# Patient Record
Sex: FEMALE | Race: WHITE | Employment: UNEMPLOYED | URBAN - METROPOLITAN AREA
[De-identification: names, ages, dates, MRNs, and addresses within clinical notes are randomized per-mention and may not be internally consistent; named-entity substitution may affect disease eponyms.]

---

## 2017-01-01 ENCOUNTER — APPOINTMENT (OUTPATIENT)
Dept: GENERAL RADIOLOGY | Age: 62
DRG: 216 | End: 2017-01-01
Attending: INTERNAL MEDICINE
Payer: MEDICARE

## 2017-01-01 ENCOUNTER — APPOINTMENT (OUTPATIENT)
Dept: ULTRASOUND IMAGING | Age: 62
DRG: 216 | End: 2017-01-01
Attending: INTERNAL MEDICINE
Payer: MEDICARE

## 2017-01-01 ENCOUNTER — APPOINTMENT (OUTPATIENT)
Dept: INTERVENTIONAL RADIOLOGY/VASCULAR | Age: 62
DRG: 216 | End: 2017-01-01
Attending: INTERNAL MEDICINE
Payer: MEDICARE

## 2017-01-01 ENCOUNTER — APPOINTMENT (OUTPATIENT)
Dept: GENERAL RADIOLOGY | Age: 62
DRG: 216 | End: 2017-01-01
Attending: SURGERY
Payer: MEDICARE

## 2017-01-01 ENCOUNTER — APPOINTMENT (OUTPATIENT)
Dept: CT IMAGING | Age: 62
End: 2017-01-01
Attending: NURSE PRACTITIONER
Payer: MEDICARE

## 2017-01-01 ENCOUNTER — ANESTHESIA (OUTPATIENT)
Dept: SURGERY | Age: 62
DRG: 216 | End: 2017-01-01
Payer: MEDICARE

## 2017-01-01 ENCOUNTER — HOSPITAL ENCOUNTER (INPATIENT)
Age: 62
LOS: 39 days | Discharge: HOSPICE | DRG: 216 | End: 2017-04-03
Attending: EMERGENCY MEDICINE | Admitting: INTERNAL MEDICINE
Payer: MEDICARE

## 2017-01-01 ENCOUNTER — ANESTHESIA EVENT (OUTPATIENT)
Dept: SURGERY | Age: 62
DRG: 216 | End: 2017-01-01
Payer: MEDICARE

## 2017-01-01 ENCOUNTER — HOSPICE ADMISSION (OUTPATIENT)
Dept: HOSPICE | Facility: HOSPICE | Age: 62
End: 2017-01-01

## 2017-01-01 ENCOUNTER — HOSPITAL ENCOUNTER (OUTPATIENT)
Dept: LAB | Age: 62
Discharge: HOME | End: 2017-02-21
Payer: MEDICARE

## 2017-01-01 ENCOUNTER — APPOINTMENT (OUTPATIENT)
Dept: GENERAL RADIOLOGY | Age: 62
DRG: 216 | End: 2017-01-01
Attending: UROLOGY
Payer: MEDICARE

## 2017-01-01 ENCOUNTER — OFFICE VISIT (OUTPATIENT)
Dept: FAMILY MEDICINE CLINIC | Age: 62
End: 2017-01-01

## 2017-01-01 ENCOUNTER — APPOINTMENT (OUTPATIENT)
Dept: CT IMAGING | Age: 62
DRG: 216 | End: 2017-01-01
Attending: SURGERY
Payer: MEDICARE

## 2017-01-01 ENCOUNTER — ANESTHESIA EVENT (OUTPATIENT)
Dept: NON INVASIVE DIAGNOSTICS | Age: 62
DRG: 216 | End: 2017-01-01
Payer: MEDICARE

## 2017-01-01 ENCOUNTER — APPOINTMENT (OUTPATIENT)
Dept: GENERAL RADIOLOGY | Age: 62
End: 2017-01-01
Attending: NURSE PRACTITIONER
Payer: MEDICARE

## 2017-01-01 ENCOUNTER — PATIENT OUTREACH (OUTPATIENT)
Dept: FAMILY MEDICINE CLINIC | Age: 62
End: 2017-01-01

## 2017-01-01 ENCOUNTER — ANESTHESIA (OUTPATIENT)
Dept: NON INVASIVE DIAGNOSTICS | Age: 62
DRG: 216 | End: 2017-01-01
Payer: MEDICARE

## 2017-01-01 ENCOUNTER — APPOINTMENT (OUTPATIENT)
Dept: ULTRASOUND IMAGING | Age: 62
DRG: 216 | End: 2017-01-01
Attending: UROLOGY
Payer: MEDICARE

## 2017-01-01 ENCOUNTER — APPOINTMENT (OUTPATIENT)
Dept: GENERAL RADIOLOGY | Age: 62
DRG: 216 | End: 2017-01-01
Attending: RADIOLOGY
Payer: MEDICARE

## 2017-01-01 ENCOUNTER — HOSPITAL ENCOUNTER (EMERGENCY)
Age: 62
Discharge: OTHER INSTITUTION | End: 2017-02-23
Attending: EMERGENCY MEDICINE
Payer: MEDICARE

## 2017-01-01 VITALS
OXYGEN SATURATION: 95 % | DIASTOLIC BLOOD PRESSURE: 52 MMHG | WEIGHT: 207.8 LBS | BODY MASS INDEX: 32.62 KG/M2 | HEART RATE: 80 BPM | SYSTOLIC BLOOD PRESSURE: 142 MMHG | HEIGHT: 67 IN | TEMPERATURE: 98 F | RESPIRATION RATE: 18 BRPM

## 2017-01-01 VITALS
HEIGHT: 66 IN | HEART RATE: 65 BPM | RESPIRATION RATE: 16 BRPM | OXYGEN SATURATION: 97 % | WEIGHT: 170.25 LBS | TEMPERATURE: 97.7 F | DIASTOLIC BLOOD PRESSURE: 98 MMHG | SYSTOLIC BLOOD PRESSURE: 152 MMHG | BODY MASS INDEX: 27.36 KG/M2

## 2017-01-01 VITALS
TEMPERATURE: 98.5 F | HEIGHT: 66 IN | RESPIRATION RATE: 21 BRPM | OXYGEN SATURATION: 93 % | BODY MASS INDEX: 27.64 KG/M2 | DIASTOLIC BLOOD PRESSURE: 50 MMHG | WEIGHT: 171.96 LBS | HEART RATE: 84 BPM | SYSTOLIC BLOOD PRESSURE: 122 MMHG

## 2017-01-01 VITALS
WEIGHT: 171.25 LBS | OXYGEN SATURATION: 96 % | HEIGHT: 66 IN | BODY MASS INDEX: 27.52 KG/M2 | SYSTOLIC BLOOD PRESSURE: 140 MMHG | TEMPERATURE: 97.3 F | RESPIRATION RATE: 16 BRPM | DIASTOLIC BLOOD PRESSURE: 86 MMHG | HEART RATE: 56 BPM

## 2017-01-01 DIAGNOSIS — I70.1 RENAL ARTERY ARTERIOSCLEROSIS (HCC): ICD-10-CM

## 2017-01-01 DIAGNOSIS — I71.00 DISSECTION OF AORTA, UNSPECIFIED PORTION OF AORTA (HCC): Primary | ICD-10-CM

## 2017-01-01 DIAGNOSIS — I10 ESSENTIAL HYPERTENSION: ICD-10-CM

## 2017-01-01 DIAGNOSIS — J32.0 MAXILLARY SINUSITIS, UNSPECIFIED CHRONICITY: Primary | ICD-10-CM

## 2017-01-01 DIAGNOSIS — E78.00 HYPERCHOLESTEROLEMIA: ICD-10-CM

## 2017-01-01 DIAGNOSIS — F41.9 ANXIETY: ICD-10-CM

## 2017-01-01 DIAGNOSIS — E03.1 CONGENITAL HYPOTHYROIDISM WITHOUT GOITER: ICD-10-CM

## 2017-01-01 DIAGNOSIS — I71.012 DISSECTION OF DESCENDING THORACIC AORTA: Primary | ICD-10-CM

## 2017-01-01 DIAGNOSIS — K29.70 VIRAL GASTRITIS: Primary | ICD-10-CM

## 2017-01-01 LAB
ABO + RH BLD: NORMAL
ACT BLD: 147 SECS (ref 79–138)
ALBUMIN SERPL BCP-MCNC: 1.5 G/DL (ref 3.5–5)
ALBUMIN SERPL BCP-MCNC: 1.5 G/DL (ref 3.5–5)
ALBUMIN SERPL BCP-MCNC: 1.6 G/DL (ref 3.5–5)
ALBUMIN SERPL BCP-MCNC: 1.7 G/DL (ref 3.5–5)
ALBUMIN SERPL BCP-MCNC: 2 G/DL (ref 3.5–5)
ALBUMIN SERPL BCP-MCNC: 2 G/DL (ref 3.5–5)
ALBUMIN SERPL BCP-MCNC: 2.1 G/DL (ref 3.5–5)
ALBUMIN SERPL BCP-MCNC: 2.2 G/DL (ref 3.5–5)
ALBUMIN SERPL BCP-MCNC: 2.3 G/DL (ref 3.5–5)
ALBUMIN SERPL BCP-MCNC: 2.4 G/DL (ref 3.5–5)
ALBUMIN SERPL BCP-MCNC: 2.5 G/DL (ref 3.5–5)
ALBUMIN SERPL BCP-MCNC: 2.5 G/DL (ref 3.5–5)
ALBUMIN SERPL BCP-MCNC: 3.1 G/DL (ref 3.5–5)
ALBUMIN SERPL-MCNC: 4.6 G/DL (ref 3.6–4.8)
ALBUMIN/GLOB SERPL: 0.3 {RATIO} (ref 1.1–2.2)
ALBUMIN/GLOB SERPL: 0.4 {RATIO} (ref 1.1–2.2)
ALBUMIN/GLOB SERPL: 0.5 {RATIO} (ref 1.1–2.2)
ALBUMIN/GLOB SERPL: 0.6 {RATIO} (ref 1.1–2.2)
ALBUMIN/GLOB SERPL: 0.7 {RATIO} (ref 1.1–2.2)
ALBUMIN/GLOB SERPL: 0.8 {RATIO} (ref 1.1–2.2)
ALBUMIN/GLOB SERPL: 0.9 {RATIO} (ref 1.1–2.2)
ALBUMIN/GLOB SERPL: 1.5 {RATIO} (ref 1.1–2.5)
ALP SERPL-CCNC: 101 U/L (ref 45–117)
ALP SERPL-CCNC: 103 U/L (ref 45–117)
ALP SERPL-CCNC: 104 U/L (ref 45–117)
ALP SERPL-CCNC: 107 U/L (ref 45–117)
ALP SERPL-CCNC: 109 U/L (ref 45–117)
ALP SERPL-CCNC: 117 U/L (ref 45–117)
ALP SERPL-CCNC: 118 U/L (ref 45–117)
ALP SERPL-CCNC: 120 U/L (ref 45–117)
ALP SERPL-CCNC: 121 U/L (ref 45–117)
ALP SERPL-CCNC: 130 U/L (ref 45–117)
ALP SERPL-CCNC: 135 U/L (ref 45–117)
ALP SERPL-CCNC: 136 U/L (ref 45–117)
ALP SERPL-CCNC: 142 U/L (ref 45–117)
ALP SERPL-CCNC: 147 U/L (ref 45–117)
ALP SERPL-CCNC: 148 U/L (ref 45–117)
ALP SERPL-CCNC: 148 U/L (ref 45–117)
ALP SERPL-CCNC: 156 U/L (ref 45–117)
ALP SERPL-CCNC: 157 U/L (ref 45–117)
ALP SERPL-CCNC: 159 U/L (ref 45–117)
ALP SERPL-CCNC: 162 U/L (ref 45–117)
ALP SERPL-CCNC: 162 U/L (ref 45–117)
ALP SERPL-CCNC: 166 U/L (ref 45–117)
ALP SERPL-CCNC: 169 U/L (ref 45–117)
ALP SERPL-CCNC: 177 U/L (ref 45–117)
ALP SERPL-CCNC: 182 U/L (ref 45–117)
ALP SERPL-CCNC: 182 U/L (ref 45–117)
ALP SERPL-CCNC: 199 U/L (ref 45–117)
ALP SERPL-CCNC: 203 U/L (ref 45–117)
ALP SERPL-CCNC: 79 U/L (ref 45–117)
ALP SERPL-CCNC: 85 IU/L (ref 39–117)
ALP SERPL-CCNC: 95 U/L (ref 45–117)
ALT SERPL-CCNC: 111 U/L (ref 12–78)
ALT SERPL-CCNC: 112 U/L (ref 12–78)
ALT SERPL-CCNC: 114 U/L (ref 12–78)
ALT SERPL-CCNC: 146 U/L (ref 12–78)
ALT SERPL-CCNC: 17 IU/L (ref 0–32)
ALT SERPL-CCNC: 17 U/L (ref 12–78)
ALT SERPL-CCNC: 18 U/L (ref 12–78)
ALT SERPL-CCNC: 198 U/L (ref 12–78)
ALT SERPL-CCNC: 20 U/L (ref 12–78)
ALT SERPL-CCNC: 22 U/L (ref 12–78)
ALT SERPL-CCNC: 22 U/L (ref 12–78)
ALT SERPL-CCNC: 23 U/L (ref 12–78)
ALT SERPL-CCNC: 25 U/L (ref 12–78)
ALT SERPL-CCNC: 25 U/L (ref 12–78)
ALT SERPL-CCNC: 29 U/L (ref 12–78)
ALT SERPL-CCNC: 34 U/L (ref 12–78)
ALT SERPL-CCNC: 35 U/L (ref 12–78)
ALT SERPL-CCNC: 37 U/L (ref 12–78)
ALT SERPL-CCNC: 41 U/L (ref 12–78)
ALT SERPL-CCNC: 42 U/L (ref 12–78)
ALT SERPL-CCNC: 42 U/L (ref 12–78)
ALT SERPL-CCNC: 43 U/L (ref 12–78)
ALT SERPL-CCNC: 50 U/L (ref 12–78)
ALT SERPL-CCNC: 53 U/L (ref 12–78)
ALT SERPL-CCNC: 57 U/L (ref 12–78)
ALT SERPL-CCNC: 58 U/L (ref 12–78)
ALT SERPL-CCNC: 63 U/L (ref 12–78)
ALT SERPL-CCNC: 66 U/L (ref 12–78)
ALT SERPL-CCNC: 72 U/L (ref 12–78)
ALT SERPL-CCNC: 80 U/L (ref 12–78)
ALT SERPL-CCNC: 81 U/L (ref 12–78)
ANION GAP BLD CALC-SCNC: 10 MMOL/L (ref 5–15)
ANION GAP BLD CALC-SCNC: 11 MMOL/L (ref 5–15)
ANION GAP BLD CALC-SCNC: 12 MMOL/L (ref 5–15)
ANION GAP BLD CALC-SCNC: 13 MMOL/L (ref 5–15)
ANION GAP BLD CALC-SCNC: 14 MMOL/L (ref 5–15)
ANION GAP BLD CALC-SCNC: 15 MMOL/L (ref 5–15)
ANION GAP BLD CALC-SCNC: 6 MMOL/L (ref 5–15)
ANION GAP BLD CALC-SCNC: 7 MMOL/L (ref 5–15)
ANION GAP BLD CALC-SCNC: 9 MMOL/L (ref 5–15)
ANTI-COMPLEMENT (C3B,C3D): NORMAL
APPEARANCE UR: ABNORMAL
ARTERIAL PATENCY WRIST A: ABNORMAL
ARTERIAL PATENCY WRIST A: YES
AST SERPL W P-5'-P-CCNC: 107 U/L (ref 15–37)
AST SERPL W P-5'-P-CCNC: 121 U/L (ref 15–37)
AST SERPL W P-5'-P-CCNC: 138 U/L (ref 15–37)
AST SERPL W P-5'-P-CCNC: 142 U/L (ref 15–37)
AST SERPL W P-5'-P-CCNC: 146 U/L (ref 15–37)
AST SERPL W P-5'-P-CCNC: 155 U/L (ref 15–37)
AST SERPL W P-5'-P-CCNC: 16 U/L (ref 15–37)
AST SERPL W P-5'-P-CCNC: 17 U/L (ref 15–37)
AST SERPL W P-5'-P-CCNC: 20 U/L (ref 15–37)
AST SERPL W P-5'-P-CCNC: 20 U/L (ref 15–37)
AST SERPL W P-5'-P-CCNC: 23 U/L (ref 15–37)
AST SERPL W P-5'-P-CCNC: 249 U/L (ref 15–37)
AST SERPL W P-5'-P-CCNC: 25 U/L (ref 15–37)
AST SERPL W P-5'-P-CCNC: 25 U/L (ref 15–37)
AST SERPL W P-5'-P-CCNC: 26 U/L (ref 15–37)
AST SERPL W P-5'-P-CCNC: 27 U/L (ref 15–37)
AST SERPL W P-5'-P-CCNC: 27 U/L (ref 15–37)
AST SERPL W P-5'-P-CCNC: 32 U/L (ref 15–37)
AST SERPL W P-5'-P-CCNC: 32 U/L (ref 15–37)
AST SERPL W P-5'-P-CCNC: 35 U/L (ref 15–37)
AST SERPL W P-5'-P-CCNC: 55 U/L (ref 15–37)
AST SERPL W P-5'-P-CCNC: 58 U/L (ref 15–37)
AST SERPL W P-5'-P-CCNC: 61 U/L (ref 15–37)
AST SERPL W P-5'-P-CCNC: 64 U/L (ref 15–37)
AST SERPL W P-5'-P-CCNC: 65 U/L (ref 15–37)
AST SERPL W P-5'-P-CCNC: 68 U/L (ref 15–37)
AST SERPL W P-5'-P-CCNC: 76 U/L (ref 15–37)
AST SERPL W P-5'-P-CCNC: 79 U/L (ref 15–37)
AST SERPL W P-5'-P-CCNC: 88 U/L (ref 15–37)
AST SERPL W P-5'-P-CCNC: 89 U/L (ref 15–37)
AST SERPL-CCNC: 23 IU/L (ref 0–40)
ATRIAL RATE: 70 BPM
ATRIAL RATE: 77 BPM
BACTERIA SPEC CULT: ABNORMAL
BACTERIA SPEC CULT: NORMAL
BACTERIA SPEC CULT: NORMAL
BACTERIA URNS QL MICRO: NEGATIVE /HPF
BASE DEFICIT BLDA-SCNC: 0.6 MMOL/L
BASE DEFICIT BLDA-SCNC: 1.1 MMOL/L
BASE DEFICIT BLDA-SCNC: 1.3 MMOL/L
BASE DEFICIT BLDA-SCNC: 1.4 MMOL/L
BASE DEFICIT BLDA-SCNC: 1.5 MMOL/L
BASE DEFICIT BLDA-SCNC: 2.6 MMOL/L
BASE DEFICIT BLDA-SCNC: 2.6 MMOL/L
BASE DEFICIT BLDA-SCNC: 4.5 MMOL/L
BASE DEFICIT BLDA-SCNC: 4.5 MMOL/L
BASE DEFICIT BLDA-SCNC: 4.7 MMOL/L
BASE DEFICIT BLDA-SCNC: 5.2 MMOL/L
BASE DEFICIT BLDA-SCNC: 7.9 MMOL/L
BASE DEFICIT BLDA-SCNC: 8.3 MMOL/L
BASE DEFICIT BLDA-SCNC: 9.1 MMOL/L
BASE EXCESS BLDA CALC-SCNC: 0.2 MMOL/L
BASOPHILS # BLD AUTO: 0 K/UL
BASOPHILS # BLD AUTO: 0 K/UL (ref 0–0.1)
BASOPHILS # BLD AUTO: 0 X10E3/UL (ref 0–0.2)
BASOPHILS # BLD AUTO: 0.1 K/UL (ref 0–0.1)
BASOPHILS # BLD AUTO: 0.1 K/UL (ref 0–0.1)
BASOPHILS # BLD: 0 %
BASOPHILS # BLD: 0 % (ref 0–1)
BASOPHILS # BLD: 1 % (ref 0–1)
BASOPHILS # BLD: 1 % (ref 0–1)
BASOPHILS NFR BLD AUTO: 0 %
BDY SITE: ABNORMAL
BILIRUB SERPL-MCNC: 0.2 MG/DL (ref 0.2–1)
BILIRUB SERPL-MCNC: 0.3 MG/DL (ref 0.2–1)
BILIRUB SERPL-MCNC: 0.3 MG/DL (ref 0–1.2)
BILIRUB SERPL-MCNC: 0.4 MG/DL (ref 0.2–1)
BILIRUB SERPL-MCNC: 0.5 MG/DL (ref 0.2–1)
BILIRUB SERPL-MCNC: 0.6 MG/DL (ref 0.2–1)
BILIRUB SERPL-MCNC: 0.7 MG/DL (ref 0.2–1)
BILIRUB SERPL-MCNC: 0.8 MG/DL (ref 0.2–1)
BILIRUB SERPL-MCNC: 1 MG/DL (ref 0.2–1)
BILIRUB UR QL: NEGATIVE
BLD GP AB SCN SERPL QL ELUTION: NORMAL
BLD PROD TYP BPU: NORMAL
BLOOD BAG HEMOLYSIS,BLBAG: NORMAL
BLOOD BANK CMNT PATIENT-IMP: NORMAL
BLOOD GROUP ANTIBODIES SERPL: NORMAL
BNP SERPL-MCNC: 1319 PG/ML (ref 0–100)
BPU ID: NORMAL
BREATHS.SPONTANEOUS ON VENT: 18
BREATHS.SPONTANEOUS ON VENT: 28
BUN SERPL-MCNC: 10 MG/DL (ref 6–20)
BUN SERPL-MCNC: 11 MG/DL (ref 6–20)
BUN SERPL-MCNC: 12 MG/DL (ref 6–20)
BUN SERPL-MCNC: 12 MG/DL (ref 6–20)
BUN SERPL-MCNC: 14 MG/DL (ref 6–20)
BUN SERPL-MCNC: 16 MG/DL (ref 6–20)
BUN SERPL-MCNC: 18 MG/DL (ref 6–20)
BUN SERPL-MCNC: 20 MG/DL (ref 6–20)
BUN SERPL-MCNC: 23 MG/DL (ref 6–20)
BUN SERPL-MCNC: 23 MG/DL (ref 6–20)
BUN SERPL-MCNC: 25 MG/DL (ref 6–20)
BUN SERPL-MCNC: 26 MG/DL (ref 6–20)
BUN SERPL-MCNC: 27 MG/DL (ref 8–27)
BUN SERPL-MCNC: 30 MG/DL (ref 6–20)
BUN SERPL-MCNC: 33 MG/DL (ref 6–20)
BUN SERPL-MCNC: 33 MG/DL (ref 6–20)
BUN SERPL-MCNC: 37 MG/DL (ref 6–20)
BUN SERPL-MCNC: 39 MG/DL (ref 6–20)
BUN SERPL-MCNC: 39 MG/DL (ref 6–20)
BUN SERPL-MCNC: 42 MG/DL (ref 6–20)
BUN SERPL-MCNC: 51 MG/DL (ref 6–20)
BUN SERPL-MCNC: 52 MG/DL (ref 6–20)
BUN SERPL-MCNC: 54 MG/DL (ref 6–20)
BUN SERPL-MCNC: 55 MG/DL (ref 6–20)
BUN SERPL-MCNC: 56 MG/DL (ref 6–20)
BUN SERPL-MCNC: 62 MG/DL (ref 6–20)
BUN SERPL-MCNC: 64 MG/DL (ref 6–20)
BUN SERPL-MCNC: 66 MG/DL (ref 6–20)
BUN SERPL-MCNC: 67 MG/DL (ref 6–20)
BUN SERPL-MCNC: 68 MG/DL (ref 6–20)
BUN SERPL-MCNC: 72 MG/DL (ref 6–20)
BUN SERPL-MCNC: 73 MG/DL (ref 6–20)
BUN SERPL-MCNC: 74 MG/DL (ref 6–20)
BUN SERPL-MCNC: 78 MG/DL (ref 6–20)
BUN SERPL-MCNC: 80 MG/DL (ref 6–20)
BUN SERPL-MCNC: 80 MG/DL (ref 6–20)
BUN SERPL-MCNC: 81 MG/DL (ref 6–20)
BUN SERPL-MCNC: 9 MG/DL (ref 6–20)
BUN SERPL-MCNC: 94 MG/DL (ref 6–20)
BUN/CREAT SERPL: 10 (ref 12–20)
BUN/CREAT SERPL: 11 (ref 12–20)
BUN/CREAT SERPL: 13 (ref 12–20)
BUN/CREAT SERPL: 14 (ref 12–20)
BUN/CREAT SERPL: 16 (ref 11–26)
BUN/CREAT SERPL: 16 (ref 12–20)
BUN/CREAT SERPL: 17 (ref 12–20)
BUN/CREAT SERPL: 20 (ref 12–20)
BUN/CREAT SERPL: 20 (ref 12–20)
BUN/CREAT SERPL: 21 (ref 12–20)
BUN/CREAT SERPL: 21 (ref 12–20)
BUN/CREAT SERPL: 22 (ref 12–20)
BUN/CREAT SERPL: 23 (ref 12–20)
BUN/CREAT SERPL: 23 (ref 12–20)
BUN/CREAT SERPL: 24 (ref 12–20)
BUN/CREAT SERPL: 25 (ref 12–20)
BUN/CREAT SERPL: 25 (ref 12–20)
BUN/CREAT SERPL: 26 (ref 12–20)
BUN/CREAT SERPL: 26 (ref 12–20)
BUN/CREAT SERPL: 27 (ref 12–20)
BUN/CREAT SERPL: 29 (ref 12–20)
BUN/CREAT SERPL: 29 (ref 12–20)
BUN/CREAT SERPL: 30 (ref 12–20)
BUN/CREAT SERPL: 31 (ref 12–20)
BUN/CREAT SERPL: 32 (ref 12–20)
BUN/CREAT SERPL: 7 (ref 12–20)
BUN/CREAT SERPL: 7 (ref 12–20)
BUN/CREAT SERPL: 8 (ref 12–20)
BUN/CREAT SERPL: 9 (ref 12–20)
CALCIUM SERPL-MCNC: 10.7 MG/DL (ref 8.7–10.3)
CALCIUM SERPL-MCNC: 7.2 MG/DL (ref 8.5–10.1)
CALCIUM SERPL-MCNC: 7.5 MG/DL (ref 8.5–10.1)
CALCIUM SERPL-MCNC: 7.5 MG/DL (ref 8.5–10.1)
CALCIUM SERPL-MCNC: 7.7 MG/DL (ref 8.5–10.1)
CALCIUM SERPL-MCNC: 7.8 MG/DL (ref 8.5–10.1)
CALCIUM SERPL-MCNC: 7.9 MG/DL (ref 8.5–10.1)
CALCIUM SERPL-MCNC: 8 MG/DL (ref 8.5–10.1)
CALCIUM SERPL-MCNC: 8.1 MG/DL (ref 8.5–10.1)
CALCIUM SERPL-MCNC: 8.2 MG/DL (ref 8.5–10.1)
CALCIUM SERPL-MCNC: 8.3 MG/DL (ref 8.5–10.1)
CALCIUM SERPL-MCNC: 8.4 MG/DL (ref 8.5–10.1)
CALCIUM SERPL-MCNC: 8.5 MG/DL (ref 8.5–10.1)
CALCIUM SERPL-MCNC: 8.6 MG/DL (ref 8.5–10.1)
CALCIUM SERPL-MCNC: 8.7 MG/DL (ref 8.5–10.1)
CALCIUM SERPL-MCNC: 8.8 MG/DL (ref 8.5–10.1)
CALCIUM SERPL-MCNC: 8.9 MG/DL (ref 8.5–10.1)
CALCIUM SERPL-MCNC: 8.9 MG/DL (ref 8.5–10.1)
CALCIUM SERPL-MCNC: 9 MG/DL (ref 8.5–10.1)
CALCIUM SERPL-MCNC: 9 MG/DL (ref 8.5–10.1)
CALCIUM SERPL-MCNC: 9.1 MG/DL (ref 8.5–10.1)
CALCIUM SERPL-MCNC: 9.2 MG/DL (ref 8.5–10.1)
CALCULATED P AXIS, ECG09: 70 DEGREES
CALCULATED P AXIS, ECG09: 70 DEGREES
CALCULATED R AXIS, ECG10: -6 DEGREES
CALCULATED R AXIS, ECG10: 8 DEGREES
CALCULATED T AXIS, ECG11: 102 DEGREES
CALCULATED T AXIS, ECG11: 146 DEGREES
CC UR VC: ABNORMAL
CHLORIDE SERPL-SCNC: 100 MMOL/L (ref 97–108)
CHLORIDE SERPL-SCNC: 102 MMOL/L (ref 97–108)
CHLORIDE SERPL-SCNC: 103 MMOL/L (ref 97–108)
CHLORIDE SERPL-SCNC: 104 MMOL/L (ref 97–108)
CHLORIDE SERPL-SCNC: 105 MMOL/L (ref 97–108)
CHLORIDE SERPL-SCNC: 106 MMOL/L (ref 97–108)
CHLORIDE SERPL-SCNC: 107 MMOL/L (ref 97–108)
CHLORIDE SERPL-SCNC: 108 MMOL/L (ref 97–108)
CHLORIDE SERPL-SCNC: 109 MMOL/L (ref 97–108)
CHLORIDE SERPL-SCNC: 111 MMOL/L (ref 97–108)
CHLORIDE SERPL-SCNC: 111 MMOL/L (ref 97–108)
CHLORIDE SERPL-SCNC: 85 MMOL/L (ref 96–106)
CHLORIDE SERPL-SCNC: 93 MMOL/L (ref 97–108)
CHLORIDE SERPL-SCNC: 94 MMOL/L (ref 97–108)
CHLORIDE SERPL-SCNC: 96 MMOL/L (ref 97–108)
CHLORIDE SERPL-SCNC: 96 MMOL/L (ref 97–108)
CHLORIDE SERPL-SCNC: 97 MMOL/L (ref 97–108)
CHLORIDE SERPL-SCNC: 99 MMOL/L (ref 97–108)
CHLORIDE UR-SCNC: 19 MMOL/L
CHOLEST SERPL-MCNC: 263 MG/DL (ref 100–199)
CLERICAL ERRORS,CLERR: NORMAL
CO2 SERPL-SCNC: 18 MMOL/L (ref 21–32)
CO2 SERPL-SCNC: 19 MMOL/L (ref 21–32)
CO2 SERPL-SCNC: 20 MMOL/L (ref 21–32)
CO2 SERPL-SCNC: 21 MMOL/L (ref 21–32)
CO2 SERPL-SCNC: 22 MMOL/L (ref 21–32)
CO2 SERPL-SCNC: 23 MMOL/L (ref 21–32)
CO2 SERPL-SCNC: 24 MMOL/L (ref 21–32)
CO2 SERPL-SCNC: 25 MMOL/L (ref 21–32)
CO2 SERPL-SCNC: 26 MMOL/L (ref 18–29)
CO2 SERPL-SCNC: 26 MMOL/L (ref 21–32)
CO2 SERPL-SCNC: 26 MMOL/L (ref 21–32)
CO2 SERPL-SCNC: 27 MMOL/L (ref 21–32)
CO2 SERPL-SCNC: 30 MMOL/L (ref 21–32)
COLOR UR: ABNORMAL
CREAT SERPL-MCNC: 1.12 MG/DL (ref 0.55–1.02)
CREAT SERPL-MCNC: 1.16 MG/DL (ref 0.55–1.02)
CREAT SERPL-MCNC: 1.18 MG/DL (ref 0.55–1.02)
CREAT SERPL-MCNC: 1.21 MG/DL (ref 0.55–1.02)
CREAT SERPL-MCNC: 1.22 MG/DL (ref 0.55–1.02)
CREAT SERPL-MCNC: 1.22 MG/DL (ref 0.55–1.02)
CREAT SERPL-MCNC: 1.24 MG/DL (ref 0.55–1.02)
CREAT SERPL-MCNC: 1.28 MG/DL (ref 0.55–1.02)
CREAT SERPL-MCNC: 1.35 MG/DL (ref 0.55–1.02)
CREAT SERPL-MCNC: 1.43 MG/DL (ref 0.55–1.02)
CREAT SERPL-MCNC: 1.44 MG/DL (ref 0.55–1.02)
CREAT SERPL-MCNC: 1.47 MG/DL (ref 0.55–1.02)
CREAT SERPL-MCNC: 1.55 MG/DL (ref 0.55–1.02)
CREAT SERPL-MCNC: 1.56 MG/DL (ref 0.55–1.02)
CREAT SERPL-MCNC: 1.61 MG/DL (ref 0.55–1.02)
CREAT SERPL-MCNC: 1.61 MG/DL (ref 0.55–1.02)
CREAT SERPL-MCNC: 1.73 MG/DL (ref 0.57–1)
CREAT SERPL-MCNC: 1.76 MG/DL (ref 0.55–1.02)
CREAT SERPL-MCNC: 1.76 MG/DL (ref 0.55–1.02)
CREAT SERPL-MCNC: 1.81 MG/DL (ref 0.55–1.02)
CREAT SERPL-MCNC: 1.92 MG/DL (ref 0.55–1.02)
CREAT SERPL-MCNC: 1.96 MG/DL (ref 0.55–1.02)
CREAT SERPL-MCNC: 2.13 MG/DL (ref 0.55–1.02)
CREAT SERPL-MCNC: 2.21 MG/DL (ref 0.55–1.02)
CREAT SERPL-MCNC: 2.27 MG/DL (ref 0.55–1.02)
CREAT SERPL-MCNC: 2.31 MG/DL (ref 0.55–1.02)
CREAT SERPL-MCNC: 2.34 MG/DL (ref 0.55–1.02)
CREAT SERPL-MCNC: 2.46 MG/DL (ref 0.55–1.02)
CREAT SERPL-MCNC: 2.53 MG/DL (ref 0.55–1.02)
CREAT SERPL-MCNC: 2.61 MG/DL (ref 0.55–1.02)
CREAT SERPL-MCNC: 2.63 MG/DL (ref 0.55–1.02)
CREAT SERPL-MCNC: 2.73 MG/DL (ref 0.55–1.02)
CREAT SERPL-MCNC: 2.78 MG/DL (ref 0.55–1.02)
CREAT SERPL-MCNC: 2.81 MG/DL (ref 0.55–1.02)
CREAT SERPL-MCNC: 2.9 MG/DL (ref 0.55–1.02)
CREAT SERPL-MCNC: 2.95 MG/DL (ref 0.55–1.02)
CREAT SERPL-MCNC: 3 MG/DL (ref 0.55–1.02)
CREAT SERPL-MCNC: 3.04 MG/DL (ref 0.55–1.02)
CREAT SERPL-MCNC: 3.05 MG/DL (ref 0.55–1.02)
CREAT SERPL-MCNC: 3.07 MG/DL (ref 0.55–1.02)
CREAT SERPL-MCNC: 3.12 MG/DL (ref 0.55–1.02)
CREAT SERPL-MCNC: 3.18 MG/DL (ref 0.55–1.02)
CREAT SERPL-MCNC: 3.21 MG/DL (ref 0.55–1.02)
CREAT UR-MCNC: 58 MG/DL
CROSSMATCH RESULT,%XM: NORMAL
DAT IGG-SP REAG RBC QL: NORMAL
DAT P TRANSF RBC QL: NORMAL
DAT POLY-SP REAG RBC QL: NORMAL
DAT RBC QL: NORMAL
DATE LAST DOSE: ABNORMAL
DATE LAST DOSE: ABNORMAL
DIAGNOSIS, 93000: NORMAL
DIAGNOSIS, 93000: NORMAL
DIFFERENTIAL METHOD BLD: ABNORMAL
EOSINOPHIL # BLD AUTO: 0.4 X10E3/UL (ref 0–0.4)
EOSINOPHIL # BLD: 0 K/UL
EOSINOPHIL # BLD: 0.1 K/UL
EOSINOPHIL # BLD: 0.1 K/UL (ref 0–0.4)
EOSINOPHIL # BLD: 0.2 K/UL
EOSINOPHIL # BLD: 0.2 K/UL (ref 0–0.4)
EOSINOPHIL # BLD: 0.3 K/UL (ref 0–0.4)
EOSINOPHIL # BLD: 0.4 K/UL (ref 0–0.4)
EOSINOPHIL # BLD: 0.5 K/UL (ref 0–0.4)
EOSINOPHIL # BLD: 0.6 K/UL (ref 0–0.4)
EOSINOPHIL # BLD: 0.7 K/UL
EOSINOPHIL # BLD: 0.7 K/UL (ref 0–0.4)
EOSINOPHIL # BLD: 0.8 K/UL (ref 0–0.4)
EOSINOPHIL NFR BLD AUTO: 3 %
EOSINOPHIL NFR BLD: 0 %
EOSINOPHIL NFR BLD: 1 %
EOSINOPHIL NFR BLD: 1 % (ref 0–7)
EOSINOPHIL NFR BLD: 2 %
EOSINOPHIL NFR BLD: 2 % (ref 0–7)
EOSINOPHIL NFR BLD: 3 % (ref 0–7)
EOSINOPHIL NFR BLD: 4 % (ref 0–7)
EOSINOPHIL NFR BLD: 5 %
EOSINOPHIL NFR BLD: 5 % (ref 0–7)
EOSINOPHIL NFR BLD: 7 % (ref 0–7)
EOSINOPHIL NFR BLD: 7 % (ref 0–7)
EPAP/CPAP/PEEP, PAPEEP: 5
EPAP/CPAP/PEEP, PAPEEP: 6
EPAP/CPAP/PEEP, PAPEEP: 7
EPAP/CPAP/PEEP, PAPEEP: 8
EPITH CASTS URNS QL MICRO: ABNORMAL /LPF
ERYTHROCYTE [DISTWIDTH] IN BLOOD BY AUTOMATED COUNT: 14.1 % (ref 11.5–14.5)
ERYTHROCYTE [DISTWIDTH] IN BLOOD BY AUTOMATED COUNT: 14.3 % (ref 11.5–14.5)
ERYTHROCYTE [DISTWIDTH] IN BLOOD BY AUTOMATED COUNT: 14.4 % (ref 11.5–14.5)
ERYTHROCYTE [DISTWIDTH] IN BLOOD BY AUTOMATED COUNT: 14.5 % (ref 11.5–14.5)
ERYTHROCYTE [DISTWIDTH] IN BLOOD BY AUTOMATED COUNT: 14.6 % (ref 11.5–14.5)
ERYTHROCYTE [DISTWIDTH] IN BLOOD BY AUTOMATED COUNT: 14.8 % (ref 11.5–14.5)
ERYTHROCYTE [DISTWIDTH] IN BLOOD BY AUTOMATED COUNT: 14.8 % (ref 11.5–14.5)
ERYTHROCYTE [DISTWIDTH] IN BLOOD BY AUTOMATED COUNT: 14.9 % (ref 11.5–14.5)
ERYTHROCYTE [DISTWIDTH] IN BLOOD BY AUTOMATED COUNT: 14.9 % (ref 11.5–14.5)
ERYTHROCYTE [DISTWIDTH] IN BLOOD BY AUTOMATED COUNT: 15 % (ref 11.5–14.5)
ERYTHROCYTE [DISTWIDTH] IN BLOOD BY AUTOMATED COUNT: 15 % (ref 11.5–14.5)
ERYTHROCYTE [DISTWIDTH] IN BLOOD BY AUTOMATED COUNT: 15 % (ref 12.3–15.4)
ERYTHROCYTE [DISTWIDTH] IN BLOOD BY AUTOMATED COUNT: 15.2 % (ref 11.5–14.5)
ERYTHROCYTE [DISTWIDTH] IN BLOOD BY AUTOMATED COUNT: 15.3 % (ref 11.5–14.5)
ERYTHROCYTE [DISTWIDTH] IN BLOOD BY AUTOMATED COUNT: 15.4 % (ref 11.5–14.5)
ERYTHROCYTE [DISTWIDTH] IN BLOOD BY AUTOMATED COUNT: 15.7 % (ref 11.5–14.5)
ERYTHROCYTE [DISTWIDTH] IN BLOOD BY AUTOMATED COUNT: 15.7 % (ref 11.5–14.5)
ERYTHROCYTE [DISTWIDTH] IN BLOOD BY AUTOMATED COUNT: 16.4 % (ref 11.5–14.5)
ERYTHROCYTE [DISTWIDTH] IN BLOOD BY AUTOMATED COUNT: 16.4 % (ref 11.5–14.5)
ERYTHROCYTE [DISTWIDTH] IN BLOOD BY AUTOMATED COUNT: 16.6 % (ref 11.5–14.5)
ERYTHROCYTE [DISTWIDTH] IN BLOOD BY AUTOMATED COUNT: 17.1 % (ref 11.5–14.5)
ERYTHROCYTE [DISTWIDTH] IN BLOOD BY AUTOMATED COUNT: 17.2 % (ref 11.5–14.5)
ERYTHROCYTE [DISTWIDTH] IN BLOOD BY AUTOMATED COUNT: 17.2 % (ref 11.5–14.5)
ERYTHROCYTE [DISTWIDTH] IN BLOOD BY AUTOMATED COUNT: 17.3 % (ref 11.5–14.5)
ERYTHROCYTE [DISTWIDTH] IN BLOOD BY AUTOMATED COUNT: 17.3 % (ref 11.5–14.5)
ERYTHROCYTE [DISTWIDTH] IN BLOOD BY AUTOMATED COUNT: 17.4 % (ref 11.5–14.5)
ERYTHROCYTE [DISTWIDTH] IN BLOOD BY AUTOMATED COUNT: 17.6 % (ref 11.5–14.5)
ERYTHROCYTE [DISTWIDTH] IN BLOOD BY AUTOMATED COUNT: 17.7 % (ref 11.5–14.5)
ERYTHROCYTE [DISTWIDTH] IN BLOOD BY AUTOMATED COUNT: 17.8 % (ref 11.5–14.5)
FIO2 ON VENT: 100 %
FIO2 ON VENT: 100 %
FIO2 ON VENT: 40 %
FIO2 ON VENT: 40 %
FIO2 ON VENT: 50 %
FIO2 ON VENT: 60 %
FIO2 ON VENT: 80 %
GAS FLOW.O2 O2 DELIVERY SYS: 5 L/MIN
GAS FLOW.O2 SETTING OXYMISER: 10 L/MIN
GAS FLOW.O2 SETTING OXYMISER: 12 L/MIN
GAS FLOW.O2 SETTING OXYMISER: 125 L/MIN
GAS FLOW.O2 SETTING OXYMISER: 16 L/MIN
GAS FLOW.O2 SETTING OXYMISER: 18 L/MIN
GLOBULIN SER CALC-MCNC: 2.7 G/DL (ref 2–4)
GLOBULIN SER CALC-MCNC: 3 G/DL (ref 1.5–4.5)
GLOBULIN SER CALC-MCNC: 3.1 G/DL (ref 2–4)
GLOBULIN SER CALC-MCNC: 3.4 G/DL (ref 2–4)
GLOBULIN SER CALC-MCNC: 3.4 G/DL (ref 2–4)
GLOBULIN SER CALC-MCNC: 3.5 G/DL (ref 2–4)
GLOBULIN SER CALC-MCNC: 3.6 G/DL (ref 2–4)
GLOBULIN SER CALC-MCNC: 3.6 G/DL (ref 2–4)
GLOBULIN SER CALC-MCNC: 3.7 G/DL (ref 2–4)
GLOBULIN SER CALC-MCNC: 3.8 G/DL (ref 2–4)
GLOBULIN SER CALC-MCNC: 3.9 G/DL (ref 2–4)
GLOBULIN SER CALC-MCNC: 4 G/DL (ref 2–4)
GLOBULIN SER CALC-MCNC: 4.1 G/DL (ref 2–4)
GLOBULIN SER CALC-MCNC: 4.2 G/DL (ref 2–4)
GLOBULIN SER CALC-MCNC: 4.3 G/DL (ref 2–4)
GLOBULIN SER CALC-MCNC: 4.3 G/DL (ref 2–4)
GLOBULIN SER CALC-MCNC: 4.4 G/DL (ref 2–4)
GLOBULIN SER CALC-MCNC: 4.4 G/DL (ref 2–4)
GLOBULIN SER CALC-MCNC: 4.5 G/DL (ref 2–4)
GLOBULIN SER CALC-MCNC: 4.6 G/DL (ref 2–4)
GLUCOSE BLD STRIP.AUTO-MCNC: 111 MG/DL (ref 65–100)
GLUCOSE BLD STRIP.AUTO-MCNC: 113 MG/DL (ref 65–100)
GLUCOSE BLD STRIP.AUTO-MCNC: 120 MG/DL (ref 65–100)
GLUCOSE BLD STRIP.AUTO-MCNC: 120 MG/DL (ref 65–100)
GLUCOSE BLD STRIP.AUTO-MCNC: 121 MG/DL (ref 65–100)
GLUCOSE BLD STRIP.AUTO-MCNC: 121 MG/DL (ref 65–100)
GLUCOSE BLD STRIP.AUTO-MCNC: 122 MG/DL (ref 65–100)
GLUCOSE BLD STRIP.AUTO-MCNC: 125 MG/DL (ref 65–100)
GLUCOSE BLD STRIP.AUTO-MCNC: 126 MG/DL (ref 65–100)
GLUCOSE BLD STRIP.AUTO-MCNC: 128 MG/DL (ref 65–100)
GLUCOSE BLD STRIP.AUTO-MCNC: 129 MG/DL (ref 65–100)
GLUCOSE BLD STRIP.AUTO-MCNC: 130 MG/DL (ref 65–100)
GLUCOSE BLD STRIP.AUTO-MCNC: 132 MG/DL (ref 65–100)
GLUCOSE BLD STRIP.AUTO-MCNC: 134 MG/DL (ref 65–100)
GLUCOSE BLD STRIP.AUTO-MCNC: 135 MG/DL (ref 65–100)
GLUCOSE BLD STRIP.AUTO-MCNC: 138 MG/DL (ref 65–100)
GLUCOSE BLD STRIP.AUTO-MCNC: 140 MG/DL (ref 65–100)
GLUCOSE BLD STRIP.AUTO-MCNC: 141 MG/DL (ref 65–100)
GLUCOSE BLD STRIP.AUTO-MCNC: 142 MG/DL (ref 65–100)
GLUCOSE BLD STRIP.AUTO-MCNC: 142 MG/DL (ref 65–100)
GLUCOSE BLD STRIP.AUTO-MCNC: 143 MG/DL (ref 65–100)
GLUCOSE BLD STRIP.AUTO-MCNC: 144 MG/DL (ref 65–100)
GLUCOSE BLD STRIP.AUTO-MCNC: 144 MG/DL (ref 65–100)
GLUCOSE BLD STRIP.AUTO-MCNC: 148 MG/DL (ref 65–100)
GLUCOSE BLD STRIP.AUTO-MCNC: 149 MG/DL (ref 65–100)
GLUCOSE BLD STRIP.AUTO-MCNC: 152 MG/DL (ref 65–100)
GLUCOSE BLD STRIP.AUTO-MCNC: 152 MG/DL (ref 65–100)
GLUCOSE BLD STRIP.AUTO-MCNC: 153 MG/DL (ref 65–100)
GLUCOSE BLD STRIP.AUTO-MCNC: 155 MG/DL (ref 65–100)
GLUCOSE BLD STRIP.AUTO-MCNC: 157 MG/DL (ref 65–100)
GLUCOSE BLD STRIP.AUTO-MCNC: 158 MG/DL (ref 65–100)
GLUCOSE BLD STRIP.AUTO-MCNC: 159 MG/DL (ref 65–100)
GLUCOSE BLD STRIP.AUTO-MCNC: 160 MG/DL (ref 65–100)
GLUCOSE BLD STRIP.AUTO-MCNC: 162 MG/DL (ref 65–100)
GLUCOSE BLD STRIP.AUTO-MCNC: 163 MG/DL (ref 65–100)
GLUCOSE BLD STRIP.AUTO-MCNC: 164 MG/DL (ref 65–100)
GLUCOSE BLD STRIP.AUTO-MCNC: 164 MG/DL (ref 65–100)
GLUCOSE BLD STRIP.AUTO-MCNC: 166 MG/DL (ref 65–100)
GLUCOSE BLD STRIP.AUTO-MCNC: 167 MG/DL (ref 65–100)
GLUCOSE BLD STRIP.AUTO-MCNC: 168 MG/DL (ref 65–100)
GLUCOSE BLD STRIP.AUTO-MCNC: 170 MG/DL (ref 65–100)
GLUCOSE BLD STRIP.AUTO-MCNC: 172 MG/DL (ref 65–100)
GLUCOSE BLD STRIP.AUTO-MCNC: 173 MG/DL (ref 65–100)
GLUCOSE BLD STRIP.AUTO-MCNC: 175 MG/DL (ref 65–100)
GLUCOSE BLD STRIP.AUTO-MCNC: 175 MG/DL (ref 65–100)
GLUCOSE BLD STRIP.AUTO-MCNC: 176 MG/DL (ref 65–100)
GLUCOSE BLD STRIP.AUTO-MCNC: 180 MG/DL (ref 65–100)
GLUCOSE BLD STRIP.AUTO-MCNC: 180 MG/DL (ref 65–100)
GLUCOSE BLD STRIP.AUTO-MCNC: 182 MG/DL (ref 65–100)
GLUCOSE BLD STRIP.AUTO-MCNC: 183 MG/DL (ref 65–100)
GLUCOSE BLD STRIP.AUTO-MCNC: 184 MG/DL (ref 65–100)
GLUCOSE BLD STRIP.AUTO-MCNC: 184 MG/DL (ref 65–100)
GLUCOSE BLD STRIP.AUTO-MCNC: 187 MG/DL (ref 65–100)
GLUCOSE BLD STRIP.AUTO-MCNC: 188 MG/DL (ref 65–100)
GLUCOSE BLD STRIP.AUTO-MCNC: 190 MG/DL (ref 65–100)
GLUCOSE BLD STRIP.AUTO-MCNC: 190 MG/DL (ref 65–100)
GLUCOSE BLD STRIP.AUTO-MCNC: 192 MG/DL (ref 65–100)
GLUCOSE BLD STRIP.AUTO-MCNC: 193 MG/DL (ref 65–100)
GLUCOSE BLD STRIP.AUTO-MCNC: 194 MG/DL (ref 65–100)
GLUCOSE BLD STRIP.AUTO-MCNC: 200 MG/DL (ref 65–100)
GLUCOSE BLD STRIP.AUTO-MCNC: 200 MG/DL (ref 65–100)
GLUCOSE BLD STRIP.AUTO-MCNC: 201 MG/DL (ref 65–100)
GLUCOSE BLD STRIP.AUTO-MCNC: 202 MG/DL (ref 65–100)
GLUCOSE BLD STRIP.AUTO-MCNC: 208 MG/DL (ref 65–100)
GLUCOSE BLD STRIP.AUTO-MCNC: 210 MG/DL (ref 65–100)
GLUCOSE BLD STRIP.AUTO-MCNC: 216 MG/DL (ref 65–100)
GLUCOSE BLD STRIP.AUTO-MCNC: 218 MG/DL (ref 65–100)
GLUCOSE BLD STRIP.AUTO-MCNC: 220 MG/DL (ref 65–100)
GLUCOSE BLD STRIP.AUTO-MCNC: 220 MG/DL (ref 65–100)
GLUCOSE BLD STRIP.AUTO-MCNC: 223 MG/DL (ref 65–100)
GLUCOSE BLD STRIP.AUTO-MCNC: 239 MG/DL (ref 65–100)
GLUCOSE BLD STRIP.AUTO-MCNC: 78 MG/DL (ref 65–100)
GLUCOSE BLD STRIP.AUTO-MCNC: 82 MG/DL (ref 65–100)
GLUCOSE BLD STRIP.AUTO-MCNC: 95 MG/DL (ref 65–100)
GLUCOSE BLD STRIP.AUTO-MCNC: 96 MG/DL (ref 65–100)
GLUCOSE SERPL-MCNC: 100 MG/DL (ref 65–100)
GLUCOSE SERPL-MCNC: 102 MG/DL (ref 65–100)
GLUCOSE SERPL-MCNC: 103 MG/DL (ref 65–100)
GLUCOSE SERPL-MCNC: 106 MG/DL (ref 65–100)
GLUCOSE SERPL-MCNC: 109 MG/DL (ref 65–100)
GLUCOSE SERPL-MCNC: 113 MG/DL (ref 65–100)
GLUCOSE SERPL-MCNC: 116 MG/DL (ref 65–99)
GLUCOSE SERPL-MCNC: 117 MG/DL (ref 65–100)
GLUCOSE SERPL-MCNC: 118 MG/DL (ref 65–100)
GLUCOSE SERPL-MCNC: 119 MG/DL (ref 65–100)
GLUCOSE SERPL-MCNC: 120 MG/DL (ref 65–100)
GLUCOSE SERPL-MCNC: 122 MG/DL (ref 65–100)
GLUCOSE SERPL-MCNC: 123 MG/DL (ref 65–100)
GLUCOSE SERPL-MCNC: 130 MG/DL (ref 65–100)
GLUCOSE SERPL-MCNC: 131 MG/DL (ref 65–100)
GLUCOSE SERPL-MCNC: 131 MG/DL (ref 65–100)
GLUCOSE SERPL-MCNC: 132 MG/DL (ref 65–100)
GLUCOSE SERPL-MCNC: 133 MG/DL (ref 65–100)
GLUCOSE SERPL-MCNC: 135 MG/DL (ref 65–100)
GLUCOSE SERPL-MCNC: 137 MG/DL (ref 65–100)
GLUCOSE SERPL-MCNC: 138 MG/DL (ref 65–100)
GLUCOSE SERPL-MCNC: 140 MG/DL (ref 65–100)
GLUCOSE SERPL-MCNC: 142 MG/DL (ref 65–100)
GLUCOSE SERPL-MCNC: 144 MG/DL (ref 65–100)
GLUCOSE SERPL-MCNC: 144 MG/DL (ref 65–100)
GLUCOSE SERPL-MCNC: 146 MG/DL (ref 65–100)
GLUCOSE SERPL-MCNC: 149 MG/DL (ref 65–100)
GLUCOSE SERPL-MCNC: 151 MG/DL (ref 65–100)
GLUCOSE SERPL-MCNC: 155 MG/DL (ref 65–100)
GLUCOSE SERPL-MCNC: 159 MG/DL (ref 65–100)
GLUCOSE SERPL-MCNC: 161 MG/DL (ref 65–100)
GLUCOSE SERPL-MCNC: 163 MG/DL (ref 65–100)
GLUCOSE SERPL-MCNC: 165 MG/DL (ref 65–100)
GLUCOSE SERPL-MCNC: 171 MG/DL (ref 65–100)
GLUCOSE SERPL-MCNC: 181 MG/DL (ref 65–100)
GLUCOSE SERPL-MCNC: 198 MG/DL (ref 65–100)
GLUCOSE SERPL-MCNC: 74 MG/DL (ref 65–100)
GLUCOSE SERPL-MCNC: 84 MG/DL (ref 65–100)
GLUCOSE SERPL-MCNC: 93 MG/DL (ref 65–100)
GLUCOSE SERPL-MCNC: 94 MG/DL (ref 65–100)
GLUCOSE UR STRIP.AUTO-MCNC: 100 MG/DL
GRAM STN SPEC: ABNORMAL
GRAM STN SPEC: NORMAL
HBV SURFACE AG SER QL: <0.1 INDEX
HBV SURFACE AG SER QL: NEGATIVE
HCO3 BLDA-SCNC: 17 MMOL/L (ref 22–26)
HCO3 BLDA-SCNC: 17 MMOL/L (ref 22–26)
HCO3 BLDA-SCNC: 18 MMOL/L (ref 22–26)
HCO3 BLDA-SCNC: 19 MMOL/L (ref 22–26)
HCO3 BLDA-SCNC: 20 MMOL/L (ref 22–26)
HCO3 BLDA-SCNC: 21 MMOL/L (ref 22–26)
HCO3 BLDA-SCNC: 21 MMOL/L (ref 22–26)
HCO3 BLDA-SCNC: 22 MMOL/L (ref 22–26)
HCO3 BLDA-SCNC: 22 MMOL/L (ref 22–26)
HCO3 BLDA-SCNC: 23 MMOL/L (ref 22–26)
HCO3 BLDA-SCNC: 23 MMOL/L (ref 22–26)
HCO3 BLDA-SCNC: 24 MMOL/L (ref 22–26)
HCO3 BLDA-SCNC: 25 MMOL/L (ref 22–26)
HCT VFR BLD AUTO: 19.8 % (ref 35–47)
HCT VFR BLD AUTO: 20.3 % (ref 35–47)
HCT VFR BLD AUTO: 20.7 % (ref 35–47)
HCT VFR BLD AUTO: 20.7 % (ref 35–47)
HCT VFR BLD AUTO: 21.7 % (ref 35–47)
HCT VFR BLD AUTO: 21.7 % (ref 35–47)
HCT VFR BLD AUTO: 22.3 % (ref 35–47)
HCT VFR BLD AUTO: 23 % (ref 35–47)
HCT VFR BLD AUTO: 23.2 % (ref 35–47)
HCT VFR BLD AUTO: 23.5 % (ref 35–47)
HCT VFR BLD AUTO: 23.6 % (ref 35–47)
HCT VFR BLD AUTO: 23.6 % (ref 35–47)
HCT VFR BLD AUTO: 24.2 % (ref 35–47)
HCT VFR BLD AUTO: 24.2 % (ref 35–47)
HCT VFR BLD AUTO: 25 % (ref 35–47)
HCT VFR BLD AUTO: 25 % (ref 35–47)
HCT VFR BLD AUTO: 25.1 % (ref 35–47)
HCT VFR BLD AUTO: 25.4 % (ref 35–47)
HCT VFR BLD AUTO: 25.9 % (ref 35–47)
HCT VFR BLD AUTO: 26.3 % (ref 35–47)
HCT VFR BLD AUTO: 26.6 % (ref 35–47)
HCT VFR BLD AUTO: 26.6 % (ref 35–47)
HCT VFR BLD AUTO: 27.2 % (ref 35–47)
HCT VFR BLD AUTO: 27.3 % (ref 35–47)
HCT VFR BLD AUTO: 27.4 % (ref 35–47)
HCT VFR BLD AUTO: 27.9 % (ref 35–47)
HCT VFR BLD AUTO: 28.1 % (ref 35–47)
HCT VFR BLD AUTO: 28.9 % (ref 35–47)
HCT VFR BLD AUTO: 29.7 % (ref 35–47)
HCT VFR BLD AUTO: 29.9 % (ref 35–47)
HCT VFR BLD AUTO: 30.2 % (ref 35–47)
HCT VFR BLD AUTO: 30.6 % (ref 35–47)
HCT VFR BLD AUTO: 31.1 % (ref 35–47)
HCT VFR BLD AUTO: 32.2 % (ref 35–47)
HCT VFR BLD AUTO: 32.2 % (ref 35–47)
HCT VFR BLD AUTO: 32.7 % (ref 35–47)
HCT VFR BLD AUTO: 32.8 % (ref 35–47)
HCT VFR BLD AUTO: 33.2 % (ref 34–46.6)
HDLC SERPL-MCNC: 49 MG/DL
HEMOLYSIS, POST TXN,PSTHE: NORMAL
HEMOLYSIS,PRE TXN,PREHE: SLIGHT
HGB BLD-MCNC: 10 G/DL (ref 11.5–16)
HGB BLD-MCNC: 10.8 G/DL (ref 11.5–16)
HGB BLD-MCNC: 11 G/DL (ref 11.5–16)
HGB BLD-MCNC: 11.1 G/DL (ref 11.5–16)
HGB BLD-MCNC: 11.2 G/DL (ref 11.1–15.9)
HGB BLD-MCNC: 6.4 G/DL (ref 11.5–16)
HGB BLD-MCNC: 6.5 G/DL (ref 11.5–16)
HGB BLD-MCNC: 6.9 G/DL (ref 11.5–16)
HGB BLD-MCNC: 7.1 G/DL (ref 11.5–16)
HGB BLD-MCNC: 7.1 G/DL (ref 11.5–16)
HGB BLD-MCNC: 7.2 G/DL (ref 11.5–16)
HGB BLD-MCNC: 7.4 G/DL (ref 11.5–16)
HGB BLD-MCNC: 7.6 G/DL (ref 11.5–16)
HGB BLD-MCNC: 7.7 G/DL (ref 11.5–16)
HGB BLD-MCNC: 7.7 G/DL (ref 11.5–16)
HGB BLD-MCNC: 7.8 G/DL (ref 11.5–16)
HGB BLD-MCNC: 7.8 G/DL (ref 11.5–16)
HGB BLD-MCNC: 7.9 G/DL (ref 11.5–16)
HGB BLD-MCNC: 8 G/DL (ref 11.5–16)
HGB BLD-MCNC: 8 G/DL (ref 11.5–16)
HGB BLD-MCNC: 8.2 G/DL (ref 11.5–16)
HGB BLD-MCNC: 8.4 G/DL (ref 11.5–16)
HGB BLD-MCNC: 8.4 G/DL (ref 11.5–16)
HGB BLD-MCNC: 8.5 G/DL (ref 11.5–16)
HGB BLD-MCNC: 8.7 G/DL (ref 11.5–16)
HGB BLD-MCNC: 8.7 G/DL (ref 11.5–16)
HGB BLD-MCNC: 8.8 G/DL (ref 11.5–16)
HGB BLD-MCNC: 8.9 G/DL (ref 11.5–16)
HGB BLD-MCNC: 9 G/DL (ref 11.5–16)
HGB BLD-MCNC: 9.1 G/DL (ref 11.5–16)
HGB BLD-MCNC: 9.3 G/DL (ref 11.5–16)
HGB BLD-MCNC: 9.4 G/DL (ref 11.5–16)
HGB BLD-MCNC: 9.5 G/DL (ref 11.5–16)
HGB BLD-MCNC: 9.7 G/DL (ref 11.5–16)
HGB BLD-MCNC: 9.7 G/DL (ref 11.5–16)
HGB UR QL STRIP: NEGATIVE
IMM GRANULOCYTES # BLD: 0.1 X10E3/UL (ref 0–0.1)
IMM GRANULOCYTES NFR BLD: 1 %
INR PPP: 1.1 (ref 0.9–1.1)
INR PPP: 1.1 (ref 0.9–1.1)
INTERPRETATION, 910389: NORMAL
INTERPRETATION: NORMAL
IPAP/PIP, IPAPIP: 12
IPAP/PIP, IPAPIP: 24
KETONES UR QL STRIP.AUTO: NEGATIVE MG/DL
LACTATE SERPL-SCNC: 0.8 MMOL/L (ref 0.4–2)
LACTATE SERPL-SCNC: 0.8 MMOL/L (ref 0.4–2)
LACTATE SERPL-SCNC: 1 MMOL/L (ref 0.4–2)
LACTATE SERPL-SCNC: 1.2 MMOL/L (ref 0.4–2)
LACTATE SERPL-SCNC: 1.2 MMOL/L (ref 0.4–2)
LDLC SERPL CALC-MCNC: 168 MG/DL (ref 0–99)
LEUKOCYTE ESTERASE UR QL STRIP.AUTO: ABNORMAL
LIPASE SERPL-CCNC: 147 U/L (ref 73–393)
LYMPHOCYTES # BLD AUTO: 1 % (ref 12–49)
LYMPHOCYTES # BLD AUTO: 1.2 X10E3/UL (ref 0.7–3.1)
LYMPHOCYTES # BLD AUTO: 10 % (ref 12–49)
LYMPHOCYTES # BLD AUTO: 3 %
LYMPHOCYTES # BLD AUTO: 4 %
LYMPHOCYTES # BLD AUTO: 4 %
LYMPHOCYTES # BLD AUTO: 4 % (ref 12–49)
LYMPHOCYTES # BLD AUTO: 5 % (ref 12–49)
LYMPHOCYTES # BLD AUTO: 6 % (ref 12–49)
LYMPHOCYTES # BLD AUTO: 7 %
LYMPHOCYTES # BLD AUTO: 8 % (ref 12–49)
LYMPHOCYTES # BLD AUTO: 8 % (ref 12–49)
LYMPHOCYTES # BLD AUTO: 9 % (ref 12–49)
LYMPHOCYTES # BLD: 0.1 K/UL (ref 0.8–3.5)
LYMPHOCYTES # BLD: 0.3 K/UL
LYMPHOCYTES # BLD: 0.4 K/UL (ref 0.8–3.5)
LYMPHOCYTES # BLD: 0.5 K/UL
LYMPHOCYTES # BLD: 0.5 K/UL
LYMPHOCYTES # BLD: 0.5 K/UL (ref 0.8–3.5)
LYMPHOCYTES # BLD: 0.5 K/UL (ref 0.8–3.5)
LYMPHOCYTES # BLD: 0.6 K/UL (ref 0.8–3.5)
LYMPHOCYTES # BLD: 0.7 K/UL (ref 0.8–3.5)
LYMPHOCYTES # BLD: 0.7 K/UL (ref 0.8–3.5)
LYMPHOCYTES # BLD: 0.8 K/UL (ref 0.8–3.5)
LYMPHOCYTES # BLD: 0.9 K/UL
LYMPHOCYTES # BLD: 0.9 K/UL (ref 0.8–3.5)
LYMPHOCYTES # BLD: 1.1 K/UL (ref 0.8–3.5)
LYMPHOCYTES NFR BLD AUTO: 8 %
MAGNESIUM SERPL-MCNC: 1.4 MG/DL (ref 1.6–2.4)
MAGNESIUM SERPL-MCNC: 1.4 MG/DL (ref 1.6–2.4)
MAGNESIUM SERPL-MCNC: 1.5 MG/DL (ref 1.6–2.4)
MAGNESIUM SERPL-MCNC: 1.7 MG/DL (ref 1.6–2.4)
MAGNESIUM SERPL-MCNC: 1.8 MG/DL (ref 1.6–2.4)
MAGNESIUM SERPL-MCNC: 1.8 MG/DL (ref 1.6–2.4)
MAGNESIUM SERPL-MCNC: 1.9 MG/DL (ref 1.6–2.4)
MAGNESIUM SERPL-MCNC: 2 MG/DL (ref 1.6–2.4)
MAGNESIUM SERPL-MCNC: 2.1 MG/DL (ref 1.6–2.4)
MAGNESIUM SERPL-MCNC: 2.1 MG/DL (ref 1.6–2.4)
MAGNESIUM SERPL-MCNC: 2.4 MG/DL (ref 1.6–2.4)
MAGNESIUM SERPL-MCNC: 2.5 MG/DL (ref 1.6–2.4)
MAGNESIUM SERPL-MCNC: 2.7 MG/DL (ref 1.6–2.4)
MAGNESIUM SERPL-MCNC: 2.8 MG/DL (ref 1.6–2.4)
MCH RBC QN AUTO: 27.1 PG (ref 26–34)
MCH RBC QN AUTO: 27.4 PG (ref 26–34)
MCH RBC QN AUTO: 27.5 PG (ref 26–34)
MCH RBC QN AUTO: 27.6 PG (ref 26–34)
MCH RBC QN AUTO: 27.7 PG (ref 26.6–33)
MCH RBC QN AUTO: 27.7 PG (ref 26–34)
MCH RBC QN AUTO: 27.8 PG (ref 26–34)
MCH RBC QN AUTO: 27.8 PG (ref 26–34)
MCH RBC QN AUTO: 27.9 PG (ref 26–34)
MCH RBC QN AUTO: 28 PG (ref 26–34)
MCH RBC QN AUTO: 28.1 PG (ref 26–34)
MCH RBC QN AUTO: 28.2 PG (ref 26–34)
MCH RBC QN AUTO: 28.3 PG (ref 26–34)
MCH RBC QN AUTO: 28.5 PG (ref 26–34)
MCH RBC QN AUTO: 28.5 PG (ref 26–34)
MCH RBC QN AUTO: 28.6 PG (ref 26–34)
MCH RBC QN AUTO: 28.8 PG (ref 26–34)
MCH RBC QN AUTO: 28.9 PG (ref 26–34)
MCH RBC QN AUTO: 29 PG (ref 26–34)
MCH RBC QN AUTO: 29 PG (ref 26–34)
MCHC RBC AUTO-ENTMCNC: 31 G/DL (ref 30–36.5)
MCHC RBC AUTO-ENTMCNC: 31.1 G/DL (ref 30–36.5)
MCHC RBC AUTO-ENTMCNC: 31.1 G/DL (ref 30–36.5)
MCHC RBC AUTO-ENTMCNC: 31.5 G/DL (ref 30–36.5)
MCHC RBC AUTO-ENTMCNC: 31.8 G/DL (ref 30–36.5)
MCHC RBC AUTO-ENTMCNC: 32 G/DL (ref 30–36.5)
MCHC RBC AUTO-ENTMCNC: 32.2 G/DL (ref 30–36.5)
MCHC RBC AUTO-ENTMCNC: 32.2 G/DL (ref 30–36.5)
MCHC RBC AUTO-ENTMCNC: 32.3 G/DL (ref 30–36.5)
MCHC RBC AUTO-ENTMCNC: 32.5 G/DL (ref 30–36.5)
MCHC RBC AUTO-ENTMCNC: 32.7 G/DL (ref 30–36.5)
MCHC RBC AUTO-ENTMCNC: 32.8 G/DL (ref 30–36.5)
MCHC RBC AUTO-ENTMCNC: 33 G/DL (ref 30–36.5)
MCHC RBC AUTO-ENTMCNC: 33 G/DL (ref 30–36.5)
MCHC RBC AUTO-ENTMCNC: 33.1 G/DL (ref 30–36.5)
MCHC RBC AUTO-ENTMCNC: 33.2 G/DL (ref 30–36.5)
MCHC RBC AUTO-ENTMCNC: 33.2 G/DL (ref 30–36.5)
MCHC RBC AUTO-ENTMCNC: 33.3 G/DL (ref 30–36.5)
MCHC RBC AUTO-ENTMCNC: 33.3 G/DL (ref 30–36.5)
MCHC RBC AUTO-ENTMCNC: 33.5 G/DL (ref 30–36.5)
MCHC RBC AUTO-ENTMCNC: 33.6 G/DL (ref 30–36.5)
MCHC RBC AUTO-ENTMCNC: 33.7 G/DL (ref 30–36.5)
MCHC RBC AUTO-ENTMCNC: 33.7 G/DL (ref 31.5–35.7)
MCHC RBC AUTO-ENTMCNC: 33.8 G/DL (ref 30–36.5)
MCHC RBC AUTO-ENTMCNC: 34.1 G/DL (ref 30–36.5)
MCHC RBC AUTO-ENTMCNC: 34.2 G/DL (ref 30–36.5)
MCHC RBC AUTO-ENTMCNC: 34.5 G/DL (ref 30–36.5)
MCHC RBC AUTO-ENTMCNC: 34.6 G/DL (ref 30–36.5)
MCV RBC AUTO: 82 FL (ref 79–97)
MCV RBC AUTO: 82.4 FL (ref 80–99)
MCV RBC AUTO: 82.6 FL (ref 80–99)
MCV RBC AUTO: 83.4 FL (ref 80–99)
MCV RBC AUTO: 83.8 FL (ref 80–99)
MCV RBC AUTO: 84.5 FL (ref 80–99)
MCV RBC AUTO: 84.5 FL (ref 80–99)
MCV RBC AUTO: 84.6 FL (ref 80–99)
MCV RBC AUTO: 84.6 FL (ref 80–99)
MCV RBC AUTO: 84.7 FL (ref 80–99)
MCV RBC AUTO: 84.9 FL (ref 80–99)
MCV RBC AUTO: 84.9 FL (ref 80–99)
MCV RBC AUTO: 85 FL (ref 80–99)
MCV RBC AUTO: 85.1 FL (ref 80–99)
MCV RBC AUTO: 85.1 FL (ref 80–99)
MCV RBC AUTO: 85.3 FL (ref 80–99)
MCV RBC AUTO: 85.3 FL (ref 80–99)
MCV RBC AUTO: 85.4 FL (ref 80–99)
MCV RBC AUTO: 85.5 FL (ref 80–99)
MCV RBC AUTO: 85.6 FL (ref 80–99)
MCV RBC AUTO: 85.6 FL (ref 80–99)
MCV RBC AUTO: 85.7 FL (ref 80–99)
MCV RBC AUTO: 85.8 FL (ref 80–99)
MCV RBC AUTO: 85.8 FL (ref 80–99)
MCV RBC AUTO: 85.9 FL (ref 80–99)
MCV RBC AUTO: 86.1 FL (ref 80–99)
MCV RBC AUTO: 86.1 FL (ref 80–99)
MCV RBC AUTO: 86.2 FL (ref 80–99)
MCV RBC AUTO: 86.3 FL (ref 80–99)
MCV RBC AUTO: 86.4 FL (ref 80–99)
MCV RBC AUTO: 86.6 FL (ref 80–99)
MCV RBC AUTO: 86.6 FL (ref 80–99)
MCV RBC AUTO: 87.2 FL (ref 80–99)
MCV RBC AUTO: 88.7 FL (ref 80–99)
MCV RBC AUTO: 89.5 FL (ref 80–99)
MCV RBC AUTO: 89.7 FL (ref 80–99)
MCV RBC AUTO: 89.9 FL (ref 80–99)
MD INTERPRETATION: NORMAL
METAMYELOCYTES NFR BLD MANUAL: 1 %
METAMYELOCYTES NFR BLD MANUAL: 1 %
METAMYELOCYTES NFR BLD MANUAL: 6 %
MONOCYTES # BLD AUTO: 0.9 X10E3/UL (ref 0.1–0.9)
MONOCYTES # BLD: 0.3 K/UL
MONOCYTES # BLD: 0.4 K/UL
MONOCYTES # BLD: 0.5 K/UL
MONOCYTES # BLD: 0.5 K/UL (ref 0–1)
MONOCYTES # BLD: 0.5 K/UL (ref 0–1)
MONOCYTES # BLD: 0.6 K/UL (ref 0–1)
MONOCYTES # BLD: 0.7 K/UL
MONOCYTES # BLD: 0.7 K/UL (ref 0–1)
MONOCYTES # BLD: 0.8 K/UL (ref 0–1)
MONOCYTES # BLD: 0.8 K/UL (ref 0–1)
MONOCYTES # BLD: 0.9 K/UL (ref 0–1)
MONOCYTES # BLD: 1 K/UL (ref 0–1)
MONOCYTES # BLD: 1.1 K/UL (ref 0–1)
MONOCYTES # BLD: 1.2 K/UL (ref 0–1)
MONOCYTES # BLD: 1.5 K/UL (ref 0–1)
MONOCYTES NFR BLD AUTO: 10 % (ref 5–13)
MONOCYTES NFR BLD AUTO: 10 % (ref 5–13)
MONOCYTES NFR BLD AUTO: 11 % (ref 5–13)
MONOCYTES NFR BLD AUTO: 3 %
MONOCYTES NFR BLD AUTO: 3 %
MONOCYTES NFR BLD AUTO: 4 %
MONOCYTES NFR BLD AUTO: 4 % (ref 5–13)
MONOCYTES NFR BLD AUTO: 5 %
MONOCYTES NFR BLD AUTO: 5 % (ref 5–13)
MONOCYTES NFR BLD AUTO: 5 % (ref 5–13)
MONOCYTES NFR BLD AUTO: 6 %
MONOCYTES NFR BLD AUTO: 6 % (ref 5–13)
MONOCYTES NFR BLD AUTO: 6 % (ref 5–13)
MONOCYTES NFR BLD AUTO: 7 % (ref 5–13)
MONOCYTES NFR BLD AUTO: 7 % (ref 5–13)
MONOCYTES NFR BLD AUTO: 9 % (ref 5–13)
MYELOCYTES NFR BLD MANUAL: 1 %
MYELOCYTES NFR BLD MANUAL: 1 %
MYELOCYTES NFR BLD MANUAL: 2 %
NEUTROPHILS # BLD AUTO: 12.4 X10E3/UL (ref 1.4–7)
NEUTROPHILS NFR BLD AUTO: 82 %
NEUTS BAND NFR BLD MANUAL: 1 %
NEUTS BAND NFR BLD MANUAL: 1 %
NEUTS BAND NFR BLD MANUAL: 1 % (ref 0–6)
NEUTS BAND NFR BLD MANUAL: 4 %
NEUTS BAND NFR BLD MANUAL: 5 %
NEUTS SEG # BLD: 10.4 K/UL (ref 1.8–8)
NEUTS SEG # BLD: 10.7 K/UL
NEUTS SEG # BLD: 11.8 K/UL
NEUTS SEG # BLD: 11.9 K/UL
NEUTS SEG # BLD: 11.9 K/UL (ref 1.8–8)
NEUTS SEG # BLD: 12.5 K/UL (ref 1.8–8)
NEUTS SEG # BLD: 13.9 K/UL (ref 1.8–8)
NEUTS SEG # BLD: 7.6 K/UL (ref 1.8–8)
NEUTS SEG # BLD: 8.3 K/UL (ref 1.8–8)
NEUTS SEG # BLD: 8.6 K/UL (ref 1.8–8)
NEUTS SEG # BLD: 8.7 K/UL (ref 1.8–8)
NEUTS SEG # BLD: 9 K/UL
NEUTS SEG # BLD: 9.1 K/UL (ref 1.8–8)
NEUTS SEG # BLD: 9.2 K/UL (ref 1.8–8)
NEUTS SEG # BLD: 9.2 K/UL (ref 1.8–8)
NEUTS SEG # BLD: 9.7 K/UL (ref 1.8–8)
NEUTS SEG NFR BLD AUTO: 73 % (ref 32–75)
NEUTS SEG NFR BLD AUTO: 76 % (ref 32–75)
NEUTS SEG NFR BLD AUTO: 81 %
NEUTS SEG NFR BLD AUTO: 81 % (ref 32–75)
NEUTS SEG NFR BLD AUTO: 82 %
NEUTS SEG NFR BLD AUTO: 82 % (ref 32–75)
NEUTS SEG NFR BLD AUTO: 83 % (ref 32–75)
NEUTS SEG NFR BLD AUTO: 84 % (ref 32–75)
NEUTS SEG NFR BLD AUTO: 84 % (ref 32–75)
NEUTS SEG NFR BLD AUTO: 85 % (ref 32–75)
NEUTS SEG NFR BLD AUTO: 86 %
NEUTS SEG NFR BLD AUTO: 86 % (ref 32–75)
NEUTS SEG NFR BLD AUTO: 88 %
NEUTS SEG NFR BLD AUTO: 88 % (ref 32–75)
NITRITE UR QL STRIP.AUTO: NEGATIVE
OSMOLALITY UR: 230 MOSM/KG H2O
P-R INTERVAL, ECG05: 140 MS
P-R INTERVAL, ECG05: 148 MS
PATH REV BLD -IMP: NORMAL
PCO2 BLDA: 25 MMHG (ref 35–45)
PCO2 BLDA: 31 MMHG (ref 35–45)
PCO2 BLDA: 33 MMHG (ref 35–45)
PCO2 BLDA: 34 MMHG (ref 35–45)
PCO2 BLDA: 34 MMHG (ref 35–45)
PCO2 BLDA: 35 MMHG (ref 35–45)
PCO2 BLDA: 36 MMHG (ref 35–45)
PCO2 BLDA: 40 MMHG (ref 35–45)
PCO2 BLDA: 40 MMHG (ref 35–45)
PCO2 BLDA: 41 MMHG (ref 35–45)
PCO2 BLDA: 57 MMHG (ref 35–45)
PDF IMAGE, 910387: NORMAL
PH BLDA: 7.24 [PH] (ref 7.35–7.45)
PH BLDA: 7.25 [PH] (ref 7.35–7.45)
PH BLDA: 7.32 [PH] (ref 7.35–7.45)
PH BLDA: 7.34 [PH] (ref 7.35–7.45)
PH BLDA: 7.36 [PH] (ref 7.35–7.45)
PH BLDA: 7.37 [PH] (ref 7.35–7.45)
PH BLDA: 7.38 [PH] (ref 7.35–7.45)
PH BLDA: 7.38 [PH] (ref 7.35–7.45)
PH BLDA: 7.41 [PH] (ref 7.35–7.45)
PH BLDA: 7.41 [PH] (ref 7.35–7.45)
PH BLDA: 7.42 [PH] (ref 7.35–7.45)
PH BLDA: 7.42 [PH] (ref 7.35–7.45)
PH BLDA: 7.43 [PH] (ref 7.35–7.45)
PH BLDA: 7.45 [PH] (ref 7.35–7.45)
PH BLDA: 7.52 [PH] (ref 7.35–7.45)
PH UR STRIP: 5 [PH] (ref 5–8)
PHOSPHATE SERPL-MCNC: 2.1 MG/DL (ref 2.6–4.7)
PHOSPHATE SERPL-MCNC: 2.4 MG/DL (ref 2.6–4.7)
PHOSPHATE SERPL-MCNC: 2.5 MG/DL (ref 2.6–4.7)
PHOSPHATE SERPL-MCNC: 2.5 MG/DL (ref 2.6–4.7)
PHOSPHATE SERPL-MCNC: 2.6 MG/DL (ref 2.6–4.7)
PHOSPHATE SERPL-MCNC: 3 MG/DL (ref 2.6–4.7)
PHOSPHATE SERPL-MCNC: 3.7 MG/DL (ref 2.6–4.7)
PHOSPHATE SERPL-MCNC: 3.7 MG/DL (ref 2.6–4.7)
PHOSPHATE SERPL-MCNC: 4 MG/DL (ref 2.6–4.7)
PHOSPHATE SERPL-MCNC: 4 MG/DL (ref 2.6–4.7)
PHOSPHATE SERPL-MCNC: 4.4 MG/DL (ref 2.6–4.7)
PHOSPHATE SERPL-MCNC: 4.5 MG/DL (ref 2.6–4.7)
PHOSPHATE SERPL-MCNC: 4.8 MG/DL (ref 2.6–4.7)
PHOSPHATE SERPL-MCNC: 4.9 MG/DL (ref 2.6–4.7)
PHOSPHATE SERPL-MCNC: 5.3 MG/DL (ref 2.6–4.7)
PHOSPHATE SERPL-MCNC: 5.4 MG/DL (ref 2.6–4.7)
PHOSPHATE SERPL-MCNC: 5.5 MG/DL (ref 2.6–4.7)
PHOSPHATE SERPL-MCNC: 6.1 MG/DL (ref 2.6–4.7)
PLATELET # BLD AUTO: 115 K/UL (ref 150–400)
PLATELET # BLD AUTO: 128 K/UL (ref 150–400)
PLATELET # BLD AUTO: 138 K/UL (ref 150–400)
PLATELET # BLD AUTO: 141 K/UL (ref 150–400)
PLATELET # BLD AUTO: 143 K/UL (ref 150–400)
PLATELET # BLD AUTO: 162 K/UL (ref 150–400)
PLATELET # BLD AUTO: 171 K/UL (ref 150–400)
PLATELET # BLD AUTO: 182 K/UL (ref 150–400)
PLATELET # BLD AUTO: 185 K/UL (ref 150–400)
PLATELET # BLD AUTO: 188 K/UL (ref 150–400)
PLATELET # BLD AUTO: 189 K/UL (ref 150–400)
PLATELET # BLD AUTO: 190 K/UL (ref 150–400)
PLATELET # BLD AUTO: 196 K/UL (ref 150–400)
PLATELET # BLD AUTO: 200 K/UL (ref 150–400)
PLATELET # BLD AUTO: 203 K/UL (ref 150–400)
PLATELET # BLD AUTO: 208 K/UL (ref 150–400)
PLATELET # BLD AUTO: 217 K/UL (ref 150–400)
PLATELET # BLD AUTO: 226 K/UL (ref 150–400)
PLATELET # BLD AUTO: 231 K/UL (ref 150–400)
PLATELET # BLD AUTO: 231 K/UL (ref 150–400)
PLATELET # BLD AUTO: 233 K/UL (ref 150–400)
PLATELET # BLD AUTO: 235 K/UL (ref 150–400)
PLATELET # BLD AUTO: 264 K/UL (ref 150–400)
PLATELET # BLD AUTO: 266 K/UL (ref 150–400)
PLATELET # BLD AUTO: 268 K/UL (ref 150–400)
PLATELET # BLD AUTO: 275 K/UL (ref 150–400)
PLATELET # BLD AUTO: 292 K/UL (ref 150–400)
PLATELET # BLD AUTO: 310 K/UL (ref 150–400)
PLATELET # BLD AUTO: 325 K/UL (ref 150–400)
PLATELET # BLD AUTO: 334 K/UL (ref 150–400)
PLATELET # BLD AUTO: 338 K/UL (ref 150–400)
PLATELET # BLD AUTO: 342 K/UL (ref 150–400)
PLATELET # BLD AUTO: 344 K/UL (ref 150–400)
PLATELET # BLD AUTO: 377 K/UL (ref 150–400)
PLATELET # BLD AUTO: 382 K/UL (ref 150–400)
PLATELET # BLD AUTO: 387 K/UL (ref 150–400)
PLATELET # BLD AUTO: 473 X10E3/UL (ref 150–379)
PLATELET COMMENTS,PCOM: ABNORMAL
PO2 BLDA: 102 MMHG (ref 80–100)
PO2 BLDA: 55 MMHG (ref 80–100)
PO2 BLDA: 55 MMHG (ref 80–100)
PO2 BLDA: 61 MMHG (ref 80–100)
PO2 BLDA: 63 MMHG (ref 80–100)
PO2 BLDA: 66 MMHG (ref 80–100)
PO2 BLDA: 68 MMHG (ref 80–100)
PO2 BLDA: 71 MMHG (ref 80–100)
PO2 BLDA: 71 MMHG (ref 80–100)
PO2 BLDA: 72 MMHG (ref 80–100)
PO2 BLDA: 76 MMHG (ref 80–100)
PO2 BLDA: 79 MMHG (ref 80–100)
PO2 BLDA: 84 MMHG (ref 80–100)
PO2 BLDA: 84 MMHG (ref 80–100)
PO2 BLDA: 90 MMHG (ref 80–100)
POTASSIUM SERPL-SCNC: 3.2 MMOL/L (ref 3.5–5.1)
POTASSIUM SERPL-SCNC: 3.3 MMOL/L (ref 3.5–5.1)
POTASSIUM SERPL-SCNC: 3.4 MMOL/L (ref 3.5–5.1)
POTASSIUM SERPL-SCNC: 3.5 MMOL/L (ref 3.5–5.1)
POTASSIUM SERPL-SCNC: 3.6 MMOL/L (ref 3.5–5.1)
POTASSIUM SERPL-SCNC: 3.7 MMOL/L (ref 3.5–5.1)
POTASSIUM SERPL-SCNC: 3.8 MMOL/L (ref 3.5–5.1)
POTASSIUM SERPL-SCNC: 3.9 MMOL/L (ref 3.5–5.1)
POTASSIUM SERPL-SCNC: 3.9 MMOL/L (ref 3.5–5.1)
POTASSIUM SERPL-SCNC: 4 MMOL/L (ref 3.5–5.1)
POTASSIUM SERPL-SCNC: 4.1 MMOL/L (ref 3.5–5.1)
POTASSIUM SERPL-SCNC: 4.2 MMOL/L (ref 3.5–5.1)
POTASSIUM SERPL-SCNC: 4.2 MMOL/L (ref 3.5–5.1)
POTASSIUM SERPL-SCNC: 4.3 MMOL/L (ref 3.5–5.1)
POTASSIUM SERPL-SCNC: 4.3 MMOL/L (ref 3.5–5.1)
POTASSIUM SERPL-SCNC: 4.4 MMOL/L (ref 3.5–5.1)
POTASSIUM SERPL-SCNC: 4.5 MMOL/L (ref 3.5–5.1)
POTASSIUM SERPL-SCNC: 4.6 MMOL/L (ref 3.5–5.1)
POTASSIUM SERPL-SCNC: 4.7 MMOL/L (ref 3.5–5.1)
POTASSIUM SERPL-SCNC: 4.9 MMOL/L (ref 3.5–5.1)
POTASSIUM SERPL-SCNC: 5.3 MMOL/L (ref 3.5–5.2)
POTASSIUM SERPL-SCNC: 5.6 MMOL/L (ref 3.5–5.1)
PRESSURE SUPPORT SETTING VENT: 5 CM[H2O]
PRESSURE SUPPORT SETTING VENT: 5 CM[H2O]
PROT SERPL-MCNC: 5 G/DL (ref 6.4–8.2)
PROT SERPL-MCNC: 5.2 G/DL (ref 6.4–8.2)
PROT SERPL-MCNC: 5.4 G/DL (ref 6.4–8.2)
PROT SERPL-MCNC: 5.5 G/DL (ref 6.4–8.2)
PROT SERPL-MCNC: 5.7 G/DL (ref 6.4–8.2)
PROT SERPL-MCNC: 5.8 G/DL (ref 6.4–8.2)
PROT SERPL-MCNC: 5.9 G/DL (ref 6.4–8.2)
PROT SERPL-MCNC: 6 G/DL (ref 6.4–8.2)
PROT SERPL-MCNC: 6.1 G/DL (ref 6.4–8.2)
PROT SERPL-MCNC: 6.1 G/DL (ref 6.4–8.2)
PROT SERPL-MCNC: 6.2 G/DL (ref 6.4–8.2)
PROT SERPL-MCNC: 6.3 G/DL (ref 6.4–8.2)
PROT SERPL-MCNC: 6.5 G/DL (ref 6.4–8.2)
PROT SERPL-MCNC: 6.5 G/DL (ref 6.4–8.2)
PROT SERPL-MCNC: 6.6 G/DL (ref 6.4–8.2)
PROT SERPL-MCNC: 6.8 G/DL (ref 6.4–8.2)
PROT SERPL-MCNC: 7.6 G/DL (ref 6–8.5)
PROT SERPL-MCNC: 7.7 G/DL (ref 6.4–8.2)
PROT UR STRIP-MCNC: 100 MG/DL
PROTHROMBIN TIME: 10.8 SEC (ref 9–11.1)
PROTHROMBIN TIME: 11.6 SEC (ref 9–11.1)
Q-T INTERVAL, ECG07: 372 MS
Q-T INTERVAL, ECG07: 392 MS
QRS DURATION, ECG06: 88 MS
QRS DURATION, ECG06: 88 MS
QTC CALCULATION (BEZET), ECG08: 420 MS
QTC CALCULATION (BEZET), ECG08: 423 MS
RBC # BLD AUTO: 2.32 M/UL (ref 3.8–5.2)
RBC # BLD AUTO: 2.37 M/UL (ref 3.8–5.2)
RBC # BLD AUTO: 2.4 M/UL (ref 3.8–5.2)
RBC # BLD AUTO: 2.53 M/UL (ref 3.8–5.2)
RBC # BLD AUTO: 2.55 M/UL (ref 3.8–5.2)
RBC # BLD AUTO: 2.58 M/UL (ref 3.8–5.2)
RBC # BLD AUTO: 2.69 M/UL (ref 3.8–5.2)
RBC # BLD AUTO: 2.72 M/UL (ref 3.8–5.2)
RBC # BLD AUTO: 2.78 M/UL (ref 3.8–5.2)
RBC # BLD AUTO: 2.78 M/UL (ref 3.8–5.2)
RBC # BLD AUTO: 2.79 M/UL (ref 3.8–5.2)
RBC # BLD AUTO: 2.81 M/UL (ref 3.8–5.2)
RBC # BLD AUTO: 2.85 M/UL (ref 3.8–5.2)
RBC # BLD AUTO: 2.9 M/UL (ref 3.8–5.2)
RBC # BLD AUTO: 2.92 M/UL (ref 3.8–5.2)
RBC # BLD AUTO: 2.95 M/UL (ref 3.8–5.2)
RBC # BLD AUTO: 2.95 M/UL (ref 3.8–5.2)
RBC # BLD AUTO: 3.02 M/UL (ref 3.8–5.2)
RBC # BLD AUTO: 3.06 M/UL (ref 3.8–5.2)
RBC # BLD AUTO: 3.07 M/UL (ref 3.8–5.2)
RBC # BLD AUTO: 3.07 M/UL (ref 3.8–5.2)
RBC # BLD AUTO: 3.21 M/UL (ref 3.8–5.2)
RBC # BLD AUTO: 3.21 M/UL (ref 3.8–5.2)
RBC # BLD AUTO: 3.24 M/UL (ref 3.8–5.2)
RBC # BLD AUTO: 3.33 M/UL (ref 3.8–5.2)
RBC # BLD AUTO: 3.34 M/UL (ref 3.8–5.2)
RBC # BLD AUTO: 3.35 M/UL (ref 3.8–5.2)
RBC # BLD AUTO: 3.37 M/UL (ref 3.8–5.2)
RBC # BLD AUTO: 3.41 M/UL (ref 3.8–5.2)
RBC # BLD AUTO: 3.5 M/UL (ref 3.8–5.2)
RBC # BLD AUTO: 3.53 M/UL (ref 3.8–5.2)
RBC # BLD AUTO: 3.58 M/UL (ref 3.8–5.2)
RBC # BLD AUTO: 3.64 M/UL (ref 3.8–5.2)
RBC # BLD AUTO: 3.75 M/UL (ref 3.8–5.2)
RBC # BLD AUTO: 3.81 M/UL (ref 3.8–5.2)
RBC # BLD AUTO: 3.98 M/UL (ref 3.8–5.2)
RBC # BLD AUTO: 4.04 X10E6/UL (ref 3.77–5.28)
RBC #/AREA URNS HPF: ABNORMAL /HPF (ref 0–5)
RBC MORPH BLD: ABNORMAL
REPORTED DOSE,DOSE: ABNORMAL UNITS
REPORTED DOSE,DOSE: ABNORMAL UNITS
REPORTED DOSE/TIME,TMG: ABNORMAL
REPORTED DOSE/TIME,TMG: ABNORMAL
SAO2 % BLD: 87 % (ref 92–97)
SAO2 % BLD: 88 % (ref 92–97)
SAO2 % BLD: 91 % (ref 92–97)
SAO2 % BLD: 93 % (ref 92–97)
SAO2 % BLD: 94 % (ref 92–97)
SAO2 % BLD: 95 % (ref 92–97)
SAO2 % BLD: 96 % (ref 92–97)
SAO2 % BLD: 97 % (ref 92–97)
SAO2 % BLD: 98 % (ref 92–97)
SAO2% DEVICE SAO2% SENSOR NAME: ABNORMAL
SERVICE CMNT-IMP: ABNORMAL
SERVICE CMNT-IMP: NORMAL
SODIUM SERPL-SCNC: 129 MMOL/L (ref 134–144)
SODIUM SERPL-SCNC: 129 MMOL/L (ref 136–145)
SODIUM SERPL-SCNC: 131 MMOL/L (ref 136–145)
SODIUM SERPL-SCNC: 132 MMOL/L (ref 136–145)
SODIUM SERPL-SCNC: 133 MMOL/L (ref 136–145)
SODIUM SERPL-SCNC: 134 MMOL/L (ref 136–145)
SODIUM SERPL-SCNC: 135 MMOL/L (ref 136–145)
SODIUM SERPL-SCNC: 136 MMOL/L (ref 136–145)
SODIUM SERPL-SCNC: 137 MMOL/L (ref 136–145)
SODIUM SERPL-SCNC: 138 MMOL/L (ref 136–145)
SODIUM SERPL-SCNC: 139 MMOL/L (ref 136–145)
SODIUM SERPL-SCNC: 140 MMOL/L (ref 136–145)
SODIUM SERPL-SCNC: 140 MMOL/L (ref 136–145)
SODIUM SERPL-SCNC: 141 MMOL/L (ref 136–145)
SODIUM SERPL-SCNC: 142 MMOL/L (ref 136–145)
SODIUM SERPL-SCNC: 142 MMOL/L (ref 136–145)
SODIUM SERPL-SCNC: 143 MMOL/L (ref 136–145)
SODIUM SERPL-SCNC: 143 MMOL/L (ref 136–145)
SODIUM SERPL-SCNC: 145 MMOL/L (ref 136–145)
SODIUM UR-SCNC: 19 MMOL/L
SP GR UR REFRACTOMETRY: 1.01 (ref 1–1.03)
SPECIMEN EXP DATE BLD: NORMAL
SPECIMEN SITE: ABNORMAL
STATUS OF UNIT,%ST: NORMAL
TRIGL SERPL-MCNC: 232 MG/DL (ref 0–149)
TROPONIN I SERPL-MCNC: <0.04 NG/ML
TRXN WORKUP COMMENT,TRCOM: NORMAL
TSH SERPL DL<=0.005 MIU/L-ACNC: 2.41 UIU/ML (ref 0.45–4.5)
UNIT DIVISION, %UDIV: 0
UNIT NUMBER,UN: NORMAL
UROBILINOGEN UR QL STRIP.AUTO: 0.2 EU/DL (ref 0.2–1)
VANCOMYCIN TROUGH SERPL-MCNC: 19.7 UG/ML (ref 5–10)
VANCOMYCIN TROUGH SERPL-MCNC: 29.2 UG/ML (ref 5–10)
VENTILATION MODE VENT: ABNORMAL
VENTRICULAR RATE, ECG03: 70 BPM
VENTRICULAR RATE, ECG03: 77 BPM
VLDLC SERPL CALC-MCNC: 46 MG/DL (ref 5–40)
VT SETTING VENT: 500 ML
WBC # BLD AUTO: 10.1 K/UL (ref 3.6–11)
WBC # BLD AUTO: 10.4 K/UL (ref 3.6–11)
WBC # BLD AUTO: 10.5 K/UL (ref 3.6–11)
WBC # BLD AUTO: 10.6 K/UL (ref 3.6–11)
WBC # BLD AUTO: 10.6 K/UL (ref 3.6–11)
WBC # BLD AUTO: 10.8 K/UL (ref 3.6–11)
WBC # BLD AUTO: 10.8 K/UL (ref 3.6–11)
WBC # BLD AUTO: 10.9 K/UL (ref 3.6–11)
WBC # BLD AUTO: 11 K/UL (ref 3.6–11)
WBC # BLD AUTO: 11.2 K/UL (ref 3.6–11)
WBC # BLD AUTO: 11.3 K/UL (ref 3.6–11)
WBC # BLD AUTO: 11.5 K/UL (ref 3.6–11)
WBC # BLD AUTO: 12 K/UL (ref 3.6–11)
WBC # BLD AUTO: 12 K/UL (ref 3.6–11)
WBC # BLD AUTO: 12.1 K/UL (ref 3.6–11)
WBC # BLD AUTO: 12.3 K/UL (ref 3.6–11)
WBC # BLD AUTO: 12.7 K/UL (ref 3.6–11)
WBC # BLD AUTO: 12.8 K/UL (ref 3.6–11)
WBC # BLD AUTO: 13 K/UL (ref 3.6–11)
WBC # BLD AUTO: 13.2 K/UL (ref 3.6–11)
WBC # BLD AUTO: 13.6 K/UL (ref 3.6–11)
WBC # BLD AUTO: 14.1 K/UL (ref 3.6–11)
WBC # BLD AUTO: 14.3 K/UL (ref 3.6–11)
WBC # BLD AUTO: 14.6 K/UL (ref 3.6–11)
WBC # BLD AUTO: 14.9 K/UL (ref 3.6–11)
WBC # BLD AUTO: 15 X10E3/UL (ref 3.4–10.8)
WBC # BLD AUTO: 15.8 K/UL (ref 3.6–11)
WBC # BLD AUTO: 8.5 K/UL (ref 3.6–11)
WBC # BLD AUTO: 9.2 K/UL (ref 3.6–11)
WBC # BLD AUTO: 9.2 K/UL (ref 3.6–11)
WBC # BLD AUTO: 9.5 K/UL (ref 3.6–11)
WBC # BLD AUTO: 9.7 K/UL (ref 3.6–11)
WBC # BLD AUTO: 9.7 K/UL (ref 3.6–11)
WBC # BLD AUTO: 9.8 K/UL (ref 3.6–11)
WBC # BLD AUTO: 9.9 K/UL (ref 3.6–11)
WBC MORPH BLD: ABNORMAL
WBC URNS QL MICRO: ABNORMAL /HPF (ref 0–4)
YEAST URNS QL MICRO: PRESENT

## 2017-01-01 PROCEDURE — 74011000250 HC RX REV CODE- 250: Performed by: INTERNAL MEDICINE

## 2017-01-01 PROCEDURE — 80048 BASIC METABOLIC PNL TOTAL CA: CPT | Performed by: INTERNAL MEDICINE

## 2017-01-01 PROCEDURE — 77030018836 HC SOL IRR NACL ICUM -A: Performed by: UROLOGY

## 2017-01-01 PROCEDURE — 74011000258 HC RX REV CODE- 258: Performed by: INTERNAL MEDICINE

## 2017-01-01 PROCEDURE — 74011000250 HC RX REV CODE- 250: Performed by: SURGERY

## 2017-01-01 PROCEDURE — 74011250636 HC RX REV CODE- 250/636: Performed by: INTERNAL MEDICINE

## 2017-01-01 PROCEDURE — 77010033678 HC OXYGEN DAILY

## 2017-01-01 PROCEDURE — P9016 RBC LEUKOCYTES REDUCED: HCPCS | Performed by: SURGERY

## 2017-01-01 PROCEDURE — 85025 COMPLETE CBC W/AUTO DIFF WBC: CPT | Performed by: INTERNAL MEDICINE

## 2017-01-01 PROCEDURE — 0W9B30Z DRAINAGE OF LEFT PLEURAL CAVITY WITH DRAINAGE DEVICE, PERCUTANEOUS APPROACH: ICD-10-PCS | Performed by: RADIOLOGY

## 2017-01-01 PROCEDURE — 85025 COMPLETE CBC W/AUTO DIFF WBC: CPT | Performed by: SURGERY

## 2017-01-01 PROCEDURE — 94640 AIRWAY INHALATION TREATMENT: CPT

## 2017-01-01 PROCEDURE — 80053 COMPREHEN METABOLIC PANEL: CPT | Performed by: INTERNAL MEDICINE

## 2017-01-01 PROCEDURE — 36415 COLL VENOUS BLD VENIPUNCTURE: CPT | Performed by: INTERNAL MEDICINE

## 2017-01-01 PROCEDURE — 85025 COMPLETE CBC W/AUTO DIFF WBC: CPT

## 2017-01-01 PROCEDURE — L3670 SO ACRO/CLAV CAN WEB PRE OTS: HCPCS

## 2017-01-01 PROCEDURE — C9113 INJ PANTOPRAZOLE SODIUM, VIA: HCPCS | Performed by: INTERNAL MEDICINE

## 2017-01-01 PROCEDURE — 74011250637 HC RX REV CODE- 250/637: Performed by: SURGERY

## 2017-01-01 PROCEDURE — 047D3EZ DILATION OF LEFT COMMON ILIAC ARTERY WITH TWO INTRALUMINAL DEVICES, PERCUTANEOUS APPROACH: ICD-10-PCS | Performed by: SURGERY

## 2017-01-01 PROCEDURE — 71010 XR CHEST PORT: CPT

## 2017-01-01 PROCEDURE — C1732 CATH, EP, DIAG/ABL, 3D/VECT: HCPCS

## 2017-01-01 PROCEDURE — 65660000000 HC RM CCU STEPDOWN

## 2017-01-01 PROCEDURE — 84100 ASSAY OF PHOSPHORUS: CPT | Performed by: INTERNAL MEDICINE

## 2017-01-01 PROCEDURE — 77030002996 HC SUT SLK J&J -A: Performed by: SURGERY

## 2017-01-01 PROCEDURE — 77030004515 HC CATH ANGI DX AVU ANGI -C: Performed by: SURGERY

## 2017-01-01 PROCEDURE — 77030018846 HC SOL IRR STRL H20 ICUM -A

## 2017-01-01 PROCEDURE — 74011250637 HC RX REV CODE- 250/637: Performed by: INTERNAL MEDICINE

## 2017-01-01 PROCEDURE — 82803 BLOOD GASES ANY COMBINATION: CPT | Performed by: INTERNAL MEDICINE

## 2017-01-01 PROCEDURE — 74011250636 HC RX REV CODE- 250/636: Performed by: SURGERY

## 2017-01-01 PROCEDURE — 82962 GLUCOSE BLOOD TEST: CPT

## 2017-01-01 PROCEDURE — 74011250637 HC RX REV CODE- 250/637: Performed by: NURSE PRACTITIONER

## 2017-01-01 PROCEDURE — 97530 THERAPEUTIC ACTIVITIES: CPT

## 2017-01-01 PROCEDURE — 97165 OT EVAL LOW COMPLEX 30 MIN: CPT | Performed by: OCCUPATIONAL THERAPIST

## 2017-01-01 PROCEDURE — 97112 NEUROMUSCULAR REEDUCATION: CPT

## 2017-01-01 PROCEDURE — C1894 INTRO/SHEATH, NON-LASER: HCPCS

## 2017-01-01 PROCEDURE — 76001 XR FLUOROSCOPY OVER 60 MINUTES: CPT

## 2017-01-01 PROCEDURE — 85027 COMPLETE CBC AUTOMATED: CPT | Performed by: INTERNAL MEDICINE

## 2017-01-01 PROCEDURE — 97110 THERAPEUTIC EXERCISES: CPT

## 2017-01-01 PROCEDURE — 74011000250 HC RX REV CODE- 250: Performed by: EMERGENCY MEDICINE

## 2017-01-01 PROCEDURE — 74011000258 HC RX REV CODE- 258: Performed by: SURGERY

## 2017-01-01 PROCEDURE — 77030031139 HC SUT VCRL2 J&J -A

## 2017-01-01 PROCEDURE — C1785 PMKR, DUAL, RATE-RESP: HCPCS

## 2017-01-01 PROCEDURE — 65620000000 HC RM CCU GENERAL

## 2017-01-01 PROCEDURE — 80048 BASIC METABOLIC PNL TOTAL CA: CPT | Performed by: SURGERY

## 2017-01-01 PROCEDURE — C1892 INTRO/SHEATH,FIXED,PEEL-AWAY: HCPCS | Performed by: SURGERY

## 2017-01-01 PROCEDURE — 041J0JJ BYPASS LEFT EXTERNAL ILIAC ARTERY TO LEFT FEMORAL ARTERY WITH SYNTHETIC SUBSTITUTE, OPEN APPROACH: ICD-10-PCS | Performed by: SURGERY

## 2017-01-01 PROCEDURE — 96376 TX/PRO/DX INJ SAME DRUG ADON: CPT

## 2017-01-01 PROCEDURE — 77030018832 HC SOL IRR H20 ICUM -A: Performed by: UROLOGY

## 2017-01-01 PROCEDURE — 77030014008 HC SPNG HEMSTAT J&J -C: Performed by: SURGERY

## 2017-01-01 PROCEDURE — C1768 GRAFT, VASCULAR: HCPCS | Performed by: SURGERY

## 2017-01-01 PROCEDURE — 36592 COLLECT BLOOD FROM PICC: CPT

## 2017-01-01 PROCEDURE — 65610000006 HC RM INTENSIVE CARE

## 2017-01-01 PROCEDURE — 87070 CULTURE OTHR SPECIMN AEROBIC: CPT | Performed by: INTERNAL MEDICINE

## 2017-01-01 PROCEDURE — C1758 CATHETER, URETERAL: HCPCS | Performed by: UROLOGY

## 2017-01-01 PROCEDURE — C1876 STENT, NON-COA/NON-COV W/DEL: HCPCS

## 2017-01-01 PROCEDURE — 94003 VENT MGMT INPAT SUBQ DAY: CPT

## 2017-01-01 PROCEDURE — C1752 CATH,HEMODIALYSIS,SHORT-TERM: HCPCS

## 2017-01-01 PROCEDURE — 77030032490 HC SLV COMPR SCD KNE COVD -B: Performed by: UROLOGY

## 2017-01-01 PROCEDURE — 96361 HYDRATE IV INFUSION ADD-ON: CPT

## 2017-01-01 PROCEDURE — 77030018798 HC PMP KT ENTRL FED COVD -A

## 2017-01-01 PROCEDURE — 77030008771 HC TU NG SALEM SUMP -A

## 2017-01-01 PROCEDURE — 86920 COMPATIBILITY TEST SPIN: CPT | Performed by: SURGERY

## 2017-01-01 PROCEDURE — 77030003627 HC NDL PERC VASC BSC -A: Performed by: SURGERY

## 2017-01-01 PROCEDURE — 74011636637 HC RX REV CODE- 636/637: Performed by: INTERNAL MEDICINE

## 2017-01-01 PROCEDURE — 74000 XR ABD PORT  1 V: CPT

## 2017-01-01 PROCEDURE — 76770 US EXAM ABDO BACK WALL COMP: CPT

## 2017-01-01 PROCEDURE — 77030012890

## 2017-01-01 PROCEDURE — 77030006689 HC BLD OPHTH BVR BD -A: Performed by: SURGERY

## 2017-01-01 PROCEDURE — 74011250636 HC RX REV CODE- 250/636: Performed by: NURSE PRACTITIONER

## 2017-01-01 PROCEDURE — 36593 DECLOT VASCULAR DEVICE: CPT

## 2017-01-01 PROCEDURE — 77030009834 HC MSK O2 TRACH VYRM -A

## 2017-01-01 PROCEDURE — G8988 SELF CARE GOAL STATUS: HCPCS | Performed by: OCCUPATIONAL THERAPIST

## 2017-01-01 PROCEDURE — 36430 TRANSFUSION BLD/BLD COMPNT: CPT

## 2017-01-01 PROCEDURE — 74011636320 HC RX REV CODE- 636/320: Performed by: SURGERY

## 2017-01-01 PROCEDURE — 97535 SELF CARE MNGMENT TRAINING: CPT | Performed by: OCCUPATIONAL THERAPIST

## 2017-01-01 PROCEDURE — 74011000250 HC RX REV CODE- 250

## 2017-01-01 PROCEDURE — C1769 GUIDE WIRE: HCPCS

## 2017-01-01 PROCEDURE — 83735 ASSAY OF MAGNESIUM: CPT | Performed by: INTERNAL MEDICINE

## 2017-01-01 PROCEDURE — 77030010852 HC CATH NB ADVNTG COOK -B

## 2017-01-01 PROCEDURE — 83690 ASSAY OF LIPASE: CPT | Performed by: NURSE PRACTITIONER

## 2017-01-01 PROCEDURE — L4396 STATIC OR DYNAMI AFO PRE CST: HCPCS

## 2017-01-01 PROCEDURE — 77030004561 HC CATH ANGI DX COBRA ANGI -B: Performed by: SURGERY

## 2017-01-01 PROCEDURE — 77030029065 HC DRSG HEMO QCLOT ZMED -B

## 2017-01-01 PROCEDURE — 94002 VENT MGMT INPAT INIT DAY: CPT

## 2017-01-01 PROCEDURE — B41G1ZZ FLUOROSCOPY OF LEFT LOWER EXTREMITY ARTERIES USING LOW OSMOLAR CONTRAST: ICD-10-PCS | Performed by: SURGERY

## 2017-01-01 PROCEDURE — 02VW3DZ RESTRICTION OF THORACIC AORTA, DESCENDING WITH INTRALUMINAL DEVICE, PERCUTANEOUS APPROACH: ICD-10-PCS | Performed by: SURGERY

## 2017-01-01 PROCEDURE — 02PYX3Z REMOVAL OF INFUSION DEVICE FROM GREAT VESSEL, EXTERNAL APPROACH: ICD-10-PCS | Performed by: INTERNAL MEDICINE

## 2017-01-01 PROCEDURE — 86900 BLOOD TYPING SEROLOGIC ABO: CPT | Performed by: SURGERY

## 2017-01-01 PROCEDURE — 85610 PROTHROMBIN TIME: CPT | Performed by: INTERNAL MEDICINE

## 2017-01-01 PROCEDURE — 97162 PT EVAL MOD COMPLEX 30 MIN: CPT

## 2017-01-01 PROCEDURE — C1892 INTRO/SHEATH,FIXED,PEEL-AWAY: HCPCS

## 2017-01-01 PROCEDURE — 83605 ASSAY OF LACTIC ACID: CPT | Performed by: INTERNAL MEDICINE

## 2017-01-01 PROCEDURE — B3141ZZ FLUOROSCOPY OF LEFT COMMON CAROTID ARTERY USING LOW OSMOLAR CONTRAST: ICD-10-PCS | Performed by: SURGERY

## 2017-01-01 PROCEDURE — 04CL0ZZ EXTIRPATION OF MATTER FROM LEFT FEMORAL ARTERY, OPEN APPROACH: ICD-10-PCS | Performed by: SURGERY

## 2017-01-01 PROCEDURE — 80202 ASSAY OF VANCOMYCIN: CPT | Performed by: SURGERY

## 2017-01-01 PROCEDURE — 86880 COOMBS TEST DIRECT: CPT | Performed by: INTERNAL MEDICINE

## 2017-01-01 PROCEDURE — C1769 GUIDE WIRE: HCPCS | Performed by: UROLOGY

## 2017-01-01 PROCEDURE — 0T9B8ZX DRAINAGE OF BLADDER, VIA NATURAL OR ARTIFICIAL OPENING ENDOSCOPIC, DIAGNOSTIC: ICD-10-PCS | Performed by: UROLOGY

## 2017-01-01 PROCEDURE — 77030012961 HC IRR KT CYSTO/TUR ICUM -A: Performed by: UROLOGY

## 2017-01-01 PROCEDURE — 51798 US URINE CAPACITY MEASURE: CPT

## 2017-01-01 PROCEDURE — 92526 ORAL FUNCTION THERAPY: CPT

## 2017-01-01 PROCEDURE — 83735 ASSAY OF MAGNESIUM: CPT | Performed by: SURGERY

## 2017-01-01 PROCEDURE — 77030020847 HC STATLOK BARD -A

## 2017-01-01 PROCEDURE — 84300 ASSAY OF URINE SODIUM: CPT | Performed by: INTERNAL MEDICINE

## 2017-01-01 PROCEDURE — 02K83ZZ MAP CONDUCTION MECHANISM, PERCUTANEOUS APPROACH: ICD-10-PCS | Performed by: INTERNAL MEDICINE

## 2017-01-01 PROCEDURE — 77030018846 HC SOL IRR STRL H20 ICUM -A: Performed by: UROLOGY

## 2017-01-01 PROCEDURE — 36600 WITHDRAWAL OF ARTERIAL BLOOD: CPT | Performed by: INTERNAL MEDICINE

## 2017-01-01 PROCEDURE — B340ZZ3 ULTRASONOGRAPHY OF THORACIC AORTA, INTRAVASCULAR: ICD-10-PCS | Performed by: SURGERY

## 2017-01-01 PROCEDURE — 04753EZ DILATION OF SUPERIOR MESENTERIC ARTERY WITH TWO INTRALUMINAL DEVICES, PERCUTANEOUS APPROACH: ICD-10-PCS | Performed by: SURGERY

## 2017-01-01 PROCEDURE — C1757 CATH, THROMBECTOMY/EMBOLECT: HCPCS | Performed by: SURGERY

## 2017-01-01 PROCEDURE — B3121ZZ FLUOROSCOPY OF LEFT SUBCLAVIAN ARTERY USING LOW OSMOLAR CONTRAST: ICD-10-PCS | Performed by: SURGERY

## 2017-01-01 PROCEDURE — 82803 BLOOD GASES ANY COMBINATION: CPT | Performed by: SURGERY

## 2017-01-01 PROCEDURE — 77030002987 HC SUT PROL J&J -B: Performed by: SURGERY

## 2017-01-01 PROCEDURE — 83605 ASSAY OF LACTIC ACID: CPT | Performed by: SURGERY

## 2017-01-01 PROCEDURE — 76942 ECHO GUIDE FOR BIOPSY: CPT

## 2017-01-01 PROCEDURE — 87077 CULTURE AEROBIC IDENTIFY: CPT | Performed by: INTERNAL MEDICINE

## 2017-01-01 PROCEDURE — 77030002986 HC SUT PROL J&J -A: Performed by: SURGERY

## 2017-01-01 PROCEDURE — 77030027138 HC INCENT SPIROMETER -A

## 2017-01-01 PROCEDURE — 86880 COOMBS TEST DIRECT: CPT | Performed by: SURGERY

## 2017-01-01 PROCEDURE — 86922 COMPATIBILITY TEST ANTIGLOB: CPT | Performed by: INTERNAL MEDICINE

## 2017-01-01 PROCEDURE — 77030012879 HC MSK CPAP FLL FAC PHIL -B

## 2017-01-01 PROCEDURE — C1751 CATH, INF, PER/CENT/MIDLINE: HCPCS | Performed by: ANESTHESIOLOGY

## 2017-01-01 PROCEDURE — 75625 CONTRAST EXAM ABDOMINL AORTA: CPT

## 2017-01-01 PROCEDURE — 74011250636 HC RX REV CODE- 250/636

## 2017-01-01 PROCEDURE — P9016 RBC LEUKOCYTES REDUCED: HCPCS | Performed by: INTERNAL MEDICINE

## 2017-01-01 PROCEDURE — 96375 TX/PRO/DX INJ NEW DRUG ADDON: CPT

## 2017-01-01 PROCEDURE — 36415 COLL VENOUS BLD VENIPUNCTURE: CPT | Performed by: EMERGENCY MEDICINE

## 2017-01-01 PROCEDURE — 86078 PHYS BLOOD BANK SERV REACTJ: CPT | Performed by: SURGERY

## 2017-01-01 PROCEDURE — 77030015396 HC CATH DRN ABSC ANGI -C

## 2017-01-01 PROCEDURE — 77030029684 HC NEB SM VOL KT MONA -A

## 2017-01-01 PROCEDURE — 3E0436Z INTRODUCTION OF NUTRITIONAL SUBSTANCE INTO CENTRAL VEIN, PERCUTANEOUS APPROACH: ICD-10-PCS | Performed by: INTERNAL MEDICINE

## 2017-01-01 PROCEDURE — B44GZZ3 ULTRASONOGRAPHY OF LEFT LOWER EXTREMITY ARTERIES, INTRAVASCULAR: ICD-10-PCS | Performed by: SURGERY

## 2017-01-01 PROCEDURE — 85347 COAGULATION TIME ACTIVATED: CPT

## 2017-01-01 PROCEDURE — 92526 ORAL FUNCTION THERAPY: CPT | Performed by: SPEECH-LANGUAGE PATHOLOGIST

## 2017-01-01 PROCEDURE — B4171ZZ FLUOROSCOPY OF LEFT RENAL ARTERY USING LOW OSMOLAR CONTRAST: ICD-10-PCS | Performed by: SURGERY

## 2017-01-01 PROCEDURE — G8979 MOBILITY GOAL STATUS: HCPCS

## 2017-01-01 PROCEDURE — 65270000029 HC RM PRIVATE

## 2017-01-01 PROCEDURE — 76010000138 HC OR TIME 0.5 TO 1 HR: Performed by: UROLOGY

## 2017-01-01 PROCEDURE — 97530 THERAPEUTIC ACTIVITIES: CPT | Performed by: OCCUPATIONAL THERAPIST

## 2017-01-01 PROCEDURE — 85025 COMPLETE CBC W/AUTO DIFF WBC: CPT | Performed by: NURSE PRACTITIONER

## 2017-01-01 PROCEDURE — 77030012390 HC DRN CHST BTL GTNG -B

## 2017-01-01 PROCEDURE — 77030010880 HC CBL EP SUPRME STJU -C

## 2017-01-01 PROCEDURE — 77030021678 HC GLIDESCP STAT DISP VERT -B

## 2017-01-01 PROCEDURE — 77030011229 HC DIL VESL COON COOK -A

## 2017-01-01 PROCEDURE — 83880 ASSAY OF NATRIURETIC PEPTIDE: CPT | Performed by: INTERNAL MEDICINE

## 2017-01-01 PROCEDURE — 77030011943

## 2017-01-01 PROCEDURE — 03L43DZ OCCLUSION OF LEFT SUBCLAVIAN ARTERY WITH INTRALUMINAL DEVICE, PERCUTANEOUS APPROACH: ICD-10-PCS | Performed by: SURGERY

## 2017-01-01 PROCEDURE — 74011000250 HC RX REV CODE- 250: Performed by: RADIOLOGY

## 2017-01-01 PROCEDURE — 77030032490 HC SLV COMPR SCD KNE COVD -B

## 2017-01-01 PROCEDURE — 86921 COMPATIBILITY TEST INCUBATE: CPT | Performed by: SURGERY

## 2017-01-01 PROCEDURE — 71020 XR CHEST PA LAT: CPT

## 2017-01-01 PROCEDURE — 36415 COLL VENOUS BLD VENIPUNCTURE: CPT | Performed by: NURSE PRACTITIONER

## 2017-01-01 PROCEDURE — 80061 LIPID PANEL: CPT

## 2017-01-01 PROCEDURE — 77030011229 HC DIL VESL COON COOK -A: Performed by: SURGERY

## 2017-01-01 PROCEDURE — 77030013518: Performed by: SURGERY

## 2017-01-01 PROCEDURE — 77030002924 HC SUT GORTX WLGO -B: Performed by: SURGERY

## 2017-01-01 PROCEDURE — 82570 ASSAY OF URINE CREATININE: CPT | Performed by: INTERNAL MEDICINE

## 2017-01-01 PROCEDURE — 02H63JZ INSERTION OF PACEMAKER LEAD INTO RIGHT ATRIUM, PERCUTANEOUS APPROACH: ICD-10-PCS | Performed by: INTERNAL MEDICINE

## 2017-01-01 PROCEDURE — P9047 ALBUMIN (HUMAN), 25%, 50ML: HCPCS | Performed by: NURSE PRACTITIONER

## 2017-01-01 PROCEDURE — B440ZZ3 ULTRASONOGRAPHY OF ABDOMINAL AORTA, INTRAVASCULAR: ICD-10-PCS | Performed by: SURGERY

## 2017-01-01 PROCEDURE — 74011000258 HC RX REV CODE- 258: Performed by: NURSE ANESTHETIST, CERTIFIED REGISTERED

## 2017-01-01 PROCEDURE — 031J0JK BYPASS LEFT COMMON CAROTID ARTERY TO LEFT EXTRACRANIAL ARTERY WITH SYNTHETIC SUBSTITUTE, OPEN APPROACH: ICD-10-PCS | Performed by: SURGERY

## 2017-01-01 PROCEDURE — 74011250637 HC RX REV CODE- 250/637: Performed by: EMERGENCY MEDICINE

## 2017-01-01 PROCEDURE — 92523 SPEECH SOUND LANG COMPREHEN: CPT

## 2017-01-01 PROCEDURE — 77030020782 HC GWN BAIR PAWS FLX 3M -B

## 2017-01-01 PROCEDURE — 77030011640 HC PAD GRND REM COVD -A: Performed by: SURGERY

## 2017-01-01 PROCEDURE — 77030015398 HC CBL EP EXT STJU -C

## 2017-01-01 PROCEDURE — 02HV33Z INSERTION OF INFUSION DEVICE INTO SUPERIOR VENA CAVA, PERCUTANEOUS APPROACH: ICD-10-PCS | Performed by: RADIOLOGY

## 2017-01-01 PROCEDURE — C1769 GUIDE WIRE: HCPCS | Performed by: SURGERY

## 2017-01-01 PROCEDURE — 77030010516 HC APPL HEMA CLP TELE -B: Performed by: SURGERY

## 2017-01-01 PROCEDURE — 36415 COLL VENOUS BLD VENIPUNCTURE: CPT | Performed by: SURGERY

## 2017-01-01 PROCEDURE — 77030033263 HC DRSG MEPILEX 16-48IN BORD MOLN -B

## 2017-01-01 PROCEDURE — 96374 THER/PROPH/DIAG INJ IV PUSH: CPT

## 2017-01-01 PROCEDURE — 5A1D60Z PERFORMANCE OF URINARY FILTRATION, MULTIPLE: ICD-10-PCS | Performed by: INTERNAL MEDICINE

## 2017-01-01 PROCEDURE — P9045 ALBUMIN (HUMAN), 5%, 250 ML: HCPCS | Performed by: INTERNAL MEDICINE

## 2017-01-01 PROCEDURE — C1874 STENT, COATED/COV W/DEL SYS: HCPCS | Performed by: SURGERY

## 2017-01-01 PROCEDURE — 86870 RBC ANTIBODY IDENTIFICATION: CPT | Performed by: INTERNAL MEDICINE

## 2017-01-01 PROCEDURE — 90935 HEMODIALYSIS ONE EVALUATION: CPT

## 2017-01-01 PROCEDURE — 80053 COMPREHEN METABOLIC PANEL: CPT

## 2017-01-01 PROCEDURE — 80069 RENAL FUNCTION PANEL: CPT | Performed by: INTERNAL MEDICINE

## 2017-01-01 PROCEDURE — 86920 COMPATIBILITY TEST SPIN: CPT | Performed by: INTERNAL MEDICINE

## 2017-01-01 PROCEDURE — 77030018836 HC SOL IRR NACL ICUM -A: Performed by: SURGERY

## 2017-01-01 PROCEDURE — 77030019563 HC DEV ATTCH FEED HOLL -A

## 2017-01-01 PROCEDURE — 77030013140 HC MSK NEB VYRM -A

## 2017-01-01 PROCEDURE — 77030002996 HC SUT SLK J&J -A

## 2017-01-01 PROCEDURE — 96366 THER/PROPH/DIAG IV INF ADDON: CPT

## 2017-01-01 PROCEDURE — 77030018719 HC DRSG PTCH ANTIMIC J&J -A

## 2017-01-01 PROCEDURE — 77030018729 HC ELECTRD DEFIB PAD CARD -B

## 2017-01-01 PROCEDURE — C1725 CATH, TRANSLUMIN NON-LASER: HCPCS | Performed by: SURGERY

## 2017-01-01 PROCEDURE — 77030026438 HC STYL ET INTUB CARD -A: Performed by: ANESTHESIOLOGY

## 2017-01-01 PROCEDURE — 74000 XR ABD (KUB): CPT

## 2017-01-01 PROCEDURE — C1753 CATH, INTRAVAS ULTRASOUND: HCPCS | Performed by: SURGERY

## 2017-01-01 PROCEDURE — BT1F1ZZ FLUOROSCOPY OF LEFT KIDNEY, URETER AND BLADDER USING LOW OSMOLAR CONTRAST: ICD-10-PCS | Performed by: UROLOGY

## 2017-01-01 PROCEDURE — 74011636320 HC RX REV CODE- 636/320: Performed by: RADIOLOGY

## 2017-01-01 PROCEDURE — 77030008027

## 2017-01-01 PROCEDURE — 77030013058 HC DEV INFL ANGIO BSC -B: Performed by: SURGERY

## 2017-01-01 PROCEDURE — 86870 RBC ANTIBODY IDENTIFICATION: CPT | Performed by: SURGERY

## 2017-01-01 PROCEDURE — P9047 ALBUMIN (HUMAN), 25%, 50ML: HCPCS | Performed by: INTERNAL MEDICINE

## 2017-01-01 PROCEDURE — 74176 CT ABD & PELVIS W/O CONTRAST: CPT

## 2017-01-01 PROCEDURE — 74011636320 HC RX REV CODE- 636/320: Performed by: UROLOGY

## 2017-01-01 PROCEDURE — 93306 TTE W/DOPPLER COMPLETE: CPT

## 2017-01-01 PROCEDURE — 85025 COMPLETE CBC W/AUTO DIFF WBC: CPT | Performed by: EMERGENCY MEDICINE

## 2017-01-01 PROCEDURE — 30233N1 TRANSFUSION OF NONAUTOLOGOUS RED BLOOD CELLS INTO PERIPHERAL VEIN, PERCUTANEOUS APPROACH: ICD-10-PCS | Performed by: SURGERY

## 2017-01-01 PROCEDURE — 77030018836 HC SOL IRR NACL ICUM -A

## 2017-01-01 PROCEDURE — G8978 MOBILITY CURRENT STATUS: HCPCS

## 2017-01-01 PROCEDURE — 74011250636 HC RX REV CODE- 250/636: Performed by: ANESTHESIOLOGY

## 2017-01-01 PROCEDURE — 86921 COMPATIBILITY TEST INCUBATE: CPT | Performed by: INTERNAL MEDICINE

## 2017-01-01 PROCEDURE — 77030019908 HC STETH ESOPH SIMS -A: Performed by: ANESTHESIOLOGY

## 2017-01-01 PROCEDURE — 97112 NEUROMUSCULAR REEDUCATION: CPT | Performed by: OCCUPATIONAL THERAPIST

## 2017-01-01 PROCEDURE — 80053 COMPREHEN METABOLIC PANEL: CPT | Performed by: SURGERY

## 2017-01-01 PROCEDURE — 76060000048 HC ANESTHESIA 8.5 TO 9 HR: Performed by: SURGERY

## 2017-01-01 PROCEDURE — 84100 ASSAY OF PHOSPHORUS: CPT | Performed by: SURGERY

## 2017-01-01 PROCEDURE — 04BL0ZZ EXCISION OF LEFT FEMORAL ARTERY, OPEN APPROACH: ICD-10-PCS | Performed by: SURGERY

## 2017-01-01 PROCEDURE — 85027 COMPLETE CBC AUTOMATED: CPT | Performed by: NURSE PRACTITIONER

## 2017-01-01 PROCEDURE — 77030013797 HC KT TRNSDUC PRSSR EDWD -A: Performed by: ANESTHESIOLOGY

## 2017-01-01 PROCEDURE — G8987 SELF CARE CURRENT STATUS: HCPCS | Performed by: OCCUPATIONAL THERAPIST

## 2017-01-01 PROCEDURE — 80053 COMPREHEN METABOLIC PANEL: CPT | Performed by: NURSE PRACTITIONER

## 2017-01-01 PROCEDURE — 77030008684 HC TU ET CUF COVD -B: Performed by: ANESTHESIOLOGY

## 2017-01-01 PROCEDURE — 0BH17EZ INSERTION OF ENDOTRACHEAL AIRWAY INTO TRACHEA, VIA NATURAL OR ARTIFICIAL OPENING: ICD-10-PCS | Performed by: ANESTHESIOLOGY

## 2017-01-01 PROCEDURE — 97535 SELF CARE MNGMENT TRAINING: CPT

## 2017-01-01 PROCEDURE — 77030034848: Performed by: SURGERY

## 2017-01-01 PROCEDURE — 82436 ASSAY OF URINE CHLORIDE: CPT | Performed by: INTERNAL MEDICINE

## 2017-01-01 PROCEDURE — 86922 COMPATIBILITY TEST ANTIGLOB: CPT | Performed by: SURGERY

## 2017-01-01 PROCEDURE — 92507 TX SP LANG VOICE COMM INDIV: CPT | Performed by: SPEECH-LANGUAGE PATHOLOGIST

## 2017-01-01 PROCEDURE — 77030014558 HC PLUG EMB VASC -G: Performed by: SURGERY

## 2017-01-01 PROCEDURE — 77030031139 HC SUT VCRL2 J&J -A: Performed by: SURGERY

## 2017-01-01 PROCEDURE — C1898 LEAD, PMKR, OTHER THAN TRANS: HCPCS

## 2017-01-01 PROCEDURE — 0BPQX0Z REMOVAL OF DRAINAGE DEVICE FROM PLEURA, EXTERNAL APPROACH: ICD-10-PCS | Performed by: INTERNAL MEDICINE

## 2017-01-01 PROCEDURE — 74011636320 HC RX REV CODE- 636/320

## 2017-01-01 PROCEDURE — 76210000006 HC OR PH I REC 0.5 TO 1 HR: Performed by: UROLOGY

## 2017-01-01 PROCEDURE — 83935 ASSAY OF URINE OSMOLALITY: CPT | Performed by: INTERNAL MEDICINE

## 2017-01-01 PROCEDURE — 4A023FZ MEASUREMENT OF CARDIAC RHYTHM, PERCUTANEOUS APPROACH: ICD-10-PCS | Performed by: INTERNAL MEDICINE

## 2017-01-01 PROCEDURE — 74420 UROGRAPHY RTRGR +-KUB: CPT

## 2017-01-01 PROCEDURE — 77030018786 HC NDL GD F/USND BARD -B

## 2017-01-01 PROCEDURE — 76937 US GUIDE VASCULAR ACCESS: CPT

## 2017-01-01 PROCEDURE — 0W9930Z DRAINAGE OF RIGHT PLEURAL CAVITY WITH DRAINAGE DEVICE, PERCUTANEOUS APPROACH: ICD-10-PCS | Performed by: RADIOLOGY

## 2017-01-01 PROCEDURE — 93970 EXTREMITY STUDY: CPT

## 2017-01-01 PROCEDURE — C1887 CATHETER, GUIDING: HCPCS

## 2017-01-01 PROCEDURE — 76060000032 HC ANESTHESIA 0.5 TO 1 HR: Performed by: UROLOGY

## 2017-01-01 PROCEDURE — 77030013079 HC BLNKT BAIR HGGR 3M -A: Performed by: ANESTHESIOLOGY

## 2017-01-01 PROCEDURE — C1725 CATH, TRANSLUMIN NON-LASER: HCPCS

## 2017-01-01 PROCEDURE — 77030010221 HC SPLNT WR POS TELE -B

## 2017-01-01 PROCEDURE — 0JH606Z INSERTION OF PACEMAKER, DUAL CHAMBER INTO CHEST SUBCUTANEOUS TISSUE AND FASCIA, OPEN APPROACH: ICD-10-PCS | Performed by: INTERNAL MEDICINE

## 2017-01-01 PROCEDURE — C1731 CATH, EP, 20 OR MORE ELEC: HCPCS

## 2017-01-01 PROCEDURE — 74011000250 HC RX REV CODE- 250: Performed by: NURSE ANESTHETIST, CERTIFIED REGISTERED

## 2017-01-01 PROCEDURE — 85018 HEMOGLOBIN: CPT | Performed by: SURGERY

## 2017-01-01 PROCEDURE — 77030020153 HC PRB DOPLR DISP MIZU -C: Performed by: SURGERY

## 2017-01-01 PROCEDURE — 87086 URINE CULTURE/COLONY COUNT: CPT | Performed by: SURGERY

## 2017-01-01 PROCEDURE — 0T778DZ DILATION OF LEFT URETER WITH INTRALUMINAL DEVICE, VIA NATURAL OR ARTIFICIAL OPENING ENDOSCOPIC: ICD-10-PCS | Performed by: UROLOGY

## 2017-01-01 PROCEDURE — 77030028837 HC SYR ANGI PWR INJ COEU -A: Performed by: SURGERY

## 2017-01-01 PROCEDURE — 74011250636 HC RX REV CODE- 250/636: Performed by: EMERGENCY MEDICINE

## 2017-01-01 PROCEDURE — 86860 RBC ANTIBODY ELUTION: CPT | Performed by: INTERNAL MEDICINE

## 2017-01-01 PROCEDURE — 84484 ASSAY OF TROPONIN QUANT: CPT | Performed by: NURSE PRACTITIONER

## 2017-01-01 PROCEDURE — C1751 CATH, INF, PER/CENT/MIDLINE: HCPCS

## 2017-01-01 PROCEDURE — 77030011187

## 2017-01-01 PROCEDURE — 87340 HEPATITIS B SURFACE AG IA: CPT | Performed by: INTERNAL MEDICINE

## 2017-01-01 PROCEDURE — 77030013058 HC DEV INFL ANGIO BSC -B

## 2017-01-01 PROCEDURE — 5A1955Z RESPIRATORY VENTILATION, GREATER THAN 96 CONSECUTIVE HOURS: ICD-10-PCS | Performed by: INTERNAL MEDICINE

## 2017-01-01 PROCEDURE — 77030018846 HC SOL IRR STRL H20 ICUM -A: Performed by: SURGERY

## 2017-01-01 PROCEDURE — 04UL0JZ SUPPLEMENT LEFT FEMORAL ARTERY WITH SYNTHETIC SUBSTITUTE, OPEN APPROACH: ICD-10-PCS | Performed by: SURGERY

## 2017-01-01 PROCEDURE — C1894 INTRO/SHEATH, NON-LASER: HCPCS | Performed by: SURGERY

## 2017-01-01 PROCEDURE — 77030020263 HC SOL INJ SOD CL0.9% LFCR 1000ML: Performed by: SURGERY

## 2017-01-01 PROCEDURE — 02583ZZ DESTRUCTION OF CONDUCTION MECHANISM, PERCUTANEOUS APPROACH: ICD-10-PCS | Performed by: INTERNAL MEDICINE

## 2017-01-01 PROCEDURE — 77030004549 HC CATH ANGI DX PRF MRTM -A

## 2017-01-01 PROCEDURE — 33208 INSRT HEART PM ATRIAL & VENT: CPT

## 2017-01-01 PROCEDURE — 3E0G76Z INTRODUCTION OF NUTRITIONAL SUBSTANCE INTO UPPER GI, VIA NATURAL OR ARTIFICIAL OPENING: ICD-10-PCS | Performed by: INTERNAL MEDICINE

## 2017-01-01 PROCEDURE — 74011250636 HC RX REV CODE- 250/636: Performed by: RADIOLOGY

## 2017-01-01 PROCEDURE — 81001 URINALYSIS AUTO W/SCOPE: CPT | Performed by: NURSE PRACTITIONER

## 2017-01-01 PROCEDURE — 037J0DZ DILATION OF LEFT COMMON CAROTID ARTERY WITH INTRALUMINAL DEVICE, OPEN APPROACH: ICD-10-PCS | Performed by: SURGERY

## 2017-01-01 PROCEDURE — 87186 SC STD MICRODIL/AGAR DIL: CPT | Performed by: INTERNAL MEDICINE

## 2017-01-01 PROCEDURE — 77001 FLUOROGUIDE FOR VEIN DEVICE: CPT

## 2017-01-01 PROCEDURE — 93005 ELECTROCARDIOGRAM TRACING: CPT

## 2017-01-01 PROCEDURE — 4A0234Z MEASUREMENT OF CARDIAC ELECTRICAL ACTIVITY, PERCUTANEOUS APPROACH: ICD-10-PCS | Performed by: INTERNAL MEDICINE

## 2017-01-01 PROCEDURE — 77030004827 HC CATH CSF LUMBR MEDT -C: Performed by: SURGERY

## 2017-01-01 PROCEDURE — 009U30Z DRAINAGE OF SPINAL CANAL WITH DRAINAGE DEVICE, PERCUTANEOUS APPROACH: ICD-10-PCS | Performed by: ANESTHESIOLOGY

## 2017-01-01 PROCEDURE — 77030029131 HC ADMN ST IV BLD N DEHP ICUM -B

## 2017-01-01 PROCEDURE — 77030002933 HC SUT MCRYL J&J -A: Performed by: SURGERY

## 2017-01-01 PROCEDURE — 83605 ASSAY OF LACTIC ACID: CPT | Performed by: NURSE PRACTITIONER

## 2017-01-01 PROCEDURE — 77030027107 HC PTCH EXT REF CARTO3 J&J -F

## 2017-01-01 PROCEDURE — 77030034850

## 2017-01-01 PROCEDURE — P9045 ALBUMIN (HUMAN), 5%, 250 ML: HCPCS

## 2017-01-01 PROCEDURE — C2617 STENT, NON-COR, TEM W/O DEL: HCPCS | Performed by: UROLOGY

## 2017-01-01 PROCEDURE — 36569 INSJ PICC 5 YR+ W/O IMAGING: CPT | Performed by: SURGERY

## 2017-01-01 PROCEDURE — 77030034115 HC SHR ENDO COAG HARM FOCS J&J -F: Performed by: SURGERY

## 2017-01-01 PROCEDURE — 99285 EMERGENCY DEPT VISIT HI MDM: CPT

## 2017-01-01 PROCEDURE — B31P1ZZ FLUOROSCOPY OF THORACO-ABDOMINAL AORTA USING LOW OSMOLAR CONTRAST: ICD-10-PCS | Performed by: SURGERY

## 2017-01-01 PROCEDURE — B4141ZZ FLUOROSCOPY OF SUPERIOR MESENTERIC ARTERY USING LOW OSMOLAR CONTRAST: ICD-10-PCS | Performed by: SURGERY

## 2017-01-01 PROCEDURE — 77030020268 HC MISC GENERAL SUPPLY: Performed by: SURGERY

## 2017-01-01 PROCEDURE — 74020 XR ABD PORT MIN 2 V: CPT

## 2017-01-01 PROCEDURE — 71275 CT ANGIOGRAPHY CHEST: CPT

## 2017-01-01 PROCEDURE — 77030012793 HC CIRC VNTLTR FISP -B

## 2017-01-01 PROCEDURE — 00PUX0Z REMOVAL OF DRAINAGE DEVICE FROM SPINAL CANAL, EXTERNAL APPROACH: ICD-10-PCS | Performed by: ANESTHESIOLOGY

## 2017-01-01 PROCEDURE — 77030028837 HC SYR ANGI PWR INJ COEU -A

## 2017-01-01 PROCEDURE — 80202 ASSAY OF VANCOMYCIN: CPT | Performed by: INTERNAL MEDICINE

## 2017-01-01 PROCEDURE — 77030020788: Performed by: SURGERY

## 2017-01-01 PROCEDURE — 77030020269 HC MISC IMPL: Performed by: SURGERY

## 2017-01-01 PROCEDURE — 77030012393 HC DRN CSF INLC -C: Performed by: SURGERY

## 2017-01-01 PROCEDURE — 74011000250 HC RX REV CODE- 250: Performed by: NURSE PRACTITIONER

## 2017-01-01 PROCEDURE — 84443 ASSAY THYROID STIM HORMONE: CPT

## 2017-01-01 PROCEDURE — 36600 WITHDRAWAL OF ARTERIAL BLOOD: CPT | Performed by: SURGERY

## 2017-01-01 PROCEDURE — 74011000272 HC RX REV CODE- 272: Performed by: SURGERY

## 2017-01-01 PROCEDURE — 76010000184 HC OR TIME 8.5 TO 9 HR INTENSV-TIER 1: Performed by: SURGERY

## 2017-01-01 PROCEDURE — 04713DZ DILATION OF CELIAC ARTERY WITH INTRALUMINAL DEVICE, PERCUTANEOUS APPROACH: ICD-10-PCS | Performed by: SURGERY

## 2017-01-01 PROCEDURE — 92610 EVALUATE SWALLOWING FUNCTION: CPT

## 2017-01-01 PROCEDURE — 80048 BASIC METABOLIC PNL TOTAL CA: CPT | Performed by: NURSE PRACTITIONER

## 2017-01-01 PROCEDURE — 96365 THER/PROPH/DIAG IV INF INIT: CPT

## 2017-01-01 PROCEDURE — 77030011640 HC PAD GRND REM COVD -A

## 2017-01-01 PROCEDURE — 86900 BLOOD TYPING SEROLOGIC ABO: CPT | Performed by: INTERNAL MEDICINE

## 2017-01-01 PROCEDURE — 02HK3JZ INSERTION OF PACEMAKER LEAD INTO RIGHT VENTRICLE, PERCUTANEOUS APPROACH: ICD-10-PCS | Performed by: INTERNAL MEDICINE

## 2017-01-01 PROCEDURE — 77030020061 HC IV BLD WRMR ADMIN SET 3M -B: Performed by: ANESTHESIOLOGY

## 2017-01-01 DEVICE — STENT PERIPH L100MM DIA8MM CATH L120CM DIA7FR 0.035IN HEP: Type: IMPLANTABLE DEVICE | Site: AORTA | Status: FUNCTIONAL

## 2017-01-01 DEVICE — STENT TRACHBRONCH L38MM DIA8MM CATH 7FR L80CM S STL PTFE: Type: IMPLANTABLE DEVICE | Site: CAROTID | Status: FUNCTIONAL

## 2017-01-01 DEVICE — URETERAL STENT
Type: IMPLANTABLE DEVICE | Site: URETER | Status: FUNCTIONAL
Brand: CONTOUR™

## 2017-01-01 DEVICE — GRAFT VASC L40CM ID8MM EPTFE HEP THN WALLED STR PROPATEN: Type: IMPLANTABLE DEVICE | Site: CAROTID | Status: FUNCTIONAL

## 2017-01-01 DEVICE — IMPLANTABLE DEVICE: Type: IMPLANTABLE DEVICE | Site: AORTA | Status: FUNCTIONAL

## 2017-01-01 DEVICE — IMPLANTABLE DEVICE: Type: IMPLANTABLE DEVICE | Site: SUBCLAVIAN | Status: FUNCTIONAL

## 2017-01-01 DEVICE — GRAFT PTCH TW GEL SEAL 8X75MM -- VASCUTEK FLUOROPASSIV: Type: IMPLANTABLE DEVICE | Site: GROIN | Status: FUNCTIONAL

## 2017-01-01 RX ORDER — SODIUM CHLORIDE 0.9 % (FLUSH) 0.9 %
5-10 SYRINGE (ML) INJECTION AS NEEDED
Status: DISCONTINUED | OUTPATIENT
Start: 2017-01-01 | End: 2017-01-01 | Stop reason: SDUPTHER

## 2017-01-01 RX ORDER — HEPARIN SODIUM 200 [USP'U]/100ML
500 INJECTION, SOLUTION INTRAVENOUS ONCE
Status: COMPLETED | OUTPATIENT
Start: 2017-01-01 | End: 2017-01-01

## 2017-01-01 RX ORDER — HEPARIN 100 UNIT/ML
300 SYRINGE INTRAVENOUS ONCE
Status: COMPLETED | OUTPATIENT
Start: 2017-01-01 | End: 2017-01-01

## 2017-01-01 RX ORDER — MIDAZOLAM HYDROCHLORIDE 1 MG/ML
.5-2 INJECTION, SOLUTION INTRAMUSCULAR; INTRAVENOUS
Status: DISCONTINUED | OUTPATIENT
Start: 2017-01-01 | End: 2017-01-01

## 2017-01-01 RX ORDER — ESMOLOL HYDROCHLORIDE 10 MG/ML
INJECTION INTRAVENOUS
Status: DISCONTINUED | OUTPATIENT
Start: 2017-01-01 | End: 2017-01-01 | Stop reason: HOSPADM

## 2017-01-01 RX ORDER — ESMOLOL HYDROCHLORIDE 10 MG/ML
50 INJECTION, SOLUTION INTRAVENOUS
Status: DISCONTINUED | OUTPATIENT
Start: 2017-01-01 | End: 2017-01-01 | Stop reason: HOSPADM

## 2017-01-01 RX ORDER — MUPIROCIN 20 MG/G
OINTMENT TOPICAL EVERY 12 HOURS
Status: DISPENSED | OUTPATIENT
Start: 2017-01-01 | End: 2017-01-01

## 2017-01-01 RX ORDER — PROPOFOL 10 MG/ML
INJECTION, EMULSION INTRAVENOUS
Status: DISCONTINUED | OUTPATIENT
Start: 2017-01-01 | End: 2017-01-01 | Stop reason: HOSPADM

## 2017-01-01 RX ORDER — SODIUM CHLORIDE 9 MG/ML
INJECTION, SOLUTION INTRAVENOUS
Status: DISCONTINUED | OUTPATIENT
Start: 2017-01-01 | End: 2017-01-01 | Stop reason: HOSPADM

## 2017-01-01 RX ORDER — HYDROMORPHONE HYDROCHLORIDE 1 MG/ML
1 INJECTION, SOLUTION INTRAMUSCULAR; INTRAVENOUS; SUBCUTANEOUS
Status: DISCONTINUED | OUTPATIENT
Start: 2017-01-01 | End: 2017-01-01

## 2017-01-01 RX ORDER — MIDAZOLAM HYDROCHLORIDE 1 MG/ML
INJECTION, SOLUTION INTRAMUSCULAR; INTRAVENOUS
Status: COMPLETED
Start: 2017-01-01 | End: 2017-01-01

## 2017-01-01 RX ORDER — IPRATROPIUM BROMIDE AND ALBUTEROL SULFATE 2.5; .5 MG/3ML; MG/3ML
3 SOLUTION RESPIRATORY (INHALATION)
Status: DISCONTINUED | OUTPATIENT
Start: 2017-01-01 | End: 2017-01-01 | Stop reason: HOSPADM

## 2017-01-01 RX ORDER — FUROSEMIDE 10 MG/ML
40 INJECTION INTRAMUSCULAR; INTRAVENOUS ONCE
Status: COMPLETED | OUTPATIENT
Start: 2017-01-01 | End: 2017-01-01

## 2017-01-01 RX ORDER — CHLORHEXIDINE GLUCONATE 1.2 MG/ML
15 RINSE ORAL EVERY 12 HOURS
Status: DISCONTINUED | OUTPATIENT
Start: 2017-01-01 | End: 2017-01-01

## 2017-01-01 RX ORDER — ALBUMIN HUMAN 250 G/1000ML
50 SOLUTION INTRAVENOUS ONCE
Status: COMPLETED | OUTPATIENT
Start: 2017-01-01 | End: 2017-01-01

## 2017-01-01 RX ORDER — FUROSEMIDE 10 MG/ML
80 INJECTION INTRAMUSCULAR; INTRAVENOUS ONCE
Status: COMPLETED | OUTPATIENT
Start: 2017-01-01 | End: 2017-01-01

## 2017-01-01 RX ORDER — SODIUM CHLORIDE 0.9 % (FLUSH) 0.9 %
10 SYRINGE (ML) INJECTION EVERY 24 HOURS
Status: DISCONTINUED | OUTPATIENT
Start: 2017-01-01 | End: 2017-01-01

## 2017-01-01 RX ORDER — CEFAZOLIN SODIUM 1 G/3ML
INJECTION, POWDER, FOR SOLUTION INTRAMUSCULAR; INTRAVENOUS AS NEEDED
Status: DISCONTINUED | OUTPATIENT
Start: 2017-01-01 | End: 2017-01-01 | Stop reason: HOSPADM

## 2017-01-01 RX ORDER — CALCIUM CHLORIDE INJECTION 100 MG/ML
INJECTION, SOLUTION INTRAVENOUS AS NEEDED
Status: DISCONTINUED | OUTPATIENT
Start: 2017-01-01 | End: 2017-01-01 | Stop reason: HOSPADM

## 2017-01-01 RX ORDER — VANCOMYCIN HYDROCHLORIDE
1250
Status: DISCONTINUED | OUTPATIENT
Start: 2017-01-01 | End: 2017-01-01

## 2017-01-01 RX ORDER — LIDOCAINE HYDROCHLORIDE 20 MG/ML
20 INJECTION, SOLUTION INFILTRATION; PERINEURAL ONCE
Status: COMPLETED | OUTPATIENT
Start: 2017-01-01 | End: 2017-01-01

## 2017-01-01 RX ORDER — ALPRAZOLAM 0.5 MG/1
0.5 TABLET ORAL
Status: DISCONTINUED | OUTPATIENT
Start: 2017-01-01 | End: 2017-01-01

## 2017-01-01 RX ORDER — BACITRACIN 50000 [IU]/1
50000 INJECTION, POWDER, FOR SOLUTION INTRAMUSCULAR ONCE
Status: DISCONTINUED | OUTPATIENT
Start: 2017-01-01 | End: 2017-01-01 | Stop reason: HOSPADM

## 2017-01-01 RX ORDER — CEFDINIR 300 MG/1
300 CAPSULE ORAL 2 TIMES DAILY
Qty: 20 CAP | Refills: 0 | Status: SHIPPED | OUTPATIENT
Start: 2017-01-01 | End: 2017-01-01

## 2017-01-01 RX ORDER — ONDANSETRON 2 MG/ML
4 INJECTION INTRAMUSCULAR; INTRAVENOUS
Status: COMPLETED | OUTPATIENT
Start: 2017-01-01 | End: 2017-01-01

## 2017-01-01 RX ORDER — SODIUM CHLORIDE 0.9 % (FLUSH) 0.9 %
20 SYRINGE (ML) INJECTION AS NEEDED
Status: DISCONTINUED | OUTPATIENT
Start: 2017-01-01 | End: 2017-01-01 | Stop reason: HOSPADM

## 2017-01-01 RX ORDER — MIDAZOLAM HYDROCHLORIDE 1 MG/ML
2 INJECTION, SOLUTION INTRAMUSCULAR; INTRAVENOUS ONCE
Status: ACTIVE | OUTPATIENT
Start: 2017-01-01 | End: 2017-01-01

## 2017-01-01 RX ORDER — METOPROLOL TARTRATE 25 MG/1
25 TABLET, FILM COATED ORAL 2 TIMES DAILY
Status: DISCONTINUED | OUTPATIENT
Start: 2017-01-01 | End: 2017-01-01

## 2017-01-01 RX ORDER — PROTAMINE SULFATE 10 MG/ML
40 INJECTION, SOLUTION INTRAVENOUS
Status: COMPLETED | OUTPATIENT
Start: 2017-01-01 | End: 2017-01-01

## 2017-01-01 RX ORDER — HYDROMORPHONE HYDROCHLORIDE 1 MG/ML
0.2 INJECTION, SOLUTION INTRAMUSCULAR; INTRAVENOUS; SUBCUTANEOUS
Status: DISCONTINUED | OUTPATIENT
Start: 2017-01-01 | End: 2017-01-01 | Stop reason: HOSPADM

## 2017-01-01 RX ORDER — POTASSIUM CHLORIDE 29.8 MG/ML
20 INJECTION INTRAVENOUS
Status: COMPLETED | OUTPATIENT
Start: 2017-01-01 | End: 2017-01-01

## 2017-01-01 RX ORDER — PROPOFOL 10 MG/ML
5-50 VIAL (ML) INTRAVENOUS
Status: DISCONTINUED | OUTPATIENT
Start: 2017-01-01 | End: 2017-01-01

## 2017-01-01 RX ORDER — DEXTROSE 20 G/100ML
INJECTION, SOLUTION INTRAVENOUS CONTINUOUS
Status: DISCONTINUED | OUTPATIENT
Start: 2017-01-01 | End: 2017-01-01

## 2017-01-01 RX ORDER — FENTANYL CITRATE 50 UG/ML
INJECTION, SOLUTION INTRAMUSCULAR; INTRAVENOUS
Status: COMPLETED
Start: 2017-01-01 | End: 2017-01-01

## 2017-01-01 RX ORDER — SORBITOL SOLUTION 70 %
30 SOLUTION, ORAL MISCELLANEOUS ONCE
Status: COMPLETED | OUTPATIENT
Start: 2017-01-01 | End: 2017-01-01

## 2017-01-01 RX ORDER — FUROSEMIDE 40 MG/1
40 TABLET ORAL ONCE
Status: COMPLETED | OUTPATIENT
Start: 2017-01-01 | End: 2017-01-01

## 2017-01-01 RX ORDER — ALBUTEROL SULFATE 0.83 MG/ML
2.5 SOLUTION RESPIRATORY (INHALATION)
Status: DISCONTINUED | OUTPATIENT
Start: 2017-01-01 | End: 2017-01-01

## 2017-01-01 RX ORDER — SODIUM CHLORIDE 0.9 % (FLUSH) 0.9 %
10-40 SYRINGE (ML) INJECTION EVERY 8 HOURS
Status: DISCONTINUED | OUTPATIENT
Start: 2017-01-01 | End: 2017-01-01

## 2017-01-01 RX ORDER — ALBUMIN HUMAN 50 G/1000ML
SOLUTION INTRAVENOUS AS NEEDED
Status: DISCONTINUED | OUTPATIENT
Start: 2017-01-01 | End: 2017-01-01 | Stop reason: HOSPADM

## 2017-01-01 RX ORDER — ESCITALOPRAM OXALATE 10 MG/1
10 TABLET ORAL DAILY
Status: DISCONTINUED | OUTPATIENT
Start: 2017-01-01 | End: 2017-01-01 | Stop reason: ALTCHOICE

## 2017-01-01 RX ORDER — DEXTROSE 20 G/100ML
INJECTION, SOLUTION INTRAVENOUS CONTINUOUS
Status: DISPENSED | OUTPATIENT
Start: 2017-01-01 | End: 2017-01-01

## 2017-01-01 RX ORDER — IPRATROPIUM BROMIDE AND ALBUTEROL SULFATE 2.5; .5 MG/3ML; MG/3ML
3 SOLUTION RESPIRATORY (INHALATION)
Status: DISCONTINUED | OUTPATIENT
Start: 2017-01-01 | End: 2017-01-01

## 2017-01-01 RX ORDER — SODIUM CHLORIDE 9 MG/ML
250 INJECTION, SOLUTION INTRAVENOUS AS NEEDED
Status: DISCONTINUED | OUTPATIENT
Start: 2017-01-01 | End: 2017-01-01 | Stop reason: HOSPADM

## 2017-01-01 RX ORDER — SODIUM CHLORIDE 9 MG/ML
50 INJECTION, SOLUTION INTRAVENOUS CONTINUOUS
Status: DISCONTINUED | OUTPATIENT
Start: 2017-01-01 | End: 2017-01-01

## 2017-01-01 RX ORDER — HYDRALAZINE HYDROCHLORIDE 50 MG/1
100 TABLET, FILM COATED ORAL 3 TIMES DAILY
Status: DISCONTINUED | OUTPATIENT
Start: 2017-01-01 | End: 2017-01-01 | Stop reason: HOSPADM

## 2017-01-01 RX ORDER — SODIUM CHLORIDE 9 MG/ML
150 INJECTION, SOLUTION INTRAVENOUS ONCE
Status: ACTIVE | OUTPATIENT
Start: 2017-01-01 | End: 2017-01-01

## 2017-01-01 RX ORDER — HEPARIN SODIUM 5000 [USP'U]/ML
5000 INJECTION, SOLUTION INTRAVENOUS; SUBCUTANEOUS EVERY 8 HOURS
Status: DISCONTINUED | OUTPATIENT
Start: 2017-01-01 | End: 2017-01-01

## 2017-01-01 RX ORDER — SODIUM CHLORIDE, SODIUM LACTATE, POTASSIUM CHLORIDE, CALCIUM CHLORIDE 600; 310; 30; 20 MG/100ML; MG/100ML; MG/100ML; MG/100ML
125 INJECTION, SOLUTION INTRAVENOUS CONTINUOUS
Status: DISCONTINUED | OUTPATIENT
Start: 2017-01-01 | End: 2017-01-01

## 2017-01-01 RX ORDER — SODIUM CHLORIDE 0.9 % (FLUSH) 0.9 %
5-10 SYRINGE (ML) INJECTION AS NEEDED
Status: DISCONTINUED | OUTPATIENT
Start: 2017-01-01 | End: 2017-01-01 | Stop reason: HOSPADM

## 2017-01-01 RX ORDER — CLONAZEPAM 0.5 MG/1
TABLET ORAL
Qty: 60 TAB | Refills: 2 | Status: SHIPPED | OUTPATIENT
Start: 2017-01-01

## 2017-01-01 RX ORDER — DOBUTAMINE HYDROCHLORIDE 200 MG/100ML
INJECTION INTRAVENOUS
Status: COMPLETED
Start: 2017-01-01 | End: 2017-01-01

## 2017-01-01 RX ORDER — POLYETHYLENE GLYCOL 3350 17 G/17G
17 POWDER, FOR SOLUTION ORAL DAILY
Status: DISCONTINUED | OUTPATIENT
Start: 2017-01-01 | End: 2017-01-01 | Stop reason: HOSPADM

## 2017-01-01 RX ORDER — HEPARIN SODIUM 5000 [USP'U]/ML
5000 INJECTION, SOLUTION INTRAVENOUS; SUBCUTANEOUS EVERY 8 HOURS
Status: DISCONTINUED | OUTPATIENT
Start: 2017-01-01 | End: 2017-01-01 | Stop reason: HOSPADM

## 2017-01-01 RX ORDER — PANTOPRAZOLE SODIUM 40 MG/1
40 TABLET, DELAYED RELEASE ORAL 2 TIMES DAILY
Status: DISCONTINUED | OUTPATIENT
Start: 2017-01-01 | End: 2017-01-01

## 2017-01-01 RX ORDER — SODIUM CHLORIDE, SODIUM LACTATE, POTASSIUM CHLORIDE, CALCIUM CHLORIDE 600; 310; 30; 20 MG/100ML; MG/100ML; MG/100ML; MG/100ML
125 INJECTION, SOLUTION INTRAVENOUS CONTINUOUS
Status: CANCELLED | OUTPATIENT
Start: 2017-01-01

## 2017-01-01 RX ORDER — SODIUM BICARBONATE 1 MEQ/ML
SYRINGE (ML) INTRAVENOUS AS NEEDED
Status: DISCONTINUED | OUTPATIENT
Start: 2017-01-01 | End: 2017-01-01

## 2017-01-01 RX ORDER — LIDOCAINE HYDROCHLORIDE 10 MG/ML
1-30 INJECTION, SOLUTION EPIDURAL; INFILTRATION; INTRACAUDAL; PERINEURAL
Status: DISCONTINUED | OUTPATIENT
Start: 2017-01-01 | End: 2017-01-01

## 2017-01-01 RX ORDER — SPIRONOLACTONE 25 MG/1
25 TABLET ORAL DAILY
Qty: 90 TAB | Refills: 3 | Status: SHIPPED | OUTPATIENT
Start: 2017-01-01 | End: 2017-01-01

## 2017-01-01 RX ORDER — SODIUM CHLORIDE 9 MG/ML
250 INJECTION, SOLUTION INTRAVENOUS AS NEEDED
Status: DISCONTINUED | OUTPATIENT
Start: 2017-01-01 | End: 2017-01-01

## 2017-01-01 RX ORDER — PETROLATUM 42 G/100G
OINTMENT TOPICAL AS NEEDED
Status: DISCONTINUED | OUTPATIENT
Start: 2017-01-01 | End: 2017-01-01

## 2017-01-01 RX ORDER — MINOXIDIL 2.5 MG/1
5 TABLET ORAL DAILY
Status: DISCONTINUED | OUTPATIENT
Start: 2017-01-01 | End: 2017-01-01

## 2017-01-01 RX ORDER — PANTOPRAZOLE SODIUM 40 MG/1
40 TABLET, DELAYED RELEASE ORAL 2 TIMES DAILY
Qty: 180 TAB | Refills: 3 | Status: SHIPPED | OUTPATIENT
Start: 2017-01-01

## 2017-01-01 RX ORDER — BUSPIRONE HYDROCHLORIDE 5 MG/1
7.5 TABLET ORAL 2 TIMES DAILY
Status: DISCONTINUED | OUTPATIENT
Start: 2017-01-01 | End: 2017-01-01

## 2017-01-01 RX ORDER — SODIUM CHLORIDE 0.9 % (FLUSH) 0.9 %
10-40 SYRINGE (ML) INJECTION EVERY 8 HOURS
Status: DISCONTINUED | OUTPATIENT
Start: 2017-01-01 | End: 2017-01-01 | Stop reason: HOSPADM

## 2017-01-01 RX ORDER — LIDOCAINE HYDROCHLORIDE 20 MG/ML
INJECTION, SOLUTION EPIDURAL; INFILTRATION; INTRACAUDAL; PERINEURAL AS NEEDED
Status: DISCONTINUED | OUTPATIENT
Start: 2017-01-01 | End: 2017-01-01 | Stop reason: HOSPADM

## 2017-01-01 RX ORDER — METOPROLOL TARTRATE 5 MG/5ML
2.5 INJECTION INTRAVENOUS EVERY 4 HOURS
Status: DISCONTINUED | OUTPATIENT
Start: 2017-01-01 | End: 2017-01-01

## 2017-01-01 RX ORDER — SODIUM CHLORIDE 0.9 % (FLUSH) 0.9 %
10 SYRINGE (ML) INJECTION AS NEEDED
Status: DISCONTINUED | OUTPATIENT
Start: 2017-01-01 | End: 2017-01-01

## 2017-01-01 RX ORDER — FENOFIBRATE 54 MG/1
54 TABLET ORAL DAILY
Qty: 90 TAB | Refills: 3 | Status: SHIPPED | OUTPATIENT
Start: 2017-01-01 | End: 2017-01-01

## 2017-01-01 RX ORDER — HYDROMORPHONE HYDROCHLORIDE 2 MG/ML
1 INJECTION, SOLUTION INTRAMUSCULAR; INTRAVENOUS; SUBCUTANEOUS
Status: DISCONTINUED | OUTPATIENT
Start: 2017-01-01 | End: 2017-01-01

## 2017-01-01 RX ORDER — ALBUMIN HUMAN 250 G/1000ML
12.5 SOLUTION INTRAVENOUS ONCE
Status: COMPLETED | OUTPATIENT
Start: 2017-01-01 | End: 2017-01-01

## 2017-01-01 RX ORDER — IPRATROPIUM BROMIDE 0.5 MG/2.5ML
0.5 SOLUTION RESPIRATORY (INHALATION)
Status: DISCONTINUED | OUTPATIENT
Start: 2017-01-01 | End: 2017-01-01

## 2017-01-01 RX ORDER — POTASSIUM CHLORIDE 29.8 MG/ML
20 INJECTION INTRAVENOUS
Status: DISCONTINUED | OUTPATIENT
Start: 2017-01-01 | End: 2017-01-01 | Stop reason: SDUPTHER

## 2017-01-01 RX ORDER — CEFAZOLIN SODIUM IN 0.9 % NACL 2 G/100 ML
2 PLASTIC BAG, INJECTION (ML) INTRAVENOUS EVERY 8 HOURS
Status: DISCONTINUED | OUTPATIENT
Start: 2017-01-01 | End: 2017-01-01

## 2017-01-01 RX ORDER — DOBUTAMINE HYDROCHLORIDE 200 MG/100ML
2.5-1 INJECTION INTRAVENOUS
Status: DISCONTINUED | OUTPATIENT
Start: 2017-01-01 | End: 2017-01-01

## 2017-01-01 RX ORDER — PROPOFOL 10 MG/ML
INJECTION, EMULSION INTRAVENOUS AS NEEDED
Status: DISCONTINUED | OUTPATIENT
Start: 2017-01-01 | End: 2017-01-01 | Stop reason: HOSPADM

## 2017-01-01 RX ORDER — LIDOCAINE HYDROCHLORIDE 10 MG/ML
INJECTION INFILTRATION; PERINEURAL
Status: COMPLETED
Start: 2017-01-01 | End: 2017-01-01

## 2017-01-01 RX ORDER — LABETALOL HYDROCHLORIDE 5 MG/ML
20 INJECTION, SOLUTION INTRAVENOUS
Status: COMPLETED | OUTPATIENT
Start: 2017-01-01 | End: 2017-01-01

## 2017-01-01 RX ORDER — ENOXAPARIN SODIUM 100 MG/ML
40 INJECTION SUBCUTANEOUS EVERY 24 HOURS
Status: DISCONTINUED | OUTPATIENT
Start: 2017-01-01 | End: 2017-01-01

## 2017-01-01 RX ORDER — MAGNESIUM SULFATE HEPTAHYDRATE 40 MG/ML
2 INJECTION, SOLUTION INTRAVENOUS ONCE
Status: COMPLETED | OUTPATIENT
Start: 2017-01-01 | End: 2017-01-01

## 2017-01-01 RX ORDER — SODIUM CHLORIDE 450 MG/100ML
25 INJECTION, SOLUTION INTRAVENOUS CONTINUOUS
Status: DISCONTINUED | OUTPATIENT
Start: 2017-01-01 | End: 2017-01-01

## 2017-01-01 RX ORDER — SODIUM CHLORIDE 0.9 % (FLUSH) 0.9 %
5-10 SYRINGE (ML) INJECTION EVERY 8 HOURS
Status: DISCONTINUED | OUTPATIENT
Start: 2017-01-01 | End: 2017-01-01

## 2017-01-01 RX ORDER — ONDANSETRON 2 MG/ML
INJECTION INTRAMUSCULAR; INTRAVENOUS
Status: COMPLETED
Start: 2017-01-01 | End: 2017-01-01

## 2017-01-01 RX ORDER — FLUCONAZOLE 100 MG/1
200 TABLET ORAL DAILY
Status: DISCONTINUED | OUTPATIENT
Start: 2017-01-01 | End: 2017-01-01

## 2017-01-01 RX ORDER — FLUCONAZOLE 100 MG/1
400 TABLET ORAL DAILY
Status: DISCONTINUED | OUTPATIENT
Start: 2017-01-01 | End: 2017-01-01 | Stop reason: HOSPADM

## 2017-01-01 RX ORDER — LIDOCAINE HYDROCHLORIDE 10 MG/ML
INJECTION, SOLUTION EPIDURAL; INFILTRATION; INTRACAUDAL; PERINEURAL
Status: COMPLETED
Start: 2017-01-01 | End: 2017-01-01

## 2017-01-01 RX ORDER — HYDROMORPHONE HYDROCHLORIDE 1 MG/ML
0.5 INJECTION, SOLUTION INTRAMUSCULAR; INTRAVENOUS; SUBCUTANEOUS
Status: DISCONTINUED | OUTPATIENT
Start: 2017-01-01 | End: 2017-01-01 | Stop reason: HOSPADM

## 2017-01-01 RX ORDER — SODIUM BICARBONATE 1 MEQ/ML
SYRINGE (ML) INTRAVENOUS
Status: COMPLETED
Start: 2017-01-01 | End: 2017-01-01

## 2017-01-01 RX ORDER — LABETALOL HYDROCHLORIDE 5 MG/ML
INJECTION, SOLUTION INTRAVENOUS
Status: COMPLETED
Start: 2017-01-01 | End: 2017-01-01

## 2017-01-01 RX ORDER — LIDOCAINE HYDROCHLORIDE 20 MG/ML
18 INJECTION, SOLUTION INFILTRATION; PERINEURAL ONCE
Status: COMPLETED | OUTPATIENT
Start: 2017-01-01 | End: 2017-01-01

## 2017-01-01 RX ORDER — SODIUM CHLORIDE 0.9 % (FLUSH) 0.9 %
5-10 SYRINGE (ML) INJECTION EVERY 8 HOURS
Status: DISCONTINUED | OUTPATIENT
Start: 2017-01-01 | End: 2017-01-01 | Stop reason: SDUPTHER

## 2017-01-01 RX ORDER — FENTANYL CITRATE 50 UG/ML
25 INJECTION, SOLUTION INTRAMUSCULAR; INTRAVENOUS
Status: DISCONTINUED | OUTPATIENT
Start: 2017-01-01 | End: 2017-01-01 | Stop reason: HOSPADM

## 2017-01-01 RX ORDER — VANCOMYCIN 2 GRAM/500 ML IN 0.9 % SODIUM CHLORIDE INTRAVENOUS
2000 ONCE
Status: COMPLETED | OUTPATIENT
Start: 2017-01-01 | End: 2017-01-01

## 2017-01-01 RX ORDER — OXYCODONE AND ACETAMINOPHEN 5; 325 MG/1; MG/1
1 TABLET ORAL AS NEEDED
Status: CANCELLED | OUTPATIENT
Start: 2017-01-01

## 2017-01-01 RX ORDER — SODIUM CHLORIDE 900 MG/100ML
INJECTION INTRAVENOUS
Status: DISCONTINUED
Start: 2017-01-01 | End: 2017-01-01 | Stop reason: HOSPADM

## 2017-01-01 RX ORDER — ACETAMINOPHEN 325 MG/1
650 TABLET ORAL
Status: DISCONTINUED | OUTPATIENT
Start: 2017-01-01 | End: 2017-01-01 | Stop reason: HOSPADM

## 2017-01-01 RX ORDER — AMIODARONE HYDROCHLORIDE 200 MG/1
400 TABLET ORAL EVERY 12 HOURS
Status: DISPENSED | OUTPATIENT
Start: 2017-01-01 | End: 2017-01-01

## 2017-01-01 RX ORDER — CLOPIDOGREL BISULFATE 75 MG/1
75 TABLET ORAL DAILY
Qty: 90 TAB | Refills: 3 | Status: SHIPPED | OUTPATIENT
Start: 2017-01-01 | End: 2017-01-01

## 2017-01-01 RX ORDER — SODIUM BICARBONATE 1 MEQ/ML
50 SYRINGE (ML) INTRAVENOUS ONCE
Status: CANCELLED | OUTPATIENT
Start: 2017-01-01 | End: 2017-01-01

## 2017-01-01 RX ORDER — HYDRALAZINE HYDROCHLORIDE 25 MG/1
25 TABLET, FILM COATED ORAL 3 TIMES DAILY
Status: DISCONTINUED | OUTPATIENT
Start: 2017-01-01 | End: 2017-01-01

## 2017-01-01 RX ORDER — MIDAZOLAM HYDROCHLORIDE 1 MG/ML
0.5 INJECTION, SOLUTION INTRAMUSCULAR; INTRAVENOUS
Status: CANCELLED | OUTPATIENT
Start: 2017-01-01

## 2017-01-01 RX ORDER — IODIXANOL 320 MG/ML
0-100 INJECTION, SOLUTION INTRAVASCULAR
Status: DISCONTINUED | OUTPATIENT
Start: 2017-01-01 | End: 2017-01-01

## 2017-01-01 RX ORDER — FENTANYL CITRATE 50 UG/ML
25 INJECTION, SOLUTION INTRAMUSCULAR; INTRAVENOUS
Status: CANCELLED | OUTPATIENT
Start: 2017-01-01

## 2017-01-01 RX ORDER — HEPARIN SODIUM 1000 [USP'U]/ML
INJECTION, SOLUTION INTRAVENOUS; SUBCUTANEOUS AS NEEDED
Status: DISCONTINUED | OUTPATIENT
Start: 2017-01-01 | End: 2017-01-01 | Stop reason: HOSPADM

## 2017-01-01 RX ORDER — HEPARIN SODIUM 5000 [USP'U]/ML
5000 INJECTION, SOLUTION INTRAVENOUS; SUBCUTANEOUS EVERY 8 HOURS
Status: CANCELLED | OUTPATIENT
Start: 2017-01-01

## 2017-01-01 RX ORDER — PROTAMINE SULFATE 10 MG/ML
INJECTION, SOLUTION INTRAVENOUS
Status: COMPLETED
Start: 2017-01-01 | End: 2017-01-01

## 2017-01-01 RX ORDER — FENTANYL CITRATE 50 UG/ML
INJECTION, SOLUTION INTRAMUSCULAR; INTRAVENOUS AS NEEDED
Status: DISCONTINUED | OUTPATIENT
Start: 2017-01-01 | End: 2017-01-01 | Stop reason: HOSPADM

## 2017-01-01 RX ORDER — BUPROPION HYDROCHLORIDE 150 MG/1
150 TABLET ORAL DAILY
Status: DISCONTINUED | OUTPATIENT
Start: 2017-01-01 | End: 2017-01-01

## 2017-01-01 RX ORDER — CLONIDINE HYDROCHLORIDE 0.1 MG/1
0.1 TABLET ORAL 2 TIMES DAILY
Status: DISCONTINUED | OUTPATIENT
Start: 2017-01-01 | End: 2017-01-01

## 2017-01-01 RX ORDER — SODIUM CHLORIDE 9 MG/ML
500 INJECTION, SOLUTION INTRAVENOUS ONCE
Status: DISCONTINUED | OUTPATIENT
Start: 2017-01-01 | End: 2017-01-01

## 2017-01-01 RX ORDER — ONDANSETRON 2 MG/ML
4 INJECTION INTRAMUSCULAR; INTRAVENOUS AS NEEDED
Status: CANCELLED | OUTPATIENT
Start: 2017-01-01

## 2017-01-01 RX ORDER — DIPHENHYDRAMINE HYDROCHLORIDE 50 MG/ML
INJECTION, SOLUTION INTRAMUSCULAR; INTRAVENOUS
Status: COMPLETED
Start: 2017-01-01 | End: 2017-01-01

## 2017-01-01 RX ORDER — ESMOLOL HYDROCHLORIDE 10 MG/ML
50-200 INJECTION, SOLUTION INTRAVENOUS
Status: DISCONTINUED | OUTPATIENT
Start: 2017-01-01 | End: 2017-01-01

## 2017-01-01 RX ORDER — LEVOTHYROXINE SODIUM 100 UG/1
100 TABLET ORAL
Status: DISCONTINUED | OUTPATIENT
Start: 2017-01-01 | End: 2017-01-01 | Stop reason: HOSPADM

## 2017-01-01 RX ORDER — HYDRALAZINE HYDROCHLORIDE 20 MG/ML
10 INJECTION INTRAMUSCULAR; INTRAVENOUS EVERY 4 HOURS
Status: DISCONTINUED | OUTPATIENT
Start: 2017-01-01 | End: 2017-01-01

## 2017-01-01 RX ORDER — VENLAFAXINE HYDROCHLORIDE 150 MG/1
150 CAPSULE, EXTENDED RELEASE ORAL 2 TIMES DAILY
Status: DISCONTINUED | OUTPATIENT
Start: 2017-01-01 | End: 2017-01-01

## 2017-01-01 RX ORDER — FACIAL-BODY WIPES
10 EACH TOPICAL DAILY PRN
Status: DISCONTINUED | OUTPATIENT
Start: 2017-01-01 | End: 2017-01-01 | Stop reason: HOSPADM

## 2017-01-01 RX ORDER — HYDRALAZINE HYDROCHLORIDE 20 MG/ML
20 INJECTION INTRAMUSCULAR; INTRAVENOUS
Status: DISCONTINUED | OUTPATIENT
Start: 2017-01-01 | End: 2017-01-01

## 2017-01-01 RX ORDER — SODIUM CHLORIDE, SODIUM LACTATE, POTASSIUM CHLORIDE, CALCIUM CHLORIDE 600; 310; 30; 20 MG/100ML; MG/100ML; MG/100ML; MG/100ML
25 INJECTION, SOLUTION INTRAVENOUS CONTINUOUS
Status: DISCONTINUED | OUTPATIENT
Start: 2017-01-01 | End: 2017-01-01 | Stop reason: HOSPADM

## 2017-01-01 RX ORDER — INSULIN LISPRO 100 [IU]/ML
INJECTION, SOLUTION INTRAVENOUS; SUBCUTANEOUS EVERY 6 HOURS
Status: DISCONTINUED | OUTPATIENT
Start: 2017-01-01 | End: 2017-01-01 | Stop reason: HOSPADM

## 2017-01-01 RX ORDER — FAMOTIDINE 10 MG/ML
20 INJECTION INTRAVENOUS
Status: COMPLETED | OUTPATIENT
Start: 2017-01-01 | End: 2017-01-01

## 2017-01-01 RX ORDER — HYDRALAZINE HYDROCHLORIDE 20 MG/ML
10 INJECTION INTRAMUSCULAR; INTRAVENOUS EVERY 6 HOURS
Status: DISCONTINUED | OUTPATIENT
Start: 2017-01-01 | End: 2017-01-01

## 2017-01-01 RX ORDER — MIDAZOLAM HYDROCHLORIDE 1 MG/ML
1-5 INJECTION, SOLUTION INTRAMUSCULAR; INTRAVENOUS
Status: DISCONTINUED | OUTPATIENT
Start: 2017-01-01 | End: 2017-01-01

## 2017-01-01 RX ORDER — SODIUM CHLORIDE 9 MG/ML
25 INJECTION, SOLUTION INTRAVENOUS CONTINUOUS
Status: DISCONTINUED | OUTPATIENT
Start: 2017-01-01 | End: 2017-01-01

## 2017-01-01 RX ORDER — PETROLATUM 42 G/100G
OINTMENT TOPICAL AS NEEDED
Status: DISCONTINUED | OUTPATIENT
Start: 2017-01-01 | End: 2017-01-01 | Stop reason: HOSPADM

## 2017-01-01 RX ORDER — CEFAZOLIN SODIUM IN 0.9 % NACL 2 G/100 ML
2 PLASTIC BAG, INJECTION (ML) INTRAVENOUS
Status: DISCONTINUED | OUTPATIENT
Start: 2017-01-01 | End: 2017-01-01

## 2017-01-01 RX ORDER — LEVALBUTEROL INHALATION SOLUTION 1.25 MG/3ML
1.25 SOLUTION RESPIRATORY (INHALATION)
Status: DISCONTINUED | OUTPATIENT
Start: 2017-01-01 | End: 2017-01-01

## 2017-01-01 RX ORDER — SODIUM CHLORIDE 0.9 % (FLUSH) 0.9 %
10 SYRINGE (ML) INJECTION AS NEEDED
Status: DISCONTINUED | OUTPATIENT
Start: 2017-01-01 | End: 2017-01-01 | Stop reason: SDUPTHER

## 2017-01-01 RX ORDER — MIDAZOLAM HYDROCHLORIDE 1 MG/ML
INJECTION, SOLUTION INTRAMUSCULAR; INTRAVENOUS AS NEEDED
Status: DISCONTINUED | OUTPATIENT
Start: 2017-01-01 | End: 2017-01-01 | Stop reason: HOSPADM

## 2017-01-01 RX ORDER — HYDRALAZINE HYDROCHLORIDE 20 MG/ML
20 INJECTION INTRAMUSCULAR; INTRAVENOUS EVERY 4 HOURS
Status: DISCONTINUED | OUTPATIENT
Start: 2017-01-01 | End: 2017-01-01

## 2017-01-01 RX ORDER — HYDROMORPHONE HYDROCHLORIDE 1 MG/ML
1-2 INJECTION, SOLUTION INTRAMUSCULAR; INTRAVENOUS; SUBCUTANEOUS
Status: DISCONTINUED | OUTPATIENT
Start: 2017-01-01 | End: 2017-01-01

## 2017-01-01 RX ORDER — MORPHINE SULFATE 10 MG/ML
2 INJECTION, SOLUTION INTRAMUSCULAR; INTRAVENOUS
Status: CANCELLED | OUTPATIENT
Start: 2017-01-01

## 2017-01-01 RX ORDER — MORPHINE SULFATE 4 MG/ML
4 INJECTION, SOLUTION INTRAMUSCULAR; INTRAVENOUS
Status: COMPLETED | OUTPATIENT
Start: 2017-01-01 | End: 2017-01-01

## 2017-01-01 RX ORDER — CLONIDINE 0.2 MG/24H
2 PATCH, EXTENDED RELEASE TRANSDERMAL
Status: DISCONTINUED | OUTPATIENT
Start: 2017-01-01 | End: 2017-01-01 | Stop reason: HOSPADM

## 2017-01-01 RX ORDER — LEVOTHYROXINE SODIUM 100 UG/1
100 TABLET ORAL
Qty: 90 TAB | Refills: 3 | Status: SHIPPED | OUTPATIENT
Start: 2017-01-01 | End: 2017-01-01

## 2017-01-01 RX ORDER — DEXTROSE 50 % IN WATER (D50W) INTRAVENOUS SYRINGE
12.5-25 AS NEEDED
Status: DISCONTINUED | OUTPATIENT
Start: 2017-01-01 | End: 2017-01-01 | Stop reason: HOSPADM

## 2017-01-01 RX ORDER — LORAZEPAM 2 MG/ML
1 INJECTION INTRAMUSCULAR ONCE
Status: COMPLETED | OUTPATIENT
Start: 2017-01-01 | End: 2017-01-01

## 2017-01-01 RX ORDER — PROTAMINE SULFATE 10 MG/ML
INJECTION, SOLUTION INTRAVENOUS AS NEEDED
Status: DISCONTINUED | OUTPATIENT
Start: 2017-01-01 | End: 2017-01-01 | Stop reason: HOSPADM

## 2017-01-01 RX ORDER — SODIUM CHLORIDE 0.9 % (FLUSH) 0.9 %
5-10 SYRINGE (ML) INJECTION AS NEEDED
Status: DISCONTINUED | OUTPATIENT
Start: 2017-01-01 | End: 2017-01-01

## 2017-01-01 RX ORDER — VENLAFAXINE HYDROCHLORIDE 150 MG/1
150 CAPSULE, EXTENDED RELEASE ORAL 2 TIMES DAILY
Qty: 180 CAP | Refills: 3 | Status: SHIPPED | OUTPATIENT
Start: 2017-01-01

## 2017-01-01 RX ORDER — SODIUM CHLORIDE 0.9 % (FLUSH) 0.9 %
10-30 SYRINGE (ML) INJECTION AS NEEDED
Status: DISCONTINUED | OUTPATIENT
Start: 2017-01-01 | End: 2017-01-01

## 2017-01-01 RX ORDER — ALPRAZOLAM 0.25 MG/1
0.25 TABLET ORAL
Status: DISCONTINUED | OUTPATIENT
Start: 2017-01-01 | End: 2017-01-01 | Stop reason: HOSPADM

## 2017-01-01 RX ORDER — SODIUM CHLORIDE 0.9 % (FLUSH) 0.9 %
5-10 SYRINGE (ML) INJECTION EVERY 8 HOURS
Status: DISCONTINUED | OUTPATIENT
Start: 2017-01-01 | End: 2017-01-01 | Stop reason: HOSPADM

## 2017-01-01 RX ORDER — HEPARIN SODIUM 200 [USP'U]/100ML
INJECTION, SOLUTION INTRAVENOUS
Status: COMPLETED
Start: 2017-01-01 | End: 2017-01-01

## 2017-01-01 RX ORDER — FUROSEMIDE 10 MG/ML
INJECTION INTRAMUSCULAR; INTRAVENOUS
Status: COMPLETED
Start: 2017-01-01 | End: 2017-01-01

## 2017-01-01 RX ORDER — NALOXONE HYDROCHLORIDE 0.4 MG/ML
0.4 INJECTION, SOLUTION INTRAMUSCULAR; INTRAVENOUS; SUBCUTANEOUS AS NEEDED
Status: DISCONTINUED | OUTPATIENT
Start: 2017-01-01 | End: 2017-01-01

## 2017-01-01 RX ORDER — AMLODIPINE BESYLATE 5 MG/1
5 TABLET ORAL ONCE
Status: COMPLETED | OUTPATIENT
Start: 2017-01-01 | End: 2017-01-01

## 2017-01-01 RX ORDER — HEPARIN SODIUM 1000 [USP'U]/ML
INJECTION, SOLUTION INTRAVENOUS; SUBCUTANEOUS
Status: COMPLETED
Start: 2017-01-01 | End: 2017-01-01

## 2017-01-01 RX ORDER — HYDROMORPHONE HYDROCHLORIDE 1 MG/ML
0.2 INJECTION, SOLUTION INTRAMUSCULAR; INTRAVENOUS; SUBCUTANEOUS
Status: CANCELLED | OUTPATIENT
Start: 2017-01-01

## 2017-01-01 RX ORDER — DEXAMETHASONE SODIUM PHOSPHATE 4 MG/ML
6 INJECTION, SOLUTION INTRA-ARTICULAR; INTRALESIONAL; INTRAMUSCULAR; INTRAVENOUS; SOFT TISSUE EVERY 6 HOURS
Status: COMPLETED | OUTPATIENT
Start: 2017-01-01 | End: 2017-01-01

## 2017-01-01 RX ORDER — ONDANSETRON 2 MG/ML
INJECTION INTRAMUSCULAR; INTRAVENOUS AS NEEDED
Status: DISCONTINUED | OUTPATIENT
Start: 2017-01-01 | End: 2017-01-01 | Stop reason: HOSPADM

## 2017-01-01 RX ORDER — CISATRACURIUM BESYLATE 2 MG/ML
INJECTION, SOLUTION INTRAVENOUS AS NEEDED
Status: DISCONTINUED | OUTPATIENT
Start: 2017-01-01 | End: 2017-01-01 | Stop reason: HOSPADM

## 2017-01-01 RX ORDER — ONDANSETRON 2 MG/ML
4 INJECTION INTRAMUSCULAR; INTRAVENOUS
Status: DISCONTINUED | OUTPATIENT
Start: 2017-01-01 | End: 2017-01-01

## 2017-01-01 RX ORDER — HYDROMORPHONE HYDROCHLORIDE 1 MG/ML
INJECTION, SOLUTION INTRAMUSCULAR; INTRAVENOUS; SUBCUTANEOUS AS NEEDED
Status: DISCONTINUED | OUTPATIENT
Start: 2017-01-01 | End: 2017-01-01 | Stop reason: HOSPADM

## 2017-01-01 RX ORDER — CLONAZEPAM 0.5 MG/1
0.5 TABLET ORAL 2 TIMES DAILY
Status: DISCONTINUED | OUTPATIENT
Start: 2017-01-01 | End: 2017-01-01

## 2017-01-01 RX ORDER — HYDROMORPHONE HYDROCHLORIDE 1 MG/ML
1 INJECTION, SOLUTION INTRAMUSCULAR; INTRAVENOUS; SUBCUTANEOUS ONCE
Status: COMPLETED | OUTPATIENT
Start: 2017-01-01 | End: 2017-01-01

## 2017-01-01 RX ORDER — MORPHINE SULFATE 2 MG/ML
2-5 INJECTION, SOLUTION INTRAMUSCULAR; INTRAVENOUS
Status: DISCONTINUED | OUTPATIENT
Start: 2017-01-01 | End: 2017-01-01

## 2017-01-01 RX ORDER — LIDOCAINE HYDROCHLORIDE 10 MG/ML
0.1 INJECTION, SOLUTION EPIDURAL; INFILTRATION; INTRACAUDAL; PERINEURAL AS NEEDED
Status: DISCONTINUED | OUTPATIENT
Start: 2017-01-01 | End: 2017-01-01 | Stop reason: HOSPADM

## 2017-01-01 RX ORDER — DIPHENHYDRAMINE HYDROCHLORIDE 50 MG/ML
25 INJECTION, SOLUTION INTRAMUSCULAR; INTRAVENOUS
Status: COMPLETED | OUTPATIENT
Start: 2017-01-01 | End: 2017-01-01

## 2017-01-01 RX ORDER — BUSPIRONE HYDROCHLORIDE 7.5 MG/1
TABLET ORAL
Qty: 60 TAB | Refills: 2 | Status: SHIPPED | OUTPATIENT
Start: 2017-01-01 | End: 2017-01-01

## 2017-01-01 RX ORDER — MIDAZOLAM HYDROCHLORIDE 1 MG/ML
1 INJECTION, SOLUTION INTRAMUSCULAR; INTRAVENOUS AS NEEDED
Status: DISCONTINUED | OUTPATIENT
Start: 2017-01-01 | End: 2017-01-01

## 2017-01-01 RX ORDER — LORAZEPAM 2 MG/ML
INJECTION INTRAMUSCULAR
Status: COMPLETED
Start: 2017-01-01 | End: 2017-01-01

## 2017-01-01 RX ORDER — FACIAL-BODY WIPES
10 EACH TOPICAL ONCE
Status: COMPLETED | OUTPATIENT
Start: 2017-01-01 | End: 2017-01-01

## 2017-01-01 RX ORDER — ATORVASTATIN CALCIUM 40 MG/1
80 TABLET, FILM COATED ORAL DAILY
Status: DISCONTINUED | OUTPATIENT
Start: 2017-01-01 | End: 2017-01-01

## 2017-01-01 RX ORDER — FENTANYL CITRATE 50 UG/ML
50 INJECTION, SOLUTION INTRAMUSCULAR; INTRAVENOUS AS NEEDED
Status: DISCONTINUED | OUTPATIENT
Start: 2017-01-01 | End: 2017-01-01

## 2017-01-01 RX ORDER — HYDROMORPHONE HYDROCHLORIDE 1 MG/ML
INJECTION, SOLUTION INTRAMUSCULAR; INTRAVENOUS; SUBCUTANEOUS
Status: COMPLETED
Start: 2017-01-01 | End: 2017-01-01

## 2017-01-01 RX ORDER — SUCCINYLCHOLINE CHLORIDE 20 MG/ML
INJECTION INTRAMUSCULAR; INTRAVENOUS
Status: DISPENSED
Start: 2017-01-01 | End: 2017-01-01

## 2017-01-01 RX ORDER — LIDOCAINE HYDROCHLORIDE 10 MG/ML
1-40 INJECTION INFILTRATION; PERINEURAL
Status: DISCONTINUED | OUTPATIENT
Start: 2017-01-01 | End: 2017-01-01 | Stop reason: HOSPADM

## 2017-01-01 RX ORDER — MICONAZOLE NITRATE 20 MG/G
CREAM TOPICAL 2 TIMES DAILY
Status: DISCONTINUED | OUTPATIENT
Start: 2017-01-01 | End: 2017-01-01 | Stop reason: HOSPADM

## 2017-01-01 RX ORDER — ATORVASTATIN CALCIUM 40 MG/1
80 TABLET, FILM COATED ORAL DAILY
Qty: 180 TAB | Refills: 1 | Status: SHIPPED | OUTPATIENT
Start: 2017-01-01 | End: 2017-01-01

## 2017-01-01 RX ORDER — HEPARIN SODIUM 1000 [USP'U]/ML
1000-10000 INJECTION, SOLUTION INTRAVENOUS; SUBCUTANEOUS
Status: DISCONTINUED | OUTPATIENT
Start: 2017-01-01 | End: 2017-01-01

## 2017-01-01 RX ORDER — DEXTROSE 50 % IN WATER (D50W) INTRAVENOUS SYRINGE
Status: COMPLETED
Start: 2017-01-01 | End: 2017-01-01

## 2017-01-01 RX ORDER — PANTOPRAZOLE SODIUM 40 MG/1
40 TABLET, DELAYED RELEASE ORAL
Status: DISCONTINUED | OUTPATIENT
Start: 2017-01-01 | End: 2017-01-01 | Stop reason: HOSPADM

## 2017-01-01 RX ORDER — HYDROMORPHONE HYDROCHLORIDE 1 MG/ML
0.5 INJECTION, SOLUTION INTRAMUSCULAR; INTRAVENOUS; SUBCUTANEOUS
Status: DISCONTINUED | OUTPATIENT
Start: 2017-01-01 | End: 2017-01-01

## 2017-01-01 RX ORDER — NALOXONE HYDROCHLORIDE 0.4 MG/ML
0.4 INJECTION, SOLUTION INTRAMUSCULAR; INTRAVENOUS; SUBCUTANEOUS AS NEEDED
Status: DISCONTINUED | OUTPATIENT
Start: 2017-01-01 | End: 2017-01-01 | Stop reason: HOSPADM

## 2017-01-01 RX ORDER — CLONIDINE 0.2 MG/24H
1 PATCH, EXTENDED RELEASE TRANSDERMAL
Status: DISCONTINUED | OUTPATIENT
Start: 2017-01-01 | End: 2017-01-01

## 2017-01-01 RX ORDER — DIPHENHYDRAMINE HYDROCHLORIDE 50 MG/ML
12.5 INJECTION, SOLUTION INTRAMUSCULAR; INTRAVENOUS AS NEEDED
Status: CANCELLED | OUTPATIENT
Start: 2017-01-01 | End: 2017-01-01

## 2017-01-01 RX ORDER — FUROSEMIDE 10 MG/ML
40 INJECTION INTRAMUSCULAR; INTRAVENOUS DAILY
Status: COMPLETED | OUTPATIENT
Start: 2017-01-01 | End: 2017-01-01

## 2017-01-01 RX ORDER — LABETALOL HYDROCHLORIDE 5 MG/ML
20 INJECTION, SOLUTION INTRAVENOUS
Status: DISCONTINUED | OUTPATIENT
Start: 2017-01-01 | End: 2017-01-01 | Stop reason: HOSPADM

## 2017-01-01 RX ORDER — SODIUM BICARBONATE 1 MEQ/ML
100 SYRINGE (ML) INTRAVENOUS ONCE
Status: COMPLETED | OUTPATIENT
Start: 2017-01-01 | End: 2017-01-01

## 2017-01-01 RX ORDER — AMLODIPINE BESYLATE 5 MG/1
10 TABLET ORAL DAILY
Status: DISCONTINUED | OUTPATIENT
Start: 2017-01-01 | End: 2017-01-01 | Stop reason: HOSPADM

## 2017-01-01 RX ORDER — HEPARIN SODIUM 200 [USP'U]/100ML
500 INJECTION, SOLUTION INTRAVENOUS ONCE
Status: ACTIVE | OUTPATIENT
Start: 2017-01-01 | End: 2017-01-01

## 2017-01-01 RX ORDER — SODIUM CHLORIDE 9 MG/ML
250 INJECTION, SOLUTION INTRAVENOUS AS NEEDED
Status: DISCONTINUED | OUTPATIENT
Start: 2017-01-01 | End: 2017-01-01 | Stop reason: SDUPTHER

## 2017-01-01 RX ORDER — DOCUSATE SODIUM 100 MG/1
100 CAPSULE, LIQUID FILLED ORAL 2 TIMES DAILY
Status: DISCONTINUED | OUTPATIENT
Start: 2017-01-01 | End: 2017-01-01

## 2017-01-01 RX ORDER — HYDRALAZINE HYDROCHLORIDE 20 MG/ML
10 INJECTION INTRAMUSCULAR; INTRAVENOUS
Status: COMPLETED | OUTPATIENT
Start: 2017-01-01 | End: 2017-01-01

## 2017-01-01 RX ORDER — DIPHENHYDRAMINE HYDROCHLORIDE 50 MG/ML
12.5 INJECTION, SOLUTION INTRAMUSCULAR; INTRAVENOUS AS NEEDED
Status: DISCONTINUED | OUTPATIENT
Start: 2017-01-01 | End: 2017-01-01 | Stop reason: HOSPADM

## 2017-01-01 RX ORDER — HYDROMORPHONE HYDROCHLORIDE 2 MG/1
2-4 TABLET ORAL
Qty: 100 TAB | Refills: 0 | Status: SHIPPED | OUTPATIENT
Start: 2017-01-01

## 2017-01-01 RX ORDER — FENTANYL CITRATE 50 UG/ML
25-50 INJECTION, SOLUTION INTRAMUSCULAR; INTRAVENOUS
Status: DISCONTINUED | OUTPATIENT
Start: 2017-01-01 | End: 2017-01-01

## 2017-01-01 RX ORDER — CLONIDINE HYDROCHLORIDE 0.1 MG/1
0.2 TABLET ORAL
Status: COMPLETED | OUTPATIENT
Start: 2017-01-01 | End: 2017-01-01

## 2017-01-01 RX ORDER — FENTANYL CITRATE 50 UG/ML
12.5-5 INJECTION, SOLUTION INTRAMUSCULAR; INTRAVENOUS
Status: DISCONTINUED | OUTPATIENT
Start: 2017-01-01 | End: 2017-01-01

## 2017-01-01 RX ORDER — LIDOCAINE HYDROCHLORIDE 10 MG/ML
0.1 INJECTION, SOLUTION EPIDURAL; INFILTRATION; INTRACAUDAL; PERINEURAL AS NEEDED
Status: DISCONTINUED | OUTPATIENT
Start: 2017-01-01 | End: 2017-01-01

## 2017-01-01 RX ORDER — HEPARIN 100 UNIT/ML
300 SYRINGE INTRAVENOUS AS NEEDED
Status: DISCONTINUED | OUTPATIENT
Start: 2017-01-01 | End: 2017-01-01

## 2017-01-01 RX ORDER — SODIUM CHLORIDE 0.9 % (FLUSH) 0.9 %
10-30 SYRINGE (ML) INJECTION AS NEEDED
Status: DISCONTINUED | OUTPATIENT
Start: 2017-01-01 | End: 2017-01-01 | Stop reason: HOSPADM

## 2017-01-01 RX ORDER — ONDANSETRON 2 MG/ML
4 INJECTION INTRAMUSCULAR; INTRAVENOUS
Status: DISCONTINUED | OUTPATIENT
Start: 2017-01-01 | End: 2017-01-01 | Stop reason: HOSPADM

## 2017-01-01 RX ORDER — LEVOTHYROXINE SODIUM 100 UG/1
100 TABLET ORAL
Status: DISCONTINUED | OUTPATIENT
Start: 2017-01-01 | End: 2017-01-01

## 2017-01-01 RX ORDER — ENALAPRILAT 1.25 MG/ML
1.25 INJECTION INTRAVENOUS EVERY 6 HOURS
Status: DISCONTINUED | OUTPATIENT
Start: 2017-01-01 | End: 2017-01-01

## 2017-01-01 RX ORDER — CLONAZEPAM 0.5 MG/1
0.5 TABLET ORAL
Status: DISCONTINUED | OUTPATIENT
Start: 2017-01-01 | End: 2017-01-01

## 2017-01-01 RX ORDER — AMIODARONE HYDROCHLORIDE 200 MG/1
400 TABLET ORAL DAILY
Status: DISCONTINUED | OUTPATIENT
Start: 2017-01-01 | End: 2017-01-01

## 2017-01-01 RX ORDER — AMLODIPINE BESYLATE 5 MG/1
10 TABLET ORAL DAILY
Status: DISCONTINUED | OUTPATIENT
Start: 2017-01-01 | End: 2017-01-01

## 2017-01-01 RX ORDER — ALBUMIN HUMAN 50 G/1000ML
25 SOLUTION INTRAVENOUS ONCE
Status: COMPLETED | OUTPATIENT
Start: 2017-01-01 | End: 2017-01-01

## 2017-01-01 RX ORDER — FLUMAZENIL 0.1 MG/ML
0.2 INJECTION INTRAVENOUS ONCE
Status: ACTIVE | OUTPATIENT
Start: 2017-01-01 | End: 2017-01-01

## 2017-01-01 RX ORDER — SODIUM CHLORIDE, SODIUM LACTATE, POTASSIUM CHLORIDE, CALCIUM CHLORIDE 600; 310; 30; 20 MG/100ML; MG/100ML; MG/100ML; MG/100ML
INJECTION, SOLUTION INTRAVENOUS
Status: DISCONTINUED | OUTPATIENT
Start: 2017-01-01 | End: 2017-01-01 | Stop reason: HOSPADM

## 2017-01-01 RX ORDER — POTASSIUM CHLORIDE 29.8 MG/ML
20 INJECTION INTRAVENOUS
Status: DISCONTINUED | OUTPATIENT
Start: 2017-01-01 | End: 2017-01-01

## 2017-01-01 RX ORDER — BACITRACIN 50000 [IU]/1
INJECTION, POWDER, FOR SOLUTION INTRAMUSCULAR
Status: COMPLETED
Start: 2017-01-01 | End: 2017-01-01

## 2017-01-01 RX ORDER — IODIXANOL 320 MG/ML
INJECTION, SOLUTION INTRAVASCULAR
Status: COMPLETED
Start: 2017-01-01 | End: 2017-01-01

## 2017-01-01 RX ORDER — HEPARIN SODIUM 200 [USP'U]/100ML
400 INJECTION, SOLUTION INTRAVENOUS ONCE
Status: DISPENSED | OUTPATIENT
Start: 2017-01-01 | End: 2017-01-01

## 2017-01-01 RX ORDER — DEXTROSE 50 % IN WATER (D50W) INTRAVENOUS SYRINGE
25 ONCE
Status: COMPLETED | OUTPATIENT
Start: 2017-01-01 | End: 2017-01-01

## 2017-01-01 RX ORDER — LABETALOL HYDROCHLORIDE 5 MG/ML
20 INJECTION, SOLUTION INTRAVENOUS ONCE
Status: COMPLETED | OUTPATIENT
Start: 2017-01-01 | End: 2017-01-01

## 2017-01-01 RX ORDER — SODIUM CHLORIDE, SODIUM LACTATE, POTASSIUM CHLORIDE, CALCIUM CHLORIDE 600; 310; 30; 20 MG/100ML; MG/100ML; MG/100ML; MG/100ML
25 INJECTION, SOLUTION INTRAVENOUS CONTINUOUS
Status: DISCONTINUED | OUTPATIENT
Start: 2017-01-01 | End: 2017-01-01

## 2017-01-01 RX ORDER — SUCCINYLCHOLINE CHLORIDE 20 MG/ML
INJECTION INTRAMUSCULAR; INTRAVENOUS AS NEEDED
Status: DISCONTINUED | OUTPATIENT
Start: 2017-01-01 | End: 2017-01-01 | Stop reason: HOSPADM

## 2017-01-01 RX ORDER — NALOXONE HYDROCHLORIDE 0.4 MG/ML
0.4 INJECTION, SOLUTION INTRAMUSCULAR; INTRAVENOUS; SUBCUTANEOUS ONCE
Status: ACTIVE | OUTPATIENT
Start: 2017-01-01 | End: 2017-01-01

## 2017-01-01 RX ORDER — PROPOFOL 10 MG/ML
INJECTION, EMULSION INTRAVENOUS
Status: COMPLETED
Start: 2017-01-01 | End: 2017-01-01

## 2017-01-01 RX ORDER — FENOFIBRATE 48 MG/1
48 TABLET, COATED ORAL DAILY
Status: DISCONTINUED | OUTPATIENT
Start: 2017-01-01 | End: 2017-01-01

## 2017-01-01 RX ORDER — VANCOMYCIN HYDROCHLORIDE 1 G/20ML
INJECTION, POWDER, LYOPHILIZED, FOR SOLUTION INTRAVENOUS
Status: DISCONTINUED
Start: 2017-01-01 | End: 2017-01-01 | Stop reason: HOSPADM

## 2017-01-01 RX ORDER — ROCURONIUM BROMIDE 10 MG/ML
INJECTION, SOLUTION INTRAVENOUS AS NEEDED
Status: DISCONTINUED | OUTPATIENT
Start: 2017-01-01 | End: 2017-01-01 | Stop reason: HOSPADM

## 2017-01-01 RX ORDER — MINOXIDIL 2.5 MG/1
2.5 TABLET ORAL DAILY
Status: DISCONTINUED | OUTPATIENT
Start: 2017-01-01 | End: 2017-01-01 | Stop reason: HOSPADM

## 2017-01-01 RX ORDER — FUROSEMIDE 10 MG/ML
20 INJECTION INTRAMUSCULAR; INTRAVENOUS ONCE
Status: DISCONTINUED | OUTPATIENT
Start: 2017-01-01 | End: 2017-01-01

## 2017-01-01 RX ORDER — SPIRONOLACTONE 25 MG/1
25 TABLET ORAL DAILY
Status: DISCONTINUED | OUTPATIENT
Start: 2017-01-01 | End: 2017-01-01

## 2017-01-01 RX ORDER — FENTANYL CITRATE 50 UG/ML
25 INJECTION, SOLUTION INTRAMUSCULAR; INTRAVENOUS ONCE
Status: DISPENSED | OUTPATIENT
Start: 2017-01-01 | End: 2017-01-01

## 2017-01-01 RX ORDER — BUSPIRONE HYDROCHLORIDE 5 MG/1
5 TABLET ORAL 2 TIMES DAILY
Status: DISCONTINUED | OUTPATIENT
Start: 2017-01-01 | End: 2017-01-01

## 2017-01-01 RX ORDER — SODIUM CHLORIDE 0.9 % (FLUSH) 0.9 %
5-10 SYRINGE (ML) INJECTION AS NEEDED
Status: CANCELLED | OUTPATIENT
Start: 2017-01-01

## 2017-01-01 RX ORDER — MAGNESIUM SULFATE 100 %
4 CRYSTALS MISCELLANEOUS AS NEEDED
Status: DISCONTINUED | OUTPATIENT
Start: 2017-01-01 | End: 2017-01-01 | Stop reason: HOSPADM

## 2017-01-01 RX ORDER — HYDROMORPHONE HYDROCHLORIDE 2 MG/ML
0.5 INJECTION, SOLUTION INTRAMUSCULAR; INTRAVENOUS; SUBCUTANEOUS
Status: DISCONTINUED | OUTPATIENT
Start: 2017-01-01 | End: 2017-01-01 | Stop reason: SDUPTHER

## 2017-01-01 RX ADMIN — SODIUM CHLORIDE 5 MG: 9 INJECTION INTRAMUSCULAR; INTRAVENOUS; SUBCUTANEOUS at 12:18

## 2017-01-01 RX ADMIN — Medication 10 ML: at 03:21

## 2017-01-01 RX ADMIN — INSULIN LISPRO 2 UNITS: 100 INJECTION, SOLUTION INTRAVENOUS; SUBCUTANEOUS at 06:06

## 2017-01-01 RX ADMIN — Medication 10 ML: at 17:00

## 2017-01-01 RX ADMIN — HYDRALAZINE HYDROCHLORIDE 20 MG: 20 INJECTION INTRAMUSCULAR; INTRAVENOUS at 16:08

## 2017-01-01 RX ADMIN — HEPARIN SODIUM 1000 UNITS: 200 INJECTION, SOLUTION INTRAVENOUS at 12:11

## 2017-01-01 RX ADMIN — VANCOMYCIN HYDROCHLORIDE 1250 MG: 10 INJECTION, POWDER, LYOPHILIZED, FOR SOLUTION INTRAVENOUS at 23:26

## 2017-01-01 RX ADMIN — Medication 10 ML: at 05:42

## 2017-01-01 RX ADMIN — AMIODARONE HYDROCHLORIDE 0.5 MG/MIN: 50 INJECTION, SOLUTION INTRAVENOUS at 09:20

## 2017-01-01 RX ADMIN — HEPARIN SODIUM 5000 UNITS: 5000 INJECTION, SOLUTION INTRAVENOUS; SUBCUTANEOUS at 06:25

## 2017-01-01 RX ADMIN — ALBUTEROL SULFATE 2.5 MG: 2.5 SOLUTION RESPIRATORY (INHALATION) at 15:15

## 2017-01-01 RX ADMIN — LEVALBUTEROL HYDROCHLORIDE 1.25 MG: 1.25 SOLUTION RESPIRATORY (INHALATION) at 15:12

## 2017-01-01 RX ADMIN — LIDOCAINE HYDROCHLORIDE 2 ML: 10 INJECTION, SOLUTION EPIDURAL; INFILTRATION; INTRACAUDAL; PERINEURAL at 16:12

## 2017-01-01 RX ADMIN — SODIUM CHLORIDE 40 MG: 9 INJECTION INTRAMUSCULAR; INTRAVENOUS; SUBCUTANEOUS at 08:54

## 2017-01-01 RX ADMIN — BUPROPION HYDROCHLORIDE 150 MG: 150 TABLET, FILM COATED, EXTENDED RELEASE ORAL at 10:49

## 2017-01-01 RX ADMIN — RACEPINEPHRINE HYDROCHLORIDE 0.5 ML: 11.25 SOLUTION RESPIRATORY (INHALATION) at 18:41

## 2017-01-01 RX ADMIN — Medication 10 ML: at 23:06

## 2017-01-01 RX ADMIN — SODIUM CHLORIDE: 9 INJECTION, SOLUTION INTRAVENOUS at 08:00

## 2017-01-01 RX ADMIN — HEPARIN SODIUM 5000 UNITS: 5000 INJECTION, SOLUTION INTRAVENOUS; SUBCUTANEOUS at 21:28

## 2017-01-01 RX ADMIN — PROPOFOL 20 MG: 10 INJECTION, EMULSION INTRAVENOUS at 11:26

## 2017-01-01 RX ADMIN — HYDRALAZINE HYDROCHLORIDE 100 MG: 50 TABLET, FILM COATED ORAL at 08:01

## 2017-01-01 RX ADMIN — PROPOFOL 20 MCG/KG/MIN: 10 INJECTION, EMULSION INTRAVENOUS at 08:11

## 2017-01-01 RX ADMIN — FLUCONAZOLE 400 MG: 100 TABLET ORAL at 08:12

## 2017-01-01 RX ADMIN — ACETAMINOPHEN 650 MG: 325 TABLET, FILM COATED ORAL at 19:54

## 2017-01-01 RX ADMIN — PANTOPRAZOLE SODIUM 40 MG: 40 TABLET, DELAYED RELEASE ORAL at 09:44

## 2017-01-01 RX ADMIN — RACEPINEPHRINE HYDROCHLORIDE 0.5 ML: 11.25 SOLUTION RESPIRATORY (INHALATION) at 15:54

## 2017-01-01 RX ADMIN — Medication 10 MG/HR: at 05:30

## 2017-01-01 RX ADMIN — SODIUM CHLORIDE 100 ML/HR: 450 INJECTION, SOLUTION INTRAVENOUS at 17:50

## 2017-01-01 RX ADMIN — PANTOPRAZOLE SODIUM 40 MG: 40 TABLET, DELAYED RELEASE ORAL at 10:48

## 2017-01-01 RX ADMIN — HYDRALAZINE HYDROCHLORIDE 10 MG: 20 INJECTION INTRAMUSCULAR; INTRAVENOUS at 16:57

## 2017-01-01 RX ADMIN — Medication 10 ML: at 22:13

## 2017-01-01 RX ADMIN — CLONIDINE HYDROCHLORIDE 0.1 MG: 0.1 TABLET ORAL at 08:54

## 2017-01-01 RX ADMIN — HEPARIN SODIUM 5000 UNITS: 5000 INJECTION, SOLUTION INTRAVENOUS; SUBCUTANEOUS at 05:42

## 2017-01-01 RX ADMIN — Medication 10 ML: at 06:36

## 2017-01-01 RX ADMIN — ASCORBIC ACID: 500 INJECTION, SOLUTION INTRAMUSCULAR; INTRAVENOUS; SUBCUTANEOUS at 17:58

## 2017-01-01 RX ADMIN — Medication 10 ML: at 18:00

## 2017-01-01 RX ADMIN — HEPARIN SODIUM 5000 UNITS: 5000 INJECTION, SOLUTION INTRAVENOUS; SUBCUTANEOUS at 14:09

## 2017-01-01 RX ADMIN — IPRATROPIUM BROMIDE 0.5 MG: 0.5 SOLUTION RESPIRATORY (INHALATION) at 07:47

## 2017-01-01 RX ADMIN — CLONAZEPAM 0.5 MG: 0.5 TABLET ORAL at 08:45

## 2017-01-01 RX ADMIN — Medication 10 ML: at 16:09

## 2017-01-01 RX ADMIN — CHLORHEXIDINE GLUCONATE 15 ML: 1.2 RINSE ORAL at 09:44

## 2017-01-01 RX ADMIN — Medication 10 MG/HR: at 14:02

## 2017-01-01 RX ADMIN — PROPOFOL 50 MCG/KG/MIN: 10 INJECTION, EMULSION INTRAVENOUS at 16:47

## 2017-01-01 RX ADMIN — CHLORHEXIDINE GLUCONATE 15 ML: 1.2 RINSE ORAL at 21:10

## 2017-01-01 RX ADMIN — HYDROMORPHONE HYDROCHLORIDE 0.5 MG: 1 INJECTION, SOLUTION INTRAMUSCULAR; INTRAVENOUS; SUBCUTANEOUS at 03:17

## 2017-01-01 RX ADMIN — Medication 10 MG/HR: at 20:54

## 2017-01-01 RX ADMIN — METOPROLOL TARTRATE 2.5 MG: 5 INJECTION INTRAVENOUS at 15:48

## 2017-01-01 RX ADMIN — Medication 10 ML: at 06:20

## 2017-01-01 RX ADMIN — HYDROMORPHONE HYDROCHLORIDE 0.5 MG: 1 INJECTION, SOLUTION INTRAMUSCULAR; INTRAVENOUS; SUBCUTANEOUS at 22:50

## 2017-01-01 RX ADMIN — HEPARIN SODIUM 5000 UNITS: 5000 INJECTION, SOLUTION INTRAVENOUS; SUBCUTANEOUS at 06:01

## 2017-01-01 RX ADMIN — ENALAPRILAT 1.25 MG: 2.5 INJECTION INTRAVENOUS at 11:56

## 2017-01-01 RX ADMIN — HYDROMORPHONE HYDROCHLORIDE 0.5 MG: 1 INJECTION, SOLUTION INTRAMUSCULAR; INTRAVENOUS; SUBCUTANEOUS at 05:37

## 2017-01-01 RX ADMIN — INSULIN LISPRO 2 UNITS: 100 INJECTION, SOLUTION INTRAVENOUS; SUBCUTANEOUS at 06:45

## 2017-01-01 RX ADMIN — HEPARIN SODIUM 1000 UNITS: 200 INJECTION, SOLUTION INTRAVENOUS at 16:07

## 2017-01-01 RX ADMIN — Medication 4 MG/HR: at 15:38

## 2017-01-01 RX ADMIN — PROPOFOL 50 MCG/KG/MIN: 10 INJECTION, EMULSION INTRAVENOUS at 06:35

## 2017-01-01 RX ADMIN — IPRATROPIUM BROMIDE AND ALBUTEROL SULFATE 3 ML: .5; 3 SOLUTION RESPIRATORY (INHALATION) at 11:42

## 2017-01-01 RX ADMIN — INSULIN LISPRO 2 UNITS: 100 INJECTION, SOLUTION INTRAVENOUS; SUBCUTANEOUS at 23:46

## 2017-01-01 RX ADMIN — Medication 10 ML: at 17:58

## 2017-01-01 RX ADMIN — AMLODIPINE BESYLATE 10 MG: 5 TABLET ORAL at 08:16

## 2017-01-01 RX ADMIN — PROPOFOL 35 MCG/KG/MIN: 10 INJECTION, EMULSION INTRAVENOUS at 16:46

## 2017-01-01 RX ADMIN — POLYETHYLENE GLYCOL 3350 17 G: 17 POWDER, FOR SOLUTION ORAL at 09:02

## 2017-01-01 RX ADMIN — LEVOTHYROXINE SODIUM 100 MCG: 100 TABLET ORAL at 08:07

## 2017-01-01 RX ADMIN — Medication 10 ML: at 06:44

## 2017-01-01 RX ADMIN — Medication 10 ML: at 21:22

## 2017-01-01 RX ADMIN — ALBUTEROL SULFATE 2.5 MG: 2.5 SOLUTION RESPIRATORY (INHALATION) at 02:32

## 2017-01-01 RX ADMIN — Medication 10 ML: at 21:27

## 2017-01-01 RX ADMIN — Medication 10 ML: at 21:33

## 2017-01-01 RX ADMIN — Medication 10 MG/HR: at 10:42

## 2017-01-01 RX ADMIN — AMIODARONE HYDROCHLORIDE 0.5 MG/MIN: 50 INJECTION, SOLUTION INTRAVENOUS at 05:10

## 2017-01-01 RX ADMIN — BACITRACIN 50000 UNITS: 5000 INJECTION, POWDER, FOR SOLUTION INTRAMUSCULAR at 12:14

## 2017-01-01 RX ADMIN — Medication 10 MG/HR: at 13:23

## 2017-01-01 RX ADMIN — PROPOFOL 150 MG: 10 INJECTION, EMULSION INTRAVENOUS at 07:52

## 2017-01-01 RX ADMIN — HYDRALAZINE HYDROCHLORIDE 100 MG: 50 TABLET, FILM COATED ORAL at 08:12

## 2017-01-01 RX ADMIN — Medication 10 ML: at 15:32

## 2017-01-01 RX ADMIN — ESMOLOL HYDROCHLORIDE 50 MCG/KG/MIN: 10 INJECTION INTRAVENOUS at 08:01

## 2017-01-01 RX ADMIN — MUPIROCIN: 20 OINTMENT TOPICAL at 02:59

## 2017-01-01 RX ADMIN — Medication 10 ML: at 14:46

## 2017-01-01 RX ADMIN — SODIUM CHLORIDE 40 MG: 9 INJECTION INTRAMUSCULAR; INTRAVENOUS; SUBCUTANEOUS at 09:14

## 2017-01-01 RX ADMIN — Medication 10 ML: at 22:43

## 2017-01-01 RX ADMIN — Medication 30 ML: at 14:00

## 2017-01-01 RX ADMIN — METOPROLOL TARTRATE 2.5 MG: 5 INJECTION INTRAVENOUS at 23:15

## 2017-01-01 RX ADMIN — ALBUTEROL SULFATE 2.5 MG: 2.5 SOLUTION RESPIRATORY (INHALATION) at 04:00

## 2017-01-01 RX ADMIN — LEVALBUTEROL HYDROCHLORIDE 1.25 MG: 1.25 SOLUTION RESPIRATORY (INHALATION) at 12:34

## 2017-01-01 RX ADMIN — ALBUTEROL SULFATE 2.5 MG: 2.5 SOLUTION RESPIRATORY (INHALATION) at 01:31

## 2017-01-01 RX ADMIN — SODIUM CHLORIDE 5 MG/HR: 900 INJECTION, SOLUTION INTRAVENOUS at 01:01

## 2017-01-01 RX ADMIN — HEPARIN SODIUM 5000 UNITS: 5000 INJECTION, SOLUTION INTRAVENOUS; SUBCUTANEOUS at 05:56

## 2017-01-01 RX ADMIN — Medication 10 ML: at 00:58

## 2017-01-01 RX ADMIN — HYDROMORPHONE HYDROCHLORIDE 1 MG: 1 INJECTION, SOLUTION INTRAMUSCULAR; INTRAVENOUS; SUBCUTANEOUS at 18:30

## 2017-01-01 RX ADMIN — AMLODIPINE BESYLATE 10 MG: 5 TABLET ORAL at 09:25

## 2017-01-01 RX ADMIN — HYDROMORPHONE HYDROCHLORIDE 1 MG: 1 INJECTION, SOLUTION INTRAMUSCULAR; INTRAVENOUS; SUBCUTANEOUS at 03:13

## 2017-01-01 RX ADMIN — FENTANYL CITRATE 50 MCG: 50 INJECTION, SOLUTION INTRAMUSCULAR; INTRAVENOUS at 16:56

## 2017-01-01 RX ADMIN — HEPARIN SODIUM 5000 UNITS: 5000 INJECTION, SOLUTION INTRAVENOUS; SUBCUTANEOUS at 22:12

## 2017-01-01 RX ADMIN — SODIUM CHLORIDE 5 MG: 9 INJECTION INTRAMUSCULAR; INTRAVENOUS; SUBCUTANEOUS at 18:42

## 2017-01-01 RX ADMIN — INSULIN LISPRO 3 UNITS: 100 INJECTION, SOLUTION INTRAVENOUS; SUBCUTANEOUS at 12:09

## 2017-01-01 RX ADMIN — HEPARIN SODIUM 5000 UNITS: 5000 INJECTION, SOLUTION INTRAVENOUS; SUBCUTANEOUS at 13:38

## 2017-01-01 RX ADMIN — Medication 10 ML: at 22:09

## 2017-01-01 RX ADMIN — ALBUTEROL SULFATE 2.5 MG: 2.5 SOLUTION RESPIRATORY (INHALATION) at 14:28

## 2017-01-01 RX ADMIN — IPRATROPIUM BROMIDE AND ALBUTEROL SULFATE 3 ML: .5; 3 SOLUTION RESPIRATORY (INHALATION) at 20:57

## 2017-01-01 RX ADMIN — Medication 10 ML: at 21:57

## 2017-01-01 RX ADMIN — IPRATROPIUM BROMIDE 0.5 MG: 0.5 SOLUTION RESPIRATORY (INHALATION) at 07:48

## 2017-01-01 RX ADMIN — Medication 10 ML: at 06:46

## 2017-01-01 RX ADMIN — Medication 10 ML: at 21:34

## 2017-01-01 RX ADMIN — Medication 10 ML: at 18:25

## 2017-01-01 RX ADMIN — Medication 10 ML: at 11:11

## 2017-01-01 RX ADMIN — Medication 10 MG/HR: at 19:00

## 2017-01-01 RX ADMIN — FUROSEMIDE 40 MG: 10 INJECTION, SOLUTION INTRAMUSCULAR; INTRAVENOUS at 08:57

## 2017-01-01 RX ADMIN — HEPARIN SODIUM 5000 UNITS: 5000 INJECTION, SOLUTION INTRAVENOUS; SUBCUTANEOUS at 16:39

## 2017-01-01 RX ADMIN — BUPROPION HYDROCHLORIDE 150 MG: 150 TABLET, FILM COATED, EXTENDED RELEASE ORAL at 08:45

## 2017-01-01 RX ADMIN — HYDRALAZINE HYDROCHLORIDE 100 MG: 50 TABLET, FILM COATED ORAL at 14:23

## 2017-01-01 RX ADMIN — PROPOFOL 50 MCG/KG/MIN: 10 INJECTION, EMULSION INTRAVENOUS at 23:32

## 2017-01-01 RX ADMIN — HYDRALAZINE HYDROCHLORIDE 100 MG: 50 TABLET, FILM COATED ORAL at 09:25

## 2017-01-01 RX ADMIN — SODIUM CHLORIDE, SODIUM LACTATE, POTASSIUM CHLORIDE, CALCIUM CHLORIDE: 600; 310; 30; 20 INJECTION, SOLUTION INTRAVENOUS at 13:31

## 2017-01-01 RX ADMIN — Medication 10 ML: at 14:21

## 2017-01-01 RX ADMIN — Medication 10 MG/HR: at 02:32

## 2017-01-01 RX ADMIN — ERYTHROPOIETIN 10000 UNITS: 20000 INJECTION, SOLUTION INTRAVENOUS; SUBCUTANEOUS at 18:03

## 2017-01-01 RX ADMIN — AMIODARONE HYDROCHLORIDE 150 MG: 50 INJECTION, SOLUTION INTRAVENOUS at 16:50

## 2017-01-01 RX ADMIN — Medication 2 MG: at 22:45

## 2017-01-01 RX ADMIN — Medication 10 ML: at 18:10

## 2017-01-01 RX ADMIN — LEVALBUTEROL HYDROCHLORIDE 1.25 MG: 1.25 SOLUTION RESPIRATORY (INHALATION) at 15:16

## 2017-01-01 RX ADMIN — Medication 7.5 MG/HR: at 00:03

## 2017-01-01 RX ADMIN — Medication 10 MG/HR: at 08:17

## 2017-01-01 RX ADMIN — LEVALBUTEROL HYDROCHLORIDE 1.25 MG: 1.25 SOLUTION RESPIRATORY (INHALATION) at 08:22

## 2017-01-01 RX ADMIN — LEVOTHYROXINE SODIUM 100 MCG: 100 TABLET ORAL at 08:03

## 2017-01-01 RX ADMIN — Medication 7.5 MG/HR: at 21:14

## 2017-01-01 RX ADMIN — LEVOTHYROXINE SODIUM 100 MCG: 100 TABLET ORAL at 09:25

## 2017-01-01 RX ADMIN — HYDROMORPHONE HYDROCHLORIDE 0.5 MG: 1 INJECTION, SOLUTION INTRAMUSCULAR; INTRAVENOUS; SUBCUTANEOUS at 06:17

## 2017-01-01 RX ADMIN — IPRATROPIUM BROMIDE 0.5 MG: 0.5 SOLUTION RESPIRATORY (INHALATION) at 15:16

## 2017-01-01 RX ADMIN — FUROSEMIDE 40 MG: 40 TABLET ORAL at 14:02

## 2017-01-01 RX ADMIN — HYDROMORPHONE HYDROCHLORIDE 0.4 MG: 1 INJECTION, SOLUTION INTRAMUSCULAR; INTRAVENOUS; SUBCUTANEOUS at 12:19

## 2017-01-01 RX ADMIN — Medication 10 ML: at 23:19

## 2017-01-01 RX ADMIN — Medication 7.5 MG/HR: at 08:57

## 2017-01-01 RX ADMIN — Medication 10 ML: at 22:11

## 2017-01-01 RX ADMIN — BUSPIRONE HYDROCHLORIDE 5 MG: 5 TABLET ORAL at 19:52

## 2017-01-01 RX ADMIN — POTASSIUM CHLORIDE 20 MEQ: 400 INJECTION, SOLUTION INTRAVENOUS at 07:45

## 2017-01-01 RX ADMIN — Medication 10 ML: at 21:14

## 2017-01-01 RX ADMIN — HEPARIN SODIUM 5000 UNITS: 5000 INJECTION, SOLUTION INTRAVENOUS; SUBCUTANEOUS at 22:05

## 2017-01-01 RX ADMIN — PROPOFOL 50 MCG/KG/MIN: 10 INJECTION, EMULSION INTRAVENOUS at 09:42

## 2017-01-01 RX ADMIN — Medication 5 MG/HR: at 21:28

## 2017-01-01 RX ADMIN — POLYETHYLENE GLYCOL 3350 17 G: 17 POWDER, FOR SOLUTION ORAL at 08:12

## 2017-01-01 RX ADMIN — FENOFIBRATE 48 MG: 48 TABLET ORAL at 10:22

## 2017-01-01 RX ADMIN — SODIUM CHLORIDE 40 MG: 9 INJECTION INTRAMUSCULAR; INTRAVENOUS; SUBCUTANEOUS at 08:15

## 2017-01-01 RX ADMIN — POLYETHYLENE GLYCOL 3350 17 G: 17 POWDER, FOR SOLUTION ORAL at 08:45

## 2017-01-01 RX ADMIN — SODIUM CHLORIDE 5 MG: 9 INJECTION INTRAMUSCULAR; INTRAVENOUS; SUBCUTANEOUS at 05:37

## 2017-01-01 RX ADMIN — Medication 10 ML: at 21:01

## 2017-01-01 RX ADMIN — Medication 10 ML: at 05:30

## 2017-01-01 RX ADMIN — BUSPIRONE HYDROCHLORIDE 5 MG: 5 TABLET ORAL at 08:02

## 2017-01-01 RX ADMIN — INSULIN LISPRO 2 UNITS: 100 INJECTION, SOLUTION INTRAVENOUS; SUBCUTANEOUS at 18:19

## 2017-01-01 RX ADMIN — IPRATROPIUM BROMIDE 0.5 MG: 0.5 SOLUTION RESPIRATORY (INHALATION) at 19:22

## 2017-01-01 RX ADMIN — ALBUTEROL SULFATE 2.5 MG: 2.5 SOLUTION RESPIRATORY (INHALATION) at 07:22

## 2017-01-01 RX ADMIN — ENALAPRILAT 1.25 MG: 2.5 INJECTION INTRAVENOUS at 00:27

## 2017-01-01 RX ADMIN — Medication 30 ML: at 10:36

## 2017-01-01 RX ADMIN — ASCORBIC ACID: 500 INJECTION, SOLUTION INTRAMUSCULAR; INTRAVENOUS; SUBCUTANEOUS at 20:34

## 2017-01-01 RX ADMIN — HEPARIN SODIUM 5000 UNITS: 5000 INJECTION, SOLUTION INTRAVENOUS; SUBCUTANEOUS at 21:18

## 2017-01-01 RX ADMIN — ONDANSETRON 4 MG: 2 SOLUTION INTRAMUSCULAR; INTRAVENOUS at 00:29

## 2017-01-01 RX ADMIN — PROPOFOL 30 MCG/KG/MIN: 10 INJECTION, EMULSION INTRAVENOUS at 18:29

## 2017-01-01 RX ADMIN — PROPOFOL 50 MCG/KG/MIN: 10 INJECTION, EMULSION INTRAVENOUS at 09:52

## 2017-01-01 RX ADMIN — Medication 10 ML: at 03:23

## 2017-01-01 RX ADMIN — Medication 10 ML: at 22:10

## 2017-01-01 RX ADMIN — DEXTROSE MONOHYDRATE 25 G: 25 INJECTION, SOLUTION INTRAVENOUS at 10:50

## 2017-01-01 RX ADMIN — Medication 10 ML: at 06:26

## 2017-01-01 RX ADMIN — FUROSEMIDE 40 MG: 10 INJECTION, SOLUTION INTRAMUSCULAR; INTRAVENOUS at 09:53

## 2017-01-01 RX ADMIN — HYDROMORPHONE HYDROCHLORIDE 0.2 MG: 1 INJECTION, SOLUTION INTRAMUSCULAR; INTRAVENOUS; SUBCUTANEOUS at 14:52

## 2017-01-01 RX ADMIN — METOPROLOL TARTRATE 2.5 MG: 5 INJECTION INTRAVENOUS at 11:55

## 2017-01-01 RX ADMIN — CHLORHEXIDINE GLUCONATE 15 ML: 1.2 RINSE ORAL at 08:39

## 2017-01-01 RX ADMIN — HYDRALAZINE HYDROCHLORIDE 100 MG: 50 TABLET, FILM COATED ORAL at 08:37

## 2017-01-01 RX ADMIN — Medication 10 ML: at 09:17

## 2017-01-01 RX ADMIN — Medication 10 ML: at 14:04

## 2017-01-01 RX ADMIN — Medication 10 ML: at 14:45

## 2017-01-01 RX ADMIN — ASCORBIC ACID: 500 INJECTION, SOLUTION INTRAMUSCULAR; INTRAVENOUS; SUBCUTANEOUS at 20:12

## 2017-01-01 RX ADMIN — POTASSIUM CHLORIDE 20 MEQ: 400 INJECTION, SOLUTION INTRAVENOUS at 10:57

## 2017-01-01 RX ADMIN — FUROSEMIDE 80 MG: 10 INJECTION, SOLUTION INTRAMUSCULAR; INTRAVENOUS at 11:31

## 2017-01-01 RX ADMIN — METOPROLOL TARTRATE 2.5 MG: 5 INJECTION INTRAVENOUS at 03:02

## 2017-01-01 RX ADMIN — UMECLIDINIUM 1 PUFF: 62.5 AEROSOL, POWDER ORAL at 09:10

## 2017-01-01 RX ADMIN — ESMOLOL HYDROCHLORIDE 50 MCG/KG/MIN: 10 INJECTION INTRAVENOUS at 15:00

## 2017-01-01 RX ADMIN — Medication 15 MG/HR: at 20:51

## 2017-01-01 RX ADMIN — PANTOPRAZOLE SODIUM 40 MG: 40 TABLET, DELAYED RELEASE ORAL at 08:54

## 2017-01-01 RX ADMIN — IPRATROPIUM BROMIDE AND ALBUTEROL SULFATE 3 ML: .5; 3 SOLUTION RESPIRATORY (INHALATION) at 20:50

## 2017-01-01 RX ADMIN — HEPARIN SODIUM 5000 UNITS: 5000 INJECTION, SOLUTION INTRAVENOUS; SUBCUTANEOUS at 14:02

## 2017-01-01 RX ADMIN — ALBUTEROL SULFATE 2.5 MG: 2.5 SOLUTION RESPIRATORY (INHALATION) at 19:38

## 2017-01-01 RX ADMIN — HYDROMORPHONE HYDROCHLORIDE 1 MG: 1 INJECTION, SOLUTION INTRAMUSCULAR; INTRAVENOUS; SUBCUTANEOUS at 05:29

## 2017-01-01 RX ADMIN — HYDROMORPHONE HYDROCHLORIDE 1 MG: 1 INJECTION, SOLUTION INTRAMUSCULAR; INTRAVENOUS; SUBCUTANEOUS at 20:09

## 2017-01-01 RX ADMIN — AMIODARONE HYDROCHLORIDE 400 MG: 200 TABLET ORAL at 10:49

## 2017-01-01 RX ADMIN — Medication 10 ML: at 13:16

## 2017-01-01 RX ADMIN — SODIUM CHLORIDE 40 MG: 9 INJECTION INTRAMUSCULAR; INTRAVENOUS; SUBCUTANEOUS at 08:12

## 2017-01-01 RX ADMIN — MUPIROCIN: 20 OINTMENT TOPICAL at 09:32

## 2017-01-01 RX ADMIN — HEPARIN SODIUM 5000 UNITS: 5000 INJECTION, SOLUTION INTRAVENOUS; SUBCUTANEOUS at 05:41

## 2017-01-01 RX ADMIN — CALCIUM CHLORIDE INJECTION 1 G: 100 INJECTION, SOLUTION INTRAVENOUS at 14:13

## 2017-01-01 RX ADMIN — LEVALBUTEROL HYDROCHLORIDE 1.25 MG: 1.25 SOLUTION RESPIRATORY (INHALATION) at 09:35

## 2017-01-01 RX ADMIN — POTASSIUM CHLORIDE 20 MEQ: 400 INJECTION, SOLUTION INTRAVENOUS at 13:07

## 2017-01-01 RX ADMIN — Medication 10 ML: at 21:03

## 2017-01-01 RX ADMIN — Medication 13 MG/HR: at 08:14

## 2017-01-01 RX ADMIN — Medication 10 ML: at 06:58

## 2017-01-01 RX ADMIN — HYDROMORPHONE HYDROCHLORIDE 1 MG: 1 INJECTION, SOLUTION INTRAMUSCULAR; INTRAVENOUS; SUBCUTANEOUS at 14:14

## 2017-01-01 RX ADMIN — Medication 10 ML: at 06:40

## 2017-01-01 RX ADMIN — SODIUM CHLORIDE 40 MG: 9 INJECTION INTRAMUSCULAR; INTRAVENOUS; SUBCUTANEOUS at 10:00

## 2017-01-01 RX ADMIN — SODIUM CHLORIDE 50 ML/HR: 900 INJECTION, SOLUTION INTRAVENOUS at 03:42

## 2017-01-01 RX ADMIN — Medication 6 MG/HR: at 05:51

## 2017-01-01 RX ADMIN — HYDROMORPHONE HYDROCHLORIDE 1 MG: 1 INJECTION, SOLUTION INTRAMUSCULAR; INTRAVENOUS; SUBCUTANEOUS at 02:15

## 2017-01-01 RX ADMIN — ENALAPRILAT 1.25 MG: 2.5 INJECTION INTRAVENOUS at 07:45

## 2017-01-01 RX ADMIN — Medication 10 MG/HR: at 23:00

## 2017-01-01 RX ADMIN — BUSPIRONE HYDROCHLORIDE 5 MG: 5 TABLET ORAL at 08:15

## 2017-01-01 RX ADMIN — Medication 5 MG/HR: at 09:30

## 2017-01-01 RX ADMIN — SODIUM CHLORIDE 5 MG: 9 INJECTION INTRAMUSCULAR; INTRAVENOUS; SUBCUTANEOUS at 16:16

## 2017-01-01 RX ADMIN — Medication 10 ML: at 14:30

## 2017-01-01 RX ADMIN — HYDROMORPHONE HYDROCHLORIDE 0.5 MG: 1 INJECTION, SOLUTION INTRAMUSCULAR; INTRAVENOUS; SUBCUTANEOUS at 02:41

## 2017-01-01 RX ADMIN — FUROSEMIDE 40 MG: 10 INJECTION, SOLUTION INTRAMUSCULAR; INTRAVENOUS at 08:27

## 2017-01-01 RX ADMIN — Medication 10 ML: at 13:15

## 2017-01-01 RX ADMIN — Medication 13 MG/HR: at 10:41

## 2017-01-01 RX ADMIN — Medication 10 ML: at 20:15

## 2017-01-01 RX ADMIN — PROPOFOL 50 MCG/KG/MIN: 10 INJECTION, EMULSION INTRAVENOUS at 06:46

## 2017-01-01 RX ADMIN — HYDRALAZINE HYDROCHLORIDE 10 MG: 20 INJECTION INTRAMUSCULAR; INTRAVENOUS at 08:28

## 2017-01-01 RX ADMIN — AMIODARONE HYDROCHLORIDE 400 MG: 200 TABLET ORAL at 09:25

## 2017-01-01 RX ADMIN — BUPROPION HYDROCHLORIDE 150 MG: 150 TABLET, FILM COATED, EXTENDED RELEASE ORAL at 12:30

## 2017-01-01 RX ADMIN — Medication 10 ML: at 18:18

## 2017-01-01 RX ADMIN — Medication 10 ML: at 22:28

## 2017-01-01 RX ADMIN — PROPOFOL 50 MCG/KG/MIN: 10 INJECTION, EMULSION INTRAVENOUS at 16:41

## 2017-01-01 RX ADMIN — PROPOFOL 35 MCG/KG/MIN: 10 INJECTION, EMULSION INTRAVENOUS at 04:09

## 2017-01-01 RX ADMIN — IPRATROPIUM BROMIDE 0.5 MG: 0.5 SOLUTION RESPIRATORY (INHALATION) at 11:27

## 2017-01-01 RX ADMIN — SODIUM BICARBONATE: 84 INJECTION, SOLUTION INTRAVENOUS at 11:00

## 2017-01-01 RX ADMIN — DEXAMETHASONE SODIUM PHOSPHATE 6 MG: 4 INJECTION, SOLUTION INTRAMUSCULAR; INTRAVENOUS at 15:40

## 2017-01-01 RX ADMIN — SODIUM CHLORIDE 40 MG: 9 INJECTION INTRAMUSCULAR; INTRAVENOUS; SUBCUTANEOUS at 08:03

## 2017-01-01 RX ADMIN — HYDROMORPHONE HYDROCHLORIDE 1 MG: 1 INJECTION, SOLUTION INTRAMUSCULAR; INTRAVENOUS; SUBCUTANEOUS at 03:56

## 2017-01-01 RX ADMIN — METOPROLOL TARTRATE 2.5 MG: 5 INJECTION INTRAVENOUS at 07:41

## 2017-01-01 RX ADMIN — AMLODIPINE BESYLATE 10 MG: 5 TABLET ORAL at 10:13

## 2017-01-01 RX ADMIN — IODIXANOL 15 ML: 320 INJECTION, SOLUTION INTRAVASCULAR at 16:29

## 2017-01-01 RX ADMIN — PANTOPRAZOLE SODIUM 40 MG: 40 TABLET, DELAYED RELEASE ORAL at 08:12

## 2017-01-01 RX ADMIN — Medication 10 ML: at 13:50

## 2017-01-01 RX ADMIN — HEPARIN SODIUM 5000 UNITS: 1000 INJECTION, SOLUTION INTRAVENOUS; SUBCUTANEOUS at 11:50

## 2017-01-01 RX ADMIN — SODIUM CHLORIDE: 234 INJECTION, SOLUTION, CONCENTRATE INTRAVENOUS; SUBCUTANEOUS at 02:41

## 2017-01-01 RX ADMIN — HYDROMORPHONE HYDROCHLORIDE 0.5 MG: 1 INJECTION, SOLUTION INTRAMUSCULAR; INTRAVENOUS; SUBCUTANEOUS at 20:36

## 2017-01-01 RX ADMIN — FENTANYL CITRATE 50 MCG: 50 INJECTION, SOLUTION INTRAMUSCULAR; INTRAVENOUS at 11:25

## 2017-01-01 RX ADMIN — INSULIN LISPRO 2 UNITS: 100 INJECTION, SOLUTION INTRAVENOUS; SUBCUTANEOUS at 18:30

## 2017-01-01 RX ADMIN — PROPOFOL 50 MG: 10 INJECTION, EMULSION INTRAVENOUS at 12:20

## 2017-01-01 RX ADMIN — DOBUTAMINE HYDROCHLORIDE 5 MCG/KG/MIN: 200 INJECTION INTRAVENOUS at 11:45

## 2017-01-01 RX ADMIN — Medication 10 ML: at 05:20

## 2017-01-01 RX ADMIN — ONDANSETRON HYDROCHLORIDE 4 MG: 2 INJECTION, SOLUTION INTRAMUSCULAR; INTRAVENOUS at 22:36

## 2017-01-01 RX ADMIN — HYDROMORPHONE HYDROCHLORIDE 0.2 MG: 1 INJECTION, SOLUTION INTRAMUSCULAR; INTRAVENOUS; SUBCUTANEOUS at 14:21

## 2017-01-01 RX ADMIN — HEPARIN SODIUM 5000 UNITS: 5000 INJECTION, SOLUTION INTRAVENOUS; SUBCUTANEOUS at 05:30

## 2017-01-01 RX ADMIN — Medication 10 ML: at 21:09

## 2017-01-01 RX ADMIN — CISATRACURIUM BESYLATE 4 MG: 2 INJECTION, SOLUTION INTRAVENOUS at 15:17

## 2017-01-01 RX ADMIN — DOCUSATE SODIUM 100 MG: 100 CAPSULE, LIQUID FILLED ORAL at 17:06

## 2017-01-01 RX ADMIN — PROPOFOL 20 MCG/KG/MIN: 10 INJECTION, EMULSION INTRAVENOUS at 14:56

## 2017-01-01 RX ADMIN — DIPHENHYDRAMINE HYDROCHLORIDE 25 MG: 50 INJECTION, SOLUTION INTRAMUSCULAR; INTRAVENOUS at 23:13

## 2017-01-01 RX ADMIN — Medication 10 ML: at 23:09

## 2017-01-01 RX ADMIN — VENLAFAXINE HYDROCHLORIDE 150 MG: 150 CAPSULE, EXTENDED RELEASE ORAL at 18:38

## 2017-01-01 RX ADMIN — Medication 10 ML: at 21:53

## 2017-01-01 RX ADMIN — CLONAZEPAM 0.5 MG: 0.5 TABLET ORAL at 20:44

## 2017-01-01 RX ADMIN — SODIUM CHLORIDE 5 MG: 9 INJECTION INTRAMUSCULAR; INTRAVENOUS; SUBCUTANEOUS at 08:42

## 2017-01-01 RX ADMIN — ESMOLOL HYDROCHLORIDE 75 MCG/KG/MIN: 10 INJECTION INTRAVENOUS at 18:24

## 2017-01-01 RX ADMIN — HYDROMORPHONE HYDROCHLORIDE 0.4 MG: 1 INJECTION, SOLUTION INTRAMUSCULAR; INTRAVENOUS; SUBCUTANEOUS at 09:04

## 2017-01-01 RX ADMIN — Medication 10 ML: at 13:09

## 2017-01-01 RX ADMIN — ERYTHROPOIETIN 10000 UNITS: 20000 INJECTION, SOLUTION INTRAVENOUS; SUBCUTANEOUS at 17:00

## 2017-01-01 RX ADMIN — INSULIN LISPRO 3 UNITS: 100 INJECTION, SOLUTION INTRAVENOUS; SUBCUTANEOUS at 23:52

## 2017-01-01 RX ADMIN — HYDROMORPHONE HYDROCHLORIDE 2 MG: 1 INJECTION, SOLUTION INTRAMUSCULAR; INTRAVENOUS; SUBCUTANEOUS at 11:03

## 2017-01-01 RX ADMIN — Medication 7.5 MG/HR: at 11:38

## 2017-01-01 RX ADMIN — CHLORHEXIDINE GLUCONATE 15 ML: 1.2 RINSE ORAL at 08:17

## 2017-01-01 RX ADMIN — Medication 10 ML: at 05:43

## 2017-01-01 RX ADMIN — SODIUM BICARBONATE 84 ML/HR: 84 INJECTION, SOLUTION INTRAVENOUS at 08:17

## 2017-01-01 RX ADMIN — CLONAZEPAM 0.5 MG: 0.5 TABLET ORAL at 18:17

## 2017-01-01 RX ADMIN — Medication 10 ML: at 06:47

## 2017-01-01 RX ADMIN — HEPARIN SODIUM 5000 UNITS: 5000 INJECTION, SOLUTION INTRAVENOUS; SUBCUTANEOUS at 22:17

## 2017-01-01 RX ADMIN — INSULIN LISPRO 2 UNITS: 100 INJECTION, SOLUTION INTRAVENOUS; SUBCUTANEOUS at 19:52

## 2017-01-01 RX ADMIN — ALBUTEROL SULFATE 2.5 MG: 2.5 SOLUTION RESPIRATORY (INHALATION) at 07:42

## 2017-01-01 RX ADMIN — Medication 10 ML: at 17:47

## 2017-01-01 RX ADMIN — WATER 3 MG: 1 INJECTION INTRAMUSCULAR; INTRAVENOUS; SUBCUTANEOUS at 17:25

## 2017-01-01 RX ADMIN — Medication 10 MG/HR: at 02:10

## 2017-01-01 RX ADMIN — PROPOFOL 30 MCG/KG/MIN: 10 INJECTION, EMULSION INTRAVENOUS at 04:27

## 2017-01-01 RX ADMIN — LEVOTHYROXINE SODIUM 100 MCG: 100 TABLET ORAL at 08:10

## 2017-01-01 RX ADMIN — PROPOFOL 20 MG: 10 INJECTION, EMULSION INTRAVENOUS at 11:30

## 2017-01-01 RX ADMIN — HYDRALAZINE HYDROCHLORIDE 100 MG: 50 TABLET, FILM COATED ORAL at 22:11

## 2017-01-01 RX ADMIN — HYDRALAZINE HYDROCHLORIDE 10 MG: 20 INJECTION INTRAMUSCULAR; INTRAVENOUS at 23:41

## 2017-01-01 RX ADMIN — ALPRAZOLAM 0.25 MG: 0.25 TABLET ORAL at 20:07

## 2017-01-01 RX ADMIN — HYDRALAZINE HYDROCHLORIDE 100 MG: 50 TABLET, FILM COATED ORAL at 15:50

## 2017-01-01 RX ADMIN — CEFEPIME 2 G: 2 INJECTION, POWDER, FOR SOLUTION INTRAVENOUS at 12:13

## 2017-01-01 RX ADMIN — AMIODARONE HYDROCHLORIDE 0.5 MG/MIN: 50 INJECTION, SOLUTION INTRAVENOUS at 23:04

## 2017-01-01 RX ADMIN — Medication 10 MG/HR: at 04:59

## 2017-01-01 RX ADMIN — SODIUM CHLORIDE: 234 INJECTION, SOLUTION, CONCENTRATE INTRAVENOUS; SUBCUTANEOUS at 18:27

## 2017-01-01 RX ADMIN — HEPARIN SODIUM 5000 UNITS: 5000 INJECTION, SOLUTION INTRAVENOUS; SUBCUTANEOUS at 05:57

## 2017-01-01 RX ADMIN — METOPROLOL TARTRATE 25 MG: 25 TABLET ORAL at 08:53

## 2017-01-01 RX ADMIN — AMIODARONE HYDROCHLORIDE 0.5 MG/MIN: 50 INJECTION, SOLUTION INTRAVENOUS at 08:30

## 2017-01-01 RX ADMIN — Medication 10 MG/HR: at 07:17

## 2017-01-01 RX ADMIN — LEVOTHYROXINE SODIUM 100 MCG: 100 TABLET ORAL at 08:29

## 2017-01-01 RX ADMIN — PROPOFOL 25 MCG/KG/MIN: 10 INJECTION, EMULSION INTRAVENOUS at 13:00

## 2017-01-01 RX ADMIN — LACTULOSE 30 G: 10 SOLUTION ORAL at 10:12

## 2017-01-01 RX ADMIN — MUPIROCIN: 20 OINTMENT TOPICAL at 21:12

## 2017-01-01 RX ADMIN — SODIUM CHLORIDE: 234 INJECTION, SOLUTION, CONCENTRATE INTRAVENOUS; SUBCUTANEOUS at 21:12

## 2017-01-01 RX ADMIN — HYDROMORPHONE HYDROCHLORIDE 1 MG: 1 INJECTION, SOLUTION INTRAMUSCULAR; INTRAVENOUS; SUBCUTANEOUS at 17:30

## 2017-01-01 RX ADMIN — BUSPIRONE HYDROCHLORIDE 5 MG: 5 TABLET ORAL at 17:58

## 2017-01-01 RX ADMIN — VANCOMYCIN HYDROCHLORIDE 1000 MG: 1 INJECTION, POWDER, LYOPHILIZED, FOR SOLUTION INTRAVENOUS at 15:54

## 2017-01-01 RX ADMIN — CEFEPIME 2 G: 2 INJECTION, POWDER, FOR SOLUTION INTRAVENOUS at 13:10

## 2017-01-01 RX ADMIN — Medication 10 ML: at 22:00

## 2017-01-01 RX ADMIN — HYDRALAZINE HYDROCHLORIDE 10 MG: 20 INJECTION INTRAMUSCULAR; INTRAVENOUS at 21:19

## 2017-01-01 RX ADMIN — LEVALBUTEROL HYDROCHLORIDE 1.25 MG: 1.25 SOLUTION RESPIRATORY (INHALATION) at 11:43

## 2017-01-01 RX ADMIN — ONDANSETRON 4 MG: 2 SOLUTION INTRAMUSCULAR; INTRAVENOUS at 22:18

## 2017-01-01 RX ADMIN — Medication 20 ML: at 06:00

## 2017-01-01 RX ADMIN — Medication 10 MG/HR: at 12:34

## 2017-01-01 RX ADMIN — PANTOPRAZOLE SODIUM 40 MG: 40 TABLET, DELAYED RELEASE ORAL at 19:21

## 2017-01-01 RX ADMIN — CEFAZOLIN 2 G: 10 INJECTION, POWDER, FOR SOLUTION INTRAVENOUS; PARENTERAL at 19:22

## 2017-01-01 RX ADMIN — HYDROMORPHONE HYDROCHLORIDE 1 MG: 1 INJECTION, SOLUTION INTRAMUSCULAR; INTRAVENOUS; SUBCUTANEOUS at 02:24

## 2017-01-01 RX ADMIN — SODIUM BICARBONATE: 84 INJECTION, SOLUTION INTRAVENOUS at 18:00

## 2017-01-01 RX ADMIN — Medication 40 ML: at 05:37

## 2017-01-01 RX ADMIN — Medication 2.5 MG/HR: at 07:06

## 2017-01-01 RX ADMIN — Medication 10 MG/HR: at 19:07

## 2017-01-01 RX ADMIN — Medication 10 ML: at 23:10

## 2017-01-01 RX ADMIN — Medication 20 ML: at 21:18

## 2017-01-01 RX ADMIN — ONDANSETRON 4 MG: 2 INJECTION INTRAMUSCULAR; INTRAVENOUS at 15:29

## 2017-01-01 RX ADMIN — PROPOFOL 50 MCG/KG/MIN: 10 INJECTION, EMULSION INTRAVENOUS at 23:38

## 2017-01-01 RX ADMIN — Medication 10 ML: at 17:55

## 2017-01-01 RX ADMIN — HYDROMORPHONE HYDROCHLORIDE 1 MG: 1 INJECTION, SOLUTION INTRAMUSCULAR; INTRAVENOUS; SUBCUTANEOUS at 10:23

## 2017-01-01 RX ADMIN — VANCOMYCIN HYDROCHLORIDE 1250 MG: 10 INJECTION, POWDER, LYOPHILIZED, FOR SOLUTION INTRAVENOUS at 11:38

## 2017-01-01 RX ADMIN — SODIUM CHLORIDE 0.3 MCG/KG/HR: 900 INJECTION, SOLUTION INTRAVENOUS at 22:08

## 2017-01-01 RX ADMIN — AMIODARONE HYDROCHLORIDE 1 MG/MIN: 50 INJECTION, SOLUTION INTRAVENOUS at 05:08

## 2017-01-01 RX ADMIN — Medication 10 ML: at 05:19

## 2017-01-01 RX ADMIN — SPIRONOLACTONE 25 MG: 25 TABLET, FILM COATED ORAL at 09:27

## 2017-01-01 RX ADMIN — HYDROMORPHONE HYDROCHLORIDE 0.5 MG: 1 INJECTION, SOLUTION INTRAMUSCULAR; INTRAVENOUS; SUBCUTANEOUS at 06:41

## 2017-01-01 RX ADMIN — SODIUM CHLORIDE 100 ML/HR: 900 INJECTION, SOLUTION INTRAVENOUS at 12:28

## 2017-01-01 RX ADMIN — HEPARIN SODIUM 5000 UNITS: 5000 INJECTION, SOLUTION INTRAVENOUS; SUBCUTANEOUS at 13:53

## 2017-01-01 RX ADMIN — PANTOPRAZOLE SODIUM 40 MG: 40 TABLET, DELAYED RELEASE ORAL at 17:06

## 2017-01-01 RX ADMIN — PROPOFOL 50 MCG/KG/MIN: 10 INJECTION, EMULSION INTRAVENOUS at 12:12

## 2017-01-01 RX ADMIN — CEFEPIME 2 G: 2 INJECTION, POWDER, FOR SOLUTION INTRAVENOUS at 12:27

## 2017-01-01 RX ADMIN — CHLORHEXIDINE GLUCONATE 15 ML: 1.2 RINSE ORAL at 09:02

## 2017-01-01 RX ADMIN — ONDANSETRON 4 MG: 2 SOLUTION INTRAMUSCULAR; INTRAVENOUS at 02:52

## 2017-01-01 RX ADMIN — ACETAMINOPHEN 650 MG: 325 TABLET, FILM COATED ORAL at 06:51

## 2017-01-01 RX ADMIN — IODIXANOL 48 ML: 320 INJECTION, SOLUTION INTRAVASCULAR at 17:15

## 2017-01-01 RX ADMIN — HYDROMORPHONE HYDROCHLORIDE 2 MG: 1 INJECTION, SOLUTION INTRAMUSCULAR; INTRAVENOUS; SUBCUTANEOUS at 06:36

## 2017-01-01 RX ADMIN — BUPROPION HYDROCHLORIDE 150 MG: 150 TABLET, FILM COATED, EXTENDED RELEASE ORAL at 08:14

## 2017-01-01 RX ADMIN — HYDRALAZINE HYDROCHLORIDE 100 MG: 50 TABLET, FILM COATED ORAL at 17:54

## 2017-01-01 RX ADMIN — Medication 10 ML: at 15:58

## 2017-01-01 RX ADMIN — IPRATROPIUM BROMIDE 0.5 MG: 0.5 SOLUTION RESPIRATORY (INHALATION) at 11:00

## 2017-01-01 RX ADMIN — HYDRALAZINE HYDROCHLORIDE 100 MG: 50 TABLET, FILM COATED ORAL at 16:45

## 2017-01-01 RX ADMIN — HEPARIN SODIUM 5000 UNITS: 5000 INJECTION, SOLUTION INTRAVENOUS; SUBCUTANEOUS at 14:07

## 2017-01-01 RX ADMIN — MAGNESIUM SULFATE HEPTAHYDRATE 2 G: 40 INJECTION, SOLUTION INTRAVENOUS at 13:00

## 2017-01-01 RX ADMIN — Medication 10 ML: at 18:11

## 2017-01-01 RX ADMIN — POTASSIUM CHLORIDE 20 MEQ: 400 INJECTION, SOLUTION INTRAVENOUS at 11:38

## 2017-01-01 RX ADMIN — HEPARIN SODIUM 5000 UNITS: 5000 INJECTION, SOLUTION INTRAVENOUS; SUBCUTANEOUS at 14:15

## 2017-01-01 RX ADMIN — METOPROLOL TARTRATE 2.5 MG: 5 INJECTION INTRAVENOUS at 03:56

## 2017-01-01 RX ADMIN — HEPARIN SODIUM 5000 UNITS: 5000 INJECTION, SOLUTION INTRAVENOUS; SUBCUTANEOUS at 22:27

## 2017-01-01 RX ADMIN — ONDANSETRON 4 MG: 2 SOLUTION INTRAMUSCULAR; INTRAVENOUS at 12:34

## 2017-01-01 RX ADMIN — Medication 5 MG/HR: at 10:31

## 2017-01-01 RX ADMIN — HEPARIN SODIUM 5000 UNITS: 5000 INJECTION, SOLUTION INTRAVENOUS; SUBCUTANEOUS at 13:09

## 2017-01-01 RX ADMIN — METOPROLOL TARTRATE 2.5 MG: 5 INJECTION INTRAVENOUS at 03:20

## 2017-01-01 RX ADMIN — METOPROLOL TARTRATE 25 MG: 25 TABLET ORAL at 17:06

## 2017-01-01 RX ADMIN — Medication 40 ML: at 14:04

## 2017-01-01 RX ADMIN — Medication 40 ML: at 14:03

## 2017-01-01 RX ADMIN — ALBUTEROL SULFATE 2.5 MG: 2.5 SOLUTION RESPIRATORY (INHALATION) at 19:47

## 2017-01-01 RX ADMIN — HEPARIN SODIUM 5000 UNITS: 5000 INJECTION, SOLUTION INTRAVENOUS; SUBCUTANEOUS at 21:33

## 2017-01-01 RX ADMIN — INSULIN LISPRO 3 UNITS: 100 INJECTION, SOLUTION INTRAVENOUS; SUBCUTANEOUS at 12:08

## 2017-01-01 RX ADMIN — HYDROMORPHONE HYDROCHLORIDE 1 MG: 1 INJECTION, SOLUTION INTRAMUSCULAR; INTRAVENOUS; SUBCUTANEOUS at 15:38

## 2017-01-01 RX ADMIN — Medication 10 ML: at 13:40

## 2017-01-01 RX ADMIN — HYDRALAZINE HYDROCHLORIDE 100 MG: 50 TABLET, FILM COATED ORAL at 21:56

## 2017-01-01 RX ADMIN — PROPOFOL 20 MCG/KG/MIN: 10 INJECTION, EMULSION INTRAVENOUS at 22:24

## 2017-01-01 RX ADMIN — HYDROMORPHONE HYDROCHLORIDE 0.5 MG: 1 INJECTION, SOLUTION INTRAMUSCULAR; INTRAVENOUS; SUBCUTANEOUS at 09:29

## 2017-01-01 RX ADMIN — BUSPIRONE HYDROCHLORIDE 7.5 MG: 5 TABLET ORAL at 10:21

## 2017-01-01 RX ADMIN — FENOFIBRATE 48 MG: 48 TABLET ORAL at 08:53

## 2017-01-01 RX ADMIN — Medication 9 MG/HR: at 09:00

## 2017-01-01 RX ADMIN — IPRATROPIUM BROMIDE 0.5 MG: 0.5 SOLUTION RESPIRATORY (INHALATION) at 15:40

## 2017-01-01 RX ADMIN — PROPOFOL 50 MCG/KG/MIN: 10 INJECTION, EMULSION INTRAVENOUS at 12:04

## 2017-01-01 RX ADMIN — Medication 10 ML: at 21:17

## 2017-01-01 RX ADMIN — SODIUM CHLORIDE: 234 INJECTION, SOLUTION, CONCENTRATE INTRAVENOUS; SUBCUTANEOUS at 05:35

## 2017-01-01 RX ADMIN — Medication 15 MG/HR: at 00:37

## 2017-01-01 RX ADMIN — PANTOPRAZOLE SODIUM 40 MG: 40 TABLET, DELAYED RELEASE ORAL at 07:40

## 2017-01-01 RX ADMIN — AMIODARONE HYDROCHLORIDE 400 MG: 200 TABLET ORAL at 08:02

## 2017-01-01 RX ADMIN — ALBUTEROL SULFATE 2.5 MG: 2.5 SOLUTION RESPIRATORY (INHALATION) at 07:28

## 2017-01-01 RX ADMIN — RETINOL, ERGOCALCIFEROL, .ALPHA.-TOCOPHEROL ACETATE, DL-, PHYTONADIONE, ASCORBIC ACID, NIACINAMIDE, RIBOFLAVIN 5-PHOSPHATE SODIUM, THIAMINE HYDROCHLORIDE, PYRIDOXINE HYDROCHLORIDE, DEXPANTHENOL, BIOTIN, FOLIC ACID, AND CYANOCOBALAMIN: KIT at 18:48

## 2017-01-01 RX ADMIN — AMLODIPINE BESYLATE 10 MG: 5 TABLET ORAL at 07:55

## 2017-01-01 RX ADMIN — MICONAZOLE NITRATE: 20 CREAM TOPICAL at 22:28

## 2017-01-01 RX ADMIN — LABETALOL HYDROCHLORIDE 20 MG: 5 INJECTION, SOLUTION INTRAVENOUS at 02:13

## 2017-01-01 RX ADMIN — SODIUM CHLORIDE 100 ML/HR: 900 INJECTION, SOLUTION INTRAVENOUS at 16:31

## 2017-01-01 RX ADMIN — HEPARIN SODIUM 5000 UNITS: 5000 INJECTION, SOLUTION INTRAVENOUS; SUBCUTANEOUS at 21:50

## 2017-01-01 RX ADMIN — POTASSIUM CHLORIDE 20 MEQ: 400 INJECTION, SOLUTION INTRAVENOUS at 08:49

## 2017-01-01 RX ADMIN — HYDRALAZINE HYDROCHLORIDE 100 MG: 50 TABLET, FILM COATED ORAL at 08:29

## 2017-01-01 RX ADMIN — RETINOL, ERGOCALCIFEROL, .ALPHA.-TOCOPHEROL ACETATE, DL-, PHYTONADIONE, ASCORBIC ACID, NIACINAMIDE, RIBOFLAVIN 5-PHOSPHATE SODIUM, THIAMINE HYDROCHLORIDE, PYRIDOXINE HYDROCHLORIDE, DEXPANTHENOL, BIOTIN, FOLIC ACID, AND CYANOCOBALAMIN: KIT at 18:09

## 2017-01-01 RX ADMIN — LEVOTHYROXINE SODIUM 100 MCG: 100 TABLET ORAL at 08:14

## 2017-01-01 RX ADMIN — HYDRALAZINE HYDROCHLORIDE 25 MG: 50 TABLET, FILM COATED ORAL at 16:52

## 2017-01-01 RX ADMIN — INSULIN LISPRO 2 UNITS: 100 INJECTION, SOLUTION INTRAVENOUS; SUBCUTANEOUS at 11:58

## 2017-01-01 RX ADMIN — Medication 10 ML: at 13:24

## 2017-01-01 RX ADMIN — HYDRALAZINE HYDROCHLORIDE 10 MG: 20 INJECTION INTRAMUSCULAR; INTRAVENOUS at 04:05

## 2017-01-01 RX ADMIN — IPRATROPIUM BROMIDE 0.5 MG: 0.5 SOLUTION RESPIRATORY (INHALATION) at 21:09

## 2017-01-01 RX ADMIN — AMIODARONE HYDROCHLORIDE 400 MG: 200 TABLET ORAL at 20:18

## 2017-01-01 RX ADMIN — Medication 10 ML: at 15:54

## 2017-01-01 RX ADMIN — RETINOL, ERGOCALCIFEROL, .ALPHA.-TOCOPHEROL ACETATE, DL-, PHYTONADIONE, ASCORBIC ACID, NIACINAMIDE, RIBOFLAVIN 5-PHOSPHATE SODIUM, THIAMINE HYDROCHLORIDE, PYRIDOXINE HYDROCHLORIDE, DEXPANTHENOL, BIOTIN, FOLIC ACID, AND CYANOCOBALAMIN: KIT at 17:58

## 2017-01-01 RX ADMIN — PROPOFOL 50 MCG/KG/MIN: 10 INJECTION, EMULSION INTRAVENOUS at 20:03

## 2017-01-01 RX ADMIN — HEPARIN SODIUM 5000 UNITS: 5000 INJECTION, SOLUTION INTRAVENOUS; SUBCUTANEOUS at 06:18

## 2017-01-01 RX ADMIN — DIPHENHYDRAMINE HYDROCHLORIDE 25 MG: 50 INJECTION, SOLUTION INTRAMUSCULAR; INTRAVENOUS at 19:32

## 2017-01-01 RX ADMIN — MINOXIDIL 2.5 MG: 2.5 TABLET ORAL at 09:26

## 2017-01-01 RX ADMIN — AMIODARONE HYDROCHLORIDE 1 MG/MIN: 50 INJECTION, SOLUTION INTRAVENOUS at 16:58

## 2017-01-01 RX ADMIN — SODIUM CHLORIDE 10 MG/HR: 900 INJECTION, SOLUTION INTRAVENOUS at 10:12

## 2017-01-01 RX ADMIN — MINOXIDIL 5 MG: 2.5 TABLET ORAL at 08:54

## 2017-01-01 RX ADMIN — POTASSIUM CHLORIDE 20 MEQ: 400 INJECTION, SOLUTION INTRAVENOUS at 09:50

## 2017-01-01 RX ADMIN — Medication 10 ML: at 22:14

## 2017-01-01 RX ADMIN — PROPOFOL 40 MCG/KG/MIN: 10 INJECTION, EMULSION INTRAVENOUS at 10:29

## 2017-01-01 RX ADMIN — MIDAZOLAM HYDROCHLORIDE 2 MG: 1 INJECTION, SOLUTION INTRAMUSCULAR; INTRAVENOUS at 17:07

## 2017-01-01 RX ADMIN — MIDAZOLAM HYDROCHLORIDE 1 MG: 1 INJECTION, SOLUTION INTRAMUSCULAR; INTRAVENOUS at 16:30

## 2017-01-01 RX ADMIN — POLYETHYLENE GLYCOL 3350 17 G: 17 POWDER, FOR SOLUTION ORAL at 08:53

## 2017-01-01 RX ADMIN — ALPRAZOLAM 0.25 MG: 0.25 TABLET ORAL at 08:53

## 2017-01-01 RX ADMIN — LEVALBUTEROL HYDROCHLORIDE 1.25 MG: 1.25 SOLUTION RESPIRATORY (INHALATION) at 15:40

## 2017-01-01 RX ADMIN — CLONAZEPAM 0.5 MG: 0.5 TABLET ORAL at 11:22

## 2017-01-01 RX ADMIN — ESMOLOL HYDROCHLORIDE 145 MCG/KG/MIN: 10 INJECTION INTRAVENOUS at 00:31

## 2017-01-01 RX ADMIN — HEPARIN SODIUM 5000 UNITS: 5000 INJECTION, SOLUTION INTRAVENOUS; SUBCUTANEOUS at 05:27

## 2017-01-01 RX ADMIN — INSULIN LISPRO 3 UNITS: 100 INJECTION, SOLUTION INTRAVENOUS; SUBCUTANEOUS at 06:08

## 2017-01-01 RX ADMIN — MIDAZOLAM HYDROCHLORIDE 1 MG: 1 INJECTION, SOLUTION INTRAMUSCULAR; INTRAVENOUS at 16:03

## 2017-01-01 RX ADMIN — BISACODYL 10 MG: 10 SUPPOSITORY RECTAL at 00:28

## 2017-01-01 RX ADMIN — METOPROLOL TARTRATE 2.5 MG: 5 INJECTION INTRAVENOUS at 00:04

## 2017-01-01 RX ADMIN — SODIUM CHLORIDE 0.5 MCG/KG/HR: 900 INJECTION, SOLUTION INTRAVENOUS at 11:18

## 2017-01-01 RX ADMIN — LABETALOL HYDROCHLORIDE 20 MG: 5 INJECTION, SOLUTION INTRAVENOUS at 06:17

## 2017-01-01 RX ADMIN — Medication 10 ML: at 18:55

## 2017-01-01 RX ADMIN — IPRATROPIUM BROMIDE 0.5 MG: 0.5 SOLUTION RESPIRATORY (INHALATION) at 11:31

## 2017-01-01 RX ADMIN — Medication 10 ML: at 19:23

## 2017-01-01 RX ADMIN — LEVALBUTEROL HYDROCHLORIDE 1.25 MG: 1.25 SOLUTION RESPIRATORY (INHALATION) at 08:15

## 2017-01-01 RX ADMIN — ESMOLOL HYDROCHLORIDE 115 MCG/KG/MIN: 10 INJECTION INTRAVENOUS at 07:44

## 2017-01-01 RX ADMIN — SODIUM CHLORIDE 5 MG: 9 INJECTION INTRAMUSCULAR; INTRAVENOUS; SUBCUTANEOUS at 06:41

## 2017-01-01 RX ADMIN — HEPARIN SODIUM 5000 UNITS: 5000 INJECTION, SOLUTION INTRAVENOUS; SUBCUTANEOUS at 21:31

## 2017-01-01 RX ADMIN — HYDROMORPHONE HYDROCHLORIDE 1 MG: 1 INJECTION, SOLUTION INTRAMUSCULAR; INTRAVENOUS; SUBCUTANEOUS at 22:09

## 2017-01-01 RX ADMIN — IPRATROPIUM BROMIDE AND ALBUTEROL SULFATE 3 ML: .5; 3 SOLUTION RESPIRATORY (INHALATION) at 08:19

## 2017-01-01 RX ADMIN — HYDROMORPHONE HYDROCHLORIDE 0.5 MG: 1 INJECTION, SOLUTION INTRAMUSCULAR; INTRAVENOUS; SUBCUTANEOUS at 19:38

## 2017-01-01 RX ADMIN — CHLORHEXIDINE GLUCONATE 15 ML: 1.2 RINSE ORAL at 20:19

## 2017-01-01 RX ADMIN — SODIUM CHLORIDE: 234 INJECTION, SOLUTION, CONCENTRATE INTRAVENOUS; SUBCUTANEOUS at 02:14

## 2017-01-01 RX ADMIN — ALBUTEROL SULFATE 2.5 MG: 2.5 SOLUTION RESPIRATORY (INHALATION) at 07:56

## 2017-01-01 RX ADMIN — BACITRACIN 50000 UNITS: 50000 INJECTION, POWDER, FOR SOLUTION INTRAMUSCULAR at 12:14

## 2017-01-01 RX ADMIN — HEPARIN SODIUM 3000 UNITS: 1000 INJECTION, SOLUTION INTRAVENOUS; SUBCUTANEOUS at 13:42

## 2017-01-01 RX ADMIN — LEVALBUTEROL HYDROCHLORIDE 1.25 MG: 1.25 SOLUTION RESPIRATORY (INHALATION) at 15:20

## 2017-01-01 RX ADMIN — Medication 7 MG/HR: at 02:59

## 2017-01-01 RX ADMIN — LABETALOL HYDROCHLORIDE 20 MG: 5 INJECTION, SOLUTION INTRAVENOUS at 13:01

## 2017-01-01 RX ADMIN — AMIODARONE HYDROCHLORIDE 1 MG/MIN: 50 INJECTION, SOLUTION INTRAVENOUS at 20:37

## 2017-01-01 RX ADMIN — HYDRALAZINE HYDROCHLORIDE 20 MG: 20 INJECTION INTRAMUSCULAR; INTRAVENOUS at 07:11

## 2017-01-01 RX ADMIN — LEVALBUTEROL HYDROCHLORIDE 1.25 MG: 1.25 SOLUTION RESPIRATORY (INHALATION) at 19:50

## 2017-01-01 RX ADMIN — LEVALBUTEROL HYDROCHLORIDE 1.25 MG: 1.25 SOLUTION RESPIRATORY (INHALATION) at 21:32

## 2017-01-01 RX ADMIN — Medication 10 ML: at 17:08

## 2017-01-01 RX ADMIN — SODIUM CHLORIDE 0.4 MCG/KG/HR: 900 INJECTION, SOLUTION INTRAVENOUS at 16:25

## 2017-01-01 RX ADMIN — IOPAMIDOL 70 ML: 755 INJECTION, SOLUTION INTRAVENOUS at 17:11

## 2017-01-01 RX ADMIN — Medication 10 ML: at 04:55

## 2017-01-01 RX ADMIN — Medication 10 ML: at 21:41

## 2017-01-01 RX ADMIN — ONDANSETRON HYDROCHLORIDE 4 MG: 2 INJECTION, SOLUTION INTRAMUSCULAR; INTRAVENOUS at 16:45

## 2017-01-01 RX ADMIN — INSULIN LISPRO 2 UNITS: 100 INJECTION, SOLUTION INTRAVENOUS; SUBCUTANEOUS at 20:11

## 2017-01-01 RX ADMIN — AMLODIPINE BESYLATE 10 MG: 5 TABLET ORAL at 08:14

## 2017-01-01 RX ADMIN — Medication 10 ML: at 00:26

## 2017-01-01 RX ADMIN — FLUCONAZOLE 400 MG: 100 TABLET ORAL at 08:00

## 2017-01-01 RX ADMIN — HYDROMORPHONE HYDROCHLORIDE 2 MG: 1 INJECTION, SOLUTION INTRAMUSCULAR; INTRAVENOUS; SUBCUTANEOUS at 06:14

## 2017-01-01 RX ADMIN — Medication 10 ML: at 06:27

## 2017-01-01 RX ADMIN — SODIUM CHLORIDE 100 ML/HR: 900 INJECTION, SOLUTION INTRAVENOUS at 17:57

## 2017-01-01 RX ADMIN — Medication 15 MG/HR: at 03:01

## 2017-01-01 RX ADMIN — Medication 40 ML: at 05:57

## 2017-01-01 RX ADMIN — SPIRONOLACTONE 25 MG: 25 TABLET, FILM COATED ORAL at 10:36

## 2017-01-01 RX ADMIN — SODIUM CHLORIDE 100 ML/HR: 450 INJECTION, SOLUTION INTRAVENOUS at 07:33

## 2017-01-01 RX ADMIN — PHENOL 1 SPRAY: 1.4 LIQUID ORAL at 21:33

## 2017-01-01 RX ADMIN — CHLORHEXIDINE GLUCONATE 15 ML: 1.2 RINSE ORAL at 09:04

## 2017-01-01 RX ADMIN — HYDROMORPHONE HYDROCHLORIDE 1 MG: 1 INJECTION, SOLUTION INTRAMUSCULAR; INTRAVENOUS; SUBCUTANEOUS at 04:21

## 2017-01-01 RX ADMIN — Medication 10 ML: at 16:39

## 2017-01-01 RX ADMIN — SODIUM CHLORIDE: 234 INJECTION, SOLUTION, CONCENTRATE INTRAVENOUS; SUBCUTANEOUS at 09:11

## 2017-01-01 RX ADMIN — Medication: at 21:53

## 2017-01-01 RX ADMIN — LIDOCAINE HYDROCHLORIDE 360 MG: 20 INJECTION, SOLUTION INFILTRATION; PERINEURAL at 15:18

## 2017-01-01 RX ADMIN — METOPROLOL TARTRATE 2.5 MG: 5 INJECTION INTRAVENOUS at 00:29

## 2017-01-01 RX ADMIN — HYDROMORPHONE HYDROCHLORIDE 1 MG: 1 INJECTION, SOLUTION INTRAMUSCULAR; INTRAVENOUS; SUBCUTANEOUS at 14:04

## 2017-01-01 RX ADMIN — MUPIROCIN 1 G: 20 OINTMENT TOPICAL at 20:35

## 2017-01-01 RX ADMIN — ESMOLOL HYDROCHLORIDE 110 MCG/KG/MIN: 10 INJECTION INTRAVENOUS at 17:50

## 2017-01-01 RX ADMIN — ESMOLOL HYDROCHLORIDE 180 MCG/KG/MIN: 10 INJECTION INTRAVENOUS at 11:39

## 2017-01-01 RX ADMIN — INSULIN LISPRO 2 UNITS: 100 INJECTION, SOLUTION INTRAVENOUS; SUBCUTANEOUS at 00:51

## 2017-01-01 RX ADMIN — Medication 10 ML: at 16:44

## 2017-01-01 RX ADMIN — LEVALBUTEROL HYDROCHLORIDE 1.25 MG: 1.25 SOLUTION RESPIRATORY (INHALATION) at 11:00

## 2017-01-01 RX ADMIN — Medication 7.5 MG/HR: at 13:14

## 2017-01-01 RX ADMIN — Medication 10 ML: at 04:04

## 2017-01-01 RX ADMIN — ALBUTEROL SULFATE 2.5 MG: 2.5 SOLUTION RESPIRATORY (INHALATION) at 16:14

## 2017-01-01 RX ADMIN — Medication 10 ML: at 13:11

## 2017-01-01 RX ADMIN — SODIUM CHLORIDE 15 MG/HR: 900 INJECTION, SOLUTION INTRAVENOUS at 10:02

## 2017-01-01 RX ADMIN — HYDROMORPHONE HYDROCHLORIDE 1 MG: 1 INJECTION, SOLUTION INTRAMUSCULAR; INTRAVENOUS; SUBCUTANEOUS at 16:48

## 2017-01-01 RX ADMIN — Medication 10 ML: at 21:49

## 2017-01-01 RX ADMIN — LABETALOL HYDROCHLORIDE 20 MG: 5 INJECTION, SOLUTION INTRAVENOUS at 18:04

## 2017-01-01 RX ADMIN — Medication 10 ML: at 06:18

## 2017-01-01 RX ADMIN — Medication 11 MG/HR: at 05:00

## 2017-01-01 RX ADMIN — ONDANSETRON HYDROCHLORIDE 4 MG: 2 INJECTION, SOLUTION INTRAMUSCULAR; INTRAVENOUS at 08:46

## 2017-01-01 RX ADMIN — VANCOMYCIN HYDROCHLORIDE 1250 MG: 10 INJECTION, POWDER, LYOPHILIZED, FOR SOLUTION INTRAVENOUS at 16:37

## 2017-01-01 RX ADMIN — HYDRALAZINE HYDROCHLORIDE 100 MG: 50 TABLET, FILM COATED ORAL at 13:11

## 2017-01-01 RX ADMIN — Medication 10 ML: at 06:41

## 2017-01-01 RX ADMIN — AMLODIPINE BESYLATE 10 MG: 5 TABLET ORAL at 08:29

## 2017-01-01 RX ADMIN — VENLAFAXINE HYDROCHLORIDE 150 MG: 150 CAPSULE, EXTENDED RELEASE ORAL at 08:55

## 2017-01-01 RX ADMIN — MICONAZOLE NITRATE: 20 CREAM TOPICAL at 09:00

## 2017-01-01 RX ADMIN — IPRATROPIUM BROMIDE 0.5 MG: 0.5 SOLUTION RESPIRATORY (INHALATION) at 08:43

## 2017-01-01 RX ADMIN — HYDRALAZINE HYDROCHLORIDE 10 MG: 20 INJECTION INTRAMUSCULAR; INTRAVENOUS at 05:32

## 2017-01-01 RX ADMIN — ASCORBIC ACID: 500 INJECTION, SOLUTION INTRAMUSCULAR; INTRAVENOUS; SUBCUTANEOUS at 21:57

## 2017-01-01 RX ADMIN — ACETAMINOPHEN 650 MG: 325 TABLET, FILM COATED ORAL at 04:21

## 2017-01-01 RX ADMIN — LEVALBUTEROL HYDROCHLORIDE 1.25 MG: 1.25 SOLUTION RESPIRATORY (INHALATION) at 16:24

## 2017-01-01 RX ADMIN — Medication 10 ML: at 21:32

## 2017-01-01 RX ADMIN — SORBITOL SOLUTION (BULK) 30 ML: 70 SOLUTION at 10:53

## 2017-01-01 RX ADMIN — Medication 10 ML: at 17:05

## 2017-01-01 RX ADMIN — PROPOFOL 20 MG: 10 INJECTION, EMULSION INTRAVENOUS at 11:27

## 2017-01-01 RX ADMIN — LIDOCAINE HYDROCHLORIDE 200 MG: 20 INJECTION, SOLUTION INFILTRATION; PERINEURAL at 11:04

## 2017-01-01 RX ADMIN — METOPROLOL TARTRATE 2.5 MG: 5 INJECTION INTRAVENOUS at 00:00

## 2017-01-01 RX ADMIN — ESMOLOL HYDROCHLORIDE 140 MCG/KG/MIN: 10 INJECTION INTRAVENOUS at 12:00

## 2017-01-01 RX ADMIN — Medication 10 ML: at 03:52

## 2017-01-01 RX ADMIN — INSULIN LISPRO 2 UNITS: 100 INJECTION, SOLUTION INTRAVENOUS; SUBCUTANEOUS at 11:55

## 2017-01-01 RX ADMIN — PANTOPRAZOLE SODIUM 40 MG: 40 TABLET, DELAYED RELEASE ORAL at 18:08

## 2017-01-01 RX ADMIN — Medication 7.5 MG/HR: at 13:22

## 2017-01-01 RX ADMIN — Medication 10 ML: at 17:52

## 2017-01-01 RX ADMIN — HYDRALAZINE HYDROCHLORIDE 25 MG: 50 TABLET, FILM COATED ORAL at 08:51

## 2017-01-01 RX ADMIN — PROPOFOL 50 MCG/KG/MIN: 10 INJECTION, EMULSION INTRAVENOUS at 01:12

## 2017-01-01 RX ADMIN — HYDROMORPHONE HYDROCHLORIDE 1 MG: 1 INJECTION, SOLUTION INTRAMUSCULAR; INTRAVENOUS; SUBCUTANEOUS at 00:13

## 2017-01-01 RX ADMIN — LEVALBUTEROL HYDROCHLORIDE 1.25 MG: 1.25 SOLUTION RESPIRATORY (INHALATION) at 19:16

## 2017-01-01 RX ADMIN — Medication 2 MG/HR: at 23:11

## 2017-01-01 RX ADMIN — Medication 10 ML: at 05:51

## 2017-01-01 RX ADMIN — HEPARIN SODIUM 5000 UNITS: 5000 INJECTION, SOLUTION INTRAVENOUS; SUBCUTANEOUS at 22:48

## 2017-01-01 RX ADMIN — HYDROMORPHONE HYDROCHLORIDE 0.5 MG: 1 INJECTION, SOLUTION INTRAMUSCULAR; INTRAVENOUS; SUBCUTANEOUS at 01:22

## 2017-01-01 RX ADMIN — INSULIN LISPRO 2 UNITS: 100 INJECTION, SOLUTION INTRAVENOUS; SUBCUTANEOUS at 23:49

## 2017-01-01 RX ADMIN — HYDROMORPHONE HYDROCHLORIDE 1 MG: 1 INJECTION, SOLUTION INTRAMUSCULAR; INTRAVENOUS; SUBCUTANEOUS at 22:45

## 2017-01-01 RX ADMIN — Medication 10 ML: at 21:08

## 2017-01-01 RX ADMIN — Medication 10 ML: at 18:19

## 2017-01-01 RX ADMIN — HYDRALAZINE HYDROCHLORIDE 100 MG: 50 TABLET, FILM COATED ORAL at 10:48

## 2017-01-01 RX ADMIN — Medication 10 ML: at 15:04

## 2017-01-01 RX ADMIN — FUROSEMIDE 80 MG: 10 INJECTION, SOLUTION INTRAMUSCULAR; INTRAVENOUS at 12:03

## 2017-01-01 RX ADMIN — CHLORHEXIDINE GLUCONATE 15 ML: 1.2 RINSE ORAL at 21:00

## 2017-01-01 RX ADMIN — HYDRALAZINE HYDROCHLORIDE 100 MG: 50 TABLET, FILM COATED ORAL at 20:16

## 2017-01-01 RX ADMIN — Medication 10 MG/HR: at 18:12

## 2017-01-01 RX ADMIN — PANTOPRAZOLE SODIUM 40 MG: 40 TABLET, DELAYED RELEASE ORAL at 08:14

## 2017-01-01 RX ADMIN — HYDROMORPHONE HYDROCHLORIDE 1 MG: 1 INJECTION, SOLUTION INTRAMUSCULAR; INTRAVENOUS; SUBCUTANEOUS at 11:48

## 2017-01-01 RX ADMIN — LEVOTHYROXINE SODIUM 100 MCG: 100 TABLET ORAL at 08:54

## 2017-01-01 RX ADMIN — Medication 40 ML: at 14:10

## 2017-01-01 RX ADMIN — FUROSEMIDE 40 MG: 10 INJECTION, SOLUTION INTRAMUSCULAR; INTRAVENOUS at 08:48

## 2017-01-01 RX ADMIN — ESMOLOL HYDROCHLORIDE 170 MCG/KG/MIN: 10 INJECTION INTRAVENOUS at 08:25

## 2017-01-01 RX ADMIN — HYDROMORPHONE HYDROCHLORIDE 0.5 MG: 1 INJECTION, SOLUTION INTRAMUSCULAR; INTRAVENOUS; SUBCUTANEOUS at 05:06

## 2017-01-01 RX ADMIN — ONDANSETRON 4 MG: 2 INJECTION INTRAMUSCULAR; INTRAVENOUS at 21:49

## 2017-01-01 RX ADMIN — SODIUM CHLORIDE 5 MG: 9 INJECTION INTRAMUSCULAR; INTRAVENOUS; SUBCUTANEOUS at 11:06

## 2017-01-01 RX ADMIN — LEVOTHYROXINE SODIUM 100 MCG: 100 TABLET ORAL at 10:49

## 2017-01-01 RX ADMIN — Medication 10 ML: at 22:50

## 2017-01-01 RX ADMIN — MINOXIDIL 2.5 MG: 2.5 TABLET ORAL at 08:12

## 2017-01-01 RX ADMIN — HYDROMORPHONE HYDROCHLORIDE 1 MG: 1 INJECTION, SOLUTION INTRAMUSCULAR; INTRAVENOUS; SUBCUTANEOUS at 06:37

## 2017-01-01 RX ADMIN — AMIODARONE HYDROCHLORIDE 150 MG: 50 INJECTION, SOLUTION INTRAVENOUS at 13:23

## 2017-01-01 RX ADMIN — Medication 10 MG/HR: at 15:19

## 2017-01-01 RX ADMIN — INSULIN LISPRO 2 UNITS: 100 INJECTION, SOLUTION INTRAVENOUS; SUBCUTANEOUS at 19:05

## 2017-01-01 RX ADMIN — SODIUM CHLORIDE 40 MG: 9 INJECTION INTRAMUSCULAR; INTRAVENOUS; SUBCUTANEOUS at 09:02

## 2017-01-01 RX ADMIN — ONDANSETRON HYDROCHLORIDE 4 MG: 2 INJECTION, SOLUTION INTRAMUSCULAR; INTRAVENOUS at 00:38

## 2017-01-01 RX ADMIN — HEPARIN SODIUM 5000 UNITS: 5000 INJECTION, SOLUTION INTRAVENOUS; SUBCUTANEOUS at 05:06

## 2017-01-01 RX ADMIN — METOPROLOL TARTRATE 2.5 MG: 5 INJECTION INTRAVENOUS at 15:34

## 2017-01-01 RX ADMIN — HYDRALAZINE HYDROCHLORIDE 10 MG: 20 INJECTION INTRAMUSCULAR; INTRAVENOUS at 18:01

## 2017-01-01 RX ADMIN — HYDRALAZINE HYDROCHLORIDE 100 MG: 50 TABLET, FILM COATED ORAL at 09:44

## 2017-01-01 RX ADMIN — HYDROMORPHONE HYDROCHLORIDE 1 MG: 1 INJECTION, SOLUTION INTRAMUSCULAR; INTRAVENOUS; SUBCUTANEOUS at 23:07

## 2017-01-01 RX ADMIN — LEVOTHYROXINE SODIUM 100 MCG: 100 TABLET ORAL at 08:01

## 2017-01-01 RX ADMIN — METOPROLOL TARTRATE 2.5 MG: 5 INJECTION INTRAVENOUS at 04:54

## 2017-01-01 RX ADMIN — HYDROMORPHONE HYDROCHLORIDE 1 MG: 1 INJECTION, SOLUTION INTRAMUSCULAR; INTRAVENOUS; SUBCUTANEOUS at 03:44

## 2017-01-01 RX ADMIN — ONDANSETRON 4 MG: 2 SOLUTION INTRAMUSCULAR; INTRAVENOUS at 01:23

## 2017-01-01 RX ADMIN — HYDROMORPHONE HYDROCHLORIDE 0.5 MG: 1 INJECTION, SOLUTION INTRAMUSCULAR; INTRAVENOUS; SUBCUTANEOUS at 13:28

## 2017-01-01 RX ADMIN — Medication 10 ML: at 14:07

## 2017-01-01 RX ADMIN — MICONAZOLE NITRATE: 20 CREAM TOPICAL at 18:55

## 2017-01-01 RX ADMIN — ALBUTEROL SULFATE 2.5 MG: 2.5 SOLUTION RESPIRATORY (INHALATION) at 20:07

## 2017-01-01 RX ADMIN — ACETAMINOPHEN 650 MG: 325 TABLET, FILM COATED ORAL at 09:39

## 2017-01-01 RX ADMIN — Medication 10 ML: at 23:07

## 2017-01-01 RX ADMIN — SODIUM CHLORIDE: 234 INJECTION, SOLUTION, CONCENTRATE INTRAVENOUS; SUBCUTANEOUS at 12:53

## 2017-01-01 RX ADMIN — Medication 2 MG: at 06:24

## 2017-01-01 RX ADMIN — PROPOFOL 50 MG: 10 INJECTION, EMULSION INTRAVENOUS at 12:21

## 2017-01-01 RX ADMIN — PROPOFOL 10 MG: 10 INJECTION, EMULSION INTRAVENOUS at 11:33

## 2017-01-01 RX ADMIN — DEXTROSE: 20 INJECTION, SOLUTION INTRAVENOUS at 20:12

## 2017-01-01 RX ADMIN — PROPOFOL 50 MCG/KG/MIN: 10 INJECTION, EMULSION INTRAVENOUS at 22:24

## 2017-01-01 RX ADMIN — FLUCONAZOLE 400 MG: 100 TABLET ORAL at 10:16

## 2017-01-01 RX ADMIN — AMIODARONE HYDROCHLORIDE 400 MG: 200 TABLET ORAL at 08:14

## 2017-01-01 RX ADMIN — SODIUM CHLORIDE 5 MG: 9 INJECTION INTRAMUSCULAR; INTRAVENOUS; SUBCUTANEOUS at 01:44

## 2017-01-01 RX ADMIN — Medication 10 ML: at 22:29

## 2017-01-01 RX ADMIN — HYDROMORPHONE HYDROCHLORIDE 0.5 MG: 1 INJECTION, SOLUTION INTRAMUSCULAR; INTRAVENOUS; SUBCUTANEOUS at 20:32

## 2017-01-01 RX ADMIN — Medication 10 ML: at 06:04

## 2017-01-01 RX ADMIN — Medication 4 MG: at 16:22

## 2017-01-01 RX ADMIN — Medication 7.5 MG/HR: at 17:03

## 2017-01-01 RX ADMIN — HYDROMORPHONE HYDROCHLORIDE 0.5 MG: 1 INJECTION, SOLUTION INTRAMUSCULAR; INTRAVENOUS; SUBCUTANEOUS at 09:41

## 2017-01-01 RX ADMIN — MICONAZOLE NITRATE: 20 CREAM TOPICAL at 18:30

## 2017-01-01 RX ADMIN — Medication 7.5 MG/HR: at 09:56

## 2017-01-01 RX ADMIN — CEFEPIME 2 G: 2 INJECTION, POWDER, FOR SOLUTION INTRAVENOUS at 12:47

## 2017-01-01 RX ADMIN — Medication 2 MG: at 14:34

## 2017-01-01 RX ADMIN — POLYETHYLENE GLYCOL 3350 17 G: 17 POWDER, FOR SOLUTION ORAL at 09:26

## 2017-01-01 RX ADMIN — CALCIUM GLUCONATE: 94 INJECTION, SOLUTION INTRAVENOUS at 18:31

## 2017-01-01 RX ADMIN — Medication 10 ML: at 15:38

## 2017-01-01 RX ADMIN — METOPROLOL TARTRATE 2.5 MG: 5 INJECTION INTRAVENOUS at 04:03

## 2017-01-01 RX ADMIN — ONDANSETRON 4 MG: 2 SOLUTION INTRAMUSCULAR; INTRAVENOUS at 16:29

## 2017-01-01 RX ADMIN — DOCUSATE SODIUM 100 MG: 100 CAPSULE, LIQUID FILLED ORAL at 18:39

## 2017-01-01 RX ADMIN — ALBUTEROL SULFATE 2.5 MG: 2.5 SOLUTION RESPIRATORY (INHALATION) at 14:32

## 2017-01-01 RX ADMIN — Medication 7 MG/HR: at 02:35

## 2017-01-01 RX ADMIN — ACETAMINOPHEN 650 MG: 325 TABLET, FILM COATED ORAL at 02:53

## 2017-01-01 RX ADMIN — PROPOFOL 50 MCG/KG/MIN: 10 INJECTION, EMULSION INTRAVENOUS at 19:46

## 2017-01-01 RX ADMIN — HEPARIN SODIUM 5000 UNITS: 1000 INJECTION, SOLUTION INTRAVENOUS; SUBCUTANEOUS at 16:23

## 2017-01-01 RX ADMIN — Medication 10 MG/HR: at 00:10

## 2017-01-01 RX ADMIN — IPRATROPIUM BROMIDE 0.5 MG: 0.5 SOLUTION RESPIRATORY (INHALATION) at 11:51

## 2017-01-01 RX ADMIN — ESMOLOL HYDROCHLORIDE 80 MCG/KG/MIN: 10 INJECTION INTRAVENOUS at 07:43

## 2017-01-01 RX ADMIN — HYDRALAZINE HYDROCHLORIDE 10 MG: 20 INJECTION INTRAMUSCULAR; INTRAVENOUS at 12:13

## 2017-01-01 RX ADMIN — HYDRALAZINE HYDROCHLORIDE 25 MG: 50 TABLET, FILM COATED ORAL at 21:14

## 2017-01-01 RX ADMIN — Medication 10 ML: at 16:42

## 2017-01-01 RX ADMIN — HYDROMORPHONE HYDROCHLORIDE 0.5 MG: 1 INJECTION, SOLUTION INTRAMUSCULAR; INTRAVENOUS; SUBCUTANEOUS at 16:40

## 2017-01-01 RX ADMIN — Medication 10 ML: at 17:44

## 2017-01-01 RX ADMIN — CEFAZOLIN SODIUM 2 G: 1 INJECTION, POWDER, FOR SOLUTION INTRAMUSCULAR; INTRAVENOUS at 15:54

## 2017-01-01 RX ADMIN — HYDROMORPHONE HYDROCHLORIDE 1 MG: 1 INJECTION, SOLUTION INTRAMUSCULAR; INTRAVENOUS; SUBCUTANEOUS at 13:37

## 2017-01-01 RX ADMIN — HYDROMORPHONE HYDROCHLORIDE 0.5 MG: 1 INJECTION, SOLUTION INTRAMUSCULAR; INTRAVENOUS; SUBCUTANEOUS at 19:14

## 2017-01-01 RX ADMIN — HEPARIN SODIUM 5000 UNITS: 5000 INJECTION, SOLUTION INTRAVENOUS; SUBCUTANEOUS at 05:19

## 2017-01-01 RX ADMIN — ALBUTEROL SULFATE 2.5 MG: 2.5 SOLUTION RESPIRATORY (INHALATION) at 00:19

## 2017-01-01 RX ADMIN — Medication 10 ML: at 14:10

## 2017-01-01 RX ADMIN — ONDANSETRON 4 MG: 2 SOLUTION INTRAMUSCULAR; INTRAVENOUS at 21:13

## 2017-01-01 RX ADMIN — HYDROMORPHONE HYDROCHLORIDE 2 MG: 1 INJECTION, SOLUTION INTRAMUSCULAR; INTRAVENOUS; SUBCUTANEOUS at 20:41

## 2017-01-01 RX ADMIN — SODIUM CHLORIDE: 9 INJECTION, SOLUTION INTRAVENOUS at 11:23

## 2017-01-01 RX ADMIN — HYDROMORPHONE HYDROCHLORIDE 1 MG: 1 INJECTION, SOLUTION INTRAMUSCULAR; INTRAVENOUS; SUBCUTANEOUS at 04:38

## 2017-01-01 RX ADMIN — HYDROMORPHONE HYDROCHLORIDE 0.5 MG: 1 INJECTION, SOLUTION INTRAMUSCULAR; INTRAVENOUS; SUBCUTANEOUS at 14:14

## 2017-01-01 RX ADMIN — BUPROPION HYDROCHLORIDE 150 MG: 150 TABLET, FILM COATED, EXTENDED RELEASE ORAL at 08:54

## 2017-01-01 RX ADMIN — IPRATROPIUM BROMIDE 0.5 MG: 0.5 SOLUTION RESPIRATORY (INHALATION) at 09:35

## 2017-01-01 RX ADMIN — Medication 10 ML: at 18:20

## 2017-01-01 RX ADMIN — RETINOL, ERGOCALCIFEROL, .ALPHA.-TOCOPHEROL ACETATE, DL-, PHYTONADIONE, ASCORBIC ACID, NIACINAMIDE, RIBOFLAVIN 5-PHOSPHATE SODIUM, THIAMINE HYDROCHLORIDE, PYRIDOXINE HYDROCHLORIDE, DEXPANTHENOL, BIOTIN, FOLIC ACID, AND CYANOCOBALAMIN: KIT at 18:36

## 2017-01-01 RX ADMIN — HEPARIN SODIUM 2000 UNITS: 1000 INJECTION, SOLUTION INTRAVENOUS; SUBCUTANEOUS at 15:05

## 2017-01-01 RX ADMIN — Medication 10 ML: at 02:42

## 2017-01-01 RX ADMIN — INSULIN LISPRO 3 UNITS: 100 INJECTION, SOLUTION INTRAVENOUS; SUBCUTANEOUS at 05:20

## 2017-01-01 RX ADMIN — LEVALBUTEROL HYDROCHLORIDE 1.25 MG: 1.25 SOLUTION RESPIRATORY (INHALATION) at 20:21

## 2017-01-01 RX ADMIN — INSULIN LISPRO 2 UNITS: 100 INJECTION, SOLUTION INTRAVENOUS; SUBCUTANEOUS at 06:47

## 2017-01-01 RX ADMIN — Medication 10 ML: at 09:32

## 2017-01-01 RX ADMIN — MIDAZOLAM HYDROCHLORIDE 1 MG: 1 INJECTION INTRAMUSCULAR; INTRAVENOUS at 16:03

## 2017-01-01 RX ADMIN — FENTANYL CITRATE 50 MCG: 50 INJECTION, SOLUTION INTRAMUSCULAR; INTRAVENOUS at 08:35

## 2017-01-01 RX ADMIN — Medication 10 ML: at 16:17

## 2017-01-01 RX ADMIN — PANTOPRAZOLE SODIUM 40 MG: 40 TABLET, DELAYED RELEASE ORAL at 08:37

## 2017-01-01 RX ADMIN — AMLODIPINE BESYLATE 10 MG: 5 TABLET ORAL at 09:24

## 2017-01-01 RX ADMIN — INSULIN LISPRO 2 UNITS: 100 INJECTION, SOLUTION INTRAVENOUS; SUBCUTANEOUS at 12:46

## 2017-01-01 RX ADMIN — Medication 2.5 MG/HR: at 18:10

## 2017-01-01 RX ADMIN — Medication 10 ML: at 12:05

## 2017-01-01 RX ADMIN — ACETAMINOPHEN 650 MG: 325 TABLET, FILM COATED ORAL at 21:01

## 2017-01-01 RX ADMIN — HYDROMORPHONE HYDROCHLORIDE 0.5 MG: 1 INJECTION, SOLUTION INTRAMUSCULAR; INTRAVENOUS; SUBCUTANEOUS at 06:08

## 2017-01-01 RX ADMIN — LABETALOL HYDROCHLORIDE: 5 INJECTION, SOLUTION INTRAVENOUS at 17:53

## 2017-01-01 RX ADMIN — VANCOMYCIN HYDROCHLORIDE 1250 MG: 10 INJECTION, POWDER, LYOPHILIZED, FOR SOLUTION INTRAVENOUS at 06:18

## 2017-01-01 RX ADMIN — ATORVASTATIN CALCIUM 80 MG: 40 TABLET, FILM COATED ORAL at 09:19

## 2017-01-01 RX ADMIN — SODIUM CHLORIDE 100 ML/HR: 450 INJECTION, SOLUTION INTRAVENOUS at 04:08

## 2017-01-01 RX ADMIN — SODIUM BICARBONATE 2 ML: 0.2 INJECTION, SOLUTION INTRAVENOUS at 11:04

## 2017-01-01 RX ADMIN — INSULIN LISPRO 2 UNITS: 100 INJECTION, SOLUTION INTRAVENOUS; SUBCUTANEOUS at 06:03

## 2017-01-01 RX ADMIN — HYDRALAZINE HYDROCHLORIDE 100 MG: 50 TABLET, FILM COATED ORAL at 08:54

## 2017-01-01 RX ADMIN — IPRATROPIUM BROMIDE 0.5 MG: 0.5 SOLUTION RESPIRATORY (INHALATION) at 11:42

## 2017-01-01 RX ADMIN — PANTOPRAZOLE SODIUM 40 MG: 40 TABLET, DELAYED RELEASE ORAL at 09:20

## 2017-01-01 RX ADMIN — IPRATROPIUM BROMIDE 0.5 MG: 0.5 SOLUTION RESPIRATORY (INHALATION) at 07:16

## 2017-01-01 RX ADMIN — ONDANSETRON 4 MG: 2 SOLUTION INTRAMUSCULAR; INTRAVENOUS at 07:57

## 2017-01-01 RX ADMIN — Medication 8 MG/HR: at 09:54

## 2017-01-01 RX ADMIN — Medication 10 ML: at 09:25

## 2017-01-01 RX ADMIN — Medication 10 MG/HR: at 06:17

## 2017-01-01 RX ADMIN — IPRATROPIUM BROMIDE 0.5 MG: 0.5 SOLUTION RESPIRATORY (INHALATION) at 16:30

## 2017-01-01 RX ADMIN — CEFEPIME 2 G: 2 INJECTION, POWDER, FOR SOLUTION INTRAVENOUS at 12:16

## 2017-01-01 RX ADMIN — AMLODIPINE BESYLATE 10 MG: 5 TABLET ORAL at 08:45

## 2017-01-01 RX ADMIN — POLYETHYLENE GLYCOL 3350 17 G: 17 POWDER, FOR SOLUTION ORAL at 08:05

## 2017-01-01 RX ADMIN — Medication 10 ML: at 14:59

## 2017-01-01 RX ADMIN — AMIODARONE HYDROCHLORIDE 400 MG: 200 TABLET ORAL at 09:00

## 2017-01-01 RX ADMIN — POLYETHYLENE GLYCOL 3350 17 G: 17 POWDER, FOR SOLUTION ORAL at 09:03

## 2017-01-01 RX ADMIN — PROPOFOL 40 MCG/KG/MIN: 10 INJECTION, EMULSION INTRAVENOUS at 19:34

## 2017-01-01 RX ADMIN — PANTOPRAZOLE SODIUM 40 MG: 40 TABLET, DELAYED RELEASE ORAL at 16:30

## 2017-01-01 RX ADMIN — HEPARIN SODIUM 5000 UNITS: 5000 INJECTION, SOLUTION INTRAVENOUS; SUBCUTANEOUS at 14:10

## 2017-01-01 RX ADMIN — HYDROMORPHONE HYDROCHLORIDE 1 MG: 1 INJECTION, SOLUTION INTRAMUSCULAR; INTRAVENOUS; SUBCUTANEOUS at 02:34

## 2017-01-01 RX ADMIN — SODIUM CHLORIDE 5 MG: 9 INJECTION INTRAMUSCULAR; INTRAVENOUS; SUBCUTANEOUS at 21:06

## 2017-01-01 RX ADMIN — HYDROMORPHONE HYDROCHLORIDE 0.5 MG: 1 INJECTION, SOLUTION INTRAMUSCULAR; INTRAVENOUS; SUBCUTANEOUS at 00:36

## 2017-01-01 RX ADMIN — IPRATROPIUM BROMIDE 0.5 MG: 0.5 SOLUTION RESPIRATORY (INHALATION) at 15:24

## 2017-01-01 RX ADMIN — HEPARIN SODIUM 5000 UNITS: 5000 INJECTION, SOLUTION INTRAVENOUS; SUBCUTANEOUS at 14:45

## 2017-01-01 RX ADMIN — AMLODIPINE BESYLATE 10 MG: 5 TABLET ORAL at 09:44

## 2017-01-01 RX ADMIN — PROPOFOL 50 MCG/KG/MIN: 10 INJECTION, EMULSION INTRAVENOUS at 14:54

## 2017-01-01 RX ADMIN — Medication 2.5 MG/HR: at 10:14

## 2017-01-01 RX ADMIN — Medication 13 MG/HR: at 09:44

## 2017-01-01 RX ADMIN — POTASSIUM CHLORIDE 20 MEQ: 400 INJECTION, SOLUTION INTRAVENOUS at 10:47

## 2017-01-01 RX ADMIN — IPRATROPIUM BROMIDE 0.5 MG: 0.5 SOLUTION RESPIRATORY (INHALATION) at 21:32

## 2017-01-01 RX ADMIN — CHLORHEXIDINE GLUCONATE 15 ML: 1.2 RINSE ORAL at 10:00

## 2017-01-01 RX ADMIN — MICONAZOLE NITRATE: 20 CREAM TOPICAL at 08:13

## 2017-01-01 RX ADMIN — METOPROLOL TARTRATE 2.5 MG: 5 INJECTION INTRAVENOUS at 20:25

## 2017-01-01 RX ADMIN — CHLORHEXIDINE GLUCONATE 15 ML: 1.2 RINSE ORAL at 08:58

## 2017-01-01 RX ADMIN — INSULIN LISPRO 2 UNITS: 100 INJECTION, SOLUTION INTRAVENOUS; SUBCUTANEOUS at 05:57

## 2017-01-01 RX ADMIN — SODIUM CHLORIDE 100 ML/HR: 900 INJECTION, SOLUTION INTRAVENOUS at 02:49

## 2017-01-01 RX ADMIN — AMLODIPINE BESYLATE 10 MG: 5 TABLET ORAL at 08:37

## 2017-01-01 RX ADMIN — Medication 10 ML: at 21:50

## 2017-01-01 RX ADMIN — FUROSEMIDE 40 MG: 10 INJECTION, SOLUTION INTRAMUSCULAR; INTRAVENOUS at 08:59

## 2017-01-01 RX ADMIN — LEVOTHYROXINE SODIUM 100 MCG: 100 TABLET ORAL at 08:37

## 2017-01-01 RX ADMIN — ASCORBIC ACID: 500 INJECTION, SOLUTION INTRAMUSCULAR; INTRAVENOUS; SUBCUTANEOUS at 18:18

## 2017-01-01 RX ADMIN — ALPRAZOLAM 0.5 MG: 0.5 TABLET ORAL at 11:01

## 2017-01-01 RX ADMIN — ALBUMIN (HUMAN) 12.5 G: 0.25 INJECTION, SOLUTION INTRAVENOUS at 08:56

## 2017-01-01 RX ADMIN — BUSPIRONE HYDROCHLORIDE 5 MG: 5 TABLET ORAL at 18:54

## 2017-01-01 RX ADMIN — AMIODARONE HYDROCHLORIDE 0.5 MG/MIN: 50 INJECTION, SOLUTION INTRAVENOUS at 17:42

## 2017-01-01 RX ADMIN — LIDOCAINE HYDROCHLORIDE 10 ML: 10 INJECTION INFILTRATION; PERINEURAL at 11:00

## 2017-01-01 RX ADMIN — BUSPIRONE HYDROCHLORIDE 5 MG: 5 TABLET ORAL at 18:19

## 2017-01-01 RX ADMIN — Medication 10 ML: at 16:31

## 2017-01-01 RX ADMIN — ESMOLOL HYDROCHLORIDE 110 MCG/KG/MIN: 10 INJECTION INTRAVENOUS at 22:12

## 2017-01-01 RX ADMIN — Medication 7.5 MG/HR: at 18:17

## 2017-01-01 RX ADMIN — Medication 10 ML: at 13:55

## 2017-01-01 RX ADMIN — Medication 10 ML: at 11:10

## 2017-01-01 RX ADMIN — PANTOPRAZOLE SODIUM 40 MG: 40 TABLET, DELAYED RELEASE ORAL at 17:54

## 2017-01-01 RX ADMIN — PROPOFOL 50 MG: 10 INJECTION, EMULSION INTRAVENOUS at 12:22

## 2017-01-01 RX ADMIN — Medication 10 ML: at 06:00

## 2017-01-01 RX ADMIN — IPRATROPIUM BROMIDE 0.5 MG: 0.5 SOLUTION RESPIRATORY (INHALATION) at 19:31

## 2017-01-01 RX ADMIN — Medication 5 MG/HR: at 10:52

## 2017-01-01 RX ADMIN — HYDRALAZINE HYDROCHLORIDE 10 MG: 20 INJECTION INTRAMUSCULAR; INTRAVENOUS at 13:10

## 2017-01-01 RX ADMIN — SODIUM CHLORIDE 5 MG: 9 INJECTION INTRAMUSCULAR; INTRAVENOUS; SUBCUTANEOUS at 02:57

## 2017-01-01 RX ADMIN — Medication 5 MG/HR: at 14:55

## 2017-01-01 RX ADMIN — HYDROMORPHONE HYDROCHLORIDE 1 MG: 1 INJECTION, SOLUTION INTRAMUSCULAR; INTRAVENOUS; SUBCUTANEOUS at 16:12

## 2017-01-01 RX ADMIN — PANTOPRAZOLE SODIUM 40 MG: 40 TABLET, DELAYED RELEASE ORAL at 09:26

## 2017-01-01 RX ADMIN — HYDRALAZINE HYDROCHLORIDE 100 MG: 50 TABLET, FILM COATED ORAL at 22:20

## 2017-01-01 RX ADMIN — ENALAPRILAT 1.25 MG: 2.5 INJECTION INTRAVENOUS at 05:55

## 2017-01-01 RX ADMIN — SODIUM CHLORIDE 100 ML/HR: 900 INJECTION, SOLUTION INTRAVENOUS at 20:53

## 2017-01-01 RX ADMIN — CLONIDINE HYDROCHLORIDE 0.1 MG: 0.1 TABLET ORAL at 18:39

## 2017-01-01 RX ADMIN — Medication 20 ML: at 18:36

## 2017-01-01 RX ADMIN — VANCOMYCIN HYDROCHLORIDE 2000 MG: 10 INJECTION, POWDER, LYOPHILIZED, FOR SOLUTION INTRAVENOUS at 11:02

## 2017-01-01 RX ADMIN — AMIODARONE HYDROCHLORIDE 0.5 MG/MIN: 50 INJECTION, SOLUTION INTRAVENOUS at 02:06

## 2017-01-01 RX ADMIN — LABETALOL HYDROCHLORIDE 20 MG: 5 INJECTION, SOLUTION INTRAVENOUS at 22:12

## 2017-01-01 RX ADMIN — IPRATROPIUM BROMIDE AND ALBUTEROL SULFATE 3 ML: .5; 3 SOLUTION RESPIRATORY (INHALATION) at 16:33

## 2017-01-01 RX ADMIN — Medication 10 ML: at 16:01

## 2017-01-01 RX ADMIN — PROPOFOL 50 MCG/KG/MIN: 10 INJECTION, EMULSION INTRAVENOUS at 05:38

## 2017-01-01 RX ADMIN — ONDANSETRON 4 MG: 2 SOLUTION INTRAMUSCULAR; INTRAVENOUS at 15:57

## 2017-01-01 RX ADMIN — Medication 10 ML: at 15:30

## 2017-01-01 RX ADMIN — SODIUM CHLORIDE 40 MG: 9 INJECTION INTRAMUSCULAR; INTRAVENOUS; SUBCUTANEOUS at 08:37

## 2017-01-01 RX ADMIN — Medication 10 ML: at 13:39

## 2017-01-01 RX ADMIN — INSULIN LISPRO 2 UNITS: 100 INJECTION, SOLUTION INTRAVENOUS; SUBCUTANEOUS at 17:58

## 2017-01-01 RX ADMIN — HYDROMORPHONE HYDROCHLORIDE 1 MG: 1 INJECTION, SOLUTION INTRAMUSCULAR; INTRAVENOUS; SUBCUTANEOUS at 13:39

## 2017-01-01 RX ADMIN — POLYETHYLENE GLYCOL 3350 17 G: 17 POWDER, FOR SOLUTION ORAL at 09:33

## 2017-01-01 RX ADMIN — LEVOTHYROXINE SODIUM 100 MCG: 100 TABLET ORAL at 17:54

## 2017-01-01 RX ADMIN — HYDROMORPHONE HYDROCHLORIDE 1 MG: 1 INJECTION, SOLUTION INTRAMUSCULAR; INTRAVENOUS; SUBCUTANEOUS at 17:52

## 2017-01-01 RX ADMIN — ROCURONIUM BROMIDE 10 MG: 10 INJECTION, SOLUTION INTRAVENOUS at 07:52

## 2017-01-01 RX ADMIN — AMIODARONE HYDROCHLORIDE 400 MG: 200 TABLET ORAL at 21:28

## 2017-01-01 RX ADMIN — PROPOFOL 50 MCG/KG/MIN: 10 INJECTION, EMULSION INTRAVENOUS at 04:04

## 2017-01-01 RX ADMIN — BUSPIRONE HYDROCHLORIDE 5 MG: 5 TABLET ORAL at 11:22

## 2017-01-01 RX ADMIN — CLONIDINE HYDROCHLORIDE 0.1 MG: 0.1 TABLET ORAL at 09:23

## 2017-01-01 RX ADMIN — Medication 40 ML: at 05:07

## 2017-01-01 RX ADMIN — Medication 10 MG/HR: at 23:47

## 2017-01-01 RX ADMIN — Medication 10 ML: at 06:17

## 2017-01-01 RX ADMIN — BUSPIRONE HYDROCHLORIDE 7.5 MG: 5 TABLET ORAL at 17:05

## 2017-01-01 RX ADMIN — HYDRALAZINE HYDROCHLORIDE 100 MG: 50 TABLET, FILM COATED ORAL at 20:27

## 2017-01-01 RX ADMIN — IPRATROPIUM BROMIDE 0.5 MG: 0.5 SOLUTION RESPIRATORY (INHALATION) at 12:20

## 2017-01-01 RX ADMIN — BUSPIRONE HYDROCHLORIDE 5 MG: 5 TABLET ORAL at 08:45

## 2017-01-01 RX ADMIN — Medication 10 ML: at 21:00

## 2017-01-01 RX ADMIN — ALBUMIN (HUMAN) 50 G: 0.25 INJECTION, SOLUTION INTRAVENOUS at 07:32

## 2017-01-01 RX ADMIN — HYDRALAZINE HYDROCHLORIDE 10 MG: 20 INJECTION INTRAMUSCULAR; INTRAVENOUS at 15:50

## 2017-01-01 RX ADMIN — ALBUTEROL SULFATE 2.5 MG: 2.5 SOLUTION RESPIRATORY (INHALATION) at 00:38

## 2017-01-01 RX ADMIN — Medication 4.5 MG/HR: at 20:56

## 2017-01-01 RX ADMIN — Medication 10 ML: at 18:01

## 2017-01-01 RX ADMIN — PROPOFOL 50 MCG/KG/MIN: 10 INJECTION, EMULSION INTRAVENOUS at 07:17

## 2017-01-01 RX ADMIN — PANTOPRAZOLE SODIUM 40 MG: 40 TABLET, DELAYED RELEASE ORAL at 17:25

## 2017-01-01 RX ADMIN — Medication 20 ML: at 13:10

## 2017-01-01 RX ADMIN — LEVALBUTEROL HYDROCHLORIDE 1.25 MG: 1.25 SOLUTION RESPIRATORY (INHALATION) at 19:31

## 2017-01-01 RX ADMIN — Medication 30 ML: at 19:55

## 2017-01-01 RX ADMIN — Medication 15 MG/HR: at 22:36

## 2017-01-01 RX ADMIN — HYDROMORPHONE HYDROCHLORIDE 1 MG: 1 INJECTION, SOLUTION INTRAMUSCULAR; INTRAVENOUS; SUBCUTANEOUS at 13:31

## 2017-01-01 RX ADMIN — Medication 20 ML: at 17:25

## 2017-01-01 RX ADMIN — DEXTROSE MONOHYDRATE 25 ML: 25 INJECTION, SOLUTION INTRAVENOUS at 01:47

## 2017-01-01 RX ADMIN — INSULIN LISPRO 2 UNITS: 100 INJECTION, SOLUTION INTRAVENOUS; SUBCUTANEOUS at 00:00

## 2017-01-01 RX ADMIN — INSULIN LISPRO 2 UNITS: 100 INJECTION, SOLUTION INTRAVENOUS; SUBCUTANEOUS at 13:13

## 2017-01-01 RX ADMIN — SODIUM CHLORIDE, SODIUM LACTATE, POTASSIUM CHLORIDE, CALCIUM CHLORIDE: 600; 310; 30; 20 INJECTION, SOLUTION INTRAVENOUS at 07:48

## 2017-01-01 RX ADMIN — RETINOL, ERGOCALCIFEROL, .ALPHA.-TOCOPHEROL ACETATE, DL-, PHYTONADIONE, ASCORBIC ACID, NIACINAMIDE, RIBOFLAVIN 5-PHOSPHATE SODIUM, THIAMINE HYDROCHLORIDE, PYRIDOXINE HYDROCHLORIDE, DEXPANTHENOL, BIOTIN, FOLIC ACID, AND CYANOCOBALAMIN: KIT at 19:03

## 2017-01-01 RX ADMIN — PROPOFOL 30 MCG/KG/MIN: 10 INJECTION, EMULSION INTRAVENOUS at 14:03

## 2017-01-01 RX ADMIN — HYDRALAZINE HYDROCHLORIDE 100 MG: 50 TABLET, FILM COATED ORAL at 08:07

## 2017-01-01 RX ADMIN — LEVALBUTEROL HYDROCHLORIDE 1.25 MG: 1.25 SOLUTION RESPIRATORY (INHALATION) at 07:47

## 2017-01-01 RX ADMIN — HYDROMORPHONE HYDROCHLORIDE 0.5 MG: 1 INJECTION, SOLUTION INTRAMUSCULAR; INTRAVENOUS; SUBCUTANEOUS at 12:44

## 2017-01-01 RX ADMIN — ATORVASTATIN CALCIUM 80 MG: 40 TABLET, FILM COATED ORAL at 10:17

## 2017-01-01 RX ADMIN — CEFAZOLIN 2 G: 10 INJECTION, POWDER, FOR SOLUTION INTRAVENOUS; PARENTERAL at 02:05

## 2017-01-01 RX ADMIN — MINOXIDIL 2.5 MG: 2.5 TABLET ORAL at 08:01

## 2017-01-01 RX ADMIN — HEPARIN SODIUM 5000 UNITS: 5000 INJECTION, SOLUTION INTRAVENOUS; SUBCUTANEOUS at 06:36

## 2017-01-01 RX ADMIN — ESMOLOL HYDROCHLORIDE 50 MCG/KG/MIN: 10 INJECTION INTRAVENOUS at 17:58

## 2017-01-01 RX ADMIN — Medication 10 ML: at 14:25

## 2017-01-01 RX ADMIN — INSULIN LISPRO 2 UNITS: 100 INJECTION, SOLUTION INTRAVENOUS; SUBCUTANEOUS at 18:00

## 2017-01-01 RX ADMIN — AMIODARONE HYDROCHLORIDE 400 MG: 200 TABLET ORAL at 08:54

## 2017-01-01 RX ADMIN — POTASSIUM CHLORIDE 20 MEQ: 400 INJECTION, SOLUTION INTRAVENOUS at 11:51

## 2017-01-01 RX ADMIN — Medication 10 ML: at 05:29

## 2017-01-01 RX ADMIN — ESMOLOL HYDROCHLORIDE 50 MCG/KG/MIN: 10 INJECTION INTRAVENOUS at 19:49

## 2017-01-01 RX ADMIN — FENTANYL CITRATE 50 MCG: 50 INJECTION, SOLUTION INTRAMUSCULAR; INTRAVENOUS at 16:03

## 2017-01-01 RX ADMIN — HYDROMORPHONE HYDROCHLORIDE 1 MG: 1 INJECTION, SOLUTION INTRAMUSCULAR; INTRAVENOUS; SUBCUTANEOUS at 11:31

## 2017-01-01 RX ADMIN — HEPARIN SODIUM 5000 UNITS: 5000 INJECTION, SOLUTION INTRAVENOUS; SUBCUTANEOUS at 15:31

## 2017-01-01 RX ADMIN — INSULIN LISPRO 2 UNITS: 100 INJECTION, SOLUTION INTRAVENOUS; SUBCUTANEOUS at 18:18

## 2017-01-01 RX ADMIN — LIDOCAINE HYDROCHLORIDE 30 ML: 10 INJECTION INFILTRATION; PERINEURAL at 12:00

## 2017-01-01 RX ADMIN — IPRATROPIUM BROMIDE 0.5 MG: 0.5 SOLUTION RESPIRATORY (INHALATION) at 19:25

## 2017-01-01 RX ADMIN — HYDROMORPHONE HYDROCHLORIDE 1 MG: 1 INJECTION, SOLUTION INTRAMUSCULAR; INTRAVENOUS; SUBCUTANEOUS at 01:01

## 2017-01-01 RX ADMIN — IPRATROPIUM BROMIDE 0.5 MG: 0.5 SOLUTION RESPIRATORY (INHALATION) at 15:29

## 2017-01-01 RX ADMIN — HYDROMORPHONE HYDROCHLORIDE 1 MG: 1 INJECTION, SOLUTION INTRAMUSCULAR; INTRAVENOUS; SUBCUTANEOUS at 07:57

## 2017-01-01 RX ADMIN — HYDROMORPHONE HYDROCHLORIDE 2 MG: 1 INJECTION, SOLUTION INTRAMUSCULAR; INTRAVENOUS; SUBCUTANEOUS at 08:05

## 2017-01-01 RX ADMIN — POTASSIUM CHLORIDE 20 MEQ: 400 INJECTION, SOLUTION INTRAVENOUS at 08:56

## 2017-01-01 RX ADMIN — HEPARIN SODIUM 5000 UNITS: 5000 INJECTION, SOLUTION INTRAVENOUS; SUBCUTANEOUS at 06:00

## 2017-01-01 RX ADMIN — HYDROMORPHONE HYDROCHLORIDE 1 MG: 1 INJECTION, SOLUTION INTRAMUSCULAR; INTRAVENOUS; SUBCUTANEOUS at 17:48

## 2017-01-01 RX ADMIN — LABETALOL HYDROCHLORIDE 20 MG: 5 INJECTION, SOLUTION INTRAVENOUS at 01:14

## 2017-01-01 RX ADMIN — ALBUMIN HUMAN 250 ML: 50 SOLUTION INTRAVENOUS at 09:13

## 2017-01-01 RX ADMIN — POTASSIUM CHLORIDE 20 MEQ: 400 INJECTION, SOLUTION INTRAVENOUS at 11:00

## 2017-01-01 RX ADMIN — Medication 10 MG/HR: at 18:04

## 2017-01-01 RX ADMIN — RETINOL, ERGOCALCIFEROL, .ALPHA.-TOCOPHEROL ACETATE, DL-, PHYTONADIONE, ASCORBIC ACID, NIACINAMIDE, RIBOFLAVIN 5-PHOSPHATE SODIUM, THIAMINE HYDROCHLORIDE, PYRIDOXINE HYDROCHLORIDE, DEXPANTHENOL, BIOTIN, FOLIC ACID, AND CYANOCOBALAMIN: KIT at 18:45

## 2017-01-01 RX ADMIN — Medication 10 ML: at 05:21

## 2017-01-01 RX ADMIN — IPRATROPIUM BROMIDE 0.5 MG: 0.5 SOLUTION RESPIRATORY (INHALATION) at 15:20

## 2017-01-01 RX ADMIN — HYDRALAZINE HYDROCHLORIDE 100 MG: 50 TABLET, FILM COATED ORAL at 19:52

## 2017-01-01 RX ADMIN — LEVALBUTEROL HYDROCHLORIDE 1.25 MG: 1.25 SOLUTION RESPIRATORY (INHALATION) at 19:25

## 2017-01-01 RX ADMIN — IPRATROPIUM BROMIDE 0.5 MG: 0.5 SOLUTION RESPIRATORY (INHALATION) at 16:24

## 2017-01-01 RX ADMIN — INSULIN LISPRO 2 UNITS: 100 INJECTION, SOLUTION INTRAVENOUS; SUBCUTANEOUS at 00:37

## 2017-01-01 RX ADMIN — ALBUTEROL SULFATE 2.5 MG: 2.5 SOLUTION RESPIRATORY (INHALATION) at 01:34

## 2017-01-01 RX ADMIN — MINOXIDIL 2.5 MG: 2.5 TABLET ORAL at 08:07

## 2017-01-01 RX ADMIN — METOPROLOL TARTRATE 2.5 MG: 5 INJECTION INTRAVENOUS at 23:49

## 2017-01-01 RX ADMIN — POTASSIUM CHLORIDE 20 MEQ: 400 INJECTION, SOLUTION INTRAVENOUS at 12:05

## 2017-01-01 RX ADMIN — ENOXAPARIN SODIUM 40 MG: 40 INJECTION SUBCUTANEOUS at 07:34

## 2017-01-01 RX ADMIN — IPRATROPIUM BROMIDE 0.5 MG: 0.5 SOLUTION RESPIRATORY (INHALATION) at 11:59

## 2017-01-01 RX ADMIN — INSULIN LISPRO 3 UNITS: 100 INJECTION, SOLUTION INTRAVENOUS; SUBCUTANEOUS at 12:31

## 2017-01-01 RX ADMIN — SODIUM BICARBONATE: 84 INJECTION, SOLUTION INTRAVENOUS at 13:27

## 2017-01-01 RX ADMIN — CHLORHEXIDINE GLUCONATE 15 ML: 1.2 RINSE ORAL at 09:03

## 2017-01-01 RX ADMIN — INSULIN LISPRO 2 UNITS: 100 INJECTION, SOLUTION INTRAVENOUS; SUBCUTANEOUS at 18:03

## 2017-01-01 RX ADMIN — Medication 10 ML: at 17:10

## 2017-01-01 RX ADMIN — LEVALBUTEROL HYDROCHLORIDE 1.25 MG: 1.25 SOLUTION RESPIRATORY (INHALATION) at 12:37

## 2017-01-01 RX ADMIN — IPRATROPIUM BROMIDE 0.5 MG: 0.5 SOLUTION RESPIRATORY (INHALATION) at 07:29

## 2017-01-01 RX ADMIN — LABETALOL HYDROCHLORIDE 20 MG: 5 INJECTION, SOLUTION INTRAVENOUS at 13:02

## 2017-01-01 RX ADMIN — METOPROLOL TARTRATE 2.5 MG: 5 INJECTION INTRAVENOUS at 04:21

## 2017-01-01 RX ADMIN — Medication 10 ML: at 22:24

## 2017-01-01 RX ADMIN — IPRATROPIUM BROMIDE 0.5 MG: 0.5 SOLUTION RESPIRATORY (INHALATION) at 15:12

## 2017-01-01 RX ADMIN — Medication 10 ML: at 20:45

## 2017-01-01 RX ADMIN — Medication 10 ML: at 18:04

## 2017-01-01 RX ADMIN — LIDOCAINE HYDROCHLORIDE 20 MG: 20 INJECTION, SOLUTION EPIDURAL; INFILTRATION; INTRACAUDAL; PERINEURAL at 07:52

## 2017-01-01 RX ADMIN — HYDRALAZINE HYDROCHLORIDE 25 MG: 50 TABLET, FILM COATED ORAL at 21:22

## 2017-01-01 RX ADMIN — Medication 10 ML: at 06:43

## 2017-01-01 RX ADMIN — INSULIN LISPRO 2 UNITS: 100 INJECTION, SOLUTION INTRAVENOUS; SUBCUTANEOUS at 05:19

## 2017-01-01 RX ADMIN — HYDROMORPHONE HYDROCHLORIDE 0.5 MG: 1 INJECTION, SOLUTION INTRAMUSCULAR; INTRAVENOUS; SUBCUTANEOUS at 09:24

## 2017-01-01 RX ADMIN — METOPROLOL TARTRATE 25 MG: 25 TABLET ORAL at 10:22

## 2017-01-01 RX ADMIN — POTASSIUM CHLORIDE 20 MEQ: 400 INJECTION, SOLUTION INTRAVENOUS at 10:06

## 2017-01-01 RX ADMIN — Medication 15 MG/HR: at 15:00

## 2017-01-01 RX ADMIN — Medication 14 MG/HR: at 23:12

## 2017-01-01 RX ADMIN — Medication 10 ML: at 17:57

## 2017-01-01 RX ADMIN — Medication 8 MG/HR: at 08:38

## 2017-01-01 RX ADMIN — INSULIN LISPRO 2 UNITS: 100 INJECTION, SOLUTION INTRAVENOUS; SUBCUTANEOUS at 00:19

## 2017-01-01 RX ADMIN — HYDRALAZINE HYDROCHLORIDE 10 MG: 20 INJECTION INTRAMUSCULAR; INTRAVENOUS at 23:10

## 2017-01-01 RX ADMIN — IPRATROPIUM BROMIDE 0.5 MG: 0.5 SOLUTION RESPIRATORY (INHALATION) at 07:19

## 2017-01-01 RX ADMIN — PROPOFOL 50 MCG/KG/MIN: 10 INJECTION, EMULSION INTRAVENOUS at 23:54

## 2017-01-01 RX ADMIN — IPRATROPIUM BROMIDE 0.5 MG: 0.5 SOLUTION RESPIRATORY (INHALATION) at 08:05

## 2017-01-01 RX ADMIN — LEVOTHYROXINE SODIUM 100 MCG: 100 TABLET ORAL at 07:40

## 2017-01-01 RX ADMIN — Medication 10 MG/HR: at 20:40

## 2017-01-01 RX ADMIN — Medication 12 MG/HR: at 12:16

## 2017-01-01 RX ADMIN — Medication 20 ML: at 18:55

## 2017-01-01 RX ADMIN — HYDROMORPHONE HYDROCHLORIDE 1 MG: 1 INJECTION, SOLUTION INTRAMUSCULAR; INTRAVENOUS; SUBCUTANEOUS at 23:09

## 2017-01-01 RX ADMIN — SODIUM CHLORIDE 5 MG: 9 INJECTION INTRAMUSCULAR; INTRAVENOUS; SUBCUTANEOUS at 03:00

## 2017-01-01 RX ADMIN — ONDANSETRON HYDROCHLORIDE 4 MG: 2 INJECTION, SOLUTION INTRAMUSCULAR; INTRAVENOUS at 18:46

## 2017-01-01 RX ADMIN — Medication 15 MG/HR: at 02:25

## 2017-01-01 RX ADMIN — HYDROMORPHONE HYDROCHLORIDE 0.5 MG: 1 INJECTION, SOLUTION INTRAMUSCULAR; INTRAVENOUS; SUBCUTANEOUS at 21:13

## 2017-01-01 RX ADMIN — Medication 10 MG/HR: at 23:20

## 2017-01-01 RX ADMIN — RETINOL, ERGOCALCIFEROL, .ALPHA.-TOCOPHEROL ACETATE, DL-, PHYTONADIONE, ASCORBIC ACID, NIACINAMIDE, RIBOFLAVIN 5-PHOSPHATE SODIUM, THIAMINE HYDROCHLORIDE, PYRIDOXINE HYDROCHLORIDE, DEXPANTHENOL, BIOTIN, FOLIC ACID, AND CYANOCOBALAMIN: KIT at 19:34

## 2017-01-01 RX ADMIN — Medication 8 MG/HR: at 06:59

## 2017-01-01 RX ADMIN — SODIUM CHLORIDE 0.5 MCG/KG/HR: 900 INJECTION, SOLUTION INTRAVENOUS at 11:42

## 2017-01-01 RX ADMIN — Medication 10 ML: at 15:07

## 2017-01-01 RX ADMIN — SODIUM CHLORIDE 5 MG: 9 INJECTION INTRAMUSCULAR; INTRAVENOUS; SUBCUTANEOUS at 18:36

## 2017-01-01 RX ADMIN — LABETALOL HYDROCHLORIDE 20 MG: 5 INJECTION, SOLUTION INTRAVENOUS at 03:23

## 2017-01-01 RX ADMIN — CLONAZEPAM 0.5 MG: 0.5 TABLET ORAL at 18:19

## 2017-01-01 RX ADMIN — POTASSIUM CHLORIDE 20 MEQ: 400 INJECTION, SOLUTION INTRAVENOUS at 09:45

## 2017-01-01 RX ADMIN — PROPOFOL 50 MCG/KG/MIN: 10 INJECTION, EMULSION INTRAVENOUS at 18:29

## 2017-01-01 RX ADMIN — HEPARIN SODIUM 5000 UNITS: 5000 INJECTION, SOLUTION INTRAVENOUS; SUBCUTANEOUS at 14:23

## 2017-01-01 RX ADMIN — PROPOFOL 30 MCG/KG/MIN: 10 INJECTION, EMULSION INTRAVENOUS at 13:32

## 2017-01-01 RX ADMIN — HYDRALAZINE HYDROCHLORIDE 10 MG: 20 INJECTION INTRAMUSCULAR; INTRAVENOUS at 20:32

## 2017-01-01 RX ADMIN — Medication 10 ML: at 17:19

## 2017-01-01 RX ADMIN — BUSPIRONE HYDROCHLORIDE 5 MG: 5 TABLET ORAL at 08:01

## 2017-01-01 RX ADMIN — LEVALBUTEROL HYDROCHLORIDE 1.25 MG: 1.25 SOLUTION RESPIRATORY (INHALATION) at 15:33

## 2017-01-01 RX ADMIN — LEVALBUTEROL HYDROCHLORIDE 1.25 MG: 1.25 SOLUTION RESPIRATORY (INHALATION) at 15:25

## 2017-01-01 RX ADMIN — FUROSEMIDE 80 MG: 10 INJECTION, SOLUTION INTRAMUSCULAR; INTRAVENOUS at 10:57

## 2017-01-01 RX ADMIN — PANTOPRAZOLE SODIUM 40 MG: 40 TABLET, DELAYED RELEASE ORAL at 15:51

## 2017-01-01 RX ADMIN — METOPROLOL TARTRATE 2.5 MG: 5 INJECTION INTRAVENOUS at 20:36

## 2017-01-01 RX ADMIN — HEPARIN SODIUM 5000 UNITS: 5000 INJECTION, SOLUTION INTRAVENOUS; SUBCUTANEOUS at 21:07

## 2017-01-01 RX ADMIN — ESMOLOL HYDROCHLORIDE 200 MCG/KG/MIN: 10 INJECTION INTRAVENOUS at 01:07

## 2017-01-01 RX ADMIN — CLONIDINE HYDROCHLORIDE 0.1 MG: 0.1 TABLET ORAL at 17:05

## 2017-01-01 RX ADMIN — Medication 10 ML: at 23:42

## 2017-01-01 RX ADMIN — INSULIN LISPRO 2 UNITS: 100 INJECTION, SOLUTION INTRAVENOUS; SUBCUTANEOUS at 06:25

## 2017-01-01 RX ADMIN — MUPIROCIN: 20 OINTMENT TOPICAL at 20:29

## 2017-01-01 RX ADMIN — HYDROMORPHONE HYDROCHLORIDE 1 MG: 1 INJECTION, SOLUTION INTRAMUSCULAR; INTRAVENOUS; SUBCUTANEOUS at 00:00

## 2017-01-01 RX ADMIN — Medication 10 MG/HR: at 16:04

## 2017-01-01 RX ADMIN — INSULIN LISPRO 2 UNITS: 100 INJECTION, SOLUTION INTRAVENOUS; SUBCUTANEOUS at 00:07

## 2017-01-01 RX ADMIN — ESMOLOL HYDROCHLORIDE 75 MCG/KG/MIN: 10 INJECTION INTRAVENOUS at 00:39

## 2017-01-01 RX ADMIN — LEVALBUTEROL HYDROCHLORIDE 1.25 MG: 1.25 SOLUTION RESPIRATORY (INHALATION) at 15:21

## 2017-01-01 RX ADMIN — HYDRALAZINE HYDROCHLORIDE 25 MG: 50 TABLET, FILM COATED ORAL at 10:15

## 2017-01-01 RX ADMIN — Medication 10 ML: at 14:06

## 2017-01-01 RX ADMIN — DEXTROSE MONOHYDRATE 5 MG/HR: 50 INJECTION, SOLUTION INTRAVENOUS at 01:51

## 2017-01-01 RX ADMIN — PANTOPRAZOLE SODIUM 40 MG: 40 TABLET, DELAYED RELEASE ORAL at 19:52

## 2017-01-01 RX ADMIN — SODIUM CHLORIDE 5 MG: 9 INJECTION INTRAMUSCULAR; INTRAVENOUS; SUBCUTANEOUS at 17:58

## 2017-01-01 RX ADMIN — Medication 10 MG/HR: at 18:47

## 2017-01-01 RX ADMIN — HYDROMORPHONE HYDROCHLORIDE 0.5 MG: 1 INJECTION, SOLUTION INTRAMUSCULAR; INTRAVENOUS; SUBCUTANEOUS at 02:57

## 2017-01-01 RX ADMIN — HYDROMORPHONE HYDROCHLORIDE 0.5 MG: 1 INJECTION, SOLUTION INTRAMUSCULAR; INTRAVENOUS; SUBCUTANEOUS at 11:13

## 2017-01-01 RX ADMIN — ESMOLOL HYDROCHLORIDE 170 MCG/KG/MIN: 10 INJECTION INTRAVENOUS at 09:52

## 2017-01-01 RX ADMIN — HYDRALAZINE HYDROCHLORIDE 100 MG: 50 TABLET, FILM COATED ORAL at 14:29

## 2017-01-01 RX ADMIN — POTASSIUM CHLORIDE 20 MEQ: 400 INJECTION, SOLUTION INTRAVENOUS at 12:03

## 2017-01-01 RX ADMIN — PROPOFOL 30 MCG/KG/MIN: 10 INJECTION, EMULSION INTRAVENOUS at 02:14

## 2017-01-01 RX ADMIN — PROPOFOL 30 MCG/KG/MIN: 10 INJECTION, EMULSION INTRAVENOUS at 20:48

## 2017-01-01 RX ADMIN — SODIUM CHLORIDE, PRESERVATIVE FREE 300 UNITS: 5 INJECTION INTRAVENOUS at 15:18

## 2017-01-01 RX ADMIN — HYDRALAZINE HYDROCHLORIDE 20 MG: 20 INJECTION INTRAMUSCULAR; INTRAVENOUS at 20:18

## 2017-01-01 RX ADMIN — INSULIN LISPRO 3 UNITS: 100 INJECTION, SOLUTION INTRAVENOUS; SUBCUTANEOUS at 18:46

## 2017-01-01 RX ADMIN — HYDROMORPHONE HYDROCHLORIDE 1 MG: 1 INJECTION, SOLUTION INTRAMUSCULAR; INTRAVENOUS; SUBCUTANEOUS at 19:19

## 2017-01-01 RX ADMIN — IPRATROPIUM BROMIDE 0.5 MG: 0.5 SOLUTION RESPIRATORY (INHALATION) at 19:44

## 2017-01-01 RX ADMIN — Medication 10 ML: at 14:29

## 2017-01-01 RX ADMIN — Medication 10 ML: at 04:06

## 2017-01-01 RX ADMIN — AMIODARONE HYDROCHLORIDE 400 MG: 200 TABLET ORAL at 08:00

## 2017-01-01 RX ADMIN — PROPOFOL 50 MCG/KG/MIN: 10 INJECTION, EMULSION INTRAVENOUS at 22:56

## 2017-01-01 RX ADMIN — Medication 20 ML: at 21:12

## 2017-01-01 RX ADMIN — LABETALOL HYDROCHLORIDE 20 MG: 5 INJECTION, SOLUTION INTRAVENOUS at 10:30

## 2017-01-01 RX ADMIN — METOPROLOL TARTRATE 2.5 MG: 5 INJECTION INTRAVENOUS at 08:19

## 2017-01-01 RX ADMIN — SODIUM CHLORIDE 40 MG: 9 INJECTION INTRAMUSCULAR; INTRAVENOUS; SUBCUTANEOUS at 08:59

## 2017-01-01 RX ADMIN — MINOXIDIL 2.5 MG: 2.5 TABLET ORAL at 08:37

## 2017-01-01 RX ADMIN — HYDROMORPHONE HYDROCHLORIDE 1 MG: 1 INJECTION, SOLUTION INTRAMUSCULAR; INTRAVENOUS; SUBCUTANEOUS at 13:18

## 2017-01-01 RX ADMIN — ONDANSETRON 4 MG: 2 SOLUTION INTRAMUSCULAR; INTRAVENOUS at 09:48

## 2017-01-01 RX ADMIN — Medication 10 ML: at 22:03

## 2017-01-01 RX ADMIN — IPRATROPIUM BROMIDE 0.5 MG: 0.5 SOLUTION RESPIRATORY (INHALATION) at 19:08

## 2017-01-01 RX ADMIN — PROPOFOL 50 MCG/KG/MIN: 10 INJECTION, EMULSION INTRAVENOUS at 03:00

## 2017-01-01 RX ADMIN — FLUCONAZOLE 400 MG: 100 TABLET ORAL at 08:36

## 2017-01-01 RX ADMIN — SODIUM CHLORIDE 40 MG: 9 INJECTION INTRAMUSCULAR; INTRAVENOUS; SUBCUTANEOUS at 09:33

## 2017-01-01 RX ADMIN — ONDANSETRON 4 MG: 2 SOLUTION INTRAMUSCULAR; INTRAVENOUS at 17:46

## 2017-01-01 RX ADMIN — HYDRALAZINE HYDROCHLORIDE 100 MG: 50 TABLET, FILM COATED ORAL at 18:08

## 2017-01-01 RX ADMIN — AMIODARONE HYDROCHLORIDE 400 MG: 200 TABLET ORAL at 13:01

## 2017-01-01 RX ADMIN — CEFEPIME 2 G: 2 INJECTION, POWDER, FOR SOLUTION INTRAVENOUS at 13:09

## 2017-01-01 RX ADMIN — SODIUM CHLORIDE: 9 INJECTION, SOLUTION INTRAVENOUS at 15:40

## 2017-01-01 RX ADMIN — Medication 30 ML: at 19:56

## 2017-01-01 RX ADMIN — Medication 9 MG/HR: at 19:11

## 2017-01-01 RX ADMIN — FENTANYL CITRATE 50 MCG: 50 INJECTION, SOLUTION INTRAMUSCULAR; INTRAVENOUS at 12:16

## 2017-01-01 RX ADMIN — CHLORHEXIDINE GLUCONATE 15 ML: 1.2 RINSE ORAL at 20:34

## 2017-01-01 RX ADMIN — HYDROMORPHONE HYDROCHLORIDE 2 MG: 1 INJECTION, SOLUTION INTRAMUSCULAR; INTRAVENOUS; SUBCUTANEOUS at 16:50

## 2017-01-01 RX ADMIN — ONDANSETRON HYDROCHLORIDE 4 MG: 2 INJECTION, SOLUTION INTRAMUSCULAR; INTRAVENOUS at 10:41

## 2017-01-01 RX ADMIN — PANTOPRAZOLE SODIUM 40 MG: 40 TABLET, DELAYED RELEASE ORAL at 18:42

## 2017-01-01 RX ADMIN — ENOXAPARIN SODIUM 40 MG: 40 INJECTION SUBCUTANEOUS at 09:04

## 2017-01-01 RX ADMIN — PROTAMINE SULFATE 40 MG: 10 INJECTION, SOLUTION INTRAVENOUS at 17:09

## 2017-01-01 RX ADMIN — Medication 10 ML: at 15:39

## 2017-01-01 RX ADMIN — HEPARIN SODIUM 5000 UNITS: 5000 INJECTION, SOLUTION INTRAVENOUS; SUBCUTANEOUS at 05:28

## 2017-01-01 RX ADMIN — LIDOCAINE HYDROCHLORIDE 30 ML: 10 INJECTION, SOLUTION INFILTRATION; PERINEURAL at 12:00

## 2017-01-01 RX ADMIN — SODIUM CHLORIDE 40 MG: 9 INJECTION INTRAMUSCULAR; INTRAVENOUS; SUBCUTANEOUS at 08:28

## 2017-01-01 RX ADMIN — LEVOTHYROXINE SODIUM 100 MCG: 100 TABLET ORAL at 07:53

## 2017-01-01 RX ADMIN — MICONAZOLE NITRATE: 20 CREAM TOPICAL at 09:49

## 2017-01-01 RX ADMIN — FENTANYL CITRATE 25 MCG: 50 INJECTION, SOLUTION INTRAMUSCULAR; INTRAVENOUS at 15:46

## 2017-01-01 RX ADMIN — HEPARIN SODIUM 5000 UNITS: 5000 INJECTION, SOLUTION INTRAVENOUS; SUBCUTANEOUS at 21:43

## 2017-01-01 RX ADMIN — MICONAZOLE NITRATE: 20 CREAM TOPICAL at 08:37

## 2017-01-01 RX ADMIN — Medication 10 ML: at 13:19

## 2017-01-01 RX ADMIN — Medication 7.5 MG/HR: at 18:09

## 2017-01-01 RX ADMIN — ACETAMINOPHEN 650 MG: 325 TABLET, FILM COATED ORAL at 01:59

## 2017-01-01 RX ADMIN — Medication 10 ML: at 14:58

## 2017-01-01 RX ADMIN — Medication 10 ML: at 05:41

## 2017-01-01 RX ADMIN — HYDROMORPHONE HYDROCHLORIDE 0.5 MG: 1 INJECTION, SOLUTION INTRAMUSCULAR; INTRAVENOUS; SUBCUTANEOUS at 14:23

## 2017-01-01 RX ADMIN — Medication 10 MG/HR: at 23:29

## 2017-01-01 RX ADMIN — Medication 10 ML: at 05:37

## 2017-01-01 RX ADMIN — HYDROMORPHONE HYDROCHLORIDE 1 MG: 1 INJECTION, SOLUTION INTRAMUSCULAR; INTRAVENOUS; SUBCUTANEOUS at 21:07

## 2017-01-01 RX ADMIN — METOPROLOL TARTRATE 2.5 MG: 5 INJECTION INTRAVENOUS at 21:22

## 2017-01-01 RX ADMIN — AMIODARONE HYDROCHLORIDE 150 MG: 50 INJECTION, SOLUTION INTRAVENOUS at 08:52

## 2017-01-01 RX ADMIN — Medication 10 ML: at 17:36

## 2017-01-01 RX ADMIN — POTASSIUM CHLORIDE 20 MEQ: 400 INJECTION, SOLUTION INTRAVENOUS at 15:32

## 2017-01-01 RX ADMIN — HYDROMORPHONE HYDROCHLORIDE 0.5 MG: 1 INJECTION, SOLUTION INTRAMUSCULAR; INTRAVENOUS; SUBCUTANEOUS at 03:53

## 2017-01-01 RX ADMIN — BUSPIRONE HYDROCHLORIDE 7.5 MG: 5 TABLET ORAL at 19:22

## 2017-01-01 RX ADMIN — CHLORHEXIDINE GLUCONATE 15 ML: 1.2 RINSE ORAL at 08:21

## 2017-01-01 RX ADMIN — SODIUM CHLORIDE 5 MG: 9 INJECTION INTRAMUSCULAR; INTRAVENOUS; SUBCUTANEOUS at 20:32

## 2017-01-01 RX ADMIN — Medication 10 ML: at 19:03

## 2017-01-01 RX ADMIN — METOPROLOL TARTRATE 2.5 MG: 5 INJECTION INTRAVENOUS at 03:23

## 2017-01-01 RX ADMIN — SODIUM CHLORIDE 5 MG: 9 INJECTION INTRAMUSCULAR; INTRAVENOUS; SUBCUTANEOUS at 22:23

## 2017-01-01 RX ADMIN — HYDROMORPHONE HYDROCHLORIDE 1 MG: 1 INJECTION, SOLUTION INTRAMUSCULAR; INTRAVENOUS; SUBCUTANEOUS at 10:34

## 2017-01-01 RX ADMIN — SODIUM CHLORIDE 5 MG: 9 INJECTION INTRAMUSCULAR; INTRAVENOUS; SUBCUTANEOUS at 07:54

## 2017-01-01 RX ADMIN — Medication 10 ML: at 13:13

## 2017-01-01 RX ADMIN — CLONIDINE HYDROCHLORIDE 0.1 MG: 0.1 TABLET ORAL at 19:22

## 2017-01-01 RX ADMIN — HYDRALAZINE HYDROCHLORIDE 100 MG: 50 TABLET, FILM COATED ORAL at 20:24

## 2017-01-01 RX ADMIN — ASCORBIC ACID: 500 INJECTION, SOLUTION INTRAMUSCULAR; INTRAVENOUS; SUBCUTANEOUS at 19:16

## 2017-01-01 RX ADMIN — Medication 10 ML: at 18:33

## 2017-01-01 RX ADMIN — HYDROMORPHONE HYDROCHLORIDE 2 MG: 1 INJECTION, SOLUTION INTRAMUSCULAR; INTRAVENOUS; SUBCUTANEOUS at 09:00

## 2017-01-01 RX ADMIN — HYDROMORPHONE HYDROCHLORIDE 0.5 MG: 1 INJECTION, SOLUTION INTRAMUSCULAR; INTRAVENOUS; SUBCUTANEOUS at 15:30

## 2017-01-01 RX ADMIN — Medication 10 ML: at 05:06

## 2017-01-01 RX ADMIN — MINOXIDIL 2.5 MG: 2.5 TABLET ORAL at 09:40

## 2017-01-01 RX ADMIN — Medication 10 ML: at 06:57

## 2017-01-01 RX ADMIN — ALBUTEROL SULFATE 2.5 MG: 2.5 SOLUTION RESPIRATORY (INHALATION) at 07:40

## 2017-01-01 RX ADMIN — IPRATROPIUM BROMIDE AND ALBUTEROL SULFATE 3 ML: .5; 3 SOLUTION RESPIRATORY (INHALATION) at 14:07

## 2017-01-01 RX ADMIN — MIDAZOLAM HYDROCHLORIDE 1 MG: 1 INJECTION, SOLUTION INTRAMUSCULAR; INTRAVENOUS at 16:22

## 2017-01-01 RX ADMIN — INSULIN LISPRO 2 UNITS: 100 INJECTION, SOLUTION INTRAVENOUS; SUBCUTANEOUS at 06:00

## 2017-01-01 RX ADMIN — SODIUM CHLORIDE 100 ML/HR: 900 INJECTION, SOLUTION INTRAVENOUS at 18:15

## 2017-01-01 RX ADMIN — HYDRALAZINE HYDROCHLORIDE 10 MG: 20 INJECTION INTRAMUSCULAR; INTRAVENOUS at 08:11

## 2017-01-01 RX ADMIN — FENTANYL CITRATE 50 MCG: 50 INJECTION, SOLUTION INTRAMUSCULAR; INTRAVENOUS at 16:13

## 2017-01-01 RX ADMIN — LABETALOL HYDROCHLORIDE 20 MG: 5 INJECTION, SOLUTION INTRAVENOUS at 10:08

## 2017-01-01 RX ADMIN — LEVALBUTEROL HYDROCHLORIDE 1.25 MG: 1.25 SOLUTION RESPIRATORY (INHALATION) at 07:15

## 2017-01-01 RX ADMIN — HYDROMORPHONE HYDROCHLORIDE 1 MG: 1 INJECTION, SOLUTION INTRAMUSCULAR; INTRAVENOUS; SUBCUTANEOUS at 22:47

## 2017-01-01 RX ADMIN — HEPARIN SODIUM 5000 UNITS: 5000 INJECTION, SOLUTION INTRAVENOUS; SUBCUTANEOUS at 13:55

## 2017-01-01 RX ADMIN — HYDROMORPHONE HYDROCHLORIDE 0.5 MG: 1 INJECTION, SOLUTION INTRAMUSCULAR; INTRAVENOUS; SUBCUTANEOUS at 00:38

## 2017-01-01 RX ADMIN — HYDROMORPHONE HYDROCHLORIDE 1 MG: 1 INJECTION, SOLUTION INTRAMUSCULAR; INTRAVENOUS; SUBCUTANEOUS at 02:47

## 2017-01-01 RX ADMIN — HYDROMORPHONE HYDROCHLORIDE 0.5 MG: 1 INJECTION, SOLUTION INTRAMUSCULAR; INTRAVENOUS; SUBCUTANEOUS at 00:52

## 2017-01-01 RX ADMIN — METOPROLOL TARTRATE 2.5 MG: 5 INJECTION INTRAVENOUS at 00:24

## 2017-01-01 RX ADMIN — METOPROLOL TARTRATE 2.5 MG: 5 INJECTION INTRAVENOUS at 16:55

## 2017-01-01 RX ADMIN — PANTOPRAZOLE SODIUM 40 MG: 40 TABLET, DELAYED RELEASE ORAL at 18:19

## 2017-01-01 RX ADMIN — Medication 10 ML: at 17:22

## 2017-01-01 RX ADMIN — HYDRALAZINE HYDROCHLORIDE 10 MG: 20 INJECTION INTRAMUSCULAR; INTRAVENOUS at 05:19

## 2017-01-01 RX ADMIN — PROPOFOL 50 MCG/KG/MIN: 10 INJECTION, EMULSION INTRAVENOUS at 10:13

## 2017-01-01 RX ADMIN — Medication 2 MG: at 09:03

## 2017-01-01 RX ADMIN — LEVOTHYROXINE SODIUM ANHYDROUS 100 MCG: 100 INJECTION, POWDER, LYOPHILIZED, FOR SOLUTION INTRAVENOUS at 11:10

## 2017-01-01 RX ADMIN — VANCOMYCIN HYDROCHLORIDE 1250 MG: 10 INJECTION, POWDER, LYOPHILIZED, FOR SOLUTION INTRAVENOUS at 17:17

## 2017-01-01 RX ADMIN — Medication 15 MG/HR: at 04:23

## 2017-01-01 RX ADMIN — HYDROMORPHONE HYDROCHLORIDE 1 MG: 1 INJECTION, SOLUTION INTRAMUSCULAR; INTRAVENOUS; SUBCUTANEOUS at 01:58

## 2017-01-01 RX ADMIN — HYDROMORPHONE HYDROCHLORIDE 1 MG: 1 INJECTION, SOLUTION INTRAMUSCULAR; INTRAVENOUS; SUBCUTANEOUS at 20:12

## 2017-01-01 RX ADMIN — HYDROMORPHONE HYDROCHLORIDE 0.5 MG: 1 INJECTION, SOLUTION INTRAMUSCULAR; INTRAVENOUS; SUBCUTANEOUS at 14:49

## 2017-01-01 RX ADMIN — SODIUM CHLORIDE 40 MG: 9 INJECTION INTRAMUSCULAR; INTRAVENOUS; SUBCUTANEOUS at 08:14

## 2017-01-01 RX ADMIN — BUSPIRONE HYDROCHLORIDE 7.5 MG: 5 TABLET ORAL at 18:43

## 2017-01-01 RX ADMIN — SODIUM CHLORIDE 5 MG: 9 INJECTION INTRAMUSCULAR; INTRAVENOUS; SUBCUTANEOUS at 03:45

## 2017-01-01 RX ADMIN — ONDANSETRON 4 MG: 2 INJECTION INTRAMUSCULAR; INTRAVENOUS at 16:12

## 2017-01-01 RX ADMIN — ONDANSETRON HYDROCHLORIDE 4 MG: 2 INJECTION, SOLUTION INTRAMUSCULAR; INTRAVENOUS at 05:06

## 2017-01-01 RX ADMIN — Medication 40 ML: at 15:31

## 2017-01-01 RX ADMIN — CHLORHEXIDINE GLUCONATE 15 ML: 1.2 RINSE ORAL at 21:49

## 2017-01-01 RX ADMIN — FLUCONAZOLE 400 MG: 100 TABLET ORAL at 09:39

## 2017-01-01 RX ADMIN — SODIUM CHLORIDE 15 MG/HR: 900 INJECTION, SOLUTION INTRAVENOUS at 14:34

## 2017-01-01 RX ADMIN — DEXAMETHASONE SODIUM PHOSPHATE 6 MG: 4 INJECTION, SOLUTION INTRAMUSCULAR; INTRAVENOUS at 20:12

## 2017-01-01 RX ADMIN — Medication 10 MG/HR: at 21:18

## 2017-01-01 RX ADMIN — ONDANSETRON HYDROCHLORIDE 4 MG: 2 INJECTION, SOLUTION INTRAMUSCULAR; INTRAVENOUS at 08:09

## 2017-01-01 RX ADMIN — HEPARIN SODIUM 5000 UNITS: 5000 INJECTION, SOLUTION INTRAVENOUS; SUBCUTANEOUS at 06:46

## 2017-01-01 RX ADMIN — AMLODIPINE BESYLATE 10 MG: 5 TABLET ORAL at 08:12

## 2017-01-01 RX ADMIN — INSULIN LISPRO 2 UNITS: 100 INJECTION, SOLUTION INTRAVENOUS; SUBCUTANEOUS at 13:28

## 2017-01-01 RX ADMIN — CHLORHEXIDINE GLUCONATE 15 ML: 1.2 RINSE ORAL at 20:55

## 2017-01-01 RX ADMIN — Medication 40 ML: at 04:30

## 2017-01-01 RX ADMIN — Medication 2 MG: at 21:42

## 2017-01-01 RX ADMIN — VENLAFAXINE HYDROCHLORIDE 150 MG: 150 CAPSULE, EXTENDED RELEASE ORAL at 17:07

## 2017-01-01 RX ADMIN — PHENOL 1 SPRAY: 1.4 LIQUID ORAL at 17:44

## 2017-01-01 RX ADMIN — Medication 10 ML: at 21:18

## 2017-01-01 RX ADMIN — IPRATROPIUM BROMIDE 0.5 MG: 0.5 SOLUTION RESPIRATORY (INHALATION) at 19:16

## 2017-01-01 RX ADMIN — HYDRALAZINE HYDROCHLORIDE 20 MG: 20 INJECTION INTRAMUSCULAR; INTRAVENOUS at 04:21

## 2017-01-01 RX ADMIN — Medication 10 ML: at 17:54

## 2017-01-01 RX ADMIN — FUROSEMIDE 40 MG: 10 INJECTION, SOLUTION INTRAMUSCULAR; INTRAVENOUS at 00:50

## 2017-01-01 RX ADMIN — METOPROLOL TARTRATE 2.5 MG: 5 INJECTION INTRAVENOUS at 16:32

## 2017-01-01 RX ADMIN — ALBUTEROL SULFATE 2.5 MG: 2.5 SOLUTION RESPIRATORY (INHALATION) at 13:31

## 2017-01-01 RX ADMIN — IPRATROPIUM BROMIDE 0.5 MG: 0.5 SOLUTION RESPIRATORY (INHALATION) at 15:43

## 2017-01-01 RX ADMIN — LEVOTHYROXINE SODIUM ANHYDROUS 260 MCG: 100 INJECTION, POWDER, LYOPHILIZED, FOR SOLUTION INTRAVENOUS at 17:53

## 2017-01-01 RX ADMIN — ONDANSETRON 4 MG: 2 SOLUTION INTRAMUSCULAR; INTRAVENOUS at 12:29

## 2017-01-01 RX ADMIN — POLYETHYLENE GLYCOL 3350 17 G: 17 POWDER, FOR SOLUTION ORAL at 09:59

## 2017-01-01 RX ADMIN — INSULIN HUMAN 10 UNITS: 100 INJECTION, SOLUTION PARENTERAL at 10:49

## 2017-01-01 RX ADMIN — Medication 13 MG/HR: at 06:23

## 2017-01-01 RX ADMIN — VANCOMYCIN HYDROCHLORIDE 1250 MG: 10 INJECTION, POWDER, LYOPHILIZED, FOR SOLUTION INTRAVENOUS at 23:37

## 2017-01-01 RX ADMIN — LEVALBUTEROL HYDROCHLORIDE 1.25 MG: 1.25 SOLUTION RESPIRATORY (INHALATION) at 19:51

## 2017-01-01 RX ADMIN — AMIODARONE HYDROCHLORIDE 1 MG/MIN: 50 INJECTION, SOLUTION INTRAVENOUS at 13:23

## 2017-01-01 RX ADMIN — Medication 10 ML: at 13:23

## 2017-01-01 RX ADMIN — INSULIN LISPRO 2 UNITS: 100 INJECTION, SOLUTION INTRAVENOUS; SUBCUTANEOUS at 18:34

## 2017-01-01 RX ADMIN — ATORVASTATIN CALCIUM 80 MG: 40 TABLET, FILM COATED ORAL at 08:11

## 2017-01-01 RX ADMIN — PANTOPRAZOLE SODIUM 40 MG: 40 TABLET, DELAYED RELEASE ORAL at 16:03

## 2017-01-01 RX ADMIN — INSULIN LISPRO 3 UNITS: 100 INJECTION, SOLUTION INTRAVENOUS; SUBCUTANEOUS at 12:30

## 2017-01-01 RX ADMIN — HYDROMORPHONE HYDROCHLORIDE 1 MG: 1 INJECTION, SOLUTION INTRAMUSCULAR; INTRAVENOUS; SUBCUTANEOUS at 20:40

## 2017-01-01 RX ADMIN — Medication: at 17:58

## 2017-01-01 RX ADMIN — Medication 10 ML: at 05:32

## 2017-01-01 RX ADMIN — CHLORHEXIDINE GLUCONATE 15 ML: 1.2 RINSE ORAL at 21:34

## 2017-01-01 RX ADMIN — HYDROMORPHONE HYDROCHLORIDE 2 MG: 1 INJECTION, SOLUTION INTRAMUSCULAR; INTRAVENOUS; SUBCUTANEOUS at 02:50

## 2017-01-01 RX ADMIN — HYDROMORPHONE HYDROCHLORIDE 0.5 MG: 1 INJECTION, SOLUTION INTRAMUSCULAR; INTRAVENOUS; SUBCUTANEOUS at 11:07

## 2017-01-01 RX ADMIN — ONDANSETRON 4 MG: 2 SOLUTION INTRAMUSCULAR; INTRAVENOUS at 18:26

## 2017-01-01 RX ADMIN — SODIUM CHLORIDE 40 MG: 9 INJECTION INTRAMUSCULAR; INTRAVENOUS; SUBCUTANEOUS at 12:46

## 2017-01-01 RX ADMIN — HYDRALAZINE HYDROCHLORIDE 100 MG: 50 TABLET, FILM COATED ORAL at 08:14

## 2017-01-01 RX ADMIN — Medication 11 MG/HR: at 23:10

## 2017-01-01 RX ADMIN — PROPOFOL 30 MCG/KG/MIN: 10 INJECTION, EMULSION INTRAVENOUS at 17:37

## 2017-01-01 RX ADMIN — HEPARIN SODIUM 5000 UNITS: 5000 INJECTION, SOLUTION INTRAVENOUS; SUBCUTANEOUS at 06:41

## 2017-01-01 RX ADMIN — HYDROMORPHONE HYDROCHLORIDE 1 MG: 1 INJECTION, SOLUTION INTRAMUSCULAR; INTRAVENOUS; SUBCUTANEOUS at 06:54

## 2017-01-01 RX ADMIN — HEPARIN SODIUM 5000 UNITS: 5000 INJECTION, SOLUTION INTRAVENOUS; SUBCUTANEOUS at 22:23

## 2017-01-01 RX ADMIN — Medication 12.5 MG/HR: at 15:23

## 2017-01-01 RX ADMIN — POTASSIUM CHLORIDE 20 MEQ: 400 INJECTION, SOLUTION INTRAVENOUS at 09:53

## 2017-01-01 RX ADMIN — LEVALBUTEROL HYDROCHLORIDE 1.25 MG: 1.25 SOLUTION RESPIRATORY (INHALATION) at 19:44

## 2017-01-01 RX ADMIN — HYDRALAZINE HYDROCHLORIDE 10 MG: 20 INJECTION INTRAMUSCULAR; INTRAVENOUS at 03:32

## 2017-01-01 RX ADMIN — BUPROPION HYDROCHLORIDE 150 MG: 150 TABLET, FILM COATED, EXTENDED RELEASE ORAL at 17:54

## 2017-01-01 RX ADMIN — POLYETHYLENE GLYCOL 3350 17 G: 17 POWDER, FOR SOLUTION ORAL at 08:29

## 2017-01-01 RX ADMIN — HYDROMORPHONE HYDROCHLORIDE 1 MG: 1 INJECTION, SOLUTION INTRAMUSCULAR; INTRAVENOUS; SUBCUTANEOUS at 17:11

## 2017-01-01 RX ADMIN — HYDRALAZINE HYDROCHLORIDE 100 MG: 50 TABLET, FILM COATED ORAL at 20:07

## 2017-01-01 RX ADMIN — IPRATROPIUM BROMIDE AND ALBUTEROL SULFATE 3 ML: .5; 3 SOLUTION RESPIRATORY (INHALATION) at 11:55

## 2017-01-01 RX ADMIN — Medication 7.5 MG/HR: at 03:16

## 2017-01-01 RX ADMIN — HEPARIN SODIUM 5000 UNITS: 5000 INJECTION, SOLUTION INTRAVENOUS; SUBCUTANEOUS at 06:06

## 2017-01-01 RX ADMIN — FUROSEMIDE 40 MG: 10 INJECTION, SOLUTION INTRAMUSCULAR; INTRAVENOUS at 09:32

## 2017-01-01 RX ADMIN — CISATRACURIUM BESYLATE 2 MG: 2 INJECTION, SOLUTION INTRAVENOUS at 13:26

## 2017-01-01 RX ADMIN — IPRATROPIUM BROMIDE 0.5 MG: 0.5 SOLUTION RESPIRATORY (INHALATION) at 08:22

## 2017-01-01 RX ADMIN — LEVALBUTEROL HYDROCHLORIDE 1.25 MG: 1.25 SOLUTION RESPIRATORY (INHALATION) at 11:31

## 2017-01-01 RX ADMIN — HEPARIN SODIUM 5000 UNITS: 5000 INJECTION, SOLUTION INTRAVENOUS; SUBCUTANEOUS at 20:27

## 2017-01-01 RX ADMIN — POLYETHYLENE GLYCOL 3350 17 G: 17 POWDER, FOR SOLUTION ORAL at 10:48

## 2017-01-01 RX ADMIN — Medication 7.5 MG/HR: at 04:18

## 2017-01-01 RX ADMIN — PANTOPRAZOLE SODIUM 40 MG: 40 TABLET, DELAYED RELEASE ORAL at 16:40

## 2017-01-01 RX ADMIN — ONDANSETRON 4 MG: 2 SOLUTION INTRAMUSCULAR; INTRAVENOUS at 20:25

## 2017-01-01 RX ADMIN — CLONAZEPAM 0.5 MG: 0.5 TABLET ORAL at 17:57

## 2017-01-01 RX ADMIN — ESMOLOL HYDROCHLORIDE 50 MCG/KG/MIN: 10 INJECTION INTRAVENOUS at 07:50

## 2017-01-01 RX ADMIN — ALBUTEROL SULFATE 2.5 MG: 2.5 SOLUTION RESPIRATORY (INHALATION) at 02:13

## 2017-01-01 RX ADMIN — POTASSIUM CHLORIDE 20 MEQ: 400 INJECTION, SOLUTION INTRAVENOUS at 10:08

## 2017-01-01 RX ADMIN — Medication 4 MG: at 18:10

## 2017-01-01 RX ADMIN — AMLODIPINE BESYLATE 10 MG: 5 TABLET ORAL at 08:01

## 2017-01-01 RX ADMIN — BUSPIRONE HYDROCHLORIDE 7.5 MG: 5 TABLET ORAL at 08:30

## 2017-01-01 RX ADMIN — RACEPINEPHRINE HYDROCHLORIDE 0.5 ML: 11.25 SOLUTION RESPIRATORY (INHALATION) at 03:19

## 2017-01-01 RX ADMIN — SODIUM CHLORIDE 40 MG: 9 INJECTION INTRAMUSCULAR; INTRAVENOUS; SUBCUTANEOUS at 09:31

## 2017-01-01 RX ADMIN — Medication 10 ML: at 05:27

## 2017-01-01 RX ADMIN — Medication 30 ML: at 16:34

## 2017-01-01 RX ADMIN — HEPARIN SODIUM 5000 UNITS: 5000 INJECTION, SOLUTION INTRAVENOUS; SUBCUTANEOUS at 22:09

## 2017-01-01 RX ADMIN — Medication 10 ML: at 21:23

## 2017-01-01 RX ADMIN — METOPROLOL TARTRATE 2.5 MG: 5 INJECTION INTRAVENOUS at 11:30

## 2017-01-01 RX ADMIN — MUPIROCIN: 20 OINTMENT TOPICAL at 08:59

## 2017-01-01 RX ADMIN — Medication 10 ML: at 08:32

## 2017-01-01 RX ADMIN — Medication 10 ML: at 21:56

## 2017-01-01 RX ADMIN — ALBUMIN (HUMAN) 25 G: 12.5 INJECTION, SOLUTION INTRAVENOUS at 09:11

## 2017-01-01 RX ADMIN — CHLORHEXIDINE GLUCONATE 15 ML: 1.2 RINSE ORAL at 20:36

## 2017-01-01 RX ADMIN — HYDRALAZINE HYDROCHLORIDE 100 MG: 50 TABLET, FILM COATED ORAL at 13:55

## 2017-01-01 RX ADMIN — Medication 30 ML: at 13:39

## 2017-01-01 RX ADMIN — PROPOFOL 50 MCG/KG/MIN: 10 INJECTION, EMULSION INTRAVENOUS at 13:11

## 2017-01-01 RX ADMIN — POTASSIUM CHLORIDE 20 MEQ: 400 INJECTION, SOLUTION INTRAVENOUS at 12:04

## 2017-01-01 RX ADMIN — LORAZEPAM 1 MG: 2 INJECTION INTRAMUSCULAR; INTRAVENOUS at 18:05

## 2017-01-01 RX ADMIN — ESMOLOL HYDROCHLORIDE 100 MCG/KG/MIN: 10 INJECTION INTRAVENOUS at 22:34

## 2017-01-01 RX ADMIN — SODIUM CHLORIDE 40 MG: 9 INJECTION INTRAMUSCULAR; INTRAVENOUS; SUBCUTANEOUS at 08:32

## 2017-01-01 RX ADMIN — PROPOFOL 50 MCG/KG/MIN: 10 INJECTION, EMULSION INTRAVENOUS at 02:50

## 2017-01-01 RX ADMIN — POTASSIUM CHLORIDE 20 MEQ: 400 INJECTION, SOLUTION INTRAVENOUS at 09:39

## 2017-01-01 RX ADMIN — INSULIN LISPRO 2 UNITS: 100 INJECTION, SOLUTION INTRAVENOUS; SUBCUTANEOUS at 14:29

## 2017-01-01 RX ADMIN — AMIODARONE HYDROCHLORIDE 0.5 MG/MIN: 50 INJECTION, SOLUTION INTRAVENOUS at 23:38

## 2017-01-01 RX ADMIN — PANTOPRAZOLE SODIUM 40 MG: 40 TABLET, DELAYED RELEASE ORAL at 18:55

## 2017-01-01 RX ADMIN — SODIUM CHLORIDE 100 ML/HR: 450 INJECTION, SOLUTION INTRAVENOUS at 16:18

## 2017-01-01 RX ADMIN — LEVALBUTEROL HYDROCHLORIDE 1.25 MG: 1.25 SOLUTION RESPIRATORY (INHALATION) at 07:48

## 2017-01-01 RX ADMIN — IPRATROPIUM BROMIDE AND ALBUTEROL SULFATE 3 ML: .5; 3 SOLUTION RESPIRATORY (INHALATION) at 19:59

## 2017-01-01 RX ADMIN — HYDROMORPHONE HYDROCHLORIDE 0.5 MG: 1 INJECTION, SOLUTION INTRAMUSCULAR; INTRAVENOUS; SUBCUTANEOUS at 20:15

## 2017-01-01 RX ADMIN — HYDROMORPHONE HYDROCHLORIDE 0.5 MG: 1 INJECTION, SOLUTION INTRAMUSCULAR; INTRAVENOUS; SUBCUTANEOUS at 19:08

## 2017-01-01 RX ADMIN — Medication 10 ML: at 20:07

## 2017-01-01 RX ADMIN — ALPRAZOLAM 0.25 MG: 0.25 TABLET ORAL at 22:51

## 2017-01-01 RX ADMIN — ONDANSETRON 4 MG: 2 SOLUTION INTRAMUSCULAR; INTRAVENOUS at 21:22

## 2017-01-01 RX ADMIN — CLONAZEPAM 0.5 MG: 0.5 TABLET ORAL at 09:01

## 2017-01-01 RX ADMIN — SODIUM CHLORIDE 1000 ML: 900 INJECTION, SOLUTION INTRAVENOUS at 15:29

## 2017-01-01 RX ADMIN — Medication 10 ML: at 20:57

## 2017-01-01 RX ADMIN — HYDROMORPHONE HYDROCHLORIDE 0.5 MG: 1 INJECTION, SOLUTION INTRAMUSCULAR; INTRAVENOUS; SUBCUTANEOUS at 14:07

## 2017-01-01 RX ADMIN — IPRATROPIUM BROMIDE 0.5 MG: 0.5 SOLUTION RESPIRATORY (INHALATION) at 19:27

## 2017-01-01 RX ADMIN — Medication 10 ML: at 05:31

## 2017-01-01 RX ADMIN — HYDROMORPHONE HYDROCHLORIDE 0.5 MG: 1 INJECTION, SOLUTION INTRAMUSCULAR; INTRAVENOUS; SUBCUTANEOUS at 16:45

## 2017-01-01 RX ADMIN — Medication 6 MG/HR: at 10:24

## 2017-01-01 RX ADMIN — SODIUM CHLORIDE 5 MG: 9 INJECTION INTRAMUSCULAR; INTRAVENOUS; SUBCUTANEOUS at 20:16

## 2017-01-01 RX ADMIN — DOCUSATE SODIUM 100 MG: 100 CAPSULE, LIQUID FILLED ORAL at 10:17

## 2017-01-01 RX ADMIN — Medication 10 ML: at 00:27

## 2017-01-01 RX ADMIN — INSULIN LISPRO 2 UNITS: 100 INJECTION, SOLUTION INTRAVENOUS; SUBCUTANEOUS at 19:22

## 2017-01-01 RX ADMIN — CHLORHEXIDINE GLUCONATE 15 ML: 1.2 RINSE ORAL at 23:20

## 2017-01-01 RX ADMIN — Medication 10 MG/HR: at 10:18

## 2017-01-01 RX ADMIN — PANTOPRAZOLE SODIUM 40 MG: 40 TABLET, DELAYED RELEASE ORAL at 16:45

## 2017-01-01 RX ADMIN — PROPOFOL 30 MCG/KG/MIN: 10 INJECTION, EMULSION INTRAVENOUS at 07:33

## 2017-01-01 RX ADMIN — HEPARIN SODIUM 7000 UNITS: 1000 INJECTION, SOLUTION INTRAVENOUS; SUBCUTANEOUS at 10:11

## 2017-01-01 RX ADMIN — LEVALBUTEROL HYDROCHLORIDE 1.25 MG: 1.25 SOLUTION RESPIRATORY (INHALATION) at 12:14

## 2017-01-01 RX ADMIN — FENTANYL CITRATE 25 MCG: 50 INJECTION, SOLUTION INTRAMUSCULAR; INTRAVENOUS at 15:50

## 2017-01-01 RX ADMIN — Medication 10 MG/HR: at 05:54

## 2017-01-01 RX ADMIN — LEVALBUTEROL HYDROCHLORIDE 1.25 MG: 1.25 SOLUTION RESPIRATORY (INHALATION) at 15:24

## 2017-01-01 RX ADMIN — Medication 30 ML: at 13:09

## 2017-01-01 RX ADMIN — Medication 9 MG/HR: at 10:24

## 2017-01-01 RX ADMIN — Medication 10 ML: at 13:18

## 2017-01-01 RX ADMIN — LEVOTHYROXINE SODIUM ANHYDROUS 260 MCG: 100 INJECTION, POWDER, LYOPHILIZED, FOR SOLUTION INTRAVENOUS at 17:42

## 2017-01-01 RX ADMIN — HEPARIN SODIUM 5000 UNITS: 5000 INJECTION, SOLUTION INTRAVENOUS; SUBCUTANEOUS at 13:10

## 2017-01-01 RX ADMIN — METOPROLOL TARTRATE 2.5 MG: 5 INJECTION INTRAVENOUS at 08:36

## 2017-01-01 RX ADMIN — HYDRALAZINE HYDROCHLORIDE 20 MG: 20 INJECTION INTRAMUSCULAR; INTRAVENOUS at 12:17

## 2017-01-01 RX ADMIN — HYDRALAZINE HYDROCHLORIDE 10 MG: 20 INJECTION INTRAMUSCULAR; INTRAVENOUS at 12:16

## 2017-01-01 RX ADMIN — Medication 10 ML: at 14:00

## 2017-01-01 RX ADMIN — SODIUM CHLORIDE 5 MG: 9 INJECTION INTRAMUSCULAR; INTRAVENOUS; SUBCUTANEOUS at 09:49

## 2017-01-01 RX ADMIN — HEPARIN SODIUM 5000 UNITS: 5000 INJECTION, SOLUTION INTRAVENOUS; SUBCUTANEOUS at 05:26

## 2017-01-01 RX ADMIN — ALBUTEROL SULFATE 2.5 MG: 2.5 SOLUTION RESPIRATORY (INHALATION) at 14:15

## 2017-01-01 RX ADMIN — HYDROMORPHONE HYDROCHLORIDE 1 MG: 1 INJECTION, SOLUTION INTRAMUSCULAR; INTRAVENOUS; SUBCUTANEOUS at 16:00

## 2017-01-01 RX ADMIN — POLYETHYLENE GLYCOL 3350 17 G: 17 POWDER, FOR SOLUTION ORAL at 08:15

## 2017-01-01 RX ADMIN — ERYTHROPOIETIN 10000 UNITS: 20000 INJECTION, SOLUTION INTRAVENOUS; SUBCUTANEOUS at 19:06

## 2017-01-01 RX ADMIN — HEPARIN SODIUM 5000 UNITS: 5000 INJECTION, SOLUTION INTRAVENOUS; SUBCUTANEOUS at 13:12

## 2017-01-01 RX ADMIN — HYDRALAZINE HYDROCHLORIDE 25 MG: 50 TABLET, FILM COATED ORAL at 17:01

## 2017-01-01 RX ADMIN — HYDRALAZINE HYDROCHLORIDE 25 MG: 50 TABLET, FILM COATED ORAL at 22:01

## 2017-01-01 RX ADMIN — ERYTHROPOIETIN 10000 UNITS: 20000 INJECTION, SOLUTION INTRAVENOUS; SUBCUTANEOUS at 22:32

## 2017-01-01 RX ADMIN — Medication 40 ML: at 13:28

## 2017-01-01 RX ADMIN — MUPIROCIN: 20 OINTMENT TOPICAL at 10:23

## 2017-01-01 RX ADMIN — INSULIN LISPRO 2 UNITS: 100 INJECTION, SOLUTION INTRAVENOUS; SUBCUTANEOUS at 13:21

## 2017-01-01 RX ADMIN — HYDROMORPHONE HYDROCHLORIDE 1 MG: 1 INJECTION, SOLUTION INTRAMUSCULAR; INTRAVENOUS; SUBCUTANEOUS at 17:18

## 2017-01-01 RX ADMIN — HYDRALAZINE HYDROCHLORIDE 100 MG: 50 TABLET, FILM COATED ORAL at 17:56

## 2017-01-01 RX ADMIN — DOCUSATE SODIUM 100 MG: 100 CAPSULE, LIQUID FILLED ORAL at 19:22

## 2017-01-01 RX ADMIN — DEXTROSE: 20 INJECTION, SOLUTION INTRAVENOUS at 19:16

## 2017-01-01 RX ADMIN — CHLORHEXIDINE GLUCONATE 15 ML: 1.2 RINSE ORAL at 21:16

## 2017-01-01 RX ADMIN — HYDROMORPHONE HYDROCHLORIDE 1 MG: 1 INJECTION, SOLUTION INTRAMUSCULAR; INTRAVENOUS; SUBCUTANEOUS at 12:00

## 2017-01-01 RX ADMIN — ALBUTEROL SULFATE 2.5 MG: 2.5 SOLUTION RESPIRATORY (INHALATION) at 20:16

## 2017-01-01 RX ADMIN — SODIUM CHLORIDE 5 MG: 9 INJECTION INTRAMUSCULAR; INTRAVENOUS; SUBCUTANEOUS at 13:12

## 2017-01-01 RX ADMIN — Medication 10 ML: at 23:43

## 2017-01-01 RX ADMIN — VANCOMYCIN HYDROCHLORIDE 1250 MG: 10 INJECTION, POWDER, LYOPHILIZED, FOR SOLUTION INTRAVENOUS at 04:19

## 2017-01-01 RX ADMIN — SODIUM CHLORIDE 100 ML/HR: 450 INJECTION, SOLUTION INTRAVENOUS at 08:23

## 2017-01-01 RX ADMIN — LEVALBUTEROL HYDROCHLORIDE 1.25 MG: 1.25 SOLUTION RESPIRATORY (INHALATION) at 07:29

## 2017-01-01 RX ADMIN — ROCURONIUM BROMIDE 40 MG: 10 INJECTION, SOLUTION INTRAVENOUS at 08:06

## 2017-01-01 RX ADMIN — SPIRONOLACTONE 25 MG: 25 TABLET, FILM COATED ORAL at 11:20

## 2017-01-01 RX ADMIN — SODIUM CHLORIDE 10 MG/HR: 900 INJECTION, SOLUTION INTRAVENOUS at 15:56

## 2017-01-01 RX ADMIN — HYDRALAZINE HYDROCHLORIDE 20 MG: 20 INJECTION INTRAMUSCULAR; INTRAVENOUS at 08:40

## 2017-01-01 RX ADMIN — METOPROLOL TARTRATE 2.5 MG: 5 INJECTION INTRAVENOUS at 20:01

## 2017-01-01 RX ADMIN — Medication 10 ML: at 09:15

## 2017-01-01 RX ADMIN — SODIUM BICARBONATE 2 ML: 0.2 INJECTION, SOLUTION INTRAVENOUS at 15:18

## 2017-01-01 RX ADMIN — LEVOTHYROXINE SODIUM 100 MCG: 100 TABLET ORAL at 09:44

## 2017-01-01 RX ADMIN — SODIUM CHLORIDE 100 ML/HR: 900 INJECTION, SOLUTION INTRAVENOUS at 05:33

## 2017-01-01 RX ADMIN — SODIUM CHLORIDE 40 MG: 9 INJECTION INTRAMUSCULAR; INTRAVENOUS; SUBCUTANEOUS at 09:03

## 2017-01-01 RX ADMIN — Medication 10 MG/HR: at 02:30

## 2017-01-01 RX ADMIN — IPRATROPIUM BROMIDE 0.5 MG: 0.5 SOLUTION RESPIRATORY (INHALATION) at 12:34

## 2017-01-01 RX ADMIN — METOPROLOL TARTRATE 2.5 MG: 5 INJECTION INTRAVENOUS at 07:49

## 2017-01-01 RX ADMIN — HYDRALAZINE HYDROCHLORIDE 10 MG: 20 INJECTION INTRAMUSCULAR; INTRAVENOUS at 17:43

## 2017-01-01 RX ADMIN — PROPOFOL 50 MCG/KG/MIN: 10 INJECTION, EMULSION INTRAVENOUS at 15:15

## 2017-01-01 RX ADMIN — FLUCONAZOLE 400 MG: 100 TABLET ORAL at 08:07

## 2017-01-01 RX ADMIN — Medication 10 ML: at 15:59

## 2017-01-01 RX ADMIN — IPRATROPIUM BROMIDE 0.5 MG: 0.5 SOLUTION RESPIRATORY (INHALATION) at 15:33

## 2017-01-01 RX ADMIN — HYDROMORPHONE HYDROCHLORIDE 1 MG: 1 INJECTION, SOLUTION INTRAMUSCULAR; INTRAVENOUS; SUBCUTANEOUS at 16:43

## 2017-01-01 RX ADMIN — Medication 10 MG/HR: at 16:20

## 2017-01-01 RX ADMIN — ALBUTEROL SULFATE 2.5 MG: 2.5 SOLUTION RESPIRATORY (INHALATION) at 08:31

## 2017-01-01 RX ADMIN — HYDROMORPHONE HYDROCHLORIDE 1 MG: 1 INJECTION, SOLUTION INTRAMUSCULAR; INTRAVENOUS; SUBCUTANEOUS at 21:44

## 2017-01-01 RX ADMIN — LEVALBUTEROL HYDROCHLORIDE 1.25 MG: 1.25 SOLUTION RESPIRATORY (INHALATION) at 19:22

## 2017-01-01 RX ADMIN — IPRATROPIUM BROMIDE 0.5 MG: 0.5 SOLUTION RESPIRATORY (INHALATION) at 08:57

## 2017-01-01 RX ADMIN — SODIUM CHLORIDE 5 MG: 9 INJECTION INTRAMUSCULAR; INTRAVENOUS; SUBCUTANEOUS at 00:52

## 2017-01-01 RX ADMIN — PROPOFOL 60 MCG/KG/MIN: 10 INJECTION, EMULSION INTRAVENOUS at 15:50

## 2017-01-01 RX ADMIN — HYDRALAZINE HYDROCHLORIDE 10 MG: 20 INJECTION INTRAMUSCULAR; INTRAVENOUS at 03:46

## 2017-01-01 RX ADMIN — HYDRALAZINE HYDROCHLORIDE 20 MG: 20 INJECTION INTRAMUSCULAR; INTRAVENOUS at 11:55

## 2017-01-01 RX ADMIN — HYDROMORPHONE HYDROCHLORIDE 1 MG: 1 INJECTION, SOLUTION INTRAMUSCULAR; INTRAVENOUS; SUBCUTANEOUS at 20:28

## 2017-01-01 RX ADMIN — SODIUM CHLORIDE: 234 INJECTION, SOLUTION, CONCENTRATE INTRAVENOUS; SUBCUTANEOUS at 06:45

## 2017-01-01 RX ADMIN — AMIODARONE HYDROCHLORIDE 0.5 MG/MIN: 50 INJECTION, SOLUTION INTRAVENOUS at 15:07

## 2017-01-01 RX ADMIN — CLONAZEPAM 0.5 MG: 0.5 TABLET ORAL at 07:55

## 2017-01-01 RX ADMIN — HYDRALAZINE HYDROCHLORIDE 100 MG: 50 TABLET, FILM COATED ORAL at 21:54

## 2017-01-01 RX ADMIN — PANTOPRAZOLE SODIUM 40 MG: 40 TABLET, DELAYED RELEASE ORAL at 08:07

## 2017-01-01 RX ADMIN — POLYETHYLENE GLYCOL 3350 17 G: 17 POWDER, FOR SOLUTION ORAL at 08:40

## 2017-01-01 RX ADMIN — PROPOFOL 50 MCG/KG/MIN: 10 INJECTION, EMULSION INTRAVENOUS at 06:23

## 2017-01-01 RX ADMIN — FENTANYL CITRATE 50 MCG: 50 INJECTION, SOLUTION INTRAMUSCULAR; INTRAVENOUS at 16:42

## 2017-01-01 RX ADMIN — PROPOFOL 25 MCG/KG/MIN: 10 INJECTION, EMULSION INTRAVENOUS at 13:46

## 2017-01-01 RX ADMIN — Medication 10 ML: at 15:06

## 2017-01-01 RX ADMIN — HYDROMORPHONE HYDROCHLORIDE 0.5 MG: 1 INJECTION, SOLUTION INTRAMUSCULAR; INTRAVENOUS; SUBCUTANEOUS at 09:50

## 2017-01-01 RX ADMIN — AMLODIPINE BESYLATE 10 MG: 5 TABLET ORAL at 08:54

## 2017-01-01 RX ADMIN — HYDROMORPHONE HYDROCHLORIDE 0.5 MG: 1 INJECTION, SOLUTION INTRAMUSCULAR; INTRAVENOUS; SUBCUTANEOUS at 16:30

## 2017-01-01 RX ADMIN — UMECLIDINIUM 1 PUFF: 62.5 AEROSOL, POWDER ORAL at 09:30

## 2017-01-01 RX ADMIN — SODIUM CHLORIDE 5 MG: 9 INJECTION INTRAMUSCULAR; INTRAVENOUS; SUBCUTANEOUS at 11:13

## 2017-01-01 RX ADMIN — SODIUM CHLORIDE 100 ML/HR: 900 INJECTION, SOLUTION INTRAVENOUS at 21:20

## 2017-01-01 RX ADMIN — IPRATROPIUM BROMIDE AND ALBUTEROL SULFATE 3 ML: .5; 3 SOLUTION RESPIRATORY (INHALATION) at 11:29

## 2017-01-01 RX ADMIN — HYDRALAZINE HYDROCHLORIDE 25 MG: 50 TABLET, FILM COATED ORAL at 16:35

## 2017-01-01 RX ADMIN — Medication 10 ML: at 20:23

## 2017-01-01 RX ADMIN — AMIODARONE HYDROCHLORIDE 400 MG: 200 TABLET ORAL at 21:54

## 2017-01-01 RX ADMIN — POTASSIUM CHLORIDE 20 MEQ: 400 INJECTION, SOLUTION INTRAVENOUS at 10:00

## 2017-01-01 RX ADMIN — Medication 40 ML: at 20:28

## 2017-01-01 RX ADMIN — Medication 7 MG/HR: at 02:24

## 2017-01-01 RX ADMIN — LEVALBUTEROL HYDROCHLORIDE 1.25 MG: 1.25 SOLUTION RESPIRATORY (INHALATION) at 12:20

## 2017-01-01 RX ADMIN — Medication 10 ML: at 05:17

## 2017-01-01 RX ADMIN — HYDROMORPHONE HYDROCHLORIDE 2 MG: 1 INJECTION, SOLUTION INTRAMUSCULAR; INTRAVENOUS; SUBCUTANEOUS at 23:00

## 2017-01-01 RX ADMIN — METOPROLOL TARTRATE 2.5 MG: 5 INJECTION INTRAVENOUS at 13:10

## 2017-01-01 RX ADMIN — ACETAMINOPHEN 650 MG: 325 TABLET, FILM COATED ORAL at 09:34

## 2017-01-01 RX ADMIN — MINOXIDIL 2.5 MG: 2.5 TABLET ORAL at 08:29

## 2017-01-01 RX ADMIN — IPRATROPIUM BROMIDE 0.5 MG: 0.5 SOLUTION RESPIRATORY (INHALATION) at 15:21

## 2017-01-01 RX ADMIN — BISACODYL 10 MG: 10 SUPPOSITORY RECTAL at 12:04

## 2017-01-01 RX ADMIN — Medication 2 MG: at 16:43

## 2017-01-01 RX ADMIN — HEPARIN SODIUM 5000 UNITS: 5000 INJECTION, SOLUTION INTRAVENOUS; SUBCUTANEOUS at 14:29

## 2017-01-01 RX ADMIN — MINOXIDIL 5 MG: 2.5 TABLET ORAL at 12:03

## 2017-01-01 RX ADMIN — SODIUM CHLORIDE 100 ML/HR: 900 INJECTION, SOLUTION INTRAVENOUS at 12:10

## 2017-01-01 RX ADMIN — ESCITALOPRAM OXALATE 10 MG: 10 TABLET, FILM COATED ORAL at 08:28

## 2017-01-01 RX ADMIN — AMIODARONE HYDROCHLORIDE 400 MG: 200 TABLET ORAL at 21:19

## 2017-01-01 RX ADMIN — MUPIROCIN: 20 OINTMENT TOPICAL at 21:23

## 2017-01-01 RX ADMIN — Medication 10 ML: at 11:12

## 2017-01-01 RX ADMIN — HYDRALAZINE HYDROCHLORIDE 10 MG: 20 INJECTION INTRAMUSCULAR; INTRAVENOUS at 09:29

## 2017-01-01 RX ADMIN — LABETALOL HYDROCHLORIDE 20 MG: 5 INJECTION, SOLUTION INTRAVENOUS at 10:14

## 2017-01-01 RX ADMIN — HYDRALAZINE HYDROCHLORIDE 10 MG: 20 INJECTION INTRAMUSCULAR; INTRAVENOUS at 15:25

## 2017-01-01 RX ADMIN — Medication 10 MG/HR: at 20:58

## 2017-01-01 RX ADMIN — IPRATROPIUM BROMIDE 0.5 MG: 0.5 SOLUTION RESPIRATORY (INHALATION) at 12:37

## 2017-01-01 RX ADMIN — CEFAZOLIN SODIUM 2 G: 1 INJECTION, POWDER, FOR SOLUTION INTRAMUSCULAR; INTRAVENOUS at 08:44

## 2017-01-01 RX ADMIN — BUSPIRONE HYDROCHLORIDE 5 MG: 5 TABLET ORAL at 17:57

## 2017-01-01 RX ADMIN — SODIUM CHLORIDE 5 MG: 9 INJECTION INTRAMUSCULAR; INTRAVENOUS; SUBCUTANEOUS at 17:15

## 2017-01-01 RX ADMIN — ESMOLOL HYDROCHLORIDE 200 MCG/KG/MIN: 10 INJECTION INTRAVENOUS at 21:44

## 2017-01-01 RX ADMIN — IPRATROPIUM BROMIDE 0.5 MG: 0.5 SOLUTION RESPIRATORY (INHALATION) at 15:25

## 2017-01-01 RX ADMIN — PANTOPRAZOLE SODIUM 40 MG: 40 TABLET, DELAYED RELEASE ORAL at 17:57

## 2017-01-01 RX ADMIN — SODIUM CHLORIDE 40 MG: 9 INJECTION INTRAMUSCULAR; INTRAVENOUS; SUBCUTANEOUS at 08:26

## 2017-01-01 RX ADMIN — CHLORHEXIDINE GLUCONATE 15 ML: 1.2 RINSE ORAL at 08:13

## 2017-01-01 RX ADMIN — HYDRALAZINE HYDROCHLORIDE 20 MG: 20 INJECTION INTRAMUSCULAR; INTRAVENOUS at 18:17

## 2017-01-01 RX ADMIN — FENTANYL CITRATE 50 MCG: 50 INJECTION, SOLUTION INTRAMUSCULAR; INTRAVENOUS at 09:20

## 2017-01-01 RX ADMIN — IPRATROPIUM BROMIDE 0.5 MG: 0.5 SOLUTION RESPIRATORY (INHALATION) at 20:48

## 2017-01-01 RX ADMIN — Medication 10 ML: at 14:31

## 2017-01-01 RX ADMIN — PROPOFOL 50 MCG/KG/MIN: 10 INJECTION, EMULSION INTRAVENOUS at 02:29

## 2017-01-01 RX ADMIN — SODIUM CHLORIDE, SODIUM LACTATE, POTASSIUM CHLORIDE, AND CALCIUM CHLORIDE 25 ML/HR: 600; 310; 30; 20 INJECTION, SOLUTION INTRAVENOUS at 07:02

## 2017-01-01 RX ADMIN — POLYETHYLENE GLYCOL 3350 17 G: 17 POWDER, FOR SOLUTION ORAL at 10:32

## 2017-01-01 RX ADMIN — PROPOFOL 50 MCG/KG/MIN: 10 INJECTION, EMULSION INTRAVENOUS at 02:01

## 2017-01-01 RX ADMIN — SODIUM CHLORIDE 5 MG: 9 INJECTION INTRAMUSCULAR; INTRAVENOUS; SUBCUTANEOUS at 15:56

## 2017-01-01 RX ADMIN — IPRATROPIUM BROMIDE 0.5 MG: 0.5 SOLUTION RESPIRATORY (INHALATION) at 20:21

## 2017-01-01 RX ADMIN — AMLODIPINE BESYLATE 10 MG: 5 TABLET ORAL at 10:09

## 2017-01-01 RX ADMIN — INSULIN LISPRO 2 UNITS: 100 INJECTION, SOLUTION INTRAVENOUS; SUBCUTANEOUS at 13:01

## 2017-01-01 RX ADMIN — PROPOFOL 30 MCG/KG/MIN: 10 INJECTION, EMULSION INTRAVENOUS at 11:27

## 2017-01-01 RX ADMIN — Medication 6 MG/HR: at 05:43

## 2017-01-01 RX ADMIN — INSULIN LISPRO 2 UNITS: 100 INJECTION, SOLUTION INTRAVENOUS; SUBCUTANEOUS at 06:34

## 2017-01-01 RX ADMIN — ACETAMINOPHEN 650 MG: 325 TABLET, FILM COATED ORAL at 00:21

## 2017-01-01 RX ADMIN — SODIUM CHLORIDE 5 MG: 9 INJECTION INTRAMUSCULAR; INTRAVENOUS; SUBCUTANEOUS at 02:41

## 2017-01-01 RX ADMIN — ONDANSETRON HYDROCHLORIDE 4 MG: 2 INJECTION, SOLUTION INTRAMUSCULAR; INTRAVENOUS at 18:53

## 2017-01-01 RX ADMIN — Medication 10 ML: at 09:07

## 2017-01-01 RX ADMIN — CEFEPIME 2 G: 2 INJECTION, POWDER, FOR SOLUTION INTRAVENOUS at 13:31

## 2017-01-01 RX ADMIN — FUROSEMIDE 80 MG: 10 INJECTION INTRAMUSCULAR; INTRAVENOUS at 11:31

## 2017-01-01 RX ADMIN — PROPOFOL 50 MCG/KG/MIN: 10 INJECTION, EMULSION INTRAVENOUS at 18:47

## 2017-01-01 RX ADMIN — SODIUM CHLORIDE 1000 ML: 900 INJECTION, SOLUTION INTRAVENOUS at 17:54

## 2017-01-01 RX ADMIN — LORAZEPAM 1 MG: 2 INJECTION INTRAMUSCULAR at 18:05

## 2017-01-01 RX ADMIN — HYDROMORPHONE HYDROCHLORIDE 2 MG: 1 INJECTION, SOLUTION INTRAMUSCULAR; INTRAVENOUS; SUBCUTANEOUS at 14:12

## 2017-01-01 RX ADMIN — ESMOLOL HYDROCHLORIDE 105 MCG/KG/MIN: 10 INJECTION INTRAVENOUS at 02:57

## 2017-01-01 RX ADMIN — PANTOPRAZOLE SODIUM 40 MG: 40 TABLET, DELAYED RELEASE ORAL at 19:13

## 2017-01-01 RX ADMIN — PANTOPRAZOLE SODIUM 40 MG: 40 TABLET, DELAYED RELEASE ORAL at 08:29

## 2017-01-01 RX ADMIN — ESMOLOL HYDROCHLORIDE 100 MCG/KG/MIN: 10 INJECTION INTRAVENOUS at 04:10

## 2017-01-01 RX ADMIN — HYDROMORPHONE HYDROCHLORIDE 0.5 MG: 1 INJECTION, SOLUTION INTRAMUSCULAR; INTRAVENOUS; SUBCUTANEOUS at 20:07

## 2017-01-01 RX ADMIN — ESMOLOL HYDROCHLORIDE 140 MCG/KG/MIN: 10 INJECTION INTRAVENOUS at 13:00

## 2017-01-01 RX ADMIN — Medication 30 ML: at 18:27

## 2017-01-01 RX ADMIN — ALBUTEROL SULFATE 2.5 MG: 2.5 SOLUTION RESPIRATORY (INHALATION) at 03:23

## 2017-01-01 RX ADMIN — PROPOFOL 35 MCG/KG/MIN: 10 INJECTION, EMULSION INTRAVENOUS at 22:52

## 2017-01-01 RX ADMIN — PROPOFOL 50 MCG/KG/MIN: 10 INJECTION, EMULSION INTRAVENOUS at 16:20

## 2017-01-01 RX ADMIN — VENLAFAXINE HYDROCHLORIDE 150 MG: 150 CAPSULE, EXTENDED RELEASE ORAL at 19:22

## 2017-01-01 RX ADMIN — HYDROMORPHONE HYDROCHLORIDE 1 MG: 1 INJECTION, SOLUTION INTRAMUSCULAR; INTRAVENOUS; SUBCUTANEOUS at 08:46

## 2017-01-01 RX ADMIN — CLONIDINE HYDROCHLORIDE 0.1 MG: 0.1 TABLET ORAL at 10:15

## 2017-01-01 RX ADMIN — Medication 10 MG/HR: at 02:19

## 2017-01-01 RX ADMIN — UMECLIDINIUM 1 PUFF: 62.5 AEROSOL, POWDER ORAL at 09:50

## 2017-01-01 RX ADMIN — AMIODARONE HYDROCHLORIDE 400 MG: 200 TABLET ORAL at 22:11

## 2017-01-01 RX ADMIN — ENALAPRILAT 1.25 MG: 2.5 INJECTION INTRAVENOUS at 17:19

## 2017-01-01 RX ADMIN — VENLAFAXINE HYDROCHLORIDE 150 MG: 150 CAPSULE, EXTENDED RELEASE ORAL at 10:16

## 2017-01-01 RX ADMIN — Medication 10 ML: at 22:15

## 2017-01-01 RX ADMIN — Medication: at 17:41

## 2017-01-01 RX ADMIN — MIDAZOLAM HYDROCHLORIDE 1 MG: 1 INJECTION, SOLUTION INTRAMUSCULAR; INTRAVENOUS at 16:12

## 2017-01-01 RX ADMIN — SODIUM CHLORIDE: 9 INJECTION, SOLUTION INTRAVENOUS at 13:31

## 2017-01-01 RX ADMIN — INSULIN LISPRO 3 UNITS: 100 INJECTION, SOLUTION INTRAVENOUS; SUBCUTANEOUS at 06:42

## 2017-01-01 RX ADMIN — METOPROLOL TARTRATE 2.5 MG: 5 INJECTION INTRAVENOUS at 08:11

## 2017-01-01 RX ADMIN — Medication 6 MG/HR: at 05:00

## 2017-01-01 RX ADMIN — HYDRALAZINE HYDROCHLORIDE 10 MG: 20 INJECTION INTRAMUSCULAR; INTRAVENOUS at 00:56

## 2017-01-01 RX ADMIN — HEPARIN SODIUM 5000 UNITS: 5000 INJECTION, SOLUTION INTRAVENOUS; SUBCUTANEOUS at 17:32

## 2017-01-01 RX ADMIN — METOPROLOL TARTRATE 2.5 MG: 5 INJECTION INTRAVENOUS at 11:15

## 2017-01-01 RX ADMIN — SODIUM CHLORIDE 5 MG: 9 INJECTION INTRAMUSCULAR; INTRAVENOUS; SUBCUTANEOUS at 23:47

## 2017-01-01 RX ADMIN — HYDRALAZINE HYDROCHLORIDE 100 MG: 50 TABLET, FILM COATED ORAL at 13:28

## 2017-01-01 RX ADMIN — SODIUM CHLORIDE: 234 INJECTION, SOLUTION, CONCENTRATE INTRAVENOUS; SUBCUTANEOUS at 03:27

## 2017-01-01 RX ADMIN — AMLODIPINE BESYLATE 10 MG: 5 TABLET ORAL at 10:48

## 2017-01-01 RX ADMIN — AMIODARONE HYDROCHLORIDE 400 MG: 200 TABLET ORAL at 20:34

## 2017-01-01 RX ADMIN — ALBUTEROL SULFATE 2.5 MG: 2.5 SOLUTION RESPIRATORY (INHALATION) at 20:18

## 2017-01-01 RX ADMIN — CHLORHEXIDINE GLUCONATE 15 ML: 1.2 RINSE ORAL at 20:04

## 2017-01-01 RX ADMIN — FENOFIBRATE 48 MG: 48 TABLET ORAL at 09:22

## 2017-01-01 RX ADMIN — PANTOPRAZOLE SODIUM 40 MG: 40 TABLET, DELAYED RELEASE ORAL at 07:55

## 2017-01-01 RX ADMIN — HYDRALAZINE HYDROCHLORIDE 100 MG: 50 TABLET, FILM COATED ORAL at 18:55

## 2017-01-01 RX ADMIN — PROPOFOL 30 MCG/KG/MIN: 10 INJECTION, EMULSION INTRAVENOUS at 22:48

## 2017-01-01 RX ADMIN — HEPARIN SODIUM 5000 UNITS: 5000 INJECTION, SOLUTION INTRAVENOUS; SUBCUTANEOUS at 05:37

## 2017-01-01 RX ADMIN — IPRATROPIUM BROMIDE 0.5 MG: 0.5 SOLUTION RESPIRATORY (INHALATION) at 07:23

## 2017-01-01 RX ADMIN — VENLAFAXINE HYDROCHLORIDE 150 MG: 150 CAPSULE, EXTENDED RELEASE ORAL at 09:22

## 2017-01-01 RX ADMIN — PROPOFOL 50 MCG/KG/MIN: 10 INJECTION, EMULSION INTRAVENOUS at 20:55

## 2017-01-01 RX ADMIN — Medication 5 MG/HR: at 18:02

## 2017-01-01 RX ADMIN — METOPROLOL TARTRATE 2.5 MG: 5 INJECTION INTRAVENOUS at 21:13

## 2017-01-01 RX ADMIN — PROPOFOL 50 MCG/KG/MIN: 10 INJECTION, EMULSION INTRAVENOUS at 08:10

## 2017-01-01 RX ADMIN — SUCCINYLCHOLINE CHLORIDE 160 MG: 20 INJECTION INTRAMUSCULAR; INTRAVENOUS at 07:53

## 2017-01-01 RX ADMIN — INSULIN LISPRO 2 UNITS: 100 INJECTION, SOLUTION INTRAVENOUS; SUBCUTANEOUS at 05:06

## 2017-01-01 RX ADMIN — ESCITALOPRAM OXALATE 10 MG: 10 TABLET, FILM COATED ORAL at 09:14

## 2017-01-01 RX ADMIN — METOPROLOL TARTRATE 2.5 MG: 5 INJECTION INTRAVENOUS at 15:15

## 2017-01-01 RX ADMIN — PROPOFOL 20 MCG/KG/MIN: 10 INJECTION, EMULSION INTRAVENOUS at 22:43

## 2017-01-01 RX ADMIN — CHLORHEXIDINE GLUCONATE 15 ML: 1.2 RINSE ORAL at 20:02

## 2017-01-01 RX ADMIN — HYDRALAZINE HYDROCHLORIDE 10 MG: 20 INJECTION INTRAMUSCULAR; INTRAVENOUS at 18:19

## 2017-01-01 RX ADMIN — SODIUM CHLORIDE 5 MG: 9 INJECTION INTRAMUSCULAR; INTRAVENOUS; SUBCUTANEOUS at 09:32

## 2017-01-01 RX ADMIN — INSULIN LISPRO 3 UNITS: 100 INJECTION, SOLUTION INTRAVENOUS; SUBCUTANEOUS at 12:41

## 2017-01-01 RX ADMIN — PROPOFOL 50 MCG/KG/MIN: 10 INJECTION, EMULSION INTRAVENOUS at 22:43

## 2017-01-01 RX ADMIN — HYDROMORPHONE HYDROCHLORIDE 1 MG: 1 INJECTION, SOLUTION INTRAMUSCULAR; INTRAVENOUS; SUBCUTANEOUS at 22:43

## 2017-01-01 RX ADMIN — MIDAZOLAM HYDROCHLORIDE 2 MG: 1 INJECTION, SOLUTION INTRAMUSCULAR; INTRAVENOUS at 11:24

## 2017-01-01 RX ADMIN — AMLODIPINE BESYLATE 5 MG: 5 TABLET ORAL at 05:27

## 2017-01-01 RX ADMIN — Medication 5 MG/HR: at 19:06

## 2017-01-01 RX ADMIN — Medication 10 ML: at 06:22

## 2017-01-01 RX ADMIN — LEVALBUTEROL HYDROCHLORIDE 1.25 MG: 1.25 SOLUTION RESPIRATORY (INHALATION) at 07:19

## 2017-01-01 RX ADMIN — HYDROMORPHONE HYDROCHLORIDE 1 MG: 1 INJECTION, SOLUTION INTRAMUSCULAR; INTRAVENOUS; SUBCUTANEOUS at 23:45

## 2017-01-01 RX ADMIN — CALCIUM GLUCONATE: 94 INJECTION, SOLUTION INTRAVENOUS at 19:21

## 2017-01-01 RX ADMIN — ESMOLOL HYDROCHLORIDE 175 MCG/KG/MIN: 10 INJECTION INTRAVENOUS at 17:58

## 2017-01-01 RX ADMIN — CHLORHEXIDINE GLUCONATE 15 ML: 1.2 RINSE ORAL at 21:08

## 2017-01-01 RX ADMIN — PROPOFOL 50 MCG/KG/MIN: 10 INJECTION, EMULSION INTRAVENOUS at 22:10

## 2017-01-01 RX ADMIN — Medication 13 MG/HR: at 06:28

## 2017-01-01 RX ADMIN — MICONAZOLE NITRATE: 20 CREAM TOPICAL at 18:19

## 2017-01-01 RX ADMIN — INSULIN LISPRO 2 UNITS: 100 INJECTION, SOLUTION INTRAVENOUS; SUBCUTANEOUS at 12:44

## 2017-01-01 RX ADMIN — DOCUSATE SODIUM 100 MG: 100 CAPSULE, LIQUID FILLED ORAL at 09:22

## 2017-01-01 RX ADMIN — ESCITALOPRAM OXALATE 10 MG: 10 TABLET, FILM COATED ORAL at 08:03

## 2017-01-01 RX ADMIN — PROPOFOL 50 MCG/KG/MIN: 10 INJECTION, EMULSION INTRAVENOUS at 18:44

## 2017-01-01 RX ADMIN — ESCITALOPRAM OXALATE 10 MG: 10 TABLET, FILM COATED ORAL at 10:21

## 2017-01-01 RX ADMIN — AMIODARONE HYDROCHLORIDE 400 MG: 200 TABLET ORAL at 08:03

## 2017-01-01 RX ADMIN — ESMOLOL HYDROCHLORIDE 80 MCG/KG/MIN: 10 INJECTION INTRAVENOUS at 17:06

## 2017-01-01 RX ADMIN — INSULIN LISPRO 2 UNITS: 100 INJECTION, SOLUTION INTRAVENOUS; SUBCUTANEOUS at 01:23

## 2017-01-01 RX ADMIN — ALBUTEROL SULFATE 2.5 MG: 2.5 SOLUTION RESPIRATORY (INHALATION) at 14:14

## 2017-01-01 RX ADMIN — SODIUM CHLORIDE 5 MG: 9 INJECTION INTRAMUSCULAR; INTRAVENOUS; SUBCUTANEOUS at 23:51

## 2017-01-01 RX ADMIN — Medication 10 ML: at 14:03

## 2017-01-01 RX ADMIN — PROPOFOL 50 MCG/KG/MIN: 10 INJECTION, EMULSION INTRAVENOUS at 13:12

## 2017-01-01 RX ADMIN — ALBUTEROL SULFATE 2.5 MG: 2.5 SOLUTION RESPIRATORY (INHALATION) at 20:36

## 2017-01-01 RX ADMIN — PROPOFOL 30 MCG/KG/MIN: 10 INJECTION, EMULSION INTRAVENOUS at 08:42

## 2017-01-01 RX ADMIN — HYDRALAZINE HYDROCHLORIDE 25 MG: 50 TABLET, FILM COATED ORAL at 09:26

## 2017-01-01 RX ADMIN — POTASSIUM CHLORIDE 20 MEQ: 400 INJECTION, SOLUTION INTRAVENOUS at 08:27

## 2017-01-01 RX ADMIN — Medication 30 ML: at 19:53

## 2017-01-01 RX ADMIN — Medication 10 ML: at 21:07

## 2017-01-01 RX ADMIN — HEPARIN SODIUM 5000 UNITS: 5000 INJECTION, SOLUTION INTRAVENOUS; SUBCUTANEOUS at 14:35

## 2017-01-01 RX ADMIN — FLUCONAZOLE 400 MG: 100 TABLET ORAL at 09:23

## 2017-01-01 RX ADMIN — VANCOMYCIN HYDROCHLORIDE 1000 MG: 1 INJECTION, POWDER, LYOPHILIZED, FOR SOLUTION INTRAVENOUS at 11:40

## 2017-01-01 RX ADMIN — Medication 10 ML: at 22:04

## 2017-01-01 RX ADMIN — LEVALBUTEROL HYDROCHLORIDE 1.25 MG: 1.25 SOLUTION RESPIRATORY (INHALATION) at 11:58

## 2017-01-01 RX ADMIN — PANTOPRAZOLE SODIUM 40 MG: 40 TABLET, DELAYED RELEASE ORAL at 09:49

## 2017-01-01 RX ADMIN — HEPARIN SODIUM 5000 UNITS: 5000 INJECTION, SOLUTION INTRAVENOUS; SUBCUTANEOUS at 21:54

## 2017-01-01 RX ADMIN — SODIUM CHLORIDE 5 MG: 9 INJECTION INTRAMUSCULAR; INTRAVENOUS; SUBCUTANEOUS at 05:42

## 2017-01-01 RX ADMIN — PROPOFOL 50 MCG/KG/MIN: 10 INJECTION, EMULSION INTRAVENOUS at 08:33

## 2017-01-01 RX ADMIN — Medication 10 ML: at 21:40

## 2017-01-01 RX ADMIN — PANTOPRAZOLE SODIUM 40 MG: 40 TABLET, DELAYED RELEASE ORAL at 18:46

## 2017-01-01 RX ADMIN — Medication 2 MG: at 10:19

## 2017-01-01 RX ADMIN — FLUCONAZOLE 400 MG: 100 TABLET ORAL at 08:28

## 2017-01-01 RX ADMIN — PROPOFOL 50 MCG/KG/MIN: 10 INJECTION, EMULSION INTRAVENOUS at 20:34

## 2017-01-01 RX ADMIN — POTASSIUM CHLORIDE 20 MEQ: 400 INJECTION, SOLUTION INTRAVENOUS at 08:58

## 2017-01-01 RX ADMIN — AMIODARONE HYDROCHLORIDE 400 MG: 200 TABLET ORAL at 08:45

## 2017-01-01 RX ADMIN — ESMOLOL HYDROCHLORIDE 165 MCG/KG/MIN: 10 INJECTION INTRAVENOUS at 20:59

## 2017-01-01 RX ADMIN — AMIODARONE HYDROCHLORIDE 400 MG: 200 TABLET ORAL at 08:27

## 2017-01-01 RX ADMIN — BUSPIRONE HYDROCHLORIDE 5 MG: 5 TABLET ORAL at 18:17

## 2017-01-01 RX ADMIN — Medication 10 MG/HR: at 12:36

## 2017-01-01 RX ADMIN — HYDROMORPHONE HYDROCHLORIDE 0.5 MG: 1 INJECTION, SOLUTION INTRAMUSCULAR; INTRAVENOUS; SUBCUTANEOUS at 22:35

## 2017-01-01 RX ADMIN — Medication 15 MG/HR: at 13:17

## 2017-01-01 RX ADMIN — PROPOFOL 50 MCG/KG/MIN: 10 INJECTION, EMULSION INTRAVENOUS at 01:51

## 2017-01-01 RX ADMIN — MICONAZOLE NITRATE: 20 CREAM TOPICAL at 08:08

## 2017-01-01 RX ADMIN — PROPOFOL 50 MCG/KG/MIN: 10 INJECTION, EMULSION INTRAVENOUS at 06:08

## 2017-01-01 RX ADMIN — HYDRALAZINE HYDROCHLORIDE 20 MG: 20 INJECTION INTRAMUSCULAR; INTRAVENOUS at 23:46

## 2017-01-01 RX ADMIN — HYDROMORPHONE HYDROCHLORIDE 1 MG: 1 INJECTION, SOLUTION INTRAMUSCULAR; INTRAVENOUS; SUBCUTANEOUS at 11:40

## 2017-01-01 RX ADMIN — MIDAZOLAM HYDROCHLORIDE 1 MG: 1 INJECTION, SOLUTION INTRAMUSCULAR; INTRAVENOUS at 16:36

## 2017-01-01 RX ADMIN — FENTANYL CITRATE 50 MCG: 50 INJECTION, SOLUTION INTRAMUSCULAR; INTRAVENOUS at 12:25

## 2017-01-01 RX ADMIN — AMIODARONE HYDROCHLORIDE 1 MG/MIN: 50 INJECTION, SOLUTION INTRAVENOUS at 08:57

## 2017-01-01 RX ADMIN — IPRATROPIUM BROMIDE 0.5 MG: 0.5 SOLUTION RESPIRATORY (INHALATION) at 11:52

## 2017-01-01 RX ADMIN — AMIODARONE HYDROCHLORIDE 400 MG: 200 TABLET ORAL at 09:14

## 2017-01-01 RX ADMIN — BUSPIRONE HYDROCHLORIDE 5 MG: 5 TABLET ORAL at 08:54

## 2017-01-01 RX ADMIN — PROPOFOL 30 MCG/KG/MIN: 10 INJECTION, EMULSION INTRAVENOUS at 15:03

## 2017-01-01 RX ADMIN — PROPOFOL 150 MG: 10 INJECTION, EMULSION INTRAVENOUS at 13:07

## 2017-01-01 RX ADMIN — HYDROMORPHONE HYDROCHLORIDE 1 MG: 1 INJECTION, SOLUTION INTRAMUSCULAR; INTRAVENOUS; SUBCUTANEOUS at 00:27

## 2017-01-01 RX ADMIN — HEPARIN SODIUM 5000 UNITS: 5000 INJECTION, SOLUTION INTRAVENOUS; SUBCUTANEOUS at 06:44

## 2017-01-01 RX ADMIN — SODIUM CHLORIDE 100 ML/HR: 450 INJECTION, SOLUTION INTRAVENOUS at 00:05

## 2017-01-01 RX ADMIN — SODIUM BICARBONATE 100 MEQ: 84 INJECTION, SOLUTION INTRAVENOUS at 17:37

## 2017-01-01 RX ADMIN — DOCUSATE SODIUM 100 MG: 100 CAPSULE, LIQUID FILLED ORAL at 08:51

## 2017-01-01 RX ADMIN — HYDRALAZINE HYDROCHLORIDE 20 MG: 20 INJECTION INTRAMUSCULAR; INTRAVENOUS at 12:30

## 2017-01-01 RX ADMIN — HYDROMORPHONE HYDROCHLORIDE 0.5 MG: 1 INJECTION, SOLUTION INTRAMUSCULAR; INTRAVENOUS; SUBCUTANEOUS at 13:55

## 2017-01-01 RX ADMIN — AMLODIPINE BESYLATE 10 MG: 5 TABLET ORAL at 08:07

## 2017-01-01 RX ADMIN — IPRATROPIUM BROMIDE 0.5 MG: 0.5 SOLUTION RESPIRATORY (INHALATION) at 19:50

## 2017-01-01 RX ADMIN — LEVALBUTEROL HYDROCHLORIDE 1.25 MG: 1.25 SOLUTION RESPIRATORY (INHALATION) at 08:57

## 2017-01-01 RX ADMIN — LEVOTHYROXINE SODIUM 100 MCG: 100 TABLET ORAL at 08:18

## 2017-01-01 RX ADMIN — POLYETHYLENE GLYCOL 3350 17 G: 17 POWDER, FOR SOLUTION ORAL at 11:35

## 2017-01-01 RX ADMIN — IPRATROPIUM BROMIDE 0.5 MG: 0.5 SOLUTION RESPIRATORY (INHALATION) at 07:15

## 2017-01-01 RX ADMIN — PANTOPRAZOLE SODIUM 40 MG: 40 TABLET, DELAYED RELEASE ORAL at 08:55

## 2017-01-01 RX ADMIN — PROTAMINE SULFATE 25 MG: 10 INJECTION, SOLUTION INTRAVENOUS at 16:00

## 2017-01-01 RX ADMIN — Medication 10 ML: at 21:28

## 2017-01-01 RX ADMIN — HYDRALAZINE HYDROCHLORIDE 100 MG: 50 TABLET, FILM COATED ORAL at 21:07

## 2017-01-01 RX ADMIN — PROPOFOL 50 MCG/KG/MIN: 10 INJECTION, EMULSION INTRAVENOUS at 12:59

## 2017-01-01 RX ADMIN — ENOXAPARIN SODIUM 40 MG: 40 INJECTION SUBCUTANEOUS at 08:14

## 2017-01-01 RX ADMIN — HEPARIN SODIUM 5000 UNITS: 5000 INJECTION, SOLUTION INTRAVENOUS; SUBCUTANEOUS at 22:04

## 2017-01-01 RX ADMIN — ALBUTEROL SULFATE 2.5 MG: 2.5 SOLUTION RESPIRATORY (INHALATION) at 08:23

## 2017-01-01 RX ADMIN — ESMOLOL HYDROCHLORIDE 100 MCG/KG/MIN: 10 INJECTION INTRAVENOUS at 13:23

## 2017-01-01 RX ADMIN — PROPOFOL 50 MCG/KG/MIN: 10 INJECTION, EMULSION INTRAVENOUS at 15:38

## 2017-01-01 RX ADMIN — HYDROMORPHONE HYDROCHLORIDE 0.5 MG: 1 INJECTION, SOLUTION INTRAMUSCULAR; INTRAVENOUS; SUBCUTANEOUS at 08:00

## 2017-01-01 RX ADMIN — MUPIROCIN: 20 OINTMENT TOPICAL at 08:21

## 2017-01-01 RX ADMIN — ALBUTEROL SULFATE 2.5 MG: 2.5 SOLUTION RESPIRATORY (INHALATION) at 01:39

## 2017-01-01 RX ADMIN — HYDROMORPHONE HYDROCHLORIDE 1 MG: 1 INJECTION, SOLUTION INTRAMUSCULAR; INTRAVENOUS; SUBCUTANEOUS at 10:40

## 2017-01-01 RX ADMIN — ESMOLOL HYDROCHLORIDE 190 MCG/KG/MIN: 10 INJECTION INTRAVENOUS at 04:34

## 2017-01-01 RX ADMIN — HEPARIN SODIUM 5000 UNITS: 5000 INJECTION, SOLUTION INTRAVENOUS; SUBCUTANEOUS at 15:53

## 2017-01-01 RX ADMIN — LEVALBUTEROL HYDROCHLORIDE 1.25 MG: 1.25 SOLUTION RESPIRATORY (INHALATION) at 19:27

## 2017-01-01 RX ADMIN — ONDANSETRON HYDROCHLORIDE 4 MG: 2 INJECTION, SOLUTION INTRAMUSCULAR; INTRAVENOUS at 16:40

## 2017-01-01 RX ADMIN — SODIUM CHLORIDE 5 MG: 9 INJECTION INTRAMUSCULAR; INTRAVENOUS; SUBCUTANEOUS at 01:21

## 2017-01-01 RX ADMIN — IPRATROPIUM BROMIDE 0.5 MG: 0.5 SOLUTION RESPIRATORY (INHALATION) at 15:22

## 2017-01-01 RX ADMIN — ESMOLOL HYDROCHLORIDE 165 MCG/KG/MIN: 10 INJECTION INTRAVENOUS at 03:55

## 2017-01-01 RX ADMIN — PROPOFOL 50 MCG/KG/MIN: 10 INJECTION, EMULSION INTRAVENOUS at 05:24

## 2017-01-01 RX ADMIN — SODIUM CHLORIDE: 234 INJECTION, SOLUTION, CONCENTRATE INTRAVENOUS; SUBCUTANEOUS at 05:00

## 2017-01-01 RX ADMIN — RETINOL, ERGOCALCIFEROL, .ALPHA.-TOCOPHEROL ACETATE, DL-, PHYTONADIONE, ASCORBIC ACID, NIACINAMIDE, RIBOFLAVIN 5-PHOSPHATE SODIUM, THIAMINE HYDROCHLORIDE, PYRIDOXINE HYDROCHLORIDE, DEXPANTHENOL, BIOTIN, FOLIC ACID, AND CYANOCOBALAMIN: KIT at 19:37

## 2017-01-01 RX ADMIN — Medication 7.5 MG/HR: at 00:51

## 2017-01-01 RX ADMIN — LEVALBUTEROL HYDROCHLORIDE 1.25 MG: 1.25 SOLUTION RESPIRATORY (INHALATION) at 08:05

## 2017-01-01 RX ADMIN — BUSPIRONE HYDROCHLORIDE 5 MG: 5 TABLET ORAL at 18:03

## 2017-01-01 RX ADMIN — PROPOFOL 20 MCG/KG/MIN: 10 INJECTION, EMULSION INTRAVENOUS at 16:18

## 2017-01-01 RX ADMIN — MIDAZOLAM HYDROCHLORIDE 1 MG: 1 INJECTION, SOLUTION INTRAMUSCULAR; INTRAVENOUS at 15:43

## 2017-01-01 RX ADMIN — CHLORHEXIDINE GLUCONATE 15 ML: 1.2 RINSE ORAL at 08:40

## 2017-01-01 RX ADMIN — FAMOTIDINE 20 MG: 10 INJECTION, SOLUTION INTRAVENOUS at 15:29

## 2017-01-01 RX ADMIN — Medication 10 ML: at 22:05

## 2017-01-01 RX ADMIN — ALBUTEROL SULFATE 2.5 MG: 2.5 SOLUTION RESPIRATORY (INHALATION) at 17:39

## 2017-01-01 RX ADMIN — BUPROPION HYDROCHLORIDE 150 MG: 150 TABLET, FILM COATED, EXTENDED RELEASE ORAL at 08:02

## 2017-01-01 RX ADMIN — MICONAZOLE NITRATE: 20 CREAM TOPICAL at 18:51

## 2017-01-01 RX ADMIN — HYDROMORPHONE HYDROCHLORIDE 0.5 MG: 1 INJECTION, SOLUTION INTRAMUSCULAR; INTRAVENOUS; SUBCUTANEOUS at 21:18

## 2017-01-01 RX ADMIN — LEVALBUTEROL HYDROCHLORIDE 1.25 MG: 1.25 SOLUTION RESPIRATORY (INHALATION) at 08:43

## 2017-01-01 RX ADMIN — Medication 12 MG/HR: at 14:19

## 2017-01-01 RX ADMIN — IPRATROPIUM BROMIDE 0.5 MG: 0.5 SOLUTION RESPIRATORY (INHALATION) at 12:14

## 2017-01-01 RX ADMIN — PANTOPRAZOLE SODIUM 40 MG: 40 TABLET, DELAYED RELEASE ORAL at 08:01

## 2017-01-01 RX ADMIN — INSULIN LISPRO 2 UNITS: 100 INJECTION, SOLUTION INTRAVENOUS; SUBCUTANEOUS at 06:24

## 2017-01-01 RX ADMIN — CHLORHEXIDINE GLUCONATE 15 ML: 1.2 RINSE ORAL at 08:45

## 2017-01-01 RX ADMIN — HYDRALAZINE HYDROCHLORIDE 100 MG: 50 TABLET, FILM COATED ORAL at 14:15

## 2017-01-01 RX ADMIN — Medication 10 ML: at 13:14

## 2017-01-01 RX ADMIN — HYDROMORPHONE HYDROCHLORIDE 1 MG: 1 INJECTION, SOLUTION INTRAMUSCULAR; INTRAVENOUS; SUBCUTANEOUS at 04:55

## 2017-01-01 RX ADMIN — SODIUM CHLORIDE 40 MG: 9 INJECTION INTRAMUSCULAR; INTRAVENOUS; SUBCUTANEOUS at 09:00

## 2017-01-01 RX ADMIN — Medication 10 ML: at 04:54

## 2017-01-01 RX ADMIN — LEVALBUTEROL HYDROCHLORIDE 1.25 MG: 1.25 SOLUTION RESPIRATORY (INHALATION) at 11:51

## 2017-01-01 RX ADMIN — METOPROLOL TARTRATE 2.5 MG: 5 INJECTION INTRAVENOUS at 20:11

## 2017-01-01 RX ADMIN — Medication 10 ML: at 14:15

## 2017-01-01 RX ADMIN — HYDRALAZINE HYDROCHLORIDE 100 MG: 50 TABLET, FILM COATED ORAL at 22:28

## 2017-01-01 RX ADMIN — HEPARIN SODIUM 5000 UNITS: 5000 INJECTION, SOLUTION INTRAVENOUS; SUBCUTANEOUS at 21:46

## 2017-01-01 RX ADMIN — METOPROLOL TARTRATE 2.5 MG: 5 INJECTION INTRAVENOUS at 21:07

## 2017-01-01 RX ADMIN — BUPROPION HYDROCHLORIDE 150 MG: 150 TABLET, FILM COATED, EXTENDED RELEASE ORAL at 08:01

## 2017-01-01 RX ADMIN — IPRATROPIUM BROMIDE 0.5 MG: 0.5 SOLUTION RESPIRATORY (INHALATION) at 11:36

## 2017-01-01 RX ADMIN — HYDROMORPHONE HYDROCHLORIDE 1 MG: 1 INJECTION, SOLUTION INTRAMUSCULAR; INTRAVENOUS; SUBCUTANEOUS at 04:27

## 2017-01-01 RX ADMIN — FUROSEMIDE 40 MG: 10 INJECTION, SOLUTION INTRAMUSCULAR; INTRAVENOUS at 09:46

## 2017-01-01 RX ADMIN — LEVALBUTEROL HYDROCHLORIDE 1.25 MG: 1.25 SOLUTION RESPIRATORY (INHALATION) at 16:30

## 2017-01-01 RX ADMIN — POLYETHYLENE GLYCOL 3350 17 G: 17 POWDER, FOR SOLUTION ORAL at 08:10

## 2017-01-01 RX ADMIN — POTASSIUM CHLORIDE 20 MEQ: 400 INJECTION, SOLUTION INTRAVENOUS at 08:01

## 2017-01-01 RX ADMIN — ALBUTEROL SULFATE 2.5 MG: 2.5 SOLUTION RESPIRATORY (INHALATION) at 07:24

## 2017-01-01 RX ADMIN — CLONIDINE HYDROCHLORIDE 0.2 MG: 0.1 TABLET ORAL at 10:09

## 2017-01-01 RX ADMIN — POTASSIUM CHLORIDE 20 MEQ: 400 INJECTION, SOLUTION INTRAVENOUS at 08:32

## 2017-01-01 RX ADMIN — CHLORHEXIDINE GLUCONATE 15 ML: 1.2 RINSE ORAL at 20:44

## 2017-01-01 RX ADMIN — HYDROMORPHONE HYDROCHLORIDE 0.5 MG: 1 INJECTION, SOLUTION INTRAMUSCULAR; INTRAVENOUS; SUBCUTANEOUS at 08:15

## 2017-01-01 RX ADMIN — LIDOCAINE HYDROCHLORIDE 20 MG: 20 INJECTION, SOLUTION EPIDURAL; INFILTRATION; INTRACAUDAL; PERINEURAL at 15:50

## 2017-01-01 RX ADMIN — ALBUMIN HUMAN 250 ML: 50 SOLUTION INTRAVENOUS at 11:51

## 2017-01-01 RX ADMIN — LEVOTHYROXINE SODIUM 100 MCG: 100 TABLET ORAL at 08:12

## 2017-01-01 RX ADMIN — BUSPIRONE HYDROCHLORIDE 5 MG: 5 TABLET ORAL at 10:48

## 2017-01-01 RX ADMIN — ONDANSETRON HYDROCHLORIDE 4 MG: 2 INJECTION, SOLUTION INTRAMUSCULAR; INTRAVENOUS at 13:27

## 2017-01-01 RX ADMIN — PROPOFOL 50 MG: 10 INJECTION, EMULSION INTRAVENOUS at 12:23

## 2017-01-01 RX ADMIN — METOPROLOL TARTRATE 2.5 MG: 5 INJECTION INTRAVENOUS at 08:12

## 2017-01-01 RX ADMIN — POLYETHYLENE GLYCOL 3350 17 G: 17 POWDER, FOR SOLUTION ORAL at 08:01

## 2017-01-01 RX ADMIN — Medication 20 ML: at 18:53

## 2017-01-01 RX ADMIN — HYDRALAZINE HYDROCHLORIDE 100 MG: 50 TABLET, FILM COATED ORAL at 07:55

## 2017-01-01 RX ADMIN — IPRATROPIUM BROMIDE 0.5 MG: 0.5 SOLUTION RESPIRATORY (INHALATION) at 19:51

## 2017-01-01 RX ADMIN — HYDROMORPHONE HYDROCHLORIDE 0.5 MG: 1 INJECTION, SOLUTION INTRAMUSCULAR; INTRAVENOUS; SUBCUTANEOUS at 20:12

## 2017-01-01 RX ADMIN — ALPRAZOLAM 0.25 MG: 0.25 TABLET ORAL at 20:27

## 2017-01-01 RX ADMIN — PROPOFOL 30 MG: 10 INJECTION, EMULSION INTRAVENOUS at 11:59

## 2017-01-01 RX ADMIN — BUSPIRONE HYDROCHLORIDE 7.5 MG: 5 TABLET ORAL at 09:20

## 2017-01-01 RX ADMIN — Medication 2.5 MG/HR: at 04:43

## 2017-01-01 RX ADMIN — ONDANSETRON 4 MG: 2 SOLUTION INTRAMUSCULAR; INTRAVENOUS at 09:03

## 2017-01-01 RX ADMIN — HEPARIN SODIUM 5000 UNITS: 5000 INJECTION, SOLUTION INTRAVENOUS; SUBCUTANEOUS at 18:09

## 2017-01-01 RX ADMIN — IPRATROPIUM BROMIDE 0.5 MG: 0.5 SOLUTION RESPIRATORY (INHALATION) at 08:15

## 2017-01-01 RX ADMIN — PROPOFOL 50 MCG/KG/MIN: 10 INJECTION, EMULSION INTRAVENOUS at 09:54

## 2017-01-01 RX ADMIN — HEPARIN SODIUM 5000 UNITS: 5000 INJECTION, SOLUTION INTRAVENOUS; SUBCUTANEOUS at 06:09

## 2017-01-01 RX ADMIN — HEPARIN SODIUM 5000 UNITS: 5000 INJECTION, SOLUTION INTRAVENOUS; SUBCUTANEOUS at 13:27

## 2017-01-01 RX ADMIN — MINOXIDIL 2.5 MG: 2.5 TABLET ORAL at 10:15

## 2017-01-01 RX ADMIN — ESMOLOL HYDROCHLORIDE 180 MCG/KG/MIN: 10 INJECTION INTRAVENOUS at 15:12

## 2017-01-17 NOTE — TELEPHONE ENCOUNTER
From: Kita Elliott  To: Gloria Bowser NP  Sent: 1/15/2017 2:06 PM EST  Subject: Medication Renewal Request    Original authorizing provider: GIL Benz would like a refill of the following medications:  spironolactone (ALDACTONE) 25 mg tablet Gloria Cargo, NP]  venlafaxine-SR [facility] 150 mg capsule Gloria Bowser, NP]  levothyroxine (SYNTHROID) 100 mcg tablet Gloria Cargo, NP]  clopidogrel (PLAVIX) 75 mg tablet Gloria Cargo, NP]  fenofibrate (LOFIBRA) 54 mg tablet Gloria Cargo, NP]  pantoprazole (PROTONIX) 40 mg tablet Gloria Cargo, NP]  atorvastatin (LIPITOR) 40 mg tablet Glorai Cargo, NP]    Preferred pharmacy: [place]    Comment:  Steven Webb you and your family had a great Holiday, I'm doing alright, just a head cold right now. Please renew my prescriptions for 2017.  Thank You Very Much I didn't see these two on your list: Amiodipine Besylate Tabs 10mg 1 Daily Clonidine Tabs 0.1mg

## 2017-01-24 NOTE — PROGRESS NOTES
1. Have you been to the ER, urgent care clinic since your last visit? Hospitalized since your last visit? No    2. Have you seen or consulted any other health care providers outside of the [address] [place] [address] since your last visit? Include any pap smears or colon screening.  No    Chief Complaint   Patient presents with    Follow-up     med ck    Labs     fasting

## 2017-01-24 NOTE — MR AVS SNAPSHOT
Visit Information Date & Time Provider Department Dept. Phone Encounter #  
 1/24/2017 11:15 AM Onel Muller, [address] [phone] [id number] Follow-up Instructions Return for feb for fasting labs. Upcoming Health Maintenance Date Due Hepatitis C Screening 1955 Pneumococcal 19-64 Medium Risk (1 of 1 - PPSV23) 9/6/1972 DTaP/Tdap/Td series (1 - Tdap) 9/6/1974 PAP AKA CERVICAL CYTOLOGY 10/15/2015 FOOT EXAM Q1 10/24/2015 EYE EXAM RETINAL OR DILATED Q1 10/24/2015 MICROALBUMIN Q1 4/21/2016 INFLUENZA AGE 9 TO ADULT 8/1/2016 BREAST CANCER SCRN MAMMOGRAM 10/24/2016 HEMOGLOBIN A1C Q6M 2/24/2017 LIPID PANEL Q1 6/25/2017 COLONOSCOPY 5/22/2023 Allergies as of 1/24/2017  Review Complete On: 1/24/2017 By: Chandana Rogers LPN Severity Noted Reaction Type Reactions Tussionex High 10/01/2010   Side Effect Itching Codeine  08/10/2011    Itching Nickel  11/07/2013    Other (comments) Local reaction (redness, irritation, blistering) if wears \"cheap earrings\" Oxycodone  12/14/2012    Itching Current Immunizations  Reviewed on 11/30/2014 No immunizations on file. Not reviewed this visit You Were Diagnosed With   
  
 Codes Comments Maxillary sinusitis, unspecified chronicity    -  Primary ICD-10-CM: J32.0 ICD-9-CM: 473.0 Anxiety     ICD-10-CM: F41.9 ICD-9-CM: 300.00 Vitals BP Pulse Temp Resp Height(growth percentile) Weight(growth percentile) 140/86 (!) 56 97.3 °F (36.3 °C) (Oral) 16 5' 6\" (1.676 m) 171 lb 4 oz (77.7 kg) SpO2 BMI OB Status Smoking Status 96% 27.64 kg/m2 Postmenopausal Former Smoker Vitals History BMI and BSA Data Body Mass Index Body Surface Area  
 27.64 kg/m 2 1.9 m 2 Preferred Pharmacy Pharmacy Name Phone [id number], [address] [phone] Your Updated Medication List  
  
   
 This list is accurate as of: 1/24/17 11:41 AM.  Always use your most recent med list. amLODIPine 10 mg tablet Commonly known as:  Ankita Upton Take  by mouth ACB/HS. aspirin 81 mg chewable tablet Take 81 mg by mouth daily. atorvastatin 40 mg tablet Commonly known as:  LIPITOR Take 2 Tabs by mouth daily. BENADRYL ALLERGY 25 mg tablet Generic drug:  diphenhydrAMINE Take 25 mg by mouth three (3) times daily as needed for Itching. busPIRone 7.5 mg tablet Commonly known as:  BUSPAR  
TAKE 1 TABLET BY MOUTH TWO TIMES A DAY. cefdinir 300 mg capsule Commonly known as:  OMNICEF Take 1 Cap by mouth two (2) times a day for 10 days. clonazePAM 0.5 mg tablet Commonly known as:  KlonoPIN  
TAKE 1 TABLET BY MOUTH TWICE A DAY . Mike Reasoner MUST LAST 30 DAYS  
  
 cloNIDine HCl 0.1 mg tablet Commonly known as:  CATAPRES Take  by mouth two (2) times a day. clopidogrel 75 mg Tab Commonly known as:  PLAVIX Take 1 Tab by mouth daily. fenofibrate 54 mg tablet Commonly known as:  LOFIBRA Take 1 Tab by mouth daily. ferrous sulfate 325 mg (65 mg iron) tablet Take 325 mg by mouth three (3) times daily (with meals). hydrALAZINE 25 mg tablet Commonly known as:  APRESOLINE  
  
 KLOR-CON M20 20 mEq tablet Generic drug:  potassium chloride Take 20 mEq by mouth daily as needed (for leg cramps). levothyroxine 100 mcg tablet Commonly known as:  SYNTHROID Take 1 Tab by mouth Daily (before breakfast). metoprolol tartrate 25 mg tablet Commonly known as:  LOPRESSOR  
TAKE 1 TAB BY MOUTH TWO (2) TIMES A DAY. oxyCODONE-acetaminophen 5-325 mg per tablet Commonly known as:  PERCOCET TK 1 TO 2 TS PO Q 4 H PRN P  
  
 pantoprazole 40 mg tablet Commonly known as:  PROTONIX Take 1 Tab by mouth two (2) times a day. spironolactone 25 mg tablet Commonly known as:  ALDACTONE Take 1 Tab by mouth daily. STOOL SOFTENER 100 mg capsule Generic drug:  docusate sodium TK ONE C PO  BID FOR CONSTIPATION  
  
 tiotropium bromide 1.25 mcg/actuation inhaler Commonly known as:  De Ruyter Hasting Take 2 Puffs by inhalation daily. venlafaxine- mg capsule Commonly known as:  EFFEXOR-XR Take 1 Cap by mouth two (2) times a day. Prescriptions Printed Refills  
 clonazePAM (KLONOPIN) 0.5 mg tablet 2 Sig: TAKE 1 TABLET BY MOUTH TWICE A DAY . Jodi Wright MUST LAST 30 DAYS Class: Print Prescriptions Sent to Pharmacy Refills  
 cefdinir (OMNICEF) 300 mg capsule 0 Sig: Take 1 Cap by mouth two (2) times a day for 10 days. Class: Normal  
 Pharm[id number] [address] [facility] #: [phone] Route: Oral  
  
Follow-up Instructions Return for feb for fasting labs. Introducing [facility]! Dear Beny Aw: Thank you for requesting a Lockstream account. Our records indicate that you already have an active Lockstream account. You can access your account anytime at [email or web address] Did you know that you can access your hospital and ER discharge instructions at any time in Lockstream? You can also review all of your test results from your hospital stay or ER visit. Additional Information If you have questions, please visit the Frequently Asked Questions section of the Lockstream website at [email or web address] Remember, Lockstream is NOT to be used for urgent needs. For medical emergencies, dial 911. Now available from your iPhone and Android! Please provide this summary of care documentation to your next provider. Your primary care clinician is listed as Diony Damonelor. If you have any questions after today's visit, please call [phone].

## 2017-01-25 NOTE — PROGRESS NOTES
HPI/ROS  Patient complains of bilateral ear pressure/pain. Symptoms include congestion, headache described as frontal, lightheadedness, low grade fever, post nasal drip, productive cough with  yellow colored sputum, sinus pressure, tooth pain and vertigo. Onset of symptoms was 5 days ago, gradually worsening since that time. Patient is drinking plenty of fluids. .  Past history is significant for no history of pneumonia or bronchitis. Patient is non-smoker. She is using mucinex otc without relief of congestion and sinus pressure. She is due for refill of her klonopin, takes scheduled bid without adr/se of med      Visit Vitals    /86    Pulse (!) 56    Temp 97.3 °F (36.3 °C) (Oral)    Resp 16    Ht 5' 6\" (1.676 m)    Wt 171 lb 4 oz (77.7 kg)    SpO2 96%    BMI 27.64 kg/m2     Physical Examination:   GENERAL ASSESSMENT: well developed and well nourished  SKIN: normal color, no lesions  HEAD: normocephalic  EYES: normal eyes  EARS: external auditory canal: clear and tympanic membrane: yellow, bulging  NOSE: normal external appearance and nares patent  MOUTH: yellow exudates of OP  NECK: normal  CHEST: normal air exchange, no rales, no rhonchi, no wheezes, respiratory effort normal with no retractions  HEART: regular rate and rhythm, normal S1/S2, no murmurs  ABDOMEN:  not examined  EXTREMITY: not examined  NEURO: not examined    Tish was seen today for follow-up and labs. Diagnoses and all orders for this visit:    Maxillary sinusitis, unspecified chronicity    Anxiety    Other orders  -     clonazePAM (KLONOPIN) 0.5 mg tablet; TAKE 1 TABLET BY MOUTH TWICE A DAY . Dexter Shillings MUST LAST 30 DAYS  -     cefdinir (OMNICEF) 300 mg capsule; Take 1 Cap by mouth two (2) times a day for 10 days. Reveiwed adr/se of medication  Push fluids, rest, suggested mucinex for congestion and drainage  Recheck 5-7 days if sx not improved.     Refills of meds given for klonopin  Follow-up Disposition:  Return for feb for fasting labs. I have discussed the diagnosis with the patient and the intended plan as seen in the above orders. The patient has received an after-visit summary and questions were answered concerning future plans. Patient conveyed understanding of the plan at the time of the visit.     Mela Marrufo MSN, ANP  1/25/2017

## 2017-02-21 NOTE — PROGRESS NOTES
1. Have you been to the ER, urgent care clinic since your last visit? Hospitalized since your last visit? No    2. Have you seen or consulted any other health care providers outside of the [facility] since your last visit? Include any pap smears or colon screening.  No    Chief Complaint   Patient presents with    Follow-up     1 mo f/u, med ck    Labs     fasting

## 2017-02-21 NOTE — MR AVS SNAPSHOT
Visit Information Date & Time Provider Department Dept. Phone Encounter #  
 2/21/2017 11:15 AM [facility] [phone] [id number] Upcoming Health Maintenance Date Due Hepatitis C Screening 1955 Pneumococcal 19-64 Medium Risk (1 of 1 - PPSV23) 9/6/1972 DTaP/Tdap/Td series (1 - Tdap) 9/6/1974 PAP AKA CERVICAL CYTOLOGY 10/15/2015 FOOT EXAM Q1 10/24/2015 EYE EXAM RETINAL OR DILATED Q1 10/24/2015 MICROALBUMIN Q1 4/21/2016 INFLUENZA AGE 9 TO ADULT 8/1/2016 BREAST CANCER SCRN MAMMOGRAM 10/24/2016 HEMOGLOBIN A1C Q6M 2/24/2017 LIPID PANEL Q1 6/25/2017 COLONOSCOPY 5/22/2023 Allergies as of 2/21/2017  Review Complete On: 2/21/2017 By: Jessa Bowels, LPN Severity Noted Reaction Type Reactions Tussionex High 10/01/2010   Side Effect Itching Codeine  08/10/2011    Itching Nickel  11/07/2013    Other (comments) Local reaction (redness, irritation, blistering) if wears \"cheap earrings\" Oxycodone  12/14/2012    Itching Current Immunizations  Reviewed on 11/30/2014 No immunizations on file. Not reviewed this visit You Were Diagnosed With   
  
 Codes Comments Viral gastritis    -  Primary ICD-10-CM: K29.70 ICD-9-CM: 535.50 Renal artery arteriosclerosis (HCC)     ICD-10-CM: I70.1 ICD-9-CM: 440.1 Essential hypertension     ICD-10-CM: I10 
ICD-9-CM: 401.9 Congenital hypothyroidism without goiter     ICD-10-CM: E03.1 ICD-9-CM: 187 Hypercholesterolemia     ICD-10-CM: E78.00 ICD-9-CM: 272.0 Vitals BP Pulse Temp Resp Height(growth percentile) Weight(growth percentile) (!) 152/98 65 97.7 °F (36.5 °C) (Oral) 16 5' 6\" (1.676 m) 170 lb 4 oz (77.2 kg) SpO2 BMI OB Status Smoking Status 97% 27.48 kg/m2 Postmenopausal Former Smoker Vitals History BMI and BSA Data  Body Mass Index Body Surface Area  
 27.48 kg/m 2 1.9 m 2  
  
  
 Preferred Pharmacy Pharmacy Name Phone [id number] [facility] [phone] Your Updated Medication List  
  
   
This list is accurate as of: 2/21/17 12:00 PM.  Always use your most recent med list. amLODIPine 10 mg tablet Commonly known as:  Moe Joaquín Take  by mouth ACB/HS. aspirin 81 mg chewable tablet Take 81 mg by mouth daily. atorvastatin 40 mg tablet Commonly known as:  LIPITOR Take 2 Tabs by mouth daily. BENADRYL ALLERGY 25 mg tablet Generic drug:  diphenhydrAMINE Take 25 mg by mouth three (3) times daily as needed for Itching. busPIRone 7.5 mg tablet Commonly known as:  BUSPAR  
TAKE 1 TABLET BY MOUTH TWO TIMES A DAY. clonazePAM 0.5 mg tablet Commonly known as:  KlonoPIN  
TAKE 1 TABLET BY MOUTH TWICE A DAY . Peggyann Gang MUST LAST 30 DAYS  
  
 cloNIDine HCl 0.1 mg tablet Commonly known as:  CATAPRES Take  by mouth two (2) times a day. clopidogrel 75 mg Tab Commonly known as:  PLAVIX Take 1 Tab by mouth daily. fenofibrate 54 mg tablet Commonly known as:  LOFIBRA Take 1 Tab by mouth daily. ferrous sulfate 325 mg (65 mg iron) tablet Take 325 mg by mouth three (3) times daily (with meals). hydrALAZINE 25 mg tablet Commonly known as:  APRESOLINE  
  
 KLOR-CON M20 20 mEq tablet Generic drug:  potassium chloride Take 20 mEq by mouth daily as needed (for leg cramps). levothyroxine 100 mcg tablet Commonly known as:  SYNTHROID Take 1 Tab by mouth Daily (before breakfast). metoprolol tartrate 25 mg tablet Commonly known as:  LOPRESSOR  
TAKE 1 TAB BY MOUTH TWO (2) TIMES A DAY. oxyCODONE-acetaminophen 5-325 mg per tablet Commonly known as:  PERCOCET TK 1 TO 2 TS PO Q 4 H PRN P  
  
 pantoprazole 40 mg tablet Commonly known as:  PROTONIX Take 1 Tab by mouth two (2) times a day. spironolactone 25 mg tablet Commonly known as:  ALDACTONE  
 Take 1 Tab by mouth daily. STOOL SOFTENER 100 mg capsule Generic drug:  docusate sodium TK ONE C PO  BID FOR CONSTIPATION  
  
 tiotropium bromide 1.25 mcg/actuation inhaler Commonly known as:  Otelia Madai Take 2 Puffs by inhalation daily. venlafaxine- mg capsule Commonly known as:  EFFEXOR-XR Take 1 Cap by mouth two (2) times a day. We Performed the Following CBC WITH AUTOMATED DIFF [87457 CPT(R)] LIPID PANEL [09041 CPT(R)] METABOLIC PANEL, COMPREHENSIVE [12933 CPT(R)] TSH 3RD GENERATION [67086 CPT(R)] Introducing [facility] SERVICES! Dear Tami Birmingham: Thank you for requesting a BlueSwarm account. Our records indicate that you already have an active BlueSwarm account. You can access your account anytime at [email or web address] Did you know that you can access your hospital and ER discharge instructions at any time in BlueSwarm? You can also review all of your test results from your hospital stay or ER visit. Additional Information If you have questions, please visit the Frequently Asked Questions section of the BlueSwarm website at [email or web address] Remember, BlueSwarm is NOT to be used for urgent needs. For medical emergencies, dial 911. Now available from your iPhone and Android! Please provide this summary of care documentation to your next provider. Your primary care clinician is listed as Diamante Pompa. If you have any questions after today's visit, please call [phone].

## 2017-02-22 NOTE — PROGRESS NOTES
Pt notified (confirmed x 3). Patient verbalized understanding. Pt states she is taking her cholesterol med everyday. (confirmed pharm on file) Please advise?

## 2017-02-22 NOTE — PROGRESS NOTES
Please let her know that her infection labs (WBC) is high. I want to send in an abx for her to take - omnicef. May be respiratory in nature. Otherwise her labs look like she is dehydrated as expected, make sure she is getting enough fluids in the last 24 hours. Her chol is very high, has she been taking the chol med?  Kevin Gross

## 2017-02-22 NOTE — PROGRESS NOTES
HISTORY OF PRESENT ILLNESS  Angelica Harry is a 59 y.o. female. HPI  Cardiovascular Review:  The patient has hypertension and hyperlipidemia. Diet and Lifestyle: generally follows a low fat low cholesterol diet, generally follows a low sodium diet, exercises sporadically, nonsmoker  Home BP Monitoring: is not measured at home. Pertinent ROS: taking medications as instructed, no medication side effects noted, no TIA's, no chest pain on exertion, no dyspnea on exertion, no swelling of ankles. Thyroid Review:  Patient is seen for followup of hypothyroidism. Thyroid ROS: denies fatigue, weight changes, heat/cold intolerance, bowel/skin changes or CVS symptoms. Viral Gastritis:  Had episode of vomiting and diarrhea that lasted all night long with dry heaves. Has held down Powerade today, not eating. Patient Active Problem List    Diagnosis Date Noted    Symptomatic anemia 06/25/2016    Dyslipidemia 12/21/2015    Carotid disease, bilateral ([address] 12/21/2015    ABIMBOLA (obstructive sleep apnea) 12/21/2015    COPD (chronic obstructive pulmonary disease) ([facility] 06/29/2015    Anemia 05/02/2015    Iron deficiency anemia due to chronic blood loss 04/21/2015    GI bleed 11/28/2014    Snoring 06/27/2013    Joint pain 06/27/2013    Disturbance of skin sensation 06/27/2013    Cerebral thrombosis without mention of cerebral infarction 06/27/2013    Acute lower GI bleeding 05/21/2013    TIA (transient ischemic attack) 12/23/2012    Hypokalemia 12/23/2012    Renal artery arteriosclerosis ([address] 12/23/2012    CAD (coronary artery disease) 08/19/2011    S/P CABG (coronary artery bypass graft) 08/19/2011    Hypercholesterolemia 03/11/2011    HTN (hypertension)     Depression with anxiety     Hypothyroid      Current Outpatient Prescriptions   Medication Sig Dispense Refill    clonazePAM (KLONOPIN) 0.5 mg tablet TAKE 1 TABLET BY MOUTH TWICE A DAY . Jodi Kyle MUST LAST 30 DAYS 60 Tab 2    spironolactone (ALDACTONE) 25 mg tablet Take 1 Tab by mouth daily. 90 Tab 3    venlafaxine-SR (EFFEXOR-XR) 150 mg capsule Take 1 Cap by mouth two (2) times a day. 180 Cap 3    levothyroxine (SYNTHROID) 100 mcg tablet Take 1 Tab by mouth Daily (before breakfast). 90 Tab 3    clopidogrel (PLAVIX) 75 mg tab Take 1 Tab by mouth daily. 90 Tab 3    fenofibrate (LOFIBRA) 54 mg tablet Take 1 Tab by mouth daily. 90 Tab 3    pantoprazole (PROTONIX) 40 mg tablet Take 1 Tab by mouth two (2) times a day. 180 Tab 3    atorvastatin (LIPITOR) 40 mg tablet Take 2 Tabs by mouth daily. 180 Tab 1    busPIRone (BUSPAR) 7.5 mg tablet TAKE 1 TABLET BY MOUTH TWO TIMES A DAY. 60 Tab 2    metoprolol tartrate (LOPRESSOR) 25 mg tablet TAKE 1 TAB BY MOUTH TWO (2) TIMES A DAY. 60 Tab 4    hydrALAZINE (APRESOLINE) 25 mg tablet   0    oxyCODONE-acetaminophen (PERCOCET) 5-325 mg per tablet TK 1 TO 2 TS PO Q 4 H PRN P  0    STOOL SOFTENER 100 mg capsule TK ONE C PO  BID FOR CONSTIPATION  0    amLODIPine (NORVASC) 10 mg tablet Take  by mouth ACB/HS.  cloNIDine HCl (CATAPRES) 0.1 mg tablet Take  by mouth two (2) times a day.  tiotropium bromide (SPIRIVA RESPIMAT) 1.25 mcg/actuation inhaler Take 2 Puffs by inhalation daily.  ferrous sulfate 325 mg (65 mg iron) tablet Take 325 mg by mouth three (3) times daily (with meals).  potassium chloride (KLOR-CON M20) 20 mEq tablet Take 20 mEq by mouth daily as needed (for leg cramps).  diphenhydrAMINE (BENADRYL ALLERGY) 25 mg tablet Take 25 mg by mouth three (3) times daily as needed for Itching.  aspirin 81 mg chewable tablet Take 81 mg by mouth daily.        Family History   Problem Relation Age of Onset    Post-op Nausea/Vomiting Other     Other Other      LOW BP AND DIFFICULTY BREATHING    Hypertension Mother     Stroke Mother       age 48,    Tammy Leaks Hypertension Brother     Stroke Brother       age 55, smoked     Social History   Substance Use Topics    Smoking status: Former Smoker Packs/day: 1.00     Years: 40.00     Types: Cigarettes     Quit date: 8/7/2011    Smokeless tobacco: Former User      Comment: quit a few times but relapsed    Alcohol use No        ROS  A comprehensive review of system was obtained and negative except findings in the HPI    Visit Vitals    BP (!) 152/98    Pulse 65    Temp 97.7 °F (36.5 °C) (Oral)    Resp 16    Ht 5' 6\" (1.676 m)    Wt 170 lb 4 oz (77.2 kg)    SpO2 97%    BMI 27.48 kg/m2     Physical Exam   Constitutional: She is oriented to person, place, and time. She appears well-developed and well-nourished. Neck: No JVD present. Cardiovascular: Normal rate, regular rhythm and intact distal pulses. Exam reveals no gallop and no friction rub. No murmur heard. Pulmonary/Chest: Effort normal and breath sounds normal. No respiratory distress. She has no wheezes. Musculoskeletal: She exhibits no edema. Neurological: She is alert and oriented to person, place, and time. Skin: Skin is warm. Nursing note and vitals reviewed. ASSESSMENT and PLAN  Encounter Diagnoses   Name Primary?  Viral gastritis Yes    Renal artery arteriosclerosis (HCC)     Essential hypertension     Congenital hypothyroidism without goiter     Hypercholesterolemia      Orders Placed This Encounter    CBC WITH AUTOMATED DIFF    METABOLIC PANEL, COMPREHENSIVE    LIPID PANEL    TSH 3RD GENERATION     Labs updated today  Dev plan with labs  Reviewed GI virus management; bland diet, powerade and water, advance slowly  Recheck 24 hours if not improved    I have discussed the diagnosis with the patient and the intended plan as seen in the above orders. The patient has received an after-visit summary and questions were answered concerning future plans. Patient conveyed understanding of the plan at the time of the visit.     Brock Tan, MSN, ANP  2/21/2017

## 2017-02-22 NOTE — PROGRESS NOTES
Due to the dehydration, I want to recheck her labs in 2 weeks anyway, come fasting and I will do the chol as well to recheck. No med changes at this time.  Jaun Garay

## 2017-02-23 PROBLEM — I72.9 ANEURYSM (HCC): Status: ACTIVE | Noted: 2017-01-01

## 2017-02-23 NOTE — ED NOTES
Awaiting information to transfer pt to [facility] for specialized care. Pt updated.

## 2017-02-23 NOTE — ED PROVIDER NOTES
HPI Comments: The pt is a 59year old female without significant vasculopathic history who presents with complaints of generalized abdominal, chest, and upper back pain that began two evenings ago after eating dinner. Pain has been constant. Yesterday, she feeling jittery and lightheaded with associated diaphoresis. Seen by PCP on 2/21 and diagnosed with viral gastritis. No sick contacts. Pt denies fevers, chills, night sweats, SOB, ELIZABETH, PND, orthopnea,melena, hematuria, dysuria,  HA, and syncope. Past Medical History:  No date: Anemia  No date: Anxiety disorder  No date: Arthritis      Comment: BACK, RT. KNEE and HANDS  No date: CAD (coronary artery disease)      Comment:  s/p 5 vessel CABG 2011  No date: Carotid arterial disease (HCC)      Comment: diffuse bilateral CCA plaques  No date: Chronic obstructive pulmonary disease (HCC)  No date: Depression with anxiety  No date: Essential hypertension  No date: GERD (gastroesophageal reflux disease)      Comment: rarely  No date: GI bleed  No date: Hypothyroid  No date: Mesenteric artery stenosis (HCC)  No date: Microcytic anemia  No date: Renal artery atherosclerosis, bilateral (HCC)      Comment:  RA occlusion, left s/p stent  No date: Renal atrophy, right  No date: Thyroid disease  No date: UC (ulcerative colitis) ([facility] [address]    Past Surgical History:  6/8/2015: ESOPHAGEAL CAPSULE ENDOSCOPY      Comment:    No date: HX COLONOSCOPY  8/11: HX CORONARY ARTERY BYPASS GRAFT      Comment: 5 way bypass. 08/19/11  No date: HX FEMORAL BYPASS      Comment: triple  1997: HX LUMBAR DISKECTOMY  No date: HX UROLOGICAL      Comment: vascular stent x 2 to left kidney  3/13/2015: SMALL BOWEL ENDOSCOPY,ABLATE LESN      Comment:    4/24/12: STRESS TEST LEXISCAN/CARDIOLITE  6/2/2015: UPPER GI ENDOSCOPY,CTRL BLEED      Comment:      PCP:  Annemarie Goldman NP        Patient is a 59 y.o. female presenting with abdominal pain. The history is provided by the patient.    Abdominal Pain This is a new problem. The current episode started 2 days ago. The problem occurs constantly. The problem has not changed since onset. The pain is associated with an unknown factor. The pain is located in the generalized abdominal region and epigastric region. The quality of the pain is aching, sharp and shooting. The pain is at a severity of 3/10. The pain is moderate. Associated symptoms include diarrhea, nausea, vomiting, constipation, chest pain and back pain. Pertinent negatives include no anorexia, no fever, no belching, no flatus, no hematochezia, no melena, no dysuria, no frequency, no hematuria, no headaches, no arthralgias, no myalgias and no trauma. Nothing worsens the pain. The pain is relieved by nothing. Her past medical history is significant for GERD and ulcerative colitis. Past Medical History:   Diagnosis Date    Anemia     Anxiety disorder     Arthritis     BACK, RT. KNEE and HANDS    CAD (coronary artery disease)      s/p 5 vessel CABG 2011    Carotid arterial disease (HCC)     diffuse bilateral CCA plaques    Chronic obstructive pulmonary disease (HCC)     Depression with anxiety     Essential hypertension     GERD (gastroesophageal reflux disease)     rarely    GI bleed     Hypothyroid     Mesenteric artery stenosis (HCC)     Microcytic anemia     Renal artery atherosclerosis, bilateral (HCC)      RA occlusion, left s/p stent    Renal atrophy, right     Thyroid disease     UC (ulcerative colitis) ([facility] [address]        Past Surgical History:   Procedure Laterality Date    ESOPHAGEAL CAPSULE ENDOSCOPY  6/8/2015         HX COLONOSCOPY      HX CORONARY ARTERY BYPASS GRAFT  8/11    5 way bypass.  08/19/11    HX FEMORAL BYPASS      triple    HX LUMBAR DISKECTOMY  1997    HX UROLOGICAL      vascular stent x 2 to left kidney    SMALL BOWEL ENDOSCOPY,ABLATE LESN  3/13/2015         STRESS TEST LEXISCAN/CARDIOLITE  4/24/12    UPPER GI ENDOSCOPY,CTRL BLEED  6/2/2015 Family History:   Problem Relation Age of Onset    Post-op Nausea/Vomiting Other     Other Other      LOW BP AND DIFFICULTY BREATHING    Hypertension Mother    Wali Moore Stroke Mother       age 48,    Wali Moore Hypertension Brother     Stroke Brother       age 55, smoked       Social History     Social History    Marital status:      Spouse name: N/A    Number of children: N/A    Years of education: N/A     Occupational History    Not on file. Social History Main Topics    Smoking status: Former Smoker     Packs/day: 1.00     Years: 40.00     Types: Cigarettes     Quit date: 2011    Smokeless tobacco: Former User      Comment: quit a few times but relapsed    Alcohol use No    Drug use: No    Sexual activity: No     Other Topics Concern    Not on file     Social History Narrative         ALLERGIES: Tussionex; Codeine; Nickel; and Oxycodone    Review of Systems   Constitutional: Negative for activity change, appetite change, chills, diaphoresis, fatigue, fever and unexpected weight change. HENT: Negative for congestion, ear pain, rhinorrhea, sinus pressure, sore throat and tinnitus. Eyes: Negative for photophobia, pain, discharge and visual disturbance. Respiratory: Negative for apnea, cough, choking, chest tightness, shortness of breath, wheezing and stridor. Cardiovascular: Positive for chest pain. Negative for palpitations and leg swelling. Gastrointestinal: Positive for abdominal pain, constipation, diarrhea, nausea and vomiting. Negative for anorexia, flatus, hematochezia and melena. Endocrine: Negative for polydipsia, polyphagia and polyuria. Genitourinary: Negative for decreased urine volume, dyspareunia, dysuria, enuresis, flank pain, frequency, hematuria and urgency. Musculoskeletal: Positive for back pain. Negative for arthralgias, gait problem, myalgias and neck pain. Skin: Negative for color change, pallor, rash and wound.    Allergic/Immunologic: Negative for immunocompromised state. Neurological: Negative for dizziness, seizures, syncope, weakness, light-headedness and headaches. Hematological: Does not bruise/bleed easily. Psychiatric/Behavioral: Negative for agitation and confusion. The patient is not nervous/anxious. Vitals:    02/23/17 2110 02/23/17 2115 02/23/17 2120 02/23/17 2125   BP: 147/42 139/43 (!) 138/39 96/46   Pulse: 75 80 83 79   Resp: 17 24 24 16   Temp:       SpO2: 94% 92% 94% 92%   Weight:       Height:                Physical Exam   Constitutional: She is oriented to person, place, and time. She appears well-developed and well-nourished. No distress. HENT:   Head: Normocephalic. Right Ear: External ear normal.   Left Ear: External ear normal.   Mouth/Throat: Oropharynx is clear and moist. No oropharyngeal exudate. Eyes: Conjunctivae and EOM are normal. Pupils are equal, round, and reactive to light. Right eye exhibits no discharge. Left eye exhibits no discharge. No scleral icterus. Neck: Normal range of motion. Neck supple. No JVD present. No tracheal deviation present. No thyromegaly present. Cardiovascular: Normal rate, regular rhythm, normal heart sounds, intact distal pulses and normal pulses. PMI is not displaced. Exam reveals no gallop and no friction rub. No murmur heard. Pulmonary/Chest: Effort normal and breath sounds normal. No accessory muscle usage or stridor. No respiratory distress. She has no decreased breath sounds. She has no wheezes. She has no rhonchi. She has no rales. She exhibits no tenderness. Abdominal: Soft. Bowel sounds are normal. She exhibits no distension and no mass. There is no hepatosplenomegaly. There is no rigidity, no rebound, no guarding, no CVA tenderness, no tenderness at McBurney's point and negative Reed's sign. Musculoskeletal: Normal range of motion. She exhibits no edema or tenderness. Lymphadenopathy:     She has no cervical adenopathy.    Neurological: She is alert and oriented to person, place, and time. She has normal strength. She displays normal reflexes. No cranial nerve deficit or sensory deficit. Coordination normal. GCS eye subscore is 4. GCS verbal subscore is 5. GCS motor subscore is 6. Skin: Skin is warm and dry. No rash noted. She is not diaphoretic. No erythema. No pallor. Psychiatric: She has a normal mood and affect. Her behavior is normal. Judgment and thought content normal.   Nursing note and vitals reviewed. MDM  Number of Diagnoses or Management Options  Dissection of aorta, unspecified portion of aorta [facility]):   Diagnosis management comments:    * routine laboratory data and UA   * IVF   * EKG and CXR   * CTA chest/abd/pelvis   * Consult to vascular   * transfer          Amount and/or Complexity of Data Reviewed  Clinical lab tests: ordered and reviewed  Tests in the radiology section of CPT®: ordered and reviewed  Review and summarize past medical records: yes  Discuss the patient with other providers: yes    Risk of Complications, Morbidity, and/or Mortality  General comments:    - stable, ambulatory pt in NAD    Patient Progress  Patient progress: stable    ED Course       Procedures        ED EKG interpretation:  Rhythm: normal sinus rhythm; and regular . Rate (approx.): 70; Axis: normal; P wave: normal; QRS interval: normal ; ST/T wave: non-specific changes; in  Lead: Unchanged from 6/24/2016 This EKG was interpreted by Lex Wu NP,ED Provider. 9:43 PM  Patient is being admitted to the hospital.  The results of their tests and reasons for their admission have been discussed with them and/or available family. They convey agreement and understanding for the need to be admitted and for their admission diagnosis. Consultation has been made with the inpatient physician specialist for hospitalization.     LABORATORY TESTS:  Recent Results (from the past 12 hour(s))   EKG, 12 LEAD, INITIAL    Collection Time: 02/23/17  3:06 PM Result Value Ref Range    Ventricular Rate 70 BPM    Atrial Rate 70 BPM    P-R Interval 148 ms    QRS Duration 88 ms    Q-T Interval 392 ms    QTC Calculation (Bezet) 423 ms    Calculated P Axis 70 degrees    Calculated R Axis -6 degrees    Calculated T Axis 102 degrees    Diagnosis       Normal sinus rhythm  Possible Left atrial enlargement  Nonspecific ST and T wave abnormality  Abnormal ECG  When compared with ECG of 24-JUN-2016 21:09,  Nonspecific T wave abnormality no longer evident in Inferior leads  T wave inversion less evident in Anterolateral leads     METABOLIC PANEL, COMPREHENSIVE    Collection Time: 02/23/17  3:20 PM   Result Value Ref Range    Sodium 132 (L) 136 - 145 mmol/L    Potassium 4.0 3.5 - 5.1 mmol/L    Chloride 96 (L) 97 - 108 mmol/L    CO2 30 21 - 32 mmol/L    Anion gap 6 5 - 15 mmol/L    Glucose 74 65 - 100 mg/dL    BUN 30 (H) 6 - 20 MG/DL    Creatinine 1.92 (H) 0.55 - 1.02 MG/DL    BUN/Creatinine ratio 16 12 - 20      GFR est AA 32 (L) >60 ml/min/1.73m2    GFR est non-AA 26 (L) >60 ml/min/1.73m2    Calcium 9.0 8.5 - 10.1 MG/DL    Bilirubin, total 0.2 0.2 - 1.0 MG/DL    ALT (SGPT) 58 12 - 78 U/L    AST (SGOT) 68 (H) 15 - 37 U/L    Alk. phosphatase 79 45 - 117 U/L    Protein, total 7.7 6.4 - 8.2 g/dL    Albumin 3.1 (L) 3.5 - 5.0 g/dL    Globulin 4.6 (H) 2.0 - 4.0 g/dL    A-G Ratio 0.7 (L) 1.1 - 2.2     CBC WITH AUTOMATED DIFF    Collection Time: 02/23/17  3:20 PM   Result Value Ref Range    WBC 10.4 3.6 - 11.0 K/uL    RBC 3.53 (L) 3.80 - 5.20 M/uL    HGB 10.0 (L) 11.5 - 16.0 g/dL    HCT 30.2 (L) 35.0 - 47.0 %    MCV 85.6 80.0 - 99.0 FL    MCH 28.3 26.0 - 34.0 PG    MCHC 33.1 30.0 - 36.5 g/dL    RDW 14.4 11.5 - 14.5 %    PLATELET 674 045 - 046 K/uL    NEUTROPHILS 73 32 - 75 %    LYMPHOCYTES 9 (L) 12 - 49 %    MONOCYTES 11 5 - 13 %    EOSINOPHILS 7 0 - 7 %    BASOPHILS 0 0 - 1 %    ABS. NEUTROPHILS 7.6 1.8 - 8.0 K/UL    ABS. LYMPHOCYTES 0.9 0.8 - 3.5 K/UL    ABS.  MONOCYTES 1.2 (H) 0.0 - 1.0 K/UL    ABS. EOSINOPHILS 0.7 (H) 0.0 - 0.4 K/UL    ABS. BASOPHILS 0.0 0.0 - 0.1 K/UL   LIPASE    Collection Time: 02/23/17  3:20 PM   Result Value Ref Range    Lipase 147 73 - 393 U/L   LACTIC ACID, PLASMA    Collection Time: 02/23/17  3:20 PM   Result Value Ref Range    Lactic acid 0.8 0.4 - 2.0 MMOL/L   TROPONIN I    Collection Time: 02/23/17  3:20 PM   Result Value Ref Range    Troponin-I, Qt. <0.04 <0.05 ng/mL   URINALYSIS W/MICROSCOPIC    Collection Time: 02/23/17  3:21 PM   Result Value Ref Range    Color YELLOW/STRAW      Appearance CLOUDY (A) CLEAR      Specific gravity 1.013 1.003 - 1.030      pH (UA) 5.0 5.0 - 8.0      Protein 100 (A) NEG mg/dL    Glucose 100 (A) NEG mg/dL    Ketone NEGATIVE  NEG mg/dL    Bilirubin NEGATIVE  NEG      Blood NEGATIVE  NEG      Urobilinogen 0.2 0.2 - 1.0 EU/dL    Nitrites NEGATIVE  NEG      Leukocyte Esterase TRACE (A) NEG      WBC 0-4 0 - 4 /hpf    RBC 0-5 0 - 5 /hpf    Epithelial cells FEW FEW /lpf    Bacteria NEGATIVE  NEG /hpf    Yeast PRESENT (A) NEG         IMAGING RESULTS:  CTA CHEST ABD PELV W CONT   Final Result      XR CHEST PA LAT   Final Result        Xr Chest Pa Lat    Result Date: 2/23/2017  INDICATION: Abdominal pain for 2 days. Chest and upper back pain. COMPARISON: August 26, 2015 FINDINGS: PA and lateral views of the chest demonstrate a stable cardiomediastinal silhouette and clear lungs bilaterally. The patient is status post median sternotomy and CABG. The visualized osseous structures are unremarkable. IMPRESSION: No acute process. No change from the prior study. Cta Chest Abd Pelv W Cont    Result Date: 2/23/2017  CLINICAL HISTORY: Vasculopathy, severe back pain, possible dissection, or mesenteric ischemia INDICATION: Vasculopathy, severe back pain, possible dissection, or mesenteric ischemia COMPARISON: 2013 TECHNIQUE: CT of the chest, abdomen, pelvis with  IV contrast , 7 mL of Isovue-370 is performed.  Axial images from the thoracic inlet to the level of the upper abdomen are obtained. Manual post-processing of the images and coronal reformatting is also performed. CT dose reduction was achieved through use of a standardized protocol tailored for this examination and automatic exposure control for dose modulation. Multiplanar reformatted imaging was performed. Sagittal and coronal reformatting. 3-D Postprocessing of imaging was performed. 3-D MIP reconstructed images were obtained in the coronal plane. The risks/benefits of IV contrast menstruation were discussed with the patient. Pre-CT hydration. FINDINGS: There is an area of abnormal contrast enhancement in the mural plaque of the descending thoracic aorta. There are additional small foci of ulcerative plaque more inferiorly. In the superior descending aorta just beyond the origin of the left subclavian artery there is a ring of contrast enhancement extending into the mural plaque. Postprocedural changes and coronary artery disease. Sternotomy. The a sending aorta is normal in caliber. There is severe stenosis in the common carotid artery on the left. The patient has a history of carotid bypass. 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 There is no proximal pulmonary embolism. There is a small left-sided pleural effusion which is fluid density. There is no mediastinal, axillary or hilar lymphadenopathy. There is no pleural or pericardial effusion. Abdominal aorta demonstrates extensive mural irregularity or plaque. There is a left renal stent. Severe stenosis in the proximal right renal artery with renal cortical atrophy. SMA origin demonstrates atherosclerotic change without occlusion. Celiac origin demonstrates atherosclerotic change without hemodynamically significant stenosis or occlusion common iliac vessels demonstrate atherosclerotic change without hemodynamically significant stenosis. External iliac arteries also with mild atherosclerotic change.  Mild stenosis in the proximal left superficial femoral artery. There is atrophy of the right kidney. Contrast opacification of the right and left renal cortices is identified. Right renal cysts. Duodenal diverticulum. Cholelithiasis. Hepatic steatosis. SPLEEN/PANCREAS: No mass. There is no pancreatic duct dilatation. There is no evidence of splenomegaly. No focal ADRENALS/KIDNEYS: Renal cortical atrophy on the right. Bilateral renal cysts. There is no hydroureter or hydronephrosis. There is no renal mass. There is no perinephric mass. COLON AND SMALL BOWEL: No dilatation or wall thickening. There is no obstruction or free intraperitoneal air. There is no evidence of incarceration or obstruction. APPENDIX: Unremarkable. URINARY BLADDER: No mass or calculus. BONES: No destructive bone lesion. IMPRESSION: Progressing/developing type B descending aortic dissection. There is contrast extension into mural plaque in the descending aorta. There is extensive mural atherosclerotic change in the descending aorta with additional focal areas of ulcerated plaque as well. There is no compromise of flow into the left kidney. Severe stenosis in the left common carotid artery at its origin. The patient has an internal carotid bypass history. Occluded right vertebral artery. Atrophic right kidney with moderate to severe stenosis in the right renal artery. Small left-sided effusion is low-density. There is a diminutive right renal artery with renal cortical atrophy on the right. These results were discussed with Dr. Cynthia Angulo at 5:38 PM on 2/23/2017 by Dr. Diana Vallejo [phone].       MEDICATIONS GIVEN:  Medications   esmolol 10mg/mL (BREVIBLOC) infusion (200 mcg/kg/min × 78 kg IntraVENous Rate Change 2/23/17 1957)   sodium chloride 0.9 % bolus infusion 1,000 mL (0 mL IntraVENous IV Completed 2/23/17 1629)   ondansetron (ZOFRAN) injection 4 mg (4 mg IntraVENous Given 2/23/17 1529)   famotidine (PF) (PEPCID) injection 20 mg (20 mg IntraVENous Given 2/23/17 1529)   morphine injection 4 mg (4 mg IntraVENous Given 2/23/17 1622)   hydrALAZINE (APRESOLINE) 20 mg/mL injection 10 mg (10 mg IntraVENous Given 2/23/17 1657)   iopamidol (ISOVUE-370) 76 % injection 100 mL (70 mL IntraVENous Given 2/23/17 1711)   labetalol (NORMODYNE;TRANDATE) injection 20 mg ( IntraVENous Given 2/23/17 1753)   sodium chloride 0.9 % bolus infusion 1,000 mL (0 mL IntraVENous IV Completed 2/23/17 1959)   morphine injection 4 mg (4 mg IntraVENous Given 2/23/17 1810)   LORazepam (ATIVAN) injection 1 mg (1 mg IntraVENous Given 2/23/17 1805)   HYDROmorphone (PF) (DILAUDID) injection 1 mg (1 mg IntraVENous Given 2/23/17 1919)   diphenhydrAMINE (BENADRYL) injection 25 mg (25 mg IntraVENous Given 2/23/17 1932)       IMPRESSION:  1. Dissection of aorta, unspecified portion of aorta (HCC)        PLAN:  1.  Transfer to [facility] under the care of MD Anita Mendez NP  9:44 PM

## 2017-02-23 NOTE — ED TRIAGE NOTES
Pt presents with gas like pains in her abdomen x 2 days. Pt seen at PCP yesterday and told WBC was low and cholesterol was high.

## 2017-02-23 NOTE — IP AVS SNAPSHOT
Current Discharge Medication List  
  
START taking these medications Dose & Instructions Dispensing Information Comments Morning Noon Evening Bedtime HYDROmorphone 2 mg tablet Commonly known as:  DILAUDID Your last dose was: Your next dose is:    
   
   
 Dose:  2-4 mg Take 1-2 Tabs by mouth every three (3) hours as needed for Pain. Max Daily Amount: 32 mg. Quantity:  100 Tab Refills:  0 CONTINUE these medications which have NOT CHANGED Dose & Instructions Dispensing Information Comments Morning Noon Evening Bedtime  
 clonazePAM 0.5 mg tablet Commonly known as:  Reynoldsbenito Juan Your last dose was: Your next dose is: TAKE 1 TABLET BY MOUTH TWICE A DAY . Sia Franco MUST LAST 30 DAYS Quantity:  60 Tab Refills:  2 Not to exceed 3 additional fills before 08/20/2016  
    
   
   
   
  
 pantoprazole 40 mg tablet Commonly known as:  PROTONIX Your last dose was: Your next dose is:    
   
   
 Dose:  40 mg Take 1 Tab by mouth two (2) times a day. Quantity:  180 Tab Refills:  3 STOOL SOFTENER 100 mg capsule Generic drug:  docusate sodium Your last dose was: Your next dose is:    
   
   
 TK ONE C PO  BID FOR CONSTIPATION Refills:  0  
     
   
   
   
  
 tiotropium bromide 1.25 mcg/actuation inhaler Commonly known as:  Myke Daniels Your last dose was: Your next dose is:    
   
   
 Dose:  2 Puff Take 2 Puffs by inhalation daily. Refills:  0  
     
   
   
   
  
 venlafaxine- mg capsule Commonly known as:  EFFEXOR-XR Your last dose was: Your next dose is:    
   
   
 Dose:  150 mg Take 1 Cap by mouth two (2) times a day. Quantity:  180 Cap Refills:  3 STOP taking these medications   
 amLODIPine 10 mg tablet Commonly known as:  Jacqueline De La Garza aspirin 81 mg chewable tablet  
   
  
 atorvastatin 40 mg tablet Commonly known as:  LIPITOR  
   
  
 BENADRYL ALLERGY 25 mg tablet Generic drug:  diphenhydrAMINE  
   
  
 busPIRone 7.5 mg tablet Commonly known as:  BUSPAR  
   
  
 cefdinir 300 mg capsule Commonly known as:  OMNICEF  
   
  
 cloNIDine HCl 0.1 mg tablet Commonly known as:  CATAPRES  
   
  
 clopidogrel 75 mg Tab Commonly known as:  PLAVIX  
   
  
 fenofibrate 54 mg tablet Commonly known as:  LOFIBRA ferrous sulfate 325 mg (65 mg iron) tablet  
   
  
 hydrALAZINE 25 mg tablet Commonly known as:  APRESOLINE  
   
  
 KLOR-CON M20 20 mEq tablet Generic drug:  potassium chloride  
   
  
 levothyroxine 100 mcg tablet Commonly known as:  SYNTHROID  
   
  
 metoprolol tartrate 25 mg tablet Commonly known as:  LOPRESSOR  
   
  
 oxyCODONE-acetaminophen 5-325 mg per tablet Commonly known as:  PERCOCET  
   
  
 spironolactone 25 mg tablet Commonly known as:  ALDACTONE Where to Get Your Medications Information on where to get these meds will be given to you by the nurse or doctor. ! Ask your nurse or doctor about these medications HYDROmorphone 2 mg tablet

## 2017-02-23 NOTE — IP AVS SNAPSHOT
[address] [facility] 
[phone] Patient: Ghulam Odom MRN: [id number] DVR:3/3/7428 You are allergic to the following Allergen Reactions Tussionex Itching Codeine Itching Nickel Other (comments) Local reaction (redness, irritation, blistering) if wears \"cheap earrings\" Oxycodone Itching Recent Documentation Height Weight Breastfeeding? BMI OB Status Smoking Status 1.689 m 94.3 kg No 33.04 kg/m2 Postmenopausal Former Smoker Unresulted Labs Order Current Status BLOOD GAS, ARTERIAL In process TRXN WORKUP-POST TXN Preliminary result Yvetta Radha TXN Preliminary result Emergency Contacts Name Discharge Info Relation Home Work Mobile Vel Meyer) DISCHARGE CAREGIVER [3] Spouse [3] [phone] Antony Crease  Spouse [3] [phone] About your hospitalization You were admitted on:  February 23, 2017 You last received care in the:  [facility] PROGRESSIVE CARE You were discharged on:  April 3, 2017 Unit phone number:  [phone] Why you were hospitalized Your primary diagnosis was:  Not on File Your diagnoses also included:  Aneurysm (Hcc), Svt (Supraventricular Tachycardia) (Hcc), Htn (Hypertension), Sss (Sick Sinus Syndrome) (Hcc), Paroxysmal Atrial Fibrillation (Hcc), Atrial Flutter (Hcc) Providers Seen During Your Hospitalizations Provider Role Specialty Primary office phone Jason Crawley MD Attending Provider Emergency Medicine [phone] Magali Dunbar MD Attending Provider Vascular Surgery [phone] Your Primary Care Physician (PCP) Primary Care Physician Office Phone Office Fax Rosy Van N [phone] ** None ** Follow-up Information Follow up With Details Comments Contact Info Harley Stuart MD Schedule an appointment as soon as possible for a visit in 2 weeks post discharge [address] [facility] 
[phone] [place]   [address] [facility] [phone] [facility] [address] 
[phone] Current Discharge Medication List  
  
START taking these medications Dose & Instructions Dispensing Information Comments Morning Noon Evening Bedtime HYDROmorphone 2 mg tablet Commonly known as:  DILAUDID Your last dose was: Your next dose is:    
   
   
 Dose:  2-4 mg Take 1-2 Tabs by mouth every three (3) hours as needed for Pain. Max Daily Amount: 32 mg. Quantity:  100 Tab Refills:  0 CONTINUE these medications which have NOT CHANGED Dose & Instructions Dispensing Information Comments Morning Noon Evening Bedtime  
 clonazePAM 0.5 mg tablet Commonly known as:  [place]ee Schaefer Your last dose was: Your next dose is: TAKE 1 TABLET BY MOUTH TWICE A DAY . Vernida Chard MUST LAST 30 DAYS Quantity:  60 Tab Refills:  2 Not to exceed 3 additional fills before 08/20/2016  
    
   
   
   
  
 pantoprazole 40 mg tablet Commonly known as:  PROTONIX Your last dose was: Your next dose is:    
   
   
 Dose:  40 mg Take 1 Tab by mouth two (2) times a day. Quantity:  180 Tab Refills:  3 STOOL SOFTENER 100 mg capsule Generic drug:  docusate sodium Your last dose was: Your next dose is:    
   
   
 TK ONE C PO  BID FOR CONSTIPATION Refills:  0  
     
   
   
   
  
 tiotropium bromide 1.25 mcg/actuation inhaler Commonly known as:  Bandar Bales Your last dose was: Your next dose is:    
   
   
 Dose:  2 Puff Take 2 Puffs by inhalation daily. Refills:  0  
     
   
   
   
  
 venlafaxine- mg capsule Commonly known as:  EFFEXOR-XR  
   
 Your last dose was: Your next dose is:    
   
   
 Dose:  150 mg Take 1 Cap by mouth two (2) times a day. Quantity:  180 Cap Refills:  3 STOP taking these medications   
 amLODIPine 10 mg tablet Commonly known as:  NORVASC  
   
  
 aspirin 81 mg chewable tablet  
   
  
 atorvastatin 40 mg tablet Commonly known as:  LIPITOR  
   
  
 BENADRYL ALLERGY 25 mg tablet Generic drug:  diphenhydrAMINE  
   
  
 busPIRone 7.5 mg tablet Commonly known as:  BUSPAR  
   
  
 cefdinir 300 mg capsule Commonly known as:  OMNICEF  
   
  
 cloNIDine HCl 0.1 mg tablet Commonly known as:  CATAPRES  
   
  
 clopidogrel 75 mg Tab Commonly known as:  PLAVIX  
   
  
 fenofibrate 54 mg tablet Commonly known as:  LOFIBRA ferrous sulfate 325 mg (65 mg iron) tablet  
   
  
 hydrALAZINE 25 mg tablet Commonly known as:  APRESOLINE  
   
  
 KLOR-CON M20 20 mEq tablet Generic drug:  potassium chloride  
   
  
 levothyroxine 100 mcg tablet Commonly known as:  SYNTHROID  
   
  
 metoprolol tartrate 25 mg tablet Commonly known as:  LOPRESSOR  
   
  
 oxyCODONE-acetaminophen 5-325 mg per tablet Commonly known as:  PERCOCET  
   
  
 spironolactone 25 mg tablet Commonly known as:  ALDACTONE Where to Get Your Medications Information on where to get these meds will be given to you by the nurse or doctor. ! Ask your nurse or doctor about these medications HYDROmorphone 2 mg tablet Discharge Instructions Patient Discharge Instructions Griselda Nanas / [id number] : 1955 Admitted 2017 Discharged: 4/3/2017 Take Home Medications · It is important that you take the medication exactly as they are prescribed. · Keep your medication in the bottles provided by the pharmacist and keep a list of the medication names, dosages, and times to be taken in your wallet. · Do not take other medications without consulting your doctor. What to do at [facility] Wound Care: None Recommended diet: Regular Recommended activity: As Tolerated. Call Dr Des Noel if you have questions or problems that the hospice team cannot resolve [phone] Information obtained by : 
I understand that if any problems occur once I am at home I am to contact my physician. I understand and acknowledge receipt of the instructions indicated above. Physician's or R.N.'s Signature                                                                  Date/Time Patient or Representative Signature                                                          Date/Time Discharge Orders None ACO Transitions of Care Introducing [employer] [address] Saadia Galeano offers a voluntary care coordination program to provide high quality service and care to [facility] fee-for-service beneficiaries. Naida Capone was designed to help you enhance your health and well-being through the following services: ? Transitions of Care  support for individuals who are transitioning from one care setting to another (example: Hospital to home). ? Chronic and Complex Care Coordination  support for individuals and caregivers of those with serious or chronic illnesses or with more than one chronic (ongoing) condition and those who take a number of different medications. If you meet specific medical criteria, a [facility] [address] may call you directly to coordinate your care with your primary care physician and your other care providers. For questions about the [facility] programs, please, contact your physicians office. For general questions or additional information about Accountable Care Organizations: 
Please visit www.medicare.gov/acos. html or call 1-800-MEDICARE (6-482.296.9339) TTY users should call 6-808.510.3150. Introducing [facility] SERVICES! Dear Annemarie Hammond: Thank you for requesting a [employer] account. Our records indicate that you already have an active Tap[employer] account. You can access your account anytime at [email or web address] Did you know that you can access your hospital and ER discharge instructions at any time in [employer]Press? You can also review all of your test results from your hospital stay or ER visit. Additional Information If you have questions, please visit the Frequently Asked Questions section of the [employer] website at [email or web address] Remember, [employer]Press is NOT to be used for urgent needs. For medical emergencies, dial 911. Now available from your iPhone and Android! General Information Please provide this summary of care documentation to your next provider. Patient Signature:  ____________________________________________________________ Date:  ____________________________________________________________  
  
[employer] Provider Signature:  ____________________________________________________________ Date:  ____________________________________________________________

## 2017-02-24 NOTE — H&P
Hospitalist Admission Note    NAME: Mayito Delaney   :  1955   MRN:  [id number]     Date/Time:  2017 11:31 PM    Patient PCP: Ronel Goodson NP  ________________________________________________________________________    My assessment of this patient's clinical condition and my plan of care is as follows. Assessment / Plan:  Type B descending aortic dissection and severe stenosis of L common carotid artery in setting of severe vasculopathy with CAD/CABG, R vertebral artery occlusion, bilateral renal artery atherosclerosis and mesenteric artery stenosis, present on admission:  - CTA chest/abd/pelvis tonight with progressing/developing type B descending aortic dissection. There is contrast extension into mural plaque in the descending aorta. There is extensive mural atherosclerotic change in the descending aorta with additional focal areas of ulcerated plaque as well. There is no compromise of flow into the left kidney. Severe stenosis in the left common carotid artery at its origin. The patient has  an internal carotid bypass history. Occluded right vertebral artery. Atrophic right kidney with moderate to severe stenosis in the right. - con't esmolol for blood pressure control  - vascular surgery evaluation in AM  - holding home ASA and plavix in anticipation of surgery in the near future  - dilaudid prn for pain control  Malignant hypertension and chronic diastolic CHF in setting of CAD/CABG 2011:  - last echo 8/15 with EF 65 %. There were no regional wall motion abnormalities. Wall thickness was at the upper limits of normal. Doppler parameters were consistent with abnormal left ventricular relaxation (grade 1 diastolic dysfunction). - holding ASA and plavix as above  - con't lopressor, spironolactone, lipitor, fenofibrate  - con't hydralazine, norvasc, clonidine. Esmolol gtt as above.   Depression with anxiety:  - con't klonipin, buspar, effexor  Hypothyroidism: con't synthroid  Overweight: says she has ABIMBOLA but denies using O2 or CPAP at night. Hospitalist will sign off at this time and turn her care over to Dr. Sandeep Maravilla and Intensivist service. Please reconsult when Pt leaves ICU if additional Hospitalist assistance is needed. Code Status: Full  Surrogate Decision Maker:  Elliott Palafox [phone]  DVT Prophylaxis: SCDs        Subjective:   CHIEF COMPLAINT:  Transfer for aortic aneurysm    HISTORY OF PRESENT ILLNESS:     [place] is a 59 y.o.  female who presents with above. Pt is heavily medicated (for her pain prior to transfer) and is only very briefly arousable to obtain any history. Per notes, she went to PCP a couple of days ago for nausea/vomiting. She was dx with gastroenteritis but also had mild leukocytosis and was given Rx for omnicef. This evening, she presented to [facility] with chest/abd/back pain. She says that her chest pain was so severe that she thought she was having a heart attack. She also had back and mid abdominal pain at the same time. Pain has now resolved with meds at [facility]. She denies fever or URI sx. No nausea/vomiting today. No stool changes. No focal weakness. We were asked to admit for work up and evaluation of the above problems.      Past Medical History:   Diagnosis Date    Anxiety disorder     Arthritis     BACK, RT. KNEE and HANDS    CAD (coronary artery disease)      s/p 5 vessel CABG 2011    Carotid arterial disease (HCC)     diffuse bilateral CCA plaques    Chronic obstructive pulmonary disease (HCC)     Depression with anxiety     Essential hypertension     GERD (gastroesophageal reflux disease)     rarely    GI bleed     Hypothyroid     Mesenteric artery stenosis (HCC)     Microcytic anemia     Renal artery atherosclerosis, bilateral (HCC)      RA occlusion, left s/p stent    Renal atrophy, right     UC (ulcerative colitis) ([facility] [address]         Past Surgical History:   Procedure Laterality Date    ESOPHAGEAL CAPSULE ENDOSCOPY  2015         HX COLONOSCOPY      HX CORONARY ARTERY BYPASS GRAFT      5 way bypass. 11    HX FEMORAL BYPASS      triple    HX LUMBAR DISKECTOMY      HX UROLOGICAL      vascular stent x 2 to left kidney    SMALL BOWEL ENDOSCOPY,ABLATE LESN  3/13/2015         STRESS TEST LEXISCAN/CARDIOLITE  12    UPPER GI ENDOSCOPY,CTRL BLEED  2015            Social History   Substance Use Topics    Smoking status: Former Smoker     Packs/day: 1.00     Years: 40.00     Types: Cigarettes     Quit date: 2011    Smokeless tobacco: Former User      Comment: quit a few times but relapsed    Alcohol use No        Family History   Problem Relation Age of Onset    Post-op Nausea/Vomiting Other     Other Other      LOW BP AND DIFFICULTY BREATHING    Hypertension Mother     Stroke Mother       age 48,    Mavis Arrow Hypertension Brother     Stroke Brother       age 55, smoked     Allergies   Allergen Reactions    Tussionex Itching    Codeine Itching    Nickel Other (comments)     Local reaction (redness, irritation, blistering) if wears \"cheap earrings\"    Oxycodone Itching        Prior to Admission medications    Medication Sig Start Date End Date Taking? Authorizing Provider   cefdinir (OMNICEF) 300 mg capsule Take 1 Cap by mouth two (2) times a day for 10 days. 2/22/17 3/4/17  Jose J Coker NP   clonazePAM (KLONOPIN) 0.5 mg tablet TAKE 1 TABLET BY MOUTH TWICE A DAY . Barbie Dellen MUST LAST 30 DAYS 17   Jose J Coker NP   spironolactone (ALDACTONE) 25 mg tablet Take 1 Tab by mouth daily. 17   Jose J Coker NP   venlafaxine-SR [facility] 150 mg capsule Take 1 Cap by mouth two (2) times a day. 17   Jose J Coker NP   levothyroxine (SYNTHROID) 100 mcg tablet Take 1 Tab by mouth Daily (before breakfast). 17   Jose J Coker NP   clopidogrel (PLAVIX) 75 mg tab Take 1 Tab by mouth daily.  17   Jose J Coker NP   fenofibrate (LOFIBRA) 54 mg tablet Take 1 Tab by mouth daily. 1/17/17   Brina Winters NP   pantoprazole (PROTONIX) 40 mg tablet Take 1 Tab by mouth two (2) times a day. 1/17/17   Brina Winters NP   atorvastatin (LIPITOR) 40 mg tablet Take 2 Tabs by mouth daily. 1/17/17   Brina Winters NP   busPIRone (BUSPAR) 7.5 mg tablet TAKE 1 TABLET BY MOUTH TWO TIMES A DAY. 1/10/17   Brina Winters NP   metoprolol tartrate (LOPRESSOR) 25 mg tablet TAKE 1 TAB BY MOUTH TWO (2) TIMES A DAY. 12/20/16   Mickie Ceballos MD   hydrALAZINE (APRESOLINE) 25 mg tablet  8/2/16   Historical Provider   oxyCODONE-acetaminophen (PERCOCET) 5-325 mg per tablet TK 1 TO 2 TS PO Q 4 H PRN P 8/2/16   Historical Provider   STOOL SOFTENER 100 mg capsule TK ONE C PO  BID FOR CONSTIPATION 8/2/16   Historical Provider   amLODIPine (NORVASC) 10 mg tablet Take  by mouth ACB/HS. Historical Provider   cloNIDine HCl (CATAPRES) 0.1 mg tablet Take  by mouth two (2) times a day. Historical Provider   tiotropium bromide (SPIRIVA RESPIMAT) 1.25 mcg/actuation inhaler Take 2 Puffs by inhalation daily. Historical Provider   ferrous sulfate 325 mg (65 mg iron) tablet Take 325 mg by mouth three (3) times daily (with meals). Historical Provider   potassium chloride (KLOR-CON M20) 20 mEq tablet Take 20 mEq by mouth daily as needed (for leg cramps). Historical Provider   diphenhydrAMINE (BENADRYL ALLERGY) 25 mg tablet Take 25 mg by mouth three (3) times daily as needed for Itching. Historical Provider   aspirin 81 mg chewable tablet Take 81 mg by mouth daily. Historical Provider       REVIEW OF SYSTEMS:     I am not able to complete the review of systems because:    The patient is intubated and sedated   x The patient has altered mental status due to medications and is only giving limited history    The patient has baseline aphasia from prior stroke(s)    The patient has baseline dementia and is not reliable historian    The patient is in acute medical distress and unable to provide information           Total of 12 systems reviewed as follows:       POSITIVE= underlined text  Negative = text not underlined  General:  fever, chills, sweats, generalized weakness, weight loss/gain,      loss of appetite   Eyes:    blurred vision, eye pain, loss of vision, double vision  ENT:    rhinorrhea, pharyngitis   Respiratory:   cough, sputum production, SOB, ELIZABETH, wheezing, pleuritic pain   Cardiology:   chest pain, palpitations, orthopnea, PND, edema, syncope   Gastrointestinal:  abdominal pain , N/V, diarrhea, dysphagia, constipation, bleeding   Genitourinary:  frequency, urgency, dysuria, hematuria, incontinence   Muskuloskeletal :  arthralgia, myalgia, back pain  Hematology:  easy bruising, nose or gum bleeding, lymphadenopathy   Dermatological: rash, ulceration, pruritis, color change / jaundice  Endocrine:   hot flashes or polydipsia   Neurological:  headache, dizziness, confusion, focal weakness, paresthesia,     Speech difficulties, memory loss, gait difficulty  Psychological: Feelings of anxiety, depression, agitation    Objective:   VITALS:    Visit Vitals    /50    Pulse 71       PHYSICAL EXAM:    General:    Arousable for 2-3 seconds at a time, not cooperative due to lethargy, no distress, appears stated age. HEENT: Atraumatic, anicteric sclerae, pink conjunctivae     No oral ulcers, mucosa moist, throat clear, dentition fair  Neck:  Supple, symmetrical,  thyroid: non tender  Lungs:   Clear to auscultation bilaterally. No Wheezing or Rhonchi. No rales. Chest wall:  No tenderness  No Accessory muscle use. Heart:   Regular rhythm,  No  murmur   No edema  Abdomen:   Soft, non-tender. moderately distended. Bowel sounds normal  Extremities: No cyanosis. No clubbing    Skin:     Pale. Not Jaundiced  No rashes   Psych:  Limited insight. Not depressed. Not anxious or agitated. Neurologic: Not participating in neuro exam due to lethargy. Oriented x 1.    _______________________________________________________________________  Care Plan discussed with:    Comments   Patient x    Family      RN     Care Manager                    Consultant:  x vascular   _______________________________________________________________________  Expected  Disposition:   Home with Family x   HH/PT/OT/RN x   SNF/LTC    RADHA    ________________________________________________________________________  TOTAL TIME:   Minutes    Critical Care Provided    39 Minutes non procedure based ([id number] and additional time in discussion with vascular surgery, transfer center and ER at [facility])      Comments    x Reviewed previous records   >50% of visit spent in counseling and coordination of care x Discussion with patient and/or family and questions answered       ________________________________________________________________________  Signed: Cholo Norton MD    Procedures: see electronic medical records for all procedures/Xrays and details which were not copied into this note but were reviewed prior to creation of Plan.     LAB DATA REVIEWED:    Recent Results (from the past 24 hour(s))   EKG, 12 LEAD, INITIAL    Collection Time: 02/23/17  3:06 PM   Result Value Ref Range    Ventricular Rate 70 BPM    Atrial Rate 70 BPM    P-R Interval 148 ms    QRS Duration 88 ms    Q-T Interval 392 ms    QTC Calculation (Bezet) 423 ms    Calculated P Axis 70 degrees    Calculated R Axis -6 degrees    Calculated T Axis 102 degrees    Diagnosis       Normal sinus rhythm  Possible Left atrial enlargement  Nonspecific ST and T wave abnormality  Abnormal ECG  When compared with ECG of 24-JUN-2016 21:09,  Nonspecific T wave abnormality no longer evident in Inferior leads  T wave inversion less evident in Anterolateral leads     METABOLIC PANEL, COMPREHENSIVE    Collection Time: 02/23/17  3:20 PM   Result Value Ref Range    Sodium 132 (L) 136 - 145 mmol/L    Potassium 4.0 3.5 - 5.1 mmol/L    Chloride 96 (L) 97 - 108 mmol/L    CO2 30 21 - 32 mmol/L    Anion gap 6 5 - 15 mmol/L    Glucose 74 65 - 100 mg/dL    BUN 30 (H) 6 - 20 MG/DL    Creatinine 1.92 (H) 0.55 - 1.02 MG/DL    BUN/Creatinine ratio 16 12 - 20      GFR est AA 32 (L) >60 ml/min/1.73m2    GFR est non-AA 26 (L) >60 ml/min/1.73m2    Calcium 9.0 8.5 - 10.1 MG/DL    Bilirubin, total 0.2 0.2 - 1.0 MG/DL    ALT (SGPT) 58 12 - 78 U/L    AST (SGOT) 68 (H) 15 - 37 U/L    Alk. phosphatase 79 45 - 117 U/L    Protein, total 7.7 6.4 - 8.2 g/dL    Albumin 3.1 (L) 3.5 - 5.0 g/dL    Globulin 4.6 (H) 2.0 - 4.0 g/dL    A-G Ratio 0.7 (L) 1.1 - 2.2     CBC WITH AUTOMATED DIFF    Collection Time: 02/23/17  3:20 PM   Result Value Ref Range    WBC 10.4 3.6 - 11.0 K/uL    RBC 3.53 (L) 3.80 - 5.20 M/uL    HGB 10.0 (L) 11.5 - 16.0 g/dL    HCT 30.2 (L) 35.0 - 47.0 %    MCV 85.6 80.0 - 99.0 FL    MCH 28.3 26.0 - 34.0 PG    MCHC 33.1 30.0 - 36.5 g/dL    RDW 14.4 11.5 - 14.5 %    PLATELET 580 958 - 157 K/uL    NEUTROPHILS 73 32 - 75 %    LYMPHOCYTES 9 (L) 12 - 49 %    MONOCYTES 11 5 - 13 %    EOSINOPHILS 7 0 - 7 %    BASOPHILS 0 0 - 1 %    ABS. NEUTROPHILS 7.6 1.8 - 8.0 K/UL    ABS. LYMPHOCYTES 0.9 0.8 - 3.5 K/UL    ABS. MONOCYTES 1.2 (H) 0.0 - 1.0 K/UL    ABS. EOSINOPHILS 0.7 (H) 0.0 - 0.4 K/UL    ABS.  BASOPHILS 0.0 0.0 - 0.1 K/UL   LIPASE    Collection Time: 02/23/17  3:20 PM   Result Value Ref Range    Lipase 147 73 - 393 U/L   LACTIC ACID, PLASMA    Collection Time: 02/23/17  3:20 PM   Result Value Ref Range    Lactic acid 0.8 0.4 - 2.0 MMOL/L   TROPONIN I    Collection Time: 02/23/17  3:20 PM   Result Value Ref Range    Troponin-I, Qt. <0.04 <0.05 ng/mL   URINALYSIS W/MICROSCOPIC    Collection Time: 02/23/17  3:21 PM   Result Value Ref Range    Color YELLOW/STRAW      Appearance CLOUDY (A) CLEAR      Specific gravity 1.013 1.003 - 1.030      pH (UA) 5.0 5.0 - 8.0      Protein 100 (A) NEG mg/dL    Glucose 100 (A) NEG mg/dL    Ketone NEGATIVE  NEG mg/dL    Bilirubin NEGATIVE NEG      Blood NEGATIVE  NEG      Urobilinogen 0.2 0.2 - 1.0 EU/dL    Nitrites NEGATIVE  NEG      Leukocyte Esterase TRACE (A) NEG      WBC 0-4 0 - 4 /hpf    RBC 0-5 0 - 5 /hpf    Epithelial cells FEW FEW /lpf    Bacteria NEGATIVE  NEG /hpf    Yeast PRESENT (A) NEG

## 2017-02-24 NOTE — ED NOTES
Pt escorted to floor by Anne Elliott and tech with Esmolol gtt continued and cardiac monitor. V/S remain stable.

## 2017-02-24 NOTE — ROUTINE PROCESS
Report to [facility] on CCU regarding pt's current hx of admission and plan of care. Pt has remained on total bedrest with Esmolol gtt infusing and v/s within parameters since arrival. No c/o pain made. Pt to be escorted by CCU staff to room via stretcher, IV, O2, and cardiac monitor.

## 2017-02-24 NOTE — PROGRESS NOTES
0730:  Bedside handoff report received from Candi Mckeon RN (offgoing nurse). Pt on esmolol at 170 mcg/kg/min. Pt reports \"a little\" pain \"all over\", along with nausea. Extremities warm; cap refill normal in BLE BUE; Pulses +1 in BLE, BUE.    0900:  2 mg morphine given for 6/10 abdominal and upper back pain. 0945:  Pt denies pain; resting comfortably. 1055:  Pt denies urge to void; has not voided through shift. Bladder scan reveals 781 mL. Writer s/w Dr. Mary Stahl: orders received to place grubbs catheter. Pt agreeable    1110: Grubbs catheter placed using sterile technique. Pt tolerated well. Pale yellow urine draining. 1555:  IV with normal saline infusing noted to be leaking; IV removed; writer unable to place new line. PICC team notified. Esmolol continues to infuse. 1655:  Writer s/w Dr. Sailaja Mckeon regarding prospect of PICC line as pt will now be on esmolol and nicardepine. Dr. Des Noel reminded of pt's elevated creatinine and CKD; agreeable to PICC placement. 1900:  Bedside handoff report given to Candi Mckeon RN (oncoming nurse).     Elena Holly RN

## 2017-02-24 NOTE — ED PROVIDER NOTES
HPI Comments: Ras Castillo is a 59 y.o. female with PMhx significant for HTN, carotid arterial disease, CAD, COPD, GERD, and anemia who presents via EMS as sent by [address] [place] [address] ED for admission and overnight observation prior to undergoing a AAA Type B repair tomorrow with cardiac surgery. Pt was seen at [address] [place] [address] ED today complaining of constant, generalized abdominal, chest, and upper back pain that had been ongoing x 2 days after eating dinner. She had followed up with her PCP on 2/21 regarding symptoms and was diagnosed with gastritis with no known sick contact exposure. On exam, pt reports some discomfort across her upper back. She is otherwise without complaint and denies any leg pain, numbness or weakness. See note from [address] [place] [address] ED. PCP: Rosa Glass NP    There are no other complaints, changes or physical findings at this time. The history is provided by the patient and medical records. No  was used. Past Medical History:   Diagnosis Date    Anemia     Anxiety disorder     Arthritis     BACK, RT. KNEE and HANDS    CAD (coronary artery disease)      s/p 5 vessel CABG 2011    Carotid arterial disease (HCC)     diffuse bilateral CCA plaques    Chronic obstructive pulmonary disease (HCC)     Depression with anxiety     Essential hypertension     GERD (gastroesophageal reflux disease)     rarely    GI bleed     Hypothyroid     Mesenteric artery stenosis (HCC)     Microcytic anemia     Renal artery atherosclerosis, bilateral (HCC)      RA occlusion, left s/p stent    Renal atrophy, right     Thyroid disease     UC (ulcerative colitis) ([facility] [address]        Past Surgical History:   Procedure Laterality Date    ESOPHAGEAL CAPSULE ENDOSCOPY  6/8/2015         HX COLONOSCOPY      HX CORONARY ARTERY BYPASS GRAFT  8/11    5 way bypass.  08/19/11    HX FEMORAL BYPASS      triple    HX LUMBAR DISKECTOMY  1997    HX UROLOGICAL      vascular stent x 2 to left kidney  SMALL BOWEL ENDOSCOPY,ABLATE LESN  3/13/2015         STRESS TEST LEXISCAN/CARDIOLITE  12    UPPER GI ENDOSCOPY,CTRL BLEED  2015              Family History:   Problem Relation Age of Onset    Post-op Nausea/Vomiting Other     Other Other      LOW BP AND DIFFICULTY BREATHING    Hypertension Mother     Stroke Mother       age 48,    Laurie Stack Hypertension Brother     Stroke Brother       age 55, smoked       Social History     Social History    Marital status:      Spouse name: N/A    Number of children: N/A    Years of education: N/A     Occupational History    Not on file. Social History Main Topics    Smoking status: Former Smoker     Packs/day: 1.00     Years: 40.00     Types: Cigarettes     Quit date: 2011    Smokeless tobacco: Former User      Comment: quit a few times but relapsed    Alcohol use No    Drug use: No    Sexual activity: No     Other Topics Concern    Not on file     Social History Narrative         ALLERGIES: Tussionex; Codeine; Nickel; and Oxycodone    Review of Systems   Constitutional: Negative for activity change, appetite change, chills, fever and unexpected weight change. HENT: Negative for congestion. Eyes: Negative for pain and visual disturbance. Respiratory: Negative for cough and shortness of breath. Cardiovascular: Negative for chest pain. Gastrointestinal: Negative for abdominal pain, diarrhea, nausea and vomiting. Genitourinary: Negative for dysuria. Musculoskeletal: Positive for back pain. Negative for myalgias. Skin: Negative for pallor and rash. Neurological: Negative for weakness, numbness and headaches. Vitals:    17 2230 17 2248 17 2345   BP: 133/50     Pulse: 71 71    SpO2:   94%            Physical Exam   Constitutional: She is oriented to person, place, and time. She appears well-developed and well-nourished. Sleepy, but easily arousable. HENT:   Head: Normocephalic and atraumatic. Mouth/Throat: Oropharynx is clear and moist.   Eyes: Conjunctivae and EOM are normal. Pupils are equal, round, and reactive to light. Right eye exhibits no discharge. Left eye exhibits no discharge. Neck: Normal range of motion. Neck supple. No JVD present. No tracheal deviation present. Cardiovascular: Normal rate, normal heart sounds and intact distal pulses. No murmur heard. Pulses:       Radial pulses are 2+ on the right side, and 2+ on the left side. Femoral pulses are 2+ on the right side, and 2+ on the left side. Dorsalis pedis pulses are 2+ on the right side, and 2+ on the left side. HR of 70. Pulmonary/Chest: Effort normal and breath sounds normal. No stridor. No respiratory distress. She has no wheezes. She has no rales. Normal respiratory rate. Abdominal: Soft. Bowel sounds are normal. She exhibits no distension and no mass. There is no tenderness. Musculoskeletal: Normal range of motion. She exhibits no edema, tenderness or deformity. Neurological: She is alert and oriented to person, place, and time. No cranial nerve deficit. She exhibits normal muscle tone. Skin: Skin is warm and dry. No rash noted. She is not diaphoretic. Nursing note and vitals reviewed. MDM  Number of Diagnoses or Management Options  Dissection of descending thoracic aorta [facility]):   Diagnosis management comments: Known descending dissection. Hear rate and blood pressure well controlled with beta blocker. Pain well controlled with opiates. Distal pulses intact. Pt appears stable at this time.         Amount and/or Complexity of Data Reviewed  Clinical lab tests: ordered and reviewed  Review and summarize past medical records: yes  Discuss the patient with other providers: yes (Vascular Surgery, Hospitalist)    Patient Progress  Patient progress: stable        Procedures    PROGRESS NOTE:  10:55 PM  Pt reports itching at time of exam; symptoms believed to be a side effect of the narcotic pain medication. Will provide Benadryl. CONSULT NOTE:  11:21 PM  Tiffany Ortez MD spoke with Dr. Clarence Cook,  Specialty: vascular surgery  Discussed pt's hx, disposition, and available diagnostic and imaging results. Reviewed care plans. Consultant made aware of pt's status in the ED. He recommends hospitalist admission. CONSULT NOTE:   11:30 PM  Tiffany Ortez MD spoke with Dr. Ying Ramsey,   Specialty: Hospitalist  Discussed pt's hx, disposition, and available diagnostic and imaging results. Reviewed care plans. Consultant will evaluate pt for admission. LABORATORY TESTS:  Recent Results (from the past 12 hour(s))   EKG, 12 LEAD, INITIAL    Collection Time: 02/23/17  3:06 PM   Result Value Ref Range    Ventricular Rate 70 BPM    Atrial Rate 70 BPM    P-R Interval 148 ms    QRS Duration 88 ms    Q-T Interval 392 ms    QTC Calculation (Bezet) 423 ms    Calculated P Axis 70 degrees    Calculated R Axis -6 degrees    Calculated T Axis 102 degrees    Diagnosis       Normal sinus rhythm  Possible Left atrial enlargement  Nonspecific ST and T wave abnormality  Abnormal ECG  When compared with ECG of 24-JUN-2016 21:09,  Nonspecific T wave abnormality no longer evident in Inferior leads  T wave inversion less evident in Anterolateral leads     METABOLIC PANEL, COMPREHENSIVE    Collection Time: 02/23/17  3:20 PM   Result Value Ref Range    Sodium 132 (L) 136 - 145 mmol/L    Potassium 4.0 3.5 - 5.1 mmol/L    Chloride 96 (L) 97 - 108 mmol/L    CO2 30 21 - 32 mmol/L    Anion gap 6 5 - 15 mmol/L    Glucose 74 65 - 100 mg/dL    BUN 30 (H) 6 - 20 MG/DL    Creatinine 1.92 (H) 0.55 - 1.02 MG/DL    BUN/Creatinine ratio 16 12 - 20      GFR est AA 32 (L) >60 ml/min/1.73m2    GFR est non-AA 26 (L) >60 ml/min/1.73m2    Calcium 9.0 8.5 - 10.1 MG/DL    Bilirubin, total 0.2 0.2 - 1.0 MG/DL    ALT (SGPT) 58 12 - 78 U/L    AST (SGOT) 68 (H) 15 - 37 U/L    Alk.  phosphatase 79 45 - 117 U/L    Protein, total 7.7 6.4 - 8.2 g/dL    Albumin 3.1 (L) 3.5 - 5.0 g/dL    Globulin 4.6 (H) 2.0 - 4.0 g/dL    A-G Ratio 0.7 (L) 1.1 - 2.2     CBC WITH AUTOMATED DIFF    Collection Time: 02/23/17  3:20 PM   Result Value Ref Range    WBC 10.4 3.6 - 11.0 K/uL    RBC 3.53 (L) 3.80 - 5.20 M/uL    HGB 10.0 (L) 11.5 - 16.0 g/dL    HCT 30.2 (L) 35.0 - 47.0 %    MCV 85.6 80.0 - 99.0 FL    MCH 28.3 26.0 - 34.0 PG    MCHC 33.1 30.0 - 36.5 g/dL    RDW 14.4 11.5 - 14.5 %    PLATELET 516 309 - 690 K/uL    NEUTROPHILS 73 32 - 75 %    LYMPHOCYTES 9 (L) 12 - 49 %    MONOCYTES 11 5 - 13 %    EOSINOPHILS 7 0 - 7 %    BASOPHILS 0 0 - 1 %    ABS. NEUTROPHILS 7.6 1.8 - 8.0 K/UL    ABS. LYMPHOCYTES 0.9 0.8 - 3.5 K/UL    ABS. MONOCYTES 1.2 (H) 0.0 - 1.0 K/UL    ABS. EOSINOPHILS 0.7 (H) 0.0 - 0.4 K/UL    ABS. BASOPHILS 0.0 0.0 - 0.1 K/UL   LIPASE    Collection Time: 02/23/17  3:20 PM   Result Value Ref Range    Lipase 147 73 - 393 U/L   LACTIC ACID, PLASMA    Collection Time: 02/23/17  3:20 PM   Result Value Ref Range    Lactic acid 0.8 0.4 - 2.0 MMOL/L   TROPONIN I    Collection Time: 02/23/17  3:20 PM   Result Value Ref Range    Troponin-I, Qt. <0.04 <0.05 ng/mL   URINALYSIS W/MICROSCOPIC    Collection Time: 02/23/17  3:21 PM   Result Value Ref Range    Color YELLOW/STRAW      Appearance CLOUDY (A) CLEAR      Specific gravity 1.013 1.003 - 1.030      pH (UA) 5.0 5.0 - 8.0      Protein 100 (A) NEG mg/dL    Glucose 100 (A) NEG mg/dL    Ketone NEGATIVE  NEG mg/dL    Bilirubin NEGATIVE  NEG      Blood NEGATIVE  NEG      Urobilinogen 0.2 0.2 - 1.0 EU/dL    Nitrites NEGATIVE  NEG      Leukocyte Esterase TRACE (A) NEG      WBC 0-4 0 - 4 /hpf    RBC 0-5 0 - 5 /hpf    Epithelial cells FEW FEW /lpf    Bacteria NEGATIVE  NEG /hpf    Yeast PRESENT (A) NEG         IMAGING RESULTS:  CT Results  (Last 48 hours)               02/23/17 1715  CTA CHEST ABD PELV W CONT Final result    Impression:  IMPRESSION:   Progressing/developing type B descending aortic dissection.  There is contrast   extension into mural plaque in the descending aorta. There is extensive mural   atherosclerotic change in the descending aorta with additional focal areas of   ulcerated plaque as well. There is no compromise of flow into the left kidney. Severe stenosis in the left common carotid artery at its origin. The patient has   an internal carotid bypass history. Occluded right vertebral artery. Atrophic right kidney with moderate to severe stenosis in the right renal   artery. Small left-sided effusion is low-density. There is a diminutive right renal artery with renal cortical atrophy on the   right. These results were discussed with Dr. Carl Moreland at 5:38 PM on 2/23/2017 by Dr. Shayla Sierra. Narrative:  CLINICAL HISTORY: Vasculopathy, severe back pain, possible dissection, or   mesenteric ischemia       INDICATION: Vasculopathy, severe back pain, possible dissection, or mesenteric   ischemia           COMPARISON: 2013       TECHNIQUE: CT of the chest, abdomen, pelvis with  IV contrast , 7 mL of   Isovue-370 is performed. Axial images from the thoracic inlet to the level of   the upper abdomen are obtained. Manual post-processing of the images and coronal   reformatting is also performed. CT dose reduction was achieved through use of a standardized protocol tailored   for this examination and automatic exposure control for dose modulation. Multiplanar reformatted imaging was performed. Sagittal and coronal reformatting. 3-D Postprocessing of imaging was performed. 3-D MIP reconstructed images were obtained in the coronal plane. The risks/benefits of IV contrast menstruation were discussed with the patient. Pre-CT hydration. FINDINGS:    There is an area of abnormal contrast enhancement in the mural plaque of the   descending thoracic aorta. There are additional small foci of ulcerative plaque   more inferiorly.  In the superior descending aorta just beyond the origin of the   left subclavian artery there is a ring of contrast enhancement extending into   the mural plaque. Postprocedural changes and coronary artery disease. Sternotomy. The a sending   aorta is normal in caliber. There is severe stenosis in the common carotid   artery on the left. The patient has a history of carotid bypass. The right   vertebral artery is occluded. There is no proximal pulmonary embolism. There is a small left-sided pleural   effusion which is fluid density. There is no mediastinal, axillary or hilar lymphadenopathy. There is no pleural   or pericardial effusion. Abdominal aorta demonstrates extensive mural irregularity or plaque. There is a   left renal stent. Severe stenosis in the proximal right renal artery with renal   cortical atrophy. SMA origin demonstrates atherosclerotic change without occlusion. Celiac origin demonstrates atherosclerotic change without hemodynamically   significant stenosis or occlusion common iliac vessels demonstrate   atherosclerotic change without hemodynamically significant stenosis. External iliac arteries also with mild atherosclerotic change. Mild stenosis in   the proximal left superficial femoral artery. There is atrophy of the right kidney. Contrast opacification of the right and left renal cortices is identified. Right   renal cysts. Duodenal diverticulum. Cholelithiasis. Hepatic steatosis. SPLEEN/PANCREAS: No mass. There is no pancreatic duct dilatation. There is no   evidence of splenomegaly. No focal       ADRENALS/KIDNEYS: Renal cortical atrophy on the right. Bilateral renal cysts. There is no hydroureter or hydronephrosis. There is no renal mass. There is no   perinephric mass. COLON AND SMALL BOWEL: No dilatation or wall thickening. There is no obstruction   or free intraperitoneal air. There is no evidence of incarceration or   obstruction. APPENDIX: Unremarkable. URINARY BLADDER: No mass or calculus. BONES: No destructive bone lesion. CXR Results  (Last 48 hours)               02/23/17 1514  XR CHEST PA LAT Final result    Impression:  IMPRESSION: No acute process. No change from the prior study. Narrative:  INDICATION: Abdominal pain for 2 days. Chest and upper back pain. COMPARISON: August 26, 2015       FINDINGS: PA and lateral views of the chest demonstrate a stable   cardiomediastinal silhouette and clear lungs bilaterally. The patient is status   post median sternotomy and CABG. The visualized osseous structures are   unremarkable. MEDICATIONS GIVEN:  Medications   esmolol 10mg/mL (BREVIBLOC) infusion (0 mcg/kg/min × 78 kg IntraVENous Continued On External Transport 2/23/17 2230)   diphenhydrAMINE (BENADRYL) injection 25 mg (25 mg IntraVENous Given 2/23/17 2313)   0.9% sodium chloride infusion (150 mL/hr IntraVENous New Bag 2/23/17 2314)       IMPRESSION:  1. Dissection of descending thoracic aorta (HCC)        PLAN:  1. Admit to Hospitalist    Admit Note:  11:33 PM  Patient is being admitted to the hospital by Dr. Lionel Castle. The results of their tests and reasons for their admission have been discussed with them and/or available family. They convey agreement and understanding for the need to be admitted and for their admission diagnosis. Consultation has been made with the inpatient physician specialist for hospitalization. Attestation: This note is prepared by Rebekah Miranda, acting as Scribe for MD Wu Akhtar MD: The scribe's documentation has been prepared under my direction and personally reviewed by me in its entirety. I confirm that the note above accurately reflects all work, treatment, procedures, and medical decision making performed by me.

## 2017-02-24 NOTE — CONSULTS
Vascular Surgery Consult    Subjective:      Denny Rosario is a 59 y.o. WHITE OR  female who was referred for Type B aortic dissection evaluation by Dr. Wilfredo Barnhart. Pt with PMH significant for CAD, s/p CABG, HTN, anxiety, carotid artery disease, renal artery stenosis, high cholesterol, anemia and GERD. Pt presented to [facility] ED yesterday with c/o worsening chest, upper back and abdominal pain, described as burning and pressure, over the last 2 days. She was diagnosed with gastritis by PCP but symptoms have persisted. Symptoms seemed to be aggravated by eating, no relieving factors. She denies any SOB, N/V, diarrhea, fever or chills. CTA of chest, abdomen and pelvis revealed the following:    Progressing/developing type B descending aortic dissection. There is contrast  extension into mural plaque in the descending aorta. There is extensive mural  atherosclerotic change in the descending aorta with additional focal areas of  ulcerated plaque as well. There is no compromise of flow into the left kidney.      Severe stenosis in the left common carotid artery at its origin. The patient has  an internal carotid bypass history.     Occluded right vertebral artery.     Atrophic right kidney with moderate to severe stenosis in the right renal  artery.     Past Medical History:   Diagnosis Date    Anxiety disorder     Arthritis     BACK, RT. KNEE and HANDS    CAD (coronary artery disease)      s/p 5 vessel CABG 2011    Carotid arterial disease (HCC)     diffuse bilateral CCA plaques    Chronic obstructive pulmonary disease (HCC)     Depression with anxiety     Essential hypertension     GERD (gastroesophageal reflux disease)     rarely    GI bleed     Hypothyroid     Mesenteric artery stenosis (HCC)     Microcytic anemia     Renal artery atherosclerosis, bilateral (HCC)      RA occlusion, left s/p stent    Renal atrophy, right     UC (ulcerative colitis) ([facility] [address]      Past Surgical History:   Procedure Laterality Date    ESOPHAGEAL CAPSULE ENDOSCOPY  2015         HX COLONOSCOPY      HX CORONARY ARTERY BYPASS GRAFT      5 way bypass. 11    HX FEMORAL BYPASS      triple    HX LUMBAR DISKECTOMY      HX UROLOGICAL      vascular stent x 2 to left kidney    SMALL BOWEL ENDOSCOPY,ABLATE LESN  3/13/2015         STRESS TEST LEXISCAN/CARDIOLITE  12    UPPER GI ENDOSCOPY,CTRL BLEED  2015           Social History   Substance Use Topics    Smoking status: Former Smoker     Packs/day: 1.00     Years: 40.00     Types: Cigarettes     Quit date: 2011    Smokeless tobacco: Former User      Comment: quit a few times but relapsed    Alcohol use No      Family History   Problem Relation Age of Onset    Post-op Nausea/Vomiting Other     Other Other      LOW BP AND DIFFICULTY BREATHING    Hypertension Mother     Stroke Mother       age 48,    [facility] Hypertension Brother    [facility] Stroke Brother       age 55, smoked     Prior to Admission medications    Medication Sig Start Date End Date Taking? Authorizing Provider   cefdinir (OMNICEF) 300 mg capsule Take 1 Cap by mouth two (2) times a day for 10 days. 2/22/17 3/4/17  Miles Reza NP   clonazePAM (KLONOPIN) 0.5 mg tablet TAKE 1 TABLET BY MOUTH TWICE A DAY . Marisabel Sanderson MUST LAST 30 DAYS 17   Miles Reza NP   spironolactone (ALDACTONE) 25 mg tablet Take 1 Tab by mouth daily. 17   Miles Reza NP   venlafaxine-SR [facility] 150 mg capsule Take 1 Cap by mouth two (2) times a day. 17   Miles Reza NP   levothyroxine (SYNTHROID) 100 mcg tablet Take 1 Tab by mouth Daily (before breakfast). 17   Miles Reza NP   clopidogrel (PLAVIX) 75 mg tab Take 1 Tab by mouth daily. 17   Miles Reza NP   fenofibrate (LOFIBRA) 54 mg tablet Take 1 Tab by mouth daily. 17   Miles Reza NP   pantoprazole (PROTONIX) 40 mg tablet Take 1 Tab by mouth two (2) times a day.  17   Miles Reza NP atorvastatin (LIPITOR) 40 mg tablet Take 2 Tabs by mouth daily. 1/17/17   Gold Brennan NP   busPIRone (BUSPAR) 7.5 mg tablet TAKE 1 TABLET BY MOUTH TWO TIMES A DAY. 1/10/17   Gold Brennan NP   metoprolol tartrate (LOPRESSOR) 25 mg tablet TAKE 1 TAB BY MOUTH TWO (2) TIMES A DAY. 12/20/16   Brynn Salmeron MD   hydrALAZINE (APRESOLINE) 25 mg tablet  8/2/16   Historical Provider   oxyCODONE-acetaminophen (PERCOCET) 5-325 mg per tablet TK 1 TO 2 TS PO Q 4 H PRN P 8/2/16   Historical Provider   STOOL SOFTENER 100 mg capsule TK ONE C PO  BID FOR CONSTIPATION 8/2/16   Historical Provider   amLODIPine (NORVASC) 10 mg tablet Take  by mouth ACB/HS. Historical Provider   cloNIDine HCl (CATAPRES) 0.1 mg tablet Take  by mouth two (2) times a day. Historical Provider   tiotropium bromide (SPIRIVA RESPIMAT) 1.25 mcg/actuation inhaler Take 2 Puffs by inhalation daily. Historical Provider   ferrous sulfate 325 mg (65 mg iron) tablet Take 325 mg by mouth three (3) times daily (with meals). Historical Provider   potassium chloride (KLOR-CON M20) 20 mEq tablet Take 20 mEq by mouth daily as needed (for leg cramps). Historical Provider   diphenhydrAMINE (BENADRYL ALLERGY) 25 mg tablet Take 25 mg by mouth three (3) times daily as needed for Itching. Historical Provider   aspirin 81 mg chewable tablet Take 81 mg by mouth daily.     Historical Provider       Allergies   Allergen Reactions    Tussionex Itching    Codeine Itching    Nickel Other (comments)     Local reaction (redness, irritation, blistering) if wears \"cheap earrings\"    Oxycodone Itching       Review of Systems:    [] Unable to obtain  ROS due to  []mental status change  []sedated   []intubated   [x]Total of 13 systems reviewed as follows:  Constitutional: negative fever, negative chills  Eyes:   negative for amauroses fugax  ENT:   negative sore throat,oral absecess  Endocrine Negative goiter; DM  Respiratory:  negative chronic cough,sputum production  Cards:  negative for  palpitations, lower extremity edema, varicosities, Claudication  GI:   negative for dysphagia, bleeding, nausea, vomiting, diarrhea  Genitourinary: negative for frequency, dysuria, BPH in men. Integument:  negative for rash and pruritus  Hematologic:  negative for easy bruising; bleeding dyscrasia  Musculoskel: negative for muscle weakness inhibiting ambulation  Neurological:  negative for stroke, syncope, dizziness, +TIA history  Behavl/Psych: negative for feelings of anxiety, depression     Objective:     Patient Vitals for the past 8 hrs:   BP Temp Pulse Resp SpO2 Weight   02/24/17 0802 144/76 - 80 16 96 % -   02/24/17 0706 136/54 98.5 °F (36.9 °C) 72 26 94 % -   02/24/17 0600 128/61 - 76 22 97 % -   02/24/17 0544 121/63 - 72 20 95 % -   02/24/17 0500 140/50 - 74 14 97 % -   02/24/17 0434 137/59 - 81 17 90 % -   02/24/17 0319 118/64 - 73 - - 81.9 kg (180 lb 8.9 oz)   02/24/17 0315 118/64 - 72 20 100 % -   02/24/17 0300 121/77 - 73 13 100 % -   02/24/17 0238 141/67 98.5 °F (36.9 °C) 74 21 100 % -   02/24/17 0154 118/44 - 78 - - -   02/24/17 0145 118/44 - 80 24 96 % -   02/24/17 0130 137/52 - 73 21 96 % -   02/24/17 0115 135/51 - 75 19 98 % -       EXAM:  General:  Alert, cooperative, appears stated age. Anxious   Eyes:  Sclera anicteric, PERRL   Neck: Supple, symmetrical, trachea midline, no adenopathy, no carotid bruit and no JVD. Lungs:   Clear to auscultation bilaterally. Heart:  Regular rate and rhythm, S1, S2 normal, no murmur, click, rub or gallop. Abdomen:   Soft, non-tender. Bowel sounds normal. No masses,  No organomegaly. Extremities: No edema, +2 DP pulses bilat, unable to palpate PT pulses, lower extremities warm   Neurologic:  Normal strength and sensation throughout.      LABS: Recent Labs      02/24/17   0357   WBC  9.7   HGB  8.7*   HCT  26.6*   PLT  266   NA  138   K  4.4   BUN  25*   CREA  1.76*   GLU  84   INR  1.1       Assessment:     Patient Active Problem List   Diagnosis Code    HTN (hypertension) I10    Depression with anxiety F41.8    Hypothyroid E03.9    Hypercholesterolemia E78.00    CAD (coronary artery disease) I25.10    S/P CABG (coronary artery bypass graft) Z95.1    TIA (transient ischemic attack) G45.9    Hypokalemia E87.6    Renal artery arteriosclerosis (HCC) I70.1    Acute lower GI bleeding K92.2    Snoring R06.83    Joint pain M25.50    Disturbance of skin sensation R20.9    Cerebral thrombosis without mention of cerebral infarction I66.9    GI bleed K92.2    Iron deficiency anemia due to chronic blood loss D50.0    Anemia D64.9    COPD (chronic obstructive pulmonary disease) ([facility]) J44.9    Dyslipidemia E78.5    Carotid disease, bilateral ([facility]) I77.9    ABIMBOLA (obstructive sleep apnea) G47.33    Symptomatic anemia D64.9    Aneurysm ([facility]) I72.9       Plan:   1. Type B aortic dissection: cont to control pain and BP, keep NPO. Over sedated with dilaudid apparently, trying morphine for pain. If significant pain continues will most likely need surgical intervention today.  Dr. Reynolds  will reassess between OR cases today and determine plan     Signed By: Haseeb Moody NP     February 24, 2017       madhavions

## 2017-02-24 NOTE — ED NOTES
Bedside and Verbal shift change report given to Jacinto Craft (oncoming nurse) by Graciela Cee RN (offgoing nurse).  Report included the following information SBAR, ED Summary, MAR, Recent Results and Cardiac Rhythm SR.

## 2017-02-24 NOTE — PROGRESS NOTES
2/24/2017  8:33 AM    INTENSIVIST PROGRESS NOTE:     Patient seen and evaluated   58 yo female with HX of HTN presented with CP  Found to have uncontrolled BP, type B descending aortic dissection  Admitted by vascular surgery  Now anxious on esmolol drip  Resting comfortable  No acute distress    Visit Vitals    /76    Pulse 80    Temp 98.5 °F (36.9 °C)    Resp 16    Wt 81.9 kg (180 lb 8.9 oz)    SpO2 96%    Breastfeeding No    BMI 29.14 kg/m2       General: no acute distress  CV: RRR  Lungs: clear    CXR: no acute findings    Labs reviewed    A/P:  BP control  Pain control  IVF  May need to go to OR today  Will assist on disposition planning when stable for transfer  Yaakov Pearson MD

## 2017-02-24 NOTE — PROGRESS NOTES
Vascular    Pain free at present  She has only had 2mg Morphine  BP/HR good on Esmolol  No need for emergent TEVAR at this time  Will give her a diet  Watch closely for now  We are prepared for emergent repair but would be good to give her a chance to recover fro JEANA before more contrast exposure if possible

## 2017-02-24 NOTE — PROGRESS NOTES
PICC (Peripherally Inserted Central Catheter) line insertion  procedure note :     Procedure explained to patient along with risks and benefits  and patient agreed to proceed. Informed consent obtained from  patient. Patient teaching completed. Timeout completed. Pre-procedure assessment done. Maximum sterile barrier precautions observed throughout procedure. Lidocaine 1%  3.0    ml sq given prior to cannulation. Cannulated basilic  vein using ultrasound guidance and modified seldinger technique. Inserted 6  [place] triple  lumen PICC to right arm using Hitpost Tip Location System and  [address]. Pt has    sinus   rhythm. PICC tip location was confirmed by 3 CG tip positioning system, indicating tall P wave and no negative deflection before P wave which would indicate that the PICC tip is properly placed in the distal SVC or at the [place]. PICC tip location was  confirmed by 2 PICC nurses and 3CG printout placed on patient's chart. Blood return verified and flushed with 20 ml normal saline in each port. Sterile dressing applied with biopatch, statLock and occlusive dressing as per protocol. Curos caps applied to each port. Patient tolerated procedure well with minimal blood loss ( less than 5 ml.)   Patient education material provided. PICC procedure performed by  :  Shania Avilez RN. PICC nurse  Assisted by : Glenora Castleman RN  PICC nurse  Reason for access : reliable access / MD order /   Hemodynamically unstable / Poor vascular access / Vesicant IV medication infusion  Complications related to insertion  : none  X-Ray : not applicable  Notified primary nurse  Eddie Bronson RN  that  PICC line can be used.    Total Trimmed Length :  39   cm   External Length : 0  cm   PICC line site arm circumference:  29    cm   PICC catheter occupies  19   % of vein  Type of PICC: Bard Solo Power PICC   Ref # :     [id number]     Lot # :  [id number]   Expiration Date :    2018-03-31     Shania Avilez RN. MEGGAN. OLVIN,CMSRN. Clinician IV .  PICC Nurse, Vascular Access Team.

## 2017-02-24 NOTE — ED NOTES
Pt transferred to [facility] via [facility], belongings taken with patient. Pt treated for Nausea prior transfer.

## 2017-02-24 NOTE — PROGRESS NOTES
0300  TRANSFER - IN REPORT:    Verbal report received from Melchor GARCIA(name) on Denny Rosario  being received from ER(unit) for routine progression of care      Report consisted of patients Situation, Background, Assessment and   Recommendations(SBAR). Information from the following report(s) SBAR, Recent Results and Cardiac Rhythm NSR was reviewed with the receiving nurse. Opportunity for questions and clarification was provided. Assessment completed upon patients arrival to unit and care assumed. Patient brought from ER to CCU with RN and tech on monitor. VSS. NSR. Afebrile. Has not voided. A/O to person, time, and situation, had to reorient to place. Clothes and purse in closet. Pulses palpable. Primary Nurse Jake Tunrer and Jose RN, RN performed a dual skin assessment on this patient No impairment noted  Robin score is 17    0320  7/10 pain in epigastric area, pt describes as aching, pt drifting in and out of sleep, trying to use bedpan to see if that's what is causing pain, pt said pain feels like she is just hungry    0450  Patient bladder scanned over 400 in bladder. Straight cath per . 650 out. Titrating esmolol, pt denies pain.     0700  Bedside and Verbal shift change report given to ABRAHAN's (oncoming nurse) by Mk Pérez RN (offgoing nurse). Report included the following information SBAR, Kardex, Intake/Output, MAR, Recent Results, Med Rec Status and Cardiac Rhythm NSR.

## 2017-02-24 NOTE — ED NOTES
Pt currently max dose for esmolol, pt to maintain on current dose. Dr. Perla Broadveronica aware of current BP and heart rate. No further orders at this time.

## 2017-02-25 NOTE — PROGRESS NOTES
Patient A/Ox4, drowsy. Follows commands. VSS. NSR 60s. Titrating meds according to parameters in esmolol order. Pulses palpable, denies pain. Put O2 at 3 L on, pt says she sometimes wears O2 at home. 2245  Pt has some back pain, describes it as 2/10 achy, burning, and constant. She says she thinks it is from being in bed all day. 2 mg morphine given, pt then denied any pain. 2330  No change in assessment. VSS. NSR. Afebrile. 12  Pt woke up confused, asked RN where her  was and then where was she. Reoriented to place, time, and situation, pt asked if she was dying, having some back pain and possible nausea, but has not been able to describe what level and how it feels, it is the middle of her back. VS remain about the same, BP varying based on pt alertness and arm position. A little anxious. Labs drawn. 0250  Zofran given. 0300  SBP 140s-150s titrating up, 0400 metoprolol given.   hmg 8.7 and today 7.4    Discussed situation with [facility] RN.      1271  CT abd pelvis for decrease in H/H to rule out bleed. Pt told xray tech that her abdomen was hurting some, she described it as gas to RN, like she needs to have a BM.     0420  Called CT to see why image has not been read, it has been verified and is waiting to be read by radiologist.   Patient is less confused, just drowsy. 7984  Image has not been read, CT called again. 5405  No evidence of bleed from CT abd/pelvis, small bilateral pleural effusions. 2873  6/10 aching in middle of back, morphine helped. Bedside and Verbal shift change report given to Do Bajwa (oncoming nurse) by Narda Perkins RN (offgoing nurse). Report included the following information SBAR, Kardex, Intake/Output, MAR, Recent Results, Med Rec Status and Cardiac Rhythm NSR.

## 2017-02-25 NOTE — PROGRESS NOTES
Vascular  Stilll uncomfortable but improving  Good urine output  Renal function improving with Cr of 1.47  hgb down to 7.4-will set up 2 units of prbcs for the OR  Continue ICU care

## 2017-02-25 NOTE — PROGRESS NOTES
Patient complained of nausea. Zofran 4mg IV given. 1005 Nausea relieved some at this time. 1200 Patient with O2 sats 88%. Patient with nasal cannula at 3LPM and patient states that she wears nasal cannula at home of 4 LPM. O2 increased to 5 LPM.   1203 O2 sat up to 90%  1352 Dr. Won Clark in to see patient. 1643 Patient complained of pain in back 9/10. Morphine 2mg IV given. 1700 Patient resting quietly with eyes closed. 1900 Patient without complaints at this time. Report to Morelia Mac RN.

## 2017-02-25 NOTE — PROGRESS NOTES
1746 -- Patient vomited green chunky emesis approx. 150cc. Zofran 4mg IV given. 1800 -- Nausea relieved at this time. Patient resting .

## 2017-02-25 NOTE — PROGRESS NOTES
2/25/2017  8:33 AM    INTENSIVIST PROGRESS NOTE:       58 yo female with HX of HTN presented with CP  Found to have uncontrolled BP, type B descending aortic dissection  Admitted by vascular surgery  Now  on esmolol drip  Resting comfortable, poor appetite, denies chest pain  No acute distress    Visit Vitals    /52 (BP 1 Location: Left arm, BP Patient Position: At rest)    Pulse 64    Temp 97.9 °F (36.6 °C)    Resp 17    Wt 81.9 kg (180 lb 8.9 oz)    SpO2 94%    Breastfeeding No    BMI 29.14 kg/m2       General: no acute distress  CV: RRR  Lungs: clear    CXR: no acute findings    Labs reviewed    Impression  =========  Type B aortic dissection  htn    Plan:   ====  Esmolol  Vascular surgery plans noted  Pain control  IVF  F/u labs      Martha Carter MD

## 2017-02-26 NOTE — ROUTINE PROCESS
8:35 PM  Pt SATS in 80's. Went in pt room. Pt states she feels SOB. NC turned back on.    8:38 PM  Pt SATS remains in 80's despite NC. Venti-mask 24%, 2 L NC placed on pt.    8:45 PM  Pt place Venti mask 50%, SATS up to low 90's. Klonopin given for anxiety.

## 2017-02-26 NOTE — PROGRESS NOTES
2/26/2017  8:33 AM    INTENSIVIST PROGRESS NOTE:       58 yo female with HX of HTN presented with CP  Found to have htn urgency and type B descending aortic dissection  Now  on esmolol drip  Resting comfortable, poor appetite, denies chest pain  No acute distress    Visit Vitals    /57    Pulse 62    Temp 97.8 °F (36.6 °C)    Resp 24    Wt 81.9 kg (180 lb 8.9 oz)    SpO2 93%    Breastfeeding No    BMI 29.14 kg/m2       General: no acute distress  CV: RRR  Lungs: clear    CXR: no acute findings    Labs reviewed    Impression  =========  Type B aortic dissection  htn    Plan:   ====  Esmolol  Vascular surgery plans noted  Pain control  IVF  F/u labs      Chavo Bashir MD

## 2017-02-26 NOTE — PROGRESS NOTES
Patient anxious and nervous. Clonazepam .5mg po given at this time. 8399 Patient eating a few bites of breakfast and complaining of nausea. Zofran 4mg IV given at this time. 0912 Nausea relieved at this time   0930 Dr. Kristine Castillo in to see patient. 1019 Patient complained of pain in her back 9/10. Morphine 2mg IV given at this time. 1045 Pain relieved and patient reports 0/10.  1102 Dr. Yovana Farley in to see patient  [phone] Patient complained of nausea. Compazine   5mg IV given. 1240 Nausea relieved and patient eating some of her lunch at this time. 1430 Patient with venti mask partially off of face at this time. O2 sats down to 70%. 50% venti-mask placed back on patient at this time and nasal cannula increased to 5LPM.   1431 O2 sats up to 81%. O2 sat coming up at this time   1434 O2 sat up to 90%. Dr. Yovana Farley paged. 12 Dr. Yovana Farley called back and notified of patients increase oxygen demand. Orders received for PCXR and ABG. 1520 Dr. Morris Louis paged to notify of ABG result. 1 Dr. Emeli Aguilar called back and order received for hi-flow O2 or 100% non-rebreather to keep O2 sat >92%. Patient placed on 100%non-rebreather mask and nasal cannula at 6LPM  1546 O2 sats %. Patient tolerating well at this time. 1629 Patient complained of nausea. Zofran 4mg IV given. 1645 Nausea easing a bit, but still persists. 1836 Patient continues to complain of nausea. Compazine 5mg Iv given. 1845 Patient resting quietly at this time. No complaints of pain or nausea. 1900 report to Sonu Wright RN.

## 2017-02-27 NOTE — PROGRESS NOTES
[facility]  Pulmonary, Critical Care, and Sleep Medicine    Name: Griselda Nanas MRN: [id number]   : 1955 Hospital: [facility]   Date: 2017        Critical Care Patient Consult      IMPRESSION:   · Acute respiratory failure with hypoxia: on NRB prior to OR. On arrival to ICU pt was not maintaing Vt. Placed on ACV, Vt of 500 but only was getting Vt of 100s. Placed pt on PCV with pressure of PCV of 24, That generated tidal volumes in mid 300s. Awaiting CXR to eval ET tube placement. · COPD noted to be moderate severity, Possible bronchitis-has moderate rhonchi. · AAA Type B dissection; distal thoracic aorta  · S/p Left CCA- SCA bypass 17  · S/p TEVAR 17  · Post op iliac artery repair  · IMH from Penetrating Aortic ulceration  · spinal drain placed for measurement of ICP, drainage for Ao dissection Opening pressure (cm H2O):  26  · PICC line  · Renal disease: CRI; JEANA - improved  · Abn LFTS  · Anemia  · Sleep apnea: No treatment  · Hypertension: well controlled  · CAD and CABG  · Hypothyroidism: well controlled  · Arthritis  · GERD: well controlled  · Pt is critically ill at at high risk of deterioration; 35 min CC, EOP. · CC time with pt eop procedures  · Discussed with nurse, and CRNA and RT. PLAN:   · CCU monitoring  · Hemodynamic monitoring  · Spinal drain monitoring  · Will try to obtain sputum Cx. · Added Nebs  · Will adjust ventilator. Will check ABG. Added nebs. Placed on PCV mode. · Frequent neuro checks  · Transfuse prn Hgb less than 7  · Watch renal function and LFTs  · CXR in AM  · Antihypertensives and Beta Blocker        Subjective/History: This patient has been seen and evaluated at the request of Dr. Sachin Escobedo for above. Patient is a 59 y.o. female who presented from OR directly to ICU. Pt underwent EVAR for aneurysm. Failed medical therapy for above. Brought to ICU intubated and sedated.      The patient is critically ill and can not provide additional history due to Ventilated. Past Medical History:   Diagnosis Date    Anxiety disorder     Arthritis     BACK, RT. KNEE and HANDS    CAD (coronary artery disease)      s/p 5 vessel CABG 2011    Carotid arterial disease (HCC)     diffuse bilateral CCA plaques    Chronic obstructive pulmonary disease (HCC)     Depression with anxiety     Essential hypertension     GERD (gastroesophageal reflux disease)     rarely    GI bleed     Hypothyroid     Mesenteric artery stenosis (HCC)     Microcytic anemia     Renal artery atherosclerosis, bilateral (HCC)      RA occlusion, left s/p stent    Renal atrophy, right     UC (ulcerative colitis) ([facility] [address]       Past Surgical History:   Procedure Laterality Date    ESOPHAGEAL CAPSULE ENDOSCOPY  6/8/2015         HX COLONOSCOPY      HX CORONARY ARTERY BYPASS GRAFT  8/11    5 way bypass. 08/19/11    HX FEMORAL BYPASS      triple    HX LUMBAR DISKECTOMY  1997    HX UROLOGICAL      vascular stent x 2 to left kidney    SMALL BOWEL ENDOSCOPY,ABLATE LESN  3/13/2015         STRESS TEST LEXISCAN/CARDIOLITE  4/24/12    UPPER GI ENDOSCOPY,CTRL BLEED  6/2/2015           Prior to Admission medications    Medication Sig Start Date End Date Taking? Authorizing Provider   spironolactone (ALDACTONE) 25 mg tablet Take 1 Tab by mouth daily. 1/17/17  Yes Christine Awad NP   venlafaxine-SR [facility] 150 mg capsule Take 1 Cap by mouth two (2) times a day. 1/17/17  Yes Christine Awad NP   clopidogrel (PLAVIX) 75 mg tab Take 1 Tab by mouth daily. 1/17/17  Yes Christine Awad NP   fenofibrate (LOFIBRA) 54 mg tablet Take 1 Tab by mouth daily. 1/17/17  Yes Christine Awad NP   pantoprazole (PROTONIX) 40 mg tablet Take 1 Tab by mouth two (2) times a day. 1/17/17  Yes Christine Awad NP   atorvastatin (LIPITOR) 40 mg tablet Take 2 Tabs by mouth daily.  1/17/17  Yes Christine Awad NP   busPIRone (BUSPAR) 7.5 mg tablet TAKE 1 TABLET BY MOUTH TWO TIMES A DAY. 1/10/17  Yes Forrest Hoffman NP   metoprolol tartrate (LOPRESSOR) 25 mg tablet TAKE 1 TAB BY MOUTH TWO (2) TIMES A DAY. 12/20/16  Yes Jania Suero MD   STOOL SOFTENER 100 mg capsule TK ONE C PO  BID FOR CONSTIPATION 8/2/16  Yes Historical Provider   cloNIDine HCl (CATAPRES) 0.1 mg tablet Take  by mouth two (2) times a day. Yes Historical Provider   cefdinir (OMNICEF) 300 mg capsule Take 1 Cap by mouth two (2) times a day for 10 days. 2/22/17 3/4/17  Forrest Hoffman NP   clonazePAM (KLONOPIN) 0.5 mg tablet TAKE 1 TABLET BY MOUTH TWICE A DAY . Kasia Cashing MUST LAST 30 DAYS 1/24/17   Forrest Hoffman NP   levothyroxine (SYNTHROID) 100 mcg tablet Take 1 Tab by mouth Daily (before breakfast). 1/17/17   Forrest Hoffman NP   hydrALAZINE (APRESOLINE) 25 mg tablet  8/2/16   Historical Provider   oxyCODONE-acetaminophen (PERCOCET) 5-325 mg per tablet TK 1 TO 2 TS PO Q 4 H PRN P 8/2/16   Historical Provider   amLODIPine (NORVASC) 10 mg tablet Take  by mouth ACB/HS. Historical Provider   tiotropium bromide (SPIRIVA RESPIMAT) 1.25 mcg/actuation inhaler Take 2 Puffs by inhalation daily. Historical Provider   ferrous sulfate 325 mg (65 mg iron) tablet Take 325 mg by mouth three (3) times daily (with meals). Historical Provider   potassium chloride (KLOR-CON M20) 20 mEq tablet Take 20 mEq by mouth daily as needed (for leg cramps). Historical Provider   diphenhydrAMINE (BENADRYL ALLERGY) 25 mg tablet Take 25 mg by mouth three (3) times daily as needed for Itching. Historical Provider   aspirin 81 mg chewable tablet Take 81 mg by mouth daily.     Historical Provider     Current Facility-Administered Medications   Medication Dose Route Frequency    lactated ringers infusion  125 mL/hr IntraVENous CONTINUOUS    0.9% sodium chloride infusion  25 mL/hr IntraVENous CONTINUOUS    sodium chloride (NS) flush 5-10 mL  5-10 mL IntraVENous Q8H    lactated ringers infusion  25 mL/hr IntraVENous CONTINUOUS    sodium chloride (NS) flush 5-10 mL  5-10 mL IntraVENous Q8H    fentaNYL citrate (PF) injection 25 mcg  25 mcg IntraVENous ONCE    midazolam (VERSED) injection 2 mg  2 mg IntraVENous ONCE    naloxone (NARCAN) injection 0.4 mg  0.4 mg IntraVENous ONCE    flumazenil (ROMAZICON) 0.1 mg/mL injection 0.2 mg  0.2 mg IntraVENous ONCE    sodium bicarbonate (8.4%) 150 mEq in dextrose 5% 1,000 mL infusion   IntraVENous CONTINUOUS    dexmedetomidine (PRECEDEX) 400 mcg in 0.9% sodium chloride 100 mL infusion  0.1-0.9 mcg/kg/hr IntraVENous TITRATE    ceFAZolin (ANCEF) 2g in 100 ml 0.9% NS IVPB  2 g IntraVENous ON CALL TO OR    mupirocin (BACTROBAN) 2 % ointment   Topical Q12H    0.9% sodium chloride infusion  100 mL/hr IntraVENous CONTINUOUS    niCARdipine (CARDENE) 25 mg in 0.9% sodium chloride 250 mL infusion  0-15 mg/hr IntraVENous TITRATE    metoprolol (LOPRESSOR) injection 2.5 mg  2.5 mg IntraVENous Q4H    sodium chloride (NS) flush 10 mL  10 mL InterCATHeter Q24H    sodium chloride (NS) flush 10-40 mL  10-40 mL InterCATHeter Q8H    esmolol 10mg/mL (BREVIBLOC) infusion   mcg/kg/min IntraVENous TITRATE    amLODIPine (NORVASC) tablet 10 mg  10 mg Oral DAILY    atorvastatin (LIPITOR) tablet 80 mg  80 mg Oral DAILY    busPIRone (BUSPAR) tablet 7.5 mg  7.5 mg Oral BID    cloNIDine HCl (CATAPRES) tablet 0.1 mg  0.1 mg Oral BID    fenofibrate nanocrystallized (TRICOR) tablet 48 mg  48 mg Oral DAILY    hydrALAZINE (APRESOLINE) tablet 25 mg  25 mg Oral TID    levothyroxine (SYNTHROID) tablet 100 mcg  100 mcg Oral ACB    pantoprazole (PROTONIX) tablet 40 mg  40 mg Oral BID    spironolactone (ALDACTONE) tablet 25 mg  25 mg Oral DAILY    docusate sodium (COLACE) capsule 100 mg  100 mg Oral BID    umeclidinium (INCRUSE ELLIPTA) 62.5 mcg/actuation  1 Puff Inhalation DAILY    venlafaxine-SR (EFFEXOR-XR) capsule 150 mg  150 mg Oral BID    sodium chloride (NS) flush 5-10 mL  5-10 mL IntraVENous Q8H     Allergies   Allergen Reactions    Tussionex Itching    Codeine Itching    Nickel Other (comments)     Local reaction (redness, irritation, blistering) if wears \"cheap earrings\"    Oxycodone Itching      Social History   Substance Use Topics    Smoking status: Former Smoker     Packs/day: 1.00     Years: 40.00     Types: Cigarettes     Quit date: 2011    Smokeless tobacco: Former User      Comment: quit a few times but relapsed    Alcohol use No      Family History   Problem Relation Age of Onset    Post-op Nausea/Vomiting Other     Other Other      LOW BP AND DIFFICULTY BREATHING    Hypertension Mother    [facility] Stroke Mother       age 48,    [facility] Hypertension Brother     Stroke Brother       age 55, smoked        Review of Systems:  Review of systems not obtained due to patient factors. Objective:   Vital Signs:    Visit Vitals    /48 (BP 1 Location: Left arm, BP Patient Position: At rest)    Pulse 69    Temp 97.6 °F (36.4 °C)    Resp 28    Wt 81.9 kg (180 lb 8.9 oz)    SpO2 92%    Breastfeeding No    BMI 29.14 kg/m2       O2 Device: Nasal cannula, Oxygen mask   O2 Flow Rate (L/min): 4 l/min   Temp (24hrs), Av.4 °F (36.3 °C), Min:96.8 °F (36 °C), Max:97.6 °F (36.4 °C)       Intake/Output:   Last shift:       07 - 1900  In: 4392 [I.V.:2000]  Out:  [Urine:1400]  Last 3 shifts: 1901 -  07  In: 8462.1 [P.O.:395; I.V.:8067.1]  Out: 3650 [Urine:3650]    Intake/Output Summary (Last 24 hours) at 17 1641  Last data filed at 17 1625   Gross per 24 hour   Intake          5929.64 ml   Output             3100 ml   Net          2829.64 ml       Ventilator Settings:  Mode Rate Tidal Volume Pressure FiO2 PEEP            100 %       Peak airway pressure:      Minute ventilation:        Physical Exam:    General:  Intubated and sedated, no distress, appears stated age. Head:  Normocephalic, without obvious abnormality, atraumatic.    Eyes: Conjunctivae/corneas clear. PERRL, EOMs intact. Nose: Nares normal. Septum midline. Mucosa normal. No drainage or sinus tenderness. Throat: Lips, mucosa, and tongue normal. Teeth and gums normal. Has a white coating over mouth. Neck: Supple, symmetrical, trachea midline, no adenopathy, thyroid: no enlargment/tenderness/nodules, no carotid bruit and no JVD. Back:   Symmetric, no curvature. ROM normal.   Lungs:   Decreased BS bilaterally, has bilateral rhonchi. Chest wall:  No tenderness or deformity. Heart:  Regular rate and rhythm, S1, S2 normal, no murmur, click, rub or gallop. Abdomen:   Obese;  bowel sounds are decreased,  No masses,  No organomegaly. Extremities: Extremities normal, atraumatic, no cyanosis or edema. Warm. Pulses: 2+ and symmetric all extremities. Skin: Skin color, texture, turgor normal. No rashes or lesions   Lymph nodes: Cervical, supraclavicular, and axillary nodes normal.   Neurologic: Not able to fully assess. Pt is post op, effects of anesthesia in place.         Data:     Recent Results (from the past 24 hour(s))   CBC W/O DIFF    Collection Time: 02/27/17  2:28 AM   Result Value Ref Range    WBC 8.5 3.6 - 11.0 K/uL    RBC 2.78 (L) 3.80 - 5.20 M/uL    HGB 7.7 (L) 11.5 - 16.0 g/dL    HCT 23.5 (L) 35.0 - 47.0 %    MCV 84.5 80.0 - 99.0 FL    MCH 27.7 26.0 - 34.0 PG    MCHC 32.8 30.0 - 36.5 g/dL    RDW 14.3 11.5 - 14.5 %    PLATELET 599 920 - 473 K/uL   METABOLIC PANEL, COMPREHENSIVE    Collection Time: 02/27/17  2:28 AM   Result Value Ref Range    Sodium 137 136 - 145 mmol/L    Potassium 4.0 3.5 - 5.1 mmol/L    Chloride 108 97 - 108 mmol/L    CO2 19 (L) 21 - 32 mmol/L    Anion gap 10 5 - 15 mmol/L    Glucose 94 65 - 100 mg/dL    BUN 12 6 - 20 MG/DL    Creatinine 1.35 (H) 0.55 - 1.02 MG/DL    BUN/Creatinine ratio 9 (L) 12 - 20      GFR est AA 48 (L) >60 ml/min/1.73m2    GFR est non-AA 40 (L) >60 ml/min/1.73m2    Calcium 7.7 (L) 8.5 - 10.1 MG/DL    Bilirubin, total 0.3 0.2 - 1.0 MG/DL    ALT (SGPT) 198 (H) 12 - 78 U/L    AST (SGOT) 249 (H) 15 - 37 U/L    Alk. phosphatase 203 (H) 45 - 117 U/L    Protein, total 6.2 (L) 6.4 - 8.2 g/dL    Albumin 2.3 (L) 3.5 - 5.0 g/dL    Globulin 3.9 2.0 - 4.0 g/dL    A-G Ratio 0.6 (L) 1.1 - 2.2     PROTHROMBIN TIME + INR    Collection Time: 02/27/17  2:28 AM   Result Value Ref Range    INR 1.1 0.9 - 1.1      Prothrombin time 11.6 (H) 9.0 - 11.1 sec   MAGNESIUM    Collection Time: 02/27/17  2:28 AM   Result Value Ref Range    Magnesium 1.7 1.6 - 2.4 mg/dL   GLUCOSE, POC    Collection Time: 02/27/17  7:20 AM   Result Value Ref Range    Glucose (POC) 111 (H) 65 - 100 mg/dL    Performed by [address] [place] [address], [facility]    Collection Time: 02/27/17  1:12 PM   Result Value Ref Range    Sodium 137 136 - 145 mmol/L    Potassium 3.8 3.5 - 5.1 mmol/L    Chloride 107 97 - 108 mmol/L    CO2 20 (L) 21 - 32 mmol/L    Anion gap 10 5 - 15 mmol/L    Glucose 131 (H) 65 - 100 mg/dL    BUN 11 6 - 20 MG/DL    Creatinine 1.21 (H) 0.55 - 1.02 MG/DL    BUN/Creatinine ratio 9 (L) 12 - 20      GFR est AA 54 (L) >60 ml/min/1.73m2    GFR est non-AA 45 (L) >60 ml/min/1.73m2    Calcium 7.2 (L) 8.5 - 10.1 MG/DL   HGB & HCT    Collection Time: 02/27/17  1:12 PM   Result Value Ref Range    HGB 7.1 (L) 11.5 - 16.0 g/dL    HCT 20.7 (L) 35.0 - 47.0 %   BLOOD GAS, ARTERIAL    Collection Time: 02/27/17  1:15 PM   Result Value Ref Range    pH 7.32 (L) 7.35 - 7.45      PCO2 33 (L) 35.0 - 45.0 mmHg    PO2 102 (H) 80 - 100 mmHg    O2 SAT 97 92 - 97 %    BICARBONATE 17 (L) 22 - 26 mmol/L    BASE DEFICIT 8.3 mmol/L    O2 METHOD VENTILATOR      FIO2 100 %    MODE SIMV      Tidal volume 500      SET RATE 12      EPAP/CPAP/PEEP 7.0      Sample source ARTERIAL      SITE Aorta      LEIGH ANN'S TEST N/A               Telemetry:normal sinus rhythm    Imaging:  I have personally reviewed the patients radiographs and have reviewed the reports:  2-27-17: CXR is pending.         Total critical care time exclusive of procedures: 35  minutes  Kristian Enriquez MD

## 2017-02-27 NOTE — PERIOP NOTES
TRANSFER - OUT REPORT:    Verbal report given to [address] Josse Glasgow RN (name) on Leticia Villar  being transferred to CCU (unit) for routine post - op       Report consisted of patients Situation, Background, Assessment and   Recommendations(SBAR). Information from the following report(s) SBAR and OR Summary was reviewed with the receiving nurse. Lines:   PICC Triple Lumen 57/71/88 Right;Basilic (Active)   Central Line Being Utilized Yes 2/27/2017  7:02 AM   Criteria for Appropriate Use Limited/no vessel suitable for conventional peripheral access 2/27/2017  7:02 AM   Site Assessment Clean, dry, & intact 2/27/2017  7:02 AM   Phlebitis Assessment 0 2/27/2017  7:02 AM   Infiltration Assessment 0 2/27/2017  7:02 AM   Arm Circumference (cm) 29 cm 2/24/2017  5:58 PM   Date of Last Dressing Change 02/24/17 2/27/2017  7:02 AM   Dressing Status Clean, dry, & intact 2/27/2017  7:02 AM   External Catheter Length (cm) 0 centimeters 2/25/2017 10:08 AM   Dressing Type Disk with Chlorhexadine Gluconate (CHG); Transparent 2/27/2017  7:02 AM   Action Taken Other (comment) 2/25/2017 10:08 AM   Hub Color/Line Status Monique ; Infusing 2/27/2017  7:02 AM   Positive Blood Return (Site #1) Yes 2/26/2017  4:00 PM   Hub Color/Line Status Red; Infusing 2/27/2017  7:02 AM   Positive Blood Return (Site #2) Yes 2/26/2017  4:00 PM   Hub Color/Line Status White; Infusing 2/27/2017  7:02 AM   Positive Blood Return (Site #3) Yes 2/26/2017  4:00 PM   Alcohol Cap Used Yes 2/26/2017  4:00 PM     Right quad IJ, Right arterial line, ET tube, Lumbar Drain, Palacio  Wounds to left neck, bilateral groins clean, dry and intact. Opportunity for questions and clarification was provided. Notified arrival time in approximately 1 hour.     Patient transported with:   Monitor  O2 @ 10 liters  Registered Nurse, CRNA

## 2017-02-27 NOTE — PROGRESS NOTES
1700 received from OR lumbar grain intact. Report received from [facility] and CRNA. Placed on Carrington hugger for tmax 94.2. Vent and lab orders received from Dr. Lance . 1740 Orders received from Dr. Sandeep Maravilla.  at bedside and updated. 1750 Report given to oncoming RN.

## 2017-02-27 NOTE — PERIOP NOTES
TRANSFER - IN REPORT:    Verbal report received from [facility]) on Kaur Suarez  being received from [id number] CCU(unit) for ordered procedure      Report consisted of patients Situation, Background, Assessment and   Recommendations(SBAR). Information from the following report(s) SBAR, Kardex, [facility] and Recent Results was reviewed with the receiving nurse. Opportunity for questions and clarification was provided. Assessment completed upon patients arrival to unit and care assumed.

## 2017-02-27 NOTE — PROGRESS NOTES
Patient earrings were placed in denture cup with patient lable on it. (earrings yellow gold total of 4) 3 earring in left and 1 in right. Patient cell phone was also put in belonging bag with earring and clothes.

## 2017-02-27 NOTE — ANESTHESIA PROCEDURE NOTES
Lumbar Puncture    Start time: 2/27/2017 7:52 AM  End time: 2/27/2017 8:20 AM  Performed by: Jacqueline Bocanegra  Authorized by: Jacqueline Bocanegra     Pre-Procedure  Indications: spinal drain placed for measurement of ICP, drainage for Ao dissection. Preanesthetic Checklist: patient identified, risks and benefits discussed, anesthesia consent, site marked, patient being monitored and timeout performed    Timeout Time: 07:55        Anesthesia and Sedation:   Anesthesia:  Local infiltration  Local Anesthetic:  Lidocaine 1% without epinephrine  Patient Sedated: Yes      Procedure:   Prep:  ChloraPrep and alcohol  Lumbar space: L2-3. Patient position:  Left lateral decubitus  Needle gauge: 17g. Needle Type:  Tuohy needle  Needle length:  3.5  Attempts:  1  Opening pressure (cm H2O):  26  Fluid appearance:  Clear    Assessment:   Post-procedure:  Site cleaned  Patient tolerance:  Patient tolerated the procedure well with no immediate complications  Total MD procedure time:  16-30 minutes  Spinal drain placed at 20 cm at skin.

## 2017-02-27 NOTE — PROGRESS NOTES
[facility]  Pulmonary, Critical Care, and Sleep Medicine    Name: Mayito Delaney MRN: [id number]   : 1955 Hospital: [facility]   Date: 2017        IMPRESSION:   · Acute respiratory failure with hypoxia: on NRB prior Masoud  · AAA Type B dissection; distal thoracic aorta  · S/p Left CCA- SCA bypass 17  · S/p TEVAR 17  · Post op iliac artery repair  · IMH from Penetrating Aortic ulceration  · spinal drain placed for measurement of ICP, drainage for Ao dissection Opening pressure (cm H2O):  26  · PICC line  · Renal disease: CRI; JEANA - improved  · Abn LFTS  · Anemia  · COPD: moderate  · Sleep apnea: No treatment  · Hypertension: well controlled  · CAD and CABG  · Hypothyroidism: well controlled  · Arthritis  · GERD: well controlled  · Pt is critically ill at at high risk of deterioration  · CC time with pt eop procedures      PLAN:   · CCU monitoring  · Hemodynamic monitoring  · Spinal drain monitoring  · Frequent neuro checks  · Transfuse prn Hgb less than 7  · Watch renal function and LFTs  · CXR in AM  · Antihypertensives and Beta Blocker      Subjective/Interval History:   I have reviewed the flowsheet and previous days notes. Complex operative procedure. Prop plan reviewed.         DATA:  Labs:  Recent Labs      17   1312  178  17   04217   0246   WBC   --   8.5  9.2  9.2   HGB  7.1*  7.7*  7.9*  7.4*   HCT  20.7*  23.5*  23.6*  21.7*   PLT   --   231  233  235     Recent Labs      17   0228  17   0421  17   0246   NA  137  134*  136   K  4.0  4.1  4.1   CL  108  105  103   CO2  19*  18*  22   GLU  94  100  118*   BUN  12  12  16   CREA  1.35*  1.28*  1.47*   CA  7.7*  7.8*  7.8*   MG  1.7  1.8   --    ALB  2.3*  2.4*  2.5*   TBILI  0.3  0.4  0.3   SGOT  249*  138*  61*   ALT  198*  112*  53   INR  1.1   --    --      Recent Labs      17   1315  17   1458   PH  7.32*  7.34*   PCO2  33* 31*   PO2  102*  55*   HCO3  17*  17*   FIO2  100  50       Imaging:  [x]I have personally reviewed the patients radiographs  []Radiographs reviewed with radiologist   []No change from prior, tubes and lines in adequate position  []Improved   []Worsening    Sanjay Roberts MD

## 2017-02-27 NOTE — PERIOP NOTES
06:46= timeout performed with Dr Sima Pérez. 07:25= called family waiting area to see if any family was there. Per Mitcheal Spurling, no one is in the waiting area at all.

## 2017-02-27 NOTE — PROGRESS NOTES
OK to proceed with scheduled surgical procedure tomorrow, assuming no acute changes in clinical status or lab derangements overnight and patient remains NPO overnight.

## 2017-02-27 NOTE — PROGRESS NOTES
[address]  [phone]  Fax [phone]    Patient Name: Shery Kocher  YOB: 1955    Patient appeared on Ambulatory Nurse Navigator Combined Daily Census Report on Friday 2/24/17. Listed as being seen at [facility] ED for abdominal pain. Further chart review shows patient has been admitted with a dissecting aortic aneurysm. Will continue to monitor for discharge plans.

## 2017-02-27 NOTE — PERIOP NOTES
Notified María May RN in PACU and Junior Snell RN in CCU of completion of carotid subclavian bypass and commencement of TEVAR.

## 2017-02-27 NOTE — ANESTHESIA PREPROCEDURE EVALUATION
Anesthetic History   No history of anesthetic complications            Review of Systems / Medical History  Patient summary reviewed, nursing notes reviewed and pertinent labs reviewed    Pulmonary    COPD: moderate    Sleep apnea: No treatment  Shortness of breath         Neuro/Psych   Within defined limits           Cardiovascular    Hypertension: well controlled          CAD and CABG    Exercise tolerance: <4 METS  Comments: Type B Ao dissection   GI/Hepatic/Renal     GERD: well controlled    Renal disease: CRI       Endo/Other      Hypothyroidism: well controlled  Arthritis     Other Findings            Physical Exam    Airway  Mallampati: III  TM Distance: > 6 cm  Neck ROM: decreased range of motion   Mouth opening: Diminished (comment)     Cardiovascular  Regular rate and rhythm,  S1 and S2 normal,  no murmur, click, rub, or gallop             Dental  No notable dental hx       Pulmonary      Decreased breath sounds: bilateral      Stridor     Abdominal  GI exam deferred       Other Findings            Anesthetic Plan    ASA: 4  Anesthesia type: general    Monitoring Plan: Arterial line and CVP    Post procedure ventilation   Induction: Intravenous  Anesthetic plan and risks discussed with: Patient      Disc w/pt and surgeon the high risk for resp failure p-op, and the need for prolonged ventilation. Also discussed pt's elevated risk for cardiac complications. Pt understands. All questions answered. Pt agrees to proceed.

## 2017-02-27 NOTE — BRIEF OP NOTE
BRIEF OPERATIVE NOTE    Date of Procedure: 2/27/2017   Preoperative Diagnosis: AORTIC DISECTION  Postoperative Diagnosis: Descending thoracic aortic dissection    Procedure(s):  ENDOVASCULAR ANEURYSM REPAIR of THORACIC AORTIC DISSECTION, repair of ruptured left iliac artery with stent placement. Left ileo-femoral bypass graft with PTFE. LEFT CAROTID SUBCLAVIAN BYPASS with embolization of proximal ;eft subclavian artery. Stent placement in left common carotid artery. IVUS. Surgeon(s) and Role:     * Mary Retana MD - Primary            Surgical Staff:  Circ-1: Josh Traore RN  Circ-Relief: Hamida Van RN; [address] [facility]  Radiology Technician: Diomedes Beavers RT; Yolanda Triplett; Veda Hicks,   Scrub Tech-1: Rock Pineda  Scrub RN-1: Ant Wilson  Scrub RN-Relief: Yaquelin Ruiz  Surg Asst-1: Price Rascon  Surg Asst-Relief: Shaneka Severe; [address] [facility] [address] Staff: Jourdan Pryor RN  Event Time In   Incision Start 4337   Incision Close 1616     Anesthesia: General   Estimated Blood Loss: 700 cc  Specimens: * No specimens in log *   Findings: As above. Complications: none  Implants:   Implant Name Type Inv.  Item Serial No.  Lot No. LRB No. Used Action   GRAFT TW STRTCH [id number] -- PROPATEN - [id number]  GRAFT TW STRTCH [id number] -- PROPATEN [id number]  [employer] NA Left 1 Implanted   STENT TRACH BLLN [id number] -- ICAST CVR - [id number]  STENT TRACH BLLN [id number] -- ICAST CVR [id number] GETINGE [employer] MAQUET CARDIOVASCLR NA Left 1 Implanted   VALIANT THORACIC Graft  [id number] MED[employer] [id number] N/A 1 Implanted   VALIANT THORACIC Graft  [id number] [employer] [id number] N/A 1 Implanted   PLUG VASC II OCCLDR [id number] -- AMPLATZER - SNA  PLUG VASC II OCCLDR [id number] -- AMPLATZER NA [employer] [id number] Left 1 Implanted   STENT TRACH BLLN [id number] -- ICAST CVR - SNA  STENT TRACH BLLN [id number] -- ICAST CVR NA GETINGE AB MAQUET CARDIOVASCLR [id number] N/A 1 Implanted   STENT GRAFT VIA HEP [id number] -- Miguel Ortez [de-identified]  STENT GRAFT VIA HEP [id number] -- VIABAHN [de-identified] [employer] NA Left 1 Implanted   Viahahn endoprosthesis   [id number] HUI NA Left 1 Implanted   GRAFT PTCH TW GEL SEAL 8X75MM --  - [id number]   GRAFT PTCH TW GEL SEAL 8X75MM --  [id number] [employer] [facility] [id number] Left 1 Implanted

## 2017-02-27 NOTE — PROGRESS NOTES
Vascular    Came to check on patient pre-op  Still on Cardene and Esmolol  Respiratory status has deteriorated over weekend  Now on 100% NRB  She continues to have intermittent episodes of severe pain in back and chest  Still requiring IV narcotics  She states she \"feels like she is dying\"  Cr now back to baseline at 1.2  She has a dissection of the DTA with extensive IMH which likely originated in a PETAR  She has failed maximal medical management  I do not think she has any hope of survival w/ continued medical management alone  Will proceed w/ TEVAR, Lt CCA-SCA bypass, and embolization of proximal Lt SCA in AM  She is at high risk for complications of one sort or another  Highest probablity risk is prolonged mechanical ventilation  Other less likely major risks are paralysis, stroke, MI, ARF/ESRD, retrograde dissection, and death

## 2017-02-28 NOTE — OP NOTES
[address], [facility]   OP NOTE       Name:  Dora Valentin   MR#:  [id number]   :  1955   Account #:  [de-identified]    Surgery Date:  2017   Date of Adm:  2017       PREOPERATIVE DIAGNOSIS: Complicated type B descending   thoracic aortic dissection. POSTOPERATIVE DIAGNOSIS: Complicated type B descending   thoracic aortic dissection. PROCEDURES PERFORMED    1. Endovascular repair of thoracic aortic dissection. 2. Left carotid subclavian bypass. 3. Angioplasty and stenting of proximal left common carotid artery. 4. Embolization of proximal left subclavian artery. 5. Endovascular stent graft repair of ruptured left iliac artery. 6. Left iliofemoral interposition bypass graft. 7. Thoracic and abdominal aortograms. 8. Left subclavian and the carotid angiograms. 9. Intravascular ultrasound of left iliac artery and thoracic and   abdominal aorta. SURGEON: Sun Ackerman MD    ANESTHESIA: General.    ESTIMATED BLOOD LOSS: 700 mL. SPECIMENS: None. DRAINS: None. COMPLICATIONS: Left iliac artery rupture. INDICATIONS: The patient is a 57-year-old female with multiple   medical problems, including severe peripheral arterial disease with a   history of a right femoral popliteal bypass and left renal artery stenting,   coronary artery disease with prior coronary artery bypass grafting,   including a left internal mammary to left anterior descending coronary   bypass graft, chronic kidney disease, and home oxygen-dependent   COPD. She presented to the [facility] Emergency Department with   a 2-3 day history of chest and back pain as well as nausea and   vomiting. She was found to have an acute kidney injury due to   dehydration on top of her underlying chronic kidney disease, CT   angiogram of the chest, abdomen and pelvis showed a dissection of   the descending thoracic aorta.  This was not a typical type B dissection. This appeared to be a dissection, which originated in a penetrating   aortic ulcer and lead to extensive intramural hematoma throughout the   thoracic aorta. The majority of the intramural hematoma was confined   to the descending thoracic aorta, beginning just at the distal edge of   the left subclavian artery, but there was very trace intramural   hematoma tracking into the mid to proximal arch. Because of her   multiple comorbidities an attempt was made to treat the dissection with   medical management. The patient was maintained on multiple oral   blood pressure medications as well as intravenous esmolol   and Cardene infusions. She was given intravenous fluids for   rehydration and treatment of her acute kidney injury, although her   creatinine returned to its mildly elevated baseline, she had persistent   chest and back pain requiring intravenous narcotics. She had   progressive hypoxic respiratory failure progressing from nasal cannula   oxygen to a Venturi mask to a nonrebreather mask with a nasal   cannula and persistent hypoxia with an impending need for mechanical   ventilation. Because of her persistent pain and nausea and vomiting,   she was clearly failing medical management. She now presents for   endovascular repair, which will require coverage of the left subclavian   artery. 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 Because of the severe stenosis at the origin   of the left common carotid artery, she will also require angioplasty and   stenting of the left common carotid artery. The patient is felt to have no   hope for survival with medical management alone. She therefore presents for repair despite the   extensive risks of surgical repair.     DESCRIPTION OF PROCEDURE: After informed consent was   obtained, the patient was given preoperative intravenous antibiotics   within 1 hour of the incision. She was taken to the operating room and   after induction of adequate general anesthesia and placement of   appropriate arterial and venous lines by Anesthesia and a lumbar   cerebrospinal fluid drain, the left neck, chest and abdomen and groins   were prepped and draped as a sterile field. The skin was covered with   Caren Body. Attention was first directed to the left carotid subclavian dissection   through a transverse incision just above the left clavicle and the   platysma and subcutaneous tissue were divided. The scalene fat pad   was elevated cephalad and the clavicular head of the sternomastoid   was divided. The brachial plexus and the anterior scalene muscle were   identified. The phrenic nerve was clearly visualized and protected   along the medial border of the anterior scalene. The anterior scalene   was divided with the Harmonic Focus and the underlying subclavian   artery was isolated and controlled with vessel loops. The internal   jugular vein and vagus nerve were clearly identified and the common   carotid artery was dissected free, medial to this and encircled with   vessel loops. There was some lymphatic leakage from what appeared   to be the thoracic duct and this was controlled with multiple clips and   with complete cessation of lymphatic leakage. The patient was systemically heparinized with 7000 units of   intravenous heparin and then maintained and  systemically heparinized   throughout the remainder of the case with multiple heparin boluses. The common carotid artery was clamped at the distal extent of   dissection.  A micropuncture needle was used to access the common   carotid artery in a retrograde fashion and a micropuncture sheath was   placed, a 7-Guinean sheath was inserted over a standard guidewire   after removing the micropuncture sheath and the sheath was   advanced down through the common carotid stenosis and an arteriogram was performed, in approximately 70-80 degrees of Citizen of the [place]   projection. This clearly identified the origin of the carotid artery where   the sheath was occlusive. The severe stenosis at the origin of the left   common carotid artery was angioplastied and stented with an 8 x 38   balloon expandable [place]-Kirit covered iCAST stent, which was inflated   to 10 atmospheres. The stent was allowed to protrude into the aorta   approximately 3-5 mm. Once this was done, the sheath was removed   and the carotid was flushed antegrade. It was then back bled to ensure   removal of any debris. The sheath puncture site was then converted to   a longitudinal arteriotomy on the lateral wall of the common carotid   artery and an end-to-side anastomosis was created between the   common carotid artery and an 8-mm Propaten [place]-Kirit graft using   running 5-0 [place]-Kirit suture. The anastomosis was hemostatic. The   graft was tunneled posterior to the internal jugular vein and vagus   nerve and an end-to-side anastomosis was created between the graft   and the subclavian arteries using running 5-0 [place]-Kirit suture. The   distal anastomosis was hemostatic. Attention was then directed toward obtaining femoral access while the   left neck incision was packed with antibiotic-soaked lap pads. Under   ultrasound guidance, micropuncture access was obtained to the right   common femoral artery just proximal to the proximal anastomosis of   the femoral popliteal bypass. A 5-Bolivian sheath was placed. Next,   micropuncture access was gained to the left common femoral artery   and a 6-[place]ivian sheath was placed. Two separate Perclose devices   were deployed in the usual preclose technique and tagged for later use   in arterial closure. The 6-Bolivian sheath was exchanged for an 8-  [place] sheath. Under fluoroscopic guidance, guidewires were   advanced from the bilateral femoral approaches up into the aortic arch.    Intravascular ultrasound was used to examine the left iliac artery and   the entire aorta. The left iliac artery appeared to be acceptable for   delivery of the 24-[place] thoracic endograft. The location of the   dissection of the penetrating ulcers was identified in the thoracic aorta. Over a Lunderquist wire, a Medtronic Valiant 32 x 32 x 200 thoracic   endograft was advanced up into the aortic arch. The graft was   positioned to land the fabric portion at the distal edge of the left carotid stent. The   graft was deployed in the usual fashion by lowering the systolic blood   pressure to approximately 80-90. Next, the location of the celiac artery was confirmed with intravascular   ultrasound and a 34 x 30 x 150 Medtronic Valiant thoracic endograft   was deployed extending from the first graft down to approximately 1   cm above the celiac artery origin. Intravascular ultrasound was then   used to examine the thoracic endograft. They were expanded well as   opposed to each other and the aortic wall and the device landed in   perfect position relative to the left carotid stent in the celiac artery. Attention was then directed to embolization of the proximal left   subclavian artery. The vazquez of the distal anastomosis of the carotid   subclavian bypass was punctured with a standard arterial access   needle and a wire was advanced into the proximal subclavian artery   down into the endograft. A 7-[place] sheath was inserted and an   angiogram of the left subclavian artery was performed and the origin of   the vertebral artery and the internal mammary artery were clearly   identified. A 14 mm Amplatzer II vascular plug was then deployed in   the proximal left subclavian artery, taking care not to impair any flow to   the vertebral or internal mammary arteries. Completion angiogram   showed excellent position of the device with good wall apposition.      Next, the sheath was removed and the puncture site in the vazquez of the   graft was closed with a single horizontal mattress 5-0 [place]-Kirit suture. Flow was restored first toward the left hand and then retrograde toward   the vertebral and internal mammary. There was good Doppler flow in   the entire circuit upon completion. Next, a retrograde left iliac   arteriogram was performed in the PERKINS projection. The thoracic grafts   had both advanced more easily than expected through the left iliac   system, but in between delivery of the first and second grafts, the left   femoral artery was temporarily occluded with placement of a 22-  [place] Sentrant sheath. This sheath, although smaller than the actual   thoracic endograft, did encounter some resistance just above the   inguinal ligament. It was not advanced any further beyond this, but due   to concern for any dissection or injury to her relatively small external   iliac arteries, a retrograde angiogram was done. Surprisingly, this   showed jules extravasation of contrast from the left iliac artery,   probably in the area of the external iliac artery, but the exact location of   disruption could not be determined as normal artery above and below   the level of rupture could not be identified. Immediately, a 10 x 4   angioplasty balloon was placed in the left common iliac artery and   inflated to provide occlusion of inflow to provide hemostasis. The   sheath was occlusive distally and therefore there was no retrograde   bleeding. The wire was rapidly exchanged for a 0.018 guidewire, and   an 8 x 10 heparin coated VIABAHN stent graft was deployed,   beginning approximately 2 cm below the origin of the left common iliac   artery, extending down into the external iliac. Next, a 10 x 38 iCAST balloon expandable stent graft was deployed   from the origin of the left common iliac artery overlapping about 2 cm   down into the VIABAHN graft to stabilize it proximally.  A repeat   angiogram was done and there was still extravasation distal to the   existing VIABAHN stent graft. An 8 x 15 VIABAHN stent graft was then   deployed overlapping extensively with the existing graft and extending   all the way down to the inguinal ligament just above the entry site of   the sheath. The occlusion balloon was then placed within the   VIABAHN stent grafts and the left groin puncture site was converted to   a transverse groin incision. The Perclose sutures were removed. The   common femoral artery was dissected free from the inguinal ligament   for about 3 cm distally. There was a significant amount of plaque distal   to the arterial puncture site. The sheath was removed. The balloons   and wires were removed and the artery was clamped just distal to the   stent graft. The stent graft terminated just above the level of the   inguinal ligament in the distal external iliac artery. The stent graft repair   was clearly hemostatic. There was no bleeding from the   retroperitoneum, coming under the inguinal ligament and there was a   good pulse above the level of clamp. A longitudinal arteriotomy was   made in the femoral artery and because the plaque was so significant   a limited endarterectomy was performed. This was then closed with a   Dacron patch using running 5-0 [place]-Kirit suture. Some of the arterial   wall had been disrupted by the endarterectomy and when this closure   was completed, there was significant compromise of the flow lumen as   confirmed by a completion arteriogram using a catheter from the right   femoral approach. The decision was made to perform an interposition   iliofemoral bypass and a more extensive endarterectomy. The artery   was dissected free distally down at the bifurcation and the proximal   superficial femoral artery and profunda were selectively controlled. The   external iliac artery was clamped on the stent graft above the level of   the inguinal ligament.  It was  transected just below the stent graft and   the intervening segment of common femoral artery was excised   including significant plaque. A #4 Gloria catheter was passed down   the profunda and there was no thrombus. An end-to-end anastomosis   was performed between the external iliac artery just distal to the stent   graft and an 8-mm Propaten [place]-Kirit graft using running 5-0 [place]-  Kirit suture. There was an excellent pulse in the bypass and flushing   maneuvers were performed. The distal end of the short bypass was   spatulated and an end-to-end anastomosis was performed between   the bypass graft and the femoral bifurcation using running 5-0 [place]-  Kirit suture. Flow was released to the leg. The profunda and superficial   femoral and the external iliac arteries were interrogated with a hand-  held Doppler and had excellent flow. A completion angiogram was   performed with the right femoral catheter in the terminal abdominal   aorta which allowed imaging of the terminal aorta, the bilateral iliac   arteries and bilateral common femoral arteries, having good flow down   both legs with brisk flow of contrast. The right femoral popliteal bypass   was patent. There was mild to moderate stenosis in the distal left   external iliac artery. It was unclear if this was at the anastomosis of the   iliofemoral bypass proximally or whether this was where the stent graft   had been clamped, but given the risk disrupting this fresh anastomosis   an angioplasty was not performed as the lesion did not appear to be   significantly flow-limiting. There was moderate stenosis at the origin of   the profunda and superficial femoral artery with diffuse plaque   throughout the proximal profunda and the entire superficial femoral   artery. This was felt to possibly be from the locations of the vessel   loops and because of the extent of her plaque and the fact that there   was brisk flow of contrast, no further manipulation of this was   undertaken. The groin wound was irrigated with antibiotic irrigation and   was hemostatic.  It was closed with 2 layers of Vicryl suture and then   skin staples. An attempt was made to close the right femoral access   site with a StarClose device, but this was not hemostatic, so direct   pressure was held for 15 minutes with good hemostasis. The left neck   incision was irrigated extensively with antibiotic irrigation. The scalene   fat pad and the clavicular head of the sternomastoid were   reapproximated. There was no bleeding or a lymphatic leakage. The   platysma was closed with running Vicryl suture and the skin with   staples. Dry dressings were applied. The feet were inspected and   showed no evidence of thromboembolic complications. The patient's   abdomen was mildly distended, but there was no evidence of   abdominal compartment syndrome. The patient had a good urine   output. She was left intubated and transferred directly to the ICU with   the lumbar cerebrospinal fluid drain in place. All counts were correct.         Chente Altamirano ([facility]) Brandon Rachel MD      WT / THS   D:  02/27/2017   22:40   T:  02/28/2017   07:28   Job #:  [id number]

## 2017-02-28 NOTE — PROGRESS NOTES
Pressure Ulcer Prevention In basket Alert Received for Robin < 14 (moderate risk).      Suggested Care Plan/Interventions for Nursing  1. Complete Robin Pressure Ulcer Risk Scale and use sub scores to identify appropriate interventions. 2. Perform Assessment: skin, changes in LOC, visual cues for pain, monitor skin under medical devices  3. Respond to Reduced Sensory Perception: changes in LOC, check visual cues for pain, float heels, suspension boots, pressure redistribution bed/mattress/chair cushion, turning and reposition approximately every 2 hours (pillows & wedges), pad between skin to skin, turn & reposition  4. Manage Moisture: absorbent under pads, internal / external urinary device, internal /  external fecal device, minimize layers, contain wound drainage, access need for specialty bed, limit adult briefs, maintain skin hydration (lotion/cream), moisture barrier, offer toileting every hour  5. Promote Activity: increase time out of bed, chair cushion, PT/OT evaluation, trapeze to reposition, pressure redistribution bed/mattress/chair  6. Address Reduced Mobility: float heels / suspension boot, HOB 30 degrees or less, pressure redistribution bed/mattress/cushion, PT / OT evaluation, turn and reposition approximately every 2 hours (pillows & wedges)  7. Promote Nutrition: document food / fluid / supplement intake, encourage/assist with meals as needed  8. Reduce Friction and Shear:  Progressa Bed needed in the CCU. Specialty bed when transferred to the nursing units. transferring/repositioning devices (lift/draw sheet), lift team/ patient mobility team, feet elevated on foot rest, minimize layers, foam dressing / transparent film / skin sealants, protective barrier creams and emollients, transfer aides (board, Ivory lift, ceiling lift, stand assist), HOB 30 degrees or less, trapeze to reposition.   Wound Care Team

## 2017-02-28 NOTE — ROUTINE PROCESS
1930 Report received from [facility]  2022 Patient chewing at tube, moving around in bed increased propofol to 50mcgs. 2145 Dr Edith Jolley called to check on patient status, new orders received. 0000 Patient reassessed. No changes to neuro status patient moving all extremities. 0400 Patient continues to move all extremities, however movement is non-purposeful.  0700Bedside shift change report given to [facility] (oncoming nurse) by Mara Albarran (offgoing nurse). Report included the following information SBAR, Kardex, ED Summary, OR Summary, Intake/Output, MAR, Accordion, Recent Results, Med Rec Status, Cardiac Rhythm Sinus Rhythm/Sinus Tach and Alarm Parameters .

## 2017-02-28 NOTE — PROGRESS NOTES
Vascular Surgery ICU Progress Note    Admit Date: 2017  POD:  1 Day Post-Op    Procedure:  Procedure(s):  ENDOVASCULAR ANEURYSM REPAIR of THORACIC AORTIC DISSECTION, repair of ruptured left iliac artery with stent placement. Left ileo-femoral bypass graft with PTFE. LEFT CAROTID SUBCLAVIAN BYPASS with stent placement of left common artery. Subjective:   Pt remains sedated/intubated, in no apparent distress. On esmolol and cardene gtts     Objective:   Vitals:  Blood pressure 140/54, pulse 92, temperature 99.4 °F (37.4 °C), resp. rate 20, weight 81.9 kg (180 lb 8.9 oz), SpO2 99 %, not currently breastfeeding. Temp (24hrs), Av.7 °F (36.5 °C), Min:94.1 °F (34.5 °C), Max:100.9 °F (38.3 °C)    Ventilator:  Ventilator Volumes  Vt Exhaled (Machine Breath) (ml): 579 ml (17)  Ve Observed (l/min): 10 l/min (17)    Oxygen Therapy:  Oxygen Therapy  O2 Sat (%): 99 % (17)  Pulse via Oximetry: 108 beats per minute (17)  O2 Device: Endotracheal tube (17)  O2 Flow Rate (L/min): 4 l/min (17)  FIO2 (%): 80 % (17)    CXR: bilat pleural effusions     Admission Weight: Last Weight   Weight: 81.9 kg (180 lb 8.9 oz) Weight: 81.9 kg (180 lb 8.9 oz)     Intake / Output / Drain:  Current Shift:  07 -  1900  In: 560.2 [I.V.:560.2]  Out: 222 [Urine:200; Drains:22]  Last 24 hrs.:   Intake/Output Summary (Last 24 hours) at 17  Last data filed at 17 08   Gross per 24 hour   Intake          9756.49 ml   Output             7819 ml   Net          1937.49 ml       EXAM:  General:  In no apparent distress                                                                                            Lungs:   Coarse bilat   Incision/Access Sites:  No erythema, drainage or swelling. Heart:  Regular rate and rhythm, S1, S2 normal, no murmur, click, rub or gallop. Abdomen:   Slightly firm, non-tender. Bowel sounds quiet.  No masses, No organomegaly. Extremities:  No edema, palpable R pedal pulses, ? Faint L DP pulse   Neurologic:  Unable to assess, pt sedated. Pupils round/reactive     Labs: Recent Labs      17   0412   17   0720  17   0228   WBC  14.6*   < >   --   8.5   HGB  11.1*   < >   --   7.7*   HCT  32.8*   < >   --   23.5*   PLT  217   < >   --   231   NA  139   < >   --   137   K  3.2*   < >   --   4.0   BUN  9   < >   --   12   CREA  1.22*   < >   --   1.35*   GLU  113*   < >   --   94   GLUCPOC   --    --   111*   --    INR   --    --    --   1.1    < > = values in this interval not displayed. Assessment:     Active Problems:    Aneurysm ([facility] [address] (2017)         Plan/Recommendations/Medical Decision Makin. S/p TEVAR, L ileo-fem bypass, L carotid-subclavian bypass:  Sites stable, lumbar drain in place-do not drain more than 150 ml per shift. 2. Volume overload/bilat effusions: diurese today  3. Acute resp failure: on NRB prior to surgery. Keep vent settings as they are, no plans to extubate today. Hx of COPD, cont scheduled albuterol  4. HTN: cont IV gtts- esmolol, cardene  5. JEANA: BUN/Cr improving, monitor and avoid nephrotoxins  6. Elevated LFTs: trending down, monitor  7. Hypokalemia: repletion orders in  8.  Dispo: to remain in ICU for now    Signed By: Larry Yarbrough NP

## 2017-02-28 NOTE — ANESTHESIA POSTPROCEDURE EVALUATION
Post-Anesthesia Evaluation and Assessment    Patient: Shery Kocher MRN: [id number]  SSN: [id number]    YOB: 1955  Age: 59 y.o. Sex: female       Cardiovascular Function/Vital Signs  Visit Vitals    /57    Pulse 76    Temp 35.9 °C (96.7 °F)    Resp 8    Wt 81.9 kg (180 lb 8.9 oz)    SpO2 98%    Breastfeeding No    BMI 29.14 kg/m2       Patient is status post general anesthesia for Procedure(s):  ENDOVASCULAR ANEURYSM REPAIR of THORACIC AORTIC DISSECTION, repair of ruptured left iliac artery with stent placement. Left ileo-femoral bypass graft with PTFE. LEFT CAROTID SUBCLAVIAN BYPASS with stent placement of left common artery. .    Nausea/Vomiting: None    Postoperative hydration reviewed and adequate. Pain:  Pain Scale 1: Behavioral Pain Scale (BPS) (02/27/17 1700)  Pain Intensity 1: 3 (02/27/17 1700)   Managed    Neurological Status:   Neuro (WDL): Exceptions to WDL (02/27/17 0740)  Neuro  Neurologic State: Alert (02/27/17 0740)  Orientation Level: Oriented to situation (02/27/17 0740)  Cognition: Follows commands (02/27/17 0740)  Speech: Clear (02/27/17 0740)  Assessment L Pupil: Brisk (02/26/17 0800)  Size L Pupil (mm): 3 (02/26/17 0800)  Assessment R Pupil: Brisk (02/26/17 0800)  Size R Pupil (mm): 3 (02/26/17 0800)  LUE Motor Response: Weak;Purposeful (02/27/17 0740)  LLE Motor Response: Weak;Purposeful (02/27/17 0740)  RUE Motor Response: Weak;Purposeful (02/27/17 0740)  RLE Motor Response: Numbness;Weak;Purposeful (02/27/17 0740)   At baseline    Mental Status and Level of Consciousness: Arousable    Pulmonary Status:   O2 Device: Nasal cannula; Oxygen mask (02/27/17 0730)   Adequate oxygenation and airway patent    Complications related to anesthesia: None    Post-anesthesia assessment completed.  No concerns    Signed By: Garett Wolfe MD     February 27, 2017

## 2017-02-28 NOTE — PROGRESS NOTES
02/28/17 0605   Weaning Parameters   Spontaneous Breathing Trial Complete No (Comments)  (SBT safety not passed.  FIO2 > 50%)

## 2017-02-28 NOTE — PROGRESS NOTES
[facility]  Pulmonary, Critical Care, and Sleep Medicine    Name: King Herb MRN: [id number]   : 1955 Hospital: [facility]   Date: 2017        Critical Care Patient Consult      IMPRESSION:   · Acute respiratory failure with hypoxia: on NRB prior to OR. On arrival to ICU pt was not maintaing Vt. Placed on ACV, Vt of 500 but only was getting Vt of 100s. Placed pt on PCV with pressure of PCV of 24, That generated tidal volumes in mid 300s. Not yet ready for SBT  · Bilateral pleural effusions; may influence respiratory efforts if they get any larger  · COPD noted to be moderate severity. · AAA Type B dissection; distal thoracic aorta  · S/p Left CCA- SCA bypass 17  · S/p TEVAR 17  · Post op iliac artery repair  · IMH from Penetrating Aortic ulceration  · spinal drain placed for measurement of ICP, drainage for Ao dissection Opening pressure (cm H2O):  26  · PICC line  · Renal disease: CRI; JEANA - improved  · Abn LFTS  · Anemia  · Sleep apnea: No treatment  · Hypertension: well controlled  · CAD and CABG  · Hypothyroidism: well controlled  · Arthritis  · GERD: well controlled  · Pt is critically ill at at high risk of deterioration; 35 min CC, EOP. · CC time with pt eop procedures  · Discussed with nurse, and CRNA and RT. PLAN:   · CCU monitoring  · monitor secretions  · Hold on SBT  · Replete lytes  · Hemodynamic monitoring  · Spinal drain monitoring  · Will try to obtain sputum Cx. · adjust ventilator prn  · nebs. · Frequent neuro checks  · Transfuse prn Hgb less than 7  · Watch renal function and LFTs  · CXR in AM  · Antihypertensives and Beta Blocker         On vent. IV preceddex. Polyuria. Labs reviewed with nursing. Still on PCV mode. Subjective/History: This patient has been seen and evaluated at the request of Dr. Jason Cabello for above. Patient is a 59 y.o. female who presented from OR directly to ICU.  Pt underwent EVAR for aneurysm. Failed medical therapy for above. Brought to ICU intubated and sedated. The patient is critically ill and can not provide additional history due to Ventilated. Past Medical History:   Diagnosis Date    Anxiety disorder     Arthritis     BACK, RT. KNEE and HANDS    CAD (coronary artery disease)      s/p 5 vessel CABG 2011    Carotid arterial disease (HCC)     diffuse bilateral CCA plaques    Chronic obstructive pulmonary disease (HCC)     Depression with anxiety     Essential hypertension     GERD (gastroesophageal reflux disease)     rarely    GI bleed     Hypothyroid     Mesenteric artery stenosis (HCC)     Microcytic anemia     Renal artery atherosclerosis, bilateral (HCC)      RA occlusion, left s/p stent    Renal atrophy, right     UC (ulcerative [address]) ([facility] [address]       Past Surgical History:   Procedure Laterality Date    ESOPHAGEAL CAPSULE ENDOSCOPY  6/8/2015         HX COLONOSCOPY      HX CORONARY ARTERY BYPASS GRAFT  8/11    5 way bypass. 08/19/11    HX FEMORAL BYPASS      triple    HX LUMBAR DISKECTOMY  1997    HX UROLOGICAL      vascular stent x 2 to left kidney    SMALL BOWEL ENDOSCOPY,ABLATE LESN  3/13/2015         STRESS TEST LEXISCAN/CARDIOLITE  4/24/12    UPPER GI ENDOSCOPY,CTRL BLEED  6/2/2015           Prior to Admission medications    Medication Sig Start Date End Date Taking? Authorizing Provider   spironolactone (ALDACTONE) 25 mg tablet Take 1 Tab by mouth daily. 1/17/17  Yes Peterson Engel NP   venlafaxine-SR [facility] 150 mg capsule Take 1 Cap by mouth two (2) times a day. 1/17/17  Yes Peterson Engel NP   clopidogrel (PLAVIX) 75 mg tab Take 1 Tab by mouth daily. 1/17/17  Yes Peterson Engel NP   fenofibrate (LOFIBRA) 54 mg tablet Take 1 Tab by mouth daily. 1/17/17  Yes Peterson Engel NP   pantoprazole (PROTONIX) 40 mg tablet Take 1 Tab by mouth two (2) times a day.  1/17/17  Yes Peterson Engel NP   atorvastatin (LIPITOR) 40 mg tablet Take 2 Tabs by mouth daily. 1/17/17  Yes Ronel Goodson NP   busPIRone (BUSPAR) 7.5 mg tablet TAKE 1 TABLET BY MOUTH TWO TIMES A DAY. 1/10/17  Yes Ronel Goodson NP   metoprolol tartrate (LOPRESSOR) 25 mg tablet TAKE 1 TAB BY MOUTH TWO (2) TIMES A DAY. 12/20/16  Yes Magalys Benavidez MD   STOOL SOFTENER 100 mg capsule TK ONE C PO  BID FOR CONSTIPATION 8/2/16  Yes Historical Provider   cloNIDine HCl (CATAPRES) 0.1 mg tablet Take  by mouth two (2) times a day. Yes Historical Provider   cefdinir (OMNICEF) 300 mg capsule Take 1 Cap by mouth two (2) times a day for 10 days. 2/22/17 3/4/17  Ronel Goodson NP   clonazePAM (KLONOPIN) 0.5 mg tablet TAKE 1 TABLET BY MOUTH TWICE A DAY . Arvell Nuzhat MUST LAST 30 DAYS 1/24/17   Ronel Goodson NP   levothyroxine (SYNTHROID) 100 mcg tablet Take 1 Tab by mouth Daily (before breakfast). 1/17/17   Ronel Goodson NP   hydrALAZINE (APRESOLINE) 25 mg tablet  8/2/16   Historical Provider   oxyCODONE-acetaminophen (PERCOCET) 5-325 mg per tablet TK 1 TO 2 TS PO Q 4 H PRN P 8/2/16   Historical Provider   amLODIPine (NORVASC) 10 mg tablet Take  by mouth ACB/HS. Historical Provider   tiotropium bromide (SPIRIVA RESPIMAT) 1.25 mcg/actuation inhaler Take 2 Puffs by inhalation daily. Historical Provider   ferrous sulfate 325 mg (65 mg iron) tablet Take 325 mg by mouth three (3) times daily (with meals). Historical Provider   potassium chloride (KLOR-CON M20) 20 mEq tablet Take 20 mEq by mouth daily as needed (for leg cramps). Historical Provider   diphenhydrAMINE (BENADRYL ALLERGY) 25 mg tablet Take 25 mg by mouth three (3) times daily as needed for Itching. Historical Provider   aspirin 81 mg chewable tablet Take 81 mg by mouth daily.     Historical Provider     Current Facility-Administered Medications   Medication Dose Route Frequency    potassium chloride 20 mEq in 50 ml IVPB  20 mEq IntraVENous Q1H    magnesium sulfate 2 g/50 ml IVPB (premix or compounded)  2 g IntraVENous ONCE    sodium chloride (NS) flush 5-10 mL  5-10 mL IntraVENous Q8H    sodium chloride (NS) flush 5-10 mL  5-10 mL IntraVENous Q8H    dexmedetomidine (PRECEDEX) 400 mcg in 0.9% sodium chloride 100 mL infusion  0.1-0.9 mcg/kg/hr IntraVENous TITRATE    albuterol (PROVENTIL VENTOLIN) nebulizer solution 2.5 mg  2.5 mg Nebulization Q4H RT    propofol (DIPRIVAN) infusion  5-50 mcg/kg/min IntraVENous TITRATE    niCARdipine (CARDENE) 25 mg in 0.9% sodium chloride 250 mL infusion  0-15 mg/hr IntraVENous TITRATE    PHENYLephrine (NEOSYNEPHRINE) 10,000 mcg in 0.9% sodium chloride 250 mL infusion   mcg/min IntraVENous TITRATE    ceFAZolin (ANCEF) 2g in 100 ml 0.9% NS IVPB  2 g IntraVENous Q8H    chlorhexidine (PERIDEX) 0.12 % mouthwash 15 mL  15 mL Oral Q12H    mupirocin (BACTROBAN) 2 % ointment   Topical Q12H    0.9% sodium chloride infusion  100 mL/hr IntraVENous CONTINUOUS    metoprolol (LOPRESSOR) injection 2.5 mg  2.5 mg IntraVENous Q4H    sodium chloride (NS) flush 10 mL  10 mL InterCATHeter Q24H    sodium chloride (NS) flush 10-40 mL  10-40 mL InterCATHeter Q8H    esmolol 10mg/mL (BREVIBLOC) infusion   mcg/kg/min IntraVENous TITRATE    amLODIPine (NORVASC) tablet 10 mg  10 mg Oral DAILY    atorvastatin (LIPITOR) tablet 80 mg  80 mg Oral DAILY    busPIRone (BUSPAR) tablet 7.5 mg  7.5 mg Oral BID    cloNIDine HCl (CATAPRES) tablet 0.1 mg  0.1 mg Oral BID    fenofibrate nanocrystallized (TRICOR) tablet 48 mg  48 mg Oral DAILY    hydrALAZINE (APRESOLINE) tablet 25 mg  25 mg Oral TID    levothyroxine (SYNTHROID) tablet 100 mcg  100 mcg Oral ACB    pantoprazole (PROTONIX) tablet 40 mg  40 mg Oral BID    spironolactone (ALDACTONE) tablet 25 mg  25 mg Oral DAILY    docusate sodium (COLACE) capsule 100 mg  100 mg Oral BID    venlafaxine-SR (EFFEXOR-XR) capsule 150 mg  150 mg Oral BID    sodium chloride (NS) flush 5-10 mL  5-10 mL IntraVENous Q8H     Allergies   Allergen Reactions  Tussionex Itching    Codeine Itching    Nickel Other (comments)     Local reaction (redness, irritation, blistering) if wears \"cheap earrings\"    Oxycodone Itching      Social History   Substance Use Topics    Smoking status: Former Smoker     Packs/day: 1.00     Years: 40.00     Types: Cigarettes     Quit date: 2011    Smokeless tobacco: Former User      Comment: quit a few times but relapsed    Alcohol use No      Family History   Problem Relation Age of Onset    Post-op Nausea/Vomiting Other     Other Other      LOW BP AND DIFFICULTY BREATHING    Hypertension Mother    Brand [place] Stroke Mother       age 48,    Brand [place] Hypertension Brother     Stroke Brother       age 55, smoked        Review of Systems:  Review of systems not obtained due to patient factors. Objective:   Vital Signs:    Visit Vitals    /55    Pulse (!) 108    Temp 99.4 °F (37.4 °C)    Resp 20    Wt 81.9 kg (180 lb 8.9 oz)    SpO2 97%    Breastfeeding No    BMI 29.14 kg/m2       O2 Device: Endotracheal tube   O2 Flow Rate (L/min): 4 l/min   Temp (24hrs), Av.7 °F (36.5 °C), Min:94.1 °F (34.5 °C), Max:100.9 °F (38.3 °C)       Intake/Output:   Last shift:         Last 3 shifts:  1901 -  07  In: 22899.1 [I.V.:18231.1]  Out: 9336 [Urine:7415; Drains:182]    Intake/Output Summary (Last 24 hours) at 17 0808  Last data filed at 17 0700   Gross per 24 hour   Intake          9196.25 ml   Output             7247 ml   Net          1949.25 ml       Ventilator Settings:  Mode Rate Tidal Volume Pressure FiO2 PEEP   Assist control, Pressure control        80 % 5 cm H20     Peak airway pressure: 33 cm H2O    Minute ventilation: 10 l/min      Physical Exam:    General:  Intubated and sedated, no distress, appears stated age. Head:  Normocephalic, without obvious abnormality, atraumatic. Eyes:  Conjunctivae/corneas clear. PERRL, EOMs intact. Nose: Nares normal. Septum midline.  Mucosa normal. No drainage or sinus tenderness. Throat: Lips, mucosa, and tongue normal. Teeth and gums normal. Has a white coating over mouth. Neck: Supple, symmetrical, trachea midline, no adenopathy, thyroid: no enlargment/tenderness/nodules, no carotid bruit and no JVD. Back:   Symmetric, no curvature. ROM normal.   Lungs:   Decreased BS bilaterally, has bilateral rhonchi. Chest wall:  No tenderness or deformity. Heart:  Regular rate and rhythm, S1, S2 normal, no murmur, click, rub or gallop. Abdomen:   Obese;  bowel sounds are decreased,  No masses,  No organomegaly. Extremities: Extremities normal, atraumatic, no cyanosis or edema. Warm. Pulses: 2+ and symmetric all extremities. Skin: Skin color, texture, turgor normal. No rashes or lesions   Lymph nodes: Cervical, supraclavicular, and axillary nodes normal.   Neurologic: Not able to fully assess. Pt is post op, effects of anesthesia in place.         Data:     Recent Results (from the past 24 hour(s))   METABOLIC PANEL, BASIC    Collection Time: 02/27/17  1:12 PM   Result Value Ref Range    Sodium 137 136 - 145 mmol/L    Potassium 3.8 3.5 - 5.1 mmol/L    Chloride 107 97 - 108 mmol/L    CO2 20 (L) 21 - 32 mmol/L    Anion gap 10 5 - 15 mmol/L    Glucose 131 (H) 65 - 100 mg/dL    BUN 11 6 - 20 MG/DL    Creatinine 1.21 (H) 0.55 - 1.02 MG/DL    BUN/Creatinine ratio 9 (L) 12 - 20      GFR est AA 54 (L) >60 ml/min/1.73m2    GFR est non-AA 45 (L) >60 ml/min/1.73m2    Calcium 7.2 (L) 8.5 - 10.1 MG/DL   HGB & HCT    Collection Time: 02/27/17  1:12 PM   Result Value Ref Range    HGB 7.1 (L) 11.5 - 16.0 g/dL    HCT 20.7 (L) 35.0 - 47.0 %   BLOOD GAS, ARTERIAL    Collection Time: 02/27/17  1:15 PM   Result Value Ref Range    pH 7.32 (L) 7.35 - 7.45      PCO2 33 (L) 35.0 - 45.0 mmHg    PO2 102 (H) 80 - 100 mmHg    O2 SAT 97 92 - 97 %    BICARBONATE 17 (L) 22 - 26 mmol/L    BASE DEFICIT 8.3 mmol/L    O2 METHOD VENTILATOR      FIO2 100 %    MODE SIMV      Tidal volume 500      SET RATE 12      EPAP/CPAP/PEEP 7.0      Sample source ARTERIAL      SITE Aorta      LEIGH ANN'S TEST N/A     CBC WITH AUTOMATED DIFF    Collection Time: 02/27/17  5:18 PM   Result Value Ref Range    WBC 13.2 (H) 3.6 - 11.0 K/uL    RBC 3.81 3.80 - 5.20 M/uL    HGB 11.0 (L) 11.5 - 16.0 g/dL    HCT 32.2 (L) 35.0 - 47.0 %    MCV 84.5 80.0 - 99.0 FL    MCH 28.9 26.0 - 34.0 PG    MCHC 34.2 30.0 - 36.5 g/dL    RDW 14.5 11.5 - 14.5 %    PLATELET 193 164 - 293 K/uL    NEUTROPHILS 86 %    BAND NEUTROPHILS 4 %    LYMPHOCYTES 4 %    MONOCYTES 5 %    EOSINOPHILS 0 %    BASOPHILS 0 %    MYELOCYTES 1 %    ABS. NEUTROPHILS 11.9 K/UL    ABS. LYMPHOCYTES 0.5 K/UL    ABS. MONOCYTES 0.7 K/UL    ABS. EOSINOPHILS 0.0 K/UL    ABS. BASOPHILS 0.0 K/UL    RBC COMMENTS NORMOCYTIC, NORMOCHROMIC     METABOLIC PANEL, COMPREHENSIVE    Collection Time: 02/27/17  5:18 PM   Result Value Ref Range    Sodium 136 136 - 145 mmol/L    Potassium 4.0 3.5 - 5.1 mmol/L    Chloride 106 97 - 108 mmol/L    CO2 19 (L) 21 - 32 mmol/L    Anion gap 11 5 - 15 mmol/L    Glucose 144 (H) 65 - 100 mg/dL    BUN 10 6 - 20 MG/DL    Creatinine 1.16 (H) 0.55 - 1.02 MG/DL    BUN/Creatinine ratio 9 (L) 12 - 20      GFR est AA 57 (L) >60 ml/min/1.73m2    GFR est non-AA 47 (L) >60 ml/min/1.73m2    Calcium 7.9 (L) 8.5 - 10.1 MG/DL    Bilirubin, total 0.5 0.2 - 1.0 MG/DL    ALT (SGPT) 146 (H) 12 - 78 U/L    AST (SGOT) 146 (H) 15 - 37 U/L    Alk.  phosphatase 147 (H) 45 - 117 U/L    Protein, total 5.5 (L) 6.4 - 8.2 g/dL    Albumin 2.4 (L) 3.5 - 5.0 g/dL    Globulin 3.1 2.0 - 4.0 g/dL    A-G Ratio 0.8 (L) 1.1 - 2.2     MAGNESIUM    Collection Time: 02/27/17  5:18 PM   Result Value Ref Range    Magnesium 1.5 (L) 1.6 - 2.4 mg/dL   PHOSPHORUS    Collection Time: 02/27/17  5:18 PM   Result Value Ref Range    Phosphorus 4.0 2.6 - 4.7 MG/DL   CULTURE, RESPIRATORY/SPUTUM/BRONCH W GRAM STAIN    Collection Time: 02/27/17  5:18 PM   Result Value Ref Range    Special Requests: NO SPECIAL REQUESTS GRAM STAIN RARE  WBCS SEEN        GRAM STAIN FEW  EPITHELIAL CELLS SEEN        GRAM STAIN FEW  GRAM POSITIVE COCCI  IN CLUSTERS        Culture result: PENDING    LACTIC ACID, PLASMA    Collection Time: 02/27/17  5:18 PM   Result Value Ref Range    Lactic acid 1.2 0.4 - 2.0 MMOL/L   BLOOD GAS, ARTERIAL    Collection Time: 02/27/17  5:24 PM   Result Value Ref Range    pH 7.25 (L) 7.35 - 7.45      PCO2 41 35.0 - 45.0 mmHg    PO2 84 80 - 100 mmHg    O2 SAT 95 92 - 97 %    BICARBONATE 18 (L) 22 - 26 mmol/L    BASE DEFICIT 9.1 mmol/L    O2 METHOD VENTILATOR      FIO2 100 %    MODE PRESSURE CONTROL      SET RATE 12      IPAP/PIP 24      EPAP/CPAP/PEEP 5.0      Sample source ARTERIAL      SITE DRAWN FROM ARTERIAL LINE      LEIGH ANN'S TEST N/A     CBC WITH AUTOMATED DIFF    Collection Time: 02/28/17  4:12 AM   Result Value Ref Range    WBC 14.6 (H) 3.6 - 11.0 K/uL    RBC 3.98 3.80 - 5.20 M/uL    HGB 11.1 (L) 11.5 - 16.0 g/dL    HCT 32.8 (L) 35.0 - 47.0 %    MCV 82.4 80.0 - 99.0 FL    MCH 27.9 26.0 - 34.0 PG    MCHC 33.8 30.0 - 36.5 g/dL    RDW 14.8 (H) 11.5 - 14.5 %    PLATELET 800 249 - 732 K/uL    NEUTROPHILS 85 (H) 32 - 75 %    LYMPHOCYTES 4 (L) 12 - 49 %    MONOCYTES 10 5 - 13 %    EOSINOPHILS 1 0 - 7 %    BASOPHILS 0 0 - 1 %    ABS. NEUTROPHILS 12.5 (H) 1.8 - 8.0 K/UL    ABS. LYMPHOCYTES 0.6 (L) 0.8 - 3.5 K/UL    ABS. MONOCYTES 1.5 (H) 0.0 - 1.0 K/UL    ABS. EOSINOPHILS 0.1 0.0 - 0.4 K/UL    ABS.  BASOPHILS 0.0 0.0 - 0.1 K/UL   METABOLIC PANEL, COMPREHENSIVE    Collection Time: 02/28/17  4:12 AM   Result Value Ref Range    Sodium 139 136 - 145 mmol/L    Potassium 3.2 (L) 3.5 - 5.1 mmol/L    Chloride 104 97 - 108 mmol/L    CO2 24 21 - 32 mmol/L    Anion gap 11 5 - 15 mmol/L    Glucose 113 (H) 65 - 100 mg/dL    BUN 9 6 - 20 MG/DL    Creatinine 1.22 (H) 0.55 - 1.02 MG/DL    BUN/Creatinine ratio 7 (L) 12 - 20      GFR est AA 54 (L) >60 ml/min/1.73m2    GFR est non-AA 45 (L) >60 ml/min/1.73m2    Calcium 7.9 (L) 8.5 - 10.1 MG/DL Bilirubin, total 0.5 0.2 - 1.0 MG/DL    ALT (SGPT) 114 (H) 12 - 78 U/L    AST (SGOT) 121 (H) 15 - 37 U/L    Alk.  phosphatase 157 (H) 45 - 117 U/L    Protein, total 5.0 (L) 6.4 - 8.2 g/dL    Albumin 2.3 (L) 3.5 - 5.0 g/dL    Globulin 2.7 2.0 - 4.0 g/dL    A-G Ratio 0.9 (L) 1.1 - 2.2     MAGNESIUM    Collection Time: 02/28/17  4:12 AM   Result Value Ref Range    Magnesium 1.4 (L) 1.6 - 2.4 mg/dL   PHOSPHORUS    Collection Time: 02/28/17  4:12 AM   Result Value Ref Range    Phosphorus 2.5 (L) 2.6 - 4.7 MG/DL   LACTIC ACID, PLASMA    Collection Time: 02/28/17  4:12 AM   Result Value Ref Range    Lactic acid 1.2 0.4 - 2.0 MMOL/L   BLOOD GAS, ARTERIAL    Collection Time: 02/28/17  5:55 AM   Result Value Ref Range    pH 7.42 7.35 - 7.45      PCO2 33 (L) 35.0 - 45.0 mmHg    PO2 63 (L) 80 - 100 mmHg    O2 SAT 93 92 - 97 %    BICARBONATE 21 (L) 22 - 26 mmol/L    BASE DEFICIT 2.6 mmol/L    O2 METHOD VENTILATOR      FIO2 80 %    MODE PRESSURE CONTROL      SET RATE 18      SPONTANEOUS RATE 18.0      IPAP/PIP 24      EPAP/CPAP/PEEP 5.0      Sample source ARTERIAL      SITE DRAWN FROM ARTERIAL LINE      LEIGH ANN'S TEST N/A               Telemetry:normal sinus rhythm    Imaging:  I have personally reviewed the patients radiographs and have reviewed the reports:  2-27-17: CXR bilateral pleural efffusions        Total critical care time exclusive of procedures: 35  Minutes    Jesus tSeven MD

## 2017-02-28 NOTE — PROGRESS NOTES
0700[facility] [address] [place] [address]-Dr. Christine Garcia at bedside, updated on patient condition, esmolol started at 50mcg, propofol continued at 50mcg, cardene at 7.5mg, bilat wrist restraints continued for patient safety, pt moves all extremities but doesn't not follow commands. Failed SBT FiO2 80%. 0800-Shift assessment complete, prn dilaudid given pt appears to be resting more comfortable after. 0920-Esmolol increased to 100mcg  1200-Reassessment completed no changes. 1600-Reassessment completed no changes. 1715-Family updated on plan of care.   1900-Bedside report given to Jacek Patel

## 2017-02-28 NOTE — PROGRESS NOTES
Pharmacy Automatic Renal Dosing Protocol - Antimicrobials    Indication for Antimicrobials: HCAP  Current Regimen of Each Antimicrobial (Start Day & Day of Therapy):  Vancomycin - pharmacy to dose (, day 1)    Previous:  Cefazolin 2 gm every 8 hours (post op surgical prophylaxis)    Significant cultures:    resp: GPC pending    CAPD, Intermittent Hemodialysis or Renal Replacement Therapy: n/a  Paralysis, amputations, malnutrition: n/a  Recent Labs      17   0412  17   1718  17   1312  17   0228   CREA  1.22*  1.16*  1.21*  1.35*   BUN  9  10  11  12   WBC  14.6*  13.2*   --   8.5     Temp (24hrs), Av.7 °F (36.5 °C), Min:94.1 °F (34.5 °C), Max:100.9 °F (38.3 °C)    Creatinine Clearance (ml/min): 44    Goal Vancomycin Level(s): 15-20      Impression/Plan: vancomycin 2000 mg x 1 dose, followed by 1250 mg every 18 hours        Pharmacy will follow daily and adjust doses of monitored medications as appropriate for renal function and/or serum levels.     Thank you,  Kishor Coker, PHARMD

## 2017-03-01 NOTE — PROGRESS NOTES
Nutrition Assessment:    RECOMMENDATIONS:   Initiate trickle TF per Dr. Jamilah Gongora:   Start Osmolite 1.2 @ 10mL/h + 75mL H2O flush q 4h      As medically able, would advance to Goal Rate of 40mL/h + 1 packet Proscource (liquid protein) BID + 75mL H2O flush q 4h (provides 1272kcals/83gPro/1237mL)    DIETITIANS INTERVENTIONS/PLAN:   TF recommendations  Monitor plan of care    ASSESSMENT:   Pt admitted with aneurysm. PMH: CAD, GERD, HTN, COPD, CHF. Chart reviewed, case discussed during CCU rounds. Pt is s/p TEVAR and bypass on 2/27. She is intubated and sedated on propofol @ 24. 6mL/h which provides 649 kcals daily. NGT to be placed today, discussed plan to start trophic feeds with pulmonology. Pt has been having some drops in BG and is on D10IVF's currently. K+ 3.2, being repleted. SUBJECTIVE/OBJECTIVE:   Pt intubated and sedated   Diet Order: NPO  % Eaten:  Patient Vitals for the past 72 hrs:   % Diet Eaten   02/26/17 1406 15 %     Pertinent Medications:protonix, KCl, vancomycin; IVF([email or web address], [email or web address]). Chemistries:  Lab Results   Component Value Date/Time    Sodium 138 03/01/2017 04:37 AM    Potassium 3.2 03/01/2017 04:37 AM    Chloride 106 03/01/2017 04:37 AM    CO2 21 03/01/2017 04:37 AM    Anion gap 11 03/01/2017 04:37 AM    Glucose 93 03/01/2017 04:37 AM    BUN 9 03/01/2017 04:37 AM    Creatinine 1.12 03/01/2017 04:37 AM    BUN/Creatinine ratio 8 03/01/2017 04:37 AM    GFR est AA 60 03/01/2017 04:37 AM    GFR est non-AA 49 03/01/2017 04:37 AM    Calcium 7.5 03/01/2017 04:37 AM    AST (SGOT) 65 03/01/2017 04:37 AM    Alk.  phosphatase 136 03/01/2017 04:37 AM    Protein, total 5.2 03/01/2017 04:37 AM    Albumin 1.7 03/01/2017 04:37 AM    Globulin 3.5 03/01/2017 04:37 AM    A-G Ratio 0.5 03/01/2017 04:37 AM    ALT (SGPT) 50 03/01/2017 04:37 AM      Anthropometrics: Height: 5' 5.98\" (167.6 cm) Weight: 95.3 kg (210 lb 1.6 oz)    IBW (%IBW): 59.1 kg (130 lb 4.7 oz) ( ) UBW (%UBW): 68.2 kg (150 lb 5.7 oz) (  %) BMI: Body mass index is 33.93 kg/(m^2). This BMI is indicative of:  []Underweight   []Normal   []Overweight   [x] Obesity   [] Extreme Obesity (BMI>40)  Estimated Nutrition Needs (Based on): 1887 Kcals/day (PSU (MSJ 1524)) , 75 g (1.1gPro/kg ABW) Protein  Carbohydrate: At Least 130 g/day  Fluids: 1900 mL/day    Last BM: 2/20   []Active     []Hyperactive  [x]Hypoactive       [] Absent   BS  Skin:    [] Intact   [x] Incision  [] Breakdown   [] DTI   [] Tears/Excoriation/Abrasion  []Edema [] Other: Wt Readings from Last 30 Encounters:   03/01/17 95.3 kg (210 lb 1.6 oz)   02/23/17 78 kg (171 lb 15.3 oz)   02/21/17 77.2 kg (170 lb 4 oz)   01/24/17 77.7 kg (171 lb 4 oz)   08/24/16 75.5 kg (166 lb 8 oz)   06/27/16 79.2 kg (174 lb 9.6 oz)   06/26/16 78 kg (172 lb)   05/31/16 77.3 kg (170 lb 8 oz)   04/15/16 77.6 kg (171 lb)   04/12/16 77.8 kg (171 lb 9.6 oz)   02/22/16 82.1 kg (181 lb)   12/21/15 78.8 kg (173 lb 12.8 oz)   11/09/15 80.3 kg (177 lb)   10/09/15 80.3 kg (177 lb)   09/16/15 79.5 kg (175 lb 4.8 oz)   07/10/15 80.3 kg (177 lb)   07/08/15 80.6 kg (177 lb 9.6 oz)   07/01/15 79.8 kg (176 lb)   06/29/15 79.7 kg (175 lb 9.6 oz)   06/02/15 80.3 kg (177 lb)   05/07/15 80.6 kg (177 lb 9.6 oz)   05/06/15 79.8 kg (176 lb)   05/02/15 79.4 kg (175 lb)   04/21/15 80.3 kg (177 lb)   03/13/15 77.1 kg (170 lb)   02/02/15 77.3 kg (170 lb 6.4 oz)   12/05/14 77.9 kg (171 lb 12.8 oz)   11/28/14 76.7 kg (169 lb)   11/10/14 78.5 kg (173 lb)   10/24/14 76.7 kg (169 lb 3.2 oz)      NUTRITION DIAGNOSES:   Problem:  Inadequate protein-energy intake      Etiology: related to pt NPO      Signs/Symptoms: as evidenced by NPO + propofol meets <25% kcal and 0% protein needs. NUTRITION INTERVENTIONS:    Enteral/Parenteral Nutrition: Initiate enteral nutrition                GOAL:   Pt will tolerate initiation of TF with residuals <250mL in 2-3 days.      Cultural, Confucianist, or Ethnic Dietary Needs: None     LEARNING NEEDS (Diet, Food/Nutrient-Drug Interaction):    [x] None Identified   [] Identified and Education Provided/Documented   [] Identified and Pt declined/was not appropriate      [x] Interdisciplinary Care Plan Reviewed/Documented    [x] Participated in Discharge Planning: Unable to determine    [x] Interdisciplinary Rounds     NUTRITION RISK:    [x] High              [] Moderate           []  Low  []  Minimal/Uncompromised    PT SEEN FOR:    [x]  MD Consult: []Calorie Count      []Diabetic Diet Education        []Diet Education     []Electrolyte Management     []General Nutrition Management and Supplements     [x]Management of Tube Feeding     []TPN Recommendations    []  RN Referral:  []MST score >=2     []Enteral/Parenteral Nutrition PTA     []Pregnant: Gestational DM or Multigestation   []  Low BMI  []  DTR Referral       Zonia Awad, [address] [place] [address]  Pager [phone]  Weekend Pager [phone]

## 2017-03-01 NOTE — INTERDISCIPLINARY ROUNDS
Interdisciplinary team rounds were held 3/1/17 with the following team members:Care Management, Diabetes Treatment Specialist, Nursing, Nutrition, Pastoral Care, Pharmacy, Physical Therapy, Physician, Respiratory Therapy and Clinical Coordinator. Plan of care discussed. Goal: See MD orders and progress notes for further  interventions and desired outcomes. Patient still not ready to initiate ABCDE bundle per Dr. Eric Mercado.

## 2017-03-01 NOTE — PROGRESS NOTES
Progress Note    Lumbar drain removed intact. Small amount of blood emitted when pulled. Band-aid applied. 24 hour neuro checks ordered.         Mona Marin MD

## 2017-03-01 NOTE — PROGRESS NOTES
[facility]  Pulmonary, Critical Care, and Sleep Medicine    Name: Ghulam Odom MRN: [id number]   : 1955 Hospital: [facility]   Date: 3/1/2017        Critical Care Patient Consult      IMPRESSION:   · Acute respiratory failure with hypoxia: on NRB prior to OR. On arrival to ICU pt was not maintaing Vt. Placed on ACV, Vt of 500 but only was getting Vt of 100s. Placed pt on PCV with pressure of PCV of 24, That generated tidal volumes in mid 300s. Not yet ready for SBT; drops sats with movement  · Bilateral pleural effusions; may influence respiratory efforts if they get any larger  · hypoglycemia  · COPD noted to be moderate severity. · AAA Type B dissection; distal thoracic aorta  · S/p Left CCA- SCA bypass 17  · S/p TEVAR 17  · Post op iliac artery repair  · IMH from Penetrating Aortic ulceration  · spinal drain placed for measurement of ICP, drainage for Ao dissection Opening pressure (cm H2O):  26  · PICC line  · Renal disease: CRI; JEANA - improved  · Abn LFTS  · Anemia  · Sleep apnea: No treatment  · Hypertension: well controlled  · CAD and CABG  · Hypothyroidism: well controlled  · Arthritis  · GERD: well controlled  · Pt is critically ill at at high risk of deterioration; 35 min CC, EOP. · CC time with pt eop procedures  · Discussed with nurse, and CRNA and RT. PLAN:   · CCU monitoring  · TPN- if ok with   · adjsut vent  · Not ready for SBT  · monitor secretions  · Replete lytes  · Hemodynamic monitoring  · Spinal drain monitoring  · Will try to obtain sputum Cx. · nebs. · Frequent neuro checks  · Transfuse prn Hgb less than 7  · Watch renal function and LFTs  · CXR in AM  · Antihypertensives and Beta Blocker      3/1 drops sats with movement. On vent. Dropped BS as well. IV esmolol and IV cardene. Movesa ll fours but agitated   On vent. IV preceddex. Polyuria. Labs reviewed with nursing. Still on PCV mode. Subjective/History: This patient has been seen and evaluated at the request of Dr. Darrius Alvarenga for above. Patient is a 59 y.o. female who presented from OR directly to ICU. Pt underwent EVAR for aneurysm. Failed medical therapy for above. Brought to ICU intubated and sedated. The patient is critically ill and can not provide additional history due to Ventilated. Past Surgical History:   Procedure Laterality Date    ESOPHAGEAL CAPSULE ENDOSCOPY  6/8/2015         HX COLONOSCOPY      HX CORONARY ARTERY BYPASS GRAFT  8/11    5 way bypass.  08/19/11    HX FEMORAL BYPASS      triple    HX LUMBAR DISKECTOMY  1997    HX UROLOGICAL      vascular stent x 2 to left kidney    SMALL BOWEL ENDOSCOPY,ABLATE LESN  3/13/2015         STRESS TEST LEXISCAN/CARDIOLITE  4/24/12    UPPER GI ENDOSCOPY,CTRL BLEED  6/2/2015             Current Facility-Administered Medications   Medication Dose Route Frequency    potassium chloride 20 mEq in 50 ml IVPB  20 mEq IntraVENous Q2H    furosemide (LASIX) injection 40 mg  40 mg IntraVENous ONCE    sodium chloride 0.45 % in dextrose 10% 1,019.6 mL infusion   IntraVENous CONTINUOUS    albuterol (PROVENTIL VENTOLIN) nebulizer solution 2.5 mg  2.5 mg Nebulization Q6H RT    pantoprazole (PROTONIX) 40 mg in sodium chloride 0.9 % 10 mL injection  40 mg IntraVENous DAILY    vancomycin (VANCOCIN) 1250 mg in  ml infusion  1,250 mg IntraVENous Q18H    sodium chloride (NS) flush 5-10 mL  5-10 mL IntraVENous Q8H    sodium chloride (NS) flush 5-10 mL  5-10 mL IntraVENous Q8H    propofol (DIPRIVAN) infusion  5-50 mcg/kg/min IntraVENous TITRATE    niCARdipine (CARDENE) 25 mg in 0.9% sodium chloride 250 mL infusion  0-15 mg/hr IntraVENous TITRATE    PHENYLephrine (NEOSYNEPHRINE) 10,000 mcg in 0.9% sodium chloride 250 mL infusion   mcg/min IntraVENous TITRATE    chlorhexidine (PERIDEX) 0.12 % mouthwash 15 mL  15 mL Oral Q12H    metoprolol (LOPRESSOR) injection 2.5 mg 2.5 mg IntraVENous Q4H    sodium chloride (NS) flush 10 mL  10 mL InterCATHeter Q24H    sodium chloride (NS) flush 10-40 mL  10-40 mL InterCATHeter Q8H    esmolol 10mg/mL (BREVIBLOC) infusion   mcg/kg/min IntraVENous TITRATE    sodium chloride (NS) flush 5-10 mL  5-10 mL IntraVENous Q8H     Allergies   Allergen Reactions    Tussionex Itching    Codeine Itching    Nickel Other (comments)     Local reaction (redness, irritation, blistering) if wears \"cheap earrings\"    Oxycodone Itching      Social History   Substance Use Topics    Smoking status: Former Smoker     Packs/day: 1.00     Years: 40.00     Types: Cigarettes     Quit date: 2011    Smokeless tobacco: Former User      Comment: quit a few times but relapsed    Alcohol use No      Family History   Problem Relation Age of Onset    Post-op Nausea/Vomiting Other     Other Other      LOW BP AND DIFFICULTY BREATHING    Hypertension Mother     Stroke Mother       age 48,    [facility] Hypertension Brother    [facility] Stroke Brother       age 55, smoked        Review of Systems:  Review of systems not obtained due to patient factors.     Objective:   Vital Signs:    Visit Vitals    /46 (BP 1 Location: Left arm, BP Patient Position: At rest;Head of bed elevated (Comment degrees))    Pulse 69    Temp 98.9 °F (37.2 °C)    Resp 18    Ht 5' 5.98\" (1.676 m)    Wt 95.3 kg (210 lb 1.6 oz)    SpO2 99%    Breastfeeding No    BMI 33.93 kg/m2       O2 Device: Endotracheal tube, Ventilator   O2 Flow Rate (L/min): 4 l/min   Temp (24hrs), Av.2 °F (37.3 °C), Min:98.9 °F (37.2 °C), Max:99.6 °F (37.6 °C)       Intake/Output:   Last shift:       07 -  1900  In: 167.7 [I.V.:167.7]  Out: 250 [Urine:250]  Last 3 shifts: 1901 -  0700  In: 9322.5 [I.V.:9322.5]  Out: 66752 [Urine:9630; Drains:408]    Intake/Output Summary (Last 24 hours) at 17 0839  Last data filed at 17 0800   Gross per 24 hour   Intake          5341.75 ml Output             5759 ml   Net          -417.25 ml       Ventilator Settings:  Mode Rate Tidal Volume Pressure FiO2 PEEP   Assist control, Pressure control        80 % 8 cm H20     Peak airway pressure: 34 cm H2O    Minute ventilation: 11.5 l/min      Physical Exam:    General:  Intubated and sedated, no distress, appears stated age. Head:  Normocephalic, without obvious abnormality, atraumatic. Eyes:  Conjunctivae/corneas clear. PERRL, EOMs intact. Nose: Nares normal. Septum midline. Mucosa normal. No drainage or sinus tenderness. Throat: Lips, mucosa, and tongue normal. Teeth and gums normal. Has a white coating over mouth. Neck: Supple, symmetrical, trachea midline, no adenopathy, thyroid: no enlargment/tenderness/nodules, no carotid bruit and no JVD. Back:   Symmetric, no curvature. ROM normal.   Lungs:   Decreased BS bilaterally, has bilateral rhonchi. Chest wall:  No tenderness or deformity. Heart:  Regular rate and rhythm, S1, S2 normal, no murmur, click, rub or gallop. Abdomen:   Obese;  bowel sounds are decreased,  No masses,  No organomegaly. Extremities: Extremities normal, atraumatic, no cyanosis or edema. Warm. Pulses: 2+ and symmetric all extremities. Skin: Skin color, texture, turgor normal. No rashes or lesions   Lymph nodes: Cervical, supraclavicular, and axillary nodes normal.   Neurologic: Not able to fully assess. Pt is post op, effects of anesthesia in place.         Data:     Recent Results (from the past 24 hour(s))   GLUCOSE, POC    Collection Time: 02/28/17 11:46 AM   Result Value Ref Range    Glucose (POC) 122 (H) 65 - 100 mg/dL    Performed by Cara Lew    METABOLIC PANEL, COMPREHENSIVE    Collection Time: 02/28/17 11:53 AM   Result Value Ref Range    Sodium 141 136 - 145 mmol/L    Potassium 3.9 3.5 - 5.1 mmol/L    Chloride 107 97 - 108 mmol/L    CO2 25 21 - 32 mmol/L    Anion gap 9 5 - 15 mmol/L    Glucose 106 (H) 65 - 100 mg/dL    BUN 9 6 - 20 MG/DL Creatinine 1.22 (H) 0.55 - 1.02 MG/DL    BUN/Creatinine ratio 7 (L) 12 - 20      GFR est AA 54 (L) >60 ml/min/1.73m2    GFR est non-AA 45 (L) >60 ml/min/1.73m2    Calcium 7.5 (L) 8.5 - 10.1 MG/DL    Bilirubin, total 0.4 0.2 - 1.0 MG/DL    ALT (SGPT) 81 (H) 12 - 78 U/L    AST (SGOT) 89 (H) 15 - 37 U/L    Alk.  phosphatase 135 (H) 45 - 117 U/L    Protein, total 5.4 (L) 6.4 - 8.2 g/dL    Albumin 2.0 (L) 3.5 - 5.0 g/dL    Globulin 3.4 2.0 - 4.0 g/dL    A-G Ratio 0.6 (L) 1.1 - 2.2     MAGNESIUM    Collection Time: 02/28/17 11:53 AM   Result Value Ref Range    Magnesium 1.4 (L) 1.6 - 2.4 mg/dL   PHOSPHORUS    Collection Time: 02/28/17 11:53 AM   Result Value Ref Range    Phosphorus 2.1 (L) 2.6 - 4.7 MG/DL   GLUCOSE, POC    Collection Time: 02/28/17  5:21 PM   Result Value Ref Range    Glucose (POC) 96 65 - 100 mg/dL    Performed by [address] [place] [address], POC    Collection Time: 03/01/17  1:33 AM   Result Value Ref Range    Glucose (POC) 78 65 - 100 mg/dL    Performed by True Duarte    GLUCOSE, POC    Collection Time: 03/01/17  2:00 AM   Result Value Ref Range    Glucose (POC) 142 (H) 65 - 100 mg/dL    Performed by Joe Castanon    BLOOD GAS, ARTERIAL    Collection Time: 03/01/17  4:00 AM   Result Value Ref Range    pH 7.36 7.35 - 7.45      PCO2 36 35.0 - 45.0 mmHg    PO2 55 (L) 80 - 100 mmHg    O2 SAT 88 (L) 92 - 97 %    BICARBONATE 20 (L) 22 - 26 mmol/L    BASE DEFICIT 4.7 mmol/L    O2 METHOD VENTILATOR      FIO2 80 %    MODE PRESSURE CONTROL      SET RATE 18      IPAP/PIP 24      EPAP/CPAP/PEEP 5.0      Sample source ARTERIAL      SITE DRAWN FROM ARTERIAL LINE      LEIGH ANN'S TEST N/A     PHOSPHORUS    Collection Time: 03/01/17  4:37 AM   Result Value Ref Range    Phosphorus 2.5 (L) 2.6 - 4.7 MG/DL   MAGNESIUM    Collection Time: 03/01/17  4:37 AM   Result Value Ref Range    Magnesium 1.9 1.6 - 2.4 mg/dL   METABOLIC PANEL, COMPREHENSIVE    Collection Time: 03/01/17  4:37 AM   Result Value Ref Range    Sodium 138 136 - 145 mmol/L    Potassium 3.2 (L) 3.5 - 5.1 mmol/L    Chloride 106 97 - 108 mmol/L    CO2 21 21 - 32 mmol/L    Anion gap 11 5 - 15 mmol/L    Glucose 93 65 - 100 mg/dL    BUN 9 6 - 20 MG/DL    Creatinine 1.12 (H) 0.55 - 1.02 MG/DL    BUN/Creatinine ratio 8 (L) 12 - 20      GFR est AA 60 (L) >60 ml/min/1.73m2    GFR est non-AA 49 (L) >60 ml/min/1.73m2    Calcium 7.5 (L) 8.5 - 10.1 MG/DL    Bilirubin, total 0.4 0.2 - 1.0 MG/DL    ALT (SGPT) 50 12 - 78 U/L    AST (SGOT) 65 (H) 15 - 37 U/L    Alk.  phosphatase 136 (H) 45 - 117 U/L    Protein, total 5.2 (L) 6.4 - 8.2 g/dL    Albumin 1.7 (L) 3.5 - 5.0 g/dL    Globulin 3.5 2.0 - 4.0 g/dL    A-G Ratio 0.5 (L) 1.1 - 2.2     CBC W/O DIFF    Collection Time: 03/01/17  4:37 AM   Result Value Ref Range    WBC 14.3 (H) 3.6 - 11.0 K/uL    RBC 3.50 (L) 3.80 - 5.20 M/uL    HGB 10.0 (L) 11.5 - 16.0 g/dL    HCT 28.9 (L) 35.0 - 47.0 %    MCV 82.6 80.0 - 99.0 FL    MCH 28.6 26.0 - 34.0 PG    MCHC 34.6 30.0 - 36.5 g/dL    RDW 15.4 (H) 11.5 - 14.5 %    PLATELET 692 588 - 453 K/uL   GLUCOSE, POC    Collection Time: 03/01/17  6:13 AM   Result Value Ref Range    Glucose (POC) 82 65 - 100 mg/dL    Performed by Ronny Zayas              Telemetry:normal sinus rhythm    Imaging:  I have personally reviewed the patients radiographs and have reviewed the reports:  2-27-17: CXR bilateral pleural efffusions        Total critical care time exclusive of procedures: 28  Minutes    Harmeet Johnson MD

## 2017-03-01 NOTE — PROGRESS NOTES
Respiratory Care;    Per Dr. Davis Ba orders no (SBT) trails for patient. Please see note in system.

## 2017-03-01 NOTE — PROGRESS NOTES
1920 recd shift report from [facility].  Patient is sedated on propofol/vented, BLW soft wrist restaint for line/ett safety pt attemt pullingout lines,respond to pain,does not open eyes, facial and b/l eyes swelling noted,perrla,good cough positve gag,lumbar drain to 10 mm/hg draining s/s drain, icp of 10 noted on rounds, lumbar drain cdi,b/l groin with drsg cdi,skin wdi  2200 Dr Elonda Loron called for clarification of  order, updated with drainage,to keep at 10mm/hg level of incision and to drain only if icp is greater than 10.  2300 pt medicated with dilaudid for pt restless with attempt to pull out lines  0100 pt icp been less than 10 cpp 60s to 70s, no neuro changes  0200 bs check 78 gave 25 ml of d50 will recheck in 15 minutes,repeat bs is 142  0500 pt icp remain 8 to 9 cpp 70s, continue neuro check, no changes, b/l groin cdi, warm skin  B/l wrist restraint continue pt noted attempt to pull out lines  0700 bedside shift report given to Ana Giordano and Natanael Flores

## 2017-03-01 NOTE — PROGRESS NOTES
Vascular Surgery ICU Progress Note    Admit Date: 2017  POD:  2 Day Post-Op    Procedure:  Procedure(s):  ENDOVASCULAR ANEURYSM REPAIR of THORACIC AORTIC DISSECTION, repair of ruptured left iliac artery with stent placement. Left ileo-femoral bypass graft with PTFE. LEFT CAROTID SUBCLAVIAN BYPASS with stent placement of left common artery. Subjective:   Pt remains sedated/intubated, in no apparent distress. On esmolol and cardene gtts     Objective:   Vitals:  Blood pressure 114/41, pulse 65, temperature (P) 98.9 °F (37.2 °C), resp. rate 18, height 5' 5.98\" (1.676 m), weight 95.3 kg (210 lb 1.6 oz), SpO2 95 %, not currently breastfeeding. Temp (24hrs), Av.2 °F (37.3 °C), Min:98.9 °F (37.2 °C), Max:99.6 °F (37.6 °C)    Ventilator:  Ventilator Volumes  Vt Exhaled (Machine Breath) (ml): 638 ml (17)  Ve Observed (l/min): 11.5 l/min (17)    Oxygen Therapy:  Oxygen Therapy  O2 Sat (%): 95 % (17)  Pulse via Oximetry: 65 beats per minute (17)  O2 Device: Endotracheal tube (17)  O2 Flow Rate (L/min): 4 l/min (17)  O2 Temperature: 97 °F (36.1 °C) (17)  FIO2 (%): 80 % (17)    CXR: bilat pleural effusions     Admission Weight: Last Weight   Weight: 81.9 kg (180 lb 8.9 oz) Weight: 95.3 kg (210 lb 1.6 oz)     Intake / Output / Drain:  Current Shift:    Last 24 hrs.:     Intake/Output Summary (Last 24 hours) at 17 0831  Last data filed at 17 7395   Gross per 24 hour   Intake          5174.05 ml   Output             5509 ml   Net          -334.95 ml       EXAM:  General:  In no apparent distress                                                                                            Lungs:   Coarse bilat   Incision/Access Sites:  No erythema, drainage or swelling. Heart:  Regular rate and rhythm, S1, S2 normal, no murmur, click, rub or gallop. Abdomen:   Slightly firm, non-tender. Bowel sounds quiet.  No masses,  No organomegaly. Extremities:  No edema, palpable R pedal pulses, ? Faint L DP pulse   Neurologic:  Unable to assess, pt sedated. Pupils round/reactive     Labs:   Recent Labs      17   0613  17   0437   17   0228   WBC   --   14.3*   < >  8.5   HGB   --   10.0*   < >  7.7*   HCT   --   28.9*   < >  23.5*   PLT   --   203   < >  231   NA   --   138   < >  137   K   --   3.2*   < >  4.0   BUN   --   9   < >  12   CREA   --   1.12*   < >  1.35*   GLU   --   93   < >  94   GLUCPOC  82   --    < >   --    INR   --    --    --   1.1    < > = values in this interval not displayed. Assessment:     Active Problems:    Aneurysm ([facility] [address] (2017)         Plan/Recommendations/Medical Decision Makin. S/p TEVAR, L ileo-fem bypass, L carotid-subclavian bypass:  Sites stable, lumbar drain in place-do not drain more than 150 ml per shift, orders to drain when ICP > 10  2. Volume overload/bilat effusions: cont diurese today  3. Acute resp failure: on NRB prior to surgery. Keep vent settings as they are, no plans to extubate today. Hx of COPD, cont scheduled albuterol  4. HTN: cont IV gtts- esmolol, cardene  5. JEANA: BUN/Cr improving, monitor and avoid nephrotoxins  6. Elevated LFTs: trending down, monitor  7. Hypokalemia: repletion orders in  8.  Dispo: to remain in ICU for now    Signed By: Haseeb Moody NP

## 2017-03-01 NOTE — PROGRESS NOTES
0700 - Verbal and bedside shift report received from Sharyne Cooks, RN and Saw Escobedo RN. Care of patient assumed. 0800 - Patient assessment completed as documented. The patient is intubated, sedated with Propofol infusing at 50 mcg/kg/min. Pupils are brisk, round, and equal - 2 mm, moves all four extremities equally, strong cough and gag. Lungs are rhonchi/coarse bilaterally, bowel sounds are hypoactive, and pulses are palpable in all four extremities. No signs or symptoms of pain or shortness of breath. Vital signs within ordered parameters with Esmolol and Cardene infusing. 0830 - Ibis Lyon, Nurse Practitioner for Dr. Luciano Flynn at bedside to assess patient. Updated on overnight events including ICP pressures and lumbar drain currently not draining per orders as ICP is 9, AM lab work results, vital signs trends, and current medications infusing. Practitioner to put in orders for Lasix and electrolyte placement. 56 - Participated in interdisciplinary rounds for this patient. Per Dr. Hany Plaza orogastric tube needs to be inserted and trophic tube feedings should be started per dietician's orders. 56 - Dr. Luciano Flynn at bedside, updated on patient's overnight events, including ICP pressure and status of lumbar drain. Trends in vital signs, AM lab work results, current medications infusing, and orders received. Physician ok with insertion of orogastric tube and starting trophic tube feedings. Also received an order to have the Anesthesiologist discontinue the lumbar drain. 1200 - Dr. Ruben Fry, Anesthesiologist at bedside, lumbar drain removed at this time. Bandage placed over site, site clean, dry and intact, no signs or hematoma or bleeding. Per Anesthesiologist neuro checks need to be done every 2 hours for 24 hours. And ok to raise head of patient's bed to 30 degrees after one hour.     1300 - Lumbar drain site, bandage continues to be clean, dry, and intact; with no signs or symptoms of hematoma or bleeding. Vital signs remain with ordered parameters with Esmolol and Cardene infusing. 1415 - Cardene infusion rate decreased to 5 mg/hr, SBP - 130.    1700 - Chest x-ray confirms orogastric tube is in satisfactory position, Osmolite 1.2 tube feeding started to run at a continuous rate at 10 ml/hr. 46 - Verbal and bedside shift report given to Shannan Dugan RN and Damir Rubio RN.

## 2017-03-02 NOTE — PROGRESS NOTES
03/02/17 0551   ABCDE Bundle   SBT Safety Screen Passed No   SBT Screen Reason for Failure FiO2 > 50%;PEEP > 7   Weaning Parameters   Spontaneous Breathing Trial Complete No (Comments)

## 2017-03-02 NOTE — PROGRESS NOTES
Attended Interdisciplinary rounds in Critical Care Unit, where patient care was discussed. Visit by: Nisha Brown. Melissa Kim.  Jacobo Magana MA, [address]

## 2017-03-02 NOTE — PROGRESS NOTES
0700 - Verbal and bedside shift report received from [employer], RN. Care of patient assumed. 0800 - Patient assessment completed as documented. The patient is intubated, sedated with Propofol infusing at 45 mcg/kg/min, no eye opening to any stimulus, lungs are coarse/rhonchi bilaterally, bowel sounds are hypoactive, and pulses are palpable in all four extremities. No signs or symptoms of pain or shortness of breath. Vital signs stable with Cardene infusing at 9 mg/hr. 0900 - Medicated with Dilaudid 2 mg IV for pain.

## 2017-03-02 NOTE — PROGRESS NOTES
Vascular    No surgical issues  Remains on vent w/ slow progress  Cont medical management per pulmonary

## 2017-03-02 NOTE — PROGRESS NOTES
1900 bedside shift report recd from [address] Pt remained intubated and sedated on propofol. Maintained on esmolol and cardene gtt to keep sbp [id number] to 160s. BLW soft continued and noted, pt attempt to moved and pull   out lines.  Was started on tube feed ,aspiration precaution maintained  2300 esmolol on hold hr 55,map60s and sbp 120s,maintained cardene gtt,dilaudid prn for pain relief  0400 pt tolerated tube feed no residual off esmolol, kenny/cuff 313 s to 338V systolic  1001 bedside shift report given to [employer]

## 2017-03-02 NOTE — PROGRESS NOTES
Vascular Surgery ICU Progress Note    Admit Date: 2017  POD:  3 Day Post-Op    Procedure:  Procedure(s):  ENDOVASCULAR ANEURYSM REPAIR of THORACIC AORTIC DISSECTION, repair of ruptured left iliac artery with stent placement. Left ileo-femoral bypass graft with PTFE. LEFT CAROTID SUBCLAVIAN BYPASS with stent placement of left common artery. Subjective:   Pt remains sedated/intubated, in no apparent distress. Off esmolol, remains on cardene     Objective:   Vitals:  Blood pressure 134/48, pulse 62, temperature 98.7 °F (37.1 °C), resp. rate 18, height 5' 5.98\" (1.676 m), weight 95.8 kg (211 lb 3.2 oz), SpO2 100 %, not currently breastfeeding. Temp (24hrs), Av.5 °F (37.5 °C), Min:98.7 °F (37.1 °C), Max:100.2 °F (37.9 °C)    Ventilator:  Ventilator Volumes  Vt Exhaled (Machine Breath) (ml): 682 ml (17)  Ve Observed (l/min): 12.3 l/min (17)    Oxygen Therapy:  Oxygen Therapy  O2 Sat (%): 100 % (17)  Pulse via Oximetry: 62 beats per minute (17)  O2 Device: Endotracheal tube (17)  O2 Flow Rate (L/min): 4 l/min (17)  O2 Temperature: 96.4 °F (35.8 °C) (17)  FIO2 (%): 80 % (17)    CXR: pending    Admission Weight: Last Weight   Weight: 81.9 kg (180 lb 8.9 oz) Weight: 95.8 kg (211 lb 3.2 oz)     Intake / Output / Drain:  Current Shift:    Last 24 hrs.:     Intake/Output Summary (Last 24 hours) at 17 0813  Last data filed at 17 0700   Gross per 24 hour   Intake           4633.7 ml   Output             4020 ml   Net            613.7 ml       EXAM:  General:  In no apparent distress                                                                                            Lungs:   Coarse bilat   Incision/Access Sites:  No erythema, drainage or swelling. Heart:  Regular rate and rhythm, S1, S2 normal, no murmur, click, rub or gallop. Abdomen:   Slightly firm, non-tender. Bowel sounds absent.  No masses,  No organomegaly. Extremities:  Generalized edema edema, palpable pedal pulses bilat   Neurologic:  Unable to assess, pt sedated. Pupils round/reactive     Labs:   Recent Labs      17   0609  17   0600   WBC   --   14.9*   HGB   --   9.7*   HCT   --   28.1*   PLT   --   208   NA   --   140   K   --   3.4*   BUN   --   11   CREA   --   1.18*   GLU   --   135*   GLUCPOC  125*   --         Assessment:     Active Problems:    Aneurysm ([address] (2017)         Plan/Recommendations/Medical Decision Makin. S/p TEVAR, L ileo-fem bypass, L carotid-subclavian bypass:  Sites stable, lumbar drain dc'd. Control BP  2. Volume overload/bilat effusions: looks less puffy today, waiting on chest xray, may diurese again today  3. Acute resp failure: on NRB prior to surgery, hx of COPD-cont nebs. Will discuss plans for vent weaning with Dr. Giancarlo Anaya  4. HTN: esmolol stopped due to bradycardia/hypotension. Cont cardene gtt  5. JEANA: BUN/Cr improving, monitor and avoid nephrotoxins  6. Elevated LFTs: trending down, monitor  7. Hypokalemia: repletion orders in  8. Nutrition: OG tube inserted, on TF's  9. GI/DVT prophylaxis: protonix, SCD's  10.  Dispo: to remain in ICU for now    Signed By: Ronaldo Storey NP

## 2017-03-02 NOTE — PROGRESS NOTES
Spiritual Care Assessment/Progress Notes    Lorne November [id number]  [id number]    1955  59 y.o.  female    Patient Telephone Number: [phone] (home)   Sikh Affiliation: Spiritism   Language: English   Extended Emergency Contact Information  Primary Emergency Contact: Cedric Jensen  Address: [facility] [address] [place], [address] [place] [address] [address] [place] [address] Phone: [phone]  Mobile Phone: [phone]  Relation: Spouse  Secondary Emergency Contact: [address] [place] [address] Phone: [phone]  Relation: Spouse   Patient Active Problem List    Diagnosis Date Noted    Aneurysm ([facility] 02/23/2017    Symptomatic anemia 06/25/2016    Dyslipidemia 12/21/2015    Carotid disease, bilateral ([facility] 12/21/2015    ABIMBOLA (obstructive sleep apnea) 12/21/2015    COPD (chronic obstructive pulmonary disease) ([facility] 06/29/2015    Anemia 05/02/2015    Iron deficiency anemia due to chronic blood loss 04/21/2015    GI bleed 11/28/2014    Snoring 06/27/2013    Joint pain 06/27/2013    Disturbance of skin sensation 06/27/2013    Cerebral thrombosis without mention of cerebral infarction 06/27/2013    Acute lower GI bleeding 05/21/2013    TIA (transient ischemic attack) 12/23/2012    Hypokalemia 12/23/2012    Renal artery arteriosclerosis ([facility] 12/23/2012    CAD (coronary artery disease) 08/19/2011    S/P CABG (coronary artery bypass graft) 08/19/2011    Hypercholesterolemia 03/11/2011    HTN (hypertension)     Depression with anxiety     Hypothyroid         Date: 3/2/2017       Level of Sikh/Spiritual Activity:  []         Involved in mckay tradition/spiritual practice    []         Not involved in mckay tradition/spiritual practice  []         Spiritually oriented    []         Claims no spiritual orientation    []         seeking spiritual identity  []         Feels alienated from Tenriism practice/tradition  []         Feels angry about Tenriism practice/tradition  [] Spirituality/Hinduism tradition is a resource for coping at this time. [x]         Not able to assess due to medical condition    Services Provided Today:  []         crisis intervention    []         reading Scriptures  []         spiritual assessment    []         prayer  []         empathic listening/emotional support  []         rites and rituals (cite in comments)  []         life review     []         Hinduism support  []         theological development   []         advocacy  []         ethical dialog     []         blessing  []         bereavement support    []         support to family  []         anticipatory grief support   []         help with AMD  []         spiritual guidance    []         meditation      Spiritual Care Needs  []         Emotional Support  []         Spiritual/Quaker Care  []         Loss/Adjustment  []         Advocacy/Referral                /Ethics  []         No needs expressed at               this time  []         Other: (note in               comments)  [address]  []         Follow up visits with               pt/family  []         Provide materials  []         Schedule sacraments  []         Contact Community               Clergy  [x]         Follow up as needed  []         Other: (note in               comments)     Comments:  Initial visit with patient who was intubated and sedated at this time. I left a pastoral care card for patient informing her of spiritual care support and presence. Spoke with RN who shared that patient has had few if any visitors; her  has called to check on her but has not visited that staff is aware of. Pastoral care is available and will continue to follow patient to provide emotional/spiritual support; please page as needed/desired. [phone]-PRAY. Visit by: Kwaku Mallory. Madai Najera.  Елена Vora MA, [facility]    Lead  Profession Development & Advancement

## 2017-03-02 NOTE — PROGRESS NOTES
[facility]  Pulmonary, Critical Care, and Sleep Medicine    Name: Oleksandr Adams MRN: [id number]   : 1955 Hospital: [facility]   Date: 3/2/2017        Critical Care Patient Consult      IMPRESSION:   · Acute respiratory failure with hypoxia: on NRB prior to OR. On arrival to ICU pt was not maintaing Vt. Placed on ACV, Vt of 500 but only was getting Vt of 100s. Placed pt on PCV with pressure of PCV of 24, That generated tidal volumes in mid 300s. Not yet ready for SBT; drops sats with movement. Minute ventilation lower; may issue now is oxygenation  · Bilateral pleural effusions; may influence respiratory efforts if they get any larger; if still present after diuresis, will ask IR to image and possible place pigtail on largest side  · hypoglycemia  · COPD noted to be moderate severity. 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 · CC time with pt eop procedures  · Discussed with nurse, and CRNA and RT. PLAN:   · CCU monitoring  · Not ready for SBT  · diuresis  · adjsut vent  · monitor secretions  · Replete lytes  · Hemodynamic monitoring  · Spinal drain monitoring  · Will try to obtain sputum Cx. · nebs. · Frequent neuro checks  · Transfuse prn Hgb less than 7  · Watch renal function and LFTs  · CXR in AM  · Antihypertensives and Beta Blocker      3/2 off esmolol. Still critically ill on vent.  Still dropping sats with movement to low 80'2. On 80% and PEEP 8. Coarse rhonchi on exam . No fever    3/1 drops sats with movement. On vent. Dropped BS as well. IV esmolol and IV cardene. Movesa ll fours but agitated. NG placed. TF    2/28 On vent. IV preceddex. Polyuria. Labs reviewed with nursing. Still on PCV mode. Subjective/History: This patient has been seen and evaluated at the request of Dr. Chet Klein for above. Patient is a 59 y.o. female who presented from OR directly to ICU. Pt underwent EVAR for aneurysm. Failed medical therapy for above. Brought to ICU intubated and sedated. The patient is critically ill and can not provide additional history due to Ventilated. Past Surgical History:   Procedure Laterality Date    ESOPHAGEAL CAPSULE ENDOSCOPY  6/8/2015         HX COLONOSCOPY      HX CORONARY ARTERY BYPASS GRAFT  8/11    5 way bypass.  08/19/11    HX FEMORAL BYPASS      triple    HX LUMBAR DISKECTOMY  1997    HX UROLOGICAL      vascular stent x 2 to left kidney    SMALL BOWEL ENDOSCOPY,ABLATE LESN  3/13/2015         STRESS TEST LEXISCAN/CARDIOLITE  4/24/12    UPPER GI ENDOSCOPY,CTRL BLEED  6/2/2015             Current Facility-Administered Medications   Medication Dose Route Frequency    sodium chloride 0.45 % in dextrose 10% 1,019.6 mL infusion   IntraVENous CONTINUOUS    Vancomycin - please send trough level before starting dose due at 1700  1 Each Other ONCE    albuterol (PROVENTIL VENTOLIN) nebulizer solution 2.5 mg  2.5 mg Nebulization Q6H RT    pantoprazole (PROTONIX) 40 mg in sodium chloride 0.9 % 10 mL injection  40 mg IntraVENous DAILY    vancomycin (VANCOCIN) 1250 mg in  ml infusion  1,250 mg IntraVENous Q18H    sodium chloride (NS) flush 5-10 mL  5-10 mL IntraVENous Q8H    sodium chloride (NS) flush 5-10 mL  5-10 mL IntraVENous Q8H    propofol (DIPRIVAN) infusion  5-50 mcg/kg/min IntraVENous TITRATE    niCARdipine (CARDENE) 25 mg in 0.9% sodium chloride 250 mL infusion 0-15 mg/hr IntraVENous TITRATE    PHENYLephrine (NEOSYNEPHRINE) 10,000 mcg in 0.9% sodium chloride 250 mL infusion   mcg/min IntraVENous TITRATE    chlorhexidine (PERIDEX) 0.12 % mouthwash 15 mL  15 mL Oral Q12H    metoprolol (LOPRESSOR) injection 2.5 mg  2.5 mg IntraVENous Q4H    sodium chloride (NS) flush 10 mL  10 mL InterCATHeter Q24H    sodium chloride (NS) flush 10-40 mL  10-40 mL InterCATHeter Q8H    esmolol 10mg/mL (BREVIBLOC) infusion   mcg/kg/min IntraVENous TITRATE    sodium chloride (NS) flush 5-10 mL  5-10 mL IntraVENous Q8H     Allergies   Allergen Reactions    Tussionex Itching    Codeine Itching    Nickel Other (comments)     Local reaction (redness, irritation, blistering) if wears \"cheap earrings\"    Oxycodone Itching      Social History   Substance Use Topics    Smoking status: Former Smoker     Packs/day: 1.00     Years: 40.00     Types: Cigarettes     Quit date: 2011    Smokeless tobacco: Former User      Comment: quit a few times but relapsed    Alcohol use No      Family History   Problem Relation Age of Onset    Post-op Nausea/Vomiting Other     Other Other      LOW BP AND DIFFICULTY BREATHING    Hypertension Mother     Stroke Mother       age 48,    Ronni Cypher Hypertension Brother     Stroke Brother       age 55, smoked        Review of Systems:  Review of systems not obtained due to patient factors.     Objective:   Vital Signs:    Visit Vitals    /48    Pulse 62    Temp 98.7 °F (37.1 °C)    Resp 18    Ht 5' 5.98\" (1.676 m)    Wt 95.8 kg (211 lb 3.2 oz)    SpO2 100%    Breastfeeding No    BMI 34.11 kg/m2       O2 Device: Endotracheal tube   O2 Flow Rate (L/min): 4 l/min   Temp (24hrs), Av.5 °F (37.5 °C), Min:98.7 °F (37.1 °C), Max:100.2 °F (37.9 °C)       Intake/Output:   Last shift:         Last 3 shifts:  1901 -  0700  In: 8604 [I.V.:6888]  Out: 3201 [KTJKK:0793; Drains:47]    Intake/Output Summary (Last 24 hours) at 17 1467  Last data filed at 03/02/17 0700   Gross per 24 hour   Intake           4633.7 ml   Output             4020 ml   Net            613.7 ml       Ventilator Settings:  Mode Rate Tidal Volume Pressure FiO2 PEEP   Pressure control        80 % 8 cm H20     Peak airway pressure: 34 cm H2O    Minute ventilation: 12.3 l/min      Physical Exam:    General:  Intubated and sedated, no distress, appears stated age. Head:  Normocephalic, without obvious abnormality, atraumatic. Eyes:  Conjunctivae/corneas clear. PERRL, EOMs intact. Nose: Nares normal. Septum midline. Mucosa normal. No drainage or sinus tenderness. Throat: Lips, mucosa, and tongue normal. Teeth and gums normal. Has a white coating over mouth. Neck: Supple, symmetrical, trachea midline, no adenopathy, thyroid: no enlargment/tenderness/nodules, no carotid bruit and no JVD. Back:   Symmetric, no curvature. ROM normal.   Lungs:   Decreased BS bilaterally, has bilateral rhonchi. Chest wall:  No tenderness or deformity. Heart:  Regular rate and rhythm, S1, S2 normal, no murmur, click, rub or gallop. Abdomen:   Obese;  bowel sounds are decreased,  No masses,  No organomegaly. Extremities: Extremities normal, atraumatic, no cyanosis or edema. Warm. Pulses: 2+ and symmetric all extremities. Skin: Skin color, texture, turgor normal. No rashes or lesions   Lymph nodes: Cervical, supraclavicular, and axillary nodes normal.   Neurologic: Not able to fully assess. Pt is post op, effects of anesthesia in place.         Data:     Recent Results (from the past 24 hour(s))   GLUCOSE, POC    Collection Time: 03/01/17 11:10 AM   Result Value Ref Range    Glucose (POC) 95 65 - 100 mg/dL    Performed by [address], POC    Collection Time: 03/01/17  5:07 PM   Result Value Ref Range    Glucose (POC) 130 (H) 65 - 100 mg/dL    Performed by [facility], POC    Collection Time: 03/01/17 11:27 PM   Result Value Ref Range    Glucose (POC) 149 (H) 65 - 100 mg/dL    Performed by Marianna Duarte    CBC W/O DIFF    Collection Time: 03/02/17  6:00 AM   Result Value Ref Range    WBC 14.9 (H) 3.6 - 11.0 K/uL    RBC 3.37 (L) 3.80 - 5.20 M/uL    HGB 9.7 (L) 11.5 - 16.0 g/dL    HCT 28.1 (L) 35.0 - 47.0 %    MCV 83.4 80.0 - 99.0 FL    MCH 28.8 26.0 - 34.0 PG    MCHC 34.5 30.0 - 36.5 g/dL    RDW 15.4 (H) 11.5 - 14.5 %    PLATELET 538 266 - 469 K/uL   METABOLIC PANEL, BASIC    Collection Time: 03/02/17  6:00 AM   Result Value Ref Range    Sodium 140 136 - 145 mmol/L    Potassium 3.4 (L) 3.5 - 5.1 mmol/L    Chloride 105 97 - 108 mmol/L    CO2 21 21 - 32 mmol/L    Anion gap 14 5 - 15 mmol/L    Glucose 135 (H) 65 - 100 mg/dL    BUN 11 6 - 20 MG/DL    Creatinine 1.18 (H) 0.55 - 1.02 MG/DL    BUN/Creatinine ratio 9 (L) 12 - 20      GFR est AA 56 (L) >60 ml/min/1.73m2    GFR est non-AA 46 (L) >60 ml/min/1.73m2    Calcium 7.8 (L) 8.5 - 10.1 MG/DL   GLUCOSE, POC    Collection Time: 03/02/17  6:09 AM   Result Value Ref Range    Glucose (POC) 125 (H) 65 - 100 mg/dL    Performed by Keyla Lombardo    BLOOD GAS, ARTERIAL    Collection Time: 03/02/17  7:48 AM   Result Value Ref Range    pH 7.52 (H) 7.35 - 7.45      PCO2 25 (L) 35.0 - 45.0 mmHg    PO2 90 80 - 100 mmHg    O2 SAT 98 (H) 92 - 97 %    BICARBONATE 20 (L) 22 - 26 mmol/L    BASE DEFICIT 1.3 mmol/L    O2 METHOD VENTILATOR      FIO2 80 %    MODE PRESSURE CONTROL      SET RATE 18      EPAP/CPAP/PEEP 8.0      Sample source ARTERIAL      SITE DRAWN FROM ARTERIAL LINE      LEIGH ANN'S TEST YES               Telemetry:normal sinus rhythm    Imaging:  I have personally reviewed the patients radiographs and have reviewed the reports:  2-27-17: CXR bilateral pleural efffusions  3-2-17 pending        Total critical care time exclusive of procedures: 35  Minutes    Alfredo Aguilar MD

## 2017-03-02 NOTE — PROGRESS NOTES
Nutrition:  Chart reviewed, case discussed during CCU rounds. Pt has tolerated trophic feeds well with no residuals. Per Dr. Eric Mercado, okay to slowly advance TF towards goal rate. Recommend:     Advance rate 10mL q 12h as tolerated to Goal Rate of Osmolite 1.2 @ 40mL/h + 1 packet Proscource (liquid protein) BID + 75mL H2O flush q 4h (provides 1272kcals/83gPro/1237mL)    Will monitor TF progression closely. Thank you!     Pedro Has RD  Pager [phone]

## 2017-03-03 PROBLEM — I47.1 SVT (SUPRAVENTRICULAR TACHYCARDIA) (HCC): Status: ACTIVE | Noted: 2017-01-01

## 2017-03-03 NOTE — PROGRESS NOTES
03/03/17 0605   ABCDE Bundle   SBT Safety Screen Passed No   SBT Screen Reason for Failure PEEP > 7   Weaning Parameters   Spontaneous Breathing Trial Complete No (Comments)  (No SBT per Dr. Davis Ba order)

## 2017-03-03 NOTE — PROGRESS NOTES
0700 - Verbal and bedside shift report received from Boogie Denney RN. Care of patient assumed. 0800 - Patient assessment completed as documented. The patient is intubated, sedated with Propofol infusing at 50 mcg/kg/min, no eye opening to any stimulus. Lungs are coarse/rhonchi bilaterally, bowel sounds are hypoactive, and pulses are palpable in all four extremities. No signs or symptoms of pain or shortness of breath. Vital signs are stable with Cardene infusing at 13 mg/hr. 0900 - Propofol infusion rate decreased to 40 mcg/kg/min. 1029 - Patient's heart rate up to 155 Afib, patient biting ETT, high peak pressures. Medicated with Dilaudid 1 mg IV and Propofol infusion rate increased to 50 mcg/kg/min. Dr. Pastor Pandey made aware, orders received for a cardiology consult. 1100 - Patient sees Dr. Juliana Rossi of [facility] at [phone], Dr. Ricardo Christian contacted and informed of cardiology consult. 1135 - Patient had a pause in heart rhythm of about 10 - 20 seconds. Normal pulse returned with no CPR. Received orders to stop scheduled Metoprolol. 200 - Dr. Isi Miller updated on events, no orders received at this time. Physician to contact patient's .

## 2017-03-03 NOTE — PROGRESS NOTES
Responded to [employer] called for patient. No family present. Silent prayer said for patient. Pastoral care will continue to follow. 287-PRAY. Visit by: Jourdan Philippe. Lev Rodriguez.  Елена Vora MA, [facility]    Lead  Profession Development & Advancement

## 2017-03-03 NOTE — PROGRESS NOTES
Pharmacy Automatic Renal Dosing Protocol - Antimicrobials     Indication for Antimicrobials: HCAP  Current Regimen of Each Antimicrobial (Start Day & Day of Therapy):  Vancomycin - pharmacy to dose (, day 3     Previous:  Cefazolin 2 gm every 8 hours (post op surgical prophylaxis)     Significant cultures:    resp: GPC, in clusters and moderate normal respiratory yesika - final     CAPD, Intermittent Hemodialysis or Renal Replacement Therapy: n/a  Paralysis, amputations, malnutrition: n/a  Estimated Creatinine Clearance: 56.9 mL/min (based on Cr of 1.18). Estimated Creatinine Clearance (using IBW): 45.6 mL/min  Recent Labs      17   0600  17   0437  17   1153  17   0412  17   1718   17   1312   CREA  1.18*  1.12*  1.22*  1.22*  1.16*   --   1.21*   BUN  11  9  9  9  10   --   11   WBC  14.9*  14.3*   --   14.6*  13.2*   --    --    BANDS   --    --    --    --   4   --    --    NA  140  138  141  139  136   --   137   K  3.4*  3.2*  3.9  3.2*  4.0   --   3.8   MG   --   1.9  1.4*  1.4*  1.5*   --    --    CA  7.8*  7.5*  7.5*  7.9*  7.9*   --   7.2*   PHOS   --   2.5*  2.1*  2.5*  4.0   --    --    ALB   --   1.7*  2.0*  2.3*  2.4*   --    --    HGB  9.7*  10.0*   --   11.1*  11.0*   --   7.1*   HCT  28.1*  28.9*   --   32.8*  32.2*   --   20.7*   PLT  208  203   --   217  226   --    --    ALT   --   50  81*  114*  146*   < >   --     < > = values in this interval not displayed. Temp (24hrs), Av.5 °F (37.5 °C), Min:98.7 °F (37.1 °C), Max:100.2 °F (37.9 °C)        Creatinine Clearance (ml/min): 45     Goal Vancomycin Level(s): 15-20        Impression/Plan: Vancomycin trough 19.7 on 1250 mg Q18H, extrapolated to 19.4 mcg/ml which is appropriate for HCAP. Pharmacy will follow daily and adjust doses of monitored medications as appropriate for renal function and/or serum levels. Thank you,    Jerry MckeeD.

## 2017-03-03 NOTE — CONSULTS
Reyna Childs, [address] [facility]     Date of  Admission: 2/23/2017 10:40 PM     Admission type:Emergency    Consult for: afib with RVR  Consult by: Anupama Boo Intensivist       Subjective:     True Rayo is a 59 y.o. female admitted for Aneurysm ([facility] [address] Type B disection. Cardiac hx of CABD, HTN, DM, dyslipidemia COPD, carotid disease. Surgical repair on 2/2317, has remained intubated, sedated. Requiring IV Esmolol and Cardene for BP control. This morning developed SVT, initially thought to be afib with RVR, but review of telemetry ST with PACs. The patient remained in this fast rhythm, then slowed and developed asystole. Code Blue called, but before any medications or CPR, returned to NSR. Esmolol discontinued with concerns of causing some type of block or slowing. Currently in SR, -160/70-80s, remains on Cardene.       Patient Active Problem List    Diagnosis Date Noted    SVT (supraventricular tachycardia) 03/03/2017    Aneurysm ([facility] [address] 02/23/2017    Symptomatic anemia 06/25/2016    Dyslipidemia 12/21/2015    Carotid disease, bilateral ([address] 12/21/2015    ABIMBOLA (obstructive sleep apnea) 12/21/2015    COPD (chronic obstructive pulmonary disease) ([facility] [address] 06/29/2015    Anemia 05/02/2015    Iron deficiency anemia due to chronic blood loss 04/21/2015    GI bleed 11/28/2014    Snoring 06/27/2013    Joint pain 06/27/2013    Disturbance of skin sensation 06/27/2013    Cerebral thrombosis without mention of cerebral infarction 06/27/2013    Acute lower GI bleeding 05/21/2013    TIA (transient ischemic attack) 12/23/2012    Hypokalemia 12/23/2012    Renal artery arteriosclerosis ([address] 12/23/2012    CAD (coronary artery disease) 08/19/2011    S/P CABG (coronary artery bypass graft) 08/19/2011    Hypercholesterolemia 03/11/2011    HTN (hypertension)     Depression with anxiety     Hypothyroid Forrest Hoffman NP  Past Medical History:   Diagnosis Date    Anxiety disorder     Arthritis     BACK, RT. KNEE and HANDS    CAD (coronary artery disease)      s/p 5 vessel CABG     Carotid arterial disease (HCC)     diffuse bilateral CCA plaques    Chronic obstructive pulmonary disease (HCC)     Depression with anxiety     Essential hypertension     GERD (gastroesophageal reflux disease)     rarely    GI bleed     Hypothyroid     Mesenteric artery stenosis (HCC)     Microcytic anemia     Renal artery atherosclerosis, bilateral (HCC)      RA occlusion, left s/p stent    Renal atrophy, right     SVT (supraventricular tachycardia) 3/3/2017    UC (ulcerative colitis) ([facility] [address]       Social History     Social History    Marital status:      Spouse name: N/A    Number of children: N/A    Years of education: N/A     Social History Main Topics    Smoking status: Former Smoker     Packs/day: 1.00     Years: 40.00     Types: Cigarettes     Quit date: 2011    Smokeless tobacco: Former User      Comment: quit a few times but relapsed    Alcohol use No    Drug use: No    Sexual activity: No     Other Topics Concern    None     Social History Narrative     Allergies   Allergen Reactions    Tussionex Itching    Codeine Itching    Nickel Other (comments)     Local reaction (redness, irritation, blistering) if wears \"cheap earrings\"    Oxycodone Itching      Family History   Problem Relation Age of Onset    Post-op Nausea/Vomiting Other     Other Other      LOW BP AND DIFFICULTY BREATHING    Hypertension Mother     Stroke Mother       age 48,    [address] [facility] [address] Hypertension Brother     Stroke Brother       age 55, smoked      Current Facility-Administered Medications   Medication Dose Route Frequency    potassium chloride 20 mEq in 50 ml IVPB  20 mEq IntraVENous Q2H    furosemide (LASIX) injection 40 mg  40 mg IntraVENous DAILY    HYDROmorphone (PF) (DILAUDID) injection 1 mg  1 mg IntraVENous Q3H PRN    polyethylene glycol (MIRALAX) packet 17 g  17 g Oral DAILY    sodium chloride 0.45 % in dextrose 10% 1,019.6 mL infusion   IntraVENous CONTINUOUS    albuterol (PROVENTIL VENTOLIN) nebulizer solution 2.5 mg  2.5 mg Nebulization Q6H RT    pantoprazole (PROTONIX) 40 mg in sodium chloride 0.9 % 10 mL injection  40 mg IntraVENous DAILY    vancomycin (VANCOCIN) 1250 mg in  ml infusion  1,250 mg IntraVENous Q18H    sodium chloride (NS) flush 5-10 mL  5-10 mL IntraVENous Q8H    sodium chloride (NS) flush 5-10 mL  5-10 mL IntraVENous Q8H    sodium chloride (NS) flush 5-10 mL  5-10 mL IntraVENous PRN    propofol (DIPRIVAN) infusion  5-50 mcg/kg/min IntraVENous TITRATE    niCARdipine (CARDENE) 25 mg in 0.9% sodium chloride 250 mL infusion  0-15 mg/hr IntraVENous TITRATE    chlorhexidine (PERIDEX) 0.12 % mouthwash 15 mL  15 mL Oral Q12H    prochlorperazine (COMPAZINE) with saline injection 5 mg  5 mg IntraVENous Q4H PRN    0.9% sodium chloride infusion 250 mL  250 mL IntraVENous PRN    sodium chloride (NS) flush 10 mL  10 mL InterCATHeter Q24H    sodium chloride (NS) flush 10-40 mL  10-40 mL InterCATHeter Q8H    sodium chloride (NS) flush 5-10 mL  5-10 mL IntraVENous Q8H    acetaminophen (TYLENOL) tablet 650 mg  650 mg Oral Q4H PRN    naloxone (NARCAN) injection 0.4 mg  0.4 mg IntraVENous PRN    ondansetron (ZOFRAN) injection 4 mg  4 mg IntraVENous Q4H PRN    bisacodyl (DULCOLAX) suppository 10 mg  10 mg Rectal DAILY PRN        Review of Symptoms: unobtainable  Constitutional: negative  Eyes: negative   Ears, nose, mouth, throat, and face: negative  Respiratory: negative   Cardiovascular: negative   Gastrointestinal: negative  Genitourinary:negative   Musculoskeletal:negative   Neurological: negative   Endocrine: negative          Objective:      Visit Vitals    /51    Pulse 83    Temp 98.3 °F (36.8 °C)    Resp 13    Ht 5' 5.98\" (1.676 m)    Wt 95.8 kg (211 lb 3.2 oz)  SpO2 96%    Breastfeeding No    BMI 34.11 kg/m2       Physical:   General: obese  female in no acute distress  Heart: RRR, no m/S3/JVD, no carotid bruits   Lungs: rhonchi  Abdomen: Soft, +BS, NTND   Extremities: LE lisseth +DP/PT, trace edema   Neurologic: Grossly normal    Data Review:   Recent Labs      03/03/17 0519 03/02/17 0600 03/01/17   0437   WBC  10.8  14.9*  14.3*   HGB  9.1*  9.7*  10.0*   HCT  27.3*  28.1*  28.9*   PLT  200  208  203     Recent Labs      03/03/17 0519 03/02/17 0600 03/01/17   0437   NA  141  140  138   K  3.5  3.4*  3.2*   CL  107  105  106   CO2  24  21  21   GLU  140*  135*  93   BUN  11  11  9   CREA  1.24*  1.18*  1.12*   CA  7.9*  7.8*  7.5*   MG   --    --   1.9   PHOS   --    --   2.5*   ALB  1.5*   --   1.7*   TBILI  0.7   --   0.4   SGOT  79*   --   65*   ALT  43   --   50       No results for input(s): TROIQ, CPK, CKMB in the last 72 hours. Intake/Output Summary (Last 24 hours) at 03/03/17 1414  Last data filed at 03/03/17 1400   Gross per 24 hour   Intake          5620.98 ml   Output             5915 ml   Net          -294.02 ml        Cardiographics    Telemetry: SR  ECG: SR with no acute changes  Echocardiogram: EF 55-60%, LVH, mild DHF, mild pericardial effusion    CXRAY:Increasing bilateral pleural effusions and basilar atelectasis. Assessment:       Active Problems:    HTN (hypertension) ()      Aneurysm (HCC) (2/23/2017)      SVT (supraventricular tachycardia) (3/3/2017)         Plan:     SVT followed by asystole:  Not afib. Discontinued BB. If BP continues to be uncontrolled, consider ACEI IV, but no AV tabby blocker. Thank you for consulting GABE Pollard, GIL       Pt seen and examined in details. Agree with NP A&P. Tachy-hussein. Avoid -ve chronotropics. D/w pulmonary. No mucus plugging, hypoxia or resp distress noted at the time of asystole. Continue to monitor. Will follow.      Arya Peters MD

## 2017-03-03 NOTE — PROGRESS NOTES
1900- bedside report rc'd from [facility] and assumed care of pt.  2230- pt restless and biting ett. Prn Dilaudid given. 0400-am labs sent. 0600- pt continues to need Cardene gtt now ttr to 13mg/hr.  Dilaudid given thru the night to helpwioth pain and elevated BP as well.  0700-bedside  report given to [employer], RN

## 2017-03-03 NOTE — PROGRESS NOTES
[facility]  Pulmonary, Critical Care, and Sleep Medicine    Name: Kaur Suarez MRN: [id number]   : 1955 Hospital: [facility]   Date: 3/3/2017        Critical Care Patient Consult      IMPRESSION:   · Acute respiratory failure with hypoxia: on NRB prior to OR. On arrival to ICU pt was not maintaing Vt. Placed on ACV, Vt of 500 but only was getting Vt of 100s. Placed pt on PCV with pressure of PCV of 24, That generated tidal volumes in mid 300s. Ready for SBT; not dropping sats with movement. · Bilateral pleural effusions; may influence respiratory efforts if they get any larger; if still present after diuresis, will ask IR to image and possible place pigtail on largest side  · Hypoglycemia better on TF  · Malnutrition  · COPD noted to be moderate severity. 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 CC time with pt eop procedures      PLAN:   · CCU monitoring  · ready for SBT  · Diuresis  · Labs daily  · adjsut vent  · monitor secretions  · Replete lytes  · Tube feedings  · Hemodynamic monitoring  · Spinal drain removed  · follow sputum Cx. · nebs. · Frequent neuro checks  · Transfuse prn Hgb less than 7  · Watch renal function and LFTs  · CXR in AM  · Antihypertensives and Beta Blocker      3/3 no pressors. IV cardene. Good response to diuretics. Not dropping sats on vent. Down to 50% and PEEP 8.     3/2 off esmolol. Still critically ill on vent. Still dropping sats with movement to low 80'2. On 80% and PEEP 8. Coarse rhonchi on exam . No fever    3/1 drops sats with movement. On vent. Dropped BS as well. IV esmolol and IV cardene. Movesa ll fours but agitated. NG placed. TF    2/28 On vent. IV preceddex. Polyuria. Labs reviewed with nursing. Still on PCV mode. Subjective/History: This patient has been seen and evaluated at the request of Dr. Sage Ramey for above. Patient is a 59 y.o. female who presented from OR directly to ICU. Pt underwent EVAR for aneurysm. Failed medical therapy for above. Brought to ICU intubated and sedated. The patient is critically ill and can not provide additional history due to Ventilated. Past Surgical History:   Procedure Laterality Date    ESOPHAGEAL CAPSULE ENDOSCOPY  6/8/2015         HX COLONOSCOPY      HX CORONARY ARTERY BYPASS GRAFT  8/11    5 way bypass.  08/19/11    HX FEMORAL BYPASS      triple    HX LUMBAR DISKECTOMY  1997    HX UROLOGICAL      vascular stent x 2 to left kidney    SMALL BOWEL ENDOSCOPY,ABLATE LESN  3/13/2015         STRESS TEST LEXISCAN/CARDIOLITE  4/24/12    UPPER GI ENDOSCOPY,CTRL BLEED  6/2/2015             Current Facility-Administered Medications   Medication Dose Route Frequency    potassium chloride 20 mEq in 50 ml IVPB  20 mEq IntraVENous Q1H    furosemide (LASIX) injection 40 mg  40 mg IntraVENous DAILY    sodium chloride 0.45 % in dextrose 10% 1,019.6 mL infusion   IntraVENous CONTINUOUS    albuterol (PROVENTIL VENTOLIN) nebulizer solution 2.5 mg  2.5 mg Nebulization Q6H RT    pantoprazole (PROTONIX) 40 mg in sodium chloride 0.9 % 10 mL injection  40 mg IntraVENous DAILY    vancomycin (VANCOCIN) 1250 mg in  ml infusion  1,250 mg IntraVENous Q18H    sodium chloride (NS) flush 5-10 mL  5-10 mL IntraVENous Q8H    sodium chloride (NS) flush 5-10 mL  5-10 mL IntraVENous Q8H    propofol (DIPRIVAN) infusion  5-50 mcg/kg/min IntraVENous TITRATE    niCARdipine (CARDENE) 25 mg in 0.9% sodium chloride 250 mL infusion  0-15 mg/hr IntraVENous TITRATE    chlorhexidine (PERIDEX) 0.12 % mouthwash 15 mL  15 mL Oral Q12H    metoprolol (LOPRESSOR) injection 2.5 mg  2.5 mg IntraVENous Q4H    sodium chloride (NS) flush 10 mL  10 mL InterCATHeter Q24H    sodium chloride (NS) flush 10-40 mL  10-40 mL InterCATHeter Q8H    sodium chloride (NS) flush 5-10 mL  5-10 mL IntraVENous Q8H     Allergies   Allergen Reactions    Tussionex Itching    Codeine Itching    Nickel Other (comments)     Local reaction (redness, irritation, blistering) if wears \"cheap earrings\"    Oxycodone Itching      Social History   Substance Use Topics    Smoking status: Former Smoker     Packs/day: 1.00     Years: 40.00     Types: Cigarettes     Quit date: 2011    Smokeless tobacco: Former User      Comment: quit a few times but relapsed    Alcohol use No      Family History   Problem Relation Age of Onset    Post-op Nausea/Vomiting Other     Other Other      LOW BP AND DIFFICULTY BREATHING    Hypertension Mother     Stroke Mother       age 48,    [place]aleigh March Hypertension Brother     Stroke Brother       age 55, smoked        Review of Systems:  Review of systems not obtained due to patient factors.     Objective:   Vital Signs:    Visit Vitals    /50 (BP 1 Location: Left arm, BP Patient Position: At rest;Head of bed elevated (Comment degrees))    Pulse 85    Temp 98.7 °F (37.1 °C)    Resp 26    Ht 5' 5.98\" (1.676 m)    Wt 95.8 kg (211 lb 3.2 oz)    SpO2 97%    Breastfeeding No    BMI 34.11 kg/m2       O2 Device: Endotracheal tube, Ventilator   O2 Flow Rate (L/min): 4 l/min   Temp (24hrs), Av.2 °F (37.3 °C), Min:98.4 °F (36.9 °C), Max:100 °F (37.8 °C)       Intake/Output:   Last shift:         Last 3 shifts:  1901 -  0700  In: 8057.3 [I.V.:6832.3]  Out: 8215 [Urine:8215]    Intake/Output Summary (Last 24 hours) at 17 0839  Last data filed at 03/03/17 0653   Gross per 24 hour   Intake          5159.96 ml   Output             7800 ml   Net         -2640.04 ml       Ventilator Settings:  Mode Rate Tidal Volume Pressure FiO2 PEEP   Assist control        50 % 5 cm H20     Peak airway pressure: 30 cm H2O    Minute ventilation: 8.09 l/min      Physical Exam:    General:  Intubated and sedated, no distress, appears stated age. Head:  Normocephalic, without obvious abnormality, atraumatic. Eyes:  Conjunctivae/corneas clear. PERRL, EOMs intact. Nose: Nares normal. Septum midline. Mucosa normal. No drainage or sinus tenderness. Throat: Lips, mucosa, and tongue normal. Teeth and gums normal. Has a white coating over mouth. Neck: Supple, symmetrical, trachea midline, no adenopathy, thyroid: no enlargment/tenderness/nodules, no carotid bruit and no JVD. Back:   Symmetric, no curvature. ROM normal.   Lungs:   Decreased BS bilaterally, has bilateral rhonchi. Chest wall:  No tenderness or deformity. Heart:  Regular rate and rhythm, S1, S2 normal, no murmur, click, rub or gallop. Abdomen:   Obese;  bowel sounds are decreased,  No masses,  No organomegaly. Extremities: Extremities normal, atraumatic, no cyanosis or edema. Warm. Pulses: 2+ and symmetric all extremities. Skin: Skin color, texture, turgor normal. No rashes or lesions   Lymph nodes: Cervical, supraclavicular, and axillary nodes normal.   Neurologic: Not able to fully assess. Pt is post op, effects of anesthesia in place.         Data:     Recent Results (from the past 24 hour(s))   CULTURE, RESPIRATORY/SPUTUM/BRONCH W GRAM STAIN    Collection Time: 03/02/17 10:25 AM   Result Value Ref Range    Special Requests: NO SPECIAL REQUESTS      GRAM STAIN OCCASIONAL  WBCS SEEN        GRAM STAIN NO ORGANISMS SEEN      Culture result: PENDING    GLUCOSE, POC    Collection Time: 03/02/17 11:57 AM   Result Value Ref Range    Glucose (POC) 138 (H) 65 - 100 mg/dL    Performed by Darin Gutiérrez [place], [place]    Collection Time: 03/02/17  3:45 PM   Result Value Ref Range    Vancomycin,trough 19.7 (H) 5.0 - 10.0 ug/mL    Reported dose date: NOT PROVIDED      Reported dose time: NOT PROVIDED      Reported dose: NOT PROVIDED UNITS   GLUCOSE, POC    Collection Time: 03/02/17  5:58 PM   Result Value Ref Range    Glucose (POC) 121 (H) 65 - 100 mg/dL    Performed by Spike Sanches, POC    Collection Time: 03/03/17 12:41 AM   Result Value Ref Range    Glucose (POC) 149 (H) 65 - 100 mg/dL    Performed by Gricelda Stilwell    METABOLIC PANEL, COMPREHENSIVE    Collection Time: 03/03/17  5:19 AM   Result Value Ref Range    Sodium 141 136 - 145 mmol/L    Potassium 3.5 3.5 - 5.1 mmol/L    Chloride 107 97 - 108 mmol/L    CO2 24 21 - 32 mmol/L    Anion gap 10 5 - 15 mmol/L    Glucose 140 (H) 65 - 100 mg/dL    BUN 11 6 - 20 MG/DL    Creatinine 1.24 (H) 0.55 - 1.02 MG/DL    BUN/Creatinine ratio 9 (L) 12 - 20      GFR est AA 53 (L) >60 ml/min/1.73m2    GFR est non-AA 44 (L) >60 ml/min/1.73m2    Calcium 7.9 (L) 8.5 - 10.1 MG/DL    Bilirubin, total 0.7 0.2 - 1.0 MG/DL    ALT (SGPT) 43 12 - 78 U/L    AST (SGOT) 79 (H) 15 - 37 U/L    Alk.  phosphatase 199 (H) 45 - 117 U/L    Protein, total 5.7 (L) 6.4 - 8.2 g/dL    Albumin 1.5 (L) 3.5 - 5.0 g/dL    Globulin 4.2 (H) 2.0 - 4.0 g/dL    A-G Ratio 0.4 (L) 1.1 - 2.2     CBC W/O DIFF    Collection Time: 03/03/17  5:19 AM   Result Value Ref Range    WBC 10.8 3.6 - 11.0 K/uL    RBC 3.21 (L) 3.80 - 5.20 M/uL    HGB 9.1 (L) 11.5 - 16.0 g/dL    HCT 27.3 (L) 35.0 - 47.0 %    MCV 85.0 80.0 - 99.0 FL    MCH 28.3 26.0 - 34.0 PG    MCHC 33.3 30.0 - 36.5 g/dL    RDW 15.4 (H) 11.5 - 14.5 %    PLATELET 209 827 - 205 K/uL             Telemetry:normal sinus rhythm    Imaging:  I have personally reviewed the patients radiographs and have reviewed the reports:  2-27-17: CXR bilateral pleural efffusions  3-2-17 pending        Total critical care time exclusive of procedures: Romero Tam MD

## 2017-03-03 NOTE — PROGRESS NOTES
Pt had a pause of about 10 secs. Had normal pulse return. NO CPR. Had pulse on A line. Was on metoprolol due to long pause will stop. Dr. Billy Noel to evaluate and follow. Pt is at risk of pulling her ET tube or CVC. Will place in Restraints in order to prevent harm to herself.

## 2017-03-03 NOTE — PROGRESS NOTES
Vascular Surgery ICU Progress Note    Admit Date: 2017  POD:  4 Day Post-Op    Procedure:  Procedure(s):  ENDOVASCULAR ANEURYSM REPAIR of THORACIC AORTIC DISSECTION, repair of ruptured left iliac artery with stent placement. Left ileo-femoral bypass graft with PTFE. LEFT CAROTID SUBCLAVIAN BYPASS with stent placement of left common artery. Subjective:   Pt remains sedated/intubated, remains on cardene. Vent settings decreased some     Objective:   Vitals:  Blood pressure 156/50, pulse 85, temperature 100 °F (37.8 °C), resp. rate 26, height 5' 5.98\" (1.676 m), weight 95.8 kg (211 lb 3.2 oz), SpO2 97 %, not currently breastfeeding. Temp (24hrs), Av.3 °F (37.4 °C), Min:98.4 °F (36.9 °C), Max:100 °F (37.8 °C)    Ventilator:  Ventilator Volumes  Vt Exhaled (Machine Breath) (ml): 672 ml (17 0758)  Ve Observed (l/min): 8.09 l/min (17 0758)    Oxygen Therapy:  Oxygen Therapy  O2 Sat (%): 97 % (17 08)  Pulse via Oximetry: 84 beats per minute (17 08)  O2 Device: Endotracheal tube;Ventilator (17 08)  O2 Flow Rate (L/min): 4 l/min (17 0730)  O2 Temperature: 96.4 °F (35.8 °C) (17 1428)  FIO2 (%): 50 % (17 08)    Admission Weight: Last Weight   Weight: 81.9 kg (180 lb 8.9 oz) Weight: 95.8 kg (211 lb 3.2 oz)     Intake / Output / Drain:  Current Shift:    Last 24 hrs.:     Intake/Output Summary (Last 24 hours) at 17 0821  Last data filed at 17 8275   Gross per 24 hour   Intake          5159.96 ml   Output             7800 ml   Net         -2640.04 ml       EXAM:  General:  In no apparent distress                                                                                            Lungs:   Coarse bilat   Incision/Access Sites:  No erythema, drainage or swelling. Heart:  Regular rate and rhythm, S1, S2 normal, no murmur, click, rub or gallop. Abdomen:   Slightly firm, non-tender. Bowel sounds absent. No masses,  No organomegaly. Extremities:  Generalized edema, palpable pedal pulses bilat   Neurologic:  Unable to assess, pt sedated. Pupils round/reactive     Labs:   Recent Labs      17   0519  17   0041   WBC  10.8   --    HGB  9.1*   --    HCT  27.3*   --    PLT  200   --    NA  141   --    K  3.5   --    BUN  11   --    CREA  1.24*   --    GLU  140*   --    GLUCPOC   --   149*        Assessment:     Active Problems:    Aneurysm (HCC) (2017)         Plan/Recommendations/Medical Decision Makin. S/p TEVAR, L ileo-fem bypass, L carotid-subclavian bypass:  Sites stable, lumbar drain dc'd. Control BP  2. Volume overload/bilat effusions: no xray ordered for today, edema going down. Careful with diuresis and kidney function  3. Acute resp failure: on NRB prior to surgery, hx of COPD-cont nebs. FiO2 down to 50%, vent settings much better. Wean per pulmonary recs  4. HTN: esmolol stopped due to bradycardia/hypotension. Cont cardene gtt, scheduled IV metoprolol  5. JEANA: BUN/Cr fluctuating, monitor and avoid nephrotoxins, careful with diuresis  6. Elevated LFTs: trending down, monitor  7. Hypokalemia: replete per orders  8. Nutrition: OG tube inserted, TF's held for sedation vacation trial. Bowel sounds absent, most likely has an ileus. Careful w/ resuming feeds/increasing rate  9. GI/DVT prophylaxis: protonix, SCD's  10.  Dispo: to remain in ICU for now    Signed By: Sarahi Reid NP

## 2017-03-03 NOTE — PROGRESS NOTES
Nutrition Assessment:    RECOMMENDATIONS:   Resume TF as medically able, advance as tolerated to ordered goal rate of 40mL/h  Bowel regimen (no BM since 2/20)    ASSESSMENT:   Chart reviewed, case discussed during CCU rounds. Pt remains intubated and sedated on propofol @ 24. 6mL/h which provides 649 kcals daily. Needs re-estimated. TF was running this morning at 20mL/h with no residuals, but was on hold for SBT. Noted no BM since 2/20, bowel regimen started this morning by Dr. Arnel Meyer. Labs reviewed. Dietitians Intervention(s)/Plan(s): Resume TF and advanced towards goal rate, bowel regimen   SUBJECTIVE/OBJECTIVE:   Pt intubated and sedated   Diet Order: NPO, Other (comment) (TF via OGT: Osm 1.2 @ 40mL/h + Proscource BID + 75mL flushq 4h (provides 1272kcals/83gPro/1237mL))  % Eaten:  No data found. clamped  at   flush with       via OG Tube   Residuals:      Pertinent Medications:protonix, KCl, vancomycin, lasix, miralax; IVF(D10, [email or web address]); Drips: propofol. Chemistries:  Lab Results   Component Value Date/Time    Sodium 141 03/03/2017 05:19 AM    Potassium 3.5 03/03/2017 05:19 AM    Chloride 107 03/03/2017 05:19 AM    CO2 24 03/03/2017 05:19 AM    Anion gap 10 03/03/2017 05:19 AM    Glucose 140 03/03/2017 05:19 AM    BUN 11 03/03/2017 05:19 AM    Creatinine 1.24 03/03/2017 05:19 AM    BUN/Creatinine ratio 9 03/03/2017 05:19 AM    GFR est AA 53 03/03/2017 05:19 AM    GFR est non-AA 44 03/03/2017 05:19 AM    Calcium 7.9 03/03/2017 05:19 AM    Albumin 1.5 03/03/2017 05:19 AM      Anthropometrics: Height: 5' 5.98\" (167.6 cm) Weight: 95.8 kg (211 lb 3.2 oz)    IBW (%IBW): 59.1 kg (130 lb 4.7 oz) ( ) UBW (%UBW): 68.2 kg (150 lb 5.7 oz) (  %)    BMI: Body mass index is 34.11 kg/(m^2).     This BMI is indicative of:  []Underweight   []Normal   []Overweight   [x] Obesity   [] Extreme Obesity (BMI>40)  Estimated Nutrition Needs (Based on): 1861 Kcals/day (PSU (MSJ 1520)) , 75 g (1.1gPro/kg ABW) Protein  Carbohydrate: At Least 130 g/day  Fluids: 1200 mL/day or per MD     Last BM: 2/20   [x]Active     []Hyperactive  []Hypoactive       [] Absent   BS  Skin:    [] Intact   [x] Incision  [] Breakdown   [] DTI   [] Tears/Excoriation/Abrasion  []Edema [] Other: Wt Readings from Last 30 Encounters:   03/02/17 95.8 kg (211 lb 3.2 oz)   02/23/17 78 kg (171 lb 15.3 oz)   02/21/17 77.2 kg (170 lb 4 oz)   01/24/17 77.7 kg (171 lb 4 oz)   08/24/16 75.5 kg (166 lb 8 oz)   06/27/16 79.2 kg (174 lb 9.6 oz)   06/26/16 78 kg (172 lb)   05/31/16 77.3 kg (170 lb 8 oz)   04/15/16 77.6 kg (171 lb)   04/12/16 77.8 kg (171 lb 9.6 oz)   02/22/16 82.1 kg (181 lb)   12/21/15 78.8 kg (173 lb 12.8 oz)   11/09/15 80.3 kg (177 lb)   10/09/15 80.3 kg (177 lb)   09/16/15 79.5 kg (175 lb 4.8 oz)   07/10/15 80.3 kg (177 lb)   07/08/15 80.6 kg (177 lb 9.6 oz)   07/01/15 79.8 kg (176 lb)   06/29/15 79.7 kg (175 lb 9.6 oz)   06/02/15 80.3 kg (177 lb)   05/07/15 80.6 kg (177 lb 9.6 oz)   05/06/15 79.8 kg (176 lb)   05/02/15 79.4 kg (175 lb)   04/21/15 80.3 kg (177 lb)   03/13/15 77.1 kg (170 lb)   02/02/15 77.3 kg (170 lb 6.4 oz)   12/05/14 77.9 kg (171 lb 12.8 oz)   11/28/14 76.7 kg (169 lb)   11/10/14 78.5 kg (173 lb)   10/24/14 76.7 kg (169 lb 3.2 oz)      NUTRITION DIAGNOSES:   Problem:  Inadequate protein-energy intake      Etiology: related to pt NPO      Signs/Symptoms: as evidenced by NPO + propofol meets <25% kcal and 0% protein needs. Previous dx re: inadequate intake continues as TF is on hold. NUTRITION INTERVENTIONS:    Enteral/Parenteral Nutrition: Other (Resume TF and advance as ordered)                GOAL:   Pt will tolerate TF at goal rate with residuals <250mL in 2-4 days.     NUTRITION MONITORING AND EVALUATION   Previous Goal: Pt will tolerate initiation of TF with residuals <250mL in 2-3 days   Previous Goal Met: No (TF on hold)   Previous Recommendations Implemented: Yes   Cultural, Hinduism, or Ethnic Dietary Needs: None   LEARNING NEEDS (Diet, Food/Nutrient-Drug Interaction):    [x] None Identified   [] Identified and Education Provided/Documented   [] Identified and Pt declined/was not appropriate      [x] Interdisciplinary Care Plan Reviewed/Documented    [x] Participated in Discharge Planning: Unable to determine    [x] Interdisciplinary Rounds     NUTRITION RISK:    [x] High              [] Moderate           []  Low  []  Minimal/Uncompromised      Pilo Hopson, [address] [place] [address]  Pager [phone]  Weekend Pager [phone]

## 2017-03-03 NOTE — PROGRESS NOTES
Vascular    Events noted  She looks good  Incisions OK  Peripheral pulses normal  Neuro intact  No surgical issues  Cont supportive care per intensivists  OK for pharmacologic DVT prophylaxis

## 2017-03-04 NOTE — PROGRESS NOTES
0800  Bedside and verbal report from Lizz Pham RN.  0900  Remains sedated on propofol; cardene titrated for BP control per orders. 1000  Dr. Suzette Lockwood here to see patient. No changes made at this time. 1034  Patient bucking vent; appears restless at times. Medicated with dilaudid 1 mg IV per prn orders. 1100  Urine output adequate. Now appears more relaxed. 1200  Turned and positioned; vitals stable on cardene @ 12 mg/hr. Reassessment completed. 1300  Urine output adequate; pharmacy notified re: double concentrating the cardene. 1400  Vitals stable; urine output adequate. 1500  BP stable on cardene; titrating to maintain BP < 150 per order. 1600  Reassessment completed; urine output adequate. No further changes noted at this time. 1648  Patient grimacing; bucking vent; medicated with dilaudid 1 mg IV for pain. 1700  Weaning cardene; currently infusing @ 10 mg/hr. 1710  Now resting quietly; appears more relaxed. 1800  Urine output adequate; continue to wean cardene which is currently infusing @  9 mg/hr. 1900  Bedside and Verbal shift change report given to Larena Pallas, RN (oncoming nurse) by Elizabeth Castillo RN (offgoing nurse). Report included the following information SBAR, Kardex, Procedure Summary, Intake/Output, MAR, Accordion, Recent Results, Med Rec Status, Cardiac Rhythm NSR and Alarm Parameters .

## 2017-03-04 NOTE — PROGRESS NOTES
1900-bedside report received from [address], RN and assumed care of pt.  2400- continues to have cardene gtt at 10 mg/hr. 0400- am labs drawn and sent. 0600- no ABCDE per Dr Oniel Arenas order. Pt continues to be on propofol gtt at 50 no changes. 0700- bedside report given to [facility], [facility] .

## 2017-03-04 NOTE — PROGRESS NOTES
Vascular  Intubated  VSS  Cr slightly elevated  Huge urine output  Good distal pulses  Abdomen soft  Good pulses upper extremities   Continue ICU care

## 2017-03-04 NOTE — PROGRESS NOTES
Cardiology Progress Note      3/4/2017 6:55 PM    Admit Date: 2/23/2017    Admit Diagnosis: Aneurysm (HCC);AORTIC DISECTION      Subjective:     Denny Rosario remains intubated. Echo showed normal EF.     Visit Vitals    /41 (BP 1 Location: Left arm, BP Patient Position: At rest)    Pulse 69    Temp 98.5 °F (36.9 °C)    Resp 13    Ht 5' 5.98\" (1.676 m)    Wt 205 lb 0.4 oz (93 kg)    SpO2 97%    Breastfeeding No    BMI 33.11 kg/m2       Current Facility-Administered Medications   Medication Dose Route Frequency    propofol (DIPRIVAN) infusion  5-50 mcg/kg/min IntraVENous TITRATE    niCARdipine (CARDENE) 50 mg in 0.9% sodium chloride 250 mL infusion  0-15 mg/hr IntraVENous TITRATE    furosemide (LASIX) injection 40 mg  40 mg IntraVENous DAILY    HYDROmorphone (PF) (DILAUDID) injection 1 mg  1 mg IntraVENous Q3H PRN    polyethylene glycol (MIRALAX) packet 17 g  17 g Oral DAILY    sodium chloride 0.45 % in dextrose 10% 1,019.6 mL infusion   IntraVENous CONTINUOUS    albuterol (PROVENTIL VENTOLIN) nebulizer solution 2.5 mg  2.5 mg Nebulization Q6H RT    pantoprazole (PROTONIX) 40 mg in sodium chloride 0.9 % 10 mL injection  40 mg IntraVENous DAILY    vancomycin (VANCOCIN) 1250 mg in  ml infusion  1,250 mg IntraVENous Q18H    sodium chloride (NS) flush 5-10 mL  5-10 mL IntraVENous Q8H    sodium chloride (NS) flush 5-10 mL  5-10 mL IntraVENous PRN    chlorhexidine (PERIDEX) 0.12 % mouthwash 15 mL  15 mL Oral Q12H    prochlorperazine (COMPAZINE) with saline injection 5 mg  5 mg IntraVENous Q4H PRN    0.9% sodium chloride infusion 250 mL  250 mL IntraVENous PRN    sodium chloride (NS) flush 10 mL  10 mL InterCATHeter Q24H    sodium chloride (NS) flush 10-40 mL  10-40 mL InterCATHeter Q8H    acetaminophen (TYLENOL) tablet 650 mg  650 mg Oral Q4H PRN    naloxone (NARCAN) injection 0.4 mg  0.4 mg IntraVENous PRN    ondansetron (ZOFRAN) injection 4 mg  4 mg IntraVENous Q4H PRN    bisacodyl (DULCOLAX) suppository 10 mg  10 mg Rectal DAILY PRN         Objective:      Physical Exam:  Visit Vitals    /41 (BP 1 Location: Left arm, BP Patient Position: At rest)    Pulse 69    Temp 98.5 °F (36.9 °C)    Resp 13    Ht 5' 5.98\" (1.676 m)    Wt 205 lb 0.4 oz (93 kg)    SpO2 97%    Breastfeeding No    BMI 33.11 kg/m2     General Appearance:  Well developed, well nourished,alert and oriented x 0, and individual in no acute distress. Ears/Nose/Mouth/Throat:    grossly normal.         Neck: Supple. Chest:   Lungs clear to auscultation bilaterally. Cardiovascular:  Regular rate and rhythm, S1, S2 normal, no murmur. Abdomen:   Soft, non-tender, bowel sounds are active. Extremities: No edema bilaterally. Skin: Warm and dry.                Data Review:   Labs:    Recent Results (from the past 24 hour(s))   GLUCOSE, POC    Collection Time: 03/03/17 11:34 PM   Result Value Ref Range    Glucose (POC) 168 (H) 65 - 100 mg/dL    Performed by [facility]    GLUCOSE, POC    Collection Time: 03/03/17 11:36 PM   Result Value Ref Range    Glucose (POC) 159 (H) 65 - 100 mg/dL    Performed by [facility]    METABOLIC PANEL, BASIC    Collection Time: 03/04/17  3:59 AM   Result Value Ref Range    Sodium 139 136 - 145 mmol/L    Potassium 3.5 3.5 - 5.1 mmol/L    Chloride 106 97 - 108 mmol/L    CO2 22 21 - 32 mmol/L    Anion gap 11 5 - 15 mmol/L    Glucose 133 (H) 65 - 100 mg/dL    BUN 14 6 - 20 MG/DL    Creatinine 1.43 (H) 0.55 - 1.02 MG/DL    BUN/Creatinine ratio 10 (L) 12 - 20      GFR est AA 45 (L) >60 ml/min/1.73m2    GFR est non-AA 37 (L) >60 ml/min/1.73m2    Calcium 8.1 (L) 8.5 - 10.1 MG/DL   CBC W/O DIFF    Collection Time: 03/04/17  3:59 AM   Result Value Ref Range    WBC 11.5 (H) 3.6 - 11.0 K/uL    RBC 3.24 (L) 3.80 - 5.20 M/uL    HGB 8.9 (L) 11.5 - 16.0 g/dL    HCT 27.4 (L) 35.0 - 47.0 %    MCV 84.6 80.0 - 99.0 FL    MCH 27.5 26.0 - 34.0 PG    MCHC 32.5 30.0 - 36.5 g/dL    RDW 15.2 (H) 11.5 - 14.5 %    PLATELET 103 482 - 489 K/uL   BLOOD GAS, ARTERIAL    Collection Time: 03/04/17  5:55 AM   Result Value Ref Range    pH 7.45 7.35 - 7.45      PCO2 33 (L) 35.0 - 45.0 mmHg    PO2 79 (L) 80 - 100 mmHg    O2 SAT 96 92 - 97 %    BICARBONATE 22 22 - 26 mmol/L    BASE DEFICIT 1.1 mmol/L    O2 METHOD VENTILATOR      FIO2 50 %    MODE PRESSURE CONTROL      SET RATE 10      IPAP/PIP 24      EPAP/CPAP/PEEP 5.0      Sample source ARTERIAL      SITE DRAWN FROM ARTERIAL LINE      LEIGH ANN'S TEST N/A     GLUCOSE, POC    Collection Time: 03/04/17 12:05 PM   Result Value Ref Range    Glucose (POC) 149 (H) 65 - 100 mg/dL    Performed by Yue Butler, POC    Collection Time: 03/04/17  5:57 PM   Result Value Ref Range    Glucose (POC) 129 (H) 65 - 100 mg/dL    Performed by Debra Patch        Telemetry: normal sinus rhythm      Assessment:     Active Problems:    HTN (hypertension) ()      Aneurysm ([address] (2/23/2017)      SVT (supraventricular tachycardia) (3/3/2017)        Plan:     Rhythm stable. No recurrent arrhythmia.     Edel Tsai MD

## 2017-03-04 NOTE — PROGRESS NOTES
[facility]  Pulmonary, Critical Care, and Sleep Medicine    Name: Cinthia Pandya MRN: [id number]   : 1955 Hospital: [facility]   Date: 3/4/2017            IMPRESSION:   · Acute respiratory failure with hypoxia: on NRB prior to OR. On arrival to ICU pt was not maintaing Vt. Placed on ACV, Vt of 500 but only was getting Vt of 100s. Placed pt on PCV with pressure of PCV of 24, That generated tidal volumes in mid 300s. Ready for SBT; not dropping sats with movement. · Bilateral pleural effusions; may influence respiratory efforts if they get any larger; if still present after diuresis, will ask IR to image and possible place pigtail on largest side  · Hypoglycemia better on TF  · Malnutrition  · COPD noted to be moderate severity. 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 CC time with pt eop procedures      PLAN:   · CCU monitoring  · Change vent to [facility], not ready for SBT   · Diuresis as needed  · monitor secretions  · Tube feedings  · Hemodynamic monitoring  · Spinal drain removed  · Jet nebs. · Frequent neuro checks  · Transfuse prn Hgb less than 7  · Watch renal function and LFTs  · Antihypertensives, cardene drip  · PUD/DVt prophylaxis  · Sedation         Subjective/History: This patient has been seen and evaluated at the request of Dr. Akin Astorga for above.   Patient is a 59 y.o. female who presented from OR directly to ICU. Pt underwent EVAR for aneurysm. Failed medical therapy for above. Brought to ICU intubated and sedated. The patient is critically ill and can not provide additional history due to Ventilated. Current Facility-Administered Medications   Medication Dose Route Frequency    propofol (DIPRIVAN) infusion  5-50 mcg/kg/min IntraVENous TITRATE    furosemide (LASIX) injection 40 mg  40 mg IntraVENous DAILY    polyethylene glycol (MIRALAX) packet 17 g  17 g Oral DAILY    sodium chloride 0.45 % in dextrose 10% 1,019.6 mL infusion   IntraVENous CONTINUOUS    albuterol (PROVENTIL VENTOLIN) nebulizer solution 2.5 mg  2.5 mg Nebulization Q6H RT    pantoprazole (PROTONIX) 40 mg in sodium chloride 0.9 % 10 mL injection  40 mg IntraVENous DAILY    vancomycin (VANCOCIN) 1250 mg in  ml infusion  1,250 mg IntraVENous Q18H    sodium chloride (NS) flush 5-10 mL  5-10 mL IntraVENous Q8H    sodium chloride (NS) flush 5-10 mL  5-10 mL IntraVENous Q8H    niCARdipine (CARDENE) 25 mg in 0.9% sodium chloride 250 mL infusion  0-15 mg/hr IntraVENous TITRATE    chlorhexidine (PERIDEX) 0.12 % mouthwash 15 mL  15 mL Oral Q12H    sodium chloride (NS) flush 10 mL  10 mL InterCATHeter Q24H    sodium chloride (NS) flush 10-40 mL  10-40 mL InterCATHeter Q8H    sodium chloride (NS) flush 5-10 mL  5-10 mL IntraVENous Q8H     Review of Systems:  Review of systems not obtained due to patient factors.     Objective:   Vital Signs:    Visit Vitals    /51    Pulse 94    Temp 100 °F (37.8 °C)    Resp 17    Ht 5' 5.98\" (1.676 m)    Wt 93 kg (205 lb 0.4 oz)    SpO2 94%    Breastfeeding No    BMI 33.11 kg/m2       O2 Device: Endotracheal tube   O2 Flow Rate (L/min): 4 l/min   Temp (24hrs), Av.3 °F (37.4 °C), Min:98.3 °F (36.8 °C), Max:100 °F (37.8 °C)       Intake/Output:   Last shift:         Last 3 shifts:  1901 -  0700  In: 8602.1 [I.V.:7702.1]  Out: 89304 [AFEHX:10836]    Intake/Output Summary (Last 24 hours) at 03/04/17 0840  Last data filed at 03/04/17 0646   Gross per 24 hour   Intake          5331.81 ml   Output             7360 ml   Net         -2028.19 ml       Ventilator Settings:  Mode Rate Tidal Volume Pressure FiO2 PEEP   Pressure control        50 % 5 cm H20     Peak airway pressure: 30 cm H2O    Minute ventilation: 9.4 l/min      Physical Exam:    General:  Intubated and sedated, no distress, appears stated age. Head:  Normocephalic, without obvious abnormality, atraumatic. Eyes:  Conjunctivae/corneas clear. PERRL, EOMs intact. Nose: Nares normal. Septum midline. Mucosa normal. No drainage or sinus tenderness. Throat: Lips, mucosa, and tongue normal. Teeth and gums normal. Has a white coating over mouth. Neck: Supple, symmetrical, trachea midline, no adenopathy, thyroid: no enlargment/tenderness/nodules, no carotid bruit and no JVD. Back:   Symmetric, no curvature. ROM normal.   Lungs:   Decreased BS bilaterally, has bilateral rhonchi. Chest wall:  No tenderness or deformity. Heart:  Regular rate and rhythm, S1, S2 normal, no murmur, click, rub or gallop. Abdomen:   Obese;  bowel sounds are decreased,  No masses,  No organomegaly. Extremities: Extremities normal, atraumatic, no cyanosis or edema. Warm. Pulses: 2+ and symmetric all extremities. Skin: Skin color, texture, turgor normal. No rashes or lesions   Lymph nodes: Cervical, supraclavicular, and axillary nodes normal.   Neurologic: Not able to fully assess. Pt is post op, effects of anesthesia in place.         Data:     Recent Results (from the past 24 hour(s))   GLUCOSE, POC    Collection Time: 03/03/17 11:21 AM   Result Value Ref Range    Glucose (POC) 122 (H) 65 - 100 mg/dL    Performed by Eloy Jimenez    EKG, 12 LEAD, INITIAL    Collection Time: 03/03/17 12:48 PM   Result Value Ref Range    Ventricular Rate 77 BPM    Atrial Rate 77 BPM    P-R Interval 140 ms    QRS Duration 88 ms    Q-T Interval 372 ms QTC Calculation (Bezet) 420 ms    Calculated P Axis 70 degrees    Calculated R Axis 8 degrees    Calculated T Axis 146 degrees    Diagnosis       Normal sinus rhythm    T wave abnormality, consider lateral ischemia  Abnormal ECG  When compared with ECG of 23-FEB-2017 15:06,  No significant change was found  Confirmed by Rosy Carrington ([id number]) on 3/3/2017 1:47:53 PM     GLUCOSE, POC    Collection Time: 03/03/17 11:34 PM   Result Value Ref Range    Glucose (POC) 168 (H) 65 - 100 mg/dL    Performed by Monster Ramon    GLUCOSE, POC    Collection Time: 03/03/17 11:36 PM   Result Value Ref Range    Glucose (POC) 159 (H) 65 - 100 mg/dL    Performed by Monster [facility]    METABOLIC PANEL, BASIC    Collection Time: 03/04/17  3:59 AM   Result Value Ref Range    Sodium 139 136 - 145 mmol/L    Potassium 3.5 3.5 - 5.1 mmol/L    Chloride 106 97 - 108 mmol/L    CO2 22 21 - 32 mmol/L    Anion gap 11 5 - 15 mmol/L    Glucose 133 (H) 65 - 100 mg/dL    BUN 14 6 - 20 MG/DL    Creatinine 1.43 (H) 0.55 - 1.02 MG/DL    BUN/Creatinine ratio 10 (L) 12 - 20      GFR est AA 45 (L) >60 ml/min/1.73m2    GFR est non-AA 37 (L) >60 ml/min/1.73m2    Calcium 8.1 (L) 8.5 - 10.1 MG/DL   CBC W/O DIFF    Collection Time: 03/04/17  3:59 AM   Result Value Ref Range    WBC 11.5 (H) 3.6 - 11.0 K/uL    RBC 3.24 (L) 3.80 - 5.20 M/uL    HGB 8.9 (L) 11.5 - 16.0 g/dL    HCT 27.4 (L) 35.0 - 47.0 %    MCV 84.6 80.0 - 99.0 FL    MCH 27.5 26.0 - 34.0 PG    MCHC 32.5 30.0 - 36.5 g/dL    RDW 15.2 (H) 11.5 - 14.5 %    PLATELET 278 486 - 350 K/uL   BLOOD GAS, ARTERIAL    Collection Time: 03/04/17  5:55 AM   Result Value Ref Range    pH 7.45 7.35 - 7.45      PCO2 33 (L) 35.0 - 45.0 mmHg    PO2 79 (L) 80 - 100 mmHg    O2 SAT 96 92 - 97 %    BICARBONATE 22 22 - 26 mmol/L    BASE DEFICIT 1.1 mmol/L    O2 METHOD VENTILATOR      FIO2 50 %    MODE PRESSURE CONTROL      SET RATE 10      IPAP/PIP 24      EPAP/CPAP/PEEP 5.0      Sample source ARTERIAL      SITE DRAWN FROM ARTERIAL LINE      LEIGH ANN'S TEST N/A               Telemetry:normal sinus rhythm    Imaging:  I have personally reviewed the patients radiographs and have reviewed the reports:  CXR: atx improved        Total critical care time exclusive of procedures:     Tyree Simeon MD

## 2017-03-05 NOTE — PROGRESS NOTES
[facility]  Pulmonary, Critical Care, and Sleep Medicine    Name: Kishor Cruz MRN: [id number]   : 1955 Hospital: [facility]   Date: 3/5/2017            IMPRESSION:   · Acute respiratory failure with hypoxia: on NRB prior to OR. On arrival to ICU pt was not maintaing Vt. Placed on ACV, Vt of 500 but only was getting Vt of 100s. Placed pt on PCV with pressure of PCV of 24, That generated tidal volumes in mid 300s. Ready for SBT; not dropping sats with movement. · Bilateral pleural effusions; may influence respiratory efforts if they get any larger; if still present after diuresis, will ask IR to image and possible place pigtail on largest side  · Hypoglycemia better on TF  · Malnutrition  · COPD noted to be moderate severity. 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 CC time with pt eop procedures      PLAN:   · CCU monitoring  · Vent support, start SBT monday   · Diuresis as needed  · monitor secretions  · Tube feedings  · Jet nebs. · Frequent neuro checks  · Transfuse prn Hgb less than 7  · Watch renal function and LFTs  · Antihypertensives, cardene drip  · PUD/DVT prophylaxis  · Sedation         Subjective/History: This patient has been seen and evaluated at the request of Dr. Ghulam Doherty for above. Patient is a 59 y.o. female who presented from OR directly to ICU. Pt underwent EVAR for aneurysm.  Failed medical therapy for above. Brought to ICU intubated and sedated. The patient is critically ill and can not provide additional history due to Ventilated. Current Facility-Administered Medications   Medication Dose Route Frequency    propofol (DIPRIVAN) infusion  5-50 mcg/kg/min IntraVENous TITRATE    niCARdipine (CARDENE) 50 mg in 0.9% sodium chloride 250 mL infusion  0-15 mg/hr IntraVENous TITRATE    furosemide (LASIX) injection 40 mg  40 mg IntraVENous DAILY    polyethylene glycol (MIRALAX) packet 17 g  17 g Oral DAILY    sodium chloride 0.45 % in dextrose 10% 1,019.6 mL infusion   IntraVENous CONTINUOUS    albuterol (PROVENTIL VENTOLIN) nebulizer solution 2.5 mg  2.5 mg Nebulization Q6H RT    pantoprazole (PROTONIX) 40 mg in sodium chloride 0.9 % 10 mL injection  40 mg IntraVENous DAILY    vancomycin (VANCOCIN) 1250 mg in  ml infusion  1,250 mg IntraVENous Q18H    sodium chloride (NS) flush 5-10 mL  5-10 mL IntraVENous Q8H    chlorhexidine (PERIDEX) 0.12 % mouthwash 15 mL  15 mL Oral Q12H    sodium chloride (NS) flush 10 mL  10 mL InterCATHeter Q24H    sodium chloride (NS) flush 10-40 mL  10-40 mL InterCATHeter Q8H     Review of Systems:  Review of systems not obtained due to patient factors.     Objective:   Vital Signs:    Visit Vitals    /46    Pulse 79    Temp 99.2 °F (37.3 °C)    Resp 18    Ht 5' 5.98\" (1.676 m)    Wt 91.9 kg (202 lb 9.6 oz)    SpO2 96%    Breastfeeding No    BMI 32.72 kg/m2       O2 Device: Ventilator   O2 Flow Rate (L/min): 4 l/min   Temp (24hrs), Av.7 °F (37.1 °C), Min:97.9 °F (36.6 °C), Max:99.2 °F (37.3 °C)       Intake/Output:   Last shift:         Last 3 shifts:  1901 -  07  In: 7926.4 [I.V.:6521.4]  Out: 8290 [Urine:8290]    Intake/Output Summary (Last 24 hours) at 17 0740  Last data filed at 03/05/17 0600   Gross per 24 hour   Intake          4892.35 ml   Output             4965 ml   Net           -72.65 ml       Ventilator Settings:  Mode Rate Tidal Volume Pressure FiO2 PEEP   Assist control   500 ml    50 % 5 cm H20     Peak airway pressure: 19 cm H2O    Minute ventilation: 8.06 l/min      Physical Exam:    General:  Intubated and sedated, no distress, appears stated age. Head:  Normocephalic, without obvious abnormality, atraumatic. Eyes:  Conjunctivae/corneas clear. PERRL, EOMs intact. Nose: Nares normal. Septum midline. Mucosa normal. No drainage or sinus tenderness. Throat: Lips, mucosa, and tongue normal. Teeth and gums normal. Has a white coating over mouth. Neck: Supple, symmetrical, trachea midline, no adenopathy, thyroid: no enlargment/tenderness/nodules, no carotid bruit and no JVD. Back:   Symmetric, no curvature. ROM normal.   Lungs:   Decreased BS bilaterally, has bilateral rhonchi. Chest wall:  No tenderness or deformity. Heart:  Regular rate and rhythm, S1, S2 normal, no murmur, click, rub or gallop. Abdomen:   Obese;  bowel sounds are decreased,  No masses,  No organomegaly. Extremities: Extremities normal, atraumatic, no cyanosis or edema. Warm. Pulses: 2+ and symmetric all extremities. Skin: Skin color, texture, turgor normal. No rashes or lesions   Lymph nodes: Cervical, supraclavicular, and axillary nodes normal.   Neurologic: Not able to fully assess. Pt is post op, effects of anesthesia in place.         Data:     Recent Results (from the past 24 hour(s))   GLUCOSE, POC    Collection Time: 03/04/17 12:05 PM   Result Value Ref Range    Glucose (POC) 149 (H) 65 - 100 mg/dL    Performed by Vipul Dickinson, POC    Collection Time: 03/04/17  5:57 PM   Result Value Ref Range    Glucose (POC) 129 (H) 65 - 100 mg/dL    Performed by Vipul Dickinson, POC    Collection Time: 03/05/17 12:32 AM   Result Value Ref Range    Glucose (POC) 163 (H) 65 - 100 mg/dL    Performed by [address] [place], [place] [address]Rd [facility], [facility]    Collection Time: 03/05/17  4:26 AM   Result Value Ref Range    Sodium 139 136 - 145 mmol/L    Potassium 3.5 3.5 - 5.1 mmol/L    Chloride 107 97 - 108 mmol/L    CO2 25 21 - 32 mmol/L    Anion gap 7 5 - 15 mmol/L    Glucose 146 (H) 65 - 100 mg/dL    BUN 18 6 - 20 MG/DL    Creatinine 1.44 (H) 0.55 - 1.02 MG/DL    BUN/Creatinine ratio 13 12 - 20      GFR est AA 45 (L) >60 ml/min/1.73m2    GFR est non-AA 37 (L) >60 ml/min/1.73m2    Calcium 8.2 (L) 8.5 - 10.1 MG/DL    Bilirubin, total 0.6 0.2 - 1.0 MG/DL    ALT (SGPT) 63 12 - 78 U/L    AST (SGOT) 107 (H) 15 - 37 U/L    Alk.  phosphatase 177 (H) 45 - 117 U/L    Protein, total 5.7 (L) 6.4 - 8.2 g/dL    Albumin 1.6 (L) 3.5 - 5.0 g/dL    Globulin 4.1 (H) 2.0 - 4.0 g/dL    A-G Ratio 0.4 (L) 1.1 - 2.2     CBC W/O DIFF    Collection Time: 03/05/17  4:26 AM   Result Value Ref Range    WBC 12.7 (H) 3.6 - 11.0 K/uL    RBC 3.07 (L) 3.80 - 5.20 M/uL    HGB 8.7 (L) 11.5 - 16.0 g/dL    HCT 26.6 (L) 35.0 - 47.0 %    MCV 86.6 80.0 - 99.0 FL    MCH 28.3 26.0 - 34.0 PG    MCHC 32.7 30.0 - 36.5 g/dL    RDW 15.0 (H) 11.5 - 14.5 %    PLATELET 320 278 - 856 K/uL   BLOOD GAS, ARTERIAL    Collection Time: 03/05/17  6:19 AM   Result Value Ref Range    pH 7.41 7.35 - 7.45      PCO2 35 35.0 - 45.0 mmHg    PO2 72 (L) 80 - 100 mmHg    O2 SAT 95 92 - 97 %    BICARBONATE 21 (L) 22 - 26 mmol/L    BASE DEFICIT 2.6 mmol/L    O2 METHOD VENTILATOR      FIO2 50 %    MODE A/C      Tidal volume 500      SET RATE 10      EPAP/CPAP/PEEP 5.0      Sample source ARTERIAL      SITE DRAWN FROM ARTERIAL LINE      LEIGH ANN'S TEST N/A     GLUCOSE, POC    Collection Time: 03/05/17  6:32 AM   Result Value Ref Range    Glucose (POC) 142 (H) 65 - 100 mg/dL    Performed by William Pichardo              Telemetry:normal sinus rhythm    Imaging:  I have personally reviewed the patients radiographs and have reviewed the reports:  CXR: no acute changes        Total critical care time exclusive of procedures:     Celine Gregorio MD

## 2017-03-05 NOTE — PROGRESS NOTES
0700: Uneventful shift. VS stable. Cardene drip continues as ordered. Good UO. Medicated PRN pain (grimancing and elevated BP). Report given to oncoming shift.

## 2017-03-05 NOTE — PROGRESS NOTES
Vascular  VSS  Urine output only 225 last pm  Intubated  Good gases  Abdomen totally benign  Wbc 12K  Cr. Up slightly to 1.44  Plan normal saline push, wean vent tomorrow, in light of low grade fever and wbc-lines may need to be changed.

## 2017-03-05 NOTE — PROGRESS NOTES
Cardiology Progress Note      3/5/2017 9:51 AM    Admit Date: 2/23/2017    Admit Diagnosis: Aneurysm (HCC);AORTIC DISECTION      Subjective:     Anton Appl remains intubated.     Visit Vitals    /46    Pulse 76    Temp 100 °F (37.8 °C)    Resp 18    Ht 5' 5.98\" (1.676 m)    Wt 202 lb 9.6 oz (91.9 kg)    SpO2 100%    Breastfeeding No    BMI 32.72 kg/m2       Current Facility-Administered Medications   Medication Dose Route Frequency    propofol (DIPRIVAN) infusion  5-50 mcg/kg/min IntraVENous TITRATE    niCARdipine (CARDENE) 50 mg in 0.9% sodium chloride 250 mL infusion  0-15 mg/hr IntraVENous TITRATE    HYDROmorphone (PF) (DILAUDID) injection 1 mg  1 mg IntraVENous Q3H PRN    polyethylene glycol (MIRALAX) packet 17 g  17 g Oral DAILY    sodium chloride 0.45 % in dextrose 10% 1,019.6 mL infusion   IntraVENous CONTINUOUS    albuterol (PROVENTIL VENTOLIN) nebulizer solution 2.5 mg  2.5 mg Nebulization Q6H RT    pantoprazole (PROTONIX) 40 mg in sodium chloride 0.9 % 10 mL injection  40 mg IntraVENous DAILY    vancomycin (VANCOCIN) 1250 mg in  ml infusion  1,250 mg IntraVENous Q18H    sodium chloride (NS) flush 5-10 mL  5-10 mL IntraVENous Q8H    sodium chloride (NS) flush 5-10 mL  5-10 mL IntraVENous PRN    chlorhexidine (PERIDEX) 0.12 % mouthwash 15 mL  15 mL Oral Q12H    prochlorperazine (COMPAZINE) with saline injection 5 mg  5 mg IntraVENous Q4H PRN    0.9% sodium chloride infusion 250 mL  250 mL IntraVENous PRN    sodium chloride (NS) flush 10 mL  10 mL InterCATHeter Q24H    sodium chloride (NS) flush 10-40 mL  10-40 mL InterCATHeter Q8H    acetaminophen (TYLENOL) tablet 650 mg  650 mg Oral Q4H PRN    naloxone (NARCAN) injection 0.4 mg  0.4 mg IntraVENous PRN    ondansetron (ZOFRAN) injection 4 mg  4 mg IntraVENous Q4H PRN    bisacodyl (DULCOLAX) suppository 10 mg  10 mg Rectal DAILY PRN         Objective:      Physical Exam:  Visit Vitals    /46    Pulse 76  Temp 100 °F (37.8 °C)    Resp 18    Ht 5' 5.98\" (1.676 m)    Wt 202 lb 9.6 oz (91.9 kg)    SpO2 100%    Breastfeeding No    BMI 32.72 kg/m2     General Appearance:  Well developed, well nourished,alert and oriented x 0, and individual in no acute distress. Ears/Nose/Mouth/Throat:   Hearing grossly normal.         Neck: Supple. Chest:   Lungs clear to auscultation bilaterally. Cardiovascular:  Regular rate and rhythm, S1, S2 normal, no murmur. Abdomen:   Soft, non-tender, bowel sounds are active. Extremities: No edema bilaterally. Skin: Warm and dry. Data Review:   Labs:    Recent Results (from the past 24 hour(s))   GLUCOSE, POC    Collection Time: 03/04/17 12:05 PM   Result Value Ref Range    Glucose (POC) 149 (H) 65 - 100 mg/dL    Performed by Marco Spear, POC    Collection Time: 03/04/17  5:57 PM   Result Value Ref Range    Glucose (POC) 129 (H) 65 - 100 mg/dL    Performed by Marco Spear, POC    Collection Time: 03/05/17 12:32 AM   Result Value Ref Range    Glucose (POC) 163 (H) 65 - 100 mg/dL    Performed by Kilo Major    METABOLIC PANEL, COMPREHENSIVE    Collection Time: 03/05/17  4:26 AM   Result Value Ref Range    Sodium 139 136 - 145 mmol/L    Potassium 3.5 3.5 - 5.1 mmol/L    Chloride 107 97 - 108 mmol/L    CO2 25 21 - 32 mmol/L    Anion gap 7 5 - 15 mmol/L    Glucose 146 (H) 65 - 100 mg/dL    BUN 18 6 - 20 MG/DL    Creatinine 1.44 (H) 0.55 - 1.02 MG/DL    BUN/Creatinine ratio 13 12 - 20      GFR est AA 45 (L) >60 ml/min/1.73m2    GFR est non-AA 37 (L) >60 ml/min/1.73m2    Calcium 8.2 (L) 8.5 - 10.1 MG/DL    Bilirubin, total 0.6 0.2 - 1.0 MG/DL    ALT (SGPT) 63 12 - 78 U/L    AST (SGOT) 107 (H) 15 - 37 U/L    Alk.  phosphatase 177 (H) 45 - 117 U/L    Protein, total 5.7 (L) 6.4 - 8.2 g/dL    Albumin 1.6 (L) 3.5 - 5.0 g/dL    Globulin 4.1 (H) 2.0 - 4.0 g/dL    A-G Ratio 0.4 (L) 1.1 - 2.2     CBC W/O DIFF    Collection Time: 03/05/17  4:26 AM Result Value Ref Range    WBC 12.7 (H) 3.6 - 11.0 K/uL    RBC 3.07 (L) 3.80 - 5.20 M/uL    HGB 8.7 (L) 11.5 - 16.0 g/dL    HCT 26.6 (L) 35.0 - 47.0 %    MCV 86.6 80.0 - 99.0 FL    MCH 28.3 26.0 - 34.0 PG    MCHC 32.7 30.0 - 36.5 g/dL    RDW 15.0 (H) 11.5 - 14.5 %    PLATELET 695 924 - 977 K/uL   BLOOD GAS, ARTERIAL    Collection Time: 03/05/17  6:19 AM   Result Value Ref Range    pH 7.41 7.35 - 7.45      PCO2 35 35.0 - 45.0 mmHg    PO2 72 (L) 80 - 100 mmHg    O2 SAT 95 92 - 97 %    BICARBONATE 21 (L) 22 - 26 mmol/L    BASE DEFICIT 2.6 mmol/L    O2 METHOD VENTILATOR      FIO2 50 %    MODE A/C      Tidal volume 500      SET RATE 10      EPAP/CPAP/PEEP 5.0      Sample source ARTERIAL      SITE DRAWN FROM ARTERIAL LINE      LEIGH ANN'S TEST N/A     GLUCOSE, POC    Collection Time: 03/05/17  6:32 AM   Result Value Ref Range    Glucose (POC) 142 (H) 65 - 100 mg/dL    Performed by Joy Saba        Telemetry: normal sinus rhythm      Assessment:     Active Problems:    HTN (hypertension) ()      Aneurysm (HCC) (2/23/2017)      SVT (supraventricular tachycardia) (3/3/2017)        Plan:     Maintaining sinus rhythm no arrhythmia, bradycardia.       Kala Mcfarlane MD

## 2017-03-06 PROBLEM — I48.0 PAROXYSMAL ATRIAL FIBRILLATION (HCC): Status: ACTIVE | Noted: 2017-01-01

## 2017-03-06 PROBLEM — I49.5 SSS (SICK SINUS SYNDROME) (HCC): Status: ACTIVE | Noted: 2017-01-01

## 2017-03-06 NOTE — PROGRESS NOTES
Vascular Surgery ICU Progress Note    Admit Date: 2017  POD:  7 Day Post-Op    Procedure:  Procedure(s):  ENDOVASCULAR ANEURYSM REPAIR of THORACIC AORTIC DISSECTION, repair of ruptured left iliac artery with stent placement. Left ileo-femoral bypass graft with PTFE. LEFT CAROTID SUBCLAVIAN BYPASS with stent placement of left common artery. Subjective:   Pt remains sedated/intubated, remains on cardene. Episodes of a-fib, pauses     Objective:   Vitals:  Blood pressure 152/60, pulse 73, temperature 98.2 °F (36.8 °C), resp. rate 15, height 5' 5.98\" (1.676 m), weight 91.9 kg (202 lb 9.6 oz), SpO2 100 %, not currently breastfeeding. Temp (24hrs), Av °F (37.2 °C), Min:97.8 °F (36.6 °C), Max:100 °F (37.8 °C)    Ventilator:  Ventilator Volumes  Vt Set (ml): 500 ml (17)  Vt Exhaled (Machine Breath) (ml): 530 ml (17)  Ve Observed (l/min): 6.3 l/min (17)    Oxygen Therapy:  Oxygen Therapy  O2 Sat (%): 100 % (17)  Pulse via Oximetry: 65 beats per minute (17)  O2 Device: Endotracheal tube;Ventilator (17)  O2 Flow Rate (L/min): 4 l/min (17 0730)  O2 Temperature: 96.4 °F (35.8 °C) (17 1428)  FIO2 (%): 50 % (17)    Admission Weight: Last Weight   Weight: 81.9 kg (180 lb 8.9 oz) Weight: 91.9 kg (202 lb 9.6 oz)     Intake / Output / Drain:  Current Shift: 701 -  190  In: -   Out: 230 [Urine:230]  Last 24 hrs.:     Intake/Output Summary (Last 24 hours) at 17 0838  Last data filed at 17 0810   Gross per 24 hour   Intake           3464.5 ml   Output             3960 ml   Net           -495.5 ml       EXAM:  General:  In no apparent distress                                                                                            Lungs:   Coarse bilat   Incision/Access Sites:  No erythema, drainage or swelling. Heart:  Regular rate and rhythm, S1, S2 normal, no murmur, click, rub or gallop.    Abdomen: Slightly firm, non-tender. Bowel sounds absent. No masses,  No organomegaly. Extremities:  Generalized edema, palpable pedal pulses bilat   Neurologic:  Unable to assess, pt sedated. Pupils round/reactive     Labs:   Recent Labs      17   0616  17   0428   WBC   --   11.3*   HGB   --   8.5*   HCT   --   25.9*   PLT   --   231   NA   --   141   K   --   3.3*   BUN   --   20   CREA   --   1.55*   GLU   --   131*   GLUCPOC  134*   --         Assessment:     Active Problems:    HTN (hypertension) ()      Aneurysm (HCC) (2017)      SVT (supraventricular tachycardia) (3/3/2017)         Plan/Recommendations/Medical Decision Makin. S/p TEVAR, L ileo-fem bypass, L carotid-subclavian bypass:  Sites stable. Control BP  2. Volume overload/bilat effusions: overall edema improving, cont diuresis per pulmonary. Effusion do not seem large enough to tap  3. Acute resp failure: on NRB prior to surgery, hx of COPD-cont nebs. Possible SBT today per pulmonary, follow their recs for vent weaning  4. HTN: esmolol stopped due to bradycardia/hypotension. Cont cardene gtt  5. Tachy-hussein, PAF: cards following, had episode of PAF overnight but then 16 sec pause while on cardizem-gtt stopped. Consulting EP  6. JEANA: BUN/Cr fluctuating, monitor and avoid nephrotoxins, careful with diuresis  7. Elevated LFTs: trending down, monitor  8. Hypokalemia: replete per orders  9. Nutrition: OG tube inserted, would be careful w/ TF's given no bowel sounds. Consider TPN  10. GI/DVT prophylaxis: protonix, SCD's  11.  Dispo: to remain in ICU for now    Signed By: Rosy Ferguson NP

## 2017-03-06 NOTE — PROGRESS NOTES
Patient became more sob, increased work of breathing, ABG c/w hypercapnic respiratory failure  Reintubated by anesthesia  Now sedated, intubated  Ammon Escalera MD

## 2017-03-06 NOTE — PROGRESS NOTES
[facility]  Pulmonary, Critical Care, and Sleep Medicine    Name: Demetri Azevedo MRN: [id number]   : 1955 Hospital: [facility]    Date: 3/6/2017            IMPRESSION:   · Acute respiratory failure with hypoxia: on NRB prior to OR. On arrival to ICU pt was not maintaing Vt. Placed on ACV, Vt of 500 but only was getting Vt of 100s. Placed pt on PCV with pressure of PCV of 24, That generated tidal volumes in mid 300s. Ready for SBT; not dropping sats with movement. · Bilateral pleural effusions; may influence respiratory efforts if they get any larger; if still present after diuresis, will ask IR to image and possible place pigtail on largest side  · Hypoglycemia better on TF  · Malnutrition  · COPD noted to be moderate severity. 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 CC time with pt eop procedures      PLAN:   · CCU monitoring  · Vent support, start SBT today  · Diuresis as needed  · Tube feedings  · Hemodynamic monitoring  · Jet nebs. · Frequent neuro checks  · Transfuse prn Hgb less than 7  · Replete K  · Antihypertensives, cardene drip  · PUD/DVt prophylaxis  · Sedation         Subjective/History:     Developed afib, started on cardizem drip, now back in NSR and only on 1mg/hr of cardizem??? The patient is critically ill and can not provide additional history due to Ventilated.             Current Facility-Administered Medications   Medication Dose Route Frequency    dilTIAZem (CARDIZEM) 100 mg in dextrose 5% (MBP/ADV) 100 mL infusion  1 mg/hr IntraVENous TITRATE    propofol (DIPRIVAN) infusion  5-50 mcg/kg/min IntraVENous TITRATE    niCARdipine (CARDENE) 50 mg in 0.9% sodium chloride 250 mL infusion  0-15 mg/hr IntraVENous TITRATE    polyethylene glycol (MIRALAX) packet 17 g  17 g Oral DAILY    sodium chloride 0.45 % in dextrose 10% 1,019.6 mL infusion   IntraVENous CONTINUOUS    albuterol (PROVENTIL VENTOLIN) nebulizer solution 2.5 mg  2.5 mg Nebulization Q6H RT    pantoprazole (PROTONIX) 40 mg in sodium chloride 0.9 % 10 mL injection  40 mg IntraVENous DAILY    vancomycin (VANCOCIN) 1250 mg in  ml infusion  1,250 mg IntraVENous Q18H    sodium chloride (NS) flush 5-10 mL  5-10 mL IntraVENous Q8H    chlorhexidine (PERIDEX) 0.12 % mouthwash 15 mL  15 mL Oral Q12H    sodium chloride (NS) flush 10 mL  10 mL InterCATHeter Q24H    sodium chloride (NS) flush 10-40 mL  10-40 mL InterCATHeter Q8H     Review of Systems:  Review of systems not obtained due to patient factors.     Objective:   Vital Signs:    Visit Vitals    /60    Pulse 73    Temp 98.2 °F (36.8 °C)    Resp 15    Ht 5' 5.98\" (1.676 m)    Wt 91.9 kg (202 lb 9.6 oz)    SpO2 99%    Breastfeeding No    BMI 32.72 kg/m2       O2 Device: Endotracheal tube, Ventilator   O2 Flow Rate (L/min): 4 l/min   Temp (24hrs), Av °F (37.2 °C), Min:97.8 °F (36.6 °C), Max:100 °F (37.8 °C)       Intake/Output:   Last shift:         Last 3 shifts:  1901 -  0700  In: 6137.9 [I.V.:4802.9]  Out: 1179 [Urine:5370]    Intake/Output Summary (Last 24 hours) at 17 0754  Last data filed at 17 0600   Gross per 24 hour   Intake           3691.4 ml   Output             3830 ml   Net           -138.6 ml       Ventilator Settings:  Mode Rate Tidal Volume Pressure FiO2 PEEP   Assist control   500 ml    50 % 5 cm H20     Peak airway pressure: 21 cm H2O    Minute ventilation: 6.3 l/min      Physical Exam:    General:  Intubated and sedated, no distress, appears stated age. Head:  Normocephalic, without obvious abnormality, atraumatic. Eyes:  Conjunctivae/corneas clear. PERRL, EOMs intact. Nose: Nares normal. Septum midline. Mucosa normal. No drainage or sinus tenderness. Throat: Lips, mucosa, and tongue normal. Teeth and gums normal. Has a white coating over mouth. Neck: Supple, symmetrical, trachea midline, no adenopathy, thyroid: no enlargment/tenderness/nodules, no carotid bruit and no JVD. Back:   Symmetric, no curvature. ROM normal.   Lungs:   Decreased BS bilaterally, has bilateral rhonchi. Chest wall:  No tenderness or deformity. Heart:  Regular rate and rhythm, S1, S2 normal, no murmur, click, rub or gallop. Abdomen:   Obese;  bowel sounds are decreased,  No masses,  No organomegaly. Extremities: Extremities normal, atraumatic, no cyanosis or edema. Warm. Pulses: 2+ and symmetric all extremities. Skin: Skin color, texture, turgor normal. No rashes or lesions   Lymph nodes: Cervical, supraclavicular, and axillary nodes normal.   Neurologic: Not able to fully assess. Pt is post op, effects of anesthesia in place.         Data:     Recent Results (from the past 24 hour(s))   GLUCOSE, POC    Collection Time: 03/05/17 11:35 AM   Result Value Ref Range    Glucose (POC) 144 (H) 65 - 100 mg/dL    Performed by Duke Gillis, POC    Collection Time: 03/05/17  5:16 PM   Result Value Ref Range    Glucose (POC) 135 (H) 65 - 100 mg/dL    Performed by Duke Gillis, POC    Collection Time: 03/06/17 12:16 AM   Result Value Ref Range    Glucose (POC) 132 (H) 65 - 100 mg/dL    Performed by [facility]    METABOLIC PANEL, COMPREHENSIVE    Collection Time: 03/06/17  4:28 AM   Result Value Ref Range    Sodium 141 136 - 145 mmol/L    Potassium 3.3 (L) 3.5 - 5.1 mmol/L    Chloride 105 97 - 108 mmol/L CO2 23 21 - 32 mmol/L    Anion gap 13 5 - 15 mmol/L    Glucose 131 (H) 65 - 100 mg/dL    BUN 20 6 - 20 MG/DL    Creatinine 1.55 (H) 0.55 - 1.02 MG/DL    BUN/Creatinine ratio 13 12 - 20      GFR est AA 41 (L) >60 ml/min/1.73m2    GFR est non-AA 34 (L) >60 ml/min/1.73m2    Calcium 8.3 (L) 8.5 - 10.1 MG/DL    Bilirubin, total 0.7 0.2 - 1.0 MG/DL    ALT (SGPT) 72 12 - 78 U/L    AST (SGOT) 142 (H) 15 - 37 U/L    Alk.  phosphatase 182 (H) 45 - 117 U/L    Protein, total 5.9 (L) 6.4 - 8.2 g/dL    Albumin 1.5 (L) 3.5 - 5.0 g/dL    Globulin 4.4 (H) 2.0 - 4.0 g/dL    A-G Ratio 0.3 (L) 1.1 - 2.2     CBC W/O DIFF    Collection Time: 03/06/17  4:28 AM   Result Value Ref Range    WBC 11.3 (H) 3.6 - 11.0 K/uL    RBC 3.02 (L) 3.80 - 5.20 M/uL    HGB 8.5 (L) 11.5 - 16.0 g/dL    HCT 25.9 (L) 35.0 - 47.0 %    MCV 85.8 80.0 - 99.0 FL    MCH 28.1 26.0 - 34.0 PG    MCHC 32.8 30.0 - 36.5 g/dL    RDW 14.9 (H) 11.5 - 14.5 %    PLATELET 411 537 - 460 K/uL   VANCOMYCIN, TROUGH    Collection Time: 03/06/17  4:28 AM   Result Value Ref Range    Vancomycin,trough 29.2 (HH) 5.0 - 10.0 ug/mL    Reported dose date: NOT PROVIDED      Reported dose time: NOT PROVIDED      Reported dose: NOT PROVIDED UNITS   GLUCOSE, POC    Collection Time: 03/06/17  6:16 AM   Result Value Ref Range    Glucose (POC) 134 (H) 65 - 100 mg/dL    Performed by Violetta     BLOOD GAS, ARTERIAL    Collection Time: 03/06/17  6:17 AM   Result Value Ref Range    pH 7.41 7.35 - 7.45      PCO2 40 35.0 - 45.0 mmHg    PO2 76 (L) 80 - 100 mmHg    O2 SAT 95 92 - 97 %    BICARBONATE 25 22 - 26 mmol/L    BASE EXCESS 0.2 mmol/L    O2 METHOD VENTILATOR      FIO2 50 %    MODE A/C      Tidal volume 500      SET RATE 10      EPAP/CPAP/PEEP 5.0      Sample source ARTERIAL      SITE DRAWN FROM ARTERIAL LINE      LEIGH ANN'S TEST N/A               Telemetry:normal sinus rhythm    Imaging:  I have personally reviewed the patients radiographs and have reviewed the reports:  CXR: no acute changes Total critical care time exclusive of procedures:     Megan Kwon MD

## 2017-03-06 NOTE — INTERDISCIPLINARY ROUNDS
Interdisciplinary team rounds were held 3/6/17 with the following team members:Care Management, Diabetes Treatment Specialist, Nursing, Nutrition, Pharmacy, Physician, Clinical Coordinator and Rehab. Plan of care discussed. Goal: See MD orders and progress notes for further  interventions and desired outcomes.

## 2017-03-06 NOTE — PROGRESS NOTES
Shift Summery:    0028: PRN dulcolax and dilaudid given. 2736-4557: Patient with Afib on CM up to 150's. BP stable. 2 episodes of 2 and 7 second pauses when patient attempts to self convert. Patient attached to external pacer pads with initial episode. Notified Nonda Fortis. Will start Cardizem drip at 5 mg/hr. No titrate. 0151: Weaned off Cardene drip and started Cardizem drip.  0159: PRN tylenol given for temperature 99.9.  0334: Patient had 16 second pause with spontaneous return of NSR 70's. Nonda Fortis notified. Will turn drip down to 1 mg/hr. 0443: 's. Restarted Cardene drip low dose.

## 2017-03-06 NOTE — PROGRESS NOTES
0805  AM assessment complete. Cardizem stopped per Dr. Elissa Castaneda and Dr. Chary Couch. Pt withdraws to pain in all extremities, but no command following. 0910  Propofol stopped for SAT.  0915  Cardene up to 15mg/hr  0931  Pt has eyes open and nodding appropriately. Pt attempting to squeeze hands to command, although weak and swollen. 0933  Pt placed on SBT  0945  Pt becoming anxious with increased RR and BP. Pt nods yes to pain. 0.5mg dilaudid given. RR 20's and 's. Pt tolerating SBT well  1010  Discussed pt progress in rounds. Order received to extubate. Pt extubated to 4LPM NC. Restraints removed  1016  Pt in 's  1026  Pt had short pause and into rapid a-fib. BP holding. 0771-5434  Pt RR 30's, exhibiting increased WOB. D/w Dr. Elissa Castaneda. Orders received to place pt on BIPAP and give 80mg lasix. Pt had another pause and converted to sinus tach prior to initiation of BIPAP  1230   Pt lethargic and looks agonal on BIPAP. ABG ordered  1242  ABG results called to Dr. Elissa Castaneda. He will come assess pt.  1300  Dr. Elissa Castaneda and Dr. Charly Sawyer at bedside. Pt intubated. Propofol started  1400  Called pt's  to update him on pt condition and occurences  1830  Pt resting comfortably.   NSR, BP stable on 7.5mg/hr cardene

## 2017-03-06 NOTE — PROGRESS NOTES
Attended Interdisciplinary rounds in Critical Care Unit, where patient care was discussed. Visit by: Carlyn Gonzáles. Jossue Ruffin.  Praveena Schafer MA, [address]

## 2017-03-06 NOTE — PROGRESS NOTES
Pharmacy Automatic Renal Dosing Protocol - Antimicrobials     Indication for Antimicrobials: HCAP  Current Regimen of Each Antimicrobial (Start Day & Day of Therapy):  Vancomycin 1250 mg q 18 hours (, day 7     Previous:  Cefazolin 2 gm every 8 hours (post op surgical prophylaxis)     Significant cultures:    resp: GPC, in clusters and moderate normal respiratory yesika - final  3/2: sputum cx: NG - final    CAPD, Intermittent Hemodialysis or Renal Replacement Therapy: n/a  Paralysis, amputations, malnutrition: n/a  Estimated Creatinine Clearance: 42.4 mL/min (based on Cr of 1.55). Estimated Creatinine Clearance (using IBW): 34.8 mL/min  Recent Labs      17   0428  17   0426   17   0359   CREA  1.55*  1.44*   --   1.43*   BUN  20  18   --   14   WBC  11.3*  12.7*   --   11.5*   NA  141  139   --   139   K  3.3*  3.5   --   3.5   CA  8.3*  8.2*   --   8.1*   ALB  1.5*  1.6*   --    --    HGB  8.5*  8.7*   --   8.9*   HCT  25.9*  26.6*   --   27.4*   PLT  231  196   --   189   ALT  72  63   < >   --     < > = values in this interval not displayed. Temp (24hrs), Av.9 °F (37.2 °C), Min:97.8 °F (36.6 °C), Max:99.9 °F (37.7 °C)    Goal Vancomycin Level(s): 15-20  Vancomycin trough on 3/6: 29 mcg/ml (drawn 6 hours earlier); calculated trough 23 mcg/ml     Impression/Plan:   SCr continuing to trend up. Vanc trough came back at 29.2 mcg/ml and the calculated trough is 23 mcg/ml- was taken about 11 hrs after previous dose, instead of 17-18 hours. Will adjust to 1000 mg q 18 hrs for a predicted trough of ~18 mcg/ml and re-check trough after 2-3 doses, since her SCr is trending up. Will ask for the DOT of abx. Pharmacy will follow daily and adjust doses of monitored medications as appropriate for renal function and/or serum levels.     Thanks,  Nitza Goldman PHARMD

## 2017-03-06 NOTE — CONSULTS
Subjective:                [address] [address]  [phone]    Date of  Admission: 2/23/2017 10:40 PM     Admission type:Emergency    Tristian Santana is a 59 y.o. female admitted for Aneurysm (HCC);AORTIC DISECTION. She is acutely ill. Was extubated and had to be reintubated this am. She is sp vascular procedures and has been having afib. On cardizem gtt at 5 mg, she had pauses of upto 16 sec. She is intubated and history is obtained from d/w rn and review of chart.       Patient Active Problem List    Diagnosis Date Noted    SSS (sick sinus syndrome) ([address] 03/06/2017    Paroxysmal atrial fibrillation ([address] 03/06/2017    SVT (supraventricular tachycardia) 03/03/2017    Aneurysm ([address] 02/23/2017    Symptomatic anemia 06/25/2016    Dyslipidemia 12/21/2015    Carotid disease, bilateral ([address] 12/21/2015    ABIMBOLA (obstructive sleep apnea) 12/21/2015    COPD (chronic obstructive pulmonary disease) ([address] 06/29/2015    Anemia 05/02/2015    Iron deficiency anemia due to chronic blood loss 04/21/2015    GI bleed 11/28/2014    Snoring 06/27/2013    Joint pain 06/27/2013    Disturbance of skin sensation 06/27/2013    Cerebral thrombosis without mention of cerebral infarction 06/27/2013    Acute lower GI bleeding 05/21/2013    TIA (transient ischemic attack) 12/23/2012    Hypokalemia 12/23/2012    Renal artery arteriosclerosis ([address] 12/23/2012    CAD (coronary artery disease) 08/19/2011    S/P CABG (coronary artery bypass graft) 08/19/2011    Hypercholesterolemia 03/11/2011    HTN (hypertension)     Depression with anxiety     Hypothyroid       Shabana Gutierrez NP  Past Medical History:   Diagnosis Date    Anxiety disorder     Arthritis     BACK, RT. KNEE and HANDS    CAD (coronary artery disease)      s/p 5 vessel CABG 2011    Carotid arterial disease (HCC)     diffuse bilateral CCA plaques    Chronic obstructive pulmonary disease ([address]     Depression with anxiety  Essential hypertension     GERD (gastroesophageal reflux disease)     rarely    GI bleed     Hypothyroid     Mesenteric artery stenosis (HCC)     Microcytic anemia     Renal artery atherosclerosis, bilateral (HCC)      RA occlusion, left s/p stent    Renal atrophy, right     SVT (supraventricular tachycardia) 3/3/2017    UC (ulcerative colitis) ([facility]       Past Surgical History:   Procedure Laterality Date    ESOPHAGEAL CAPSULE ENDOSCOPY  2015         HX COLONOSCOPY      HX CORONARY ARTERY BYPASS GRAFT      5 way bypass.  11    HX FEMORAL BYPASS      triple    HX LUMBAR DISKECTOMY      HX UROLOGICAL      vascular stent x 2 to left kidney    SMALL BOWEL ENDOSCOPY,ABLATE LESN  3/13/2015         STRESS TEST LEXISCAN/CARDIOLITE  12    UPPER GI ENDOSCOPY,CTRL BLEED  2015          Allergies   Allergen Reactions    Tussionex Itching    Codeine Itching    Nickel Other (comments)     Local reaction (redness, irritation, blistering) if wears \"cheap earrings\"    Oxycodone Itching     Social History   Substance Use Topics    Smoking status: Former Smoker     Packs/day: 1.00     Years: 40.00     Types: Cigarettes     Quit date: 2011    Smokeless tobacco: Former User      Comment: quit a few times but relapsed    Alcohol use No     Family History   Problem Relation Age of Onset    Post-op Nausea/Vomiting Other     Other Other      LOW BP AND DIFFICULTY BREATHING    Hypertension Mother     Stroke Mother       age 48,    [facility] Hypertension Brother     Stroke Brother       age 55, smoked      Current Facility-Administered Medications   Medication Dose Route Frequency    vancomycin (VANCOCIN) 1,000 mg in 0.9% sodium chloride (MBP/ADV) 250 mL  1,000 mg IntraVENous Q18H    succinylcholine (ANECTINE) 20 mg/mL injection        propofol (DIPRIVAN) infusion  5-50 mcg/kg/min IntraVENous TITRATE    albuterol (PROVENTIL VENTOLIN) nebulizer solution 2.5 mg  2.5 mg Nebulization Q4H RT    niCARdipine (CARDENE) 50 mg in 0.9% sodium chloride 250 mL infusion  0-15 mg/hr IntraVENous TITRATE    HYDROmorphone (PF) (DILAUDID) injection 1 mg  1 mg IntraVENous Q3H PRN    polyethylene glycol (MIRALAX) packet 17 g  17 g Oral DAILY    sodium chloride 0.45 % in dextrose 10% 1,019.6 mL infusion   IntraVENous CONTINUOUS    pantoprazole (PROTONIX) 40 mg in sodium chloride 0.9 % 10 mL injection  40 mg IntraVENous DAILY    sodium chloride (NS) flush 5-10 mL  5-10 mL IntraVENous Q8H    sodium chloride (NS) flush 5-10 mL  5-10 mL IntraVENous PRN    chlorhexidine (PERIDEX) 0.12 % mouthwash 15 mL  15 mL Oral Q12H    prochlorperazine (COMPAZINE) with saline injection 5 mg  5 mg IntraVENous Q4H PRN    0.9% sodium chloride infusion 250 mL  250 mL IntraVENous PRN    sodium chloride (NS) flush 10 mL  10 mL InterCATHeter Q24H    sodium chloride (NS) flush 10-40 mL  10-40 mL InterCATHeter Q8H    acetaminophen (TYLENOL) tablet 650 mg  650 mg Oral Q4H PRN    naloxone (NARCAN) injection 0.4 mg  0.4 mg IntraVENous PRN    ondansetron (ZOFRAN) injection 4 mg  4 mg IntraVENous Q4H PRN    bisacodyl (DULCOLAX) suppository 10 mg  10 mg Rectal DAILY PRN         Review of Symptoms:  uto     Subjective:      Visit Vitals    /64    Pulse 89    Temp 98.5 °F (36.9 °C)    Resp 22    Ht 5' 5.98\" (1.676 m)    Wt 202 lb 9.6 oz (91.9 kg)    SpO2 93%    Breastfeeding No    BMI 32.72 kg/m2       Physical Exam  Abdomen: soft, non-tender.    Extremities: extremities normal  Heart: regular rate and rhythm  Lungs: vent bs  Pulses: 2+ and symmetric    Cardiographics    Telemetry: 16 sec pauses    Labs:  Recent Labs      03/06/17 0428 03/05/17 0426 03/04/17   0359   WBC  11.3*  12.7*  11.5*   HGB  8.5*  8.7*  8.9*   HCT  25.9*  26.6*  27.4*   PLT  231  196  189     Recent Labs      03/06/17 0428 03/05/17   0426  03/04/17   0359   NA  141  139  139   K  3.3*  3.5  3.5   CL  105  107  106   CO2  23 25  22   GLU  131*  146*  133*   BUN  20  18  14   CREA  1.55*  1.44*  1.43*   CA  8.3*  8.2*  8.1*   ALB  1.5*  1.6*   --    TBILI  0.7  0.6   --    SGOT  142*  107*   --    ALT  72  63   --        No results for input(s): TROIQ, CPK, CKMB in the last 72 hours. Intake/Output Summary (Last 24 hours) at 03/06/17 1313  Last data filed at 03/06/17 1226   Gross per 24 hour   Intake          2572.67 ml   Output             3505 ml   Net          -932.33 ml         Assessment:     Assessment:       Active Problems:    HTN (hypertension) ()      Aneurysm (HCC) (2/23/2017)      SVT (supraventricular tachycardia) (3/3/2017)      SSS (sick sinus syndrome) ([facility] (3/6/2017)      Paroxysmal atrial fibrillation ([facility] (3/6/2017)         Plan:     Bob Mcclendon is critically ill sp aortic dissection and vascular surgery. She is having paf and post conversion pauses of upto 16 sec when on cardizem gtt. Very limited in options. Would consider pacemaker but i'm not sure that is going to change her prognosis at this point in time. If extubated and stable, then definitely a candidate. For now, would start cardizem gtt at 5 mg if she has an AF recurrence. Would keep pacer pads at bedside and atropine. If pauses recur, would stop cardizem and start on dopamine gtt as needed. Will cont to follow with you. Poor prognosis.       Kathi Gupta MD, [facility]    3/6/2017

## 2017-03-06 NOTE — PROGRESS NOTES
3/6/2017 8:19 AM    Admit Date: 2/23/2017    Admit Diagnosis: Aneurysm (HCC);AORTIC DISECTION    Subjective:     King Herb remains intubated. Another episode of heart block this morning after started on IV cardidzem for a. Fib with RVR. Now back in sinus rhythm.      Visit Vitals    /60    Pulse 73    Temp 98.2 °F (36.8 °C)    Resp 15    Ht 5' 5.98\" (1.676 m)    Wt 202 lb 9.6 oz (91.9 kg)    SpO2 99%    Breastfeeding No    BMI 32.72 kg/m2     Current Facility-Administered Medications   Medication Dose Route Frequency    potassium chloride 20 mEq in 50 ml IVPB  20 mEq IntraVENous Q1H    propofol (DIPRIVAN) infusion  5-50 mcg/kg/min IntraVENous TITRATE    niCARdipine (CARDENE) 50 mg in 0.9% sodium chloride 250 mL infusion  0-15 mg/hr IntraVENous TITRATE    HYDROmorphone (PF) (DILAUDID) injection 1 mg  1 mg IntraVENous Q3H PRN    polyethylene glycol (MIRALAX) packet 17 g  17 g Oral DAILY    sodium chloride 0.45 % in dextrose 10% 1,019.6 mL infusion   IntraVENous CONTINUOUS    albuterol (PROVENTIL VENTOLIN) nebulizer solution 2.5 mg  2.5 mg Nebulization Q6H RT    pantoprazole (PROTONIX) 40 mg in sodium chloride 0.9 % 10 mL injection  40 mg IntraVENous DAILY    vancomycin (VANCOCIN) 1250 mg in  ml infusion  1,250 mg IntraVENous Q18H    sodium chloride (NS) flush 5-10 mL  5-10 mL IntraVENous Q8H    sodium chloride (NS) flush 5-10 mL  5-10 mL IntraVENous PRN    chlorhexidine (PERIDEX) 0.12 % mouthwash 15 mL  15 mL Oral Q12H    prochlorperazine (COMPAZINE) with saline injection 5 mg  5 mg IntraVENous Q4H PRN    0.9% sodium chloride infusion 250 mL  250 mL IntraVENous PRN    sodium chloride (NS) flush 10 mL  10 mL InterCATHeter Q24H    sodium chloride (NS) flush 10-40 mL  10-40 mL InterCATHeter Q8H    acetaminophen (TYLENOL) tablet 650 mg  650 mg Oral Q4H PRN    naloxone (NARCAN) injection 0.4 mg  0.4 mg IntraVENous PRN    ondansetron (ZOFRAN) injection 4 mg  4 mg IntraVENous Q4H PRN    bisacodyl (DULCOLAX) suppository 10 mg  10 mg Rectal DAILY PRN         Objective:      Visit Vitals    /60    Pulse 73    Temp 98.2 °F (36.8 °C)    Resp 15    Ht 5' 5.98\" (1.676 m)    Wt 202 lb 9.6 oz (91.9 kg)    SpO2 99%    Breastfeeding No    BMI 32.72 kg/m2       Physical Exam:  Abdomen: soft, non-tender. Bowel sounds normal. No masses,  no organomegaly  Extremities: extremities normal, atraumatic, no cyanosis or edema  Heart: regular rate and rhythm, S1, S2 normal, no murmur, click, rub or gallop  Lungs: clear to auscultation bilaterally  Neck: supple, symmetrical, trachea midline, no adenopathy, thyroid: not enlarged, symmetric, no tenderness/mass/nodules, no carotid bruit and no JVD  Neurologic: Grossly normal  Pulses: 2+ and symmetric  I  Data Review:   Labs:    Recent Results (from the past 24 hour(s))   GLUCOSE, POC    Collection Time: 03/05/17 11:35 AM   Result Value Ref Range    Glucose (POC) 144 (H) 65 - 100 mg/dL    Performed by [id number] [employer], POC    Collection Time: 03/05/17  5:16 PM   Result Value Ref Range    Glucose (POC) 135 (H) 65 - 100 mg/dL    Performed by [id number] [employer], POC    Collection Time: 03/06/17 12:16 AM   Result Value Ref Range    Glucose (POC) 132 (H) 65 - 100 mg/dL    Performed by Ashely Rae    METABOLIC PANEL, COMPREHENSIVE    Collection Time: 03/06/17  4:28 AM   Result Value Ref Range    Sodium 141 136 - 145 mmol/L    Potassium 3.3 (L) 3.5 - 5.1 mmol/L    Chloride 105 97 - 108 mmol/L    CO2 23 21 - 32 mmol/L    Anion gap 13 5 - 15 mmol/L    Glucose 131 (H) 65 - 100 mg/dL    BUN 20 6 - 20 MG/DL    Creatinine 1.55 (H) 0.55 - 1.02 MG/DL    BUN/Creatinine ratio 13 12 - 20      GFR est AA 41 (L) >60 ml/min/1.73m2    GFR est non-AA 34 (L) >60 ml/min/1.73m2    Calcium 8.3 (L) 8.5 - 10.1 MG/DL    Bilirubin, total 0.7 0.2 - 1.0 MG/DL    ALT (SGPT) 72 12 - 78 U/L    AST (SGOT) 142 (H) 15 - 37 U/L    Alk.  phosphatase 182 (H) 45 - 117 U/L    Protein, total 5.9 (L) 6.4 - 8.2 g/dL    Albumin 1.5 (L) 3.5 - 5.0 g/dL    Globulin 4.4 (H) 2.0 - 4.0 g/dL    A-G Ratio 0.3 (L) 1.1 - 2.2     CBC W/O DIFF    Collection Time: 03/06/17  4:28 AM   Result Value Ref Range    WBC 11.3 (H) 3.6 - 11.0 K/uL    RBC 3.02 (L) 3.80 - 5.20 M/uL    HGB 8.5 (L) 11.5 - 16.0 g/dL    HCT 25.9 (L) 35.0 - 47.0 %    MCV 85.8 80.0 - 99.0 FL    MCH 28.1 26.0 - 34.0 PG    MCHC 32.8 30.0 - 36.5 g/dL    RDW 14.9 (H) 11.5 - 14.5 %    PLATELET 776 532 - 825 K/uL   VANCOMYCIN, TROUGH    Collection Time: 03/06/17  4:28 AM   Result Value Ref Range    Vancomycin,trough 29.2 (HH) 5.0 - 10.0 ug/mL    Reported dose date: NOT PROVIDED      Reported dose time: NOT PROVIDED      Reported dose: NOT PROVIDED UNITS   GLUCOSE, POC    Collection Time: 03/06/17  6:16 AM   Result Value Ref Range    Glucose (POC) 134 (H) 65 - 100 mg/dL    Performed by Joy Saba    BLOOD GAS, ARTERIAL    Collection Time: 03/06/17  6:17 AM   Result Value Ref Range    pH 7.41 7.35 - 7.45      PCO2 40 35.0 - 45.0 mmHg    PO2 76 (L) 80 - 100 mmHg    O2 SAT 95 92 - 97 %    BICARBONATE 25 22 - 26 mmol/L    BASE EXCESS 0.2 mmol/L    O2 METHOD VENTILATOR      FIO2 50 %    MODE A/C      Tidal volume 500      SET RATE 10      EPAP/CPAP/PEEP 5.0      Sample source ARTERIAL      SITE DRAWN FROM ARTERIAL LINE      LEIGH ANN'S TEST N/A         Telemetry: normal sinus rhythm, Tachy-hussein. Assessment:     Active Problems:    HTN (hypertension) ()      Aneurysm (HCC) (2/23/2017)      SVT (supraventricular tachycardia) (3/3/2017)        Plan:     1. Tachy-hussein, PAF: will ask EP to see. Normal LVEF.   2. On IV cardene for BP management. 3. S/p aortic dissection endovascular repair. 4. VDRF.

## 2017-03-06 NOTE — PROGRESS NOTES
Agree with Mrs Aranza Mccann. Extubated. Uncertain neuro status. Slow to respond. Fixed gaze to right? A Fib with 16 sec pause last pm.    Pacemaker? Continue support.

## 2017-03-06 NOTE — PROGRESS NOTES
03/06/17 0611   ABCDE Bundle   SBT Safety Screen Passed Yes   SBT Trial Passed No   SBT Trial Reason for Failure Acute cardiac arrhythmia   Weaning Parameters   Spontaneous Breathing Trial Complete No (Comments)

## 2017-03-07 NOTE — PROGRESS NOTES
03/07/17 0554   ABCDE Bundle   SBT Safety Screen Passed No   SBT Screen Reason for Failure Agitation

## 2017-03-07 NOTE — PROGRESS NOTES
Pt is a 58 yo female admitted from home for aortic dissection endovascular repair. Pt has extensive medical hx which inlcudes CAD with hx of CABG, HTN, anxiety, carotid artery disease, renal artery stenosis, high cholesterol, anemia and GERD. Pt was seen at [facility] ED for chest/abd/back pain and released home with pain meds. Pt was diagnosed with type B aortic dissection and underwent complicated surgical repair with Dr. Timur Agrawal on 2/18/17. Pt had post-op respiratory failure and required intubation; is being maintained on vent support in CCU. Pt failed extubation attempt on 3/6 and was reintubated - having episodes of a-fib with pauses. Pt lives w/ her spouse in a 1-story home in [place], [place]. PTA she was independent for mobility and ADLs - has a straight cane and RW. CM will follow and remain avbl to assist with dc planning as appropriate. Care Management Interventions  PCP Verified by CM: Yes  Mode of Transport at Discharge:  Other (see comment) (TBD)  Transition of Care Consult (CM Consult): Discharge Planning (Pt was assessed for possible dc needs)  Physical Therapy Consult: No  Occupational Therapy Consult: No  Speech Therapy Consult: No  Current Support Network: Lives with Spouse  Plan discussed with Pt/Family/Caregiver: Yes  Freedom of Choice Offered: Yes  Discharge Location  Discharge Placement: [address] [place] [address]     Haydee Mayers, MSW

## 2017-03-07 NOTE — PROGRESS NOTES
Follow up visit attempted with patient who remains intubated and sedated. No family present. Pastoral care continues to follow; as of this time have been unable to connect with family in person. I left a pastoral care card for patient's / family informing them of pastoral care support and presence. Pastoral care remains available to provide support to patient and/or family. [phone]. Visit by: Eddie Wick. Marlen Lujan.  Елена Vora MA, [facility]    Lead  [employer] Development [employer] Advancement

## 2017-03-07 NOTE — PROGRESS NOTES
Attended Interdisciplinary rounds in Critical Care Unit, where patient care was discussed. Visit by: Yang Michel. Feng Lopez.  Mell Toussaint MA, [address]

## 2017-03-07 NOTE — PROGRESS NOTES
Vascular Surgery ICU Progress Note    Admit Date: 2017  POD:  8 Day Post-Op    Procedure:  Procedure(s):  ENDOVASCULAR ANEURYSM REPAIR of THORACIC AORTIC DISSECTION, repair of ruptured left iliac artery with stent placement. Left ileo-femoral bypass graft with PTFE. LEFT CAROTID SUBCLAVIAN BYPASS with stent placement of left common artery. Subjective:   Pt remains sedated/intubated, failed extubation attempt on 3/6. Remains on cardene, cont w/ episodes of a-fib, pauses      Objective:   Vitals:  Blood pressure 128/68, pulse (!) 148, temperature 98.3 °F (36.8 °C), resp. rate 18, height 5' 5.98\" (1.676 m), weight 91.9 kg (202 lb 9.6 oz), SpO2 96 %, not currently breastfeeding. Temp (24hrs), Av.3 °F (36.8 °C), Min:97.4 °F (36.3 °C), Max:99.3 °F (37.4 °C)    Ventilator:  Ventilator Volumes  Vt Set (ml): 500 ml (17 034)  Vt Exhaled (Machine Breath) (ml): 548 ml (17 034)  Vt Spont (ml): 595 ml (17 0944)  Ve Observed (l/min): 11.2 l/min (17 034)    Oxygen Therapy:  Oxygen Therapy  O2 Sat (%): 96 % (17 0800)  Pulse via Oximetry: 150 beats per minute (17 0800)  O2 Device: Ventilator (17)  O2 Flow Rate (L/min): 4 l/min (17 1016)  O2 Temperature: 96.4 °F (35.8 °C) (17 1428)  FIO2 (%): 50 % (17 034)    Admission Weight: Last Weight   Weight: 81.9 kg (180 lb 8.9 oz) Weight: 91.9 kg (202 lb 9.6 oz)     Intake / Output / Drain:  Current Shift: 701 -  1900  In: -   Out: 40 [Urine:40]  Last 24 hrs.:     Intake/Output Summary (Last 24 hours) at 17 1081  Last data filed at 17 0814   Gross per 24 hour   Intake          1773.45 ml   Output             3040 ml   Net         -1266.55 ml       EXAM:  General:  In no apparent distress                                                                                            Lungs:   Coarse bilat   Incision/Access Sites:  No erythema, drainage or swelling.     Heart:  Regular rate and rhythm-SVT vs a-fib w/ RVR, S1, S2 normal, no murmur, click, rub or gallop. Abdomen:   Less firm, non-tender. Bowel sounds diminished. No masses,  No organomegaly. Extremities:  Generalized edema, palpable pedal pulses bilat   Neurologic:  Unable to assess, pt sedated. Pupils round/reactive, moving upper extremities     Labs:   Recent Labs      17   0315  17   0103   WBC  12.0*   --    HGB  8.4*   --    HCT  25.0*   --    PLT  264   --    NA  143   --    K  3.2*   --    BUN  23*   --    CREA  1.61*   --    GLU  117*   --    GLUCPOC   --   126*        Assessment:     Active Problems:    HTN (hypertension) ()      Aneurysm ([facility]) (2017)      SVT (supraventricular tachycardia) (3/3/2017)      SSS (sick sinus syndrome) ([facility]) (3/6/2017)      Paroxysmal atrial fibrillation ([facility] [address] (3/6/2017)         Plan/Recommendations/Medical Decision Makin. S/p TEVAR, L ileo-fem bypass, L carotid-subclavian bypass:  Sites stable. Control BP  2. Volume overload/bilat effusions: overall edema improving, holding diuretics today due to increasing Cr. Xray w/ bibasilar atelectasis, effusions smaller   3. Acute resp failure: on NRB prior to surgery, hx of COPD-cont nebs. Failed extubation 3/6, vent weaning per pulm   4. HTN: esmolol stopped due to bradycardia/hypotension. Cont cardene gtt  5. Tachy-hussein, PAF: cards following, EP consulted but nothing to offer at this time-may consider pacemaker once extubated   6. JEANA: BUN/Cr fluctuating, monitor and avoid nephrotoxins, careful with diuresis  7. Elevated LFTs: trending down, monitor  8. Hypokalemia: replete per orders  9. Nutrition: OG tube inserted, has some bowel sounds today, can try attempting trickle feeds again   10. GI/DVT prophylaxis: protonix, SCD's  11.  Dispo: to remain in ICU for now    Signed By: Graham Russell NP

## 2017-03-07 NOTE — PROGRESS NOTES
3/7/2017 12:30 PM    Admit Date: 2/23/2017    Admit Diagnosis: Aneurysm (HCC);AORTIC DISECTION    Subjective:     Abdulaziz Donovan remains critically ill. Got re-intubated yesterday.     Visit Vitals    /58    Pulse 91    Temp 98.3 °F (36.8 °C)    Resp 14    Ht 5' 5.98\" (1.676 m)    Wt 202 lb 9.6 oz (91.9 kg)    SpO2 94%    Breastfeeding No    BMI 32.72 kg/m2     Current Facility-Administered Medications   Medication Dose Route Frequency    potassium chloride 20 mEq in 50 ml IVPB  20 mEq IntraVENous Q1H    enoxaparin (LOVENOX) injection 40 mg  40 mg SubCUTAneous Q24H    cloNIDine (CATAPRES) 0.2 mg/24 hr patch 1 Patch  1 Patch TransDERmal Q7D    albuterol (PROVENTIL VENTOLIN) nebulizer solution 2.5 mg  2.5 mg Nebulization Q2H PRN    ipratropium (ATROVENT) 0.02 % nebulizer solution 0.5 mg  0.5 mg Nebulization QID RT    propofol (DIPRIVAN) infusion  5-50 mcg/kg/min IntraVENous TITRATE    niCARdipine (CARDENE) 50 mg in 0.9% sodium chloride 250 mL infusion  0-15 mg/hr IntraVENous TITRATE    HYDROmorphone (PF) (DILAUDID) injection 1 mg  1 mg IntraVENous Q3H PRN    polyethylene glycol (MIRALAX) packet 17 g  17 g Oral DAILY    sodium chloride 0.45 % in dextrose 10% 1,019.6 mL infusion   IntraVENous CONTINUOUS    pantoprazole (PROTONIX) 40 mg in sodium chloride 0.9 % 10 mL injection  40 mg IntraVENous DAILY    sodium chloride (NS) flush 5-10 mL  5-10 mL IntraVENous Q8H    sodium chloride (NS) flush 5-10 mL  5-10 mL IntraVENous PRN    chlorhexidine (PERIDEX) 0.12 % mouthwash 15 mL  15 mL Oral Q12H    prochlorperazine (COMPAZINE) with saline injection 5 mg  5 mg IntraVENous Q4H PRN    0.9% sodium chloride infusion 250 mL  250 mL IntraVENous PRN    sodium chloride (NS) flush 10 mL  10 mL InterCATHeter Q24H    sodium chloride (NS) flush 10-40 mL  10-40 mL InterCATHeter Q8H    acetaminophen (TYLENOL) tablet 650 mg  650 mg Oral Q4H PRN    naloxone (NARCAN) injection 0.4 mg  0.4 mg IntraVENous PRN    ondansetron (ZOFRAN) injection 4 mg  4 mg IntraVENous Q4H PRN    bisacodyl (DULCOLAX) suppository 10 mg  10 mg Rectal DAILY PRN         Objective:      Visit Vitals    /58    Pulse 91    Temp 98.3 °F (36.8 °C)    Resp 14    Ht 5' 5.98\" (1.676 m)    Wt 202 lb 9.6 oz (91.9 kg)    SpO2 94%    Breastfeeding No    BMI 32.72 kg/m2       Physical Exam:  Abdomen: soft, non-tender. Bowel sounds normal.  Extremities: no cyanosis or edema  Heart: Irregular rate and rhythm, S1, S2 normal, no murmur, click, rub or gallop  Lungs: bilateral air entry. Neurologic: sedated.      Data Review:   Labs:    Recent Results (from the past 24 hour(s))   GLUCOSE, POC    Collection Time: 03/06/17  1:31 PM   Result Value Ref Range    Glucose (POC) 180 (H) 65 - 100 mg/dL    Performed by Rock Lizarraga    BLOOD GAS, ARTERIAL    Collection Time: 03/06/17  2:00 PM   Result Value Ref Range    pH 7.38 7.35 - 7.45      PCO2 40 35.0 - 45.0 mmHg    PO2 71 (L) 80 - 100 mmHg    O2 SAT 94 92 - 97 %    BICARBONATE 23 22 - 26 mmol/L    BASE DEFICIT 1.5 mmol/L    O2 METHOD VENTILATOR      FIO2 50 %    MODE A/C      Tidal volume 500      SET RATE 12      EPAP/CPAP/PEEP 5.0      Sample source ARTERIAL      SITE DRAWN FROM ARTERIAL LINE      LEIGH ANN'S TEST N/A     GLUCOSE, POC    Collection Time: 03/06/17  5:36 PM   Result Value Ref Range    Glucose (POC) 148 (H) 65 - 100 mg/dL    Performed by Devaughn Dickey    GLUCOSE, POC    Collection Time: 03/07/17  1:03 AM   Result Value Ref Range    Glucose (POC) 126 (H) 65 - 100 mg/dL    Performed by Bridget Perla, COMPREHENSIVE    Collection Time: 03/07/17  3:15 AM   Result Value Ref Range    Sodium 143 136 - 145 mmol/L    Potassium 3.2 (L) 3.5 - 5.1 mmol/L    Chloride 106 97 - 108 mmol/L    CO2 24 21 - 32 mmol/L    Anion gap 13 5 - 15 mmol/L    Glucose 117 (H) 65 - 100 mg/dL    BUN 23 (H) 6 - 20 MG/DL    Creatinine 1.61 (H) 0.55 - 1.02 MG/DL    BUN/Creatinine ratio 14 12 - 20      GFR est AA 39 (L) >60 ml/min/1.73m2    GFR est non-AA 32 (L) >60 ml/min/1.73m2    Calcium 8.0 (L) 8.5 - 10.1 MG/DL    Bilirubin, total 0.5 0.2 - 1.0 MG/DL    ALT (SGPT) 57 12 - 78 U/L    AST (SGOT) 88 (H) 15 - 37 U/L    Alk. phosphatase 162 (H) 45 - 117 U/L    Protein, total 6.1 (L) 6.4 - 8.2 g/dL    Albumin 1.6 (L) 3.5 - 5.0 g/dL    Globulin 4.5 (H) 2.0 - 4.0 g/dL    A-G Ratio 0.4 (L) 1.1 - 2.2     CBC W/O DIFF    Collection Time: 03/07/17  3:15 AM   Result Value Ref Range    WBC 12.0 (H) 3.6 - 11.0 K/uL    RBC 2.92 (L) 3.80 - 5.20 M/uL    HGB 8.4 (L) 11.5 - 16.0 g/dL    HCT 25.0 (L) 35.0 - 47.0 %    MCV 85.6 80.0 - 99.0 FL    MCH 28.8 26.0 - 34.0 PG    MCHC 33.6 30.0 - 36.5 g/dL    RDW 14.8 (H) 11.5 - 14.5 %    PLATELET 000 531 - 337 K/uL   BLOOD GAS, ARTERIAL    Collection Time: 03/07/17  5:52 AM   Result Value Ref Range    pH 7.42 7.35 - 7.45      PCO2 35 35.0 - 45.0 mmHg    PO2 68 (L) 80 - 100 mmHg    O2 SAT 94 92 - 97 %    BICARBONATE 22 22 - 26 mmol/L    BASE DEFICIT 1.4 mmol/L    O2 METHOD VENTILATOR      FIO2 50 %    MODE A/C      Tidal volume 500      SET RATE 125      EPAP/CPAP/PEEP 5.0      Sample source ARTERIAL      SITE DRAWN FROM ARTERIAL LINE      LEIGH ANN'S TEST N/A         Telemetry: Tachy-hussein    Assessment:     Active Problems:    HTN (hypertension) ()      Aneurysm ([facility]) (2/23/2017)      SVT (supraventricular tachycardia) (3/3/2017)      SSS (sick sinus syndrome) ([facility]) (3/6/2017)      Paroxysmal atrial fibrillation ([facility] [address] (3/6/2017)        Plan:     1. Tachy-hussein, PAF: EP evaluation noted. Monitor. Had block earlier today. Hold cardizem for now. Normal LVEF.   2. On IV cardene for BP management. 3. S/p aortic dissection endovascular repair. 4. VDRF. Re intubated yesterday.

## 2017-03-07 NOTE — PROGRESS NOTES
Agree with  Aranza Mccann. Required reintubation. Sedated. I am still not sure she is ok from a neurological standpoint, centrally or peripherally. Continue support.

## 2017-03-07 NOTE — PROGRESS NOTES
Nutrition Assessment:    RECOMMENDATIONS:   Initiate trophic TF today:   Start Osmolite 1.2 @ 10mL/h + 75mL H2O flush q 4h     If pt tolerates and has a BM, would advance tomorrow 10mL q 8h as tolerated to Goal Rate of 45mL/h + 1 packet Proscource (liquid protein) once daily + 75mL H2O flush q 4h (provides 1356kcals/75gPro/1336mL)     ASSESSMENT:   Chart reviewed, case discussed during CCU rounds. Pt was extubated yesterday and reintubated. She is sedated on propofol @ 16.5mL/h which provides 436 kcals daily. NGT just replaced, awaiting placement confirmation. She continues with constipation, no BM since 2/20 and hypoactive BS. Plan is to start trickle feeds today and if she tolerates them, will advance rate tomorrow. Labs reviewed. K+ 3.2, being repleted. -180. She is on miralax. Dietitians Intervention(s)/Plan(s): Start trophic feeds, monitor for BM   SUBJECTIVE/OBJECTIVE:   Pt intubated and sedated   Diet Order: NPO  % Eaten:  No data found. just placed, clamped  at   flush with       via NG Tube   Residuals:      Pertinent Medications:protonix, KCl, miralax; IVF(D10, [email or web address]); Drips: propofol. Chemistries:  Lab Results   Component Value Date/Time    Sodium 143 03/07/2017 03:15 AM    Potassium 3.2 03/07/2017 03:15 AM    Chloride 106 03/07/2017 03:15 AM    CO2 24 03/07/2017 03:15 AM    Anion gap 13 03/07/2017 03:15 AM    Glucose 117 03/07/2017 03:15 AM    BUN 23 03/07/2017 03:15 AM    Creatinine 1.61 03/07/2017 03:15 AM    BUN/Creatinine ratio 14 03/07/2017 03:15 AM    GFR est AA 39 03/07/2017 03:15 AM    GFR est non-AA 32 03/07/2017 03:15 AM    Calcium 8.0 03/07/2017 03:15 AM    Albumin 1.6 03/07/2017 03:15 AM      Anthropometrics: Height: 5' 5.98\" (167.6 cm) Weight: 91.9 kg (202 lb 9.6 oz)    IBW (%IBW): 59.1 kg (130 lb 4.7 oz) ( ) UBW (%UBW): 68.2 kg (150 lb 5.7 oz) (  %)    BMI: Body mass index is 32.72 kg/(m^2).     This BMI is indicative of:  []Underweight   []Normal []Overweight   [x] Obesity   [] Extreme Obesity (BMI>40)  Estimated Nutrition Needs (Based on): 1770 Kcals/day (PSU (MSJ 1491)) , 75 g (1.1gPro/kg ABW) Protein  Carbohydrate: At Least 130 g/day  Fluids: 1800 mL/day    Last BM: 2/20   []Active     []Hyperactive  [x]Hypoactive       [] Absent   BS  Skin:    [] Intact   [x] Incision  [] Breakdown   [] DTI   [] Tears/Excoriation/Abrasion  [x]Edema(+1-generalized; +1 trace-BLE; +2 weeping-BUE) [] Other: Wt Readings from Last 30 Encounters:   03/05/17 91.9 kg (202 lb 9.6 oz)   02/23/17 78 kg (171 lb 15.3 oz)   02/21/17 77.2 kg (170 lb 4 oz)   01/24/17 77.7 kg (171 lb 4 oz)   08/24/16 75.5 kg (166 lb 8 oz)   06/27/16 79.2 kg (174 lb 9.6 oz)   06/26/16 78 kg (172 lb)   05/31/16 77.3 kg (170 lb 8 oz)   04/15/16 77.6 kg (171 lb)   04/12/16 77.8 kg (171 lb 9.6 oz)   02/22/16 82.1 kg (181 lb)   12/21/15 78.8 kg (173 lb 12.8 oz)   11/09/15 80.3 kg (177 lb)   10/09/15 80.3 kg (177 lb)   09/16/15 79.5 kg (175 lb 4.8 oz)   07/10/15 80.3 kg (177 lb)   07/08/15 80.6 kg (177 lb 9.6 oz)   07/01/15 79.8 kg (176 lb)   06/29/15 79.7 kg (175 lb 9.6 oz)   06/02/15 80.3 kg (177 lb)   05/07/15 80.6 kg (177 lb 9.6 oz)   05/06/15 79.8 kg (176 lb)   05/02/15 79.4 kg (175 lb)   04/21/15 80.3 kg (177 lb)   03/13/15 77.1 kg (170 lb)   02/02/15 77.3 kg (170 lb 6.4 oz)   12/05/14 77.9 kg (171 lb 12.8 oz)   11/28/14 76.7 kg (169 lb)   11/10/14 78.5 kg (173 lb)   10/24/14 76.7 kg (169 lb 3.2 oz)      NUTRITION DIAGNOSES:   Problem:  Inadequate protein-energy intake      Etiology: related to pt NPO      Signs/Symptoms: as evidenced by NPO + propofol meets <25% kcal and 0% protein needs. Previous dx re: inadequate intake continues, TF on hold. NUTRITION INTERVENTIONS:    Enteral/Parenteral Nutrition: Initiate enteral nutrition                GOAL:   Pt will tolerate TF initiation with residuals <250mL and a BM in 1-3 days.      NUTRITION MONITORING AND EVALUATION   Previous Goal: Pt will tolerate TF at goal rate with residuals <250mL in 2-4 days   Previous Goal Met: N/A   Previous Recommendations Implemented: Yes   Cultural, Temple, or Ethnic Dietary Needs: None   LEARNING NEEDS (Diet, Food/Nutrient-Drug Interaction):    [x] None Identified   [] Identified and Education Provided/Documented   [] Identified and Pt declined/was not appropriate      [x] Interdisciplinary Care Plan Reviewed/Documented    [x] Participated in Discharge Planning: Unable to determine    [x] Interdisciplinary Rounds     NUTRITION RISK:    [x] High              [] Moderate           []  Low  []  Minimal/Uncompromised      Mireille Reyes, [address] [place] [address]  Pager [phone]  Weekend Pager [phone]

## 2017-03-07 NOTE — PROGRESS NOTES
0900  NGT placed per Drea Mills NP and abd xray ordered. 0945  Pt more awake than previously. Able to open eyes with eye contact and nod appropriately. No c/o pain. RASS -2  1000  Pt had about a 10 second pause and converted from a-fib to NSR 90's. 1130  Catapres patch placed. [id number] [facility] with Dr. Alejandra Snyder at bedside to update him on pt condition. Ok to pull cordis and central line and restart tube feeds. Cardene stopped  1300  Cardene restarted @ 5mg/hr. RIJ and cordis removed. TF restarted at 10ml/hr  1545  Reassessment complete. Pt nods yes to pain. 1mg dilaudid given.

## 2017-03-08 NOTE — PROGRESS NOTES
03/08/17 0638   Weaning Parameters   Spontaneous Breathing Trial Complete Yes   Resp Rate Observed 29   Ve 10.3      RSBI 82

## 2017-03-08 NOTE — PROGRESS NOTES
3/8/2017 4:50 PM    Admit Date: 2/23/2017    Admit Diagnosis: Aneurysm (HCC);AORTIC DISECTION    Subjective:     Not much change.      Visit Vitals    /47    Pulse 100    Temp 98.7 °F (37.1 °C)    Resp 20    Ht 5' 5.98\" (1.676 m)    Wt 202 lb 9.6 oz (91.9 kg)    SpO2 99%    Breastfeeding No    BMI 32.72 kg/m2     Current Facility-Administered Medications   Medication Dose Route Frequency    enoxaparin (LOVENOX) injection 40 mg  40 mg SubCUTAneous Q24H    cloNIDine (CATAPRES) 0.2 mg/24 hr patch 1 Patch  1 Patch TransDERmal Q7D    albuterol (PROVENTIL VENTOLIN) nebulizer solution 2.5 mg  2.5 mg Nebulization Q2H PRN    ipratropium (ATROVENT) 0.02 % nebulizer solution 0.5 mg  0.5 mg Nebulization QID RT    propofol (DIPRIVAN) infusion  5-50 mcg/kg/min IntraVENous TITRATE    niCARdipine (CARDENE) 50 mg in 0.9% sodium chloride 250 mL infusion  0-15 mg/hr IntraVENous TITRATE    HYDROmorphone (PF) (DILAUDID) injection 1 mg  1 mg IntraVENous Q3H PRN    polyethylene glycol (MIRALAX) packet 17 g  17 g Oral DAILY    sodium chloride 0.45 % in dextrose 10% 1,019.6 mL infusion   IntraVENous CONTINUOUS    pantoprazole (PROTONIX) 40 mg in sodium chloride 0.9 % 10 mL injection  40 mg IntraVENous DAILY    sodium chloride (NS) flush 5-10 mL  5-10 mL IntraVENous Q8H    sodium chloride (NS) flush 5-10 mL  5-10 mL IntraVENous PRN    chlorhexidine (PERIDEX) 0.12 % mouthwash 15 mL  15 mL Oral Q12H    prochlorperazine (COMPAZINE) with saline injection 5 mg  5 mg IntraVENous Q4H PRN    0.9% sodium chloride infusion 250 mL  250 mL IntraVENous PRN    sodium chloride (NS) flush 10 mL  10 mL InterCATHeter Q24H    sodium chloride (NS) flush 10-40 mL  10-40 mL InterCATHeter Q8H    acetaminophen (TYLENOL) tablet 650 mg  650 mg Oral Q4H PRN    naloxone (NARCAN) injection 0.4 mg  0.4 mg IntraVENous PRN    ondansetron (ZOFRAN) injection 4 mg  4 mg IntraVENous Q4H PRN    bisacodyl (DULCOLAX) suppository 10 mg  10 mg Rectal DAILY PRN         Objective:      Visit Vitals    /47    Pulse 100    Temp 98.7 °F (37.1 °C)    Resp 20    Ht 5' 5.98\" (1.676 m)    Wt 202 lb 9.6 oz (91.9 kg)    SpO2 99%    Breastfeeding No    BMI 32.72 kg/m2       Physical Exam:  Abdomen: soft, non-tender. Bowel sounds normal.   Extremities: no cyanosis or edema  Heart: regular rate and rhythm, S1, S2 normal, no murmur, click, rub or gallop  Lungs: bilateral air entry, on vent  Neurologic: sedated    Data Review:   Labs:    Recent Results (from the past 24 hour(s))   GLUCOSE, POC    Collection Time: 03/07/17  6:10 PM   Result Value Ref Range    Glucose (POC) 113 (H) 65 - 100 mg/dL    Performed by Joanne Latham    METABOLIC PANEL, COMPREHENSIVE    Collection Time: 03/08/17  3:04 AM   Result Value Ref Range    Sodium 140 136 - 145 mmol/L    Potassium 3.4 (L) 3.5 - 5.1 mmol/L    Chloride 106 97 - 108 mmol/L    CO2 24 21 - 32 mmol/L    Anion gap 10 5 - 15 mmol/L    Glucose 120 (H) 65 - 100 mg/dL    BUN 23 (H) 6 - 20 MG/DL    Creatinine 1.76 (H) 0.55 - 1.02 MG/DL    BUN/Creatinine ratio 13 12 - 20      GFR est AA 35 (L) >60 ml/min/1.73m2    GFR est non-AA 29 (L) >60 ml/min/1.73m2    Calcium 8.6 8.5 - 10.1 MG/DL    Bilirubin, total 0.5 0.2 - 1.0 MG/DL    ALT (SGPT) 42 12 - 78 U/L    AST (SGOT) 64 (H) 15 - 37 U/L    Alk.  phosphatase 162 (H) 45 - 117 U/L    Protein, total 5.8 (L) 6.4 - 8.2 g/dL    Albumin 1.6 (L) 3.5 - 5.0 g/dL    Globulin 4.2 (H) 2.0 - 4.0 g/dL    A-G Ratio 0.4 (L) 1.1 - 2.2     CBC W/O DIFF    Collection Time: 03/08/17  3:04 AM   Result Value Ref Range    WBC 10.8 3.6 - 11.0 K/uL    RBC 2.95 (L) 3.80 - 5.20 M/uL    HGB 8.0 (L) 11.5 - 16.0 g/dL    HCT 25.4 (L) 35.0 - 47.0 %    MCV 86.1 80.0 - 99.0 FL    MCH 27.1 26.0 - 34.0 PG    MCHC 31.5 30.0 - 36.5 g/dL    RDW 15.0 (H) 11.5 - 14.5 %    PLATELET 331 341 - 568 K/uL   BLOOD GAS, ARTERIAL    Collection Time: 03/08/17  5:58 AM   Result Value Ref Range    pH 7.43 7.35 - 7.45      PCO2 35 35.0 - 45.0 mmHg    PO2 66 (L) 80 - 100 mmHg    O2 SAT 94 92 - 97 %    BICARBONATE 23 22 - 26 mmol/L    BASE DEFICIT 0.6 mmol/L    O2 METHOD VENTILATOR      FIO2 50 %    MODE A/C      Tidal volume 500      SET RATE 12      EPAP/CPAP/PEEP 5.0      Sample source ARTERIAL      SITE DRAWN FROM ARTERIAL LINE      LEIGH ANN'S TEST N/A     GLUCOSE, POC    Collection Time: 03/08/17  9:50 AM   Result Value Ref Range    Glucose (POC) 141 (H) 65 - 100 mg/dL    Performed by Spike Sanches, POC    Collection Time: 03/08/17  1:01 PM   Result Value Ref Range    Glucose (POC) 140 (H) 65 - 100 mg/dL    Performed by Estelle Campbell        Telemetry: tachy-hussein      Assessment:     Active Problems:    HTN (hypertension) ()      Aneurysm ([facility] [address] (2/23/2017)      SVT (supraventricular tachycardia) (3/3/2017)      SSS (sick sinus syndrome) ([facility]) (3/6/2017)      Paroxysmal atrial fibrillation ([facility] [address] (3/6/2017)        Plan:     1. Tachy-hussein, PAF: monitor. 2. On IV cardene for BP management. 3. S/p aortic dissection endovascular repair. 4. VDRF.

## 2017-03-08 NOTE — PROGRESS NOTES
[facility]  Pulmonary, Critical Care, and Sleep Medicine    Name: Marcelino Brink MRN: [id number]   : 1955 Hospital: [address]   Date: 3/8/2017            IMPRESSION:   · Acute respiratory failure with hypoxia: on NRB prior to OR. On arrival to ICU pt was not maintaing Vt. Placed on ACV, Vt of 500 but only was getting Vt of 100s. Placed pt on PCV with pressure of PCV of 24, That generated tidal volumes in mid 300s. Ready for SBT; not dropping sats with movement. · Bilateral pleural effusions; may influence respiratory efforts if they get any larger; if still present after diuresis, will ask IR to image and possible place pigtail on largest side  · Hypoglycemia better on TF  · Malnutrition  · COPD noted to be moderate severity. 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 CC time with pt eop procedures      PLAN:   · CCU monitoring  · Vent support, failed extubation 3/6, tolerating SBT again today  · Hold diuresis  · Tube feedings  · Jet nebs. · Transfuse prn Hgb less than 7  · Replete K again today  · Antihypertensives, cardene drip  · PUD/DVt prophylaxis  · Sedation as needed       Subjective/History:     Failed extubation 3/6    Now intubated, awake, tolerating SBT     The patient is critically ill and can not provide additional history due to Ventilated.             Current Facility-Administered Medications Medication Dose Route Frequency    enoxaparin (LOVENOX) injection 40 mg  40 mg SubCUTAneous Q24H    cloNIDine (CATAPRES) 0.2 mg/24 hr patch 1 Patch  1 Patch TransDERmal Q7D    ipratropium (ATROVENT) 0.02 % nebulizer solution 0.5 mg  0.5 mg Nebulization QID RT    propofol (DIPRIVAN) infusion  5-50 mcg/kg/min IntraVENous TITRATE    niCARdipine (CARDENE) 50 mg in 0.9% sodium chloride 250 mL infusion  0-15 mg/hr IntraVENous TITRATE    polyethylene glycol (MIRALAX) packet 17 g  17 g Oral DAILY    sodium chloride 0.45 % in dextrose 10% 1,019.6 mL infusion   IntraVENous CONTINUOUS    pantoprazole (PROTONIX) 40 mg in sodium chloride 0.9 % 10 mL injection  40 mg IntraVENous DAILY    sodium chloride (NS) flush 5-10 mL  5-10 mL IntraVENous Q8H    chlorhexidine (PERIDEX) 0.12 % mouthwash 15 mL  15 mL Oral Q12H    sodium chloride (NS) flush 10 mL  10 mL InterCATHeter Q24H    sodium chloride (NS) flush 10-40 mL  10-40 mL InterCATHeter Q8H     Review of Systems:  Review of systems not obtained due to patient factors. Objective:   Vital Signs:    Visit Vitals    /48 (BP 1 Location: Left arm, BP Patient Position: At rest)    Pulse (!) 104    Temp 100.2 °F (37.9 °C)    Resp 29    Ht 5' 5.98\" (1.676 m)    Wt 91.9 kg (202 lb 9.6 oz)    SpO2 95%    Breastfeeding No    BMI 32.72 kg/m2       O2 Device: Endotracheal tube   O2 Flow Rate (L/min): 4 l/min   Temp (24hrs), Av.1 °F (37.3 °C), Min:98.3 °F (36.8 °C), Max:100.2 °F (37.9 °C)       Intake/Output:   Last shift:         Last 3 shifts:  1901 -  0700  In: 4844.9 [I.V.:4359.9]  Out: 1172 [Urine:3815]    Intake/Output Summary (Last 24 hours) at 17 0749  Last data filed at 17 0718   Gross per 24 hour   Intake          4428. 85 ml   Output             2765 ml   Net          1663.85 ml       Ventilator Settings:  Mode Rate Tidal Volume Pressure FiO2 PEEP   Spontaneous   500 ml  5 cm H2O 50 % 5 cm H20     Peak airway pressure: 11 cm H2O Minute ventilation: 12 l/min      Physical Exam:    General:  Intubated, no distress, appears stated age. Head:  Normocephalic, without obvious abnormality, atraumatic. Eyes:  Conjunctivae/corneas clear. PERRL, EOMs intact. Nose: Nares normal. Septum midline. Mucosa normal. No drainage or sinus tenderness. Throat: Lips, mucosa, and tongue normal. Teeth and gums normal. Has a white coating over mouth. Neck: Supple, symmetrical, trachea midline, no adenopathy, thyroid: no enlargment/tenderness/nodules, no carotid bruit and no JVD. Back:   Symmetric, no curvature. ROM normal.   Lungs:   Decreased BS bilaterally, has bilateral rhonchi. Chest wall:  No tenderness or deformity. Heart:  Regular rate and rhythm, S1, S2 normal, no murmur, click, rub or gallop. Abdomen:   Obese;  bowel sounds are decreased,  No masses,  No organomegaly. Extremities: Extremities normal, atraumatic, no cyanosis or edema. Warm. Pulses: 2+ and symmetric all extremities. Skin: Skin color, texture, turgor normal. No rashes or lesions   Lymph nodes: Cervical, supraclavicular, and axillary nodes normal.   Neurologic: Not able to fully assess. Pt is post op, effects of anesthesia in place.         Data:     Recent Results (from the past 24 hour(s))   GLUCOSE, POC    Collection Time: 03/07/17  1:30 PM   Result Value Ref Range    Glucose (POC) 120 (H) 65 - 100 mg/dL    Performed by Serge Renner    GLUCOSE, POC    Collection Time: 03/07/17  6:10 PM   Result Value Ref Range    Glucose (POC) 113 (H) 65 - 100 mg/dL    Performed by Spike Austin COMPREHENSIVE    Collection Time: 03/08/17  3:04 AM   Result Value Ref Range    Sodium 140 136 - 145 mmol/L    Potassium 3.4 (L) 3.5 - 5.1 mmol/L    Chloride 106 97 - 108 mmol/L    CO2 24 21 - 32 mmol/L    Anion gap 10 5 - 15 mmol/L    Glucose 120 (H) 65 - 100 mg/dL    BUN 23 (H) 6 - 20 MG/DL    Creatinine 1.76 (H) 0.55 - 1.02 MG/DL    BUN/Creatinine ratio 13 12 - 20 GFR est AA 35 (L) >60 ml/min/1.73m2    GFR est non-AA 29 (L) >60 ml/min/1.73m2    Calcium 8.6 8.5 - 10.1 MG/DL    Bilirubin, total 0.5 0.2 - 1.0 MG/DL    ALT (SGPT) 42 12 - 78 U/L    AST (SGOT) 64 (H) 15 - 37 U/L    Alk.  phosphatase 162 (H) 45 - 117 U/L    Protein, total 5.8 (L) 6.4 - 8.2 g/dL    Albumin 1.6 (L) 3.5 - 5.0 g/dL    Globulin 4.2 (H) 2.0 - 4.0 g/dL    A-G Ratio 0.4 (L) 1.1 - 2.2     CBC W/O DIFF    Collection Time: 03/08/17  3:04 AM   Result Value Ref Range    WBC 10.8 3.6 - 11.0 K/uL    RBC 2.95 (L) 3.80 - 5.20 M/uL    HGB 8.0 (L) 11.5 - 16.0 g/dL    HCT 25.4 (L) 35.0 - 47.0 %    MCV 86.1 80.0 - 99.0 FL    MCH 27.1 26.0 - 34.0 PG    MCHC 31.5 30.0 - 36.5 g/dL    RDW 15.0 (H) 11.5 - 14.5 %    PLATELET 263 757 - 792 K/uL   BLOOD GAS, ARTERIAL    Collection Time: 03/08/17  5:58 AM   Result Value Ref Range    pH 7.43 7.35 - 7.45      PCO2 35 35.0 - 45.0 mmHg    PO2 66 (L) 80 - 100 mmHg    O2 SAT 94 92 - 97 %    BICARBONATE 23 22 - 26 mmol/L    BASE DEFICIT 0.6 mmol/L    O2 METHOD VENTILATOR      FIO2 50 %    MODE A/C      Tidal volume 500      SET RATE 12      EPAP/CPAP/PEEP 5.0      Sample source ARTERIAL      SITE DRAWN FROM ARTERIAL LINE      LEIGH ANN'S TEST N/A               Telemetry:normal sinus rhythm    Imaging:  I have personally reviewed the patients radiographs and have reviewed the reports:  CXR: no acute changes        Total critical care time exclusive of procedures:     Sary Moore MD

## 2017-03-08 NOTE — PROGRESS NOTES
0700[facility]  08[facility]-Shift assessment complete, pt on SBT, tachypneic, tachycardia, and anxious, per Dr. Mcginnis Distance no extubation today, restarted propofol 20mcg/kg/hr, cardene continued 11mg, D10 1/2 NS 50mL/hr, tube feed restarted, pupils equal and reactive to light, pt able to blink and stick tongue out on command, spontaneous movement of upper extremities, no movement to stimulus bilateral lower extremities, sensation intact. 0838-Cardene titrated 8mg/hr, gave PRN Dilaudid 1mg.  0910-Updated family on plan of care, pt resting comfortably. 1100-Tube feed increased Osmolite 1.2 20mL/hr per order. 1200-Reassessment completed, no changes. Report given to [facility].

## 2017-03-08 NOTE — PROGRESS NOTES
Nutrition:  Chart reviewed, case discussed during CCU rounds. Pt tolerating trophic feeds well, per Pulmonology, okay to advance. Propofol running at 11mL/h which provides 290 kcals daily. Noted no BM yet, notified Dr. Corey Uribe, she is already on miralax. Will wait one more day to increase bowel regimen. Increase TF rate 10mL q 12h as tolerated to Goal Rate of 45mL/h + Proscource 1 packet (liquids protein) daily + 75mL H2O flush q 6h (provides 1356kcals/75gPro/1186mL)    Will monitor tolerance. Thank you!     Virginia Boyd RD  Pager [phone]

## 2017-03-08 NOTE — PROGRESS NOTES
Vascular Surgery ICU Progress Note    Admit Date: 2017  POD:  9 Day Post-Op    Procedure:  Procedure(s):  ENDOVASCULAR ANEURYSM REPAIR of THORACIC AORTIC DISSECTION, repair of ruptured left iliac artery with stent placement. Left ileo-femoral bypass graft with PTFE. LEFT CAROTID SUBCLAVIAN BYPASS with stent placement of left common artery. Subjective:   Pt remains sedated/intubated, failed extubation attempt on 3/6. Remains on cardene, cont w/ episodes of a-fib, pauses      Objective:   Vitals:  Blood pressure 160/73, pulse (!) 113, temperature (P) 100.2 °F (37.9 °C), resp. rate (!) 36, height 5' 5.98\" (1.676 m), weight 91.9 kg (202 lb 9.6 oz), SpO2 95 %, not currently breastfeeding.   Temp (24hrs), Av.3 °F (37.4 °C), Min:98.6 °F (37 °C), Max:100.2 °F (37.9 °C)    Ventilator:  Ventilator Volumes  Vt Set (ml): 500 ml (17)  Vt Exhaled (Machine Breath) (ml): 603 ml (17 0442)  Vt Spont (ml): 492 ml (17)  Ve Observed (l/min): 12 l/min (17 07)    Oxygen Therapy:  Oxygen Therapy  O2 Sat (%): 95 % (17)  Pulse via Oximetry: 112 beats per minute (17)  O2 Device: (P) Endotracheal tube;Ventilator (17)  O2 Flow Rate (L/min): 4 l/min (17 1016)  O2 Temperature: 96.4 °F (35.8 °C) (17 1428)  FIO2 (%): 50 % (17)    Admission Weight: Last Weight   Weight: 81.9 kg (180 lb 8.9 oz) Weight: 91.9 kg (202 lb 9.6 oz)     Intake / Output / Drain:  Current Shift: 701 - 0  In: 190 [I.V.:105]  Out: -   Last 24 hrs.:     Intake/Output Summary (Last 24 hours) at 17 0833  Last data filed at 17 0800   Gross per 24 hour   Intake          3289.35 ml   Output             2725 ml   Net           564.35 ml       EXAM:  General:  In no apparent distress                                                                                            Lungs:   Coarse bilat   Incision/Access Sites:  No erythema, drainage or swelling. Heart:  Regular rate and rhythm-sinus tach in 100's, S1, S2 normal, no murmur, click, rub or gallop. Abdomen:   Less firm, non-tender. Bowel sounds diminished. No masses,  No organomegaly. Extremities:  Generalized edema-mostly in upper extremities, palpable pedal pulses bilat   Neurologic:  Unable to assess, pt sedated. Pupils round/reactive, moving upper extremities but not lower. Follows simple commands      Labs:   Recent Labs      17   0304  17   1810   WBC  10.8   --    HGB  8.0*   --    HCT  25.4*   --    PLT  325   --    NA  140   --    K  3.4*   --    BUN  23*   --    CREA  1.76*   --    GLU  120*   --    GLUCPOC   --   113*        Assessment:     Active Problems:    HTN (hypertension) ()      Aneurysm ([facility]) (2017)      SVT (supraventricular tachycardia) (3/3/2017)      SSS (sick sinus syndrome) ([facility]) (3/6/2017)      Paroxysmal atrial fibrillation ([facility] [address] (3/6/2017)         Plan/Recommendations/Medical Decision Makin. S/p TEVAR, L ileo-fem bypass, L carotid-subclavian bypass:  Sites stable. Control BP. Not moving lower extremities which is concerning-unable to perform full neuro assessment w/ pt sedated/intubated   2. Volume overload/bilat effusions: overall edema improving, holding diuretics today due to increasing Cr. Xray w/ bibasilar atelectasis, effusions smaller   3. Acute resp failure: on NRB prior to surgery, hx of COPD-cont nebs. Failed extubation 3/6, vent weaning per pulm-CPAP trials today  4. HTN: esmolol stopped due to bradycardia/hypotension. Cont cardene gtt  5. Tachy-hussein, PAF: cards following, EP consulted but nothing to offer at this time-may consider pacemaker once extubated   6. JEANA: BUN/Cr cont to rise, monitor and avoid nephrotoxins. Holding diuresis  7. Elevated LFTs: trending down, monitor  8. Hypokalemia: replete per orders  9. Nutrition: OG tube inserted, increasing bowel sounds, tolerating TF's at low rate   10.  GI/DVT prophylaxis: protonix, SCD's, lovenox   11.  Dispo: to remain in ICU for now    Signed By: Veda Mayen, NP

## 2017-03-08 NOTE — PROGRESS NOTES
Awake/alert. Neuro seems in tact. Hemodynamics good. Discussed with Dr Mana Martinez. Agree with another day rest on ventillator.     Hopefully extubate in am.

## 2017-03-09 NOTE — PROGRESS NOTES
Attended Interdisciplinary rounds in Critical Care Unit, where patient care was discussed. Visit by: Macy Anderson. Suzanne Ash.  Jesus Marshall MA, [address]

## 2017-03-09 NOTE — PROGRESS NOTES
Nutrition Assessment:    RECOMMENDATIONS:   Continue advancing TF to ordered goal rate   Increased bowel regimen (no BM since 2/20)     ASSESSMENT:   Chart reviewed, case discussed during CCU rounds. Pt remains intubated and sedated on propofol @ 5.5mL/h which provides 145 kcals daily. Needs re-estimated. TF tolerated well at 40mL/h thus far, goal will be reached later today. Noted still no BM, discussed with Pulmonology who ordered sorbitol 1 time in addition to miralax. K+ 3.4, being repleted. Will monitor tolerance of TF at goal rate. Dietitians Intervention(s)/Plan(s): Continue TF, bowel regimen   SUBJECTIVE/OBJECTIVE:   Pt intubated and sedated   Diet Order: NPO, Other (comment) (TF via NGT: Osm 1.2 @ 45mL/h + prosource once daily + 75mL flush q 6h (provides 1356kcals/75gPro/1186mL))  % Eaten:  No data found. Osmolite 1.2 at 40 mL/hr flush with 75 mL  Q6H  via NG Tube   Residuals: 80 mL    Pertinent Medications:protonix, KCl, miralax, sorbitol; [email or web address]); Drips: propofol. Chemistries:  Lab Results   Component Value Date/Time    Sodium 141 03/09/2017 04:31 AM    Potassium 3.4 03/09/2017 04:31 AM    Chloride 108 03/09/2017 04:31 AM    CO2 22 03/09/2017 04:31 AM    Anion gap 11 03/09/2017 04:31 AM    Glucose 144 03/09/2017 04:31 AM    BUN 26 03/09/2017 04:31 AM    Creatinine 1.96 03/09/2017 04:31 AM    BUN/Creatinine ratio 13 03/09/2017 04:31 AM    GFR est AA 31 03/09/2017 04:31 AM    GFR est non-AA 26 03/09/2017 04:31 AM    Calcium 8.1 03/09/2017 04:31 AM    Albumin 1.6 03/09/2017 04:31 AM      Anthropometrics: Height: 5' 5.98\" (167.6 cm) Weight: 91.9 kg (202 lb 9.6 oz)    IBW (%IBW): 59.1 kg (130 lb 4.7 oz) ( ) UBW (%UBW): 68.2 kg (150 lb 5.7 oz) (  %)    BMI: Body mass index is 32.72 kg/(m^2).     This BMI is indicative of:  []Underweight   []Normal   []Overweight   [x] Obesity   [] Extreme Obesity (BMI>40)  Estimated Nutrition Needs (Based on): 1840 Kcals/day (PSU (MSJ 7195)) , 75 g (1.1gPro/kg ABW) Protein  Carbohydrate: At Least 130 g/day  Fluids: 1500 mL/day or per MD     Last BM: 2/20   [x]Active     []Hyperactive  []Hypoactive       [] Absent   BS  Skin:    [] Intact   [x] Incision  [] Breakdown   [] DTI   [] Tears/Excoriation/Abrasion  [x]Edema(+2-generalized; +1 pitting-BLE; +2 pitting-BUE) [] Other: Wt Readings from Last 30 Encounters:   03/05/17 91.9 kg (202 lb 9.6 oz)   02/23/17 78 kg (171 lb 15.3 oz)   02/21/17 77.2 kg (170 lb 4 oz)   01/24/17 77.7 kg (171 lb 4 oz)   08/24/16 75.5 kg (166 lb 8 oz)   06/27/16 79.2 kg (174 lb 9.6 oz)   06/26/16 78 kg (172 lb)   05/31/16 77.3 kg (170 lb 8 oz)   04/15/16 77.6 kg (171 lb)   04/12/16 77.8 kg (171 lb 9.6 oz)   02/22/16 82.1 kg (181 lb)   12/21/15 78.8 kg (173 lb 12.8 oz)   11/09/15 80.3 kg (177 lb)   10/09/15 80.3 kg (177 lb)   09/16/15 79.5 kg (175 lb 4.8 oz)   07/10/15 80.3 kg (177 lb)   07/08/15 80.6 kg (177 lb 9.6 oz)   07/01/15 79.8 kg (176 lb)   06/29/15 79.7 kg (175 lb 9.6 oz)   06/02/15 80.3 kg (177 lb)   05/07/15 80.6 kg (177 lb 9.6 oz)   05/06/15 79.8 kg (176 lb)   05/02/15 79.4 kg (175 lb)   04/21/15 80.3 kg (177 lb)   03/13/15 77.1 kg (170 lb)   02/02/15 77.3 kg (170 lb 6.4 oz)   12/05/14 77.9 kg (171 lb 12.8 oz)   11/28/14 76.7 kg (169 lb)   11/10/14 78.5 kg (173 lb)   10/24/14 76.7 kg (169 lb 3.2 oz)      NUTRITION DIAGNOSES:   Problem:  Inadequate protein-energy intake      Etiology: related to pt NPO      Signs/Symptoms: as evidenced by NPO + propofol meets <25% kcal and 0% protein needs. Previous dx re: inadequate intake resolving, TF advancing towards goal rate. NUTRITION INTERVENTIONS:    Enteral/Parenteral Nutrition: Other (continue advancing TF to goal rate as ordered)                GOAL:   Pt will tolerate TF at goal rate with a BM in 2-4 days.      NUTRITION MONITORING AND EVALUATION   Previous Goal: Pt will tolerate TF initiation with residuals <250mL and a BM in 1-3 days   Previous Goal Met: Progressing (TF tolerated well with minimal residuals, still no BM)   Previous Recommendations Implemented: Yes   Cultural, Restorationist, or Ethnic Dietary Needs: None   LEARNING NEEDS (Diet, Food/Nutrient-Drug Interaction):    [x] None Identified   [] Identified and Education Provided/Documented   [] Identified and Pt declined/was not appropriate      [x] Interdisciplinary Care Plan Reviewed/Documented    [x] Participated in Discharge Planning: Unable to determine    [x] Interdisciplinary Rounds     NUTRITION RISK:    [x] High              [] Moderate           []  Low  []  Minimal/Uncompromised      Marty Garcia, [address]  Pager [phone]  Weekend Pager [phone]

## 2017-03-09 NOTE — PROGRESS NOTES
Vascular Surgery ICU Progress Note    Admit Date: 2017  POD:  10 Day Post-Op    Procedure:  Procedure(s):  ENDOVASCULAR ANEURYSM REPAIR of THORACIC AORTIC DISSECTION, repair of ruptured left iliac artery with stent placement. Left ileo-femoral bypass graft with PTFE. LEFT CAROTID SUBCLAVIAN BYPASS with stent placement of left common artery. Subjective:   Pt remains sedated/intubated, failed extubation attempt on 3/6. Now in a-fib for several hours, rate anywhere from 100-140's      Objective:   Vitals:  Blood pressure 150/80, pulse (!) 108, temperature 98.9 °F (37.2 °C), resp. rate 14, height 5' 5.98\" (1.676 m), weight 91.9 kg (202 lb 9.6 oz), SpO2 93 %, not currently breastfeeding. Temp (24hrs), Av.6 °F (37.6 °C), Min:98.7 °F (37.1 °C), Max:100.2 °F (37.9 °C)    Ventilator:  Ventilator Volumes  Vt Set (ml): 500 ml (17)  Vt Exhaled (Machine Breath) (ml): 531 ml (17)  Vt Spont (ml): 492 ml (17 0718)  Ve Observed (l/min): 6.97 l/min (17)    Oxygen Therapy:  Oxygen Therapy  O2 Sat (%): 93 % (17)  Pulse via Oximetry: 68 beats per minute (17 0700)  O2 Device: Ventilator;Endotracheal tube (17 0400)  O2 Flow Rate (L/min): 4 l/min (17 1016)  O2 Temperature: 96.4 °F (35.8 °C) (17 1428)  FIO2 (%): 50 % (17 07)    Admission Weight: Last Weight   Weight: 81.9 kg (180 lb 8.9 oz) Weight: 91.9 kg (202 lb 9.6 oz)     Intake / Output / Drain:  Current Shift:    Last 24 hrs.:     Intake/Output Summary (Last 24 hours) at 17 0820  Last data filed at 17 0700   Gross per 24 hour   Intake             3512 ml   Output             1305 ml   Net             2207 ml       EXAM:  General:  In no apparent distress                                                                                            Lungs:   Coarse bilat   Incision/Access Sites:  No erythema, drainage or swelling.     Heart:  Irregular rate and [id number], S1, S2 normal, no murmur, click, rub or gallop. Abdomen:   Less firm, non-tender. Bowel sounds diminished. No masses,  No organomegaly. Extremities:  Generalized edema-mostly in upper extremities, palpable pedal pulses bilat   Neurologic:  Unable to assess, pt sedated. Pupils round/reactive, moving upper extremities but not lower. Follows simple commands      Labs:   Recent Labs      17   0431  17   0218   WBC  9.7   --    HGB  7.8*   --    HCT  24.2*   --    PLT  338   --    NA  141   --    K  3.4*   --    BUN  26*   --    CREA  1.96*   --    GLU  144*   --    GLUCPOC   --   128*        Assessment:     Active Problems:    HTN (hypertension) ()      Aneurysm ([facility]) (2017)      SVT (supraventricular tachycardia) (3/3/2017)      SSS (sick sinus syndrome) ([facility]) (3/6/2017)      Paroxysmal atrial fibrillation ([facility] [address] (3/6/2017)         Plan/Recommendations/Medical Decision Makin. S/p TEVAR, L ileo-fem bypass, L carotid-subclavian bypass: Sites stable. Control BP. Not moving lower extremities which is concerning-unable to perform full neuro assessment w/ pt sedated/intubated   2. Volume overload/bilat effusions: overall edema improving, but xray remains wet, concern for continued diuresis due to worsening kidney function   3. Acute resp failure: on NRB prior to surgery, hx of COPD-cont nebs. Failed extubation 3/6, vent weaning per pulm-holding on further extubation attempts until HR controlled  4. HTN: esmolol stopped due to bradycardia/hypotension. Cont cardene gtt  5. Tachy-hussein, PAF: cards following, EP consulted but nothing to offer at this time-may consider pacemaker once extubated. Trying amiodarone today, keep pads on pt   6. JEANA: BUN/Cr cont to rise, monitor and avoid nephrotoxins. May need to get renal on board  7. Elevated LFTs: trending down, monitor  8. Hypokalemia: replete per orders  9. Nutrition: OG tube inserted, increasing bowel sounds, tolerating TF's at low rate   10.  GI/DVT prophylaxis: protonix, SCD's, lovenox   11.  Dispo: to remain in ICU for now, remains critically ill    Signed By: Indiana Gallardo NP

## 2017-03-09 NOTE — PROGRESS NOTES
1900 Report received from Christian ShahrJOSE.     2000 Assessment completed, VSS. Propofol @ 30mcg/kg/min, cardene @ 10mg/hr & D10 1/2 NS @ 50ml/hr. Pt not following commands at this time but is grimacing & withdrawing to pain in BUE. Osmolite 1.2 infusing @ 20ml/hr with water flushes 75ml q6h.     0000 Reassessment completed, VSS. Pt more restless, opening eyes spontaneously, grimacing & moving arms up towards ETT. PRN dilaudid given. 0330 Pt in Afib. Rate ranging from 90s-140s. BP stable. Will monitor. 0400 Reassessment completed, VSS except pt's heart rate & rhythm very labile. Will go from Afib/Aflutter to SR with bursts of SVT. Pt also had a 3.28 second pause. 0543 Pt had a 2.18 second pause & reverted to NSR for brief period, back in Afib.     0615 Propofol stopped. SAT started. 0630 SBT started. 0645 Pt tolerating SAT/SBT well. Pt calm & cooperative. Pt remains in Afib with HR ranging from 100s-160s, otherwise all other vital signs stable. Pt shakes head \"no\" when asked if she is in pain.     0700 Report given to JOSE Miguel.

## 2017-03-09 NOTE — PROGRESS NOTES
[facility]  Pulmonary, Critical Care, and Sleep Medicine    Name: Leticia Villar MRN: [id number]   : 1955 Hospital: [address]   Date: 3/9/2017            IMPRESSION:   · Acute respiratory failure with hypoxia: on NRB prior to OR. On arrival to ICU pt was not maintaing Vt. Placed on ACV, Vt of 500 but only was getting Vt of 100s. Placed pt on PCV with pressure of PCV of 24, That generated tidal volumes in mid 300s. Ready for SBT; not dropping sats with movement. · Bilateral pleural effusions; may influence respiratory efforts if they get any larger; if still present after diuresis, will ask IR to image and possible place pigtail on largest side  · Hypoglycemia better on TF  · Malnutrition  · COPD noted to be moderate severity. 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 CC time with pt eop procedures      PLAN:   · CCU monitoring  · Vent support, failed extubation 3/6, tolerating SBT again today but back on afib with rapid HR, eva hold on extubation for now  · IVF, albumin  · Tube feedings  · Jet nebs.    · Transfuse prn Hgb less than 7  · Replete K again today  · Antihypertensives, catapress patch, cardene drip, will add IV vasotec  · PUD/DVT prophylaxis  · Sedation as needed       Subjective/History:     Failed extubation 3/6    Now intubated, awake, tolerating SBT     The patient is critically ill and can not provide additional history due to Ventilated. Current Facility-Administered Medications   Medication Dose Route Frequency    enoxaparin (LOVENOX) injection 40 mg  40 mg SubCUTAneous Q24H    cloNIDine (CATAPRES) 0.2 mg/24 hr patch 1 Patch  1 Patch TransDERmal Q7D    ipratropium (ATROVENT) 0.02 % nebulizer solution 0.5 mg  0.5 mg Nebulization QID RT    propofol (DIPRIVAN) infusion  5-50 mcg/kg/min IntraVENous TITRATE    niCARdipine (CARDENE) 50 mg in 0.9% sodium chloride 250 mL infusion  0-15 mg/hr IntraVENous TITRATE    polyethylene glycol (MIRALAX) packet 17 g  17 g Oral DAILY    sodium chloride 0.45 % in dextrose 10% 1,019.6 mL infusion   IntraVENous CONTINUOUS    pantoprazole (PROTONIX) 40 mg in sodium chloride 0.9 % 10 mL injection  40 mg IntraVENous DAILY    sodium chloride (NS) flush 5-10 mL  5-10 mL IntraVENous Q8H    chlorhexidine (PERIDEX) 0.12 % mouthwash 15 mL  15 mL Oral Q12H    sodium chloride (NS) flush 10 mL  10 mL InterCATHeter Q24H    sodium chloride (NS) flush 10-40 mL  10-40 mL InterCATHeter Q8H     Review of Systems:  Review of systems not obtained due to patient factors.     Objective:   Vital Signs:    Visit Vitals    /80    Pulse (!) 129    Temp 98.9 °F (37.2 °C)    Resp 17    Ht 5' 5.98\" (1.676 m)    Wt 91.9 kg (202 lb 9.6 oz)    SpO2 93%    Breastfeeding No    BMI 32.72 kg/m2       O2 Device: Ventilator, Endotracheal tube   O2 Flow Rate (L/min): 4 l/min   Temp (24hrs), Av.6 °F (37.6 °C), Min:98.7 °F (37.1 °C), Max:100.2 °F (37.9 °C)       Intake/Output:   Last shift:         Last 3 shifts: 1901 - 700  In: 9027 [I.V.:4140]  Out: 3180 [Urine:3180]    Intake/Output Summary (Last 24 hours) at 17  Last data filed at 17 07   Gross per 24 hour   Intake             3702 ml   Output             1430 ml   Net             2272 ml       Ventilator Settings:  Mode Rate Tidal Volume Pressure FiO2 PEEP   Spontaneous   500 ml  5 cm H2O 50 % 5 cm H20     Peak airway pressure: 22 cm H2O    Minute ventilation: 8.9 l/min      Physical Exam:    General:  Intubated, no distress, appears stated age. Head:  Normocephalic, without obvious abnormality, atraumatic. Eyes:  Conjunctivae/corneas clear. PERRL, EOMs intact. Nose: Nares normal. Septum midline. Mucosa normal. No drainage or sinus tenderness. Throat: Lips, mucosa, and tongue normal. Teeth and gums normal. Has a white coating over mouth. Neck: Supple, symmetrical, trachea midline, no adenopathy, thyroid: no enlargment/tenderness/nodules, no carotid bruit and no JVD. Back:   Symmetric, no curvature. ROM normal.   Lungs:   Decreased BS bilaterally, has bilateral rhonchi. Chest wall:  No tenderness or deformity. Heart:  Regular rate and rhythm, S1, S2 normal, no murmur, click, rub or gallop. Abdomen:   Obese;  bowel sounds are decreased,  No masses,  No organomegaly. Extremities: Extremities normal, atraumatic, no cyanosis or edema. Warm. Pulses: 2+ and symmetric all extremities. Skin: Skin color, texture, turgor normal. No rashes or lesions   Lymph nodes: Cervical, supraclavicular, and axillary nodes normal.   Neurologic: Not able to fully assess. Pt is post op, effects of anesthesia in place.         Data:     Recent Results (from the past 24 hour(s))   GLUCOSE, POC    Collection Time: 03/08/17  9:50 AM   Result Value Ref Range    Glucose (POC) 141 (H) 65 - 100 mg/dL    Performed by [facility], POC    Collection Time: 03/08/17  1:01 PM   Result Value Ref Range    Glucose (POC) 140 (H) 65 - 100 mg/dL    Performed by [address] [place] [address], POC    Collection Time: 03/09/17  2:18 AM   Result Value Ref Range    Glucose (POC) 128 (H) 65 - 100 mg/dL    Performed by Ros Mccauleyique    METABOLIC PANEL, COMPREHENSIVE    Collection Time: 03/09/17  4:31 AM   Result Value Ref Range    Sodium 141 136 - 145 mmol/L    Potassium 3.4 (L) 3.5 - 5.1 mmol/L    Chloride 108 97 - 108 mmol/L    CO2 22 21 - 32 mmol/L    Anion gap 11 5 - 15 mmol/L    Glucose 144 (H) 65 - 100 mg/dL    BUN 26 (H) 6 - 20 MG/DL    Creatinine 1.96 (H) 0.55 - 1.02 MG/DL    BUN/Creatinine ratio 13 12 - 20      GFR est AA 31 (L) >60 ml/min/1.73m2    GFR est non-AA 26 (L) >60 ml/min/1.73m2    Calcium 8.1 (L) 8.5 - 10.1 MG/DL    Bilirubin, total 0.5 0.2 - 1.0 MG/DL    ALT (SGPT) 42 12 - 78 U/L    AST (SGOT) 58 (H) 15 - 37 U/L    Alk.  phosphatase 169 (H) 45 - 117 U/L    Protein, total 5.9 (L) 6.4 - 8.2 g/dL    Albumin 1.6 (L) 3.5 - 5.0 g/dL    Globulin 4.3 (H) 2.0 - 4.0 g/dL    A-G Ratio 0.4 (L) 1.1 - 2.2     CBC W/O DIFF    Collection Time: 03/09/17  4:31 AM   Result Value Ref Range    WBC 9.7 3.6 - 11.0 K/uL    RBC 2.81 (L) 3.80 - 5.20 M/uL    HGB 7.8 (L) 11.5 - 16.0 g/dL    HCT 24.2 (L) 35.0 - 47.0 %    MCV 86.1 80.0 - 99.0 FL    MCH 27.8 26.0 - 34.0 PG    MCHC 32.2 30.0 - 36.5 g/dL    RDW 14.9 (H) 11.5 - 14.5 %    PLATELET 810 311 - 791 K/uL             Telemetry:normal sinus rhythm    Imaging:  I have personally reviewed the patients radiographs and have reviewed the reports:  CXR: no acute changes        Total critical care time exclusive of procedures:     Marco Nolasco MD

## 2017-03-09 NOTE — PROGRESS NOTES
03/09/17 6807   Patient Observations   Pulse (Heart Rate) (!) 126   Resp Rate 18   O2 Sat (%) 94 %   Vent Settings   FIO2 (%) 50 %   Pressure Support (cm H2O) 5 cm H2O   PEEP/VENT (cm H2O) 5 cm H20   ABCDE Bundle   SBT Safety Screen Passed Yes   SBT Trial Passed Yes   Weaning Parameters   Spontaneous Breathing Trial Complete Yes   Resp Rate Observed 18   Ve 10      RSBI 33   Vent Method/Mode   Ventilation Method Conventional   Ventilator  Mode Spontaneous

## 2017-03-10 NOTE — PROGRESS NOTES
3/10/2017 9:49 AM    Admit Date: 2/23/2017    Admit Diagnosis: Aneurysm (HCC);AORTIC DISECTION    Subjective:     Tish Meyer remains vent dependant and critically ill.      Visit Vitals    /61    Pulse (!) 109    Temp 98.5 °F (36.9 °C)    Resp 21    Ht 5' 5.98\" (1.676 m)    Wt 202 lb 9.6 oz (91.9 kg)    SpO2 94%    Breastfeeding No    BMI 32.72 kg/m2     Current Facility-Administered Medications   Medication Dose Route Frequency    levalbuterol (XOPENEX) nebulizer soln 1.25 mg/3 mL  1.25 mg Nebulization QID RT    levothyroxine (SYNTHROID) injection 100 mcg  100 mcg IntraVENous ONCE    [START ON 3/13/2017] levothyroxine (SYNTHROID) injection 260 mcg  260 mcg IntraVENous EVERY MON & TH    lidocaine (XYLOCAINE) 20 mg/mL (2 %) injection 400 mg  20 mL SubCUTAneous ONCE    sodium bicarbonate (NEUT) injection 2 mL  2 mL SubCUTAneous ONCE    dilTIAZem (CARDIZEM) 125 mg in dextrose 5% 125 mL infusion  5 mg/hr IntraVENous CONTINUOUS    0.45% sodium chloride infusion  100 mL/hr IntraVENous CONTINUOUS    heparin (porcine) injection 5,000 Units  5,000 Units SubCUTAneous Q8H    cloNIDine (CATAPRES) 0.2 mg/24 hr patch 1 Patch  1 Patch TransDERmal Q7D    albuterol (PROVENTIL VENTOLIN) nebulizer solution 2.5 mg  2.5 mg Nebulization Q2H PRN    ipratropium (ATROVENT) 0.02 % nebulizer solution 0.5 mg  0.5 mg Nebulization QID RT    propofol (DIPRIVAN) infusion  5-50 mcg/kg/min IntraVENous TITRATE    HYDROmorphone (PF) (DILAUDID) injection 1 mg  1 mg IntraVENous Q3H PRN    polyethylene glycol (MIRALAX) packet 17 g  17 g Oral DAILY    pantoprazole (PROTONIX) 40 mg in sodium chloride 0.9 % 10 mL injection  40 mg IntraVENous DAILY    sodium chloride (NS) flush 5-10 mL  5-10 mL IntraVENous Q8H    sodium chloride (NS) flush 5-10 mL  5-10 mL IntraVENous PRN    chlorhexidine (PERIDEX) 0.12 % mouthwash 15 mL  15 mL Oral Q12H    prochlorperazine (COMPAZINE) with saline injection 5 mg  5 mg IntraVENous Q4H PRN    0.9% sodium chloride infusion 250 mL  250 mL IntraVENous PRN    sodium chloride (NS) flush 10 mL  10 mL InterCATHeter Q24H    sodium chloride (NS) flush 10-40 mL  10-40 mL InterCATHeter Q8H    acetaminophen (TYLENOL) tablet 650 mg  650 mg Oral Q4H PRN    naloxone (NARCAN) injection 0.4 mg  0.4 mg IntraVENous PRN    ondansetron (ZOFRAN) injection 4 mg  4 mg IntraVENous Q4H PRN    bisacodyl (DULCOLAX) suppository 10 mg  10 mg Rectal DAILY PRN         Objective:      Visit Vitals    /61    Pulse (!) 109    Temp 98.5 °F (36.9 °C)    Resp 21    Ht 5' 5.98\" (1.676 m)    Wt 202 lb 9.6 oz (91.9 kg)    SpO2 94%    Breastfeeding No    BMI 32.72 kg/m2       Physical Exam:  Abdomen: soft, non-tender. Bowel sounds normal.   Extremities: no cyanosis, 2+ edema  Heart: Irregular rate and rhythm, S1, S2 normal, no murmur, click, rub or gallop  Lungs: bilateral decreased breath sounds. Neurologic: sedated. Data Review:   Labs:    Recent Results (from the past 24 hour(s))   METABOLIC PANEL, COMPREHENSIVE    Collection Time: 03/10/17  4:55 AM   Result Value Ref Range    Sodium 138 136 - 145 mmol/L    Potassium 4.1 3.5 - 5.1 mmol/L    Chloride 104 97 - 108 mmol/L    CO2 20 (L) 21 - 32 mmol/L    Anion gap 14 5 - 15 mmol/L    Glucose 102 (H) 65 - 100 mg/dL    BUN 33 (H) 6 - 20 MG/DL    Creatinine 2.46 (H) 0.55 - 1.02 MG/DL    BUN/Creatinine ratio 13 12 - 20      GFR est AA 24 (L) >60 ml/min/1.73m2    GFR est non-AA 20 (L) >60 ml/min/1.73m2    Calcium 8.1 (L) 8.5 - 10.1 MG/DL    Bilirubin, total 0.8 0.2 - 1.0 MG/DL    ALT (SGPT) 111 (H) 12 - 78 U/L    AST (SGOT) 155 (H) 15 - 37 U/L    Alk.  phosphatase 182 (H) 45 - 117 U/L    Protein, total 6.1 (L) 6.4 - 8.2 g/dL    Albumin 2.2 (L) 3.5 - 5.0 g/dL    Globulin 3.9 2.0 - 4.0 g/dL    A-G Ratio 0.6 (L) 1.1 - 2.2     CBC W/O DIFF    Collection Time: 03/10/17  4:55 AM   Result Value Ref Range    WBC 12.3 (H) 3.6 - 11.0 K/uL    RBC 2.58 (L) 3.80 - 5.20 M/uL    HGB 7.1 (L) 11.5 - 16.0 g/dL    HCT 22.3 (L) 35.0 - 47.0 %    MCV 86.4 80.0 - 99.0 FL    MCH 27.5 26.0 - 34.0 PG    MCHC 31.8 30.0 - 36.5 g/dL    RDW 15.3 (H) 11.5 - 14.5 %    PLATELET 311 321 - 851 K/uL       Telemetry: AFIB      Assessment:     Active Problems:    HTN (hypertension) ()      Aneurysm ([facility]) (2/23/2017)      SVT (supraventricular tachycardia) (3/3/2017)      SSS (sick sinus syndrome) ([facility]) (3/6/2017)      Paroxysmal atrial fibrillation ([facility] [address] (3/6/2017)        Plan:     1. Tachy-hussein, PAF: monitor. Would recommend using IV cardizem instead of IV amio due to shorter half life. Complex situation with limited options. Ext pacer in place. 2. On IV cardene for BP management. 3. S/p aortic dissection endovascular repair. 4. VDRF. Plan for b/l CT for pleural effusion. 4. SILVESTRE: nephrology on board now.

## 2017-03-10 NOTE — PROGRESS NOTES
Physical Therapy Note    Orders acknowledged. Chart reviewed. Spoke with nursing who is requesting PT defer evaluation this date as pt with RANJEET chest tube placement this AM and has drained ~1.5 L since. Will defer PT evaluation this date and follow back tomorrow as appropriate.     Thank you,  Ruben Edmonds, PT, DPT

## 2017-03-10 NOTE — PROGRESS NOTES
Events reviewed. In addition to vanc renal insult, the 16 second asystolic episode several days ago could add to the demise. I am not convinced that she does not have an element of spinal cord injury. Nothing to be done. Will be determined     when fully awake. Continue support.

## 2017-03-10 NOTE — PROGRESS NOTES
Restraint type: Soft restraint:  right wrist and left wrist  Reason for restraints: Interference with medical treatment  Duration: 24 hours    Restraints must be removed when an alternative is available and effective and/or patient no longer meets criteria. Orders must be renewed every calendar day or when discontinued.     The MD must conduct a face to face assessment within 1 calendar day of initiation when initial restraint order is verbal.

## 2017-03-10 NOTE — PROGRESS NOTES
Attended Interdisciplinary rounds in Critical Care Unit, where patient care was discussed. Visit by: aCitlyn Mccurdy. Callum Montiel.  Shell Rasmussen MA, [address]

## 2017-03-10 NOTE — CONSULTS
Nephrology Consult Note     Fransisco Chilel     www. [facility]                    Phone - [phone]   Patient: William Anthony  YOB: 1955     Date- 3/10/2017   MRN: [id number]  Nilesh Weir NP   Age: 59 y.o. Sex: female      ADMIT DATE:2/23/2017    CONSULTATION DATE:3/10/2017                REASON FOR CONSULTATION: I have been asked to see this patient by Chandra Garcia for  Acute renal failure  ASSESSMENT:   · Acute renal failure likely due to ATN - vancomycin toxicity - vanco level - 29.2  · ckd 3 baseline cr. 1.2  · Resp. Failure - on vent  · AFIB with RVR  · Complicated type B descending thoracic aortic dissection. -   · S/p TEVAR, L ileo-fem bypass, L carotid-subclavian bypass:2/27/17  · Protein malnutrition  · Active Problems:  ·   HTN (hypertension) ()  ·   ·   Aneurysm (HCC) (2/23/2017)  ·   ·   SVT (supraventricular tachycardia) (3/3/2017)  ·   ·   SSS (sick sinus syndrome) ([facility]) (3/6/2017)  ·   ·   Paroxysmal atrial fibrillation (HCC) (3/6/2017)  ·   ·   PLAN:   · Check urine lyts  · Check renal usg  · Check bmp in am  · Use lasix PRN for decreased urine out put  · Add mg and phos in am  · Nicardipine gtt for bp control  · No indication for RRT  · Continue tube feed  · Follow vanco level    [x] High complexity decision making was performed  [x] Patient is at high-risk of decompensation with multiple organ involvement    Subjective:   HPI: William Anthony is a 59 y.o.  female. She is found to have cr. Of 2.46 with bun 33 today  Her cr. Was 1.96 on 3-9-17  She was admitted to [facility] on 2-23-17 with cr. Of 1.73  Her cr.  Improved with 1. 2 on 2-28-17  Her cr was 1.2 in august 2016  She has received iv contrast on admission FOR CTA  She has undergone type B Descending thoracic aortic dissection repair on 2-2717  SHE HAD LEFT CCA-SSA bypass 2-27-17  She had TAVER 2-27-17  She has been receiving vancomycin for > 1 week  Her vanco. Level was 29.2 on 3-6-17  She has no documented hypotension  She is on nicardipine gtt for high bp  She is intubated on vent  She has pleural effusion  She has afib with RVR  No family member at bedside  All history per current and old medical records    Past Medical Hx:   Past Medical History:   Diagnosis Date    Anxiety disorder     Arthritis     BACK, RT. KNEE and HANDS    CAD (coronary artery disease)      s/p 5 vessel CABG 2011    Carotid arterial disease (HCC)     diffuse bilateral CCA plaques    Chronic obstructive pulmonary disease ([facility] [address]     Depression with anxiety     Essential hypertension     GERD (gastroesophageal reflux disease)     rarely    GI bleed     Hypothyroid     Mesenteric artery stenosis (HCC)     Microcytic anemia     Renal artery atherosclerosis, bilateral (HCC)      RA occlusion, left s/p stent    Renal atrophy, right     SVT (supraventricular tachycardia) 3/3/2017    UC (ulcerative colitis) ([facility] [address]         Past Surgical Hx:     Past Surgical History:   Procedure Laterality Date    ESOPHAGEAL CAPSULE ENDOSCOPY  6/8/2015         HX COLONOSCOPY      HX CORONARY ARTERY BYPASS GRAFT  8/11    5 way bypass. 08/19/11    HX FEMORAL BYPASS      triple    HX LUMBAR DISKECTOMY  1997    HX UROLOGICAL      vascular stent x 2 to left kidney    SMALL BOWEL ENDOSCOPY,ABLATE LESN  3/13/2015         STRESS TEST LEXISCAN/CARDIOLITE  4/24/12    UPPER GI ENDOSCOPY,CTRL BLEED  6/2/2015            Medications:  Prior to Admission medications    Medication Sig Start Date End Date Taking? Authorizing Provider   spironolactone (ALDACTONE) 25 mg tablet Take 1 Tab by mouth daily. 1/17/17  Yes Stephens Galeazzi, NP   venlafaxine-SR [facility] 150 mg capsule Take 1 Cap by mouth two (2) times a day. 1/17/17  Yes Stephens Galeazzi, NP   clopidogrel (PLAVIX) 75 mg tab Take 1 Tab by mouth daily. 1/17/17  Yes Stephens Galeazzi, NP   fenofibrate (LOFIBRA) 54 mg tablet Take 1 Tab by mouth daily.  1/17/17  Yes Osmani Macedo Sofiya Carroll, NP   pantoprazole (PROTONIX) 40 mg tablet Take 1 Tab by mouth two (2) times a day. 1/17/17  Yes Mojgan Smith, NP   atorvastatin (LIPITOR) 40 mg tablet Take 2 Tabs by mouth daily. 1/17/17  Yes Mojgan Smith, NP   busPIRone (BUSPAR) 7.5 mg tablet TAKE 1 TABLET BY MOUTH TWO TIMES A DAY. 1/10/17  Yes Mojgan Smith, NP   metoprolol tartrate (LOPRESSOR) 25 mg tablet TAKE 1 TAB BY MOUTH TWO (2) TIMES A DAY. 12/20/16  Yes Fartun Ovalle MD   STOOL SOFTENER 100 mg capsule TK ONE C PO  BID FOR CONSTIPATION 8/2/16  Yes Historical Provider   cloNIDine HCl (CATAPRES) 0.1 mg tablet Take  by mouth two (2) times a day. Yes Historical Provider   clonazePAM (KLONOPIN) 0.5 mg tablet TAKE 1 TABLET BY MOUTH TWICE A DAY . Valaria Ding MUST LAST 30 DAYS 1/24/17   Mojgan Smith, NP   levothyroxine (SYNTHROID) 100 mcg tablet Take 1 Tab by mouth Daily (before breakfast). 1/17/17   Mojgan Smith, NP   hydrALAZINE (APRESOLINE) 25 mg tablet  8/2/16   Historical Provider   oxyCODONE-acetaminophen (PERCOCET) 5-325 mg per tablet TK 1 TO 2 TS PO Q 4 H PRN P 8/2/16   Historical Provider   amLODIPine (NORVASC) 10 mg tablet Take  by mouth ACB/HS. Historical Provider   tiotropium bromide (SPIRIVA RESPIMAT) 1.25 mcg/actuation inhaler Take 2 Puffs by inhalation daily. Historical Provider   ferrous sulfate 325 mg (65 mg iron) tablet Take 325 mg by mouth three (3) times daily (with meals). Historical Provider   potassium chloride (KLOR-CON M20) 20 mEq tablet Take 20 mEq by mouth daily as needed (for leg cramps). Historical Provider   diphenhydrAMINE (BENADRYL ALLERGY) 25 mg tablet Take 25 mg by mouth three (3) times daily as needed for Itching. Historical Provider   aspirin 81 mg chewable tablet Take 81 mg by mouth daily.     Historical Provider       Allergies   Allergen Reactions    Tussionex Itching    Codeine Itching    Nickel Other (comments)     Local reaction (redness, irritation, blistering) if wears \"cheap earrings\"    Oxycodone Itching       Social Hx:  reports that she quit smoking about 5 years ago. Her smoking use included Cigarettes. She has a 40.00 pack-year smoking history. She has quit using smokeless tobacco. She reports that she does not drink alcohol or use illicit drugs. Family History   Problem Relation Age of Onset    Post-op Nausea/Vomiting Other     Other Other      LOW BP AND DIFFICULTY BREATHING    Hypertension Mother    Juancarlos Brown Stroke Mother       age 48,    Juancarlos Brown Hypertension Brother    Juancarlos Brown Stroke Brother       age 55, smoked       Review of Systems:  A 11 point review of system was performed today. Pertinent positives and negatives are mentioned in the HPI. The reminder of the ROS is negative and noncontributory. Allergies   Allergen Reactions    Tussionex Itching    Codeine Itching    Nickel Other (comments)     Local reaction (redness, irritation, blistering) if wears \"cheap earrings\"    Oxycodone Itching      Objective:    Vitals:    Vitals:    03/10/17 0700 03/10/17 0800 03/10/17 0828 03/10/17 0900   BP: 149/81 (!) 176/93  126/61   Pulse: (!) 124 (!) 143 (!) 145 (!) 109   Resp: 20 26 25 21   Temp:  98.5 °F (36.9 °C)     SpO2: 97% 95% 95% 93%   Weight:       Height:         I&O's:   0701 - 03/10 0700  In: 4815.1 [I.V.:3185.1]  Out: 920 [Urine:920]    Physical Exam:  General:INTUBATED ON VENT  Eyes:No scleral icterus,+ conjunctival pallor  Neck: left side dressing +  ET tube +  Lungs:Clears to auscultation Bilaterally, no wheezing  CVS:IRRR, S1 S2, tachy  Abdomen:Soft, Non tender, No hepatosplenomegaly  Extremities: + LE edema  Skin:No rash or lesions, Warm and DRY   Psych: Can't access due to patient's current condition  :  Palacio +  NEURO: Can't access due to patient's current condition      CODE STATUS:  full  Care Plan discussed with:  Nurse and DR. Garcia     Chart reviewed.   ECG[de-identified] Rev:yes  Xray/CT/US/MRI REV:yes  Lab Data Personally Reviewed: (see below)  Recent Labs 03/10/17   0455  03/09/17   0431  03/08/17   0304   NA  138  141  140   K  4.1  3.4*  3.4*   CL  104  108  106   CO2  20*  22  24   GLU  102*  144*  120*   BUN  33*  26*  23*   CREA  2.46*  1.96*  1.76*   CA  8.1*  8.1*  8.6     Recent Labs      03/10/17   0455  03/09/17   0431  03/08/17   0304   WBC  12.3*  9.7  10.8   HGB  7.1*  7.8*  8.0*   HCT  22.3*  24.2*  25.4*   PLT  344  338  325     Recent Labs      03/10/17   0455  03/09/17   0431  03/08/17   0304   NA  138  141  140   K  4.1  3.4*  3.4*   CL  104  108  106   CO2  20*  22  24   BUN  33*  26*  23*   CREA  2.46*  1.96*  1.76*   GLU  102*  144*  120*   CA  8.1*  8.1*  8.6     Lab Results   Component Value Date/Time    Color YELLOW/STRAW 02/23/2017 03:21 PM    Appearance CLOUDY 02/23/2017 03:21 PM    Specific gravity 1.013 02/23/2017 03:21 PM    pH (UA) 5.0 02/23/2017 03:21 PM    Protein 100 02/23/2017 03:21 PM    Glucose 100 02/23/2017 03:21 PM    Ketone NEGATIVE  02/23/2017 03:21 PM    Bilirubin NEGATIVE  02/23/2017 03:21 PM    Urobilinogen 0.2 02/23/2017 03:21 PM    Nitrites NEGATIVE  02/23/2017 03:21 PM    Leukocyte Esterase TRACE 02/23/2017 03:21 PM    Epithelial cells FEW 02/23/2017 03:21 PM    Bacteria NEGATIVE  02/23/2017 03:21 PM    WBC 0-4 02/23/2017 03:21 PM    RBC 0-5 02/23/2017 03:21 PM     Lab Results   Component Value Date/Time    Culture result: SCANT  NORMAL RESPIRATORY YVAN   03/02/2017 10:25 AM    Culture result: MODERATE  NORMAL RESPIRATORY YVAN   02/27/2017 05:18 PM    Culture result: MIXED UROGENITAL YVAN ISOLATED 06/24/2016 09:12 PM     Prior to Admission Medications   Prescriptions Last Dose Informant Patient Reported? Taking? STOOL SOFTENER 100 mg capsule 2/24/2017 at 1700  Yes Yes   Sig: TK ONE C PO  BID FOR CONSTIPATION   amLODIPine (NORVASC) 10 mg tablet   Yes No   Sig: Take  by mouth ACB/HS. aspirin 81 mg chewable tablet  Self Yes No   Sig: Take 81 mg by mouth daily.    atorvastatin (LIPITOR) 40 mg tablet 2/24/2017 at 0800 No Yes   Sig: Take 2 Tabs by mouth daily. busPIRone (BUSPAR) 7.5 mg tablet 2/24/2017 at 1700  No Yes   Sig: TAKE 1 TABLET BY MOUTH TWO TIMES A DAY. cefdinir (OMNICEF) 300 mg capsule Unknown at Unknown time  No No   Sig: Take 1 Cap by mouth two (2) times a day for 10 days. cloNIDine HCl (CATAPRES) 0.1 mg tablet 2/24/2017 at 1700  Yes Yes   Sig: Take  by mouth two (2) times a day. clonazePAM (KLONOPIN) 0.5 mg tablet Unknown at Unknown time  No No   Sig: TAKE 1 TABLET BY MOUTH TWICE A DAY . Vernida Chard MUST LAST 30 DAYS   clopidogrel (PLAVIX) 75 mg tab 2/23/2017 at Unknown time  No Yes   Sig: Take 1 Tab by mouth daily. diphenhydrAMINE (BENADRYL ALLERGY) 25 mg tablet  Self Yes No   Sig: Take 25 mg by mouth three (3) times daily as needed for Itching. fenofibrate (LOFIBRA) 54 mg tablet 2/24/2017 at 0800  No Yes   Sig: Take 1 Tab by mouth daily. ferrous sulfate 325 mg (65 mg iron) tablet   Yes No   Sig: Take 325 mg by mouth three (3) times daily (with meals). hydrALAZINE (APRESOLINE) 25 mg tablet   Yes No   levothyroxine (SYNTHROID) 100 mcg tablet   No No   Sig: Take 1 Tab by mouth Daily (before breakfast). metoprolol tartrate (LOPRESSOR) 25 mg tablet 2/27/2017 at 0403  No Yes   Sig: TAKE 1 TAB BY MOUTH TWO (2) TIMES A DAY. oxyCODONE-acetaminophen (PERCOCET) 5-325 mg per tablet   Yes No   Sig: TK 1 TO 2 TS PO Q 4 H PRN P   pantoprazole (PROTONIX) 40 mg tablet 2/24/2017 at 1700  No Yes   Sig: Take 1 Tab by mouth two (2) times a day. potassium chloride (KLOR-CON M20) 20 mEq tablet  Self Yes No   Sig: Take 20 mEq by mouth daily as needed (for leg cramps). spironolactone (ALDACTONE) 25 mg tablet 2/24/2017 at 0800  No Yes   Sig: Take 1 Tab by mouth daily. tiotropium bromide (SPIRIVA RESPIMAT) 1.25 mcg/actuation inhaler Unknown at Unknown time  Yes No   Sig: Take 2 Puffs by inhalation daily. venlafaxine-SR (EFFEXOR-XR) 150 mg capsule 2/24/2017 at 1700  No Yes   Sig: Take 1 Cap by mouth two (2) times a day. Facility-Administered Medications: None       Medications list Personally Reviewed   [x]      Yes     []               No    Thank you for allowing us to participate in the care this patient. We will follow patient with you. Signed By: Ivon Ortiz MD  [facility]  [facility] SY[facility] FRANCISFormerly [facility]  [address], [address]  [place], [address] [place]  Phone - [phone]         Fax - [phone] [facility] Office  [address] [place] [address]  Phone - [phone]        Fax - [phone]     [email or web address]

## 2017-03-10 NOTE — PROGRESS NOTES
0700  Bedside and verbal report from Rebecca Sheets RN. Patient on CPAP, diaphoretic, anxious. 0800  Dr. Barbara Taylor @ bedside to evaluate patient. Order received to \"place patient back on the vent on previous settings. \"  Maria Esther Breath back on previous vent settings (AC 12, 50%) and propofol restarted for sedation. 0900  IR at bedside to evaluate bilateral pleural effusions via ultrasound. Per Dr. Barbara Taylor, patient \"needs bilateral chest tubes. \"  3687  Patient's  now on phone and consent obtained for bilateral chest tube placement. 1000  Dr. Carmen Melara here; amiodarone stopped and cardizem started for rate control. 56  Dr. Racheal Huerta and IR team at bedside; bilateral chest tubes placed. >500 ml straw colored drainage obtained from each side. 1023  Dilaudid 1 mg IV given for comfort. 1037  HR now 90's -- still atrial fib/flutter. Cardizem decreased to 5 mg/hr. 1051  15 second pause (asystole) noted on monitor, then HR resumed @ 58 sinus hussein; cardizem stopped. Dr. Barbara Taylor notified. 1130  Remains in SR. No further changes noted at this time. [address] completed per Dr. Racheal Huerta. 1300  Vitals stable; remains in NSR. Urine output marginal.  1337  Patient grimacing, wincing; medicated with dilaudid 1 mg IV for pain. 1400  Appears to be resting more comfortably now. 1500  Urine output adequate. /58.  1600  NG residual 210 ml. Will hold tube feedings for now. 1748  Grimacing; biting on ETT. Medicated with dilaudid 1 mg IV for discomfort. 1800  Tube feeding remains on hold; continues in NSR. Now appears more relaxed. 1900  Bedside and Verbal shift change report given to Donal Castillo (oncoming nurse) by Salma Jaramillo RN (offgoing nurse). Report included the following information SBAR, Kardex, Procedure Summary, Intake/Output, MAR, Accordion, Recent Results, Med Rec Status, Cardiac Rhythm SR and Alarm Parameters .

## 2017-03-10 NOTE — PROGRESS NOTES
Shift note:  1900: ST/Afib/Aflutter on CM. BP stable. 1954: PRN tylenol given for temperature 100.7.  2000: Grubbs noted to be leaking. Irrigated grubbs. Urine noted to be cloudy/sediment. Incontinent of small amount of stool. Incontinence care provide. 0021: PRN tylenol given for temperature of 100. 1.  0158: PRN dilaudid given for pain.  0602: Propofol drip stopped. Awake and moving bilateral UE to commands. No bilateral LE movement, but positive sensation. Intermittent ST/Afib/Aflutter 100-140's this shift. BP stable.

## 2017-03-10 NOTE — PROGRESS NOTES
Vascular Surgery ICU Progress Note    Admit Date: 2017  POD:  11 Day Post-Op    Procedure:  Procedure(s):  ENDOVASCULAR ANEURYSM REPAIR of THORACIC AORTIC DISSECTION, repair of ruptured left iliac artery with stent placement. Left ileo-femoral bypass graft with PTFE. LEFT CAROTID SUBCLAVIAN BYPASS with stent placement of left common artery. Subjective:   Pt remains sedated/intubated, remains in a-fib/aflutter now with HR in the 140's     Objective:   Vitals:  Blood pressure (!) 176/93, pulse (!) 143, temperature 98.5 °F (36.9 °C), resp. rate 26, height 5' 5.98\" (1.676 m), weight 91.9 kg (202 lb 9.6 oz), SpO2 95 %, not currently breastfeeding. Temp (24hrs), Av.4 °F (37.4 °C), Min:98.5 °F (36.9 °C), Max:100.7 °F (38.2 °C)    Ventilator:  Ventilator Volumes  Vt Set (ml): 500 ml (03/10/17 0531)  Vt Exhaled (Machine Breath) (ml): 450 ml (03/10/17 05)  Vt Spont (ml): 492 ml (17 0718)  Ve Observed (l/min): 10 l/min (03/10/17 0555)    Oxygen Therapy:  Oxygen Therapy  O2 Sat (%): 95 % (03/10/17 08)  Pulse via Oximetry: 143 beats per minute (03/10/17 0800)  O2 Device: Endotracheal tube (03/10/17 08)  O2 Flow Rate (L/min): 4 l/min (17 1016)  O2 Temperature: 96.4 °F (35.8 °C) (17 1428)  FIO2 (%): 50 % (03/10/17 0555)    Admission Weight: Last Weight   Weight: 81.9 kg (180 lb 8.9 oz) Weight: 91.9 kg (202 lb 9.6 oz)     Intake / Output / Drain:  Current Shift: 03/10 0701 - 03/10 1900  In: 25   Out: 75 [Urine:75]  Last 24 hrs.:     Intake/Output Summary (Last 24 hours) at 03/10/17 08  Last data filed at 03/10/17 0759   Gross per 24 hour   Intake          4680.69 ml   Output              975 ml   Net          3705.69 ml       EXAM:  General:  In no apparent distress                                                                                            Lungs:   Coarse bilat   Incision/Access Sites:  No erythema, drainage or swelling.     Heart:  Irregular rate and wvkoak-l-agk/aflutter, S1, S2 normal, no murmur, click, rub or gallop. Abdomen:   Firm, non-tender. Bowel sounds absent again. No masses,  No organomegaly. Extremities:  Generalized edema-mostly in upper extremities, palpable pedal pulses bilat   Neurologic:  Unable to assess, pt sedated. Pupils round/reactive, moving upper extremities but not lower. Follows simple commands      Labs:   Recent Labs      03/10/17   0455   17   0218   WBC  12.3*   < >   --    HGB  7.1*   < >   --    HCT  22.3*   < >   --    PLT  344   < >   --    NA  138   < >   --    K  4.1   < >   --    BUN  33*   < >   --    CREA  2.46*   < >   --    GLU  102*   < >   --    GLUCPOC   --    --   128*    < > = values in this interval not displayed. Assessment:     Active Problems:    HTN (hypertension) ()      Aneurysm (HCC) (2017)      SVT (supraventricular tachycardia) (3/3/2017)      SSS (sick sinus syndrome) ([facility] [address] (3/6/2017)      Paroxysmal atrial fibrillation ([facility] [address] (3/6/2017)         Plan/Recommendations/Medical Decision Makin. S/p TEVAR, L ileo-fem bypass, L carotid-subclavian bypass: Sites stable. Control BP. Not moving lower extremities which is concerning-unable to perform full neuro assessment w/ pt sedated/intubated   2. Volume overload/bilat effusions: xray wet again, will diurese with lasix/albumin 25% combo (help w/ kidneys). Would not extubate today given uncontrolled HR and pt sounds very course, increased secretions. Pulm ordered IR to tap R side today  3. Acute resp failure: on NRB prior to surgery, hx of COPD-cont nebs. Failed extubation 3/6, vent weaning per pulm  4. HTN: esmolol stopped due to bradycardia/hypotension. Off cardene currently, but may need to go back on. Monitor  5. Tachy-hussein, PAF: cards following, EP consulted but nothing to offer at this time-may consider pacemaker once extubated. Pt in uncontrolled a-fib for > 24 hrs, reconsult cardiology to assist. Cont IV amio for now. On heparin sub-q  6.  JEANA: BUN/Cr cont to rise, monitor and avoid nephrotoxins. Consult renal today  7. Elevated LFTs: back up, trend  8. Hypokalemia: replete per orders  9. Anemia: H&H cont to decrease, hold transfusion until Hgb < 7. Will discuss w/ Dr. Jesus Cervantes  10. Nutrition: OG tube inserted, tolerating TF's at low rate   11. GI/DVT prophylaxis: protonix, SCD's, heparin  12.  Dispo: to remain in ICU for now, remains critically ill    Signed By: Sarahi Reid NP

## 2017-03-10 NOTE — PROGRESS NOTES
[facility]  Pulmonary, Critical Care, and Sleep Medicine    Name: Oleksandr Adams MRN: [id number]   : 1955 Hospital: [facility]   Date: 3/10/2017            IMPRESSION:   · Acute respiratory failure with hypoxia: Ready for SBT; not dropping sats with movement. · Bilateral pleural effusions  · Hypoglycemia better on TF  · Malnutrition  · COPD noted to be moderate severity. 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 CC time with pt eop procedures      PLAN:   · Vent support, failed extubation 3/6, tolerating SBT again today, attempt extubation again today  · Will ask IR to place bilateral chest tubes today to drain effusions and help with weaning and extubation  · IVF, albumin  · Monitor renal fx, worse today  · Tube feedings  · Jet nebs. · Transfuse prn Hgb less than 7  · Antihypertensives, catapress patch, off cardene drip, will DC IV vasotec because renal fx worse  · PUD/DVT prophylaxis  · Sedation as needed       Subjective/History:     Failed extubation 3/6    Now intubated, awake, tolerating SBT     The patient is critically ill and can not provide additional history due to Ventilated.             Current Facility-Administered Medications   Medication Dose Route Frequency    0.45% sodium chloride infusion  100 mL/hr IntraVENous CONTINUOUS    enalaprilat (VASOTEC) injection 1.25 mg  1.25 mg IntraVENous Q6H    amiodarone (CORDARONE) 450 mg in dextrose 5% 250 mL infusion  0.5 mg/min IntraVENous CONTINUOUS    heparin (porcine) injection 5,000 Units  5,000 Units SubCUTAneous Q8H    cloNIDine (CATAPRES) 0.2 mg/24 hr patch 1 Patch  1 Patch TransDERmal Q7D    ipratropium (ATROVENT) 0.02 % nebulizer solution 0.5 mg  0.5 mg Nebulization QID RT    propofol (DIPRIVAN) infusion  5-50 mcg/kg/min IntraVENous TITRATE    niCARdipine (CARDENE) 50 mg in 0.9% sodium chloride 250 mL infusion  0-15 mg/hr IntraVENous TITRATE    polyethylene glycol (MIRALAX) packet 17 g  17 g Oral DAILY    pantoprazole (PROTONIX) 40 mg in sodium chloride 0.9 % 10 mL injection  40 mg IntraVENous DAILY    sodium chloride (NS) flush 5-10 mL  5-10 mL IntraVENous Q8H    chlorhexidine (PERIDEX) 0.12 % mouthwash 15 mL  15 mL Oral Q12H    sodium chloride (NS) flush 10 mL  10 mL InterCATHeter Q24H    sodium chloride (NS) flush 10-40 mL  10-40 mL InterCATHeter Q8H     Review of Systems:  Review of systems not obtained due to patient factors.     Objective:   Vital Signs:    Visit Vitals    /81    Pulse (!) 124    Temp 99.1 °F (37.3 °C)    Resp 20    Ht 5' 5.98\" (1.676 m)    Wt 91.9 kg (202 lb 9.6 oz)    SpO2 97%    Breastfeeding No    BMI 32.72 kg/m2       O2 Device: Endotracheal tube, Ventilator   O2 Flow Rate (L/min): 4 l/min   Temp (24hrs), Av.5 °F (37.5 °C), Min:98.7 °F (37.1 °C), Max:100.7 °F (38.2 °C)       Intake/Output:   Last shift:         Last 3 shifts:  1901 - 03/10 0700  In: 6554.3 [I.V.:4434.3]  Out: 1500 [Urine:1500]    Intake/Output Summary (Last 24 hours) at 03/10/17 0737  Last data filed at 03/10/17 0700   Gross per 24 hour   Intake          4815.12 ml   Output              920 ml   Net          3895.12 ml       Ventilator Settings:  Mode Rate Tidal Volume Pressure FiO2 PEEP   Spontaneous   500 ml  5 cm H2O 50 % 5 cm H20     Peak airway pressure: 11 cm H2O    Minute ventilation: 10 l/min      Physical Exam:    General: Intubated, no distress, appears stated age. Head:  Normocephalic, without obvious abnormality, atraumatic. Eyes:  Conjunctivae/corneas clear. PERRL, EOMs intact. Nose: Nares normal. Septum midline. Mucosa normal. No drainage or sinus tenderness. Throat: Lips, mucosa, and tongue normal. Teeth and gums normal. Has a white coating over mouth. Neck: Supple, symmetrical, trachea midline, no adenopathy, thyroid: no enlargment/tenderness/nodules, no carotid bruit and no JVD. Back:   Symmetric, no curvature. ROM normal.   Lungs:   Decreased BS bilaterally, has bilateral rhonchi. Chest wall:  No tenderness or deformity. Heart:  Regular rate and rhythm, S1, S2 normal, no murmur, click, rub or gallop. Abdomen:   Obese;  bowel sounds are decreased,  No masses,  No organomegaly. Extremities: Extremities normal, atraumatic, no cyanosis or edema. Warm. Pulses: 2+ and symmetric all extremities. Skin: Skin color, texture, turgor normal. No rashes or lesions   Lymph nodes: Cervical, supraclavicular, and axillary nodes normal.   Neurologic: Not able to fully assess. Pt is post op, effects of anesthesia in place. Data:     Recent Results (from the past 24 hour(s))   METABOLIC PANEL, COMPREHENSIVE    Collection Time: 03/10/17  4:55 AM   Result Value Ref Range    Sodium 138 136 - 145 mmol/L    Potassium 4.1 3.5 - 5.1 mmol/L    Chloride 104 97 - 108 mmol/L    CO2 20 (L) 21 - 32 mmol/L    Anion gap 14 5 - 15 mmol/L    Glucose 102 (H) 65 - 100 mg/dL    BUN 33 (H) 6 - 20 MG/DL    Creatinine 2.46 (H) 0.55 - 1.02 MG/DL    BUN/Creatinine ratio 13 12 - 20      GFR est AA 24 (L) >60 ml/min/1.73m2    GFR est non-AA 20 (L) >60 ml/min/1.73m2    Calcium 8.1 (L) 8.5 - 10.1 MG/DL    Bilirubin, total 0.8 0.2 - 1.0 MG/DL    ALT (SGPT) 111 (H) 12 - 78 U/L    AST (SGOT) 155 (H) 15 - 37 U/L    Alk.  phosphatase 182 (H) 45 - 117 U/L    Protein, total 6.1 (L) 6.4 - 8.2 g/dL    Albumin 2.2 (L) 3.5 - 5.0 g/dL    Globulin 3.9 2.0 - 4.0 g/dL    A-G Ratio 0.6 (L) 1.1 - 2.2     CBC W/O DIFF    Collection Time: 03/10/17  4:55 AM   Result Value Ref Range    WBC 12.3 (H) 3.6 - 11.0 K/uL    RBC 2.58 (L) 3.80 - 5.20 M/uL    HGB 7.1 (L) 11.5 - 16.0 g/dL    HCT 22.3 (L) 35.0 - 47.0 %    MCV 86.4 80.0 - 99.0 FL    MCH 27.5 26.0 - 34.0 PG    MCHC 31.8 30.0 - 36.5 g/dL    RDW 15.3 (H) 11.5 - 14.5 %    PLATELET 004 289 - 099 K/uL             Telemetry:normal sinus rhythm    Imaging:  I have personally reviewed the patients radiographs and have reviewed the reports:  CXR: no acute changes, bilateral pleural effusions        Total critical care time exclusive of procedures:     Kedar Nelson MD

## 2017-03-10 NOTE — PROGRESS NOTES
Pharmacy Levothyroxine IV Dosing Protocol    Current daily dose: 100 mcg PO    Oral interacting medications: none    The patient's daily IV levothyroxine dose has been converted to twice weekly IV dosing per the [facility] approval.    260 mcg IV on Monday and Thursday.   Will give a one time dose of 100 mcg X1 today since she haven't had a dose for the last few days and resume with the 260 mcg BID starting Monday    RAYA AdamsD

## 2017-03-10 NOTE — PROGRESS NOTES
Attempted follow up. Patient remains intubated and sedated. Her  is not present; he visited yesterday but pastoral care was unable to touch base with him at that time. Card left for  and for patient; informing them of pastoral awareness and availability. Pastoral care is available as needed/desired. [phone]. Visit by: Simon Hook. Vic Lux.  Елена Vora MA, [facility]    Lead  [employer]

## 2017-03-11 NOTE — PROGRESS NOTES
Vascular:    POD #12 after thoracic endograft    Remains on vent    Creatinine further increased (2.8)    Continue current support per pulmonary, cardiology

## 2017-03-11 NOTE — PROGRESS NOTES
2000, patient received from day shift. Neuro; sedated with Propofol at 35 sherman/kg/min, GCS;  Eye; 3, verbal; 1 for ETT, motor; 6 ( hands only) sensation at lower limbs slightly present as she grimace for pain. No movement there. Respiratory; Sat 98% on Ventilator A/C,, RR 12, Peep 5, FiO2 40%, ETT 23 cm at teeth, secretion; small, thick Tan, rhonchi upper diminished lower lung sounds. Bilateral pigtail for pleural effusion,   Cardiac; NSR ( paroxysmal A Fib/Flatter), 74 B/min, NIBP 126/42 mmHg, Diltiazem on hold ( due to pause > Bradycardia), Pads present and connected to coComment 55 stand by for any pause,   GI; NPO ( NGT at 68 cm right nare) Osmolite 1.2 restarted at rate of 45 ml/hr, free water 75 ml Q 6 hr, residual; 50 from 210, obese Abd soft with active bowel sound, on NS 0.45% at 100 ml/hr. Renal; grubbs cath with adequate urine out put. sediment and cloudy   Skin; dressing present at neck and right side of femoral area,  Endo; no,   Lines; right PICC, ETT, NGT, Grubbs. Code; Full. Lab; WBC; 12.3,  HGB; 7.1, platelet; 118 , Na; 140 , K; 4.1 , BUN; 33, Crea; 2.46 , high LFT  DVT; SCD both legs, heparin SQ Q 8 hr, heel boots. Plan; Ventilator management, cardiacmonitoring, pain management, follow lab tomorrow,    2230, Routine ultrasound images of the kidneys and retroperitoneum were obtained, IMPRESSION: No hydronephrosis. Small, echogenic right kidney, consistent with medical renal disease. . To follow tomorrow with Nephro. 0000, urine sample for Na CL osmolality creatine sent to lab.  0630, Propofol held for SAT,   Lab; WBC; 12.3 > 12.1,  HGB; 7.1 > 6.5, platelet; 571 > 485 , Na; 138 > 134, K; 4.1 > 4.5 , BUN; 33 > 39, Crea; 2.46 > 2.81 , high LFT but bimprove  To follow up for Blood transfusion, SBT so far so good,   0700, Bedside and Verbal shift change report given to [address] [place] [address] Road (oncoming nurse) by Hernán Webber RN (offgoing nurse).  Report included the following information SBAR, Kardex, Procedure Summary, Intake/Output, MAR, Accordion, Recent Results, Med Rec Status and Cardiac Rhythm NSR.

## 2017-03-11 NOTE — PROGRESS NOTES
[facility] [place]  Pulmonary, Critical Care, and Sleep Medicine    Name: Sonya Corrigan MRN: [id number]   : 1955 [facility]: [facility]   Date: 3/11/2017            IMPRESSION:   · Acute respiratory failure with hypoxia: failed extubation 3/6, tolerating SBT again today, more secretions  · Bilateral pleural effusions bilateral chest tubes 3/10 per IR to drain effusions  · Malnutrition on TF  · COPD noted to be moderate severity. 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 CC time with pt eop procedures      PLAN:   · Vent support  · IV precedex  · reculture sputum  · IVF, albumin  · Monitor renal fx, worse today  · Tube feedings  · Jet nebs. · Transfuse 2 units for Hgb less than 7  · Antihypertensives, catapress patch, off cardene drip, will DC IV vasotec because renal fx worse  · PUD/DVT prophylaxis  · Sedation as needed       Subjective/History:     3/11 Failed extubation 3/6. Now intubated, sedated. Copious frothy white secretions. Labored with SBT. Not tolrating TF. Bilateral chest tubes placed by IR on 3/11. Both lungs clearer on CXR this AM    The patient is critically ill and can not provide additional history due to Ventilated.             Current Facility-Administered Medications   Medication Dose Route Frequency    dexmedetomidine (PRECEDEX) 400 mcg in 0.9% sodium chloride 100 mL infusion  0.2-0.7 mcg/kg/hr IntraVENous TITRATE    furosemide (LASIX) injection 40 mg  40 mg IntraVENous ONCE    levalbuterol (XOPENEX) nebulizer soln 1.25 mg/3 mL  1.25 mg Nebulization QID RT    [START ON 3/13/2017] levothyroxine (SYNTHROID) injection 260 mcg  260 mcg IntraVENous EVERY MON & TH    dilTIAZem (CARDIZEM) 100 mg in 0.9% sodium chloride (MBP/ADV) 100 mL infusion  0-15 mg/hr IntraVENous TITRATE    0.45% sodium chloride infusion  25 mL/hr IntraVENous CONTINUOUS    heparin (porcine) injection 5,000 Units  5,000 Units SubCUTAneous Q8H    cloNIDine (CATAPRES) 0.2 mg/24 hr patch 1 Patch  1 Patch TransDERmal Q7D    ipratropium (ATROVENT) 0.02 % nebulizer solution 0.5 mg  0.5 mg Nebulization QID RT    propofol (DIPRIVAN) infusion  5-50 mcg/kg/min IntraVENous TITRATE    polyethylene glycol (MIRALAX) packet 17 g  17 g Oral DAILY    pantoprazole (PROTONIX) 40 mg in sodium chloride 0.9 % 10 mL injection  40 mg IntraVENous DAILY    sodium chloride (NS) flush 5-10 mL  5-10 mL IntraVENous Q8H    chlorhexidine (PERIDEX) 0.12 % mouthwash 15 mL  15 mL Oral Q12H    sodium chloride (NS) flush 10 mL  10 mL InterCATHeter Q24H    sodium chloride (NS) flush 10-40 mL  10-40 mL InterCATHeter Q8H     Review of Systems:  Review of systems not obtained due to patient factors.     Objective:   Vital Signs:    Visit Vitals    /63    Pulse 91    Temp 98.9 °F (37.2 °C)    Resp 18    Ht 5' 5.98\" (1.676 m)    Wt 91.9 kg (202 lb 9.6 oz)    SpO2 100%    Breastfeeding No    BMI 32.72 kg/m2       O2 Device: Ventilator   O2 Flow Rate (L/min): 4 l/min   Temp (24hrs), Av.6 °F (37 °C), Min:97.6 °F (36.4 °C), Max:99.3 °F (37.4 °C)       Intake/Output:   Last shift:      701 - 1900  In: -   Out: 961 [Urine:425]  Last 3 shifts: 1901 - 03/11 0700  In: 6670.6 [I.V.:4390.6]  Out: 0158 [Urine:1905]    Intake/Output Summary (Last 24 hours) at 17 1103  Last data filed at 17 0952   Gross per 24 hour   Intake          3724.79 ml Output             2425 ml   Net          1299.79 ml       Ventilator Settings:  Mode Rate Tidal Volume Pressure FiO2 PEEP   Spontaneous   500 ml  5 cm H2O 40 % 5 cm H20     Peak airway pressure: 12 cm H2O    Minute ventilation: 12.4 l/min      Physical Exam:    General:  Intubated, no distress, appears stated age. Head:  Normocephalic, without obvious abnormality, atraumatic. Eyes:  Conjunctivae/corneas clear. PERRL, EOMs intact. Nose: Nares normal. Septum midline. Mucosa normal. No drainage or sinus tenderness. Throat: Lips, mucosa, and tongue normal. Teeth and gums normal. Has a white coating over mouth. Neck: Supple, symmetrical, trachea midline, no adenopathy, thyroid: no enlargment/tenderness/nodules, no carotid bruit and no JVD. Back:   Symmetric, no curvature. ROM normal.   Lungs:   Decreased BS bilaterally, has bilateral rhonchi. Chest wall:  No tenderness or deformity. Heart:  Regular rate and rhythm, S1, S2 normal, no murmur, click, rub or gallop. Abdomen:   Obese;  bowel sounds are decreased,  No masses,  No organomegaly. Extremities: Extremities normal, atraumatic, no cyanosis or edema. Warm. Pulses: 2+ and symmetric all extremities. Skin: Skin color, texture, turgor normal. No rashes or lesions   Lymph nodes: Cervical, supraclavicular, and axillary nodes normal.   Neurologic: Not able to fully assess. Pt is post op, effects of anesthesia in place.         Data:     Recent Results (from the past 24 hour(s))   CREATININE, UR, RANDOM    Collection Time: 03/11/17 12:01 AM   Result Value Ref Range    Creatinine, urine 58.00 mg/dL   OSMOLALITY, UR    Collection Time: 03/11/17 12:01 AM   Result Value Ref Range    Osmolality,urine 230 MOSM/kg H2O   SODIUM, UR, RANDOM    Collection Time: 03/11/17 12:01 AM   Result Value Ref Range    Sodium urine, random 19 MMOL/L   CHLORIDE, UR, RANDOM    Collection Time: 03/11/17 12:01 AM   Result Value Ref Range    Chloride,urine random 19 MMOL/L CBC W/O DIFF    Collection Time: 03/11/17  4:04 AM   Result Value Ref Range    WBC 12.1 (H) 3.6 - 11.0 K/uL    RBC 2.37 (L) 3.80 - 5.20 M/uL    HGB 6.5 (L) 11.5 - 16.0 g/dL    HCT 20.3 (L) 35.0 - 47.0 %    MCV 85.7 80.0 - 99.0 FL    MCH 27.4 26.0 - 34.0 PG    MCHC 32.0 30.0 - 36.5 g/dL    RDW 15.7 (H) 11.5 - 14.5 %    PLATELET 064 348 - 083 K/uL   MAGNESIUM    Collection Time: 03/11/17  4:04 AM   Result Value Ref Range    Magnesium 2.0 1.6 - 2.4 mg/dL   PHOSPHORUS    Collection Time: 03/11/17  4:04 AM   Result Value Ref Range    Phosphorus 5.4 (H) 2.6 - 4.7 MG/DL   METABOLIC PANEL, COMPREHENSIVE    Collection Time: 03/11/17  4:04 AM   Result Value Ref Range    Sodium 134 (L) 136 - 145 mmol/L    Potassium 4.5 3.5 - 5.1 mmol/L    Chloride 104 97 - 108 mmol/L    CO2 18 (L) 21 - 32 mmol/L    Anion gap 12 5 - 15 mmol/L    Glucose 119 (H) 65 - 100 mg/dL    BUN 39 (H) 6 - 20 MG/DL    Creatinine 2.81 (H) 0.55 - 1.02 MG/DL    BUN/Creatinine ratio 14 12 - 20      GFR est AA 21 (L) >60 ml/min/1.73m2    GFR est non-AA 17 (L) >60 ml/min/1.73m2    Calcium 7.7 (L) 8.5 - 10.1 MG/DL    Bilirubin, total 0.7 0.2 - 1.0 MG/DL    ALT (SGPT) 80 (H) 12 - 78 U/L    AST (SGOT) 76 (H) 15 - 37 U/L    Alk.  phosphatase 156 (H) 45 - 117 U/L    Protein, total 5.7 (L) 6.4 - 8.2 g/dL    Albumin 2.0 (L) 3.5 - 5.0 g/dL    Globulin 3.7 2.0 - 4.0 g/dL    A-G Ratio 0.5 (L) 1.1 - 2.2     BLOOD GAS, ARTERIAL    Collection Time: 03/11/17  7:00 AM   Result Value Ref Range    pH 7.38 7.35 - 7.45      PCO2 34 (L) 35.0 - 45.0 mmHg    PO2 61 (L) 80 - 100 mmHg    O2 SAT 91 (L) 92 - 97 %    BICARBONATE 20 (L) 22 - 26 mmol/L    BASE DEFICIT 4.5 mmol/L    O2 METHOD VENTILATOR      FIO2 40 %    MODE CPAP      PRESSURE SUPPORT 5.0      EPAP/CPAP/PEEP 5.0      Sample source ARTERIAL      SITE LEFT RADIAL      LEIGH ANN'S TEST YES               Telemetry:normal sinus rhythm    Imaging:  I have personally reviewed the patients radiographs and have reviewed the reports:  CXR: no acute changes, bilateral pleural effusions        Total critical care time exclusive of procedures:     Arron Alonzo MD

## 2017-03-11 NOTE — PROGRESS NOTES
Occupational Therapy  Orders received and medical record reviewed. Nursing requests that therapy defer at this time. Will follow up as appropriate to initiate OT Evaluation.

## 2017-03-11 NOTE — PROGRESS NOTES
Physical Therapy    Approached for PT eval.  RN recommending deferral at this time. Will see pt for eval when available and appropriate/able to tolerate session.     Taran Avila, PT

## 2017-03-11 NOTE — PROGRESS NOTES
Tube feeding residual 170cc. miralax given as scheduled. Will continue tube feeds and monitor closely.

## 2017-03-11 NOTE — PROGRESS NOTES
Patient alert off of propofol. Patient very hard of hearing. Follows commands. Is tachypneic at a shallow rate of 40. Propofol restarted at 30 mcgs////kg/min, and placed back on vent previous settings.

## 2017-03-11 NOTE — CONSULTS
Nephrology PROGRESS Note     Fransisco Chilel     www. [facility]                    Phone - [phone]   Patient: Jp Bradford  YOB: 1955     Date- 3/11/2017   MRN: [id number]  Jamila Balderas NP   Age: 59 y.o. Sex: female      A  ASSESSMENT:   · Acute renal failure likely due to ATN - vancomycin toxicity - vanco level - 29.2  · ckd 3 baseline cr. 1.2  · Resp. Failure - on vent  · AFIB with RVR  · Complicated type B descending thoracic aortic dissection. -   · S/p TEVAR, L ileo-fem bypass, L carotid-subclavian bypass:2/27/17  · Protein malnutrition  Active Problems:    HTN (hypertension) ()      Aneurysm (HCC) (2/23/2017)      SVT (supraventricular tachycardia) (3/3/2017)      SSS (sick sinus syndrome) ([facility]) (3/6/2017)      Paroxysmal atrial fibrillation (HCC) (3/6/2017)      PLAN:   Good U/O but creat rising; no need for diuretics; follow for now    [x] High complexity decision making was performed  [x] Patient is at high-risk of decompensation with multiple organ involvement    Past Medical Hx:   Past Medical History:   Diagnosis Date    Anxiety disorder     Arthritis     BACK, RT. KNEE and HANDS    CAD (coronary artery disease)      s/p 5 vessel CABG 2011    Carotid arterial disease (HCC)     diffuse bilateral CCA plaques    Chronic obstructive pulmonary disease ([facility]     Depression with anxiety     Essential hypertension     GERD (gastroesophageal reflux disease)     rarely    GI bleed     Hypothyroid     Mesenteric artery stenosis (HCC)     Microcytic anemia     Renal artery atherosclerosis, bilateral (HCC)      RA occlusion, left s/p stent    Renal atrophy, right     SVT (supraventricular tachycardia) 3/3/2017    UC (ulcerative colitis) ([facility]         Past Surgical Hx:     Past Surgical History:   Procedure Laterality Date    ESOPHAGEAL CAPSULE ENDOSCOPY  6/8/2015         HX COLONOSCOPY      HX CORONARY ARTERY BYPASS GRAFT  8/11 5 way bypass. 08/19/11    HX FEMORAL BYPASS      triple    HX LUMBAR DISKECTOMY  1997    HX UROLOGICAL      vascular stent x 2 to left kidney    SMALL BOWEL ENDOSCOPY,ABLATE LESN  3/13/2015         STRESS TEST LEXISCAN/CARDIOLITE  4/24/12    UPPER GI ENDOSCOPY,CTRL BLEED  6/2/2015            Medications:  Prior to Admission medications    Medication Sig Start Date End Date Taking? Authorizing Provider   spironolactone (ALDACTONE) 25 mg tablet Take 1 Tab by mouth daily. 1/17/17  Yes Brina Winters NP   venlafaxine-SR [facility] 150 mg capsule Take 1 Cap by mouth two (2) times a day. 1/17/17  Yes Brina Winters NP   clopidogrel (PLAVIX) 75 mg tab Take 1 Tab by mouth daily. 1/17/17  Yes Brina Winters NP   fenofibrate (LOFIBRA) 54 mg tablet Take 1 Tab by mouth daily. 1/17/17  Yes Brina Winters NP   pantoprazole (PROTONIX) 40 mg tablet Take 1 Tab by mouth two (2) times a day. 1/17/17  Yes Brina Winters NP   atorvastatin (LIPITOR) 40 mg tablet Take 2 Tabs by mouth daily. 1/17/17  Yes Brina Winters NP   busPIRone (BUSPAR) 7.5 mg tablet TAKE 1 TABLET BY MOUTH TWO TIMES A DAY. 1/10/17  Yes Brina Winters NP   metoprolol tartrate (LOPRESSOR) 25 mg tablet TAKE 1 TAB BY MOUTH TWO (2) TIMES A DAY. 12/20/16  Yes Mickie Ceballos MD   STOOL SOFTENER 100 mg capsule TK ONE C PO  BID FOR CONSTIPATION 8/2/16  Yes Historical Provider   cloNIDine HCl (CATAPRES) 0.1 mg tablet Take  by mouth two (2) times a day. Yes Historical Provider   clonazePAM (KLONOPIN) 0.5 mg tablet TAKE 1 TABLET BY MOUTH TWICE A DAY . Geeta Pino MUST LAST 30 DAYS 1/24/17   Brina Winters NP   levothyroxine (SYNTHROID) 100 mcg tablet Take 1 Tab by mouth Daily (before breakfast).  1/17/17   Brina Winters NP   hydrALAZINE (APRESOLINE) 25 mg tablet  8/2/16   Historical Provider   oxyCODONE-acetaminophen (PERCOCET) 5-325 mg per tablet TK 1 TO 2 TS PO Q 4 H PRN P 8/2/16   Historical Provider   amLODIPine (NORVASC) 10 mg tablet Take by mouth ACB/HS. Historical Provider   tiotropium bromide (SPIRIVA RESPIMAT) 1.25 mcg/actuation inhaler Take 2 Puffs by inhalation daily. Historical Provider   ferrous sulfate 325 mg (65 mg iron) tablet Take 325 mg by mouth three (3) times daily (with meals). Historical Provider   potassium chloride (KLOR-CON M20) 20 mEq tablet Take 20 mEq by mouth daily as needed (for leg cramps). Historical Provider   diphenhydrAMINE (BENADRYL ALLERGY) 25 mg tablet Take 25 mg by mouth three (3) times daily as needed for Itching. Historical Provider   aspirin 81 mg chewable tablet Take 81 mg by mouth daily. Historical Provider       Allergies   Allergen Reactions    Tussionex Itching    Codeine Itching    Nickel Other (comments)     Local reaction (redness, irritation, blistering) if wears \"cheap earrings\"    Oxycodone Itching       Social Hx:  reports that she quit smoking about 5 years ago. Her smoking use included Cigarettes. She has a 40.00 pack-year smoking history. She has quit using smokeless tobacco. She reports that she does not drink alcohol or use illicit drugs.      Family History   Problem Relation Age of Onset    Post-op Nausea/Vomiting Other     Other Other      LOW BP AND DIFFICULTY BREATHING    Hypertension Mother    [facility] Stroke Mother       age 48,    [facility] Hypertension Brother     Stroke Brother       age 55, smoked       Review of Systems:  Unobtainable    Allergies   Allergen Reactions    Tussionex Itching    Codeine Itching    Nickel Other (comments)     Local reaction (redness, irritation, blistering) if wears \"cheap earrings\"    Oxycodone Itching      Objective:    Vitals:    Vitals:    17 1220 17 1252 17 1400 17 1521   BP:   125/54    Pulse:   80    Resp:   20    Temp:  98.7 °F (37.1 °C)     SpO2: 96%  98% 97%   Weight:       Height:         I&O's:  03/10 0701 -  0700  In: 4513.8 [I.V.:3003.8]  Out: 2965 [Urine:1295]    Physical Exam:  General:INTUBATED ON VENT  Eyes:No scleral icterus,+ conjunctival pallor  Neck: left side dressing +  ET tube +  Lungs: distant breath sounds  CVS:IRRR, S1 S2, tachy  Abdomen:Soft, Non tender  Extremities: + LE edema  Skin:No rash or lesions, Warm and DRY   Psych: Can't access due to patient's current condition  :  Palacio +  NEURO: Can't access due to patient's current condition      CODE STATUS:  full  Care Plan discussed with:  Nurse      Chart reviewed.     Lab Data Personally Reviewed: (see below)  Recent Labs      03/11/17 0404  03/10/17   0455  03/09/17   0431   NA  134*  138  141   K  4.5  4.1  3.4*   CL  104  104  108   CO2  18*  20*  22   GLU  119*  102*  144*   BUN  39*  33*  26*   CREA  2.81*  2.46*  1.96*   CA  7.7*  8.1*  8.1*   MG  2.0  2.1   --    PHOS  5.4*  4.0   --      Recent Labs      03/11/17   0404  03/10/17   0455  03/09/17   0431   WBC  12.1*  12.3*  9.7   HGB  6.5*  7.1*  7.8*   HCT  20.3*  22.3*  24.2*   PLT  387  344  338     Recent Labs      03/11/17   0404  03/10/17   0455  03/09/17   0431   NA  134*  138  141   K  4.5  4.1  3.4*   CL  104  104  108   CO2  18*  20*  22   BUN  39*  33*  26*   CREA  2.81*  2.46*  1.96*   GLU  119*  102*  144*   CA  7.7*  8.1*  8.1*   MG  2.0  2.1   --    PHOS  5.4*  4.0   --      Lab Results   Component Value Date/Time    Color YELLOW/STRAW 02/23/2017 03:21 PM    Appearance CLOUDY 02/23/2017 03:21 PM    Specific gravity 1.013 02/23/2017 03:21 PM    pH (UA) 5.0 02/23/2017 03:21 PM    Protein 100 02/23/2017 03:21 PM    Glucose 100 02/23/2017 03:21 PM    Ketone NEGATIVE  02/23/2017 03:21 PM    Bilirubin NEGATIVE  02/23/2017 03:21 PM    Urobilinogen 0.2 02/23/2017 03:21 PM    Nitrites NEGATIVE  02/23/2017 03:21 PM    Leukocyte Esterase TRACE 02/23/2017 03:21 PM    Epithelial cells FEW 02/23/2017 03:21 PM    Bacteria NEGATIVE  02/23/2017 03:21 PM    WBC 0-4 02/23/2017 03:21 PM    RBC 0-5 02/23/2017 03:21 PM     Lab Results   Component Value Date/Time Culture result: PENDING 03/11/2017 11:12 AM    Culture result: SCANT  NORMAL RESPIRATORY YVAN   03/02/2017 10:25 AM    Culture result: MODERATE  NORMAL RESPIRATORY YVAN   02/27/2017 05:18 PM     Prior to Admission Medications   Prescriptions Last Dose Informant Patient Reported? Taking? STOOL SOFTENER 100 mg capsule 2/24/2017 at 1700  Yes Yes   Sig: TK ONE C PO  BID FOR CONSTIPATION   amLODIPine (NORVASC) 10 mg tablet   Yes No   Sig: Take  by mouth ACB/HS. aspirin 81 mg chewable tablet  Self Yes No   Sig: Take 81 mg by mouth daily. atorvastatin (LIPITOR) 40 mg tablet 2/24/2017 at 0800  No Yes   Sig: Take 2 Tabs by mouth daily. busPIRone (BUSPAR) 7.5 mg tablet 2/24/2017 at 1700  No Yes   Sig: TAKE 1 TABLET BY MOUTH TWO TIMES A DAY. cefdinir (OMNICEF) 300 mg capsule Unknown at Unknown time  No No   Sig: Take 1 Cap by mouth two (2) times a day for 10 days. cloNIDine HCl (CATAPRES) 0.1 mg tablet 2/24/2017 at 1700  Yes Yes   Sig: Take  by mouth two (2) times a day. clonazePAM (KLONOPIN) 0.5 mg tablet Unknown at Unknown time  No No   Sig: TAKE 1 TABLET BY MOUTH TWICE A DAY . Ceclia Rey MUST LAST 30 DAYS   clopidogrel (PLAVIX) 75 mg tab 2/23/2017 at Unknown time  No Yes   Sig: Take 1 Tab by mouth daily. diphenhydrAMINE (BENADRYL ALLERGY) 25 mg tablet  Self Yes No   Sig: Take 25 mg by mouth three (3) times daily as needed for Itching. fenofibrate (LOFIBRA) 54 mg tablet 2/24/2017 at 0800  No Yes   Sig: Take 1 Tab by mouth daily. ferrous sulfate 325 mg (65 mg iron) tablet   Yes No   Sig: Take 325 mg by mouth three (3) times daily (with meals). hydrALAZINE (APRESOLINE) 25 mg tablet   Yes No   levothyroxine (SYNTHROID) 100 mcg tablet   No No   Sig: Take 1 Tab by mouth Daily (before breakfast). metoprolol tartrate (LOPRESSOR) 25 mg tablet 2/27/2017 at 0403  No Yes   Sig: TAKE 1 TAB BY MOUTH TWO (2) TIMES A DAY.    oxyCODONE-acetaminophen (PERCOCET) 5-325 mg per tablet   Yes No   Sig: TK 1 TO 2 TS PO Q 4 H PRN P pantoprazole (PROTONIX) 40 mg tablet 2/24/2017 at 1700  No Yes   Sig: Take 1 Tab by mouth two (2) times a day. potassium chloride (KLOR-CON M20) 20 mEq tablet  Self Yes No   Sig: Take 20 mEq by mouth daily as needed (for leg cramps). spironolactone (ALDACTONE) 25 mg tablet 2/24/2017 at 0800  No Yes   Sig: Take 1 Tab by mouth daily. tiotropium bromide (SPIRIVA RESPIMAT) 1.25 mcg/actuation inhaler Unknown at Unknown time  Yes No   Sig: Take 2 Puffs by inhalation daily. venlafaxine-SR (EFFEXOR-XR) 150 mg capsule 2/24/2017 at 1700  No Yes   Sig: Take 1 Cap by mouth two (2) times a day. Facility-Administered Medications: None       Medications list Personally Reviewed   [x]      Yes     []               No    Thank you for allowing us to participate in the care this patient. We will follow patient with you. Signed By: Vianey Osuna MD  [facility]  [facility] [place] [address], [address]  Phone - [phone]         Fax - [phone] [facility] Office  [address] [place] [address]  Phone - [phone]        Fax - [phone]     www. [email or web address]

## 2017-03-12 PROBLEM — I48.92 ATRIAL FLUTTER (HCC): Status: ACTIVE | Noted: 2017-01-01

## 2017-03-12 NOTE — PROGRESS NOTES
Pharmacy Automatic Renal Dosing Protocol - Antimicrobials      Indication for Antimicrobials: COPD exacerbation? (moderate severity)  Current Regimen of Each Antimicrobial (Start Day & Day of Therapy):  Cefepime 2 g IV every 12 hours (3/12 Day #1)    Significant cultures:    Respiratory: Moderate normal yesika- final   3/2 Respiratory: scant normal yesika- final   3/11 Respiratory: Moderate normal yesika, GNR's- pending      CAPD, Intermittent Hemodialysis or Renal Replacement Therapy: None documented  Paralysis, amputations, malnutrition: None documented  Recent Labs      17   0643  17   0404  03/10/17   0455   CREA  3.04*  2.81*  2.46*   BUN  42*  39*  33*   WBC  12.8*  12.1*  12.3*     Temp (24hrs), Av.3 °F (37.4 °C), Min:98.4 °F (36.9 °C), Max:100.5 °F (38.1 °C)    Creatinine Clearance (ml/min): 18 mL/min       Impression/Plan: Will change cefepime to 2 g IV every 24 hours for CrCl 11-29 mL/min per renal dosing protocol. Pharmacy will follow daily and adjust doses of monitored medications as appropriate for renal function and/or serum levels.     Thank you,  Mena Yang, PHARMD

## 2017-03-12 NOTE — PROGRESS NOTES
Continues to be tachycardic 144. Dr. Massiel Sahni paged. cardizmm drip increased to 7mg/hour in meantime.

## 2017-03-12 NOTE — PROGRESS NOTES
Cardiology Progress Note            [address], [address] [facility]  [phone]    3/12/2017 7:20 PM    Admit Date: 2/23/2017    Admit Diagnosis: Aneurysm (HCC);AORTIC DISECTION    Subjective:     Chandni Pappas   Was extubated today and went into afl with 2:1 conduction at 1150am. Her rate has decreased since starting amio gtt.     Visit Vitals    BP (!) 170/114    Pulse (!) 124    Temp 97.8 °F (36.6 °C)    Resp 21    Ht 5' 5.98\" (1.676 m)    Wt 202 lb 9.6 oz (91.9 kg)    SpO2 90%    Breastfeeding No    BMI 32.72 kg/m2     Current Facility-Administered Medications   Medication Dose Route Frequency    cefepime (MAXIPIME) 2 g in 0.9% sodium chloride (MBP/ADV) 100 mL  2 g IntraVENous Q24H    racEPINEPHrine (VAPONEFRIN) 2.25% nebulizer solution  0.5 mL Nebulization Q1H PRN    dexamethasone (DECADRON) 4 mg/mL injection 6 mg  6 mg IntraVENous Q6H    racEPINEPHrine (VAPONEFRIN) 2.25 % nebulizer solution        amiodarone (CORDARONE) 450 mg in dextrose 5% 250 mL infusion  1 mg/min IntraVENous CONTINUOUS    amiodarone (CORDARONE) 450 mg in dextrose 5% 250 mL infusion  0.5 mg/min IntraVENous CONTINUOUS    dexmedetomidine (PRECEDEX) 400 mcg in 0.9% sodium chloride 100 mL infusion  0.2-0.7 mcg/kg/hr IntraVENous TITRATE    0.9% sodium chloride infusion 250 mL  250 mL IntraVENous PRN    levalbuterol (XOPENEX) nebulizer soln 1.25 mg/3 mL  1.25 mg Nebulization QID RT    [START ON 3/13/2017] levothyroxine (SYNTHROID) injection 260 mcg  260 mcg IntraVENous EVERY MON & TH    dilTIAZem (CARDIZEM) 100 mg in 0.9% sodium chloride (MBP/ADV) 100 mL infusion  0-15 mg/hr IntraVENous TITRATE    0.45% sodium chloride infusion  25 mL/hr IntraVENous CONTINUOUS    heparin (porcine) injection 5,000 Units  5,000 Units SubCUTAneous Q8H    cloNIDine (CATAPRES) 0.2 mg/24 hr patch 1 Patch  1 Patch TransDERmal Q7D    albuterol (PROVENTIL VENTOLIN) nebulizer solution 2.5 mg  2.5 mg Nebulization Q2H PRN    ipratropium (ATROVENT) 0.02 % nebulizer solution 0.5 mg  0.5 mg Nebulization QID RT    HYDROmorphone (PF) (DILAUDID) injection 1 mg  1 mg IntraVENous Q3H PRN    polyethylene glycol (MIRALAX) packet 17 g  17 g Oral DAILY    pantoprazole (PROTONIX) 40 mg in sodium chloride 0.9 % 10 mL injection  40 mg IntraVENous DAILY    sodium chloride (NS) flush 5-10 mL  5-10 mL IntraVENous Q8H    sodium chloride (NS) flush 5-10 mL  5-10 mL IntraVENous PRN    chlorhexidine (PERIDEX) 0.12 % mouthwash 15 mL  15 mL Oral Q12H    prochlorperazine (COMPAZINE) with saline injection 5 mg  5 mg IntraVENous Q4H PRN    sodium chloride (NS) flush 10 mL  10 mL InterCATHeter Q24H    sodium chloride (NS) flush 10-40 mL  10-40 mL InterCATHeter Q8H    acetaminophen (TYLENOL) tablet 650 mg  650 mg Oral Q4H PRN    naloxone (NARCAN) injection 0.4 mg  0.4 mg IntraVENous PRN    ondansetron (ZOFRAN) injection 4 mg  4 mg IntraVENous Q4H PRN    bisacodyl (DULCOLAX) suppository 10 mg  10 mg Rectal DAILY PRN         Objective:      Visit Vitals    BP (!) 170/114    Pulse (!) 124    Temp 97.8 °F (36.6 °C)    Resp 21    Ht 5' 5.98\" (1.676 m)    Wt 202 lb 9.6 oz (91.9 kg)    SpO2 90%    Breastfeeding No    BMI 32.72 kg/m2       Physical Exam:  Abdomen: soft, non-tender  Extremities: extremities normal  Heart: regular rate and rhythm  Lungs: clear to auscultation bilaterally  Pulses: 2+ and symmetric    Data Review:   Labs:    Recent Labs      03/12/17   0643  03/11/17   0404  03/10/17   0455   WBC  12.8*  12.1*  12.3*   HGB  9.4*  6.5*  7.1*   HCT  27.9*  20.3*  22.3*   PLT  382  387  344     Recent Labs      03/12/17   0643  03/11/17   0404  03/10/17   0455   NA  137  134*  138   K  4.7  4.5  4.1   CL  105  104  104   CO2  18*  18*  20*   GLU  109*  119*  102*   BUN  42*  39*  33*   CREA  3.04*  2.81*  2.46*   CA  8.1*  7.7*  8.1*   MG  2.4  2.0  2.1   PHOS  6.1*  5.4*  4.0   ALB  2.1*  2.0*  2.2*   TBILI  1.0  0.7  0.8   SGOT  55*  76* 155*   ALT  66  80*  111*       No results for input(s): TROIQ, CPK, CKMB in the last 72 hours. Intake/Output Summary (Last 24 hours) at 03/12/17 1920  Last data filed at 03/12/17 1900   Gross per 24 hour   Intake           1932.2 ml   Output             3557 ml   Net          -1624.8 ml        Telemetry: afl with 2:1 conduction    Assessment:     Active Problems:    HTN (hypertension) ()      Aneurysm ([facility]) (2/23/2017)      SVT (supraventricular tachycardia) (3/3/2017)      SSS (sick sinus syndrome) ([facility]) (3/6/2017)      Paroxysmal atrial fibrillation ([facility]) (3/6/2017)      Atrial flutter ([address] (3/12/2017)        Plan:     Leticia Villar is in afl with 2:1 conduction. Cont amio gtt. Will resume dilt gtt at 5 mg. Will defer to vasc surgery when oac can be started. Would likely benefit from afl ablation and pacemaker prior to dc.     Odell Bustamante MD, [facility]    3/12/2017

## 2017-03-12 NOTE — PROGRESS NOTES
[facility]  Pulmonary, Critical Care, and Sleep Medicine    Name: Leticia Villar MRN: [id number]   : 1955 Hospital: [facility]    Date: 3/12/2017            IMPRESSION:   · Acute respiratory failure with hypoxia: failed extubation 3/6, tolerating SBT again today, fewer secretions  · Bilateral pleural effusions bilateral chest tubes 3/10 per IR to drain effusions  · Malnutrition on TF  · COPD noted to be moderate severity. 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 · Transfuse prn Hbg less than 7  · Antihypertensives, catapress patch, off cardene drip  · PUD/DVT prophylaxis       Subjective/History:       3/12 still with some ET secretions. Sputum culture with GNRs. Now on SBT    3/11 Failed extubation 3/6. Now intubated, sedated. Copious frothy white secretions. Labored with SBT. Not tolrating TF. Bilateral chest tubes placed by IR on 3/11. Both lungs clearer on CXR this AM. TRansfused 1unit PRBC    The patient is critically ill and can not provide additional history due to Ventilated.             Current Facility-Administered Medications   Medication Dose Route Frequency    dexmedetomidine (PRECEDEX) 400 mcg in 0.9% sodium chloride 100 mL infusion  0.2-0.7 mcg/kg/hr IntraVENous TITRATE    levalbuterol (XOPENEX) nebulizer soln 1.25 mg/3 mL  1.25 mg Nebulization QID RT    [START ON 3/13/2017] levothyroxine (SYNTHROID) injection 260 mcg  260 mcg IntraVENous EVERY MON & TH    dilTIAZem (CARDIZEM) 100 mg in 0.9% sodium chloride (MBP/ADV) 100 mL infusion  0-15 mg/hr IntraVENous TITRATE    0.45% sodium chloride infusion  25 mL/hr IntraVENous CONTINUOUS    heparin (porcine) injection 5,000 Units  5,000 Units SubCUTAneous Q8H    cloNIDine (CATAPRES) 0.2 mg/24 hr patch 1 Patch  1 Patch TransDERmal Q7D    ipratropium (ATROVENT) 0.02 % nebulizer solution 0.5 mg  0.5 mg Nebulization QID RT    propofol (DIPRIVAN) infusion  5-50 mcg/kg/min IntraVENous TITRATE    polyethylene glycol (MIRALAX) packet 17 g  17 g Oral DAILY    pantoprazole (PROTONIX) 40 mg in sodium chloride 0.9 % 10 mL injection  40 mg IntraVENous DAILY    sodium chloride (NS) flush 5-10 mL  5-10 mL IntraVENous Q8H    chlorhexidine (PERIDEX) 0.12 % mouthwash 15 mL  15 mL Oral Q12H    sodium chloride (NS) flush 10 mL  10 mL InterCATHeter Q24H    sodium chloride (NS) flush 10-40 mL  10-40 mL InterCATHeter Q8H     Review of Systems:  Review of systems not obtained due to patient factors.     Objective:   Vital Signs:    Visit Vitals    /47    Pulse 74    Temp 99.7 °F (37.6 °C)    Resp 24    Ht 5' 5.98\" (1.676 m)    Wt 91.9 kg (202 lb 9.6 oz)    SpO2 95%    Breastfeeding No    BMI 32.72 kg/m2       O2 Device: Ventilator   O2 Flow Rate (L/min): 4 l/min   Temp (24hrs), Av.3 °F (37.4 °C), Min:98.4 °F (36.9 °C), Max:100.5 °F (38.1 °C)       Intake/Output:   Last shift:       07 - 1900  In: -   Out: 460 [Urine:350]  Last 3 shifts: 03/10 190 -  0700  In: 5463.6 [I.V.:2618.6]  Out: 2477 [Urine:3152]    Intake/Output Summary (Last 24 hours) at 17 1032  Last data filed at 17 1014   Gross per 24 hour   Intake             2798 ml   Output             3072 ml   Net             -274 ml       Ventilator Settings:  Mode Rate Tidal Volume Pressure FiO2 PEEP Spontaneous   500 ml  5 cm H2O 40 % 5 cm H20     Peak airway pressure: 20 cm H2O    Minute ventilation: 9.07 l/min      Physical Exam:    General:  Intubated, no distress, appears stated age. Head:  Normocephalic, without obvious abnormality, atraumatic. Eyes:  Conjunctivae/corneas clear. PERRL, EOMs intact. Nose: Nares normal. Septum midline. Mucosa normal. No drainage or sinus tenderness. Throat: Lips, mucosa, and tongue normal. Teeth and gums normal. Has a white coating over mouth. Neck: Supple, symmetrical, trachea midline, no adenopathy, thyroid: no enlargment/tenderness/nodules, no carotid bruit and no JVD. Back:   Symmetric, no curvature. ROM normal.   Lungs:   Decreased BS bilaterally, has bilateral rhonchi. Chest wall:  No tenderness or deformity. Heart:  Regular rate and rhythm, S1, S2 normal, no murmur, click, rub or gallop. Abdomen:   Obese;  bowel sounds are decreased,  No masses,  No organomegaly. Extremities: Extremities normal, atraumatic, no cyanosis or edema. Warm. Pulses: 2+ and symmetric all extremities. Skin: Skin color, texture, turgor normal. No rashes or lesions   Lymph nodes: Cervical, supraclavicular, and axillary nodes normal.   Neurologic: Not able to fully assess. Pt is post op, effects of anesthesia in place.         Data:     Recent Results (from the past 24 hour(s))   TYPE & SCREEN    Collection Time: 03/11/17 10:59 AM   Result Value Ref Range    Crossmatch Expiration 03/14/2017     ABO/Rh(D) O NEGATIVE     Antibody screen POS     Antibody ID NONSPECIFIC ANTIBODY     KASSANDRA Poly POS     KASSANDRA IgG NEG     KASSANDRA C3b/C3d NEG     ANTIBODY ELUTED ELUATE-NEGATIVE     Unit number [id number]     Blood component type RC LR AS1     Unit division 00     Status of unit TRANSFUSED     Crossmatch result Compatible     Unit number [id number]     Blood component type RC LR AS3,2     Unit division 00     Status of unit ISSUED     Crossmatch result Compatible    TRANSFUSION REACTION    Collection Time: 03/11/17 10:59 AM   Result Value Ref Range    Unit Number       [id number]  [id number]  [id number]  [id number]      Clerical Errors None detected     Pre-txfusion hemolysis: SLIGHT     Post-txfusion hemolysis: None detected     Blood bag hemolysis: Not required     Direct Reji,pre-txfusion POS     Direct Reji,post-txfusion POS     PATHOLOGIST INTERP:       Per Dr. Erik Polanco, [facility] to give additional units. sd [phone]    Txfusion workup comment       See accession [id number] and [id number] for extended workup results.     Component Type RED CELL GROUP     Blood type,post-txn O  NEG       MD Interpretation PENDING    TRXN WORKUP-POST TXN    Collection Time: 03/11/17 10:59 AM   Result Value Ref Range    ABO/Rh(D) O NEGATIVE     Antibody screen POS     Unit number [id number]     Blood component type POST REACTION     Unit division 00     Status of unit ALLOCATED     Crossmatch result Compatible     Unit number [id number]     Blood component type POST REACTION     Unit division 00     Status of unit OUTDATED,INCINERATED     Crossmatch result Compatible     Unit number [id number]     Blood component type POST REACTION     Unit division 00     Status of unit ALLOCATED     Crossmatch result Compatible     Unit number [id number]     Blood component type POST REACTION     Unit division 00     Status of unit ALLOCATED     Crossmatch result Compatible    CULTURE, RESPIRATORY/SPUTUM/BRONCH W GRAM STAIN    Collection Time: 03/11/17 11:12 AM   Result Value Ref Range    Special Requests: NO SPECIAL REQUESTS      GRAM STAIN 2+  WBCS SEEN        GRAM STAIN 1+  EPITHELIAL CELLS SEEN        GRAM STAIN 3+  GRAM NEGATIVE RODS        GRAM STAIN 2+  GRAM POSITIVE COCCI  IN PAIRS        Culture result: MODERATE  GRAM NEGATIVE RODS   (A)      Culture result: MODERATE  NORMAL RESPIRATORY YVAN       METABOLIC PANEL, COMPREHENSIVE    Collection Time: 03/12/17  6:43 AM   Result Value Ref Range Sodium 137 136 - 145 mmol/L    Potassium 4.7 3.5 - 5.1 mmol/L    Chloride 105 97 - 108 mmol/L    CO2 18 (L) 21 - 32 mmol/L    Anion gap 14 5 - 15 mmol/L    Glucose 109 (H) 65 - 100 mg/dL    BUN 42 (H) 6 - 20 MG/DL    Creatinine 3.04 (H) 0.55 - 1.02 MG/DL    BUN/Creatinine ratio 14 12 - 20      GFR est AA 19 (L) >60 ml/min/1.73m2    GFR est non-AA 16 (L) >60 ml/min/1.73m2    Calcium 8.1 (L) 8.5 - 10.1 MG/DL    Bilirubin, total 1.0 0.2 - 1.0 MG/DL    ALT (SGPT) 66 12 - 78 U/L    AST (SGOT) 55 (H) 15 - 37 U/L    Alk.  phosphatase 159 (H) 45 - 117 U/L    Protein, total 6.3 (L) 6.4 - 8.2 g/dL    Albumin 2.1 (L) 3.5 - 5.0 g/dL    Globulin 4.2 (H) 2.0 - 4.0 g/dL    A-G Ratio 0.5 (L) 1.1 - 2.2     MAGNESIUM    Collection Time: 03/12/17  6:43 AM   Result Value Ref Range    Magnesium 2.4 1.6 - 2.4 mg/dL   PHOSPHORUS    Collection Time: 03/12/17  6:43 AM   Result Value Ref Range    Phosphorus 6.1 (H) 2.6 - 4.7 MG/DL   CBC W/O DIFF    Collection Time: 03/12/17  6:43 AM   Result Value Ref Range    WBC 12.8 (H) 3.6 - 11.0 K/uL    RBC 3.33 (L) 3.80 - 5.20 M/uL    HGB 9.4 (L) 11.5 - 16.0 g/dL    HCT 27.9 (L) 35.0 - 47.0 %    MCV 83.8 80.0 - 99.0 FL    MCH 28.2 26.0 - 34.0 PG    MCHC 33.7 30.0 - 36.5 g/dL    RDW 15.7 (H) 11.5 - 14.5 %    PLATELET 675 178 - 813 K/uL             Telemetry:normal sinus rhythm    Imaging:  I have personally reviewed the patients radiographs and have reviewed the reports:  CXR: no acute changes, bilateral pleural effusions        Total critical care time exclusive of procedures:     Lexi Kat MD

## 2017-03-12 NOTE — PROGRESS NOTES
[facility] Consult Service Progress NOTE  Pulmonary, Critical Care, and Sleep Medicine    Name: Janet Mitchell MRN: [id number]   : 1955 Hospital: [facility]   Date: 3/12/2017  Admission Date: 2017     Chart and notes reviewed. Data reviewed. I have independently evaluated and examined the patient. Pt seen earlier on rounds. Extubated after SBT and acceptable ABGs. Moderate ETT secretions but strong cough. Pt asked to avoid talking too much until vocal cord swellin ghas time to regress. Pt had a cuff leak prior to extubation. Within hours pt developed more raspiness with breathing. Stridorous per RT    Will add humidiifcation, Racemic epinephrine and IV decadron x 2 doses. Keep IV precedex in case steroids cause agitation. Will also d/c NGT as it may impede clearance of upper airway secretions is acutely and chronically ill. May need BIPAP prn but not sure if it will be tolerated.  Reintubate prn      Hospital Day: Patt Izquierdo MD     3:34 PM

## 2017-03-12 NOTE — PROGRESS NOTES
Dr. Andrew Jimenez at bedside. Leak test performed. Patient extubated as ordered and placed on 6 L NC. Heart rate up- ,o2 sat 98%.

## 2017-03-12 NOTE — PROGRESS NOTES
Heart rate 126 on amiodarone drip. Patient appears comfortable on 6 L NC. Remains hoarse,but much less stridor. Will monitor. Still unable to move lower extremities,but does have sensation.

## 2017-03-12 NOTE — PROGRESS NOTES
03/12/17 0610   ABCDE Bundle   SBT Safety Screen Passed No   SBT Screen Reason for Failure Agitation     No SBT trial attempted because patient was agitated and trying to get out ov bed.

## 2017-03-12 NOTE — PROGRESS NOTES
Patient appears labored with stridorous respirations. o2 sat 89 %. Neb treatment given. Dr. Eric Mercado notified. Will remove nasogastric tube, and give racemic epi treatment as ordered. Decadron also given. Will monitor.

## 2017-03-12 NOTE — PROGRESS NOTES
Heart rate regular at 128,possibly atrial flutter, after amiodarone bolus. Amiodarone gtt now infusing. cardizem off.

## 2017-03-12 NOTE — PROGRESS NOTES
Propofol put on hold during SBT trial. Patient sleepy,but arousable. Nods appropriately to questions.

## 2017-03-12 NOTE — PROGRESS NOTES
2000, patient received from day shift. Neuro; sedated with Propofol at 20 sherman/kg/min, Precedex at 0.3 sherman/Kg/hr, GCS; Eye; 3, verbal; 1 for ETT, motor; 6 ( hands only) sensation at lower limbs slightly present as she grimace for pain. No movement there. Respiratory; Sat 98% on Ventilator A/C,, RR 12, Peep 5, FiO2 40%, ETT 23 cm at teeth, secretion; small, thick Tan, coarse rhonchi upper diminished lower lung sounds. Bilateral pigtail for pleural effusion,   Cardiac; NSR ( paroxysmal A Fib/Flatter), 74 B/min, NIBP 126/42 mmHg, Diltiazem on 2.5 mg/hr( Hx;  pause > Bradycardia), Pads present and connected to Morning Tec 55 stand by for any pause,   GI; NPO ( NGT at 68 cm right nare) Osmolite 1.2 at rate of 45 ml/hr, free water 75 ml Q 6 hr, residual; to follow up, obese Abd distended semi soft with active bowel sound, on NS 0.45% at 25 ml/hr. meduim brown formed stool  Renal; grubbs cath with adequate urine out put. sediment and cloudy   Skin; dressing present at neck and right side of femoral area,  Endo; no,   Lines; right PICC, ETT, NGT, Grubbs. Code; Full. Lab; WBC; 12.3 > 12.1, HGB; 7.1 > 6.5, platelet; 376 > 580 , Na; 138 > 134, K; 4.1 > 4.5 , BUN; 33 > 39, Crea; 2.46 > 2.81 , high LFT but bimprove  DVT; SCD both legs, heparin SQ Q 8 hr, heel boots. Plan; Ventilator management, cardiac monitoring, pain management, transfused 2 PRBs ( awaiting blood [facility] to get it ready), once order lasix 40 mg after first PRBCs, follow lab tomorrow,   2058, Dr Celso Davies informed about Temp 100.5 F bladder, he agreed to start blood transfusion and give tylenol PRN for fever now. Consent for blood transfusion on file. 2132, Temp now 99.9 Bladder, Blood transfusion verified by 2 RN and started. 2303, Propofol reduced to 10 sherman/Kg/hr as /37 mmHg, to follow up. 2316, Cardizem Held as /38 mmHg, HR 60 B/min, to follow up.  0045, first units of PRBCs completed without any complication.   0050, lasix 40 mg IV once order given after first PRBCs unit had been transfused,  0112, second PRBCs started. 0310, Patient start cough a lot of secretion Lester  Thick, HR increased to 101 B/ min with PACs, /64 mmH, Diltiazem restarted at 2.5 mg/hr, propofol increased till 30 sherman/kg/min, biting the tube, diluadid 1 mg PRN given. 0315, HR regular now 73, /42 mmHg, patient sleeping to follow up and adjust med accordingly. 6251, daylight saving applied for this time, blood transfusion finished, total 3 hours, no complication,   8819, blood sample sent to lab, patient been coughing, biting the tube, grimace, large tan to yellow thick secretion, dilaudid 1 mg IV PRN given. Precedex increased. 0700, propofol turned off for SAT trial after she calm down, to follow up,  Bedside and Verbal shift change report given to Shantanu Hartmann (oncoming nurse) by Daniel Newsome RN (offgoing nurse). Report included the following information SBAR, Kardex, Intake/Output, MAR, Accordion, Recent Results, Med Rec Status and Cardiac Rhythm NSR.

## 2017-03-12 NOTE — CONSULTS
Nephrology PROGRESS Note     Fransisco Chilel     www. [facility]                    Phone - [phone]   Patient: Chandni Pappas  YOB: 1955     Date- 3/12/2017   MRN: [id number]  Kobi Pendleton NP   Age: 59 y.o. Sex: female      A  ASSESSMENT:   · Acute renal failure likely due to ATN - vancomycin toxicity - vanco level - 29.2  · ckd 3 baseline cr. 1.2  · Resp. Failure - on vent  · AFIB with RVR  · Complicated type B descending thoracic aortic dissection. -   · S/p TEVAR, L ileo-fem bypass, L carotid-subclavian bypass:2/27/17  · Protein malnutrition  Active Problems:    HTN (hypertension) ()      Aneurysm (HCC) (2/23/2017)      SVT (supraventricular tachycardia) (3/3/2017)      SSS (sick sinus syndrome) (HCC) (3/6/2017)      Paroxysmal atrial fibrillation (HCC) (3/6/2017)      PLAN:   U/O remaisn good, creat up some - no need for diuretics, follow     [x] High complexity decision making was performed  [x] Patient is at high-risk of decompensation with multiple organ involvement    Past Medical Hx:   Past Medical History:   Diagnosis Date    Anxiety disorder     Arthritis     BACK, RT. KNEE and HANDS    CAD (coronary artery disease)      s/p 5 vessel CABG 2011    Carotid arterial disease (HCC)     diffuse bilateral CCA plaques    Chronic obstructive pulmonary disease ([facility] [address]     Depression with anxiety     Essential hypertension     GERD (gastroesophageal reflux disease)     rarely    GI bleed     Hypothyroid     Mesenteric artery stenosis (HCC)     Microcytic anemia     Renal artery atherosclerosis, bilateral (HCC)      RA occlusion, left s/p stent    Renal atrophy, right     SVT (supraventricular tachycardia) 3/3/2017    UC (ulcerative colitis) ([facility] [address]         Past Surgical Hx:     Past Surgical History:   Procedure Laterality Date    ESOPHAGEAL CAPSULE ENDOSCOPY  6/8/2015         HX COLONOSCOPY      HX CORONARY ARTERY BYPASS GRAFT  8/11 5 way bypass. 08/19/11    HX FEMORAL BYPASS      triple    HX LUMBAR DISKECTOMY  1997    HX UROLOGICAL      vascular stent x 2 to left kidney    SMALL BOWEL ENDOSCOPY,ABLATE LESN  3/13/2015         STRESS TEST LEXISCAN/CARDIOLITE  4/24/12    UPPER GI ENDOSCOPY,CTRL BLEED  6/2/2015            Medications:  Prior to Admission medications    Medication Sig Start Date End Date Taking? Authorizing Provider   spironolactone (ALDACTONE) 25 mg tablet Take 1 Tab by mouth daily. 1/17/17  Yes Deepa Sotomayor NP   venlafaxine-SR [facility] 150 mg capsule Take 1 Cap by mouth two (2) times a day. 1/17/17  Yes Deepa Sotomayor NP   clopidogrel (PLAVIX) 75 mg tab Take 1 Tab by mouth daily. 1/17/17  Yes Deepa Sotomayor NP   fenofibrate (LOFIBRA) 54 mg tablet Take 1 Tab by mouth daily. 1/17/17  Yes Deepa Sotomayor NP   pantoprazole (PROTONIX) 40 mg tablet Take 1 Tab by mouth two (2) times a day. 1/17/17  Yes Deepa Sotomayor NP   atorvastatin (LIPITOR) 40 mg tablet Take 2 Tabs by mouth daily. 1/17/17  Yes Deepa Sotomayor NP   busPIRone (BUSPAR) 7.5 mg tablet TAKE 1 TABLET BY MOUTH TWO TIMES A DAY. 1/10/17  Yes Deepa Sotomayor NP   metoprolol tartrate (LOPRESSOR) 25 mg tablet TAKE 1 TAB BY MOUTH TWO (2) TIMES A DAY. 12/20/16  Yes John Oconnor MD   STOOL SOFTENER 100 mg capsule TK ONE C PO  BID FOR CONSTIPATION 8/2/16  Yes Historical Provider   cloNIDine HCl (CATAPRES) 0.1 mg tablet Take  by mouth two (2) times a day. Yes Historical Provider   clonazePAM (KLONOPIN) 0.5 mg tablet TAKE 1 TABLET BY MOUTH TWICE A DAY . Canary Malady MUST LAST 30 DAYS 1/24/17   Deepa Sotomayor NP   levothyroxine (SYNTHROID) 100 mcg tablet Take 1 Tab by mouth Daily (before breakfast).  1/17/17   Deepa Sotomayor NP   hydrALAZINE (APRESOLINE) 25 mg tablet  8/2/16   Historical Provider   oxyCODONE-acetaminophen (PERCOCET) 5-325 mg per tablet TK 1 TO 2 TS PO Q 4 H PRN P 8/2/16   Historical Provider   amLODIPine (NORVASC) 10 mg tablet Take by mouth ACB/HS. Historical Provider   tiotropium bromide (SPIRIVA RESPIMAT) 1.25 mcg/actuation inhaler Take 2 Puffs by inhalation daily. Historical Provider   ferrous sulfate 325 mg (65 mg iron) tablet Take 325 mg by mouth three (3) times daily (with meals). Historical Provider   potassium chloride (KLOR-CON M20) 20 mEq tablet Take 20 mEq by mouth daily as needed (for leg cramps). Historical Provider   diphenhydrAMINE (BENADRYL ALLERGY) 25 mg tablet Take 25 mg by mouth three (3) times daily as needed for Itching. Historical Provider   aspirin 81 mg chewable tablet Take 81 mg by mouth daily. Historical Provider       Allergies   Allergen Reactions    Tussionex Itching    Codeine Itching    Nickel Other (comments)     Local reaction (redness, irritation, blistering) if wears \"cheap earrings\"    Oxycodone Itching       Social Hx:  reports that she quit smoking about 5 years ago. Her smoking use included Cigarettes. She has a 40.00 pack-year smoking history. She has quit using smokeless tobacco. She reports that she does not drink alcohol or use illicit drugs.      Family History   Problem Relation Age of Onset    Post-op Nausea/Vomiting Other     Other Other      LOW BP AND DIFFICULTY BREATHING    Hypertension Mother     Stroke Mother       age 48,    [place] Hypertension Brother     Stroke Brother       age 55, smoked       Review of Systems:  Unobtainable    Allergies   Allergen Reactions    Tussionex Itching    Codeine Itching    Nickel Other (comments)     Local reaction (redness, irritation, blistering) if wears \"cheap earrings\"    Oxycodone Itching      Objective:    Vitals:    Vitals:    17 0716 17 0800 17 0900 17 1100   BP:  146/59 129/47 139/51   Pulse: 76 76 74 72   Resp: 16 16 24 27   Temp:  99.7 °F (37.6 °C)     SpO2: 97% 98% 95% 96%   Weight:       Height:         I&O's:   0701 -  07  In: 2933 [I.V.:1078]  Out: 5564 [Urine:2537]    Physical Exam:  General:INTUBATED but about to be extubated  Eyes:No scleral icterus,+ conjunctival pallor  Neck: left side dressing +  ET tube +  Lungs: few ronchi  CVS:IRRR, S1 S2, tachy  Abdomen:Soft, Non tender  Extremities: + LE edema  Skin:No rash or lesions, Warm and DRY   Psych: Can't access due to patient's current condition  :  Palacio +  NEURO: awake      CODE STATUS:  full  Care Plan discussed with:  Nurse      Chart reviewed.     Lab Data Personally Reviewed: (see below)  Recent Labs      03/12/17   0643  03/11/17   0404  03/10/17   0455   NA  137  134*  138   K  4.7  4.5  4.1   CL  105  104  104   CO2  18*  18*  20*   GLU  109*  119*  102*   BUN  42*  39*  33*   CREA  3.04*  2.81*  2.46*   CA  8.1*  7.7*  8.1*   MG  2.4  2.0  2.1   PHOS  6.1*  5.4*  4.0     Recent Labs      03/12/17   0643  03/11/17   0404  03/10/17   0455   WBC  12.8*  12.1*  12.3*   HGB  9.4*  6.5*  7.1*   HCT  27.9*  20.3*  22.3*   PLT  382  387  344     Recent Labs      03/12/17   0643  03/11/17   0404  03/10/17   0455   NA  137  134*  138   K  4.7  4.5  4.1   CL  105  104  104   CO2  18*  18*  20*   BUN  42*  39*  33*   CREA  3.04*  2.81*  2.46*   GLU  109*  119*  102*   CA  8.1*  7.7*  8.1*   MG  2.4  2.0  2.1   PHOS  6.1*  5.4*  4.0     Lab Results   Component Value Date/Time    Color YELLOW/STRAW 02/23/2017 03:21 PM    Appearance CLOUDY 02/23/2017 03:21 PM    Specific gravity 1.013 02/23/2017 03:21 PM    pH (UA) 5.0 02/23/2017 03:21 PM    Protein 100 02/23/2017 03:21 PM    Glucose 100 02/23/2017 03:21 PM    Ketone NEGATIVE  02/23/2017 03:21 PM    Bilirubin NEGATIVE  02/23/2017 03:21 PM    Urobilinogen 0.2 02/23/2017 03:21 PM    Nitrites NEGATIVE  02/23/2017 03:21 PM    Leukocyte Esterase TRACE 02/23/2017 03:21 PM    Epithelial cells FEW 02/23/2017 03:21 PM    Bacteria NEGATIVE  02/23/2017 03:21 PM    WBC 0-4 02/23/2017 03:21 PM    RBC 0-5 02/23/2017 03:21 PM     Lab Results   Component Value Date/Time    Culture result: MODERATE  GRAM NEGATIVE RODS   03/11/2017 11:12 AM    Culture result: MODERATE  NORMAL RESPIRATORY YVAN   03/11/2017 11:12 AM    Culture result: SCANT  NORMAL RESPIRATORY YVAN   03/02/2017 10:25 AM     Prior to Admission Medications   Prescriptions Last Dose Informant Patient Reported? Taking? STOOL SOFTENER 100 mg capsule 2/24/2017 at 1700  Yes Yes   Sig: TK ONE C PO  BID FOR CONSTIPATION   amLODIPine (NORVASC) 10 mg tablet   Yes No   Sig: Take  by mouth ACB/HS. aspirin 81 mg chewable tablet  Self Yes No   Sig: Take 81 mg by mouth daily. atorvastatin (LIPITOR) 40 mg tablet 2/24/2017 at 0800  No Yes   Sig: Take 2 Tabs by mouth daily. busPIRone (BUSPAR) 7.5 mg tablet 2/24/2017 at 1700  No Yes   Sig: TAKE 1 TABLET BY MOUTH TWO TIMES A DAY. cefdinir (OMNICEF) 300 mg capsule Unknown at Unknown time  No No   Sig: Take 1 Cap by mouth two (2) times a day for 10 days. cloNIDine HCl (CATAPRES) 0.1 mg tablet 2/24/2017 at 1700  Yes Yes   Sig: Take  by mouth two (2) times a day. clonazePAM (KLONOPIN) 0.5 mg tablet Unknown at Unknown time  No No   Sig: TAKE 1 TABLET BY MOUTH TWICE A DAY . Brandt Basket MUST LAST 30 DAYS   clopidogrel (PLAVIX) 75 mg tab 2/23/2017 at Unknown time  No Yes   Sig: Take 1 Tab by mouth daily. diphenhydrAMINE (BENADRYL ALLERGY) 25 mg tablet  Self Yes No   Sig: Take 25 mg by mouth three (3) times daily as needed for Itching. fenofibrate (LOFIBRA) 54 mg tablet 2/24/2017 at 0800  No Yes   Sig: Take 1 Tab by mouth daily. ferrous sulfate 325 mg (65 mg iron) tablet   Yes No   Sig: Take 325 mg by mouth three (3) times daily (with meals). hydrALAZINE (APRESOLINE) 25 mg tablet   Yes No   levothyroxine (SYNTHROID) 100 mcg tablet   No No   Sig: Take 1 Tab by mouth Daily (before breakfast). metoprolol tartrate (LOPRESSOR) 25 mg tablet 2/27/2017 at 0403  No Yes   Sig: TAKE 1 TAB BY MOUTH TWO (2) TIMES A DAY.    oxyCODONE-acetaminophen (PERCOCET) 5-325 mg per tablet   Yes No   Sig: TK 1 TO 2 TS PO Q 4 H PRN P   pantoprazole (PROTONIX) 40 mg tablet 2/24/2017 at 1700  No Yes   Sig: Take 1 Tab by mouth two (2) times a day. potassium chloride (KLOR-CON M20) 20 mEq tablet  Self Yes No   Sig: Take 20 mEq by mouth daily as needed (for leg cramps). spironolactone (ALDACTONE) 25 mg tablet 2/24/2017 at 0800  No Yes   Sig: Take 1 Tab by mouth daily. tiotropium bromide (SPIRIVA RESPIMAT) 1.25 mcg/actuation inhaler Unknown at Unknown time  Yes No   Sig: Take 2 Puffs by inhalation daily. venlafaxine-SR (EFFEXOR-XR) 150 mg capsule 2/24/2017 at 1700  No Yes   Sig: Take 1 Cap by mouth two (2) times a day. Facility-Administered Medications: None       Medications list Personally Reviewed   [x]      Yes     []               No    Thank you for allowing us to participate in the care this patient. We will follow patient with you. Signed By: Lillie Brtio MD  [facility]  [facility] [address], [place], [address]  Phone - [phone]         Fax - [phone] [facility] Office  [address] [place] [address]  Phone - [phone]        Fax - [phone]     [email or web address]

## 2017-03-12 NOTE — PROGRESS NOTES
Will continue to increase cardizem gtt as ordered by Dr. Ramsey Malik.  Changed to 10mg/hour. Will monitor. Patient has strong congested coughs with production of large amounts thick white sputum.

## 2017-03-12 NOTE — PROGRESS NOTES
Vascular:    No significant changes - remains hemodynamically stable on vent    Continue current support

## 2017-03-13 NOTE — PROGRESS NOTES
Bedside report received from Viki Butler RN                   Assessment, Background, Procedure summary, Intake/Output, MAR, and recent results discussed. Care assumed. Verbal report given to oncoming nurse Sallie Senior RN & Chetan Leach RN    Report consisted of patients Situation, Background, Assessment, and   Recommendations    Information was reviewed with the receiving nurse. Opportunity for questions and clarification was provided.       Krissy Saravia RN

## 2017-03-13 NOTE — PROGRESS NOTES
Nephrology Progress Note     Fransisco Chilel       [email or web address]. [email or web address]                   Phone - [phone]   Patient: King Herb  YOB: 1955     Date- 3/13/2017     CC: Follow up for arf          Subjective: Interval History:   -   k increase  Cr.stable   Good urine out put  Extubated yesterday    Per RN - c/o  cough  Not able to move legs  Moves both arms     Assessment:   · Acute renal failure likely due to ATN - vancomycin toxicity - vanco level - 29.2  · hyperkalemai  · ckd 3 baseline cr. 1.2  · Resp. Failure - s/p extubation  · AFIB with RVR  · Complicated type B descending thoracic aortic dissection. -   · S/p TEVAR, L ileo-fem bypass, L carotid-subclavian bypass:2/27/17  · Protein malnutrition  · B/l effusion  · S/p chest tube placement     Plan:   - give iv bicarb  - give iv insulin and d 50  - can't give kayexalate with possible risk of aspiration  - check bmp today afternoon  - no dialysis yet  - seems like cr. Has pleatue- hopefully cr will start improving  - iv bicarb  Care Plan discussed with: nurse  [] High complexity decision making was performed  [] Patient is at high-risk of decompensation with multiple organ involvement  Review of Systems: Pertinent items are noted in HPI. Objective:   Vitals:    03/13/17 0600 03/13/17 0700 03/13/17 0800 03/13/17 0858   BP: 137/77 129/74 132/76    Pulse: (!) 125 (!) 121 (!) 126    Resp: 23 24 20    Temp:   96.6 °F (35.9 °C)    SpO2: 97% 95% 98% 95%   Weight:       Height:           Intake/Output Summary (Last 24 hours) at 03/13/17 1022  Last data filed at 03/13/17 5866   Gross per 24 hour   Intake           1335.1 ml   Output             2755 ml   Net          -1419.9 ml     Physical Exam:   GEN: NAD  NECK- Supple,  RESP: Coarse b/l, no wheezing  CVS: RRR,S1,S2   NEURO: non focal, normal speech  SKIN: No Rash  ABDO: soft , non tender, No hepatosplenomegaly  EXT: + Edema   Neuro - can't move lower leg.  Moves upepr arm  lee - romie +    Chart reviewed. Pertinent Notes reviewed.    Medications list  reviewed     Current Facility-Administered Medications   Medication    phenol throat spray (CHLORASEPTIC) 1 Spray    sodium bicarbonate (8.4%) 150 mEq in dextrose 5% 850 mL infusion    insulin regular (NOVOLIN R, HUMULIN R) injection 10 Units    dextrose (D50W) injection syrg 25 g    cefepime (MAXIPIME) 2 g in 0.9% sodium chloride (MBP/ADV) 100 mL    amiodarone (CORDARONE) 450 mg in dextrose 5% 250 mL infusion    dexmedetomidine (PRECEDEX) 400 mcg in 0.9% sodium chloride 100 mL infusion    0.9% sodium chloride infusion 250 mL    levalbuterol (XOPENEX) nebulizer soln 1.25 mg/3 mL    levothyroxine (SYNTHROID) injection 260 mcg    dilTIAZem (CARDIZEM) 100 mg in 0.9% sodium chloride (MBP/ADV) 100 mL infusion    heparin (porcine) injection 5,000 Units    cloNIDine (CATAPRES) 0.2 mg/24 hr patch 1 Patch    albuterol (PROVENTIL VENTOLIN) nebulizer solution 2.5 mg    ipratropium (ATROVENT) 0.02 % nebulizer solution 0.5 mg    HYDROmorphone (PF) (DILAUDID) injection 1 mg    polyethylene glycol (MIRALAX) packet 17 g    pantoprazole (PROTONIX) 40 mg in sodium chloride 0.9 % 10 mL injection    sodium chloride (NS) flush 5-10 mL    sodium chloride (NS) flush 5-10 mL    prochlorperazine (COMPAZINE) with saline injection 5 mg    sodium chloride (NS) flush 10 mL    sodium chloride (NS) flush 10-40 mL    acetaminophen (TYLENOL) tablet 650 mg    naloxone (NARCAN) injection 0.4 mg    ondansetron (ZOFRAN) injection 4 mg    bisacodyl (DULCOLAX) suppository 10 mg              Data Review :  Recent Labs      03/13/17   0403  03/12/17   0643  03/11/17   0404   NA  139  137  134*   K  5.6*  4.7  4.5   CL  107  105  104   CO2  19*  18*  18*   GLU  146*  109*  119*   BUN  52*  42*  39*   CREA  3.05*  3.04*  2.81*   CA  8.4*  8.1*  7.7*   MG   --   2.4  2.0   PHOS   --   6.1*  5.4*   ALB   --   2.1*  2.0* SGOT   --   55*  76*   ALT   --   66  80*     Recent Labs      03/12/17   0643  03/11/17   0404   WBC  12.8*  12.1*   HGB  9.4*  6.5*   HCT  27.9*  20.3*   PLT  382  387     Lab Results   Component Value Date/Time    Culture result: MODERATE  GRAM NEGATIVE RODS   03/11/2017 11:12 AM    Culture result: MODERATE  NORMAL RESPIRATORY YVAN   03/11/2017 11:12 AM    Culture result: SCANT  NORMAL RESPIRATORY YVAN   03/02/2017 10:25 AM    Culture result: MODERATE  NORMAL RESPIRATORY YVAN   02/27/2017 05:18 PM    Culture result: MIXED UROGENITAL YVAN ISOLATED 06/24/2016 09:12 PM     Lab Results   Component Value Date/Time    Specimen Description: STOOL 11/10/2013 08:00 PM    Specimen Description: BLOOD 05/24/2013 10:50 AM    Specimen Description: BLOOD 05/22/2013 09:55 PM    Specimen Description: STOOL 05/22/2013 06:20 PM    Specimen Description: URINE 08/16/2011 10:54 AM     Tristen Tyler MD  [facility]  [facility] [place]  [facility] [address] [place], [address]  Phone - [phone]         Fax - [phone] [facility] Office  [address] [place] [address]  Phone - [phone]        Fax - [phone]     www. [facility]

## 2017-03-13 NOTE — PROGRESS NOTES
Occupational Therapy  Medical record reviewed. Pt is awaiting LE doppler test and requested to defer OT tx until resulted. Will continue to follow.

## 2017-03-13 NOTE — PROGRESS NOTES
1900 Bedside report received from SABRINA Sullivan RN. Patient is alert, on [email or web address] and face tent, infusing Precedex 0.3 mcg/kg. hr, Amio at 1 mg/hr, NS at 25cc/hr, HR at 124, /114. Dr. Arenas How arrived at bedside. 1945 Dr. Arenas How suggested to restart Diltiazem at 5mg/hr. 2011 Diltiazem restarted at 5mg/hr. Will continue to monitor. 0105 Heart monitor showed a pause of 3.14 sec long then went to SR then went back to afib/aflutter with a rate of 120. Cardizem gtt decreased to 2.5 mg/hr. Will continue to monitor. 0715 Bedside report given to SABRINA Nassar

## 2017-03-13 NOTE — PROGRESS NOTES
Physical Therapy:    Orders received and chart reviewed. Spoke with RN and discussed in rounds. Patient with pending dopplers bilateral LEs and requested to hold therapy until completed. Will follow up time permitting and patient stability.  Thank you    Judy Najera, PT, DPT

## 2017-03-13 NOTE — PROGRESS NOTES
Nutrition Assessment:    RECOMMENDATIONS:   Advance diet as medically able per SLP (pt will need a Renal diet restriction)     ASSESSMENT:   Chart reviewed, case discussed during CCU rounds. Pt extubated yesterday, needs re-estimated. She is currently unable to move her LE's. NGT is out and TF stopped. Per Dr. Katelyn Ibanez for SLP eval likely tomorrow as she is very hoarse today. Renal following for ARF, K+ and phos both high. No plans for HD yet. She is on D5IVF's. Will monitor diet advancement, at this point she will need a renal restriction. Dietitians Intervention(s)/Plan(s): Advance diet per SLP, monitor K+ and phos  SUBJECTIVE/OBJECTIVE:   Pt lying in bed awake and alert  Diet Order: NPO  % Eaten:  No data found. Pertinent Medications:cefepime, protonix, miralax, KCl; [email or web address]). Chemistries:  Lab Results   Component Value Date/Time    Sodium 139 03/13/2017 04:03 AM    Potassium 5.6 03/13/2017 04:03 AM    Chloride 107 03/13/2017 04:03 AM    CO2 19 03/13/2017 04:03 AM    Anion gap 13 03/13/2017 04:03 AM    Glucose 146 03/13/2017 04:03 AM    BUN 52 03/13/2017 04:03 AM    Creatinine 3.05 03/13/2017 04:03 AM    BUN/Creatinine ratio 17 03/13/2017 04:03 AM    GFR est AA 19 03/13/2017 04:03 AM    GFR est non-AA 15 03/13/2017 04:03 AM    Calcium 8.4 03/13/2017 04:03 AM    Albumin 2.1 03/12/2017 06:43 AM      Anthropometrics: Height: 5' 5.98\" (167.6 cm) Weight: 91.9 kg (202 lb 9.6 oz)    IBW (%IBW): 59.1 kg (130 lb 4.7 oz) ( ) UBW (%UBW): 68.2 kg (150 lb 5.7 oz) (  %)    BMI: Body mass index is 32.72 kg/(m^2). This BMI is indicative of:  []Underweight   []Normal   []Overweight   [x] Obesity   [] Extreme Obesity (BMI>40)  Estimated Nutrition Needs (Based on): 1788 Kcals/day (MSJ 1490 x 1.2) , 68 g (1gPro/kg ABW) Protein  Carbohydrate:  At Least 130 g/day  Fluids: 1800 mL/day or per Nephrology    Last BM: 3/13   []Active     []Hyperactive  [x]Hypoactive       [] Absent   BS  Skin:    [] Intact   [x] Incision  [] Breakdown   [] DTI   [] Tears/Excoriation/Abrasion  [x]Edema(+2-BUE; +1-BLE) [] Other: Wt Readings from Last 30 Encounters:   03/05/17 91.9 kg (202 lb 9.6 oz)   02/23/17 78 kg (171 lb 15.3 oz)   02/21/17 77.2 kg (170 lb 4 oz)   01/24/17 77.7 kg (171 lb 4 oz)   08/24/16 75.5 kg (166 lb 8 oz)   06/27/16 79.2 kg (174 lb 9.6 oz)   06/26/16 78 kg (172 lb)   05/31/16 77.3 kg (170 lb 8 oz)   04/15/16 77.6 kg (171 lb)   04/12/16 77.8 kg (171 lb 9.6 oz)   02/22/16 82.1 kg (181 lb)   12/21/15 78.8 kg (173 lb 12.8 oz)   11/09/15 80.3 kg (177 lb)   10/09/15 80.3 kg (177 lb)   09/16/15 79.5 kg (175 lb 4.8 oz)   07/10/15 80.3 kg (177 lb)   07/08/15 80.6 kg (177 lb 9.6 oz)   07/01/15 79.8 kg (176 lb)   06/29/15 79.7 kg (175 lb 9.6 oz)   06/02/15 80.3 kg (177 lb)   05/07/15 80.6 kg (177 lb 9.6 oz)   05/06/15 79.8 kg (176 lb)   05/02/15 79.4 kg (175 lb)   04/21/15 80.3 kg (177 lb)   03/13/15 77.1 kg (170 lb)   02/02/15 77.3 kg (170 lb 6.4 oz)   12/05/14 77.9 kg (171 lb 12.8 oz)   11/28/14 76.7 kg (169 lb)   11/10/14 78.5 kg (173 lb)   10/24/14 76.7 kg (169 lb 3.2 oz)      NUTRITION DIAGNOSES:   Problem:  Inadequate protein-energy intake      Etiology: related to pt NPO      Signs/Symptoms: as evidenced by NPO + propofol meets <25% kcal and 0% protein needs. Previous dx re: inadequate intake continues, pt now NPO. NUTRITION INTERVENTIONS:  Meals/Snacks: Other (advance diet as medically able per SLP)                 GOAL:   Pt will advance to PO diet and consume >50% of meals with K+ and phos WNL in 2-3 days.      NUTRITION MONITORING AND EVALUATION   Previous Goal: Pt will tolerate TF at goal rate with a BM in 2-4 days  Previous Goal Met: N/A (pt no longer on TF)   Previous Recommendations Implemented: Yes   Cultural, Alevism, or Ethnic Dietary Needs: None   LEARNING NEEDS (Diet, Food/Nutrient-Drug Interaction):    [x] None Identified   [] Identified and Education Provided/Documented   [] Identified and Pt declined/was not appropriate      [x] Interdisciplinary Care Plan Reviewed/Documented    [x] Participated in Discharge Planning: Unable to determine   [x] Interdisciplinary Rounds     NUTRITION RISK:    [x] High              [] Moderate           []  Low  []  Minimal/Uncompromised      Camilla Walsh, VIVIANA  Pager [phone]  Weekend Pager [phone]

## 2017-03-13 NOTE — PROGRESS NOTES
3/13/2017 10:50 AM    Admit Date: 2/23/2017    Admit Diagnosis: Aneurysm (HCC);AORTIC DISECTION    Subjective:     Tish London Havers now extubated. Hard to hear, however denies any cardiac complaints. Continues to have episodes of tachy-hussein.      Visit Vitals    /76    Pulse (!) 126    Temp 96.6 °F (35.9 °C)    Resp 20    Ht 5' 5.98\" (1.676 m)    Wt 202 lb 9.6 oz (91.9 kg)    SpO2 95%    Breastfeeding No    BMI 32.72 kg/m2     Current Facility-Administered Medications   Medication Dose Route Frequency    phenol throat spray (CHLORASEPTIC) 1 Spray  1 Spray Oral PRN    sodium bicarbonate (8.4%) 150 mEq in dextrose 5% 850 mL infusion   IntraVENous CONTINUOUS    insulin regular (NOVOLIN R, HUMULIN R) injection 10 Units  10 Units IntraVENous ONCE    dextrose (D50W) injection syrg 25 g  25 g IntraVENous ONCE    cefepime (MAXIPIME) 2 g in 0.9% sodium chloride (MBP/ADV) 100 mL  2 g IntraVENous Q24H    amiodarone (CORDARONE) 450 mg in dextrose 5% 250 mL infusion  0.5 mg/min IntraVENous CONTINUOUS    dexmedetomidine (PRECEDEX) 400 mcg in 0.9% sodium chloride 100 mL infusion  0.2-0.7 mcg/kg/hr IntraVENous TITRATE    0.9% sodium chloride infusion 250 mL  250 mL IntraVENous PRN    levalbuterol (XOPENEX) nebulizer soln 1.25 mg/3 mL  1.25 mg Nebulization QID RT    levothyroxine (SYNTHROID) injection 260 mcg  260 mcg IntraVENous EVERY MON & TH    dilTIAZem (CARDIZEM) 100 mg in 0.9% sodium chloride (MBP/ADV) 100 mL infusion  0-15 mg/hr IntraVENous TITRATE    heparin (porcine) injection 5,000 Units  5,000 Units SubCUTAneous Q8H    cloNIDine (CATAPRES) 0.2 mg/24 hr patch 1 Patch  1 Patch TransDERmal Q7D    albuterol (PROVENTIL VENTOLIN) nebulizer solution 2.5 mg  2.5 mg Nebulization Q2H PRN    ipratropium (ATROVENT) 0.02 % nebulizer solution 0.5 mg  0.5 mg Nebulization QID RT    HYDROmorphone (PF) (DILAUDID) injection 1 mg  1 mg IntraVENous Q3H PRN    polyethylene glycol (MIRALAX) packet 17 g  17 g Oral DAILY    pantoprazole (PROTONIX) 40 mg in sodium chloride 0.9 % 10 mL injection  40 mg IntraVENous DAILY    sodium chloride (NS) flush 5-10 mL  5-10 mL IntraVENous Q8H    sodium chloride (NS) flush 5-10 mL  5-10 mL IntraVENous PRN    prochlorperazine (COMPAZINE) with saline injection 5 mg  5 mg IntraVENous Q4H PRN    sodium chloride (NS) flush 10 mL  10 mL InterCATHeter Q24H    sodium chloride (NS) flush 10-40 mL  10-40 mL InterCATHeter Q8H    acetaminophen (TYLENOL) tablet 650 mg  650 mg Oral Q4H PRN    naloxone (NARCAN) injection 0.4 mg  0.4 mg IntraVENous PRN    ondansetron (ZOFRAN) injection 4 mg  4 mg IntraVENous Q4H PRN    bisacodyl (DULCOLAX) suppository 10 mg  10 mg Rectal DAILY PRN         Objective:      Visit Vitals    /76    Pulse (!) 126    Temp 96.6 °F (35.9 °C)    Resp 20    Ht 5' 5.98\" (1.676 m)    Wt 202 lb 9.6 oz (91.9 kg)    SpO2 95%    Breastfeeding No    BMI 32.72 kg/m2       Physical Exam:  Abdomen: soft, non-tender. Bowel sounds normal.   Extremities: no cyanosis or edema  Heart: regular rate and rhythm, S1, S2 normal, no murmur, click, rub or gallop  Lungs: clear to auscultation bilaterally  Neurologic: unable to move b/l LE.      Data Review:   Labs:    Recent Results (from the past 24 hour(s))   BLOOD GAS, ARTERIAL    Collection Time: 03/12/17 10:58 AM   Result Value Ref Range    pH 7.37 7.35 - 7.45      PCO2 34 (L) 35.0 - 45.0 mmHg    PO2 71 (L) 80 - 100 mmHg    O2 SAT 94 92 - 97 %    BICARBONATE 19 (L) 22 - 26 mmol/L    BASE DEFICIT 5.2 mmol/L    O2 METHOD VENTILATOR      FIO2 40 %    MODE CPAP      SPONTANEOUS RATE 28.0      PRESSURE SUPPORT 5.0      EPAP/CPAP/PEEP 5.0      Sample source ARTERIAL      SITE LEFT RADIAL      LEIGH ANN'S TEST YES     METABOLIC PANEL, BASIC    Collection Time: 03/13/17  4:03 AM   Result Value Ref Range    Sodium 139 136 - 145 mmol/L    Potassium 5.6 (H) 3.5 - 5.1 mmol/L    Chloride 107 97 - 108 mmol/L    CO2 19 (L) 21 - 32 mmol/L    Anion gap 13 5 - 15 mmol/L    Glucose 146 (H) 65 - 100 mg/dL    BUN 52 (H) 6 - 20 MG/DL    Creatinine 3.05 (H) 0.55 - 1.02 MG/DL    BUN/Creatinine ratio 17 12 - 20      GFR est AA 19 (L) >60 ml/min/1.73m2    GFR est non-AA 15 (L) >60 ml/min/1.73m2    Calcium 8.4 (L) 8.5 - 10.1 MG/DL       Telemetry: willem rollins,       Assessment:     Active Problems:    HTN (hypertension) ()      Aneurysm ([facility]) (2/23/2017)      SVT (supraventricular tachycardia) (3/3/2017)      SSS (sick sinus syndrome) ([facility]) (3/6/2017)      Paroxysmal atrial fibrillation ([facility]) (3/6/2017)      Atrial flutter ([facility] [address] (3/12/2017)        Plan:     Continues to have afl along with pauses. Cont amio gtt and dilt gtt. When and if oac, once k with vascular surgery. Would likely benefit from afl ablation and pacemaker prior to dc as per EP.  Will d/w Dr. Antonieta Davidson.

## 2017-03-13 NOTE — PROGRESS NOTES
Attended Interdisciplinary rounds in Critical Care Unit, where patient care was discussed. Visit by: Mary Jane Choi. Sylvie Hernandez.  Alex Head, MA, [address]

## 2017-03-13 NOTE — PROGRESS NOTES
Father Pebbles Mathur says he anointed patient. Visit by: Jourdan Philippe. Lev Rodriguez.  Елена Vora MA, [facility]    Lead [facility] [employer]ment

## 2017-03-14 NOTE — PROGRESS NOTES
Attended Interdisciplinary rounds in Critical Care Unit, where patient care was discussed. Visit by: Trish Calderon. Vidya Brain.  Emeli Grijalva MA, [address]

## 2017-03-14 NOTE — PROGRESS NOTES
Problem: Dysphagia (Adult)  Goal: *Speech Goal: (INSERT TEXT)  3/14/2017  Speech path goals:  1. Pt will participate with reeval of swallowing daily until started on a diet. 2. Pt will tolerate ice chips for pleasure as fed by staff. 3. Pt will tolerate modified diet without s/s of aspiration. SPEECH LANGUAGE PATHOLOGY BEDSIDE SWALLOW EVALUATION  Patient: Bob Mcclendon (89 y.o. female)  Date: 3/14/2017  Primary Diagnosis: Aneurysm ([address]  AORTIC DISECTION  Procedure(s) (LRB):  ENDOVASCULAR ANEURYSM REPAIR of THORACIC AORTIC DISSECTION, repair of ruptured left iliac artery with stent placement. Left ileo-femoral bypass graft with PTFE. (Bilateral)  LEFT CAROTID SUBCLAVIAN BYPASS with stent placement of left common artery. (Left) 15 Days Post-Op   Precautions:          ASSESSMENT :  Based on the objective data described below, the patient presents with mild oral and mod to severe pharyngeal dysphagia. Orally she does not always accept the bolus timely. Pharyngeal phase was characterized by mild swallow delay and mildly reduced hyolaryngeal excursion via palpation. Coughing was noted after ice chips and thins inconsistently. No coughing after purees. She was slow to complete tasks and did not appear very interested in po. Once applesauce was thrown out she stated she would like to have finished it. Due to lengthy intubation and hoarse vocal quality would not pursue po very aggressively. Suggest Peridex use continue and ice chips offered after Peridex use. She is at risk to aspirate so would offer small amts with nsg administering ice. Patient will benefit from skilled intervention to address the above impairments.   Patients rehabilitation potential is considered to be Good  Factors which may influence rehabilitation potential include:   [ ]            None noted  [X]            Mental ability/status  [X]            Medical condition  [X]            Home/family situation and support systems  [X] Safety awareness  [ ]            Pain tolerance/management  [ ]            Other:        PLAN :  Recommendations and Planned Interventions:  Recommend ice chips for pleasure and to prevent disuse atrophy after Peridex use. Frequency/Duration: Patient will be followed by speech-language pathology 4 times a week to address goals. Discharge Recommendations: To Be Determined       SUBJECTIVE:   Patient stated she wanted to finish the applesauce. OBJECTIVE:       Past Medical History:   Diagnosis Date    Anxiety disorder      Arthritis       BACK, RT. KNEE and HANDS    CAD (coronary artery disease)        s/p 5 vessel CABG 2011    Carotid arterial disease (HCC)       diffuse bilateral CCA plaques    Chronic obstructive pulmonary disease (HCC)      Depression with anxiety      Essential hypertension      GERD (gastroesophageal reflux disease)       rarely    GI bleed      Hypothyroid      Mesenteric artery stenosis (HCC)      Microcytic anemia      Renal artery atherosclerosis, bilateral (HCC)        RA occlusion, left s/p stent    Renal atrophy, right      SVT (supraventricular tachycardia) 3/3/2017    UC (ulcerative colitis) ([facility] [address]       Past Surgical History:   Procedure Laterality Date    ESOPHAGEAL CAPSULE ENDOSCOPY   6/8/2015          HX COLONOSCOPY        HX CORONARY ARTERY BYPASS GRAFT   8/11     5 way bypass.  08/19/11    HX FEMORAL BYPASS         triple    HX LUMBAR DISKECTOMY   1997    HX UROLOGICAL         vascular stent x 2 to left kidney    SMALL BOWEL ENDOSCOPY,ABLATE LESN   3/13/2015          STRESS TEST LEXISCAN/CARDIOLITE   4/24/12    UPPER GI ENDOSCOPY,CTRL BLEED   6/2/2015           Prior Level of Function/Home Situation:   Home Situation  Home Environment: Private residence  One/Two Story Residence: One story  Living Alone: No  Support Systems: Spouse/Significant Other/Partner  Patient Expects to be Discharged to[de-identified] Private residence  Current DME Used/Available at Home: Cane, straight, Walker  Diet prior to admission:   Current Diet: NPO   Cognitive and Communication Status:  Neurologic State: Alert  Orientation Level: Oriented to person, Oriented to time, Disoriented to place  Cognition: Follows commands  Perception: Appears intact  Perseveration: No perseveration noted     Oral Assessment:  Oral Assessment  Labial: No impairment  Dentition: Full;Natural  Lingual: No impairment  Mandible: No impairment  P.O. Trials:  Patient Position: upright in bed  Vocal quality prior to P.O.: Hoarse  Consistency Presented: Thin liquid;Puree; Ice chips  How Presented: Self-fed/presented;Straw;Spoon     Bolus Acceptance: Impaired  Bolus Formation/Control: No impairment     Propulsion: No impairment  Oral Residue: None  Initiation of Swallow: Delayed (# of seconds)  Laryngeal Elevation: Decreased  Aspiration Signs/Symptoms: Strong cough (productive cough after thins and ice)                 Oral Phase Severity: Mild  Pharyngeal Phase Severity : Moderate-severe     NOMS:   The NOMS functional outcome measure was used to quantify this patient's level of swallowing impairment. Based on the NOMS, the patient was determined to be at level 2 for swallow function      G Codes: In compliance with CMSs Claims Based Outcome Reporting, the following G-code set was chosen for this patient based the use of the NOMS functional outcome to quantify this patient's level of swallowing impairment. Using the NOMS, the patient was determined to be at level 2 for swallow function which correlates with the CM= 80-99% level of severity. Based on the objective assessment provided within this note, the current, goal, and discharge g-codes are as follows:     Swallow  Swallowing:   Swallow Current Status CM= 80-99%   Swallow Goal Status CJ= 20-39%            NOMS Swallowing Levels:  Level 1 (CN): NPO  Level 2 (CM): NPO but takes consistency in therapy  Level 3 (CL): Takes less than 50% of nutrition p.o. and continues with nonoral feedings; and/or safe with mod cues; and/or max diet restriction  Level 4 (CK): Safe swallow but needs mod cues; and/or mod diet restriction; and/or still requires some nonoral feeding/supplements  Level 5 (CJ): Safe swallow with min diet restriction; and/or needs min cues  Level 6 (CI): Independent with p.o.; rare cues; usually self cues; may need to avoid some foods or needs extra time  Level 7 ([address] [place] [address]): Independent for all p.o.  BRET. (2003). National Outcomes Measurement System (NOMS): Adult Speech-Language Pathology User's Guide. Pain:  Pain Scale 1: Visual  Pain Intensity 1: 0  Pain Location 1: Chest;Abdomen  After treatment:   [ ]            Patient left in no apparent distress sitting up in chair  [ ]            Patient left in no apparent distress in bed  [ ]            Call bell left within reach  [ ]            Nursing notified  [ ]            Caregiver present  [ ]            Bed alarm activated      COMMUNICATION/EDUCATION:   The patients plan of care including recommendations, planned interventions, and recommended diet changes were discussed with: Registered Nurse. Patient was educated regarding Her deficit(s) of dysphagia as this relates to Her diagnosis of lengthy intubation, twice intubated. .  She demonstrated Fair understanding as evidenced by fair comprehension. [ ]            Posted safety precautions in patient's room. [X]            Patient/family have participated as able in goal setting and plan of care. [ ]            Patient/family agree to work toward stated goals and plan of care. [ ]            Patient understands intent and goals of therapy, but is neutral about his/her participation. [ ]            Patient is unable to participate in goal setting and plan of care.      Thank you for this referral.  Elen Howell, SLP  Time Calculation: 12 mins

## 2017-03-14 NOTE — PROGRESS NOTES
[facility]  Pulmonary, Critical Care, and Sleep Medicine    Name: King Herb MRN: [id number]   : 1955 Hospital: [facility]   Date: 3/14/2017            IMPRESSION:   · Acute respiratory failure with hypoxia: failed extubation 3/6, extubated  3/12  · Post extubation stridor mild, better but at risk for spasm  · Bronchitis  · Bilateral pleural effusions bilateral chest tubes 3/10 per IR to drain effusions  · Sore throat  · Constipation, issue at home as well  · Malnutrition- NG removed with extubation, off TF  · Motor loss in legs is c/w ischemic spinal cord injury; discussed with Dr. Jason Cabello; Unclear when SCI may have occurred (she was moving BLE after CSF drain was d/d'd)  · COPD noted to be moderate severity. compensated  · AAA Type B dissection; distal thoracic aorta- off IV esmolol  · S/p Left CCA- SCA bypass 17  · S/p TEVAR 17  · Post op iliac artery repair  · IMH from Penetrating Aortic ulceration  · PICC line  · Renal disease: CRI; JEANA - improved  · Abn LFTS  · Anemia  · Sleep apnea: No treatment  · Hypertension: well controlled  · CAD and CABG  · Hypothyroidism: well controlled  · Arthritis  · GERD: well controlled      PLAN:   · Will ask IR to remove chest tubes if minimal output  · Finish decadron  · Speech therapy- hope we can feed soon and give laxative  · PT, OT. OOB  · Jet nebs. · Transfuse prn Hbg less than 7  · Antihypertensives, catapress patch, off cardene drip  · Will be a [employer] candidate   · PUD/DVT prophylaxis       Subjective/History:     3/14 voice stronger but noarse. Mild congestion. No SOB on NC. Constipation at home. Has sensation in legs but no motor response    3/13 Whispery voice. Sore throat. No stridor. No nausea. Right chest sore. Chest tubes still in place    3/12 still with some ET secretions. Sputum culture with GNRs. Now on SBT. Later extubated after SBT and acceptable ABGs.  Moderate ETT secretions but strong cough. Pt asked to avoid talking too much until vocal cord swellin ghas time to regress. Pt had a cuff leak prior to extubation. Within hours pt developed more raspiness with breathing. Stridorous per RT     Will add humidiifcation, Racemic epinephrine and IV decadron x 2 doses. Keep IV precedex in case steroids cause agitation. Will also d/c NGT as it may impede clearance of upper airway secretions is acutely and chronically ill. May need BIPAP prn but not sure if it will be tolerated. Reintubate prn     3/11 Failed extubation 3/6. Now intubated, sedated. Copious frothy white secretions. Labored with SBT. Not tolrating TF. Bilateral chest tubes placed by IR on 3/11. Both lungs clearer on CXR this AM. TRansfused 1unit PRBC    The patient is critically ill and can not provide additional history due to Ventilated.             Current Facility-Administered Medications   Medication Dose Route Frequency    levothyroxine (SYNTHROID) injection 260 mcg  260 mcg IntraVENous EVERY MON & TH    cefepime (MAXIPIME) 2 g in 0.9% sodium chloride (MBP/ADV) 100 mL  2 g IntraVENous Q24H    dexmedetomidine (PRECEDEX) 400 mcg in 0.9% sodium chloride 100 mL infusion  0.2-0.7 mcg/kg/hr IntraVENous TITRATE    levalbuterol (XOPENEX) nebulizer soln 1.25 mg/3 mL  1.25 mg Nebulization QID RT    dilTIAZem (CARDIZEM) 100 mg in 0.9% sodium chloride (MBP/ADV) 100 mL infusion  0-15 mg/hr IntraVENous TITRATE    heparin (porcine) injection 5,000 Units  5,000 Units SubCUTAneous Q8H    cloNIDine (CATAPRES) 0.2 mg/24 hr patch 1 Patch  1 Patch TransDERmal Q7D    ipratropium (ATROVENT) 0.02 % nebulizer solution 0.5 mg  0.5 mg Nebulization QID RT    polyethylene glycol (MIRALAX) packet 17 g  17 g Oral DAILY    pantoprazole (PROTONIX) 40 mg in sodium chloride 0.9 % 10 mL injection  40 mg IntraVENous DAILY    sodium chloride (NS) flush 5-10 mL  5-10 mL IntraVENous Q8H    sodium chloride (NS) flush 10 mL  10 mL InterCATHeter Q24H    sodium chloride (NS) flush 10-40 mL  10-40 mL InterCATHeter Q8H     Review of Systems:  Review of systems not obtained due to patient factors. Objective:   Vital Signs:    Visit Vitals    /45    Pulse 65    Temp 98 °F (36.7 °C)    Resp 13    Ht 5' 5.98\" (1.676 m)    Wt 91.9 kg (202 lb 9.6 oz)    SpO2 94%    Breastfeeding No    BMI 32.72 kg/m2       O2 Device: Nasal cannula   O2 Flow Rate (L/min): 2 l/min   Temp (24hrs), Av.9 °F (36.1 °C), Min:96.1 °F (35.6 °C), Max:98 °F (36.7 °C)       Intake/Output:   Last shift:         Last 3 shifts:  1901 -  0700  In: 2673.1 [I.V.:2643.1]  Out: 2610 [Urine:1910]    Intake/Output Summary (Last 24 hours) at 17 0753  Last data filed at 17 0600   Gross per 24 hour   Intake          1778.37 ml   Output             1405 ml   Net           373.37 ml       Ventilator Settings:  Mode Rate Tidal Volume Pressure FiO2 PEEP   CPAP   500 ml  5 cm H2O 50 % 5 cm H20     Peak airway pressure: 20 cm H2O    Minute ventilation: 9.07 l/min      Physical Exam:    General:  WDWNWF no distress, appears stated age. Head:  Normocephalic, without obvious abnormality, atraumatic. Eyes:  Conjunctivae/corneas clear. PERRL, EOMs intact. Nose: Nares normal. Septum midline. Mucosa normal. No drainage or sinus tenderness. Throat: Lips, mucosa, and tongue normal. Teeth and gums normal. Has a white coating over mouth. Neck: Supple, symmetrical, trachea midline, no adenopathy, thyroid: no enlargment/tenderness/nodules, no carotid bruit and no JVD. Back:   Symmetric, no curvature. ROM normal.   Lungs:   Decreased BS bilaterally, has bilateral rhonchi. Chest wall:  No tenderness or deformity. Heart:  Regular rate and rhythm, S1, S2 normal, no murmur, click, rub or gallop. Abdomen:   Obese;  bowel sounds are decreased,  No masses,  No organomegaly. Extremities: Extremities normal, atraumatic, no cyanosis or edema. Warm.     Pulses: 2+ and symmetric all extremities. Skin: Skin color, texture, turgor normal. No rashes or lesions   Lymph nodes: Cervical, supraclavicular, and axillary nodes normal.   Neurologic: Not able to fully assess. Pt is post op, effects of anesthesia in place.         Data:     Recent Results (from the past 24 hour(s))   METABOLIC PANEL, BASIC    Collection Time: 03/13/17  4:38 PM   Result Value Ref Range    Sodium 138 136 - 145 mmol/L    Potassium 4.4 3.5 - 5.1 mmol/L    Chloride 105 97 - 108 mmol/L    CO2 20 (L) 21 - 32 mmol/L    Anion gap 13 5 - 15 mmol/L    Glucose 132 (H) 65 - 100 mg/dL    BUN 62 (H) 6 - 20 MG/DL    Creatinine 3.12 (H) 0.55 - 1.02 MG/DL    BUN/Creatinine ratio 20 12 - 20      GFR est AA 18 (L) >60 ml/min/1.73m2    GFR est non-AA 15 (L) >60 ml/min/1.73m2    Calcium 8.8 8.5 - 18.3 MG/DL   METABOLIC PANEL, BASIC    Collection Time: 03/14/17  4:33 AM   Result Value Ref Range    Sodium 139 136 - 145 mmol/L    Potassium 4.1 3.5 - 5.1 mmol/L    Chloride 104 97 - 108 mmol/L    CO2 22 21 - 32 mmol/L    Anion gap 13 5 - 15 mmol/L    Glucose 130 (H) 65 - 100 mg/dL    BUN 64 (H) 6 - 20 MG/DL    Creatinine 3.21 (H) 0.55 - 1.02 MG/DL    BUN/Creatinine ratio 20 12 - 20      GFR est AA 18 (L) >60 ml/min/1.73m2    GFR est non-AA 15 (L) >60 ml/min/1.73m2    Calcium 8.8 8.5 - 10.1 MG/DL             Telemetry:normal sinus rhythm    Imaging:  I have personally reviewed the patients radiographs and have reviewed the reports:  CXR: no acute changes, bilateral pleural effusions       Blanca Kimble MD

## 2017-03-14 NOTE — PROGRESS NOTES
Problem: Mobility Impaired (Adult and Pediatric)  Goal: *Acute Goals and Plan of Care (Insert Text)  Physical Therapy Goals  Initiated 3/14/2017  1. Patient will move from supine to sit and sit to supine , scoot up and down and roll side to side in bed with moderate assistance within 7 day(s). 2. Patient will transfer from bed to chair and chair to bed with maximal assistance x 2 using the least restrictive device within 7 day(s). 3. Patient will perform sit to stand with maximal assistance within 7 day(s). 4. Patient will sit on EOB 2 minutes demonstrating overall good sitting balance without LOB within 7 days. PHYSICAL THERAPY EVALUATION  Patient: Enrique Watkins (30 y.o. female)  Date: 3/14/2017  Primary Diagnosis: Aneurysm ([address]  AORTIC DISECTION  Procedure(s) (LRB):  ENDOVASCULAR ANEURYSM REPAIR of THORACIC AORTIC DISSECTION, repair of ruptured left iliac artery with stent placement. Left ileo-femoral bypass graft with PTFE. (Bilateral)  LEFT CAROTID SUBCLAVIAN BYPASS with stent placement of left common artery. (Left) 15 Days Post-Op   Precautions:  Fall      ASSESSMENT :  Based on the objective data described below, the patient presents with grossly decreased ROM, strength, and coordination in BLEs, generally decreased ROM, strength, and coordination in UEs, decreased attention/concentration, slow processing, decreased independence, and decline from baseline functional mobility POD#15 of thoracic aortic dissection repair including complications of extended intubation and possible ischemia SCI. Pt currently extubated on 40% face tent with SaO2 87-96% throughout therapy session. Pt is independent for mobility at baseline and lives with  in one level home. Pt received supine in bed and agreeable to PT evaluation. Pt cleared by nursing for mobility. Pt with 0/5 ms strength in BLEs, including bilateral gluts.  Pt sat on EOB ~ 6 minutes, demonstrating overall poor sitting balance and poor trunk control throughout. Pt required max A to remain upright. Pt with full sensation in LEs. VSS in sitting, however pt with lightheadedness and nausea, therefore pt was returned supine in bed. Pt was left supine in bed with all needs met and nursing informed. Patient will require inpatient rehab at discharge as pt was independent for mobility PTA. Pt will continue to benefit from skilled acute PT 5x/week to improve overall mobility. Patient will benefit from skilled intervention to address the above impairments. Patients rehabilitation potential is considered to be Fair  Factors which may influence rehabilitation potential include:   [ ]         None noted  [X]         Mental ability/status  [X]         Medical condition  [ ]         Home/family situation and support systems  [ ]         Safety awareness  [ ]         Pain tolerance/management  [ ]         Other:        PLAN :  Recommendations and Planned Interventions:  [X]           Bed Mobility Training             [X]    Neuromuscular Re-Education  [X]           Transfer Training                   [ ]    Orthotic/Prosthetic Training  [ ]           Gait Training                         [ ]    Modalities  [X]           Therapeutic Exercises           [ ]    Edema Management/Control  [X]           Therapeutic Activities            [X]    Patient and Family Training/Education  [ ]           Other (comment):     Frequency/Duration: Patient will be followed by physical therapy  5 times a week to address goals. Discharge Recommendations: Inpatient Rehab  Further Equipment Recommendations for Discharge: TBD by rehab       SUBJECTIVE:   Patient stated My .       OBJECTIVE DATA SUMMARY:   HISTORY:    Past Medical History:   Diagnosis Date    Anxiety disorder      Arthritis       BACK, RT. KNEE and HANDS    CAD (coronary artery disease)        s/p 5 vessel CABG 2011    Carotid arterial disease (HCC)       diffuse bilateral CCA plaques    [facility] obstructive [facility]      Depression with anxiety      Essential hypertension      GERD (gastroesophageal reflux disease)       rarely    GI bleed      Hypothyroid      Mesenteric artery stenosis (HCC)      Microcytic anemia      Renal artery atherosclerosis, bilateral (HCC)        RA occlusion, left s/p stent    Renal atrophy, right      SVT (supraventricular tachycardia) 3/3/2017    UC (ulcerative colitis) ([facility]       Past Surgical History:   Procedure Laterality Date    ESOPHAGEAL CAPSULE ENDOSCOPY   6/8/2015          HX COLONOSCOPY        HX CORONARY ARTERY BYPASS GRAFT   8/11     5 way bypass. 08/19/11    HX FEMORAL BYPASS         triple    HX LUMBAR DISKECTOMY   1997    HX UROLOGICAL         vascular stent x 2 to left kidney    SMALL BOWEL ENDOSCOPY,ABLATE LESN   3/13/2015          STRESS TEST LEXISCAN/CARDIOLITE   4/24/12    UPPER GI ENDOSCOPY,CTRL BLEED   6/2/2015           Prior Level of Function/Home Situation: pt reports that she was independent for mobility and ADLs PTA; lives with ; drives; is retired  Personal factors and/or comorbidities impacting plan of care: anxiety; arthritis; HTN; COPD     Home Situation  Home Environment: Private residence  # Steps to Enter: 4  Rails to Enter: Yes  Hand Rails : Bilateral (canniot hold ay same time)  One/Two Story Residence: One story  Living Alone: No  Support Systems: Spouse/Significant Other/Partner  Patient Expects to be Discharged to[de-identified] Private residence  Current DME Used/Available at Home: [place], straight, Shower chair, Walker, rollator  Tub or Shower Type: Tub/Shower combination     EXAMINATION/PRESENTATION/DECISION MAKING:   Critical Behavior:  Neurologic State: Alert, Eyes open spontaneously  Orientation Level: Oriented to person, Oriented to place, Disoriented to time, Oriented to situation  Cognition: Follows commands     Hearing:   Auditory  Auditory Impairment: Hard of hearing, bilateral  Hearing Aids/Status: Does not own  Skin:  Incision on L side of neck, otherwise intact  Edema: None  Range Of Motion:  AROM: Grossly decreased, non-functional (LEs; UEs generally decreased)           PROM: Generally decreased, functional           Strength:    Strength: Grossly decreased, non-functional (LEs; UEs generally decreased)                    Tone & Sensation:   Tone: Abnormal (hypotonicity in LEs)              Sensation: Intact               Coordination:  Coordination: Grossly decreased, non-functional (LEs; UEs generally decreased)  Vision:      Functional Mobility:  Bed Mobility:  Rolling: Maximum assistance;Assist x2  Supine to Sit: Total assistance;Assist x2  Sit to Supine: Total assistance;Assist x2  Scooting: Total assistance  Balance:   Sitting: Impaired  Sitting - Static: Poor (constant support)  Sitting - Dynamic: Poor (constant support)     Functional Measure:  Bustamante Balance Test:      Sitting to Standin  Standing Unsupported: 0  Sitting with Back Unsupported: 0  Standing to Sittin  Transfers: 0  Standing Unsupported with Eyes Closed: 0  Standing Unsupported with Feet Together: 0  Reach Forward with Outstretched Arm: 0   Object: 0  Turn to Look Over Shoulders: 0  Turn 360 Degrees: 0  Alternate Foot on Step/Stool: 0  Standing Unsupported One Foot in Front: 0  Stand on One Le  Total: 0             56=Maximum possible score;   0-20=High fall risk  21-40=Moderate fall risk   41-56=Low fall risk      Bustamante Balance Test and G-code impairment scale:  Percentage of Impairment CH     0%    CI     1-19% CJ     20-39% CK     40-59% CL     60-79% CM     80-99% CN      100%   Bustamante   Score 0-56 56 45-55 34-44 23-33 12-22 1-11 0            G codes: In compliance with CMSs Claims Based Outcome Reporting, the following G-code set was chosen for this patient based on their primary functional limitation being treated:      The outcome measure chosen to determine the severity of the functional limitation was the Victor with krishna score of 0/56 which was correlated with the impairment scale. · Mobility - Walking and Moving Around:               - CURRENT STATUS:    CN - 100% impaired, limited or restricted               - GOAL STATUS:           CK - 40%-59% impaired, limited or restricted               - D/C STATUS:                       ---------------To be determined---------------      Physical Therapy Evaluation Charge Determination   History Examination Presentation Decision-Making   HIGH Complexity :3+ comorbidities / personal factors will impact the outcome/ POC  HIGH Complexity : 4+ Standardized tests and measures addressing body structure, function, activity limitation and / or participation in recreation  MEDIUM Complexity : Evolving with changing characteristics  Other outcome measures HOLLIDAY  HIGH       Based on the above components, the patient evaluation is determined to be of the following complexity level: MEDIUM     Pain:  Pain Scale 1: Numeric (0 - 10)  Pain Intensity 1: 0  Pain Location 1: Chest;Abdomen  Pain Orientation 1: Right;Lateral  Pain Description 1: Aching;Dull  Pain Intervention(s) 1: Medication (see MAR)  Activity Tolerance:   Fair/poor - pt reporting feeling poorly after sitting ~ 5 minutes; VSS throughout; pt on face tent at 40% with SaO2 87-96% throughout  Please refer to the flowsheet for vital signs taken during this treatment. After treatment:   [ ]         Patient left in no apparent distress sitting up in chair  [X]         Patient left in no apparent distress in bed  [X]         Call bell left within reach  [X]         Nursing notified  [ ]         Caregiver present  [ ]         Bed alarm activated      COMMUNICATION/EDUCATION:   The patients plan of care was discussed with: Physical Therapist, Occupational Therapist and Registered Nurse.  [X]         Fall prevention education was provided and the patient/caregiver indicated understanding.   [X]         Patient/family have participated as able in goal setting and plan of care. [X]         Patient/family agree to work toward stated goals and plan of care. [ ]         Patient understands intent and goals of therapy, but is neutral about his/her participation. [ ]         Patient is unable to participate in goal setting and plan of care.      Thank you for this referral.  Radha Echols, PT, DPT   Time Calculation: 30 mins

## 2017-03-14 NOTE — PROGRESS NOTES
Nephrology Progress Note     Fransisco Chilel       [email or web address]                   Phone - [phone]   Patient: Neela Cook  YOB: 1955     Date- 3/14/2017     CC: Follow up for arf          Subjective: Interval History:   -   Cr high but stable  k improved  Acidosis better  bp high    C/o cough  No sob  No fever     Assessment:   · Acute renal failure likely due to ATN - vancomycin toxicity - vanco level - 29.2  · hyperkalemai  · ckd 3 baseline cr. 1.2  · Resp. Failure - s/p extubation  · AFIB with RVR  · Complicated type B descending thoracic aortic dissection. -   · S/p TEVAR, L ileo-fem bypass, L carotid-subclavian bypass:2/27/17  · Protein malnutrition  · B/l effusion  · S/p chest tube placement  · Ischemic spinal cord injury     Plan:   - seems like cr. Has pleatue- hopefully cr will start improving  -use lasix prn - if urine out put drops give lasix 80 mg today  Goal to keep I = O  - follow bmp  - no dialysis indicated today  - agree with iv hydralazine    Care Plan discussed with: nurse  [] High complexity decision making was performed  [] Patient is at high-risk of decompensation with multiple organ involvement  Review of Systems: Pertinent items are noted in HPI. Objective:   Vitals:    03/14/17 0800 03/14/17 0819 03/14/17 0900 03/14/17 1000   BP: 178/75  154/57    Pulse: 68  71 66   Resp: 17  16 11   Temp: 97.7 °F (36.5 °C)      SpO2: 97% 94% 96% 95%   Weight:       Height:           Intake/Output Summary (Last 24 hours) at 03/14/17 1015  Last data filed at 03/14/17 1000   Gross per 24 hour   Intake          1886.82 ml   Output             1530 ml   Net           356.82 ml     Physical Exam:   GEN: NAD  NECK- Supple,  RESP: Coarse b/l, no wheezing  CVS: RRR,S1,S2   NEURO: non focal, normal speech  SKIN: No Rash  ABDO: soft , non tender, No hepatosplenomegaly  EXT: + Edema   Neuro - can't move lower leg. Moves upepr arm  gu - grubbs +    Chart reviewed. Pertinent Notes reviewed.    Medications list  reviewed     Current Facility-Administered Medications   Medication    amiodarone (CORDARONE) 450 mg in dextrose 5% 250 mL infusion    hydrALAZINE (APRESOLINE) 20 mg/mL injection 10 mg    phenol throat spray (CHLORASEPTIC) 1 Spray    levothyroxine (SYNTHROID) injection 260 mcg    mineral oil-hydrophil petrolat (AQUAPHOR) ointment    cefepime (MAXIPIME) 2 g in 0.9% sodium chloride (MBP/ADV) 100 mL    0.9% sodium chloride infusion 250 mL    levalbuterol (XOPENEX) nebulizer soln 1.25 mg/3 mL    dilTIAZem (CARDIZEM) 100 mg in 0.9% sodium chloride (MBP/ADV) 100 mL infusion    heparin (porcine) injection 5,000 Units    cloNIDine (CATAPRES) 0.2 mg/24 hr patch 1 Patch    albuterol (PROVENTIL VENTOLIN) nebulizer solution 2.5 mg    ipratropium (ATROVENT) 0.02 % nebulizer solution 0.5 mg    HYDROmorphone (PF) (DILAUDID) injection 1 mg    polyethylene glycol (MIRALAX) packet 17 g    pantoprazole (PROTONIX) 40 mg in sodium chloride 0.9 % 10 mL injection    sodium chloride (NS) flush 5-10 mL    sodium chloride (NS) flush 5-10 mL    prochlorperazine (COMPAZINE) with saline injection 5 mg    sodium chloride (NS) flush 10 mL    sodium chloride (NS) flush 10-40 mL    acetaminophen (TYLENOL) tablet 650 mg    naloxone (NARCAN) injection 0.4 mg    ondansetron (ZOFRAN) injection 4 mg    bisacodyl (DULCOLAX) suppository 10 mg              Data Review :  Recent Labs      03/14/17   0433  03/13/17   1638  03/13/17   0403  03/12/17   0643   NA  139  138  139  137   K  4.1  4.4  5.6*  4.7   CL  104  105  107  105   CO2  22  20*  19*  18*   GLU  130*  132*  146*  109*   BUN  64*  62*  52*  42*   CREA  3.21*  3.12*  3.05*  3.04*   CA  8.8  8.8  8.4*  8.1*   MG   --    --    --   2.4   PHOS   --    --    --   6.1*   ALB   --    --    --   2.1*   SGOT   --    --    --   55*   ALT   --    --    --   66     Recent Labs      03/12/17   0643   WBC 12.8*   HGB  9.4*   HCT  27.9*   PLT  382     Lab Results   Component Value Date/Time    Culture result: MODERATE  PSEUDOMONAS AERUGINOSA   03/11/2017 11:12 AM    Culture result: MODERATE  NORMAL RESPIRATORY YVAN   03/11/2017 11:12 AM    Culture result: SCANT  NORMAL RESPIRATORY YVAN   03/02/2017 10:25 AM    Culture result: MODERATE  NORMAL RESPIRATORY YVAN   02/27/2017 05:18 PM    Culture result: MIXED UROGENITAL YVAN ISOLATED 06/24/2016 09:12 PM     Lab Results   Component Value Date/Time    Specimen Description: STOOL 11/10/2013 08:00 PM    Specimen Description: BLOOD 05/24/2013 10:50 AM    Specimen Description: BLOOD 05/22/2013 09:55 PM    Specimen Description: STOOL 05/22/2013 06:20 PM    Specimen Description: URINE 08/16/2011 10:54 AM     Lisa Oglesby MD  [facility]  [facility] [place]  [facility], [address]  [place], [address]  Phone - [phone]         Fax - [phone] [facility] Office  [address] [place] [address]  Phone - [phone]        Fax - [phone]     www. [facility]

## 2017-03-14 NOTE — PROGRESS NOTES
3/14/2017 8:11 AM    Admit Date: 2/23/2017    Admit Diagnosis: Aneurysm (HCC);AORTIC DISECTION    Subjective:     No events overnight.      Visit Vitals    /75    Pulse 68    Temp 98.2 °F (36.8 °C)    Resp 17    Ht 5' 5.98\" (1.676 m)    Wt 202 lb 9.6 oz (91.9 kg)    SpO2 97%    Breastfeeding No    BMI 32.72 kg/m2     Current Facility-Administered Medications   Medication Dose Route Frequency    amiodarone (CORDARONE) 450 mg in dextrose 5% 250 mL infusion  0.5 mg/min IntraVENous TITRATE    hydrALAZINE (APRESOLINE) 20 mg/mL injection 10 mg  10 mg IntraVENous Q6H    phenol throat spray (CHLORASEPTIC) 1 Spray  1 Spray Oral PRN    levothyroxine (SYNTHROID) injection 260 mcg  260 mcg IntraVENous EVERY MON & TH    mineral oil-hydrophil petrolat (AQUAPHOR) ointment   Topical PRN    cefepime (MAXIPIME) 2 g in 0.9% sodium chloride (MBP/ADV) 100 mL  2 g IntraVENous Q24H    dexmedetomidine (PRECEDEX) 400 mcg in 0.9% sodium chloride 100 mL infusion  0.2-0.7 mcg/kg/hr IntraVENous TITRATE    0.9% sodium chloride infusion 250 mL  250 mL IntraVENous PRN    levalbuterol (XOPENEX) nebulizer soln 1.25 mg/3 mL  1.25 mg Nebulization QID RT    dilTIAZem (CARDIZEM) 100 mg in 0.9% sodium chloride (MBP/ADV) 100 mL infusion  0-15 mg/hr IntraVENous TITRATE    heparin (porcine) injection 5,000 Units  5,000 Units SubCUTAneous Q8H    cloNIDine (CATAPRES) 0.2 mg/24 hr patch 1 Patch  1 Patch TransDERmal Q7D    albuterol (PROVENTIL VENTOLIN) nebulizer solution 2.5 mg  2.5 mg Nebulization Q2H PRN    ipratropium (ATROVENT) 0.02 % nebulizer solution 0.5 mg  0.5 mg Nebulization QID RT    HYDROmorphone (PF) (DILAUDID) injection 1 mg  1 mg IntraVENous Q3H PRN    polyethylene glycol (MIRALAX) packet 17 g  17 g Oral DAILY    pantoprazole (PROTONIX) 40 mg in sodium chloride 0.9 % 10 mL injection  40 mg IntraVENous DAILY    sodium chloride (NS) flush 5-10 mL  5-10 mL IntraVENous Q8H    sodium chloride (NS) flush 5-10 mL  5-10 mL IntraVENous PRN    prochlorperazine (COMPAZINE) with saline injection 5 mg  5 mg IntraVENous Q4H PRN    sodium chloride (NS) flush 10 mL  10 mL InterCATHeter Q24H    sodium chloride (NS) flush 10-40 mL  10-40 mL InterCATHeter Q8H    acetaminophen (TYLENOL) tablet 650 mg  650 mg Oral Q4H PRN    naloxone (NARCAN) injection 0.4 mg  0.4 mg IntraVENous PRN    ondansetron (ZOFRAN) injection 4 mg  4 mg IntraVENous Q4H PRN    bisacodyl (DULCOLAX) suppository 10 mg  10 mg Rectal DAILY PRN         Objective:      Visit Vitals    /75    Pulse 68    Temp 98.2 °F (36.8 °C)    Resp 17    Ht 5' 5.98\" (1.676 m)    Wt 202 lb 9.6 oz (91.9 kg)    SpO2 97%    Breastfeeding No    BMI 32.72 kg/m2       Physical Exam:  Abdomen: soft, non-tender.  Bowel sounds normal.   Extremities: no cyanosis or edema  Heart: regular rate and rhythm, S1, S2 normal, no murmur, click, rub or gallop  Lungs: clear to auscultation bilaterally  Neurologic: LE paraplegia    Data Review:   Labs:    Recent Results (from the past 24 hour(s))   METABOLIC PANEL, BASIC    Collection Time: 03/13/17  4:38 PM   Result Value Ref Range    Sodium 138 136 - 145 mmol/L    Potassium 4.4 3.5 - 5.1 mmol/L    Chloride 105 97 - 108 mmol/L    CO2 20 (L) 21 - 32 mmol/L    Anion gap 13 5 - 15 mmol/L    Glucose 132 (H) 65 - 100 mg/dL    BUN 62 (H) 6 - 20 MG/DL    Creatinine 3.12 (H) 0.55 - 1.02 MG/DL    BUN/Creatinine ratio 20 12 - 20      GFR est AA 18 (L) >60 ml/min/1.73m2    GFR est non-AA 15 (L) >60 ml/min/1.73m2    Calcium 8.8 8.5 - 39.3 MG/DL   METABOLIC PANEL, BASIC    Collection Time: 03/14/17  4:33 AM   Result Value Ref Range    Sodium 139 136 - 145 mmol/L    Potassium 4.1 3.5 - 5.1 mmol/L    Chloride 104 97 - 108 mmol/L    CO2 22 21 - 32 mmol/L    Anion gap 13 5 - 15 mmol/L    Glucose 130 (H) 65 - 100 mg/dL    BUN 64 (H) 6 - 20 MG/DL    Creatinine 3.21 (H) 0.55 - 1.02 MG/DL    BUN/Creatinine ratio 20 12 - 20      GFR est AA 18 (L) >60 ml/min/1.73m2    GFR est non-AA 15 (L) >60 ml/min/1.73m2    Calcium 8.8 8.5 - 10.1 MG/DL       Telemetry: normal sinus rhythm      Assessment:     Active Problems:    HTN (hypertension) ()      Aneurysm (HCC) (2/23/2017)      SVT (supraventricular tachycardia) (3/3/2017)      [facility] (3/6/2017)      Paroxysmal atrial fibrillation ([facility]) (3/6/2017)      Atrial flutter ([facility] (3/12/2017)        Plan:     Now in sinus rhythm. On amio and cardizem now. Will check with vascular regarding AC. Will d/w EP regarding plans for PPM. BP is running high. Add hydralazine. Don't think ischemic spinal cord injury is cardiac related, unless she had emboli. Longest pause she had was 16-20 seconds.

## 2017-03-14 NOTE — PROGRESS NOTES
Problem: Self Care Deficits Care Plan (Adult)  Goal: *Acute Goals and Plan of Care (Insert Text)  Occupational Therapy Goals  Initiated 3/14/2017  1. Patient will perform grooming with minimal assistance/contact guard assist within 7 day(s). 2. Patient will perform supine to sit EOB with maximal assistance in preparation for adls within 7 day(s). 3. Patient will perform upper body bathing with moderate assistance within 7 day(s). 4. Patient will participate in functional mobility assessment within 7 day(s). 5. Patient will perform rolling side to side for toileting at bed level with moderate assistance within 7 day(s). 6. Patient will participate in upper extremity therapeutic exercise/activities with supervision/set-up for 10 minutes within 7 day(s). OCCUPATIONAL THERAPY EVALUATION  Patient: Mary Torres (89 y.o. female)  Date: 3/14/2017  Primary Diagnosis: Aneurysm ([address]  AORTIC DISECTION  Procedure(s) (LRB):  ENDOVASCULAR ANEURYSM REPAIR of THORACIC AORTIC DISSECTION, repair of ruptured left iliac artery with stent placement. Left ileo-femoral bypass graft with PTFE. (Bilateral)  LEFT CAROTID SUBCLAVIAN BYPASS with stent placement of left common artery. (Left) 15 Days Post-Op   Precautions:   Fall      ASSESSMENT :  Based on the objective data described below, the patient presents 15 days post surgery and possible Ischemic spinal cord injury, recently extubated and now on face tent, generally maintaining O2 sats during evaluation. Pt presents with impaired cognition/slowed processing, decreased balance, reported intact sensation but no AROM facilitated hips/lower spine to feet. Trunk balance in sitting is poor requiring maximal assistance to maintain midline and tolerating ~ 6 minutes at EOB. Pt is generally weak, requires maximal to total assistance for self care and functional mobility.   Pt was independent PTA and will benefit from intensive and likely extensive rehab at discharge, depending on her progress. Patient will benefit from skilled intervention to address the above impairments. Patients rehabilitation potential is considered to be Fair  Factors which may influence rehabilitation potential include:   [ ]             None noted  [X]             Mental ability/status  [X]             Medical condition  [ ]             Home/family situation and support systems  [ ]             Safety awareness  [ ]             Pain tolerance/management  [ ]             Other:        PLAN :  Recommendations and Planned Interventions:  [X]               Self Care Training                  [X]        Therapeutic Activities  [X]               Functional Mobility Training    [X]        Cognitive Retraining  [X]               Therapeutic Exercises           [X]        Endurance Activities  [X]               Balance Training                   [X]        Neuromuscular Re-Education  [ ]               Visual/Perceptual Training     [X]   Home Safety Training  [X]               Patient Education                 [X]        Family Training/Education  [ ]               Other (comment):     Frequency/Duration: Patient will be followed by occupational therapy 5 times a week to address goals. Discharge Recommendations: Inpatient [facility]  Further Equipment Recommendations for Discharge: tbd       SUBJECTIVE:   Patient stated  I want to do more.       OBJECTIVE DATA SUMMARY:   HISTORY:   Past Medical History:   Diagnosis Date    Anxiety disorder      Arthritis       BACK, RT. KNEE and HANDS    CAD (coronary artery disease)        s/p 5 vessel CABG 2011    Carotid arterial disease (HCC)       diffuse bilateral CCA plaques    Chronic obstructive pulmonary disease [facility] [address]      Depression with anxiety      Essential hypertension      GERD (gastroesophageal reflux disease)       rarely    GI bleed      Hypothyroid      Mesenteric artery stenosis (HCC)      Microcytic anemia      Renal artery atherosclerosis, bilateral ([address]        RA occlusion, left s/p stent    Renal atrophy, right      SVT (supraventricular tachycardia) 3/3/2017    UC (ulcerative colitis) [facility])       Past Surgical History:   Procedure Laterality Date    ESOPHAGEAL CAPSULE ENDOSCOPY   6/8/2015          HX COLONOSCOPY        HX CORONARY ARTERY BYPASS GRAFT   8/11     5 way bypass. 08/19/11    HX FEMORAL BYPASS         triple    HX LUMBAR DISKECTOMY   1997    HX UROLOGICAL         vascular stent x 2 to left kidney    SMALL BOWEL ENDOSCOPY,ABLATE LESN   3/13/2015          STRESS TEST LEXISCAN/CARDIOLITE   4/24/12    UPPER GI ENDOSCOPY,CTRL BLEED   6/2/2015              Prior Level of Function/Home Situation: independent, lives with her   Expanded or extensive additional review of patient history:      Home Situation  Home Environment: Private residence  # Steps to Enter: 4  Rails to Enter: Yes  Hand Rails : Bilateral (canniot hold ay same time)  One/Two Story Residence: One story  Living Alone: No  Support Systems: Spouse/Significant Other/Partner  Patient Expects to be Discharged to[de-identified] Private residence  Current DME Used/Available at Home: Murtaza Amabile, straight, Shower chair, Walker, rollator  Tub or Shower Type: Tub/Shower combination  [X]  Right hand dominant             [ ]  Left hand dominant     EXAMINATION OF PERFORMANCE DEFICITS:  Cognitive/Behavioral Status:  Neurologic State: Alert;Eyes open spontaneously  Orientation Level: Oriented to person;Oriented to place; Disoriented to time;Oriented to situation  Cognition: Follows commands  Perception: Appears intact  Perseveration: No perseveration noted  Safety/Judgement: Decreased awareness of environment;Decreased insight into deficits  Skin: generally intact, lines, leads, tubes, catheter  Edema: none observed  Hearing:   Auditory  Auditory Impairment: Hard of hearing, bilateral  Hearing Aids/Status: Does not own  Vision/Perceptual:                      Diplopia: No    Acuity:  (pt unable to report vision)    Corrective Lenses:  (none present)  Range of Motion:  BUEs:  Generally decreased, functional  AROM: Grossly decreased, non-functional (LEs; UEs generally decreased)  PROM: Generally decreased, functional                    Strength:  BUEs:generally decreased, functional,  strength is fair  Strength: Grossly decreased, non-functional (LEs; UEs generally decreased)              Coordination:  Coordination: Grossly decreased, non-functional (LEs; UEs generally decreased)  Fine Motor Skills-Upper: Left Intact; Right Intact    Gross Motor Skills-Upper: Left Intact; Right Intact (able to reach to shoulder height bilaterally, PROM wfl)  Tone & Sensation:     Tone: Abnormal (hypotonicity in LEs)  Sensation: Intact                       Balance:  Sitting: Impaired (tolerated 6 min. static w maximal support)-pt became nauseated  Sitting - Static: Poor (constant support)  Sitting - Dynamic: Poor (constant support)     Functional Mobility and Transfers for ADLs:  Bed Mobility:  Rolling: Maximum assistance;Assist x2  Supine to Sit: Total assistance;Assist x2  Sit to Supine: Total assistance;Assist x2  Scooting: Total assistance     Transfers:  Sit to Stand:  (not performed this date)     ADL Assessment:  Feeding: Maximum assistance     Oral Facial Hygiene/Grooming: Moderate assistance     Bathing: Maximum assistance     Upper Body Dressing: Maximum assistance     Lower Body Dressing: Total assistance     Toileting: Total assistance (catheter)                 ADL Intervention and task modifications:   Pt educated on role of OT and OT plan of care. Pt sat EOB with maximal assistance for ~ 6 minutes then became nauseated                                         Cognitive Retraining  Safety/Judgement: Decreased awareness of environment;Decreased insight into deficits     Therapeutic Exercise:  Performed BUE ROM while seated EOB--pt able to raise BUEs to shoulder height requiring assistance for full rom. Functional Measure:  Barthel Index:      Bathin  Bladder: 0  Bowels: 0  Groomin  Dressin  Feedin  Mobility: 0  Stairs: 0  Toilet Use: 0  Transfer (Bed to Chair and Back): 0  Total: 0         Barthel and G-code impairment scale:  Percentage of impairment CH  0% CI  1-19% CJ  20-39% CK  40-59% CL  60-79% CM  80-99% CN  100%   Barthel Score 0-100 100 99-80 79-60 59-40 20-39 1-19    0   Barthel Score 0-20 20 17-19 13-16 9-12 5-8 1-4 0      The Barthel ADL Index: Guidelines  1. The index should be used as a record of what a patient does, not as a record of what a patient could do. 2. The main aim is to establish degree of independence from any help, physical or verbal, however minor and for whatever reason. 3. The need for supervision renders the patient not independent. 4. A patient's performance should be established using the best available evidence. Asking the patient, friends/relatives and nurses are the usual sources, but direct observation and common sense are also important. However direct testing is not needed. 5. Usually the patient's performance over the preceding 24-48 hours is important, but occasionally longer periods will be relevant. 6. Middle categories imply that the patient supplies over 50 per cent of the effort. 7. Use of aids to be independent is allowed. Marilin Regalado., Barthel, DKELLY. ([id number]). Functional evaluation: the Barthel Index. [address] [facility] (14)2. Rey Barajas, SEANJ.M.MIYA, Selene Marquez., [place]., [place], [address] [place], [place] [address] (). Measuring the change indisability after inpatient rehabilitation; comparison of the responsiveness of the Barthel Index and Functional [place] Measure. Journal of Neurology, Neurosurgery, and Psychiatry, 66(4), 358-922. KINZA Malave.[email or web address], PHILIP Todd, & Rickie Santiago MPasqualeA. (2004.) Assessment of post-stroke quality of life in cost-effectiveness studies: The usefulness of the Barthel Index and the EuroQoL-5D.  Quality of Life Research, 13, 814-07         G codes: In compliance with CMSs Claims Based Outcome Reporting, the following G-code set was chosen for this patient based on their primary functional limitation being treated: The outcome measure chosen to determine the severity of the functional limitation was the Barthel Index with a score of 0/100 which was correlated with the impairment scale. · Self Care:               - CURRENT STATUS:    CN - 100% impaired, limited or restricted               - GOAL STATUS:           CM - 80%-99% impaired, limited or restricted               - D/C STATUS:                       ---------------To be determined---------------      Occupational Therapy Evaluation Charge Determination   History Examination Decision-Making   LOW Complexity : Brief history review  MEDIUM Complexity : 3-5 performance deficits relating to physical, cognitive , or psychosocial skils that result in activity limitations and / or participation restrictions MEDIUM Complexity : Patient may present with comorbidities that affect occupational performnce. Miniml to moderate modification of tasks or assistance (eg, physical or verbal ) with assesment(s) is necessary to enable patient to complete evaluation       Based on the above components, the patient evaluation is determined to be of the following complexity level: LOW   Pain:  Pain Scale 1: Numeric (0 - 10)  Pain Intensity 1: 0  Pain Location 1: Chest;Abdomen  Pain Orientation 1: Right;Lateral  Pain Description 1: Aching;Dull  Pain Intervention(s) 1: Medication (see MAR)  Activity Tolerance:   Poor. Nauseated in sitting EOB  Please refer to the flowsheet for vital signs taken during this treatment.   After treatment:   [ ] Patient left in no apparent distress sitting up in chair  [X] Patient left in no apparent distress in bed  [X] Call bell left within reach  [X] Nursing notified  [ ] Caregiver present  [ ] Bed alarm activated COMMUNICATION/EDUCATION:   The patients plan of care was discussed with: Physical Therapist, Registered Nurse and Rehabilitation Attendant. [ ] Home safety education was provided and the patient/caregiver indicated understanding. [X] Patient/family have participated as able in goal setting and plan of care. [ ] Patient/family agree to work toward stated goals and plan of care. [ ] Patient understands intent and goals of therapy, but is neutral about his/her participation. [ ] Patient is unable to participate in goal setting and plan of care. This patients plan of care is appropriate for delegation to [facility].      Thank you for this referral.  Rhett Grady, OTR/L  Time Calculation: 32 mins

## 2017-03-14 NOTE — PROGRESS NOTES
0730-Bedside report received from [facility] city, RN  0800-Shift assessment complete, pt alert, no movement in BLE, sensation intact, cardizem gtt 5mg/hr, amiodarone 0.5mg/min, NSR, VSS.

## 2017-03-15 NOTE — ROUTINE PROCESS
7:00 PM   Report received from [facility]IT[facility].    8:00 PM  Assessment complete. Pt A&OX4, VSS, chest tubes and grubbs in place. Amio infusing at 0.5 mcg/min. Pt in NSR. Will continue to monitor. 9:00 PM  Pt going in and out of A-FIB/flutter, asymptomatic. Will continue to monitor. 11:00 PM  Reassessment complete no changes noted. 4:00 AM  Reassessment complete no changes noted. 7:10 AM  Report given to [employer]. No changes overnight.

## 2017-03-15 NOTE — PROGRESS NOTES
Occupational Therapy  Chart reviewed. Cleared by RN to see pt for OT post ablation and PPM placement. Attempted to see pt but pt drowsy and minimally responsive from sedation medication from procedure. Pt and  currently declining attempts at therapy. Adjust L arm sling for comfort as pt c/o pain and only stated \"ow, ow, ow.\" Will follow back tomorrow.  Lo Vazquez, MS, OTR/L      Total time 9 minutes

## 2017-03-15 NOTE — ANESTHESIA POSTPROCEDURE EVALUATION
Post-Anesthesia Evaluation and Assessment    Patient: Marin Colmenares MRN: [id number]  SSN: [id number]    YOB: 1955  Age: 59 y.o. Sex: female       Cardiovascular Function/Vital Signs  Visit Vitals    /55    Pulse 68    Temp 36.4 °C (97.5 °F)    Resp 18    Ht 5' 5.98\" (1.676 m)    Wt 91.9 kg (202 lb 9.6 oz)    SpO2 99%    Breastfeeding No    BMI 32.72 kg/m2       Patient is status post general, total IV anesthesia anesthesia for * No procedures listed *. Nausea/Vomiting: None    Postoperative hydration reviewed and adequate. Pain:  Pain Scale 1: Numeric (0 - 10) (03/15/17 0800)  Pain Intensity 1: 0 (03/15/17 0800)   Managed    Neurological Status:   Neuro (WDL): Exceptions to WDL (02/27/17 0740)  Neuro  Neurologic State: Alert (03/15/17 0800)  Orientation Level: Oriented X4 (03/15/17 0800)  Cognition: Follows commands (03/15/17 0800)  Speech: Delayed responses (03/15/17 0800)  Assessment L Pupil: Round (03/15/17 0800)  Size L Pupil (mm): 3 (03/15/17 0800)  Assessment R Pupil: Round (03/15/17 0800)  Size R Pupil (mm): 3 (03/15/17 0800)  LUE Motor Response: Purposeful (03/15/17 0800)  LLE Motor Response: No movement to any stimulus (03/15/17 0800)  RUE Motor Response: Purposeful (03/15/17 0800)  RLE Motor Response: No movement to any stimulus (03/15/17 0800)   At baseline    Mental Status and Level of Consciousness: Arousable    Pulmonary Status:   O2 Device: Non-rebreather mask (03/15/17 1228)   Adequate oxygenation and airway patent    Complications related to anesthesia: None    Post-anesthesia assessment completed.  No concerns    Signed By: Jose Dowell MD     March 15, 2017

## 2017-03-15 NOTE — ANESTHESIA PREPROCEDURE EVALUATION
Anesthetic History   No history of anesthetic complications            Review of Systems / Medical History  Patient summary reviewed, nursing notes reviewed and pertinent labs reviewed    Pulmonary    COPD: moderate    Sleep apnea: No treatment  Shortness of breath         Neuro/Psych       CVA  TIA     Cardiovascular    Hypertension: well controlled        Dysrhythmias   CAD and CABG    Exercise tolerance: <4 METS  Comments: Type B Ao dissection repair   GI/Hepatic/Renal     GERD: well controlled    Renal disease: CRI       Endo/Other      Hypothyroidism: well controlled  Arthritis     Other Findings              Physical Exam    Airway  Mallampati: III  TM Distance: > 6 cm  Neck ROM: decreased range of motion   Mouth opening: Diminished (comment)     Cardiovascular  Regular rate and rhythm,  S1 and S2 normal,  no murmur, click, rub, or gallop             Dental  No notable dental hx       Pulmonary      Decreased breath sounds: bilateral      Stridor     Abdominal  GI exam deferred       Other Findings            Anesthetic Plan    ASA: 4  Anesthesia type: general and total IV anesthesia        Post procedure ventilation   Induction: Intravenous  Anesthetic plan and risks discussed with: Patient

## 2017-03-15 NOTE — INTERDISCIPLINARY ROUNDS
Interdisciplinary team rounds were held 3/15/17 with the following team members:Care Management, Diabetes Treatment Specialist, Nursing, Nutrition, Pharmacy, Physical Therapy, Physician, Respiratory Therapy and Clinical Coordinator. Plan of care discussed. Goal: See MD orders and progress notes for further  interventions and desired outcomes.

## 2017-03-15 NOTE — PROGRESS NOTES
Physical Therapy Note    Chart reviewed. Noted pt to have a-fib ablation and pacemaker placement this date. Per Dr. Angus Brito note, mobility to be deferred until following procedure. Will defer and follow back later this date or tomorrow as appropriate.     Thank you,  Meera Polanco, PT, DPT

## 2017-03-15 NOTE — PROGRESS NOTES
Follow up visit with Ms. Meyer. She was receiving her breathing treatment. She appeared somewhat confused at times; she acknowledged and seemed to understand that I was the  and asked for continued prayers, at other times she was speaking of things that did not make sense to me. Assured her of continued support; held her hand and offered a prayer. Pastoral care is available to continue to follow. She kept asking me about \"Antoni\"; I am not sure who Josse Medina is at this time. [phone]. Visit by: Diana Alvarez. Jason Cao.  Елена Vora MA, [facility]    Lead  Profession Development & Advancement

## 2017-03-15 NOTE — PROGRESS NOTES
[facility]  Pulmonary, Critical Care, and Sleep Medicine    Name: Mayito Delaney MRN: [id number]   : 1955 Hospital: [facility]   Date: 3/15/2017            IMPRESSION:   · Acute respiratory failure with hypoxia: failed extubation 3/6, extubated  3/12  · Post extubation stridor mild, better but at risk for spasm  · Bronchitis  · Bilateral pleural effusions bilateral chest tubes 3/10 per IR to drain effusions  · Sore throat  · Constipation, issue at home as well  · Malnutrition- NG removed with extubation, off TF- awaiting clearance by Speechtherapy  · Motor loss in legs is c/w ischemic spinal cord injury; discussed with Dr. Basilio Osgood; Unclear when SCI may have occurred (she was moving BLE after CSF drain was d/d'd)  · COPD noted to be moderate severity. compensated  · AAA Type B dissection; distal thoracic aorta- off IV esmolol  · S/p Left CCA- SCA bypass 17  · S/p TEVAR 17  · Post op iliac artery repair  · IMH from Penetrating Aortic ulceration  · PICC line  · Renal disease: CRI; JEANA - improved  · Abn LFTS  · Anemia  · Sleep apnea: No treatment  · Hypertension: well controlled  · CAD and CABG  · Hypothyroidism: well controlled  · Arthritis  · GERD: well controlled      PLAN:   · Will ask IR to remove right chest tube  · keep left tube in place until minimal output  · Speech therapy- hope we can feed soon and give laxative  · PT, OT. OOB  · Jet nebs. · CBC in AM  · Transfuse prn Hbg less than 7  · Antihypertensives, catapress patch, off cardene drip  · Will be a [facility] candidate   · PUD/DVT prophylaxis       Subjective/History:   3/15 confused this AM. Right chest bothers her at chest tube site. Minimal drainage. Left chest tube still > 100 ml per day. Speech therapy seeing pt    3/14 voice stronger but noarse. Mild congestion. No SOB on NC. Constipation at home. Has sensation in legs but no motor response    3/13 Whispery voice. Sore throat.  No stridor. No nausea. Right chest sore. Chest tubes still in place    3/12 still with some ET secretions. Sputum culture with GNRs. Now on SBT. Later extubated after SBT and acceptable ABGs. Moderate ETT secretions but strong cough. Pt asked to avoid talking too much until vocal cord swellin ghas time to regress. Pt had a cuff leak prior to extubation. Within hours pt developed more raspiness with breathing. Stridorous per RT     Will add humidiifcation, Racemic epinephrine and IV decadron x 2 doses. Keep IV precedex in case steroids cause agitation. Will also d/c NGT as it may impede clearance of upper airway secretions is acutely and chronically ill. May need BIPAP prn but not sure if it will be tolerated. Reintubate prn     3/11 Failed extubation 3/6. Now intubated, sedated. Copious frothy white secretions. Labored with SBT. Not tolrating TF. Bilateral chest tubes placed by IR on 3/11. Both lungs clearer on CXR this AM. TRansfused 1unit PRBC    The patient is critically ill and can not provide additional history due to Ventilated.             Current Facility-Administered Medications   Medication Dose Route Frequency    ADDaptor        vancomycin (VANCOCIN) 1,000 mg injection        0.9% sodium chloride (MBP/ADV) 0.9 % infusion        bacitracin 50,000 unit injection        lidocaine (XYLOCAINE) 10 mg/mL (1 %) injection        heparinized saline 2 units/mL 1,000 unit/500 mL infusion        DOBUTamine (DOBUTREX) 500 mg/250 mL (2,000 mcg/mL) infusion        amiodarone (CORDARONE) 450 mg in dextrose 5% 250 mL infusion  0.5 mg/min IntraVENous TITRATE    hydrALAZINE (APRESOLINE) 20 mg/mL injection 10 mg  10 mg IntraVENous Q6H    levothyroxine (SYNTHROID) injection 260 mcg  260 mcg IntraVENous EVERY MON & TH    cefepime (MAXIPIME) 2 g in 0.9% sodium chloride (MBP/ADV) 100 mL  2 g IntraVENous Q24H    levalbuterol (XOPENEX) nebulizer soln 1.25 mg/3 mL  1.25 mg Nebulization QID RT    dilTIAZem (CARDIZEM) 100 mg in 0.9% sodium chloride (MBP/ADV) 100 mL infusion  0-15 mg/hr IntraVENous TITRATE    heparin (porcine) injection 5,000 Units  5,000 Units SubCUTAneous Q8H    cloNIDine (CATAPRES) 0.2 mg/24 hr patch 1 Patch  1 Patch TransDERmal Q7D    ipratropium (ATROVENT) 0.02 % nebulizer solution 0.5 mg  0.5 mg Nebulization QID RT    polyethylene glycol (MIRALAX) packet 17 g  17 g Oral DAILY    pantoprazole (PROTONIX) 40 mg in sodium chloride 0.9 % 10 mL injection  40 mg IntraVENous DAILY    sodium chloride (NS) flush 5-10 mL  5-10 mL IntraVENous Q8H    sodium chloride (NS) flush 10 mL  10 mL InterCATHeter Q24H    sodium chloride (NS) flush 10-40 mL  10-40 mL InterCATHeter Q8H     Review of Systems:  Review of systems not obtained due to patient factors. Objective:   Vital Signs:    Visit Vitals    /68    Pulse (!) 138    Temp 97.6 °F (36.4 °C)    Resp 17    Ht 5' 5.98\" (1.676 m)    Wt 91.9 kg (202 lb 9.6 oz)    SpO2 98%    Breastfeeding No    BMI 32.72 kg/m2       O2 Device: Nasal cannula   O2 Flow Rate (L/min): 3 l/min   Temp (24hrs), Av.9 °F (36.6 °C), Min:97.4 °F (36.3 °C), Max:98.5 °F (36.9 °C)       Intake/Output:   Last shift:      03/15 07 - 03/15 1900  In: 69.4 [I.V.:69.4]  Out: 175 [Urine:175]  Last 3 shifts:  190 - 03/15 0700  In: 1517.9 [I.V.:1487.9]  Out: 4666 [Urine:3745]    Intake/Output Summary (Last 24 hours) at 03/15/17 1135  Last data filed at 03/15/17 1000   Gross per 24 hour   Intake           608.43 ml   Output             3345 ml   Net         -2736.57 ml       Ventilator Settings:  Mode Rate Tidal Volume Pressure FiO2 PEEP   CPAP   500 ml  5 cm H2O 50 % 5 cm H20     Peak airway pressure: 20 cm H2O    Minute ventilation: 9.07 l/min      Physical Exam:    General:  WDWNWF no distress, appears stated age. Head:  Normocephalic, without obvious abnormality, atraumatic. Eyes:  Conjunctivae/corneas clear. EOMs intact. Nose: Nares normal. Septum midline.  Mucosa normal. No drainage or sinus tenderness. Throat: Lips, mucosa, and tongue normal. Teeth and gums normal. Has a white coating over mouth. Neck: Supple, symmetrical, trachea midline, no adenopathy, thyroid: no enlargment/tenderness/nodules, no carotid bruit and no JVD. Back:   Symmetric, no curvature. ROM normal.   Lungs:   Decreased BS bilaterally, has bilateral rhonchi. Chest wall:  No tenderness or deformity. Heart:  Regular rate and rhythm, S1, S2 normal, no murmur, click, rub or gallop. Abdomen:   Obese;  bowel sounds are decreased,  No masses,  No organomegaly. Extremities: Extremities normal, atraumatic, no cyanosis or edema. Warm. Pulses: 2+ and symmetric all extremities. Skin: Skin color, texture, turgor normal. No rashes or lesions   Lymph nodes: Cervical, supraclavicular, and axillary nodes normal.   Neurologic: Not able to fully assess. Pt is post op, effects of anesthesia in place.         Data:     Recent Results (from the past 24 hour(s))   METABOLIC PANEL, BASIC    Collection Time: 03/15/17  3:45 AM   Result Value Ref Range    Sodium 139 136 - 145 mmol/L    Potassium 4.2 3.5 - 5.1 mmol/L    Chloride 104 97 - 108 mmol/L    CO2 23 21 - 32 mmol/L    Anion gap 12 5 - 15 mmol/L    Glucose 103 (H) 65 - 100 mg/dL    BUN 68 (H) 6 - 20 MG/DL    Creatinine 3.18 (H) 0.55 - 1.02 MG/DL    BUN/Creatinine ratio 21 (H) 12 - 20      GFR est AA 18 (L) >60 ml/min/1.73m2    GFR est non-AA 15 (L) >60 ml/min/1.73m2    Calcium 9.1 8.5 - 10.1 MG/DL   BNP    Collection Time: 03/15/17  3:45 AM   Result Value Ref Range    BNP 1319 (H) 0 - 100 pg/mL             Telemetry:normal sinus rhythm    Imaging:  I have personally reviewed the patients radiographs and have reviewed the reports:  CXR: no acute changes, bilateral pleural effusions       Nisreen Taylor MD

## 2017-03-15 NOTE — PROGRESS NOTES
Cardiology Progress Note            [id number] [address]  [phone]    3/15/2017 12:23 PM    Admit Date: 2/23/2017    Admit Diagnosis: Aneurysm (HCC);AORTIC DISECTION    Subjective:     Tish Caban   denies chest pain.     Visit Vitals    /68    Pulse (!) 138    Temp 97.6 °F (36.4 °C)    Resp 17    Ht 5' 5.98\" (1.676 m)    Wt 202 lb 9.6 oz (91.9 kg)    SpO2 98%    Breastfeeding No    BMI 32.72 kg/m2     Current Facility-Administered Medications   Medication Dose Route Frequency    heparinized saline 2 units/mL infusion 1,000 Units  500 mL Irrigation ONCE    DOBUTamine (DOBUTREX) 500 mg/250 mL (2,000 mcg/mL) infusion  2.5-10 mcg/kg/min IntraVENous TITRATE    fentaNYL citrate (PF) injection 12.5-50 mcg  12.5-50 mcg IntraVENous Multiple    iopamidol (ISOVUE-370) 76 % injection 100 mL  100 mL IntraVENous ONCE    lidocaine (XYLOCAINE) 10 mg/mL (1 %) injection 1-40 mL  1-40 mL SubCUTAneous Multiple    midazolam (VERSED) injection 1-5 mg  1-5 mg IntraVENous Multiple    vancomycin (VANCOCIN) 1,000 mg in 0.9% sodium chloride (MBP/ADV) 250 mL  1,000 mg IntraVENous ONCE    ADDaptor        vancomycin (VANCOCIN) 1,000 mg injection        0.9% sodium chloride (MBP/ADV) 0.9 % infusion        sodium chloride (NS) flush 5-10 mL  5-10 mL IntraVENous Q8H    sodium chloride (NS) flush 5-10 mL  5-10 mL IntraVENous PRN    naloxone (NARCAN) injection 0.4 mg  0.4 mg IntraVENous PRN    amiodarone (CORDARONE) 450 mg in dextrose 5% 250 mL infusion  0.5 mg/min IntraVENous TITRATE    hydrALAZINE (APRESOLINE) 20 mg/mL injection 10 mg  10 mg IntraVENous Q6H    phenol throat spray (CHLORASEPTIC) 1 Spray  1 Spray Oral PRN    levothyroxine (SYNTHROID) injection 260 mcg  260 mcg IntraVENous EVERY MON & TH    mineral oil-hydrophil petrolat (AQUAPHOR) ointment   Topical PRN    cefepime (MAXIPIME) 2 g in 0.9% sodium chloride (MBP/ADV) 100 mL  2 g IntraVENous Q24H    0.9% sodium chloride infusion 250 mL  250 mL IntraVENous PRN    levalbuterol (XOPENEX) nebulizer soln 1.25 mg/3 mL  1.25 mg Nebulization QID RT    dilTIAZem (CARDIZEM) 100 mg in 0.9% sodium chloride (MBP/ADV) 100 mL infusion  0-15 mg/hr IntraVENous TITRATE    heparin (porcine) injection 5,000 Units  5,000 Units SubCUTAneous Q8H    cloNIDine (CATAPRES) 0.2 mg/24 hr patch 1 Patch  1 Patch TransDERmal Q7D    albuterol (PROVENTIL VENTOLIN) nebulizer solution 2.5 mg  2.5 mg Nebulization Q2H PRN    ipratropium (ATROVENT) 0.02 % nebulizer solution 0.5 mg  0.5 mg Nebulization QID RT    HYDROmorphone (PF) (DILAUDID) injection 1 mg  1 mg IntraVENous Q3H PRN    polyethylene glycol (MIRALAX) packet 17 g  17 g Oral DAILY    pantoprazole (PROTONIX) 40 mg in sodium chloride 0.9 % 10 mL injection  40 mg IntraVENous DAILY    sodium chloride (NS) flush 5-10 mL  5-10 mL IntraVENous Q8H    sodium chloride (NS) flush 5-10 mL  5-10 mL IntraVENous PRN    prochlorperazine (COMPAZINE) with saline injection 5 mg  5 mg IntraVENous Q4H PRN    sodium chloride (NS) flush 10 mL  10 mL InterCATHeter Q24H    sodium chloride (NS) flush 10-40 mL  10-40 mL InterCATHeter Q8H    acetaminophen (TYLENOL) tablet 650 mg  650 mg Oral Q4H PRN    naloxone (NARCAN) injection 0.4 mg  0.4 mg IntraVENous PRN    ondansetron (ZOFRAN) injection 4 mg  4 mg IntraVENous Q4H PRN    bisacodyl (DULCOLAX) suppository 10 mg  10 mg Rectal DAILY PRN     Facility-Administered Medications Ordered in Other Encounters   Medication Dose Route Frequency    midazolam (VERSED) injection   IntraVENous PRN    fentaNYL citrate (PF) injection    PRN    propofol (DIPRIVAN) 10 mg/mL injection   IntraVENous PRN    propofol (DIPRIVAN) 10 mg/mL injection   IntraVENous CONTINUOUS    0.9% sodium chloride infusion   IntraVENous CONTINUOUS         Objective:      Visit Vitals    /68    Pulse (!) 138    Temp 97.6 °F (36.4 °C)    Resp 17    Ht 5' 5.98\" (1.676 m)    Wt 202 lb 9.6 oz (91.9 kg)    SpO2 98%    Breastfeeding No    BMI 32.72 kg/m2       Physical Exam:  Abdomen: soft, non-tender  Extremities: extremities normal  Heart: regular rate and rhythm  Lungs: clear to auscultation bilaterally  Pulses: 2+ and symmetric    Data Review:   Labs:    No results for input(s): WBC, HGB, HCT, PLT, HGBEXT, HCTEXT, PLTEXT in the last 72 hours. Recent Labs      03/15/17   0345  03/14/17   0433  03/13/17   1638   NA  139  139  138   K  4.2  4.1  4.4   CL  104  104  105   CO2  23  22  20*   GLU  103*  130*  132*   BUN  68*  64*  62*   CREA  3.18*  3.21*  3.12*   CA  9.1  8.8  8.8       No results for input(s): TROIQ, CPK, CKMB in the last 72 hours. Intake/Output Summary (Last 24 hours) at 03/15/17 1223  Last data filed at 03/15/17 1000   Gross per 24 hour   Intake           386.73 ml   Output             3015 ml   Net         -2628.27 ml        Telemetry: nsr    Assessment:     Active Problems:    HTN (hypertension) ()      Aneurysm ([facility]) (2/23/2017)      SVT (supraventricular tachycardia) (3/3/2017)      SSS (sick sinus syndrome) ([facility]) (3/6/2017)      Paroxysmal atrial fibrillation ([facility]) (3/6/2017)      Atrial flutter ([address] (3/12/2017)        Plan:     Crista min sp AFL ablation and pacemaker implant. Set LRL at 75 bpm. cardizem gtt was dc/d. cxr pending.      Parish Dave MD, [facility]    3/15/2017

## 2017-03-15 NOTE — ROUTINE PROCESS
7:15 PM  Report received from Bassam Miller RN.    8:00 PM  Assessment completed. Pt alert, slightly drowsy, no complaints of pain at this time, pacemaker site looks clean/dry/intact, no hematoma present. VSS, will continue to monitor. 8:43 PM  Pt complains of L neck pain, Dilaudid given. 11:00 PM  Reassessment, no changes noted. Pacemaker site still clean/dry/intact. No hematoma present. 12:06 AM  Pt complains of pain, Pain medication given. 4:03 AM  Reassessment completed. No changes noted. Pacemaker site is clean/dry/intact. Pt is tearful and states she is depressed she is here. Comforted pt with repositioning and ice chips. Will continue to monitor. 5:08 AM  Pt bathed, tolerated well. Ice applied to Pacemaker site. Pt remains tearful.         7:07 AM  Uneventful night, Report given to [employer] .

## 2017-03-15 NOTE — PROGRESS NOTES
Per Dr Sommer Montana, right 10FR chest tube removed bedside. Patient tolerated procedure well. Vasoline Gauze, 4x4, and 2 opsites placed over removal site.  Constantin Caldwell RN notified

## 2017-03-15 NOTE — PROGRESS NOTES
Vascular    Seen this AM  Stable from surgical standpoint  EP procedure today  Hopefully she will progress if cardiac issues are resolved post ablation and pacer

## 2017-03-15 NOTE — PROCEDURES
[address] [address]  [phone]    Indications and Pre-Procedure Diagnosis:  Oleksandr Adams is a 59 y.o. female with atrial flutter and sick sinus syndrome is referred for electr-physiologic evaluation and intervention. Post Procedure Diagnosis    Atrial flutter  Sick sinus syndrome    Electrophysiology Study Procedure  Informed consent was obtained. All vascular access sites were prepped and draped in the usual sterile fashion and the Seldinger technique was used to catherize the RFV with multi-polar electrode catheters, which were placed in the appropriate intra-cardiac sites under fluoroscopic guidance (see catheter list). Right and left atrial pacing and recording, His bundle recording, and right ventricular pacing and recording were performed. Continuous pulse oximetry and cuff BP monitoring were performed. During the procedure, the patient received Versed and Fentanyl for sedation per nursing personnel. Ablation Procedure  Mapping was performed using standard catheter-based techniques and 3-D electro-anatomic mapping. The initial rhythm was counter clockwise atrial flutter at a cycle length of 230 msec. Pacing from the posterior septum showed entrainment with concealed fusion and post pacing intervals within 30 msec of the flutter cycle length. A large curve 8 mm tip [employer] catheter was used to deliver 4 RF lesions for a total of 7 minutes in the cavo-tricuspid isthmus with termination of the arrhythmia and creation of bi-directional isthmus block. There was a 6 second post conversion pause. Follow ablation, rapid atrial pacing, on and off dobutamine @ 5 mcg/min, failed to induce any atrial arrhythmias. There was no evidence of atrial fibrillation during the procedure. The patient left the laboratory in a stable condition. At the end of the procedure all catheters were removed and vascular hemostasis achieved.  Fluoroscopic and total procedure times were 4 and 30 minutes respectively. Estimated blood loss: <10 ml. Sharp counts: correct. Specimen (s) collected: none. The procedure related complication occurred: none. The following problems were encountered: none. Conduction intervals (ms)    A-A A-H H-V P-R QRS Q-T R-R V-V  [phone]    AV tabby conduction    VA Block when pacing at 430  ms    Findings and Summary    This study demonstrates:  1. Counter-clockwise isthmus dependent atrial flutter  2. Successful RFA of the CTI with termination to sinus rhythm and confirmed bi-directional isthmus block  3. No further inducible atrial arrhythmias on dobutamine with rapid atrial pacing  4. Post conversion pause greater than 5 seconds c/w sick sinus syndrome    Recommendation:  1. Dual chamber pacemaker    Thank you for allowing me to participate in this patients care.     Frankie Montesinos MD, [facility]

## 2017-03-15 NOTE — PROGRESS NOTES
Pt extubated on 3/13, now on 3 lpm NC. Pt underwent AFL ablation and pacemaker implant today. PT/OT have conducted initial assessment - recommending In[facility] Rehab. CM spoke with pt's spouse who was visiting re: possible rehab.  very much in agreement about rehab and when given [facility], would prefer [address] since they live much closer to [place] [address]. Ref will be sent via [address].   VIVIAN Haider

## 2017-03-15 NOTE — PROGRESS NOTES
Attended Interdisciplinary rounds in Critical Care Unit, where patient care was discussed. Visit by: Yin Solo. Yazan Sanderson.  Zeina Pittman MA, [address]

## 2017-03-15 NOTE — PROGRESS NOTES
Speech pathology note  Reviewed chart and discussed case with RN. Note patient NPO for pacemaker placement. Will continue to follow for dysphagia treatment. Thank you.     Marly Srivastava., CCC-SLP

## 2017-03-15 NOTE — PROGRESS NOTES
Physical Therapy Note    Chart reviewed. Discussed pt in length with nursing. Pt cleared by nursing for mobility. Pt received supine in bed with  at bedside and VSS. Pt asleep and easily awoken to voice and touch. Attempted to initiate PT intervention, however pt only yelling \"ow\" and minimally responsive otherwise. Pt very drowsy and unable to maintain alertness to participate in acute PT. Pt is s/p pacemaker placement and sling re-adjusted for comfort. Pt was left supine in bed with all needs met,  present, and nursing informed. PT session aborted. Will follow back tomorrow.     Thank you,  Jaxson Mix, PT, DPT  Total time: 10 minutes (PCS)

## 2017-03-15 NOTE — PROGRESS NOTES
Vascular    No significant changes  A&O x 3  Incisions OK  2+ pedal pulses  No movement in legs/feet  Cr 3.2  Good UOP  Plan for Afib ablation and pacer in AM  Long talk w/ patient and  this evening  Explained that paralysis is likely permanent  They agree to proceed w/ pacer & ablation  Hopefully renal function will recover  Need to begin to mobilize her as soon as she is stable after EP procedure

## 2017-03-15 NOTE — PROGRESS NOTES
Nephrology Progress Note     Fransisco Chilel       www. [email or web address]                   Phone - [phone]   Patient: Griselda Nanas  YOB: 1955     Date- 3/15/2017     CC: Follow up for arf          Subjective: Interval History:   -   Cr high but stable  bp improved    Good urine out put  C/o cough  No sob  No fever     Assessment:   · Acute renal failure likely due to ATN - vancomycin toxicity - vanco level - 29.2- hemodynamic instability  · hyperkalemai  · ckd 3 baseline cr. 1.2  · Resp. Failure - s/p extubation  · AFIB with RVR  · Complicated type B descending thoracic aortic dissection. -   · S/p TEVAR, L ileo-fem bypass, L carotid-subclavian bypass:2/27/17  · Protein malnutrition  · B/l effusion  · S/p chest tube placement  · Ischemic spinal cord injury     Plan:   - cr remained high   - use lasix prn - if urine out put drops  Goal to keep I = O  - follow bmp  - no dialysis indicated today      Care Plan discussed with: Jaci Corral  [] High complexity decision making was performed  [] Patient is at high-risk of decompensation with multiple organ involvement  Review of Systems: Pertinent items are noted in HPI. Objective:   Vitals:    03/15/17 0700 03/15/17 0800 03/15/17 0840 03/15/17 0900   BP: 150/69 139/74  144/70   Pulse: (!) 130 (!) 132  (!) 131   Resp: 15 18  19   Temp:       SpO2: 96% 97% 98% 98%   Weight:       Height:           Intake/Output Summary (Last 24 hours) at 03/15/17 2024  Last data filed at 03/15/17 0800   Gross per 24 hour   Intake           603.35 ml   Output             3365 ml   Net         -2761.65 ml     Physical Exam:   GEN: NAD  NECK- Supple,  RESP: clear b/l, no wheezing  CVS: RRR,S1,S2   NEURO: non focal, normal speech  SKIN: No Rash  ABDO: soft , non tender, No hepatosplenomegaly  EXT: + Edema   Neuro - can't move lower leg. Moves upepr arm  gu - grubbs +    Chart reviewed. Pertinent Notes reviewed. Medications list  reviewed     Current Facility-Administered Medications   Medication    amiodarone (CORDARONE) 450 mg in dextrose 5% 250 mL infusion    hydrALAZINE (APRESOLINE) 20 mg/mL injection 10 mg    phenol throat spray (CHLORASEPTIC) 1 Spray    levothyroxine (SYNTHROID) injection 260 mcg    mineral oil-hydrophil petrolat (AQUAPHOR) ointment    cefepime (MAXIPIME) 2 g in 0.9% sodium chloride (MBP/ADV) 100 mL    0.9% sodium chloride infusion 250 mL    levalbuterol (XOPENEX) nebulizer soln 1.25 mg/3 mL    dilTIAZem (CARDIZEM) 100 mg in 0.9% sodium chloride (MBP/ADV) 100 mL infusion    heparin (porcine) injection 5,000 Units    cloNIDine (CATAPRES) 0.2 mg/24 hr patch 1 Patch    albuterol (PROVENTIL VENTOLIN) nebulizer solution 2.5 mg    ipratropium (ATROVENT) 0.02 % nebulizer solution 0.5 mg    HYDROmorphone (PF) (DILAUDID) injection 1 mg    polyethylene glycol (MIRALAX) packet 17 g    pantoprazole (PROTONIX) 40 mg in sodium chloride 0.9 % 10 mL injection    sodium chloride (NS) flush 5-10 mL    sodium chloride (NS) flush 5-10 mL    prochlorperazine (COMPAZINE) with saline injection 5 mg    sodium chloride (NS) flush 10 mL    sodium chloride (NS) flush 10-40 mL    acetaminophen (TYLENOL) tablet 650 mg    naloxone (NARCAN) injection 0.4 mg    ondansetron (ZOFRAN) injection 4 mg    bisacodyl (DULCOLAX) suppository 10 mg              Data Review :  Recent Labs      03/15/17   0345  03/14/17   0433  03/13/17   1638  03/13/17   0403   NA  139  139  138  139   K  4.2  4.1  4.4  5.6*   CL  104  104  105  107   CO2  23  22  20*  19*   GLU  103*  130*  132*  146*   BUN  68*  64*  62*  52*   CREA  3.18*  3.21*  3.12*  3.05*   CA  9.1  8.8  8.8  8.4*     No results for input(s): WBC, HGB, HCT, PLT, HGBEXT, HCTEXT, PLTEXT, HGBEXT, HCTEXT, PLTEXT in the last 72 hours.   Lab Results   Component Value Date/Time    Culture result: MODERATE  PSEUDOMONAS AERUGINOSA   03/11/2017 11:12 AM    Culture result: MODERATE  NORMAL RESPIRATORY YVAN   03/11/2017 11:12 AM    Culture result: SCANT  NORMAL RESPIRATORY YVAN   03/02/2017 10:25 AM    Culture result: MODERATE  NORMAL RESPIRATORY YVAN   02/27/2017 05:18 PM    Culture result: MIXED UROGENITAL YVAN ISOLATED 06/24/2016 09:12 PM     Lab Results   Component Value Date/Time    Specimen Description: STOOL 11/10/2013 08:00 PM    Specimen Description: BLOOD 05/24/2013 10:50 AM    Specimen Description: BLOOD 05/22/2013 09:55 PM    Specimen Description: STOOL 05/22/2013 06:20 PM    Specimen Description: URINE 08/16/2011 10:54 AM     Azucena Henderson MD  [facility]  [facility]  [facility] [address] [facility] [place], [address] [place]  Phone - [phone]         Fax - [phone] [facility] Office  [address] [place] [address]  Phone - [phone]        Fax - [phone]     [email or web address]

## 2017-03-15 NOTE — PROCEDURES
[id number] [address]  [phone]    Indications and Pre-Procedure Diagnosis:  Abdulaziz Donovan is a 59 y.o. female with sick sinus syndrome is referred for dual chamber pacemaker. Post Procedure Diagnosis:    Sick sinus syndrome    Pacemaker Implant Procedure and Findings:  Informed consent was obtained and the patient was premedicated with vancomycin. The procedure was performed under local anesthesia. Continuous pulse oximetry and cuff pressure were monitored. During the procedure, the patient received Versed and Fentanyl for sedation per nursing personnel. The left deltopectoral area was prepped and draped in the usual sterile fashion and was liberally infiltrated with 1% lidocaine. An incision was made over the left subpectoral area and a generator pocket was manually dissected. Access was achieved in the left axillary vein under fluoroscopic guidance and using the seldinger technique. Through the left axillary vein, pacing leads were positioned in appropriate regions in the right heart chambers where satisfactory pacing and sensing parameters were measured. Stability of the leads was assessed with deep breathing and there was no diaphragmatic pacing at 10V output. The leads were anchored using the sleeves and a pulse generator pocket fashioned using blunt dissection. The leads were then connected to the pulse generator. The pulse generator pocket was then liberally infiltrated with bacitracin solution, and the device implanted with a single silk fixation suture in the header to prevent migration. The wound was closed in layers using continuous 2-0 Vicryl and 4-0 Vicryl ending with a sub-cuticular closure. Fluoroscopy and total procedure times were 1 and 30 minutes respectively. Estimated blood loss <10 ml. Sharp count: correct. Specimen(s) collected: none. The following procedure related complication occurred: none. The following problems were encountered: none.  Findings: successful pacemaker placement. Device Data Measurements:  Lead Sensing (mV) Threshold (V)Pulse Width (ms) Impedance (Ohms)  RA 5.2  1.2  0.5   794  RV 13  0.4  0.5   934      Final Programmed Parameters  Bradycardia pacing rate  75 bpm  Pacing Mode    DDDR  Pacing Output    3.5 V@ 0.5 ms    Supplies Summary available in the chart  [employer]    Thank you for allowing me to participate in this patients care.     Jackson Hernandez MD, Keli Anand

## 2017-03-16 NOTE — PROGRESS NOTES
[facility]  Pulmonary, Critical Care, and Sleep Medicine    Name: Sonya Corrigan MRN: [id number]   : 1955 [facility]   Date: 3/16/2017            IMPRESSION:   · Acute respiratory failure with hypoxia: failed extubation 3/6, extubated  3/12  · Post extubation stridor mild, better but at risk for spasm  · Bronchitis  · Bilateral pleural effusions bilateral chest tubes 3/10 per IR to drain effusions; right removed 3/15  · Acute depresssion- tearful  · Left chest pain frompacer  · Constipation, issue at home as well  · Malnutrition- NG removed with extubation, off TF- awaiting clearance by Speechtherapy  · Motor loss in legs is c/w ischemic spinal cord injury; discussed with Dr. Giancarlo Anaya; Unclear when SCI may have occurred (she was moving BLE after CSF drain was d/d'd)  · COPD noted to be moderate severity. compensated  · AAA Type B dissection; distal thoracic aorta- off IV esmolol  · S/p Left CCA- SCA bypass 17  · S/p TEVAR 17  · Post op iliac artery repair  · IMH from Penetrating Aortic ulceration  · PICC line  · Renal disease: CRI; JEANA - improved  · Abn LFTS  · Anemia  · Sleep apnea: No treatment  · Hypertension: well controlled  · CAD and CABG  · Hypothyroidism: well controlled  · Arthritis  · GERD: well controlled      PLAN:   · keep left tube in place until minimal output  · Speech therapy- hope we can feed soon and give laxative  · PT, OT. OOB  · Jet nebs. · CBC in AM  · Transfuse prn Hbg less than 7  · Antihypertensives, catapress patch, off cardene drip  · Will be a [facility] candidate   · PUD/DVT prophylaxis       Subjective/History:   3/16 tearful. Left chest pain but also very depressed    3/15 confused this AM. Right chest bothers her at chest tube site. Minimal drainage. Left chest tube still > 100 ml per day. Speech therapy seeing pt. Ablation and pacer    3/14 voice stronger but noarse. Mild congestion.  No SOB on NC. Constipation at home. Has sensation in legs but no motor response    3/13 Whispery voice. Sore throat. No stridor. No nausea. Right chest sore. Chest tubes still in place    3/12 still with some ET secretions. Sputum culture with GNRs. Now on SBT. Later extubated after SBT and acceptable ABGs. Moderate ETT secretions but strong cough. Pt asked to avoid talking too much until vocal cord swellin ghas time to regress. Pt had a cuff leak prior to extubation. Within hours pt developed more raspiness with breathing. Stridorous per RT     Will add humidiifcation, Racemic epinephrine and IV decadron x 2 doses. Keep IV precedex in case steroids cause agitation. Will also d/c NGT as it may impede clearance of upper airway secretions is acutely and chronically ill. May need BIPAP prn but not sure if it will be tolerated. Reintubate prn     3/11 Failed extubation 3/6. Now intubated, sedated. Copious frothy white secretions. Labored with SBT. Not tolrating TF. Bilateral chest tubes placed by IR on 3/11. Both lungs clearer on CXR this AM. TRansfused 1unit PRBC    The patient is critically ill and can not provide additional history due to Ventilated.             Current Facility-Administered Medications   Medication Dose Route Frequency    DOBUTamine (DOBUTREX) 500 mg/250 mL (2,000 mcg/mL) infusion  2.5-10 mcg/kg/min IntraVENous TITRATE    ADDaptor        vancomycin (VANCOCIN) 1,000 mg injection        0.9% sodium chloride (MBP/ADV) 0.9 % infusion        sodium chloride (NS) flush 5-10 mL  5-10 mL IntraVENous Q8H    sodium chloride (NS) flush 10 mL  10 mL InterCATHeter Q24H    sodium chloride (NS) flush 10-40 mL  10-40 mL InterCATHeter Q8H    amiodarone (CORDARONE) 450 mg in dextrose 5% 250 mL infusion  0.5 mg/min IntraVENous TITRATE    hydrALAZINE (APRESOLINE) 20 mg/mL injection 10 mg  10 mg IntraVENous Q6H    levothyroxine (SYNTHROID) injection 260 mcg  260 mcg IntraVENous EVERY MON & TH    cefepime (MAXIPIME) 2 g in 0.9% sodium chloride (MBP/ADV) 100 mL  2 g IntraVENous Q24H    levalbuterol (XOPENEX) nebulizer soln 1.25 mg/3 mL  1.25 mg Nebulization QID RT    dilTIAZem (CARDIZEM) 100 mg in 0.9% sodium chloride (MBP/ADV) 100 mL infusion  0-15 mg/hr IntraVENous TITRATE    heparin (porcine) injection 5,000 Units  5,000 Units SubCUTAneous Q8H    cloNIDine (CATAPRES) 0.2 mg/24 hr patch 1 Patch  1 Patch TransDERmal Q7D    ipratropium (ATROVENT) 0.02 % nebulizer solution 0.5 mg  0.5 mg Nebulization QID RT    polyethylene glycol (MIRALAX) packet 17 g  17 g Oral DAILY    pantoprazole (PROTONIX) 40 mg in sodium chloride 0.9 % 10 mL injection  40 mg IntraVENous DAILY    sodium chloride (NS) flush 5-10 mL  5-10 mL IntraVENous Q8H    sodium chloride (NS) flush 10 mL  10 mL InterCATHeter Q24H    sodium chloride (NS) flush 10-40 mL  10-40 mL InterCATHeter Q8H     Review of Systems:  Review of systems not obtained due to patient factors. Objective:   Vital Signs:    Visit Vitals    /52    Pulse (!) 107    Temp 98.2 °F (36.8 °C)    Resp 21    Ht 5' 5.98\" (1.676 m)    Wt 91.9 kg (202 lb 9.6 oz)    SpO2 99%    Breastfeeding No    BMI 32.72 kg/m2       O2 Device: Nasal cannula   O2 Flow Rate (L/min): 2.5 l/min   Temp (24hrs), Av.8 °F (36.6 °C), Min:97.1 °F (36.2 °C), Max:98.2 °F (36.8 °C)       Intake/Output:   Last shift:         Last 3 shifts:  1901 -  0700  In: 631.5 [I.V.:586.5]  Out: 2690 [Urine:2635]    Intake/Output Summary (Last 24 hours) at 17 0757  Last data filed at 17 0700   Gross per 24 hour   Intake           414.43 ml   Output             1280 ml   Net          -865.57 ml       Ventilator Settings:  Mode Rate Tidal Volume Pressure FiO2 PEEP   CPAP   500 ml  5 cm H2O 50 % 5 cm H20     Peak airway pressure: 20 cm H2O    Minute ventilation: 9.07 l/min      Physical Exam:    General:  WDWNWF no distress, appears stated age.    Head:  Normocephalic, without obvious abnormality, atraumatic. Eyes:  Conjunctivae/corneas clear. EOMs intact. Nose: Nares normal. Septum midline. Mucosa normal. No drainage or sinus tenderness. Throat: Lips, mucosa, and tongue normal. Teeth and gums normal. Has a white coating over mouth. Neck: Supple, symmetrical, trachea midline, no adenopathy, thyroid: no enlargment/tenderness/nodules, no carotid bruit and no JVD. Back:   Symmetric, no curvature. ROM normal.   Lungs:   Decreased BS bilaterally, has bilateral rhonchi. Chest wall:  No tenderness or deformity. Heart:  Regular rate and rhythm, S1, S2 normal, no murmur, click, rub or gallop. Abdomen:   Obese;  bowel sounds are decreased,  No masses,  No organomegaly. Extremities: Extremities normal, atraumatic, no cyanosis or edema. Warm. Pulses: 2+ and symmetric all extremities. Skin: Skin color, texture, turgor normal. No rashes or lesions   Lymph nodes: Cervical, supraclavicular, and axillary nodes normal.   Neurologic: Not able to fully assess. Pt is post op, effects of anesthesia in place.         Data:     Recent Results (from the past 24 hour(s))   CBC W/O DIFF    Collection Time: 03/16/17  4:12 AM   Result Value Ref Range    WBC 9.9 3.6 - 11.0 K/uL    RBC 3.35 (L) 3.80 - 5.20 M/uL    HGB 9.7 (L) 11.5 - 16.0 g/dL    HCT 29.7 (L) 35.0 - 47.0 %    MCV 88.7 80.0 - 99.0 FL    MCH 29.0 26.0 - 34.0 PG    MCHC 32.7 30.0 - 36.5 g/dL    RDW 17.4 (H) 11.5 - 14.5 %    PLATELET 401 600 - 358 K/uL             Telemetry:normal sinus rhythm    Imaging:  I have personally reviewed the patients radiographs and have reviewed the reports:  CXR: no acute changes, bilateral pleural effusions       Halina Perez MD

## 2017-03-16 NOTE — PROGRESS NOTES
Cardiology Progress Note            [address]  [phone]    3/16/2017 12:34 PM    Admit Date: 2/23/2017    Admit Diagnosis: Aneurysm (HCC);AORTIC DISECTION    Subjective:     Antonlorraine Doyle is having pafib with rvr    Visit Vitals    /45 (BP 1 Location: Left arm, BP Patient Position: At rest;Head of bed elevated (Comment degrees))    Pulse (!) 128    Temp 97.5 °F (36.4 °C)    Resp 19    Ht 5' 5.98\" (1.676 m)    Wt 202 lb 9.6 oz (91.9 kg)    SpO2 95%    Breastfeeding No    BMI 32.72 kg/m2     Current Facility-Administered Medications   Medication Dose Route Frequency    HYDROmorphone (PF) (DILAUDID) injection 0.5 mg  0.5 mg IntraVENous Q2H PRN    escitalopram oxalate (LEXAPRO) tablet 10 mg  10 mg Oral DAILY    ADDaptor        vancomycin (VANCOCIN) 1,000 mg injection        0.9% sodium chloride (MBP/ADV) 0.9 % infusion        sodium chloride (NS) flush 5-10 mL  5-10 mL IntraVENous Q8H    sodium chloride (NS) flush 5-10 mL  5-10 mL IntraVENous PRN    naloxone (NARCAN) injection 0.4 mg  0.4 mg IntraVENous PRN    sodium chloride (NS) flush 10-30 mL  10-30 mL InterCATHeter PRN    sodium chloride (NS) flush 10 mL  10 mL InterCATHeter Q24H    sodium chloride (NS) flush 10 mL  10 mL InterCATHeter PRN    sodium chloride (NS) flush 10-40 mL  10-40 mL InterCATHeter Q8H    sodium chloride (NS) flush 20 mL  20 mL InterCATHeter PRN    amiodarone (CORDARONE) 450 mg in dextrose 5% 250 mL infusion  0.5 mg/min IntraVENous TITRATE    hydrALAZINE (APRESOLINE) 20 mg/mL injection 10 mg  10 mg IntraVENous Q6H    phenol throat spray (CHLORASEPTIC) 1 Spray  1 Spray Oral PRN    levothyroxine (SYNTHROID) injection 260 mcg  260 mcg IntraVENous EVERY MON & TH    mineral oil-hydrophil petrolat (AQUAPHOR) ointment   Topical PRN    cefepime (MAXIPIME) 2 g in 0.9% sodium chloride (MBP/ADV) 100 mL  2 g IntraVENous Q24H    0.9% sodium chloride infusion 250 mL  250 mL IntraVENous PRN    levalbuterol (XOPENEX) nebulizer soln 1.25 mg/3 mL  1.25 mg Nebulization QID RT    dilTIAZem (CARDIZEM) 100 mg in 0.9% sodium chloride (MBP/ADV) 100 mL infusion  0-15 mg/hr IntraVENous TITRATE    heparin (porcine) injection 5,000 Units  5,000 Units SubCUTAneous Q8H    cloNIDine (CATAPRES) 0.2 mg/24 hr patch 1 Patch  1 Patch TransDERmal Q7D    albuterol (PROVENTIL VENTOLIN) nebulizer solution 2.5 mg  2.5 mg Nebulization Q2H PRN    ipratropium (ATROVENT) 0.02 % nebulizer solution 0.5 mg  0.5 mg Nebulization QID RT    polyethylene glycol (MIRALAX) packet 17 g  17 g Oral DAILY    pantoprazole (PROTONIX) 40 mg in sodium chloride 0.9 % 10 mL injection  40 mg IntraVENous DAILY    sodium chloride (NS) flush 5-10 mL  5-10 mL IntraVENous Q8H    sodium chloride (NS) flush 5-10 mL  5-10 mL IntraVENous PRN    prochlorperazine (COMPAZINE) with saline injection 5 mg  5 mg IntraVENous Q4H PRN    sodium chloride (NS) flush 10 mL  10 mL InterCATHeter Q24H    sodium chloride (NS) flush 10-40 mL  10-40 mL InterCATHeter Q8H    acetaminophen (TYLENOL) tablet 650 mg  650 mg Oral Q4H PRN    ondansetron (ZOFRAN) injection 4 mg  4 mg IntraVENous Q4H PRN    bisacodyl (DULCOLAX) suppository 10 mg  10 mg Rectal DAILY PRN         Objective:      Visit Vitals    /45 (BP 1 Location: Left arm, BP Patient Position: At rest;Head of bed elevated (Comment degrees))    Pulse (!) 128    Temp 97.5 °F (36.4 °C)    Resp 19    Ht 5' 5.98\" (1.676 m)    Wt 202 lb 9.6 oz (91.9 kg)    SpO2 95%    Breastfeeding No    BMI 32.72 kg/m2       Physical Exam:  Abdomen: soft, non-tender  Extremities: extremities normal  Heart: regular rate and rhythm  Lungs: clear to auscultation bilaterally  Pulses: 2+ and symmetric    Data Review:   Labs:    Recent Labs      03/16/17   0412   WBC  9.9   HGB  9.7*   HCT  29.7*   PLT  377     Recent Labs      03/15/17   0345  03/14/17   0433  03/13/17   1638   NA  139  139  138   K  4.2 4.1  4.4   CL  104  104  105   CO2  23  22  20*   GLU  103*  130*  132*   BUN  68*  64*  62*   CREA  3.18*  3.21*  3.12*   CA  9.1  8.8  8.8       No results for input(s): TROIQ, CPK, CKMB in the last 72 hours. Intake/Output Summary (Last 24 hours) at 03/16/17 1234  Last data filed at 03/16/17 1227   Gross per 24 hour   Intake              200 ml   Output             1575 ml   Net            -1375 ml        Telemetry: pafib with rvr    Assessment:     Active Problems:    HTN (hypertension) ()      Aneurysm (HCC) (2/23/2017)      SVT (supraventricular tachycardia) (3/3/2017)      SSS (sick sinus syndrome) ([facility]) (3/6/2017)      Paroxysmal atrial fibrillation ([facility]) (3/6/2017)      Atrial flutter ([address] (3/12/2017)        Plan:     Geoffrey Almaguer is sp AFL ablation and pacemaker. She is still having PAF with rvr. Will IV load with amio gtt.     Ash Kruse MD, [facility]    3/16/2017

## 2017-03-16 NOTE — PROGRESS NOTES
Nutrition Assessment:    RECOMMENDATIONS:   Advance diet as medically able per SLP  If pt is unable to advance to PO diet in the next 24-48h, would place NGT and initiate enteral nutrition:   Recommend Nepro @ 40mL/h, advance rate 10mL q 8h as tolerated to Goal Rate of 40mL/h + 75mL H2O flush q 6h (provides 1728kcals/78gPro/998mL)    ASSESSMENT:   Chart reviewed, case discussed during CCU rounds. Pt remains NPO, SLP evaluated today and stated she is not safe for PO intake. Pt disoriented and depressed, was to start antidepressant today but no gastric access. No new labs today, labs from yesterday reviewed. Last BM was 3/13, pt on miralax as she has chronic constipation. If unable to advance her by tomorrow, would consider NGT placement and starting enteral nutrition as she has not gotten fed since Saturday. Dietitians Intervention(s)/Plan(s): TF recommendations, advance diet as/if able  SUBJECTIVE/OBJECTIVE:   Pt disoriented, depressed   Diet Order: NPO  % Eaten:  No data found. Pertinent Medications:cefepime, protonix, miralax. Chemistries:  Lab Results   Component Value Date/Time    Sodium 139 03/15/2017 03:45 AM    Potassium 4.2 03/15/2017 03:45 AM    Chloride 104 03/15/2017 03:45 AM    CO2 23 03/15/2017 03:45 AM    Anion gap 12 03/15/2017 03:45 AM    Glucose 103 03/15/2017 03:45 AM    BUN 68 03/15/2017 03:45 AM    Creatinine 3.18 03/15/2017 03:45 AM    BUN/Creatinine ratio 21 03/15/2017 03:45 AM    GFR est AA 18 03/15/2017 03:45 AM    GFR est non-AA 15 03/15/2017 03:45 AM    Calcium 9.1 03/15/2017 03:45 AM    Albumin 2.1 03/12/2017 06:43 AM      Anthropometrics: Height: 5' 5.98\" (167.6 cm) Weight: 91.9 kg (202 lb 9.6 oz)    IBW (%IBW): 59.1 kg (130 lb 4.7 oz) ( ) UBW (%UBW): 68.2 kg (150 lb 5.7 oz) (  %)    BMI: Body mass index is 32.72 kg/(m^2).     This BMI is indicative of:  []Underweight   []Normal   []Overweight   [x] Obesity   [] Extreme Obesity (BMI>40)  Estimated Nutrition Needs (Based on): 1788 Kcals/day (MSJ 1490 x 1.2) , 68 g (1gPro/kg ABW) Protein  Carbohydrate: At Least 130 g/day  Fluids: 1200 mL/day or per Nephrology    Last BM: 3/13   [x]Active     []Hyperactive  []Hypoactive       [] Absent   BS  Skin:    [] Intact   [x] Incision  [] Breakdown   [] DTI   [] Tears/Excoriation/Abrasion  [x]Edema(+1-generalized; all extremities)  [] Other: Wt Readings from Last 30 Encounters:   03/05/17 91.9 kg (202 lb 9.6 oz)   02/23/17 78 kg (171 lb 15.3 oz)   02/21/17 77.2 kg (170 lb 4 oz)   01/24/17 77.7 kg (171 lb 4 oz)   08/24/16 75.5 kg (166 lb 8 oz)   06/27/16 79.2 kg (174 lb 9.6 oz)   06/26/16 78 kg (172 lb)   05/31/16 77.3 kg (170 lb 8 oz)   04/15/16 77.6 kg (171 lb)   04/12/16 77.8 kg (171 lb 9.6 oz)   02/22/16 82.1 kg (181 lb)   12/21/15 78.8 kg (173 lb 12.8 oz)   11/09/15 80.3 kg (177 lb)   10/09/15 80.3 kg (177 lb)   09/16/15 79.5 kg (175 lb 4.8 oz)   07/10/15 80.3 kg (177 lb)   07/08/15 80.6 kg (177 lb 9.6 oz)   07/01/15 79.8 kg (176 lb)   06/29/15 79.7 kg (175 lb 9.6 oz)   06/02/15 80.3 kg (177 lb)   05/07/15 80.6 kg (177 lb 9.6 oz)   05/06/15 79.8 kg (176 lb)   05/02/15 79.4 kg (175 lb)   04/21/15 80.3 kg (177 lb)   03/13/15 77.1 kg (170 lb)   02/02/15 77.3 kg (170 lb 6.4 oz)   12/05/14 77.9 kg (171 lb 12.8 oz)   11/28/14 76.7 kg (169 lb)   11/10/14 78.5 kg (173 lb)   10/24/14 76.7 kg (169 lb 3.2 oz)      NUTRITION DIAGNOSES:   Problem:  Inadequate protein-energy intake      Etiology: related to pt NPO      Signs/Symptoms: as evidenced by NPO + propofol meets <25% kcal and 0% protein needs. Previous dx re: inadequate protein energy intake continues, pt remains NPO. NUTRITION INTERVENTIONS:  Meals/Snacks: Other (diet advancement per SLP) Enteral/Parenteral Nutrition: Initiate enteral nutrition                GOAL:   Pt will advance to PO diet vs TF in 2-4 days.      NUTRITION MONITORING AND EVALUATION   Previous Goal: Pt will advance to PO diet and consume >50% of meals with K+ and phos WNL in 2-3 days   Previous Goal Met: No (Pt not advanced, remains NPO)   Previous Recommendations Implemented: N/A   Cultural, Hoahaoism, or Ethnic Dietary Needs: None   LEARNING NEEDS (Diet, Food/Nutrient-Drug Interaction):    [x] None Identified   [] Identified and Education Provided/Documented   [] Identified and Pt declined/was not appropriate      [x] Interdisciplinary Care Plan Reviewed/Documented    [x] Participated in Discharge Planning: Unable to determine    [x] Interdisciplinary Rounds     NUTRITION RISK:    [x] High              [] Moderate           []  Low  []  Minimal/Uncompromised      Jory Bermeo, [address]  Pager [phone]  Weekend Pager [phone]

## 2017-03-16 NOTE — PROGRESS NOTES
Problem: Mobility Impaired (Adult and Pediatric)  Goal: *Acute Goals and Plan of Care (Insert Text)  Physical Therapy Goals  Initiated 3/14/2017  1. Patient will move from supine to sit and sit to supine , scoot up and down and roll side to side in bed with moderate assistance within 7 day(s). 2. Patient will transfer from bed to chair and chair to bed with maximal assistance x 2 using the least restrictive device within 7 day(s). 3. Patient will perform sit to stand with maximal assistance within 7 day(s). 4. Patient will sit on EOB 2 minutes demonstrating overall good sitting balance without LOB within 7 days. PHYSICAL THERAPY TREATMENT  Patient: Griselda Nanas (10 y.o. female)  Date: 3/16/2017  Diagnosis: Aneurysm ([address]  AORTIC DISECTION <principal problem not specified>  Procedure(s) (LRB):  ENDOVASCULAR ANEURYSM REPAIR of THORACIC AORTIC DISSECTION, repair of ruptured left iliac artery with stent placement. Left ileo-femoral bypass graft with PTFE. (Bilateral)  LEFT CAROTID SUBCLAVIAN BYPASS with stent placement of left common artery. (Left) 17 Days Post-Op  Precautions: Fall      ASSESSMENT: Pt with pacemaker placement on 3/15/17  Pt received supine in bed and agreeable to PT intervention. Pt cleared by nursing for mobility. Pt seen with OT for safety and skilled management of patient. Pt with change in mentation and with worsened mobility as compared to previous therapy session. Pt initially very alert, however with mild drowsiness once mobility initiated, requiring VCs to keep eyes open. Pt moaning out in pain at rest and during mobility, however pt unable to provide pain rating or pain location (nursing pre-medicated pt for pain). Pt required total A x 2 for all functional mobility. Pt's LUE remained in sling throughout session, however pt required constant cueing to maintain post-pacemaker placement precautions.  Pt with overall poor sitting balance while sitting on EOB ~ 5 minutes, demonstrating no righting reaction and poor trunk control. Pt with marked posterior LOB/lean and reported that she could feel herself leaning back, but unable to correct lean. Pt appeared to be uncomfortable and with significant fatigue throughout therapy session, therefore was returned supine in bed following treatment. VSS throughout. Pt was left with all needs met and nursing informed. Recommend pt discharge to inpatient rehab pending progress in acute PT. Pt will require mayra lift for all bed<>chair transfers at this time. Progression toward goals:  [ ]    Improving appropriately and progressing toward goals  [X]    Improving slowly and progressing toward goals  [ ]    Not making progress toward goals and plan of care will be adjusted       PLAN:  Patient continues to benefit from skilled intervention to address the above impairments. Continue treatment per established plan of care. Discharge Recommendations:  Inpatient Rehab, TBD  Further Equipment Recommendations for Discharge:  TBD by rehab       SUBJECTIVE:   Patient stated It's me.       OBJECTIVE DATA SUMMARY:   Critical Behavior:  Neurologic State: Alert  Orientation Level: Oriented to person, Disoriented to time, Disoriented to situation, Disoriented to place  Cognition: Follows commands  Safety/Judgement: Decreased awareness of environment, Decreased insight into deficits  Functional Mobility Training:  Bed Mobility:  Rolling: Total assistance;Assist x2  Supine to Sit: Total assistance;Assist x2  Sit to Supine: Total assistance;Assist x2  Scooting:  Total assistance;Assist x2        Balance:  Sitting: Impaired  Sitting - Static: Poor (constant support)  Sitting - Dynamic: Poor (constant support)  Neuro Re-Education:  Pt provided with manual cueing to spinal extensors while sitting EOB  Pain:  Pain Scale 1: Numeric (0 - 10)  Pain Intensity 1: 0  Pain Location 1: Chest;Incisional  Pain Orientation 1: Left  Pain Description 1: Aching  Pain Intervention(s) 1: Medication (see MAR)  Activity Tolerance:   Poor - pt with increased pain, however unable to provide pain rating; VSS; pt with significant fatigue  Please refer to the flowsheet for vital signs taken during this treatment.   After treatment:   [ ]    Patient left in no apparent distress sitting up in chair  [X]    Patient left in no apparent distress in bed  [X]    Call bell left within reach  [X]    Nursing notified  [ ]    Caregiver present  [ ]    Bed alarm activated      COMMUNICATION/COLLABORATION:   The patients plan of care was discussed with: Physical Therapist, Occupational Therapist and Registered Nurse     Tevin Boyd PT, DPT   Time Calculation: 28 mins

## 2017-03-16 NOTE — PROGRESS NOTES
Problem: Self Care Deficits Care Plan (Adult)  Goal: *Acute Goals and Plan of Care (Insert Text)  Occupational Therapy Goals  Initiated 3/14/2017  1. Patient will perform grooming with minimal assistance/contact guard assist within 7 day(s). 2. Patient will perform supine to sit EOB with maximal assistance in preparation for adls within 7 day(s). 3. Patient will perform upper body bathing with moderate assistance within 7 day(s). 4. Patient will participate in functional mobility assessment within 7 day(s). 5. Patient will perform rolling side to side for toileting at bed level with moderate assistance within 7 day(s). 6. Patient will participate in upper extremity therapeutic exercise/activities with supervision/set-up for 10 minutes within 7 day(s). OCCUPATIONAL THERAPY TREATMENT  Patient: Demetri Azevedo (54 y.o. female)  Date: 3/16/2017  Diagnosis: Aneurysm ([address]  AORTIC DISECTION <principal problem not specified>  Procedure(s) (LRB):  ENDOVASCULAR ANEURYSM REPAIR of THORACIC AORTIC DISSECTION, repair of ruptured left iliac artery with stent placement. Left ileo-femoral bypass graft with PTFE. (Bilateral)  LEFT CAROTID SUBCLAVIAN BYPASS with stent placement of left common artery. (Left) 17 Days Post-Op  Precautions: Fall    New pacemaker precautions-pt in sling at this time. ASSESSMENT:  Pt demonstrates decline in function. She is painful, but cannot state where; she is whimpering and fretful throughout tx session appearing quite uncomfortable. Pt demonstrates poor trunk balance/control requiring constant support and at times max A X1 (plus another)  and  Requires total assistance X2 to 3 for functional mobility supine to sit. Not safe for bed to chair transfer. If using mayra for bed to chair transfer, will need recliner chair reclined due to poor trunk control.   Patien t will need intensive rehab in spinal cord injury program at discharge  Progression toward goals:  [ ]       Improving appropriately and progressing toward goals  [ ]       Improving slowly and progressing toward goals  [X]       Not making progress toward goals        PLAN:  Patient continues to benefit from skilled intervention to address the above impairments. Continue treatment per established plan of care. Discharge Recommendations:  Rehab--specialty SCI program  Further Equipment Recommendations for Discharge:  tbd       SUBJECTIVE:   Patient moaning and appearing quite uncomfortable throughout     OBJECTIVE DATA SUMMARY:   Cognitive/Behavioral Status:  Neurologic State: Alert  Orientation Level: Oriented to person;Disoriented to time;Disoriented to situation;Disoriented to place  Cognition: Follows commands increased processing time, increased time to respond to questions. Limited communication this date              Functional Mobility and Transfers for ADLs:  Bed Mobility:  Rolling: Total assistance;Assist x2  Supine to Sit: Total assistance;Assist x2  Sit to Supine: Total assistance;Assist x2  Scooting: Total assistance;Assist x2     Transfers:   unable at this time     Balance:  Sitting: Impaired  Sitting - Static: Poor (constant support)  Sitting - Dynamic: Poor (constant support)     ADL Intervention:   Pt requiring total assistance for adls at this time. Pt with apparent discomfort in sitting EOB, limiting AROM for upper body adls due to poor trunk control and need to attempt to stabilize trunk using RUE. Neuro Re-Education:      Provided tactile cues to para spinal m. In lumbar region to facilitate  trunk extension, however, no response observed. Then attempted to provide tactile cues towards thoracic region with no response to facilitation/cues. Pt sitting with rounded spine and no activity observed in trunk musculature/spinal extension this date.   Poor trunk control for sitting EOB requiring constant support and assistance X2 at times  (max A X1)     Therapeutic Exercises:   Attempted reaching while seated, however, due to pt's significant trunk instability and poor motor control, reaching very limited in sitting EOB position. Pain:  Pain Scale 1: Numeric (0 - 10)  Pain Intensity 1: pt did not report pain in any specific area. Seemed generalized-she could not report this session. Activity Tolerance:   Poor, but vital signs stable. Please refer to the flowsheet for vital signs taken during this treatment.   After treatment:   [ ] Patient left in no apparent distress sitting up in chair  [ x] Patient left in no apparent distress in bed  [x ] Call bell left within reach  [x ] Nursing notified  [ ] Caregiver present  [ ] Bed alarm activated      COMMUNICATION/COLLABORATION:   The patients plan of care was discussed with: Physical Therapist and Registered Nurse     SALOMON Chaney/L  Time Calculation: 32 mins

## 2017-03-16 NOTE — PROGRESS NOTES
Problem: Dysphagia (Adult)  Goal: *Speech Goal: (INSERT TEXT)  3/14/2017  Speech path goals:  1. Pt will participate with reeval of swallowing daily until started on a diet. 2. Pt will tolerate ice chips for pleasure as fed by staff. 3. Pt will tolerate modified diet without s/s of aspiration. SPEECH LANGUAGE PATHOLOGY DYSPHAGIA TREATMENT  Patient: Sabi Fernando (63 y.o. female)  Date: 3/16/2017  Diagnosis: Aneurysm ([address]  AORTIC DISECTION <principal problem not specified>  Procedure(s) (LRB):  ENDOVASCULAR ANEURYSM REPAIR of THORACIC AORTIC DISSECTION, repair of ruptured left iliac artery with stent placement. Left ileo-femoral bypass graft with PTFE. (Bilateral)  LEFT CAROTID SUBCLAVIAN BYPASS with stent placement of left common artery. (Left) 17 Days Post-Op  Precautions: Aspiration,  Fall      ASSESSMENT:  Patient presents with moderate - severe oropharyngeal dysphagia characterized by dysphagia characterized by poor / absent bolus acceptance, delayed oral manipulation and transit, pharyngeal swallow delay with impaired coordination of breathing and swallowing (patient blowing when attempting to drink.)  Patient with strong cough with expectoration of water and secretions after thin liquid trial.  Patient with delayed coughing after pureed trials. Patient remains at high risk for aspiration given dysphagia and impaired mentation. Given bedside presentation recommend continue NPO except for ice chips after oral care. Progression toward goals:  [ ]         Improving appropriately and progressing toward goals  [X]         Improving slowly and progressing toward goals  [ ]         Not making progress toward goals and plan of care will be adjusted       PLAN:  Recommendations and Planned Interventions:  NPO except for ice chips after oral care. Patient continues to benefit from skilled intervention to address the above impairments. Continue treatment per established plan of care.   Discharge Recommendations: To Be Determined       SUBJECTIVE:   Patient moaning. RN approved visit. Patient in bed, eyes open. 2 L O2 via NC. SpO2 98%, RR 19. OBJECTIVE:   Cognitive and Communication Status:  Neurologic State: Alert  Orientation Level: Oriented to person, Disoriented to time, Disoriented to situation, Disoriented to place  Cognition: Follows commands  Perception: Appears intact  Perseveration: No perseveration noted  Safety/Judgement: Decreased awareness of environment, Decreased insight into deficits  Dysphagia Treatment:  Oral Assessment:  Oral Assessment  Oral Hygiene: Dry with dried secretions. Oral care completed. P.O. Trials:  Patient Position: Upright in bed  Vocal quality prior to P.O.: Low volume  Consistency Presented: Ice chips; Thin liquid;Puree  How Presented: SLP-fed/presented;Cup/sip;Spoon     Bolus Acceptance: Impaired  Bolus Formation/Control: Impaired  Type of Impairment: Delayed  Propulsion: Delayed (# of seconds)  Oral Residue: None  Initiation of Swallow: Delayed (# of seconds)  Laryngeal Elevation: Decreased  Aspiration Signs/Symptoms: Strong cough;Delayed cough/throat clear                 Oral Phase Severity: Moderate-severe  Pharyngeal Phase Severity : Moderate-severe  After treatment:   [ ]              Patient left in no apparent distress sitting up in chair  [X]              Patient left in no apparent distress in bed  [X]              Call bell left within reach  [X]              Nursing notified  [ ]              Caregiver present  [ ]              Bed alarm activated      COMMUNICATION/EDUCATION:   Patient was educated regarding role of SLP and POC. Patient did not respond. Further education warranted. The patients plan of care including recommendations, planned interventions, and recommended diet changes were discussed with: Registered Nurse. [ ]              Posted safety precautions in patient's room.      CANDY Marmolejo  Time Calculation: 20 mins

## 2017-03-16 NOTE — PROGRESS NOTES
Vascular    Patient seen this AM  AF ablation and pacer yesterday  Tearful and depressed  More sleepy than yesterday  Incisions OK  2+ peripheral pulses  No movement in legs  Labs stable  Needs nutrition soon  PT/OT/SLP  Remains critically ill and at risk for deterioration

## 2017-03-16 NOTE — PROGRESS NOTES
Nephrology Progress Note     Fransisco Chilel       www. [facility]                   Phone - [phone]   Patient: True Rayo  YOB: 1955     Date- 3/16/2017     CC: Follow up for arf          Subjective: Interval History:   -   Cr high but stable  bp improved    Good urine out put    Pt seems in sig pain and crying but does not talk to me. RN says pt has been c/o pain in her arm and depression. ROS not possible due to patient condition. Assessment:   · Acute renal failure likely due to ATN - vancomycin toxicity - vanco level - 29.2- hemodynamic instability. Creat relatively stable for past 2 days. · ckd 3 baseline cr. 1.2  · Resp. Failure - s/p extubation  · AFIB with RVR. Now sp AFL ablation and pacemaker implant  · Complicated type B descending thoracic aortic dissection. -   · S/p TEVAR, L ileo-fem bypass, L carotid-subclavian bypass:2/27/17  · Protein malnutrition  · B/l effusion. S/p bilateral chest tubes. Right side pulled. Left still in place  · S/p chest tube placement  · Ischemic spinal cord injury     Plan:   -  Continue current medications. Dialysis not indicated. Hope to see the renal function improve. Care Plan discussed with: pt's RN  [] High complexity decision making was performed  [] Patient is at high-risk of decompensation with multiple organ involvement  Review of Systems: Pertinent items are noted in HPI.   Objective:   Vitals:    03/16/17 0800 03/16/17 0816 03/16/17 0900 03/16/17 1000   BP: 146/60  153/45 (!) 107/27   Pulse: 97  (!) 103 (!) 105   Resp: 14  24 11   Temp: 97.5 °F (36.4 °C)      SpO2: 94% 96% 96% 94%   Weight:       Height:           Intake/Output Summary (Last 24 hours) at 03/16/17 1036  Last data filed at 03/16/17 1000   Gross per 24 hour   Intake              300 ml   Output             1470 ml   Net            -1170 ml     Physical Exam:   GEN:  Sleeping but aroused easily and then seemed in some distress --crying slightly. Does seem in some pain and depressed but did not answer my questions nor speak to me. NECK- Supple,  RESP: clear b/l, no wheezing. Left chest tube in place  CVS: RRR,S1,S2   NEURO: not able to evaluate at present  SKIN: No Rash  ABDO: soft , non tender, No hepatosplenomegaly  EXT: no edema  gu - grubbs + with clear yellow urine    Chart reviewed. Pertinent Notes reviewed.    Medications list  reviewed     Current Facility-Administered Medications   Medication    HYDROmorphone (PF) (DILAUDID) injection 0.5 mg    escitalopram oxalate (LEXAPRO) tablet 10 mg    ADDaptor    vancomycin (VANCOCIN) 1,000 mg injection    0.9% sodium chloride (MBP/ADV) 0.9 % infusion    sodium chloride (NS) flush 5-10 mL    sodium chloride (NS) flush 5-10 mL    naloxone (NARCAN) injection 0.4 mg    sodium chloride (NS) flush 10-30 mL    sodium chloride (NS) flush 10 mL    sodium chloride (NS) flush 10 mL    sodium chloride (NS) flush 10-40 mL    sodium chloride (NS) flush 20 mL    amiodarone (CORDARONE) 450 mg in dextrose 5% 250 mL infusion    hydrALAZINE (APRESOLINE) 20 mg/mL injection 10 mg    phenol throat spray (CHLORASEPTIC) 1 Spray    levothyroxine (SYNTHROID) injection 260 mcg    mineral oil-hydrophil petrolat (AQUAPHOR) ointment    cefepime (MAXIPIME) 2 g in 0.9% sodium chloride (MBP/ADV) 100 mL    0.9% sodium chloride infusion 250 mL    levalbuterol (XOPENEX) nebulizer soln 1.25 mg/3 mL    dilTIAZem (CARDIZEM) 100 mg in 0.9% sodium chloride (MBP/ADV) 100 mL infusion    heparin (porcine) injection 5,000 Units    cloNIDine (CATAPRES) 0.2 mg/24 hr patch 1 Patch    albuterol (PROVENTIL VENTOLIN) nebulizer solution 2.5 mg    ipratropium (ATROVENT) 0.02 % nebulizer solution 0.5 mg    polyethylene glycol (MIRALAX) packet 17 g    pantoprazole (PROTONIX) 40 mg in sodium chloride 0.9 % 10 mL injection    sodium chloride (NS) flush 5-10 mL    sodium chloride (NS) flush 5-10 mL    prochlorperazine (COMPAZINE) with saline injection 5 mg    sodium chloride (NS) flush 10 mL    sodium chloride (NS) flush 10-40 mL    acetaminophen (TYLENOL) tablet 650 mg    ondansetron (ZOFRAN) injection 4 mg    bisacodyl (DULCOLAX) suppository 10 mg              Data Review :  Recent Labs      03/15/17   0345  03/14/17   0433  03/13/17   1638   NA  139  139  138   K  4.2  4.1  4.4   CL  104  104  105   CO2  23  22  20*   GLU  103*  130*  132*   BUN  68*  64*  62*   CREA  3.18*  3.21*  3.12*   CA  9.1  8.8  8.8     Recent Labs      03/16/17   0412   WBC  9.9   HGB  9.7*   HCT  29.7*   PLT  377     Lab Results   Component Value Date/Time    Culture result: MODERATE  PSEUDOMONAS AERUGINOSA   03/11/2017 11:12 AM    Culture result: MODERATE  NORMAL RESPIRATORY YVAN   03/11/2017 11:12 AM    Culture result: SCANT  NORMAL RESPIRATORY YVAN   03/02/2017 10:25 AM    Culture result: MODERATE  NORMAL RESPIRATORY YVAN   02/27/2017 05:18 PM    Culture result: MIXED UROGENITAL YVAN ISOLATED 06/24/2016 09:12 PM     Lab Results   Component Value Date/Time    Specimen Description: STOOL 11/10/2013 08:00 PM    Specimen Description: BLOOD 05/24/2013 10:50 AM    Specimen Description: BLOOD 05/22/2013 09:55 PM    Specimen Description: STOOL 05/22/2013 06:20 PM    Specimen Description: URINE 08/16/2011 10:54 AM     Heidi Castaneda, [address] [facility]  [facility] [address]  [place], [address] [place]  Phone - [phone]         Fax - [phone] [facility]  [address] [place] [address]  Phone - [phone]        Fax - [phone]     www. [facility]

## 2017-03-17 NOTE — PROGRESS NOTES
Occupational Therapy  Medical record reviewed. Attempted to see pt for OT tx session, however, pt is too drowsy to actively participate in therapy at this time. Family was present and reported that pt had pain medication recently (about an hour ago). Pt opened her eyes toctherapist's voice, but quickly closed her eyes several times- while attempting to engage pt in tx session. She did become more alert briefly and picked her head up from the pillow and and stated that she hadn't slept last night. She closed her eyes and returned to resting. Will defer and continue to follow.

## 2017-03-17 NOTE — PROGRESS NOTES
Physical Therapy Note    Chart reviewed. Pt cleared by nursing for mobility. Attempted to initiate PT intervention, however pt currently too drowsy and unable to actively participate in therapy at this time. Will defer and continue to follow.     Thank you,  Uday Reynolds, PT, DPT

## 2017-03-17 NOTE — PROGRESS NOTES
Cardiology Progress Note            [address]  [phone]    3/17/2017 10:09 AM    Admit Date: 2/23/2017    Admit Diagnosis: Aneurysm (HCC);AORTIC DISECTION    Subjective:     Johnson Sibley   denies chest pain.     Visit Vitals    /71    Pulse 90    Temp 98.5 °F (36.9 °C)    Resp 19    Ht 5' 5.98\" (1.676 m)    Wt 197 lb 1.5 oz (89.4 kg)    SpO2 97%    Breastfeeding No    BMI 31.83 kg/m2     Current Facility-Administered Medications   Medication Dose Route Frequency    hydrALAZINE (APRESOLINE) 20 mg/mL injection 10 mg  10 mg IntraVENous Q4H    labetalol (NORMODYNE;TRANDATE) injection 20 mg  20 mg IntraVENous Q4H PRN    labetalol (NORMODYNE;TRANDATE) injection 20 mg  20 mg IntraVENous ONCE    [START ON 3/21/2017] cloNIDine (CATAPRES) 0.2 mg/24 hr patch 2 Patch  2 Patch TransDERmal Q7D    sodium bicarbonate (8.4%) 100 mEq in dextrose 5% 1,000 mL infusion   IntraVENous CONTINUOUS    HYDROmorphone (PF) (DILAUDID) injection 0.5 mg  0.5 mg IntraVENous Q2H PRN    escitalopram oxalate (LEXAPRO) tablet 10 mg  10 mg Oral DAILY    amiodarone (CORDARONE) 450 mg in dextrose 5% 250 mL infusion  1 mg/min IntraVENous CONTINUOUS    diphenhydrAMINE-zinc acetate 2%-0.1% (BENADRYL) cream   Topical TID PRN    ADDaptor        vancomycin (VANCOCIN) 1,000 mg injection        0.9% sodium chloride (MBP/ADV) 0.9 % infusion        sodium chloride (NS) flush 5-10 mL  5-10 mL IntraVENous Q8H    sodium chloride (NS) flush 5-10 mL  5-10 mL IntraVENous PRN    naloxone (NARCAN) injection 0.4 mg  0.4 mg IntraVENous PRN    sodium chloride (NS) flush 10-30 mL  10-30 mL InterCATHeter PRN    sodium chloride (NS) flush 10 mL  10 mL InterCATHeter Q24H    sodium chloride (NS) flush 10 mL  10 mL InterCATHeter PRN    sodium chloride (NS) flush 10-40 mL  10-40 mL InterCATHeter Q8H    sodium chloride (NS) flush 20 mL  20 mL InterCATHeter PRN    amiodarone (CORDARONE) 450 mg in dextrose 5% 250 mL infusion  0.5 mg/min IntraVENous TITRATE    phenol throat spray (CHLORASEPTIC) 1 Spray  1 Spray Oral PRN    levothyroxine (SYNTHROID) injection 260 mcg  260 mcg IntraVENous EVERY MON & TH    mineral oil-hydrophil petrolat (AQUAPHOR) ointment   Topical PRN    cefepime (MAXIPIME) 2 g in 0.9% sodium chloride (MBP/ADV) 100 mL  2 g IntraVENous Q24H    0.9% sodium chloride infusion 250 mL  250 mL IntraVENous PRN    levalbuterol (XOPENEX) nebulizer soln 1.25 mg/3 mL  1.25 mg Nebulization QID RT    heparin (porcine) injection 5,000 Units  5,000 Units SubCUTAneous Q8H    albuterol (PROVENTIL VENTOLIN) nebulizer solution 2.5 mg  2.5 mg Nebulization Q2H PRN    ipratropium (ATROVENT) 0.02 % nebulizer solution 0.5 mg  0.5 mg Nebulization QID RT    polyethylene glycol (MIRALAX) packet 17 g  17 g Oral DAILY    pantoprazole (PROTONIX) 40 mg in sodium chloride 0.9 % 10 mL injection  40 mg IntraVENous DAILY    sodium chloride (NS) flush 5-10 mL  5-10 mL IntraVENous Q8H    sodium chloride (NS) flush 5-10 mL  5-10 mL IntraVENous PRN    prochlorperazine (COMPAZINE) with saline injection 5 mg  5 mg IntraVENous Q4H PRN    sodium chloride (NS) flush 10 mL  10 mL InterCATHeter Q24H    sodium chloride (NS) flush 10-40 mL  10-40 mL InterCATHeter Q8H    acetaminophen (TYLENOL) tablet 650 mg  650 mg Oral Q4H PRN    ondansetron (ZOFRAN) injection 4 mg  4 mg IntraVENous Q4H PRN    bisacodyl (DULCOLAX) suppository 10 mg  10 mg Rectal DAILY PRN         Objective:      Visit Vitals    /71    Pulse 90    Temp 98.5 °F (36.9 °C)    Resp 19    Ht 5' 5.98\" (1.676 m)    Wt 197 lb 1.5 oz (89.4 kg)    SpO2 97%    Breastfeeding No    BMI 31.83 kg/m2       Physical Exam:  Abdomen: soft, non-tender  Heart: regular rate and rhythm  Lungs: coarse rhonchi throughout  Pulses: 2+ and symmetric    Data Review:   Labs:    Recent Labs      03/16/17   0412   WBC  9.9   HGB  9.7*   HCT  29.7*   PLT  377     Recent Labs 03/17/17   0559  03/15/17   0345   NA  142  139   K  4.6  4.2   CL  108  104   CO2  20*  23   GLU  123*  103*   BUN  73*  68*   CREA  3.07*  3.18*   CA  9.2  9.1       No results for input(s): TROIQ, CPK, CKMB in the last 72 hours. Intake/Output Summary (Last 24 hours) at 03/17/17 1009  Last data filed at 03/17/17 4019   Gross per 24 hour   Intake          1354.24 ml   Output             1210 ml   Net           144.24 ml        Telemetry: nsr     Assessment:     Active Problems:    HTN (hypertension) ()      Aneurysm ([facility]) (2/23/2017)      SVT (supraventricular tachycardia) (3/3/2017)      SSS (sick sinus syndrome) ([facility]) (3/6/2017)      Paroxysmal atrial fibrillation ([facility]) (3/6/2017)      Atrial flutter ([address] (3/12/2017)        Plan:     Abdulaziz Donovan is back in sinus rhythm. bp better controlled.  Will switch amio to oral    Clive Balderas MD, [facility]    3/17/2017

## 2017-03-17 NOTE — PROGRESS NOTES
Nephrology Progress Note     Fransisco Chilel       [email or web address]                   Phone - [phone]   Patient: Julia Mejía  YOB: 1955     Date- 3/17/2017     CC: Follow up for arf          Subjective: Interval History:   -   Cr slightly better  Bun increased  I/O net negative for last 3 days. Over last 3 days she is > 3600 ml negative    ROS not possible due to patient condition. Assessment:   · Acute renal failure likely due to ATN - vancomycin toxicity - vanco level - 29.2- hemodynamic instability. · Metabolic acidosis  · ckd 3 baseline cr. 1.2  · Resp. Failure - s/p extubation  · AFIB with RVR. Now sp AFL ablation and pacemaker implant  · Complicated type B descending thoracic aortic dissection. -   · S/p TEVAR, L ileo-fem bypass, L carotid-subclavian bypass:2/27/17  · Protein malnutrition  · B/l effusion. S/p bilateral chest tubes. Right side pulled. Left still in place  · S/p chest tube placement  · Ischemic spinal cord injury     Plan:   - give iv bicarb - can't give po bicarb - give only 1000 ml ivf  - increase clonidine patch to 0.4 mg /24 hour  - add prn labetalol  - continue hydralazine 10 mg q 4 hr  - consider placing NG tube to start nutrition or start TPN      Care Plan discussed with: pt's RN  [] High complexity decision making was performed  [] Patient is at high-risk of decompensation with multiple organ involvement  Review of Systems: Pertinent items are noted in HPI.   Objective:   Vitals:    03/17/17 0600 03/17/17 0630 03/17/17 0700 03/17/17 0730   BP: 187/69 162/69 150/71    Pulse: 92 92 90    Resp: 16 22 19    Temp:       SpO2: 94% 97% 97% 97%   Weight:       Height:           Intake/Output Summary (Last 24 hours) at 03/17/17 0911  Last data filed at 03/17/17 0700   Gross per 24 hour   Intake          1284.24 ml   Output             1315 ml   Net           -30.76 ml     Physical Exam:   GEN: - NAD -did not answer my questions nor speak to me. RESP: clear b/l, no wheezing. Left chest tube in place  CVS: RRR,S1,S2   NEURO: not able to evaluate at present  SKIN: No Rash  ABDO: soft , non tender, No hepatosplenomegaly  EXT: no edema  gu - grubbs + with clear yellow urine    Chart reviewed. Pertinent Notes reviewed.    Medications list  reviewed     Current Facility-Administered Medications   Medication    hydrALAZINE (APRESOLINE) 20 mg/mL injection 10 mg    labetalol (NORMODYNE;TRANDATE) injection 20 mg    labetalol (NORMODYNE;TRANDATE) injection 20 mg    [START ON 3/21/2017] cloNIDine (CATAPRES) 0.2 mg/24 hr patch 2 Patch    sodium bicarbonate (8.4%) 100 mEq in dextrose 5% 1,000 mL infusion    HYDROmorphone (PF) (DILAUDID) injection 0.5 mg    escitalopram oxalate (LEXAPRO) tablet 10 mg    amiodarone (CORDARONE) 450 mg in dextrose 5% 250 mL infusion    diphenhydrAMINE-zinc acetate 2%-0.1% (BENADRYL) cream    ADDaptor    vancomycin (VANCOCIN) 1,000 mg injection    0.9% sodium chloride (MBP/ADV) 0.9 % infusion    sodium chloride (NS) flush 5-10 mL    sodium chloride (NS) flush 5-10 mL    naloxone (NARCAN) injection 0.4 mg    sodium chloride (NS) flush 10-30 mL    sodium chloride (NS) flush 10 mL    sodium chloride (NS) flush 10 mL    sodium chloride (NS) flush 10-40 mL    sodium chloride (NS) flush 20 mL    amiodarone (CORDARONE) 450 mg in dextrose 5% 250 mL infusion    phenol throat spray (CHLORASEPTIC) 1 Spray    levothyroxine (SYNTHROID) injection 260 mcg    mineral oil-hydrophil petrolat (AQUAPHOR) ointment    cefepime (MAXIPIME) 2 g in 0.9% sodium chloride (MBP/ADV) 100 mL    0.9% sodium chloride infusion 250 mL    levalbuterol (XOPENEX) nebulizer soln 1.25 mg/3 mL    heparin (porcine) injection 5,000 Units    albuterol (PROVENTIL VENTOLIN) nebulizer solution 2.5 mg    ipratropium (ATROVENT) 0.02 % nebulizer solution 0.5 mg    polyethylene glycol (MIRALAX) packet 17 g    pantoprazole (PROTONIX) 40 mg in sodium chloride 0.9 % 10 mL injection    sodium chloride (NS) flush 5-10 mL    sodium chloride (NS) flush 5-10 mL    prochlorperazine (COMPAZINE) with saline injection 5 mg    sodium chloride (NS) flush 10 mL    sodium chloride (NS) flush 10-40 mL    acetaminophen (TYLENOL) tablet 650 mg    ondansetron (ZOFRAN) injection 4 mg    bisacodyl (DULCOLAX) suppository 10 mg              Data Review :  Recent Labs      03/17/17   0559  03/15/17   0345   NA  142  139   K  4.6  4.2   CL  108  104   CO2  20*  23   GLU  123*  103*   BUN  73*  68*   CREA  3.07*  3.18*   CA  9.2  9.1     Recent Labs      03/16/17   0412   WBC  9.9   HGB  9.7*   HCT  29.7*   PLT  377     Lab Results   Component Value Date/Time    Culture result: MODERATE  PSEUDOMONAS AERUGINOSA   03/11/2017 11:12 AM    Culture result: MODERATE  NORMAL RESPIRATORY YVAN   03/11/2017 11:12 AM    Culture result: SCANT  NORMAL RESPIRATORY YVAN   03/02/2017 10:25 AM    Culture result: MODERATE  NORMAL RESPIRATORY YVAN   02/27/2017 05:18 PM    Culture result: MIXED UROGENITAL YVAN ISOLATED 06/24/2016 09:12 PM     Lab Results   Component Value Date/Time    Specimen Description: STOOL 11/10/2013 08:00 PM    Specimen Description: BLOOD 05/24/2013 10:50 AM    Specimen Description: BLOOD 05/22/2013 09:55 PM    Specimen Description: STOOL 05/22/2013 06:20 PM    Specimen Description: URINE 08/16/2011 10:54 AM     Deepak Aleman MD  [facility]  [facility]  [address], [address]  [place], [address]  Phone - [phone]         Fax - [phone] [facility] Office  [address] [place] [address]  Phone - [phone]        Fax - [phone]     [email or web address]

## 2017-03-17 NOTE — PROGRESS NOTES
[facility]  Pulmonary, Critical Care, and Sleep Medicine    Name: Enrique Watkins MRN: [id number]   : 1955 Hospital: [facility]   Date: 3/17/2017            IMPRESSION:   · Acute respiratory failure with hypoxia: failed extubation 3/6, extubated  3/12  · Post extubation stridor mild, better but at risk for spasm  · Bronchitis  · Bilateral pleural effusions bilateral chest tubes 3/10 per IR to drain effusions; right removed 3/15;LEFT side had 25 ml output wednesday but 365 ml output last night- discussed with   · Acute depresssion- tearful  · Left chest pain frompacer  · Constipation, issue at home as well  · Malnutrition- NG removed with extubation, off TF- awaiting clearance by Speechtherapy  · Motor loss in legs is c/w ischemic spinal cord injury; discussed with Dr. Rebekah Webster; Unclear when SCI may have occurred (she was moving BLE after CSF drain was d/d'd)  · COPD noted to be moderate severity. compensated  · AAA Type B dissection; distal thoracic aorta- off IV esmolol  · S/p Left CCA- SCA bypass 17  · S/p TEVAR 17  · Post op iliac artery repair  · IMH from Penetrating Aortic ulceration  · PICC line  · Renal disease: CRI; JEANA - improved  · Abn LFTS  · Anemia  · Sleep apnea: No treatment  · Hypertension: well controlled  · CAD and CABG  · Hypothyroidism: well controlled  · Arthritis  · GERD: well controlled      PLAN:   · keep left tube in place until minimal output  · Start TPN in AM  · Ideally would feed pt and watch left chest tube output for chylous appearance  · Speech therapy- hope we can feed soon and give laxative  · PT, OT. OOB  · Jet nebs. · CBC in AM  · Transfuse prn Hbg less than 7  · Antihypertensives, catapress patch, off cardene drip  · Will be a [facility] candidate   · PUD/DVT prophylaxis       Subjective/History:     3/17 restless. No fever. No SOB. 3/16 tearful. Left chest pain but also very depressed.  Still unable to swallow? 3/15 confused this AM. Right chest bothers her at chest tube site. Minimal drainage. Left chest tube still > 100 ml per day. Speech therapy seeing pt. Ablation and pacer    3/14 voice stronger but noarse. Mild congestion. No SOB on NC. Constipation at home. Has sensation in legs but no motor response    3/13 Whispery voice. Sore throat. No stridor. No nausea. Right chest sore. Chest tubes still in place    3/12 still with some ET secretions. Sputum culture with GNRs. Now on SBT. Later extubated after SBT and acceptable ABGs. Moderate ETT secretions but strong cough. Pt asked to avoid talking too much until vocal cord swellin ghas time to regress. Pt had a cuff leak prior to extubation. Within hours pt developed more raspiness with breathing. Stridorous per RT     Will add humidiifcation, Racemic epinephrine and IV decadron x 2 doses. Keep IV precedex in case steroids cause agitation. Will also d/c NGT as it may impede clearance of upper airway secretions is acutely and chronically ill. May need BIPAP prn but not sure if it will be tolerated. Reintubate prn     3/11 Failed extubation 3/6. Now intubated, sedated. Copious frothy white secretions. Labored with SBT. Not tolrating TF. Bilateral chest tubes placed by IR on 3/11. Both lungs clearer on CXR this AM. TRansfused 1unit PRBC    The patient is critically ill and can not provide additional history due to Ventilated.             Current Facility-Administered Medications   Medication Dose Route Frequency    hydrALAZINE (APRESOLINE) 20 mg/mL injection 10 mg  10 mg IntraVENous Q4H    escitalopram oxalate (LEXAPRO) tablet 10 mg  10 mg Oral DAILY    amiodarone (CORDARONE) 450 mg in dextrose 5% 250 mL infusion  1 mg/min IntraVENous CONTINUOUS    ADDaptor        vancomycin (VANCOCIN) 1,000 mg injection        0.9% sodium chloride (MBP/ADV) 0.9 % infusion        sodium chloride (NS) flush 5-10 mL  5-10 mL IntraVENous Q8H    sodium chloride (NS) flush 10 mL  10 mL InterCATHeter Q24H    sodium chloride (NS) flush 10-40 mL  10-40 mL InterCATHeter Q8H    amiodarone (CORDARONE) 450 mg in dextrose 5% 250 mL infusion  0.5 mg/min IntraVENous TITRATE    levothyroxine (SYNTHROID) injection 260 mcg  260 mcg IntraVENous EVERY MON & TH    cefepime (MAXIPIME) 2 g in 0.9% sodium chloride (MBP/ADV) 100 mL  2 g IntraVENous Q24H    levalbuterol (XOPENEX) nebulizer soln 1.25 mg/3 mL  1.25 mg Nebulization QID RT    heparin (porcine) injection 5,000 Units  5,000 Units SubCUTAneous Q8H    cloNIDine (CATAPRES) 0.2 mg/24 hr patch 1 Patch  1 Patch TransDERmal Q7D    ipratropium (ATROVENT) 0.02 % nebulizer solution 0.5 mg  0.5 mg Nebulization QID RT    polyethylene glycol (MIRALAX) packet 17 g  17 g Oral DAILY    pantoprazole (PROTONIX) 40 mg in sodium chloride 0.9 % 10 mL injection  40 mg IntraVENous DAILY    sodium chloride (NS) flush 5-10 mL  5-10 mL IntraVENous Q8H    sodium chloride (NS) flush 10 mL  10 mL InterCATHeter Q24H    sodium chloride (NS) flush 10-40 mL  10-40 mL InterCATHeter Q8H     Review of Systems:  Review of systems not obtained due to patient factors.     Objective:   Vital Signs:    Visit Vitals    /71    Pulse 90    Temp 98.5 °F (36.9 °C)    Resp 19    Ht 5' 5.98\" (1.676 m)    Wt 89.4 kg (197 lb 1.5 oz)    SpO2 97%    Breastfeeding No    BMI 31.83 kg/m2       O2 Device: Nasal cannula   O2 Flow Rate (L/min): 2 l/min   Temp (24hrs), Av.2 °F (36.8 °C), Min:97.5 °F (36.4 °C), Max:98.8 °F (37.1 °C)       Intake/Output:   Last shift:         Last 3 shifts: 03/15 1901 -  0700  In: 1284.2 [P.O.:30; I.V.:1254.2]  Out: 2063 [Urine:1675]    Intake/Output Summary (Last 24 hours) at 17 0748  Last data filed at 17 0700   Gross per 24 hour   Intake          1284.24 ml   Output             1520 ml   Net          -235.76 ml       Ventilator Settings:  Mode Rate Tidal Volume Pressure FiO2 PEEP   CPAP   500 ml  5 cm H2O 50 % 5 cm H20 Peak airway pressure: 20 cm H2O    Minute ventilation: 9.07 l/min      Physical Exam:    General:  WDWNWF no distress, appears stated age. Head:  Normocephalic, without obvious abnormality, atraumatic. Eyes:  Conjunctivae/corneas clear. EOMs intact. Nose: Nares normal. Septum midline. Mucosa normal. No drainage or sinus tenderness. Throat: Lips, mucosa, and tongue normal. Teeth and gums normal. Has a white coating over mouth. Neck: Supple, symmetrical, trachea midline, no adenopathy, thyroid: no enlargment/tenderness/nodules, no carotid bruit and no JVD. Back:   Symmetric, no curvature. ROM normal.   Lungs:   Decreased BS bilaterally, has bilateral rhonchi. Chest wall:  No tenderness or deformity. Heart:  Regular rate and rhythm, S1, S2 normal, no murmur, click, rub or gallop. Abdomen:   Obese;  bowel sounds are decreased,  No masses,  No organomegaly. Extremities: Extremities normal, atraumatic, no cyanosis or edema. Warm. Pulses: 2+ and symmetric all extremities. Skin: Skin color, texture, turgor normal. No rashes or lesions   Lymph nodes: Cervical, supraclavicular, and axillary nodes normal.   Neurologic: Not able to fully assess. Pt is post op, effects of anesthesia in place.         Data:     Recent Results (from the past 24 hour(s))   METABOLIC PANEL, BASIC    Collection Time: 03/17/17  5:59 AM   Result Value Ref Range    Sodium 142 136 - 145 mmol/L    Potassium 4.6 3.5 - 5.1 mmol/L    Chloride 108 97 - 108 mmol/L    CO2 20 (L) 21 - 32 mmol/L    Anion gap 14 5 - 15 mmol/L    Glucose 123 (H) 65 - 100 mg/dL    BUN 73 (H) 6 - 20 MG/DL    Creatinine 3.07 (H) 0.55 - 1.02 MG/DL    BUN/Creatinine ratio 24 (H) 12 - 20      GFR est AA 19 (L) >60 ml/min/1.73m2    GFR est non-AA 15 (L) >60 ml/min/1.73m2    Calcium 9.2 8.5 - 10.1 MG/DL             Telemetry:normal sinus rhythm    Imaging:  I have personally reviewed the patients radiographs and have reviewed the reports:  CXR: no acute changes, bilateral pleural effusions       Blanca Kimble MD

## 2017-03-17 NOTE — PROGRESS NOTES
1930 recd bedside shift report from [employer]. Patient alert and ff command family at bedside, denies pain   On amiodarone gtt at 1mg /min .    2100 pt tearful medicated with dilaudid 0.5 mg with good response went to sleep after family left  2200 complained of itching on left arm and neck applied benadryl cream applied, maintained left arm sling, pacer site with dressing cdi  0000 medicated with dilaudid with repositioning easily awaken and pt will start crying verb does not know why  0300 pt awaken sbp up to 190 to 170 denies pain, nsr/paced hr 90s, called intensivist  0320 Dr Kassy Mccauley called back and updated will order hydralazine  0500 pt awake sbp down 150  0600 medicated with dilaudid change left ct dressing was dated 3/10, site check with no sx of redness or pain, applied  vaseline gauze dry dressing and tegaderm, no airleak  0700 bedside shift report given to [employer]

## 2017-03-17 NOTE — INTERDISCIPLINARY ROUNDS
Interdisciplinary team rounds were held 3/17/17 with the following team members:Care Management, Diabetes Treatment Specialist, Nursing, Nutrition, Pharmacy, Physical Therapy, Physician and Clinical Coordinator. Plan of care discussed. Goal: See MD orders and progress notes for further  interventions and desired outcomes.

## 2017-03-17 NOTE — PROGRESS NOTES
Nutrition:  Chart reviewed, case discussed during CCU rounds. Pt advanced to Pureed diet with no liquids. Will add Magic Cup and Ensure Pudding to encourage kcal/protein intake. Will closely monitor, thank you!     Massiel Cabrera RD  Pager [phone]

## 2017-03-17 NOTE — PROGRESS NOTES
Vascular    Still tearful and depressed but much better when family is around  Exam unchanged  Cr stable  Tolerating pureed but not getting many calories  Might need TPN as a bridge unless her PO intake improves a lot  Cont supportive care

## 2017-03-17 NOTE — PROGRESS NOTES
Problem: Dysphagia (Adult)  Goal: *Speech Goal: (INSERT TEXT)  3/14/2017  Speech path goals:  1. Pt will participate with reeval of swallowing daily until started on a diet. 2. Pt will tolerate ice chips for pleasure as fed by staff. 3. Pt will tolerate modified diet without s/s of aspiration. Pt will have purees only with no liquids with no overt s/s of aspiration. SPEECH LANGUAGE PATHOLOGY DYSPHAGIA TREATMENT  Patient: Janet Mitchell (62 y.o. female)  Date: 3/17/2017  Diagnosis: Aneurysm ([address]  AORTIC DISECTION <principal problem not specified>  Procedure(s) (LRB):  ENDOVASCULAR ANEURYSM REPAIR of THORACIC AORTIC DISSECTION, repair of ruptured left iliac artery with stent placement. Left ileo-femoral bypass graft with PTFE. (Bilateral)  LEFT CAROTID SUBCLAVIAN BYPASS with stent placement of left common artery. (Left) 18 Days Post-Op  Precautions:  Fall      ASSESSMENT:  Pt is tolerating purees but not thins and thickened liquids. She has a strong cough but does not expectorate anything. Mild swallow delay with reduced hyolaryngeal excursion noted. She is still confused. On room air with sats in the low 90s and RR wnl. Progression toward goals:  [ ]         Improving appropriately and progressing toward goals  [X]         Improving slowly and progressing toward goals  [ ]         Not making progress toward goals and plan of care will be adjusted       PLAN:  Recommendations and Planned Interventions:  Pt seen today for reeval of swallowing. Patient continues to benefit from skilled intervention to address the above impairments. Continue treatment per established plan of care. Discharge Recommendations: To Be Determined       SUBJECTIVE:   Patient stated she did not feel well to ns.        OBJECTIVE:   Cognitive and Communication Status:  Neurologic State: Alert  Orientation Level: Oriented to person, Oriented to place  Cognition: Follows commands  Perception: Appears intact  Perseveration: No perseveration noted  Safety/Judgement: Decreased awareness of environment, Decreased insight into deficits  Dysphagia Treatment:  Oral Assessment:     P.O. Trials:  Patient Position: upright in bed  Vocal quality prior to P.O.: No impairment  Consistency Presented: Thin liquid;Puree; Solid  How Presented: Self-fed/presented;Straw     Bolus Acceptance: No impairment  Bolus Formation/Control: Impaired  Type of Impairment: Delayed  Propulsion: Delayed (# of seconds)  Oral Residue: None  Initiation of Swallow: Delayed (# of seconds)  Laryngeal Elevation: Decreased  Aspiration Signs/Symptoms: Strong cough (after thins and nectar thick liquids )                 Oral Phase Severity: Moderate  Pharyngeal Phase Severity : Moderate                     Pain:  Pain Scale 1: Numeric (0 - 10)  Pain Intensity 1: 0     After treatment:   [ ]              Patient left in no apparent distress sitting up in chair  [X]              Patient left in no apparent distress in bed  [X]              Call bell left within reach  [X]              Nursing notified  [ ]              Caregiver present  [ ]              Bed alarm activated      COMMUNICATION/EDUCATION:   Patient was educated regarding Her deficit(s) of dysphagia as this relates to Her diagnosis of dysphagia. She demonstrated Fair understanding as evidenced by fair awareness. .     The patients plan of care including recommendations, planned interventions, and recommended diet changes were discussed with: Registered Nurse. [ ]              Posted safety precautions in patient's room.      CANDY Moore  Time Calculation: 10 mins

## 2017-03-18 NOTE — PROGRESS NOTES
Vascular    Tolerating small amounts of pureed  VSS  A-fib  Lt CT output much less  Exam unchanged  Cr 3.0  No surgical issues  Depression will be significant obstacle to PT/OT  Needs to be made to do therapy  Would probably leave left CT through weekend  Encourage PO

## 2017-03-18 NOTE — PROGRESS NOTES
Patient's vital signs stable throughout shift. Medicated once for generalized pain with Hydromorphone 0.5 mg IV. Patient currently resting with eyes closed.

## 2017-03-18 NOTE — PROGRESS NOTES
[facility]  Pulmonary, Critical Care, and Sleep Medicine    Name: rTue Rayo MRN: [id number]   : 1955 Hospital: [facility]   Date: 3/18/2017            IMPRESSION:   · Acute respiratory failure with hypoxia: failed extubation 3/6, extubated  3/12  · Post extubation stridor mild, better but at risk for spasm  · Bronchitis  · Bilateral pleural effusions bilateral chest tubes 3/10 per IR to drain effusions; right removed 3/15;LEFT side had 25 ml output wednesday but 365 ml output last night-Keep chest tubes in through weekend. · Acute depresssion- tearful  · Left chest pain frompacer  · Constipation, issue at home as well  · Malnutrition- NG removed with extubation, off TF- awaiting clearance, Start TPN. · Motor loss in legs is c/w ischemic spinal cord injury; discussed with Dr. Des Noel; Unclear when SCI may have occurred (she was moving BLE after CSF drain was d/d'd)  · COPD noted to be moderate severity. compensated  · AAA Type B dissection; distal thoracic aorta- off IV esmolol  · S/p Left CCA- SCA bypass 17  · S/p TEVAR 17  · Post op iliac artery repair  · IMH from Penetrating Aortic ulceration  · PICC line  · Renal disease: CRI; JEANA - improved  · Abn LFTS  · Anemia  · Sleep apnea: No treatment  · Hypertension: well controlled  · CAD and CABG  · Hypothyroidism: well controlled  · Arthritis  · GERD: well controlled      PLAN:   · keep left tube in place until minimal output  · Start TPN in AM  · Ideally would feed pt and watch left chest tube output for chylous appearance  · Speech therapy- hope we can feed soon and give laxative  · PT, OT. OOB  · Jet nebs. · CBC in AM  · Transfuse prn Hbg less than 7  · Antihypertensives, catapress patch, off cardene drip  · Will be a [facility] candidate   · PUD/DVT prophylaxis       Subjective/History:   3-18-17: NO acute changes. Not taking in much by mouth. 3/17 restless. No fever. No SOB.    3/16 tearful. Left chest pain but also very depressed. Still unable to swallow? 3/15 confused this AM. Right chest bothers her at chest tube site. Minimal drainage. Left chest tube still > 100 ml per day. Speech therapy seeing pt. Ablation and pacer    3/14 voice stronger but noarse. Mild congestion. No SOB on NC. Constipation at home. Has sensation in legs but no motor response    3/13 Whispery voice. Sore throat. No stridor. No nausea. Right chest sore. Chest tubes still in place    3/12 still with some ET secretions. Sputum culture with GNRs. Now on SBT. Later extubated after SBT and acceptable ABGs. Moderate ETT secretions but strong cough. Pt asked to avoid talking too much until vocal cord swellin ghas time to regress. Pt had a cuff leak prior to extubation. Within hours pt developed more raspiness with breathing. Stridorous per RT     Will add humidiifcation, Racemic epinephrine and IV decadron x 2 doses. Keep IV precedex in case steroids cause agitation. Will also d/c NGT as it may impede clearance of upper airway secretions is acutely and chronically ill. May need BIPAP prn but not sure if it will be tolerated. Reintubate prn     3/11 Failed extubation 3/6. Now intubated, sedated. Copious frothy white secretions. Labored with SBT. Not tolrating TF. Bilateral chest tubes placed by IR on 3/11. Both lungs clearer on CXR this AM. TRansfused 1unit PRBC    The patient is critically ill and can not provide additional history due to Ventilated.             Current Facility-Administered Medications   Medication Dose Route Frequency    hydrALAZINE (APRESOLINE) 20 mg/mL injection 10 mg  10 mg IntraVENous Q4H    [START ON 3/21/2017] cloNIDine (CATAPRES) 0.2 mg/24 hr patch 2 Patch  2 Patch TransDERmal Q7D    amiodarone (CORDARONE) tablet 400 mg  400 mg Oral Q12H    escitalopram oxalate (LEXAPRO) tablet 10 mg  10 mg Oral DAILY    ADDaptor        vancomycin (VANCOCIN) 1,000 mg injection        0.9% sodium chloride (MBP/ADV) 0.9 % infusion        sodium chloride (NS) flush 5-10 mL  5-10 mL IntraVENous Q8H    sodium chloride (NS) flush 10 mL  10 mL InterCATHeter Q24H    sodium chloride (NS) flush 10-40 mL  10-40 mL InterCATHeter Q8H    levothyroxine (SYNTHROID) injection 260 mcg  260 mcg IntraVENous EVERY MON & TH    cefepime (MAXIPIME) 2 g in 0.9% sodium chloride (MBP/ADV) 100 mL  2 g IntraVENous Q24H    levalbuterol (XOPENEX) nebulizer soln 1.25 mg/3 mL  1.25 mg Nebulization QID RT    heparin (porcine) injection 5,000 Units  5,000 Units SubCUTAneous Q8H    ipratropium (ATROVENT) 0.02 % nebulizer solution 0.5 mg  0.5 mg Nebulization QID RT    polyethylene glycol (MIRALAX) packet 17 g  17 g Oral DAILY    pantoprazole (PROTONIX) 40 mg in sodium chloride 0.9 % 10 mL injection  40 mg IntraVENous DAILY    sodium chloride (NS) flush 5-10 mL  5-10 mL IntraVENous Q8H    sodium chloride (NS) flush 10 mL  10 mL InterCATHeter Q24H    sodium chloride (NS) flush 10-40 mL  10-40 mL InterCATHeter Q8H     Review of Systems:  Review of systems not obtained due to patient factors.     Objective:   Vital Signs:    Visit Vitals    /55    Pulse 79    Temp 97.1 °F (36.2 °C)    Resp 15    Ht 5' 5.98\" (1.676 m)    Wt 89.4 kg (197 lb 1.5 oz)    SpO2 97%    Breastfeeding No    BMI 31.83 kg/m2       O2 Device: Nasal cannula   O2 Flow Rate (L/min): 2 l/min   Temp (24hrs), Av.6 °F (36.4 °C), Min:96.2 °F (35.7 °C), Max:99.2 °F (37.3 °C)       Intake/Output:   Last shift:      701 - 1900  In: -   Out: 195 [Urine:195]  Last 3 shifts: 1901 -  0700  In: 1415.4 [P.O.:60; I.V.:1285.4]  Out:  [Urine:1820]    Intake/Output Summary (Last 24 hours) at 17  Last data filed at 17 0900   Gross per 24 hour   Intake            952.5 ml   Output             1565 ml   Net           -612.5 ml       Ventilator Settings:  Mode Rate Tidal Volume Pressure FiO2 PEEP   CPAP   500 ml  5 cm H2O 50 % 5 cm H20 Peak airway pressure: 20 cm H2O    Minute ventilation: 9.07 l/min      Physical Exam:    General:  WDWNWF no distress, appears stated age. Head:  Normocephalic, without obvious abnormality, atraumatic. Eyes:  Conjunctivae/corneas clear. EOMs intact. Nose: Nares normal. Septum midline. Mucosa normal. No drainage or sinus tenderness. Throat: Lips, mucosa, and tongue normal. Teeth and gums normal. Has a white coating over mouth. Neck: Supple, symmetrical, trachea midline, no adenopathy, thyroid: no enlargment/tenderness/nodules, no carotid bruit and no JVD. Back:   Symmetric, no curvature. ROM normal.   Lungs:   Decreased BS bilaterally, has bilateral rhonchi. Chest wall:  No tenderness or deformity. Heart:  Regular rate and rhythm, S1, S2 normal, no murmur, click, rub or gallop. Abdomen:   Obese;  bowel sounds are decreased,  No masses,  No organomegaly. Extremities: Extremities normal, atraumatic, no cyanosis or edema. Warm. Pulses: 2+ and symmetric all extremities. Skin: Skin color, texture, turgor normal. No rashes or lesions   Lymph nodes: Cervical, supraclavicular, and axillary nodes normal.   Neurologic: Not able to fully assess. Pt is post op, effects of anesthesia in place.         Data:     Recent Results (from the past 24 hour(s))   METABOLIC PANEL, BASIC    Collection Time: 03/18/17  5:16 AM   Result Value Ref Range    Sodium 142 136 - 145 mmol/L    Potassium 4.5 3.5 - 5.1 mmol/L    Chloride 109 (H) 97 - 108 mmol/L    CO2 19 (L) 21 - 32 mmol/L    Anion gap 14 5 - 15 mmol/L    Glucose 122 (H) 65 - 100 mg/dL    BUN 72 (H) 6 - 20 MG/DL    Creatinine 3.00 (H) 0.55 - 1.02 MG/DL    BUN/Creatinine ratio 24 (H) 12 - 20      GFR est AA 19 (L) >60 ml/min/1.73m2    GFR est non-AA 16 (L) >60 ml/min/1.73m2    Calcium 8.9 8.5 - 10.1 MG/DL             Telemetry:normal sinus rhythm    Imaging:  I have personally reviewed the patients radiographs and have reviewed the reports:  CXR: no acute changes, bilateral pleural effusions       Darius MD [place]

## 2017-03-18 NOTE — PROGRESS NOTES
Nephrology Progress Note     Fransisco Chilel       [facility]. [facility]                   Phone - [phone]   Patient: Jp Bradford  YOB: 1955     Date- 3/18/2017     CC: Follow up for ARF         Subjective: Interval History:   -   Cr remained high. Cr. Stuck at 3.0 for 1 week. Good urine out put  She is coughing sputum  bp improved  Acidosis persist     Assessment:   · Acute renal failure likely due to ATN - vancomycin toxicity - vanco level - 29.2- hemodynamic instability. · Metabolic acidosis  · ckd 3 baseline cr. 1.2  · Resp. Failure - s/p extubation  · AFIB with RVR. Now s/p AFL ablation and pacemaker implant  · Complicated type B descending thoracic aortic dissection. -   · S/p TEVAR, L ileo-fem bypass, L carotid-subclavian bypass:2/27/17   · Protein malnutrition  · B/l effusion. S/p bilateral chest tubes. Right side pulled. Left still in place  · S/p chest tube placement  · Ischemic spinal cord injury     Plan:   - continue current bp meds  - check renal usg to rule out any obstruction as her cr. Is not improving   - no dialysis indicated at present  - agree with starting TPN  - use PRN lasix      Care Plan discussed with: pt's RN  [] High complexity decision making was performed  [] Patient is at high-risk of decompensation with multiple organ involvement  Review of Systems: Pertinent items are noted in HPI. Objective:   Vitals:    03/18/17 1000 03/18/17 1100 03/18/17 1200 03/18/17 1300   BP: 171/58 144/53 (!) 143/39 146/59   Pulse: 88 88 (!) 101 (!) 101   Resp: 17 23 26 29   Temp:   97.4 °F (36.3 °C)    SpO2: 97% 97% 97% 97%   Weight:       Height:           Intake/Output Summary (Last 24 hours) at 03/18/17 1405  Last data filed at 03/18/17 1200   Gross per 24 hour   Intake            982.5 ml   Output             1620 ml   Net           -637.5 ml     Physical Exam:   GEN: - NAD -did not answer my questions nor speak to me.   RESP: coarse b/l, no wheezing. Left chest tube in place  CVS: RRR,S1,S2   NEURO: not able to evaluate at present  SKIN: No Rash  ABDO: soft , non tender, No hepatosplenomegaly. gu - grubbs +    Chart reviewed. Pertinent Notes reviewed.    Medications list  reviewed     Current Facility-Administered Medications   Medication    insulin lispro (HUMALOG) injection    glucose chewable tablet 16 g    dextrose (D50W) injection syrg 12.5-25 g    glucagon (GLUCAGEN) injection 1 mg    TPN ADULT - CENTRAL AA 5% D20% W/ CA + ELECTROLYTES    hydrALAZINE (APRESOLINE) 20 mg/mL injection 10 mg    labetalol (NORMODYNE;TRANDATE) injection 20 mg    [START ON 3/21/2017] cloNIDine (CATAPRES) 0.2 mg/24 hr patch 2 Patch    amiodarone (CORDARONE) tablet 400 mg    HYDROmorphone (PF) (DILAUDID) injection 0.5 mg    escitalopram oxalate (LEXAPRO) tablet 10 mg    diphenhydrAMINE-zinc acetate 2%-0.1% (BENADRYL) cream    ADDaptor    vancomycin (VANCOCIN) 1,000 mg injection    0.9% sodium chloride (MBP/ADV) 0.9 % infusion    sodium chloride (NS) flush 5-10 mL    sodium chloride (NS) flush 5-10 mL    naloxone (NARCAN) injection 0.4 mg    sodium chloride (NS) flush 10-30 mL    sodium chloride (NS) flush 10 mL    sodium chloride (NS) flush 10 mL    sodium chloride (NS) flush 10-40 mL    sodium chloride (NS) flush 20 mL    phenol throat spray (CHLORASEPTIC) 1 Spray    levothyroxine (SYNTHROID) injection 260 mcg    mineral oil-hydrophil petrolat (AQUAPHOR) ointment    cefepime (MAXIPIME) 2 g in 0.9% sodium chloride (MBP/ADV) 100 mL    0.9% sodium chloride infusion 250 mL    levalbuterol (XOPENEX) nebulizer soln 1.25 mg/3 mL    heparin (porcine) injection 5,000 Units    albuterol (PROVENTIL VENTOLIN) nebulizer solution 2.5 mg    ipratropium (ATROVENT) 0.02 % nebulizer solution 0.5 mg    polyethylene glycol (MIRALAX) packet 17 g    pantoprazole (PROTONIX) 40 mg in sodium chloride 0.9 % 10 mL injection    sodium chloride (NS) flush 5-10 mL    sodium chloride (NS) flush 5-10 mL    prochlorperazine (COMPAZINE) with saline injection 5 mg    sodium chloride (NS) flush 10 mL    sodium chloride (NS) flush 10-40 mL    acetaminophen (TYLENOL) tablet 650 mg    ondansetron (ZOFRAN) injection 4 mg    bisacodyl (DULCOLAX) suppository 10 mg              Data Review :  Recent Labs      03/18/17   1301  03/18/17   0516  03/17/17   0559   NA   --   142  142   K   --   4.5  4.6   CL   --   109*  108   CO2   --   19*  20*   GLU   --   122*  123*   BUN   --   72*  73*   CREA   --   3.00*  3.07*   CA   --   8.9  9.2   MG  2.7*   --    --    PHOS  5.3*   --    --      Recent Labs      03/16/17   0412   WBC  9.9   HGB  9.7*   HCT  29.7*   PLT  377     Lab Results   Component Value Date/Time    Culture result: MODERATE  PSEUDOMONAS AERUGINOSA   03/11/2017 11:12 AM    Culture result: MODERATE  NORMAL RESPIRATORY YVAN   03/11/2017 11:12 AM    Culture result: SCANT  NORMAL RESPIRATORY YVAN   03/02/2017 10:25 AM    Culture result: MODERATE  NORMAL RESPIRATORY YVAN   02/27/2017 05:18 PM    Culture result: MIXED UROGENITAL YVAN ISOLATED 06/24/2016 09:12 PM     Lab Results   Component Value Date/Time    Specimen Description: STOOL 11/10/2013 08:00 PM    Specimen Description: BLOOD 05/24/2013 10:50 AM    Specimen Description: BLOOD 05/22/2013 09:55 PM    Specimen Description: STOOL 05/22/2013 06:20 PM    Specimen Description: URINE 08/16/2011 10:54 AM     Jaun Chung MD  [facility]  [facility] [address]  [place], [address]  Phone - [phone]         Fax - [phone] [facility]  [address] [place] [address]  Phone - [phone]        Fax - [phone]     www. [facility]

## 2017-03-18 NOTE — PROGRESS NOTES
[address] [place] [address]  [phone]      Cardiology Progress Note      3/18/2017 1:54 PM    Admit Date: 2/23/2017    Admit Diagnosis:   Aneurysm (HCC);AORTIC DISECTION    Subjective:     Tish Soloa Tone denies CP    Visit Vitals    /59    Pulse (!) 101    Temp 97.4 °F (36.3 °C)    Resp 29    Ht 5' 5.98\" (1.676 m)    Wt 197 lb 1.5 oz (89.4 kg)    SpO2 97%    Breastfeeding No    BMI 31.83 kg/m2       Current Facility-Administered Medications   Medication Dose Route Frequency    insulin lispro (HUMALOG) injection   SubCUTAneous Q6H    glucose chewable tablet 16 g  4 Tab Oral PRN    dextrose (D50W) injection syrg 12.5-25 g  12.5-25 g IntraVENous PRN    glucagon (GLUCAGEN) injection 1 mg  1 mg IntraMUSCular PRN    TPN ADULT - CENTRAL AA 5% D20% W/ CA + ELECTROLYTES   IntraVENous CONTINUOUS    hydrALAZINE (APRESOLINE) 20 mg/mL injection 10 mg  10 mg IntraVENous Q4H    labetalol (NORMODYNE;TRANDATE) injection 20 mg  20 mg IntraVENous Q4H PRN    [START ON 3/21/2017] cloNIDine (CATAPRES) 0.2 mg/24 hr patch 2 Patch  2 Patch TransDERmal Q7D    amiodarone (CORDARONE) tablet 400 mg  400 mg Oral Q12H    HYDROmorphone (PF) (DILAUDID) injection 0.5 mg  0.5 mg IntraVENous Q2H PRN    escitalopram oxalate (LEXAPRO) tablet 10 mg  10 mg Oral DAILY    diphenhydrAMINE-zinc acetate 2%-0.1% (BENADRYL) cream   Topical TID PRN    ADDaptor        vancomycin (VANCOCIN) 1,000 mg injection        0.9% sodium chloride (MBP/ADV) 0.9 % infusion        sodium chloride (NS) flush 5-10 mL  5-10 mL IntraVENous Q8H    sodium chloride (NS) flush 5-10 mL  5-10 mL IntraVENous PRN    naloxone (NARCAN) injection 0.4 mg  0.4 mg IntraVENous PRN    sodium chloride (NS) flush 10-30 mL  10-30 mL InterCATHeter PRN    sodium chloride (NS) flush 10 mL  10 mL InterCATHeter Q24H    sodium chloride (NS) flush 10 mL  10 mL InterCATHeter PRN    sodium chloride (NS) flush 10-40 mL  10-40 mL InterCATHeter Q8H    sodium chloride (NS) flush 20 mL  20 mL InterCATHeter PRN    phenol throat spray (CHLORASEPTIC) 1 Spray  1 Spray Oral PRN    levothyroxine (SYNTHROID) injection 260 mcg  260 mcg IntraVENous EVERY MON & TH    mineral oil-hydrophil petrolat (AQUAPHOR) ointment   Topical PRN    cefepime (MAXIPIME) 2 g in 0.9% sodium chloride (MBP/ADV) 100 mL  2 g IntraVENous Q24H    0.9% sodium chloride infusion 250 mL  250 mL IntraVENous PRN    levalbuterol (XOPENEX) nebulizer soln 1.25 mg/3 mL  1.25 mg Nebulization QID RT    heparin (porcine) injection 5,000 Units  5,000 Units SubCUTAneous Q8H    albuterol (PROVENTIL VENTOLIN) nebulizer solution 2.5 mg  2.5 mg Nebulization Q2H PRN    ipratropium (ATROVENT) 0.02 % nebulizer solution 0.5 mg  0.5 mg Nebulization QID RT    polyethylene glycol (MIRALAX) packet 17 g  17 g Oral DAILY    pantoprazole (PROTONIX) 40 mg in sodium chloride 0.9 % 10 mL injection  40 mg IntraVENous DAILY    sodium chloride (NS) flush 5-10 mL  5-10 mL IntraVENous Q8H    sodium chloride (NS) flush 5-10 mL  5-10 mL IntraVENous PRN    prochlorperazine (COMPAZINE) with saline injection 5 mg  5 mg IntraVENous Q4H PRN    sodium chloride (NS) flush 10 mL  10 mL InterCATHeter Q24H    sodium chloride (NS) flush 10-40 mL  10-40 mL InterCATHeter Q8H    acetaminophen (TYLENOL) tablet 650 mg  650 mg Oral Q4H PRN    ondansetron (ZOFRAN) injection 4 mg  4 mg IntraVENous Q4H PRN    bisacodyl (DULCOLAX) suppository 10 mg  10 mg Rectal DAILY PRN       Objective:      Physical Exam:  General Appearance:    Chest:   Clear  Cardiovascular:  Regular rate and rhythm, no murmur.   Abdomen:   Soft, non-tender, bowel sounds are active.   Extremities:   Skin:  Warm and dry.     Data Review:   Recent Labs      03/16/17   0412   WBC  9.9   HGB  9.7*   HCT  29.7*   PLT  377     Recent Labs      03/18/17   1301  03/18/17   0516  03/17/17   0559   NA   --   142  142   K   --   4.5  4.6   CL   --   109*  108   CO2   -- 19*  20*   GLU   --   122*  123*   BUN   --   72*  73*   CREA   --   3.00*  3.07*   CA   --   8.9  9.2   MG  2.7*   --    --    PHOS  5.3*   --    --        No results for input(s): TROIQ, CPK, CKMB in the last 72 hours. Intake/Output Summary (Last 24 hours) at 03/18/17 1354  Last data filed at 03/18/17 1200   Gross per 24 hour   Intake            982.5 ml   Output             1620 ml   Net           -637.5 ml        Telemetry:   EKG:  Cxray:    Assessment:     Active Problems:    HTN (hypertension) ()      Aneurysm ([facility]) (2/23/2017)      SVT (supraventricular tachycardia) (3/3/2017)      SSS (sick sinus syndrome) ([facility]) (3/6/2017)      Paroxysmal atrial fibrillation ([facility]) (3/6/2017)      Atrial flutter ([facility] (3/12/2017)        Plan:     Maintaining NSR on po amio.

## 2017-03-18 NOTE — PROGRESS NOTES
1900 bedside shift recd pt alert tearful denies pain when ask,nsr on monitor   2100 asp prec maintained,  tolerated new diet,  No cough or nausea finished half cup of applesauce  2200 incontinent of large stool soft brown, pm bath rendered  0200 pt have good response with labetalol iv sbp down from 170s to 140s  No sx of pain  0400 no changes from previous assessment

## 2017-03-19 NOTE — PROGRESS NOTES
[address] [place] [address]  [phone]      Cardiology Progress Note      3/19/2017 2:31 PM    Admit Date: 2/23/2017    Admit Diagnosis:   Aneurysm (HCC);AORTIC DISECTION    Subjective:     Tish Meyer     Visit Vitals    /58    Pulse 75    Temp 97.6 °F (36.4 °C)    Resp 16    Ht 5' 5\" (1.651 m)    Wt 202 lb 6.1 oz (91.8 kg)    SpO2 95%    Breastfeeding No    BMI 33.68 kg/m2       Current Facility-Administered Medications   Medication Dose Route Frequency    TPN ADULT-CENTRAL AA 5% D20%-MVI W/ VIT K-RCH   IntraVENous CONTINUOUS    hydrALAZINE (APRESOLINE) 20 mg/mL injection 20 mg  20 mg IntraVENous Q4H    [START ON 3/20/2017] levothyroxine (SYNTHROID) tablet 100 mcg  100 mcg Oral ACB    insulin lispro (HUMALOG) injection   SubCUTAneous Q6H    glucose chewable tablet 16 g  4 Tab Oral PRN    dextrose (D50W) injection syrg 12.5-25 g  12.5-25 g IntraVENous PRN    glucagon (GLUCAGEN) injection 1 mg  1 mg IntraMUSCular PRN    TPN ADULT-CENTRAL AA 5% D20%-MVI W/ VIT K-RCH   IntraVENous CONTINUOUS    labetalol (NORMODYNE;TRANDATE) injection 20 mg  20 mg IntraVENous Q4H PRN    [START ON 3/21/2017] cloNIDine (CATAPRES) 0.2 mg/24 hr patch 2 Patch  2 Patch TransDERmal Q7D    amiodarone (CORDARONE) tablet 400 mg  400 mg Oral Q12H    HYDROmorphone (PF) (DILAUDID) injection 0.5 mg  0.5 mg IntraVENous Q2H PRN    escitalopram oxalate (LEXAPRO) tablet 10 mg  10 mg Oral DAILY    diphenhydrAMINE-zinc acetate 2%-0.1% (BENADRYL) cream   Topical TID PRN    ADDaptor        vancomycin (VANCOCIN) 1,000 mg injection        0.9% sodium chloride (MBP/ADV) 0.9 % infusion        sodium chloride (NS) flush 5-10 mL  5-10 mL IntraVENous Q8H    sodium chloride (NS) flush 5-10 mL  5-10 mL IntraVENous PRN    naloxone (NARCAN) injection 0.4 mg  0.4 mg IntraVENous PRN    sodium chloride (NS) flush 10-30 mL  10-30 mL InterCATHeter PRN    sodium chloride (NS) flush 10 mL  10 mL InterCATHeter Q24H    sodium chloride (NS) flush 10 mL  10 mL InterCATHeter PRN    sodium chloride (NS) flush 10-40 mL  10-40 mL InterCATHeter Q8H    sodium chloride (NS) flush 20 mL  20 mL InterCATHeter PRN    phenol throat spray (CHLORASEPTIC) 1 Spray  1 Spray Oral PRN    mineral oil-hydrophil petrolat (AQUAPHOR) ointment   Topical PRN    0.9% sodium chloride infusion 250 mL  250 mL IntraVENous PRN    levalbuterol (XOPENEX) nebulizer soln 1.25 mg/3 mL  1.25 mg Nebulization QID RT    heparin (porcine) injection 5,000 Units  5,000 Units SubCUTAneous Q8H    albuterol (PROVENTIL VENTOLIN) nebulizer solution 2.5 mg  2.5 mg Nebulization Q2H PRN    ipratropium (ATROVENT) 0.02 % nebulizer solution 0.5 mg  0.5 mg Nebulization QID RT    polyethylene glycol (MIRALAX) packet 17 g  17 g Oral DAILY    pantoprazole (PROTONIX) 40 mg in sodium chloride 0.9 % 10 mL injection  40 mg IntraVENous DAILY    sodium chloride (NS) flush 5-10 mL  5-10 mL IntraVENous Q8H    sodium chloride (NS) flush 5-10 mL  5-10 mL IntraVENous PRN    prochlorperazine (COMPAZINE) with saline injection 5 mg  5 mg IntraVENous Q4H PRN    sodium chloride (NS) flush 10 mL  10 mL InterCATHeter Q24H    sodium chloride (NS) flush 10-40 mL  10-40 mL InterCATHeter Q8H    acetaminophen (TYLENOL) tablet 650 mg  650 mg Oral Q4H PRN    ondansetron (ZOFRAN) injection 4 mg  4 mg IntraVENous Q4H PRN    bisacodyl (DULCOLAX) suppository 10 mg  10 mg Rectal DAILY PRN       Objective:      Physical Exam:  General Appearance:    Chest:   Clear  Cardiovascular:  Regular rate and rhythm, no murmur.   Abdomen:   Soft, non-tender, bowel sounds are active.   Extremities:   Skin:  Warm and dry.     Data Review:   Recent Labs      03/19/17 0429   WBC  13.0*   HGB  10.0*   HCT  32.2*   PLT  334     Recent Labs      03/19/17   0429  03/18/17   1301  03/18/17   0516  03/17/17   0559   NA  143   --   142  142   K  4.9   --   4.5  4.6   CL  111*   --   109*  108   CO2 21   --   19*  20*   GLU  161*   --   122*  123*   BUN  78*   --   72*  73*   CREA  2.95*   --   3.00*  3.07*   CA  9.1   --   8.9  9.2   MG  2.8*  2.7*   --    --    PHOS  4.5  5.3*   --    --    ALB  2.4*   --    --    --    TBILI  0.6   --    --    --    SGOT  26   --    --    --    ALT  41   --    --    --        No results for input(s): TROIQ, CPK, CKMB in the last 72 hours. Intake/Output Summary (Last 24 hours) at 03/19/17 1431  Last data filed at 03/19/17 1300   Gross per 24 hour   Intake            885.4 ml   Output             1215 ml   Net           -329.6 ml        Telemetry:   EKG:  Cxray:    Assessment:     Active Problems:    HTN (hypertension) ()      Aneurysm ([facility]) (2/23/2017)      SVT (supraventricular tachycardia) (3/3/2017)      SSS (sick sinus syndrome) ([facility]) (3/6/2017)      Paroxysmal atrial fibrillation ([facility]) (3/6/2017)      Atrial flutter ([facility] [address] (3/12/2017)        Plan:     Remains in NSR.   Cont Rx

## 2017-03-19 NOTE — PROGRESS NOTES
Vascular    More alert than yesterday  Less depressed affect  BP trending up  Afebrile  Good UOP, I<O  Incisions OK  Abd soft  Good peripheral pulses  No movement in BLE, sensation intact  WBC 13  Cr 2.95, BUN up to 78  Overall she is a little better  Lack of renal recovery is concerning  Increase in BUN in part due to start of TPN & pureed diet  I do not think she has a hard indication for HD yet  Would hold off on HD for now  Her emotional state is fragile  HD would likely worsen depression  I think most benefit now would come from getting her to work w/ PT/OT  Manage BP medically  Watch volume status and pulmonary function closely  Remains at risk for aspiration  CXR & labs in AM

## 2017-03-19 NOTE — PROGRESS NOTES
[facility]  Pulmonary, Critical Care, and Sleep Medicine    Name: Kishor Cruz MRN: [id number]   : 1955 Hospital: [facility]   Date: 3/19/2017            IMPRESSION:   · Acute respiratory failure with hypoxia: failed extubation 3/6, extubated  3/12  · Post extubation stridor mild, better but at risk for spasm  · Bronchitis has a weak cough. · Bilateral pleural effusions bilateral chest tubes 3/10 per IR to drain effusions; right removed 3/15;LEFT side had 25 ml output wednesday but 365 ml output last night-Keep chest tubes in through weekend. · Acute depresssion- tearful  · Left chest pain from pacer  · Constipation, issue at home as well  · Malnutrition- NG removed with extubation, off TF- awaiting clearance, continue TPN. · Motor loss in legs is c/w ischemic spinal cord injury; discussed with Dr. Ghulam Doherty; Unclear when SCI may have occurred (she was moving BLE after CSF drain was d/d'd)  · COPD noted to be moderate severity. compensated  · AAA Type B dissection; distal thoracic aorta- off IV esmolol  · S/p Left CCA- SCA bypass 17  · S/p TEVAR 17  · Post op iliac artery repair  · IMH from Penetrating Aortic ulceration  · PICC line  · Renal disease: CRI, has not changed much. NO acute need for HD, volume status seems reasonable. · Abn LFTS  · Anemia  · Sleep apnea: No treatment  · Hypertension: well controlled  · CAD and CABG  · Hypothyroidism: well controlled  · Arthritis  · GERD: well controlled  · Discussed with Dr. Ghulam Doherty. PLAN:   · keep left tube in place until minimal output  · Continue TPN  · Ideally would feed pt and watch left chest tube output for chylous appearance, will monitor as she takes more po. · Speech therapy- hope we can feed soon and give laxative  · PT, OT. OOB  · Jet nebs.    · CBC in AM  · Transfuse prn Hbg less than 7  · Antihypertensives, catapress patch, off cardene drip  · Renal following, would watch for now.  · Will monitor WBC  · Will be a [facility] candidate   · PUD/DVT prophylaxis       Subjective/History:   3-19-17: No acute issues. Has a tray at bedside. No issues last night. 3-18-17: NO acute changes. Not taking in much by mouth. 3/17 restless. No fever. No SOB. 3/16 tearful. Left chest pain but also very depressed. Still unable to swallow? 3/15 confused this AM. Right chest bothers her at chest tube site. Minimal drainage. Left chest tube still > 100 ml per day. Speech therapy seeing pt. Ablation and pacer    3/14 voice stronger but noarse. Mild congestion. No SOB on NC. Constipation at home. Has sensation in legs but no motor response    3/13 Whispery voice. Sore throat. No stridor. No nausea. Right chest sore. Chest tubes still in place    3/12 still with some ET secretions. Sputum culture with GNRs. Now on SBT. Later extubated after SBT and acceptable ABGs. Moderate ETT secretions but strong cough. Pt asked to avoid talking too much until vocal cord swellin ghas time to regress. Pt had a cuff leak prior to extubation. Within hours pt developed more raspiness with breathing. Stridorous per RT     Will add humidiifcation, Racemic epinephrine and IV decadron x 2 doses. Keep IV precedex in case steroids cause agitation. Will also d/c NGT as it may impede clearance of upper airway secretions is acutely and chronically ill. May need BIPAP prn but not sure if it will be tolerated. Reintubate prn     3/11 Failed extubation 3/6. Now intubated, sedated. Copious frothy white secretions. Labored with SBT. Not tolrating TF. Bilateral chest tubes placed by IR on 3/11. Both lungs clearer on CXR this AM. TRansfused 1unit PRBC    The patient is critically ill and can not provide additional history due to Ventilated.             Current Facility-Administered Medications   Medication Dose Route Frequency    insulin lispro (HUMALOG) injection   SubCUTAneous Q6H    TPN ADULT-CENTRAL AA 5% D20%-MVI W/ VIT [facility] IntraVENous CONTINUOUS    hydrALAZINE (APRESOLINE) 20 mg/mL injection 10 mg  10 mg IntraVENous Q4H    [START ON 3/21/2017] cloNIDine (CATAPRES) 0.2 mg/24 hr patch 2 Patch  2 Patch TransDERmal Q7D    amiodarone (CORDARONE) tablet 400 mg  400 mg Oral Q12H    escitalopram oxalate (LEXAPRO) tablet 10 mg  10 mg Oral DAILY    ADDaptor        vancomycin (VANCOCIN) 1,000 mg injection        0.9% sodium chloride (MBP/ADV) 0.9 % infusion        sodium chloride (NS) flush 5-10 mL  5-10 mL IntraVENous Q8H    sodium chloride (NS) flush 10 mL  10 mL InterCATHeter Q24H    sodium chloride (NS) flush 10-40 mL  10-40 mL InterCATHeter Q8H    levothyroxine (SYNTHROID) injection 260 mcg  260 mcg IntraVENous EVERY MON & TH    levalbuterol (XOPENEX) nebulizer soln 1.25 mg/3 mL  1.25 mg Nebulization QID RT    heparin (porcine) injection 5,000 Units  5,000 Units SubCUTAneous Q8H    ipratropium (ATROVENT) 0.02 % nebulizer solution 0.5 mg  0.5 mg Nebulization QID RT    polyethylene glycol (MIRALAX) packet 17 g  17 g Oral DAILY    pantoprazole (PROTONIX) 40 mg in sodium chloride 0.9 % 10 mL injection  40 mg IntraVENous DAILY    sodium chloride (NS) flush 5-10 mL  5-10 mL IntraVENous Q8H    sodium chloride (NS) flush 10 mL  10 mL InterCATHeter Q24H    sodium chloride (NS) flush 10-40 mL  10-40 mL InterCATHeter Q8H     Review of Systems:  Review of systems not obtained due to patient factors.     Objective:   Vital Signs:    Visit Vitals    /64    Pulse 75    Temp 97.3 °F (36.3 °C)    Resp 16    Ht 5' 5\" (1.651 m)    Wt 91.8 kg (202 lb 6.1 oz)    SpO2 96%    Breastfeeding No    BMI 33.68 kg/m2       O2 Device: Nasal cannula   O2 Flow Rate (L/min): 2 l/min   Temp (24hrs), Av.5 °F (36.4 °C), Min:97.2 °F (36.2 °C), Max:97.9 °F (36.6 °C)       Intake/Output:   Last shift:      701 - 1900  In: 84 [I.V.:84]  Out: 80 [Urine:70]  Last 3 shifts: 1901 - 700  In: 1615.9 [P.O.:30; I.V.:1585.9]  Out: 2195 [Urine:2165]    Intake/Output Summary (Last 24 hours) at 03/19/17 0934  Last data filed at 03/19/17 0900   Gross per 24 hour   Intake            817.4 ml   Output             1130 ml   Net           -312.6 ml       Ventilator Settings:  Mode Rate Tidal Volume Pressure FiO2 PEEP   CPAP   500 ml  5 cm H2O 50 % 5 cm H20     Peak airway pressure: 20 cm H2O    Minute ventilation: 9.07 l/min      Physical Exam:    General:  WDWNWF no distress, appears stated age. Head:  Normocephalic, without obvious abnormality, atraumatic. Eyes:  Conjunctivae/corneas clear. EOMs intact. Nose: Nares normal. Septum midline. Mucosa normal. No drainage or sinus tenderness. Throat: Lips, mucosa, and tongue normal. Teeth and gums normal. Has a white coating over mouth. Neck: Supple, symmetrical, trachea midline, no adenopathy, thyroid: no enlargment/tenderness/nodules, no carotid bruit and no JVD. Back:   Symmetric, no curvature. ROM normal.   Lungs:   Decreased BS bilaterally, has bilateral rhonchi. Chest wall:  No tenderness or deformity. Left chest tube in place. Heart:  Regular rate and rhythm, S1, S2 normal, no murmur, click, rub or gallop. Abdomen:   Obese;  bowel sounds are decreased,  No masses,  No organomegaly. Extremities: Extremities normal, atraumatic, no cyanosis or edema. Warm. Pulses: 2+ and symmetric all extremities. Skin: Skin color, texture, turgor normal. No rashes or lesions   Lymph nodes: Cervical, supraclavicular, and axillary nodes normal.   Neurologic: Not able to fully assess. Pt is post op, effects of anesthesia in place.         Data:     Recent Results (from the past 24 hour(s))   PHOSPHORUS    Collection Time: 03/18/17  1:01 PM   Result Value Ref Range    Phosphorus 5.3 (H) 2.6 - 4.7 MG/DL   MAGNESIUM    Collection Time: 03/18/17  1:01 PM   Result Value Ref Range    Magnesium 2.7 (H) 1.6 - 2.4 mg/dL   GLUCOSE, POC    Collection Time: 03/18/17  1:04 PM   Result Value Ref Range    Glucose (POC) 121 (H) 65 - 100 mg/dL    Performed by [facility], POC    Collection Time: 03/18/17  5:43 PM   Result Value Ref Range    Glucose (POC) 122 (H) 65 - 100 mg/dL    Performed by "[employer], POC    Collection Time: 03/18/17 11:45 PM   Result Value Ref Range    Glucose (POC) 175 (H) 65 - 100 mg/dL    Performed by Logan Perry    CBC WITH AUTOMATED DIFF    Collection Time: 03/19/17  4:29 AM   Result Value Ref Range    WBC 13.0 (H) 3.6 - 11.0 K/uL    RBC 3.58 (L) 3.80 - 5.20 M/uL    HGB 10.0 (L) 11.5 - 16.0 g/dL    HCT 32.2 (L) 35.0 - 47.0 %    MCV 89.9 80.0 - 99.0 FL    MCH 27.9 26.0 - 34.0 PG    MCHC 31.1 30.0 - 36.5 g/dL    RDW 17.7 (H) 11.5 - 14.5 %    PLATELET 964 378 - 962 K/uL    NEUTROPHILS 81 %    BAND NEUTROPHILS 1 %    LYMPHOCYTES 7 %    MONOCYTES 4 %    EOSINOPHILS 1 %    BASOPHILS 0 %    METAMYELOCYTES 6 %    ABS. NEUTROPHILS 10.7 K/UL    ABS. LYMPHOCYTES 0.9 K/UL    ABS. MONOCYTES 0.5 K/UL    ABS. EOSINOPHILS 0.1 K/UL    ABS. BASOPHILS 0.0 K/UL    RBC COMMENTS ANISOCYTOSIS  1+        DF MANUAL     METABOLIC PANEL, COMPREHENSIVE    Collection Time: 03/19/17  4:29 AM   Result Value Ref Range    Sodium 143 136 - 145 mmol/L    Potassium 4.9 3.5 - 5.1 mmol/L    Chloride 111 (H) 97 - 108 mmol/L    CO2 21 21 - 32 mmol/L    Anion gap 11 5 - 15 mmol/L    Glucose 161 (H) 65 - 100 mg/dL    BUN 78 (H) 6 - 20 MG/DL    Creatinine 2.95 (H) 0.55 - 1.02 MG/DL    BUN/Creatinine ratio 26 (H) 12 - 20      GFR est AA 19 (L) >60 ml/min/1.73m2    GFR est non-AA 16 (L) >60 ml/min/1.73m2    Calcium 9.1 8.5 - 10.1 MG/DL    Bilirubin, total 0.6 0.2 - 1.0 MG/DL    ALT (SGPT) 41 12 - 78 U/L    AST (SGOT) 26 15 - 37 U/L    Alk.  phosphatase 121 (H) 45 - 117 U/L    Protein, total 6.8 6.4 - 8.2 g/dL    Albumin 2.4 (L) 3.5 - 5.0 g/dL    Globulin 4.4 (H) 2.0 - 4.0 g/dL    A-G Ratio 0.5 (L) 1.1 - 2.2     MAGNESIUM    Collection Time: 03/19/17  4:29 AM   Result Value Ref Range    Magnesium 2.8 (H) 1.6 - 2.4 mg/dL   PHOSPHORUS    Collection Time: 03/19/17  4:29 AM   Result Value Ref Range    Phosphorus 4.5 2.6 - 4.7 MG/DL   GLUCOSE, POC    Collection Time: 03/19/17  5:36 AM   Result Value Ref Range    Glucose (POC) 168 (H) 65 - 100 mg/dL    Performed by Dilia Bob              Telemetry:normal sinus rhythm    Imaging:  I have personally reviewed the patients radiographs and have reviewed the reports:  CXR: no acute changes, bilateral pleural effusions       Fabi Westfall MD

## 2017-03-19 NOTE — PROGRESS NOTES
Vital signs stable throughout day, patient medicated once for pain. Labetalol given twice for elevated blood pressure. Patient up to recliner for 2 hours.

## 2017-03-19 NOTE — PROGRESS NOTES
Nephrology Progress Note     Fransisco Chilel       [facility]. [email or web address]                   Phone - [phone]   Patient: Enrique Watkins  YOB: 1955     Date- 3/19/2017     CC: Follow up for ARF         Subjective: Interval History:   -   Cr remained high. Cr. Stuck at 3.0 for 1 week. Good urine out put  No hydronephrosis on renal usg  bp high     C/o cough  No sob  No fever  She has leg edema   Assessment:   · Acute renal failure likely due to ATN - vancomycin toxicity - vanco level - 29.2- hemodynamic instability. · Metabolic acidosis  · ckd 3 baseline cr. 1.2  · Resp. Failure - s/p extubation  · AFIB with RVR. Now s/p AFL ablation and pacemaker implant  · Complicated type B descending thoracic aortic dissection. -   · S/p TEVAR, L ileo-fem bypass, L carotid-subclavian bypass:2/27/17   · Protein malnutrition  · B/l effusion. S/p bilateral chest tubes. Right side pulled. Left still in place  · S/p chest tube placement  · Ischemic spinal cord injury     Plan:   - increase hydralazine 20 mg q 4 hr  - may need cardene gtt if bp remains high  - no dialysis indicated at present  - TPN per DR. Randall Salinas  D/w  yesterday and updated him regarding renal failure    Care Plan discussed with: Lavern Calvillo  [] High complexity decision making was performed  [] Patient is at high-risk of decompensation with multiple organ involvement  Review of Systems: Pertinent items are noted in HPI. Objective:   Vitals:    03/19/17 0800 03/19/17 0900 03/19/17 1000 03/19/17 1100   BP: 178/55 177/64 178/55 158/60   Pulse: 75 75 75 72   Resp: 19 16 16 17   Temp: 97.3 °F (36.3 °C)      SpO2: 96% 96% 97% 95%   Weight:       Height:           Intake/Output Summary (Last 24 hours) at 03/19/17 1130  Last data filed at 03/19/17 1100   Gross per 24 hour   Intake            901.4 ml   Output             1185 ml   Net           -283.6 ml     Physical Exam:   GEN: - NAD   RESP: clear b/l, no wheezing.   Left chest tube in place  CVS: RRR,S1,S2   SKIN: No Rash  ABDO: soft , non tender, No hepatosplenomegaly. gu - grubbs +    Chart reviewed. Pertinent Notes reviewed.    Medications list  reviewed     Current Facility-Administered Medications   Medication    TPN ADULT-CENTRAL AA 5% D20%-MVI W/ VIT [facility]    insulin lispro (HUMALOG) injection    glucose chewable tablet 16 g    dextrose (D50W) injection syrg 12.5-25 g    glucagon (GLUCAGEN) injection 1 mg    TPN ADULT-CENTRAL AA 5% D20%-MVI W/ VIT [facility]    hydrALAZINE (APRESOLINE) 20 mg/mL injection 10 mg    labetalol (NORMODYNE;TRANDATE) injection 20 mg    [START ON 3/21/2017] cloNIDine (CATAPRES) 0.2 mg/24 hr patch 2 Patch    amiodarone (CORDARONE) tablet 400 mg    HYDROmorphone (PF) (DILAUDID) injection 0.5 mg    escitalopram oxalate (LEXAPRO) tablet 10 mg    diphenhydrAMINE-zinc acetate 2%-0.1% (BENADRYL) cream    ADDaptor    vancomycin (VANCOCIN) 1,000 mg injection    0.9% sodium chloride (MBP/ADV) 0.9 % infusion    sodium chloride (NS) flush 5-10 mL    sodium chloride (NS) flush 5-10 mL    naloxone (NARCAN) injection 0.4 mg    sodium chloride (NS) flush 10-30 mL    sodium chloride (NS) flush 10 mL    sodium chloride (NS) flush 10 mL    sodium chloride (NS) flush 10-40 mL    sodium chloride (NS) flush 20 mL    phenol throat spray (CHLORASEPTIC) 1 Spray    levothyroxine (SYNTHROID) injection 260 mcg    mineral oil-hydrophil petrolat (AQUAPHOR) ointment    0.9% sodium chloride infusion 250 mL    levalbuterol (XOPENEX) nebulizer soln 1.25 mg/3 mL    heparin (porcine) injection 5,000 Units    albuterol (PROVENTIL VENTOLIN) nebulizer solution 2.5 mg    ipratropium (ATROVENT) 0.02 % nebulizer solution 0.5 mg    polyethylene glycol (MIRALAX) packet 17 g    pantoprazole (PROTONIX) 40 mg in sodium chloride 0.9 % 10 mL injection    sodium chloride (NS) flush 5-10 mL    sodium chloride (NS) flush 5-10 mL    prochlorperazine (COMPAZINE) with saline injection 5 mg    sodium chloride (NS) flush 10 mL    sodium chloride (NS) flush 10-40 mL    acetaminophen (TYLENOL) tablet 650 mg    ondansetron (ZOFRAN) injection 4 mg    bisacodyl (DULCOLAX) suppository 10 mg              Data Review :  Recent Labs      03/19/17   0429  03/18/17   1301  03/18/17   0516  03/17/17   0559   NA  143   --   142  142   K  4.9   --   4.5  4.6   CL  111*   --   109*  108   CO2  21   --   19*  20*   GLU  161*   --   122*  123*   BUN  78*   --   72*  73*   CREA  2.95*   --   3.00*  3.07*   CA  9.1   --   8.9  9.2   MG  2.8*  2.7*   --    --    PHOS  4.5  5.3*   --    --    ALB  2.4*   --    --    --    SGOT  26   --    --    --    ALT  41   --    --    --      Recent Labs      03/19/17   0429   WBC  13.0*   HGB  10.0*   HCT  32.2*   PLT  334     Lab Results   Component Value Date/Time    Culture result: MODERATE  PSEUDOMONAS AERUGINOSA   03/11/2017 11:12 AM    Culture result: MODERATE  NORMAL RESPIRATORY YVAN   03/11/2017 11:12 AM    Culture result: SCANT  NORMAL RESPIRATORY YVAN   03/02/2017 10:25 AM    Culture result: MODERATE  NORMAL RESPIRATORY YVAN   02/27/2017 05:18 PM    Culture result: MIXED UROGENITAL YVAN ISOLATED 06/24/2016 09:12 PM     Lab Results   Component Value Date/Time    Specimen Description: STOOL 11/10/2013 08:00 PM    Specimen Description: BLOOD 05/24/2013 10:50 AM    Specimen Description: BLOOD 05/22/2013 09:55 PM    Specimen Description: STOOL 05/22/2013 06:20 PM    Specimen Description: URINE 08/16/2011 10:54 AM     Michel Go MD  [facility]  [facility] SYSTM FRANCISCAN [facility] [address]  [place], [address] [place]  Phone - [phone]         Fax - [phone] [facility] Office  [address] [place] [address]  Phone - [phone]        Fax - [phone]     [email or web address]

## 2017-03-19 NOTE — PROGRESS NOTES
0242 Labetalol 20 mg IV given due to elevated BP, 197/78. (May give every 4 hours PRN). 0400 BP elevated. Scheduled Hydralazine given 10 mg IV.  0430 BP still elevated. 207/73. Dr. Lexx Casillas paged. [phone] Dr. Rebekah Webster paged due to elevated BP. Norvasc 5 mg po ordered for now. One time dose. Restart Cardene gtt if SBP is > 200.  0527 Norvasc 5 mg given. 0600 /55  0617 Labetalol 20mg IV given due to elevated BP.  0700 Bedside and Verbal shift change report given to [employer] (oncoming nurse) by Jacoby Dash RN (offgoing nurse). Report included the following information SBAR, Kardex, OR Summary, Procedure Summary, Intake/Output, MAR, Recent Results and Cardiac Rhythm Paced.

## 2017-03-20 NOTE — PROGRESS NOTES
Nutrition Assessment:    RECOMMENDATIONS:   Continue diet at consistency per SLP, continue Magic Cup and Ensure Pudding  RD to add Glucerna BID    Recommend discontinue TPN     ASSESSMENT:   Chart reviewed, case discussed during CCU rounds. Pt later moved out of the CCU to PCU. Pt sleeping soundly at time of visit, I spoke with her RN Darrian Valles. Pt's appetite has been poor, TPN was started over the weekend for this reason per RN. Discussed with intensivist this morning, recommend discontinuing TPN as her gut is functional.  If poor PO intake continues, NGT placement and enteral feeds should be considered. SLP upgraded her diet this morning which should help, CCU RN reports that she is eating some of her supplements (Magic Cup and Ensure Pudding). Will also add glucerna now that she is allowed liquids. Labs reviewed. Noted concern for possible chylothorax however RN reports all chest tube OP has been serous. Dietitians Intervention(s)/Plan(s): Continue diet, add PO supplements, discontinue TPN, encourage PO intake. SUBJECTIVE/OBJECTIVE:   Pt sleeping soundly, no family in the room, spoke with CCU RN and PCU RN   Diet Order: Mechanical soft, Consistent carb, Other (comment) (Magic Cup BID + ensure Pudding BID)  % Eaten:  Patient Vitals for the past 72 hrs:   % Diet Eaten   03/18/17 1300 50 %   03/18/17 0830 20 %   03/17/17 1900 15 %       Pertinent Medications:humalog, protonix, miralax.     Chemistries:  Lab Results   Component Value Date/Time    Sodium 145 03/20/2017 04:32 AM    Potassium 4.5 03/20/2017 04:32 AM    Chloride 111 03/20/2017 04:32 AM    CO2 23 03/20/2017 04:32 AM    Anion gap 11 03/20/2017 04:32 AM    Glucose 146 03/20/2017 04:32 AM    BUN 74 03/20/2017 04:32 AM    Creatinine 2.73 03/20/2017 04:32 AM    BUN/Creatinine ratio 27 03/20/2017 04:32 AM    GFR est AA 21 03/20/2017 04:32 AM    GFR est non-AA 18 03/20/2017 04:32 AM    Calcium 9.1 03/20/2017 04:32 AM    Albumin 2.3 03/20/2017 04:32 AM Anthropometrics: Height: 5' 6.5\" (168.9 cm) Weight: 88.6 kg (195 lb 5.2 oz)    IBW (%IBW): 59.1 kg (130 lb 4.7 oz) ( ) UBW (%UBW): 68.2 kg (150 lb 5.7 oz) (  %)    BMI: Body mass index is 31.05 kg/(m^2). This BMI is indicative of:  []Underweight   []Normal   []Overweight   [x] Obesity   [] Extreme Obesity (BMI>40)  Estimated Nutrition Needs (Based on): 1788 Kcals/day (MSJ 1490 x 1.2) , 68 g (1gPro/kg ABW) Protein  Carbohydrate: At Least 130 g/day  Fluids: 1800 mL/day    Last BM: 3/17   [x]Active     []Hyperactive  []Hypoactive       [] Absent   BS  Skin:    [] Intact   [x] Incision  [] Breakdown   [] DTI   [] Tears/Excoriation/Abrasion  [x]Edema(+1-generalized + all extremities)  [] Other: Wt Readings from Last 30 Encounters:   03/20/17 88.6 kg (195 lb 5.2 oz)   02/23/17 78 kg (171 lb 15.3 oz)   02/21/17 77.2 kg (170 lb 4 oz)   01/24/17 77.7 kg (171 lb 4 oz)   08/24/16 75.5 kg (166 lb 8 oz)   06/27/16 79.2 kg (174 lb 9.6 oz)   06/26/16 78 kg (172 lb)   05/31/16 77.3 kg (170 lb 8 oz)   04/15/16 77.6 kg (171 lb)   04/12/16 77.8 kg (171 lb 9.6 oz)   02/22/16 82.1 kg (181 lb)   12/21/15 78.8 kg (173 lb 12.8 oz)   11/09/15 80.3 kg (177 lb)   10/09/15 80.3 kg (177 lb)   09/16/15 79.5 kg (175 lb 4.8 oz)   07/10/15 80.3 kg (177 lb)   07/08/15 80.6 kg (177 lb 9.6 oz)   07/01/15 79.8 kg (176 lb)   06/29/15 79.7 kg (175 lb 9.6 oz)   06/02/15 80.3 kg (177 lb)   05/07/15 80.6 kg (177 lb 9.6 oz)   05/06/15 79.8 kg (176 lb)   05/02/15 79.4 kg (175 lb)   04/21/15 80.3 kg (177 lb)   03/13/15 77.1 kg (170 lb)   02/02/15 77.3 kg (170 lb 6.4 oz)   12/05/14 77.9 kg (171 lb 12.8 oz)   11/28/14 76.7 kg (169 lb)   11/10/14 78.5 kg (173 lb)   10/24/14 76.7 kg (169 lb 3.2 oz)      NUTRITION DIAGNOSES:   Problem:  Inadequate protein-energy intake      Etiology: related to pt NPO      Signs/Symptoms: as evidenced by NPO + propofol meets <25% kcal and 0% protein needs. Previous dx re: inadequate protein energy intake continues. NUTRITION INTERVENTIONS:  Meals/Snacks: General/healthful diet Enteral/Parenteral Nutrition: Discontinue parenteral nutrition Supplements: Commercial supplement              GOAL:   Pt will consume >25% of meals/supplements in 2-4 days. NUTRITION MONITORING AND EVALUATION   Previous Goal: Pt will advance to PO diet vs TF in 2-4 days   Previous Goal Met: Yes   Previous Recommendations Implemented: Yes   Cultural, Amish, or Ethnic Dietary Needs: None   LEARNING NEEDS (Diet, Food/Nutrient-Drug Interaction):    [x] None Identified   [] Identified and Education Provided/Documented   [] Identified and Pt declined/was not appropriate      [x] Interdisciplinary Care Plan Reviewed/Documented    [x] Participated in Discharge Planning:  To be determined    [x] Interdisciplinary Rounds     NUTRITION RISK:    [x] High              [] Moderate           []  Low  []  Minimal/Uncompromised      Khoa Guillermo RD  Pager [phone]  Weekend Pager [phone]

## 2017-03-20 NOTE — PROGRESS NOTES
Problem: Dysphagia (Adult)  Goal: *Speech Goal: (INSERT TEXT)  3/14/2017  Speech path reeval  1. Pt will tolerate dys 3/mech soft diet with thins with no overt s/s of aspiration. Speech path goals:  1. Pt will participate with reeval of swallowing daily until started on a diet. Goal met 3/20. 2. Pt will tolerate ice chips for pleasure as fed by staff. Goal met 3/20. 3. Pt will tolerate modified diet without s/s of aspiration. Pt will have purees only with no liquids with no overt s/s of aspiration. Goal met 3/20. SPEECH LANGUAGE PATHOLOGY DYSPHAGIA TREATMENT: WEEKLY REASSESSMENT  Patient: Tristian Santana (62 y.o. female)  Date: 3/20/2017  Diagnosis: Aneurysm ([address]  AORTIC DISECTION <principal problem not specified>  Procedure(s) (LRB):  ENDOVASCULAR ANEURYSM REPAIR of THORACIC AORTIC DISSECTION, repair of ruptured left iliac artery with stent placement. Left ileo-femoral bypass graft with PTFE. (Bilateral)  LEFT CAROTID SUBCLAVIAN BYPASS with stent placement of left common artery. (Left) 21 Days Post-Op  Precautions:  Fall      ASSESSMENT:  Pt seen today for swallowing therapy. She was alert and sitting up in bed. Voicing was good. Tolerated solids and thins with no dysphagia. Intermittent cough after thins but she coughs at baseline. sats were wnl and did not change during po trials. Recommend diet upgrade with small sips. No straws. Patient's progression toward goals since last assessment: good       PLAN:  Goals have been updated based on progression since last assessment. Patient continues to benefit from skilled intervention to address the above impairments. Continue to follow the patient 3 times a week to address goals. Recommendations and Planned Interventions:  dys 3/mech soft with thins  Discharge Recommendations: None       SUBJECTIVE:   Patient stated she felt nauseated.  .      OBJECTIVE:   Cognitive and Communication Status:  Neurologic State: Drowsy, Eyes open to voice  Orientation Level: Disoriented to time, Oriented to person, Oriented to place, Oriented to situation  Cognition: Follows commands  Perception: Appears intact  Perseveration: No perseveration noted  Safety/Judgement: Decreased awareness of environment, Decreased insight into deficits  Dysphagia Treatment:  Oral Assessment:     P.O. Trials:  Patient Position: upright in bed  Vocal quality prior to P.O.: No impairment  Consistency Presented: Thin liquid;Puree; Ice chips  How Presented: Self-fed/presented;Cup/sip;Straw;Successive swallows     Bolus Acceptance: No impairment  Bolus Formation/Control: No impairment     Propulsion: No impairment  Oral Residue: None  Initiation of Swallow: No impairment  Laryngeal Elevation: Functional     Pharyngeal Phase Characteristics: No impairment, issues, or problems            Oral Phase Severity: No impairment  Pharyngeal Phase Severity : Mild                  Pain:  Pain Scale 1: Numeric (0 - 10)  Pain Intensity 1: 0     After treatment:   [ ]                Patient left in no apparent distress sitting up in chair  [X]                Patient left in no apparent distress in bed  [X]                Call bell left within reach  [X]                Nursing notified  [ ]                Caregiver present  [ ]                Bed alarm activated      COMMUNICATION/EDUCATION:   The patients plan of care including recommendations, planned interventions, and recommended diet changes were discussed with: Registered Nurse. [X]                Posted safety precautions in patient's room.      Nathan Valverde SLP  Time Calculation: 15 mins

## 2017-03-20 NOTE — PROGRESS NOTES
Cardiology Progress Note            [address] [place] [address], [address] [facility]  [phone]    3/20/2017 10:24 AM    Admit Date: 2/23/2017    Admit Diagnosis: Aneurysm (HCC);AORTIC DISECTION    Subjective:     King Herb   denies chest pain.     Visit Vitals    /64    Pulse 77    Temp 97.6 °F (36.4 °C)    Resp 19    Ht 5' 5\" (1.651 m)    Wt 200 lb 13.4 oz (91.1 kg)    SpO2 98%    Breastfeeding No    BMI 33.42 kg/m2     Current Facility-Administered Medications   Medication Dose Route Frequency    TPN ADULT-CENTRAL AA 5% D20%-MVI W/ VIT K-RCH   IntraVENous CONTINUOUS    amLODIPine (NORVASC) tablet 10 mg  10 mg Oral DAILY    pantoprazole (PROTONIX) tablet 40 mg  40 mg Oral ACB&D    albuterol-ipratropium (DUO-NEB) 2.5 MG-0.5 MG/3 ML  3 mL Nebulization QID RT    buPROPion XL (WELLBUTRIN XL) tablet 150 mg  150 mg Oral BID    clonazePAM (KlonoPIN) tablet 0.5 mg  0.5 mg Oral BID    busPIRone (BUSPAR) tablet 5 mg  5 mg Oral BID    TPN ADULT-CENTRAL AA 5% D20%-MVI W/ VIT K-RCH   IntraVENous CONTINUOUS    hydrALAZINE (APRESOLINE) 20 mg/mL injection 20 mg  20 mg IntraVENous Q4H    levothyroxine (SYNTHROID) tablet 100 mcg  100 mcg Oral ACB    insulin lispro (HUMALOG) injection   SubCUTAneous Q6H    glucose chewable tablet 16 g  4 Tab Oral PRN    dextrose (D50W) injection syrg 12.5-25 g  12.5-25 g IntraVENous PRN    glucagon (GLUCAGEN) injection 1 mg  1 mg IntraMUSCular PRN    labetalol (NORMODYNE;TRANDATE) injection 20 mg  20 mg IntraVENous Q4H PRN    [START ON 3/21/2017] cloNIDine (CATAPRES) 0.2 mg/24 hr patch 2 Patch  2 Patch TransDERmal Q7D    amiodarone (CORDARONE) tablet 400 mg  400 mg Oral Q12H    HYDROmorphone (PF) (DILAUDID) injection 0.5 mg  0.5 mg IntraVENous Q2H PRN    escitalopram oxalate (LEXAPRO) tablet 10 mg  10 mg Oral DAILY    diphenhydrAMINE-zinc acetate 2%-0.1% (BENADRYL) cream   Topical TID PRN    ADDaptor        vancomycin (VANCOCIN) 1,000 mg injection        0.9% sodium chloride (MBP/ADV) 0.9 % infusion        sodium chloride (NS) flush 5-10 mL  5-10 mL IntraVENous Q8H    sodium chloride (NS) flush 5-10 mL  5-10 mL IntraVENous PRN    naloxone (NARCAN) injection 0.4 mg  0.4 mg IntraVENous PRN    sodium chloride (NS) flush 10-30 mL  10-30 mL InterCATHeter PRN    sodium chloride (NS) flush 10 mL  10 mL InterCATHeter Q24H    sodium chloride (NS) flush 10 mL  10 mL InterCATHeter PRN    sodium chloride (NS) flush 10-40 mL  10-40 mL InterCATHeter Q8H    sodium chloride (NS) flush 20 mL  20 mL InterCATHeter PRN    phenol throat spray (CHLORASEPTIC) 1 Spray  1 Spray Oral PRN    mineral oil-hydrophil petrolat (AQUAPHOR) ointment   Topical PRN    0.9% sodium chloride infusion 250 mL  250 mL IntraVENous PRN    heparin (porcine) injection 5,000 Units  5,000 Units SubCUTAneous Q8H    albuterol (PROVENTIL VENTOLIN) nebulizer solution 2.5 mg  2.5 mg Nebulization Q2H PRN    polyethylene glycol (MIRALAX) packet 17 g  17 g Oral DAILY    sodium chloride (NS) flush 5-10 mL  5-10 mL IntraVENous Q8H    sodium chloride (NS) flush 5-10 mL  5-10 mL IntraVENous PRN    prochlorperazine (COMPAZINE) with saline injection 5 mg  5 mg IntraVENous Q4H PRN    sodium chloride (NS) flush 10 mL  10 mL InterCATHeter Q24H    sodium chloride (NS) flush 10-40 mL  10-40 mL InterCATHeter Q8H    acetaminophen (TYLENOL) tablet 650 mg  650 mg Oral Q4H PRN    ondansetron (ZOFRAN) injection 4 mg  4 mg IntraVENous Q4H PRN    bisacodyl (DULCOLAX) suppository 10 mg  10 mg Rectal DAILY PRN         Objective:      Visit Vitals    /64    Pulse 77    Temp 97.6 °F (36.4 °C)    Resp 19    Ht 5' 5\" (1.651 m)    Wt 200 lb 13.4 oz (91.1 kg)    SpO2 98%    Breastfeeding No    BMI 33.42 kg/m2       Physical Exam:  Abdomen: soft, non-tender  Heart: regular rate and rhythm  Lungs: clear to auscultation bilaterally  Pulses: 2+ and symmetric    Data Review:   Labs:    Recent Labs      03/20/17 1073  03/19/17   0429   WBC  10.1  13.0*   HGB  9.5*  10.0*   HCT  30.6*  32.2*   PLT  310  334     Recent Labs      03/20/17   0432  03/19/17   0429  03/18/17   1301  03/18/17   0516   NA  145  143   --   142   K  4.5  4.9   --   4.5   CL  111*  111*   --   109*   CO2  23  21   --   19*   GLU  146*  161*   --   122*   BUN  74*  78*   --   72*   CREA  2.73*  2.95*   --   3.00*   CA  9.1  9.1   --   8.9   MG  2.5*  2.8*  2.7*   --    PHOS  3.7  4.5  5.3*   --    ALB  2.3*  2.4*   --    --    TBILI  0.5  0.6   --    --    SGOT  17  26   --    --    ALT  29  41   --    --        No results for input(s): TROIQ, CPK, CKMB in the last 72 hours. Intake/Output Summary (Last 24 hours) at 03/20/17 1024  Last data filed at 03/20/17 1000   Gross per 24 hour   Intake           1346.1 ml   Output             1495 ml   Net           -148.9 ml        Telemetry: nsr    Assessment:     Active Problems:    HTN (hypertension) ()      Aneurysm ([facility]) (2/23/2017)      SVT (supraventricular tachycardia) (3/3/2017)      SSS (sick sinus syndrome) ([facility]) (3/6/2017)      Paroxysmal atrial fibrillation ([facility]) (3/6/2017)      Atrial flutter ([facility] (3/12/2017)        Plan:     Jp Bradford is doing well. She is in sinus. She has significant htn - receiving amlodipine and clonidine now. Cont amio and after load will decrease dose to 200 mg po daily. Will sign off.  Please call with ?s    Roberto Marina MD, [facility]    3/20/2017

## 2017-03-20 NOTE — PROGRESS NOTES
Initiated declot interventions of cathflo at 1720  Instilling heparin was unsuccessful per primary nurse. Repositioning the patient in bed, asking patient to cough and deep breathe and raising Right arm were unsuccessful. Instilled 1mg/1ml Cathflo x 3 ports. Patient tolerated procedure well. Primary nurse Myke Lema RN aware. Refer to [facility] and Docflow sheet for specifics. Michael Bateman RN / Vascular Access Team      Declot follow up. Aspirated instilled Cathflo 1mg/1 ml post 120 minute dwell x 3 lumens. Noted + blood return x 3ports. 5 ml of blood wasted from 3 ports. Flushed with 20 ml NS. RN Carmina Lopez is aware of patent ports x 3. Declot intervention was explained. Declot per Cathflo intervention was Successful .    Michael Bateman RN / Vascular Access Team

## 2017-03-20 NOTE — PROGRESS NOTES
Problem: Self Care Deficits Care Plan (Adult)  Goal: *Acute Goals and Plan of Care (Insert Text)  Occupational Therapy Goals  Initiated 3/14/2017  1. Patient will perform grooming with minimal assistance/contact guard assist within 7 day(s). 2. Patient will perform supine to sit EOB with maximal assistance in preparation for adls within 7 day(s). 3. Patient will perform upper body bathing with moderate assistance within 7 day(s). 4. Patient will participate in functional mobility assessment within 7 day(s). 5. Patient will perform rolling side to side for toileting at bed level with moderate assistance within 7 day(s). 6. Patient will participate in upper extremity therapeutic exercise/activities with supervision/set-up for 10 minutes within 7 day(s). OCCUPATIONAL THERAPY TREATMENT  Patient: Geoffrey Asa (60 y.o. female)  Date: 3/20/2017  Diagnosis: Aneurysm ([facility] [address]  AORTIC DISECTION <principal problem not specified>  Procedure(s) (LRB):  ENDOVASCULAR ANEURYSM REPAIR of THORACIC AORTIC DISSECTION, repair of ruptured left iliac artery with stent placement. Left ileo-femoral bypass graft with PTFE. (Bilateral)  LEFT CAROTID SUBCLAVIAN BYPASS with stent placement of left common artery. (Left) 21 Days Post-Op  Precautions: Fall      ASSESSMENT:  Pt was alert and able to hold conversation with therapists today. Pt continues to require maximal assistance X2 for bed mobility. Assumed sitting EOB with Max. A X2/total assistance, feet were placed on supportive surface and bed level adjusted. Worked on trunk control-pt needing nearly constant support except when at rest in flexed forward posture (spine, head/neck/shoulders) with UEs resting on thighs. Limited response to correct posture (extension) with physical cues/facilitation to lower spine, mid spine and scapulae. Verbal cues throughout tx session for head/neck positioning and available shoulder m. (decreased scap. Adduction).    Lateral trunk control encouraged using BUEs-cues for UE placement and m. Activation with hands placed on mattress. Pt quickly fatigues but tolerance and participation improving. Pt does not appear to have full insight into her significant deficits. Facilitation to UEs for   Progression toward goals:  [ ]       Improving appropriately and progressing toward goals  [X]       Improving slowly and progressing toward goals  [ ]       Not making progress toward goals and plan of care will be adjusted       PLAN:  Patient continues to benefit from skilled intervention to address the above impairments. Continue treatment per established plan of care. Discharge Recommendations: Intensive Inpatient Rehab--spinal specialty program  Further Equipment Recommendations for Discharge:  tbd       SUBJECTIVE:   Patient stated-working with you is like working a 10 hour day. OBJECTIVE DATA SUMMARY:   Cognitive/Behavioral Status:  Neurologic State: Alert  Orientation Level: Oriented X4  Cognition: Appropriate decision making; Follows commands   decreased insight into deficits           Functional Mobility and Transfers for ADLs:  Bed Mobility:  Rolling: Total assistance  Supine to Sit: Total assistance  Sit to Supine: Total assistance  Scooting: Total assistance     Transfers:   recommend mayra to recliner-nursing performed over the weekend     Balance:  Sitting: Impaired  Sitting - Static: Poor (constant support)  Sitting - Dynamic: Poor (constant support)     ADL Intervention:   facilitated increased trunk control in preparation for seated adls. Neuro Re-Education:   as above. Therapeutic Exercises:   Sat EOB with nearly constant support for 13 minutes. Pain:  Pain Scale 1: Numeric (0 - 10)  Pain Intensity 1: 0              Activity Tolerance:   VSS,   Please refer to the flowsheet for vital signs taken during this treatment.   After treatment:   [ ] Patient left in no apparent distress sitting up in chair  [X] Patient left in no apparent distress in bed  [X] Call bell left within reach  [X] Nursing notified and present  [ ] Caregiver present  [ ] Bed alarm activated      COMMUNICATION/COLLABORATION:   The patients plan of care was discussed with: Physical Therapist and Registered Nurse     Remy Reese OTR/L  Time Calculation: 29 mins

## 2017-03-20 NOTE — PROGRESS NOTES
Vascular    Out of ICU  Taking small amount of PO  Afebrile  Good UOP  Looks good  CT = 45 cc in last 24 hrs  Cr down to 2.7  Overall looks better  Cont TPN  Encourage PO  If Cr stable or better tomorrow, will D/C grubbs  D/C CT tomorrow if drainage still low  PT/OT  Will need Inpt rehab at [facility] when off TPN and stable on PO meds

## 2017-03-20 NOTE — PROGRESS NOTES
2030 pt rates back pain at 4/10. 0.5mg dilaudid given iv. Assessment as noted. Will monitor. 2212 20mg labetalol given iv for bp 172/56. 2218 pt c/o nausea.  4mg zofran given iv.     0223 pt continues to c/o nausea 5mg compazine given iv.     0000 report give to sonia camacho, rn

## 2017-03-20 NOTE — PROGRESS NOTES
0700 - Verbal and bedside shift report received from Dasia Baumann RN. Care of patient assumed. 0800 - Patient assessment completed as documented. The patient is drowsy, eyes open to voice, follows commands, oriented to person, place, and situation, disoriented to time. Lungs are coarse bilaterally and diminished in the lower bases, bowel sounds are active, and pulses are palpable in all four extremities. 1000 - Spoke with Dr. Akin Astorga concerning transfer orders for this patient and possibly discontinuing the urinary catheter as it has been in for 25 days. Received orders for patient to transfer to stepdown unit and to keep urinary catheter in as the patient's renal function is being closely monitored. 1030 - Labetalol 20 mg IV given for elevated blood pressure. 1212 - TRANSFER - OUT REPORT:    Verbal report given to Grand kori RN on [employer]  being transferred to PCU for routine progression of care       Report consisted of patients Situation, Background, Assessment and   Recommendations(SBAR). Information from the following report(s) SBAR, Kardex, OR Summary, Intake/Output, MAR, Recent Results and Cardiac Rhythm paced was reviewed with the receiving nurse. Lines:   PICC Triple Lumen 58/87/88 Right;Basilic (Active)   Central Line Being Utilized Yes 3/20/2017 12:00 PM   Criteria for Appropriate Use Hemodynamically unstable, requiring monitoring lines, vasopressors, or volume resuscitation 3/20/2017 12:00 PM   Site Assessment Clean, dry, & intact 3/20/2017 12:00 PM   Phlebitis Assessment 0 3/20/2017 12:00 PM   Infiltration Assessment 0 3/20/2017 12:00 PM   Arm Circumference (cm) 29 cm 3/2/2017  4:00 AM   Date of Last Dressing Change 03/15/17 3/20/2017 12:00 PM   Dressing Status Clean, dry, & intact; Occlusive 3/20/2017 12:00 PM   External Catheter Length (cm) 0 centimeters 3/17/2017  8:00 PM   Dressing Type Disk with Chlorhexadine gluconate (CHG); Transparent 3/20/2017 12:00 PM   Action Taken Open ports on tubing capped 3/20/2017  4:00 AM   Hub Color/Line Status Gray;Capped 3/20/2017 12:00 PM   Positive Blood Return (Site #1) No 3/20/2017  4:00 AM   Hub Color/Line Status Red;Capped 3/20/2017 12:00 PM   Positive Blood Return (Site #2) No 3/20/2017  4:00 AM   Hub Color/Line Status White; Infusing 3/20/2017 12:00 PM   Positive Blood Return (Site #3) No 3/20/2017  4:00 AM   Alcohol Cap Used Yes 3/20/2017 12:00 PM        Opportunity for questions and clarification was provided.       Patient transported with:   Monitor  Patient-specific medications from Pharmacy  Registered Nurse

## 2017-03-20 NOTE — PROGRESS NOTES
0323 PRN Labetalol 20 mg IV given due to elevated /52.  0630 Uneventful night. Voicing no complaints.

## 2017-03-20 NOTE — PROGRESS NOTES
[facility]  Pulmonary, Critical Care, and Sleep Medicine    Name: Anton Doyle MRN: [id number]   : 1955 Hospital: [facility]   Date: 3/20/2017            IMPRESSION:   · Acute respiratory failure with hypoxia: failed extubation 3/6, extubated  3/12; on NC oxygen appears comfortable. · Post extubation stridor mild, better but at risk for spasm  · Bronchitis has a weak cough. · Bilateral pleural effusions bilateral chest tubes 3/10 per IR to drain effusions; right removed 3/15;LEFT side had 25 ml output wednesday but 365 ml output last night-Keep chest tubes in through weekend. · Acute depresssion-   · Chronic Constipation, issue at home as well  · Malnutrition- NG removed with extubation, off TF- awaiting clearance, continue TPN renewed, enteral nutrition as tolerated. · Motor loss in legs is c/w ischemic spinal cord injury; discussed with Dr. Wilma Walden; Unclear when SCI may have occurred (she was moving BLE after CSF drain was d/d'd)  · COPD noted to be moderate severity. compensated  · AAA Type B dissection; distal thoracic aorta   · S/p Left CCA- SCA bypass 17  · S/p TEVAR 17  · Post op iliac artery repair  · Renal disease: CRI, has not changed much. NO acute need for HD, volume status seems reasonable. Renal following. · Abn LFTS  · Anemia  · Sleep apnea: No treatment  · Hypertension: well controlled  · CAD and CABG  · Hypothyroidism: well controlled  · Arthritis  · GERD: well controlled  · Discussed with Dr. Wilma Walden on 3/19. PLAN:   · keep left tube in place until minimal output, could removed in next 24 -48 hrs. Discuss with Dr. Wilma Walden. · Continue TPN, until po intake improved. · Ideally would feed pt and watch left chest tube output for chylous appearance, will monitor as she takes more po. Concern for Chylothorax. · Speech therapy following. · PT, OT following. · Jet nebs.    · CBC in AM  · Transfuse prn Hbg less than 7  · Antihypertensives, catapress patch, off cardene drip  · Renal following, would watch for now. · Will monitor WBC  · Will be a [facility] candidate   · PUD/DVT prophylaxis       Subjective/History:   3-20-17: No issues with pain or breathing. No acute complaints this am.   3-19-17: No acute issues. Has a tray at bedside. No issues last night. 3-18-17: NO acute changes. Not taking in much by mouth. 3/17 restless. No fever. No SOB. 3/16 tearful. Left chest pain but also very depressed. Still unable to swallow? 3/15 confused this AM. Right chest bothers her at chest tube site. Minimal drainage. Left chest tube still > 100 ml per day. Speech therapy seeing pt. Ablation and pacer    3/14 voice stronger but noarse. Mild congestion. No SOB on NC. Constipation at home. Has sensation in legs but no motor response    3/13 Whispery voice. Sore throat. No stridor. No nausea. Right chest sore. Chest tubes still in place    3/12 still with some ET secretions. Sputum culture with GNRs. Now on SBT. Later extubated after SBT and acceptable ABGs. Moderate ETT secretions but strong cough. Pt asked to avoid talking too much until vocal cord swellin ghas time to regress. Pt had a cuff leak prior to extubation. Within hours pt developed more raspiness with breathing. Stridorous per RT     Will add humidiifcation, Racemic epinephrine and IV decadron x 2 doses. Keep IV precedex in case steroids cause agitation. Will also d/c NGT as it may impede clearance of upper airway secretions is acutely and chronically ill. May need BIPAP prn but not sure if it will be tolerated. Reintubate prn     3/11 Failed extubation 3/6. Now intubated, sedated. Copious frothy white secretions. Labored with SBT. Not tolrating TF. Bilateral chest tubes placed by IR on 3/11. Both lungs clearer on CXR this AM. TRansfused 1unit PRBC    The patient is critically ill and can not provide additional history due to Ventilated.             Current Facility-Administered Medications   Medication Dose Route Frequency    TPN ADULT-CENTRAL AA 5% D20%-MVI W/ VIT K-RCH   IntraVENous CONTINUOUS    TPN ADULT-CENTRAL AA 5% D20%-MVI W/ VIT K-RCH   IntraVENous CONTINUOUS    hydrALAZINE (APRESOLINE) 20 mg/mL injection 20 mg  20 mg IntraVENous Q4H    levothyroxine (SYNTHROID) tablet 100 mcg  100 mcg Oral ACB    insulin lispro (HUMALOG) injection   SubCUTAneous Q6H    [START ON 3/21/2017] cloNIDine (CATAPRES) 0.2 mg/24 hr patch 2 Patch  2 Patch TransDERmal Q7D    amiodarone (CORDARONE) tablet 400 mg  400 mg Oral Q12H    escitalopram oxalate (LEXAPRO) tablet 10 mg  10 mg Oral DAILY    ADDaptor        vancomycin (VANCOCIN) 1,000 mg injection        0.9% sodium chloride (MBP/ADV) 0.9 % infusion        sodium chloride (NS) flush 5-10 mL  5-10 mL IntraVENous Q8H    sodium chloride (NS) flush 10 mL  10 mL InterCATHeter Q24H    sodium chloride (NS) flush 10-40 mL  10-40 mL InterCATHeter Q8H    levalbuterol (XOPENEX) nebulizer soln 1.25 mg/3 mL  1.25 mg Nebulization QID RT    heparin (porcine) injection 5,000 Units  5,000 Units SubCUTAneous Q8H    ipratropium (ATROVENT) 0.02 % nebulizer solution 0.5 mg  0.5 mg Nebulization QID RT    polyethylene glycol (MIRALAX) packet 17 g  17 g Oral DAILY    pantoprazole (PROTONIX) 40 mg in sodium chloride 0.9 % 10 mL injection  40 mg IntraVENous DAILY    sodium chloride (NS) flush 5-10 mL  5-10 mL IntraVENous Q8H    sodium chloride (NS) flush 10 mL  10 mL InterCATHeter Q24H    sodium chloride (NS) flush 10-40 mL  10-40 mL InterCATHeter Q8H     Review of Systems:  Review of systems not obtained due to patient factors.     Objective:   Vital Signs:    Visit Vitals    /58 (BP 1 Location: Left arm, BP Patient Position: At rest;Head of bed elevated (Comment degrees))    Pulse 75    Temp 97.6 °F (36.4 °C)    Resp 15    Ht 5' 5\" (1.651 m)    Wt 91.1 kg (200 lb 13.4 oz)    SpO2 97%    Breastfeeding No    BMI 33.42 kg/m2       O2 Device: Room air   O2 Flow Rate (L/min): 2 l/min   Temp (24hrs), Av.8 °F (36.6 °C), Min:97.6 °F (36.4 °C), Max:98.3 °F (36.8 °C)       Intake/Output:   Last shift:      701 - 1900  In: 63 [I.V.:63]  Out: 75 [Urine:75]  Last 3 shifts: 1901 -  07  In: 1787.1 [I.V.:1787.1]  Out: 5 [Urine:]    Intake/Output Summary (Last 24 hours) at 17 08  Last data filed at 17 0800   Gross per 24 hour   Intake           1304.1 ml   Output             1440 ml   Net           -135.9 ml       Ventilator Settings:  Mode Rate Tidal Volume Pressure FiO2 PEEP   CPAP   500 ml  5 cm H2O 50 % 5 cm H20     Peak airway pressure: 20 cm H2O    Minute ventilation: 9.07 l/min      Physical Exam:    General:  WDWNWF no distress, appears stated age. Head:  Normocephalic, without obvious abnormality, atraumatic. Eyes:  Conjunctivae/corneas clear. EOMs intact. Nose: Nares normal. Septum midline. Mucosa normal. No drainage or sinus tenderness. Throat: Lips, mucosa, and tongue normal. Teeth and gums normal. Has a white coating over mouth. Neck: Supple, symmetrical, trachea midline, no adenopathy, thyroid: no enlargment/tenderness/nodules, no carotid bruit and no JVD. Back:   Symmetric, no curvature. ROM normal.   Lungs:   Decreased BS bilaterally, has bilateral rhonchi. Chest wall:  No tenderness or deformity. Left chest tube in place. Heart:  Regular rate and rhythm, S1, S2 normal, no murmur, click, rub or gallop. Abdomen:   Obese;  bowel sounds are decreased,  No masses,  No organomegaly. Extremities: Extremities normal, atraumatic, no cyanosis or edema. Warm. Pulses: 2+ and symmetric all extremities. Skin: Skin color, texture, turgor normal. No rashes or lesions   Lymph nodes: Cervical, supraclavicular, and axillary nodes normal.   Neurologic: Not able to fully assess. Pt is post op, effects of anesthesia in place.         Data:     Recent Results (from the past 24 hour(s))   GLUCOSE, POC    Collection Time: 03/19/17 11:55 AM   Result Value Ref Range    Glucose (POC) 166 (H) 65 - 100 mg/dL    Performed by [employer], POC    Collection Time: 03/19/17  5:53 PM   Result Value Ref Range    Glucose (POC) 164 (H) 65 - 100 mg/dL    Performed by [facility], POC    Collection Time: 03/20/17 12:14 AM   Result Value Ref Range    Glucose (POC) 173 (H) 65 - 100 mg/dL    Performed by Nevin Solis    CBC WITH AUTOMATED DIFF    Collection Time: 03/20/17  4:32 AM   Result Value Ref Range    WBC 10.1 3.6 - 11.0 K/uL    RBC 3.41 (L) 3.80 - 5.20 M/uL    HGB 9.5 (L) 11.5 - 16.0 g/dL    HCT 30.6 (L) 35.0 - 47.0 %    MCV 89.7 80.0 - 99.0 FL    MCH 27.9 26.0 - 34.0 PG    MCHC 31.0 30.0 - 36.5 g/dL    RDW 17.6 (H) 11.5 - 14.5 %    PLATELET 595 450 - 120 K/uL    NEUTROPHILS 88 %    BAND NEUTROPHILS 1 %    LYMPHOCYTES 3 %    MONOCYTES 3 %    EOSINOPHILS 2 %    BASOPHILS 0 %    METAMYELOCYTES 1 %    MYELOCYTES 2 %    ABS. NEUTROPHILS 9.0 K/UL    ABS. LYMPHOCYTES 0.3 K/UL    ABS. MONOCYTES 0.3 K/UL    ABS. EOSINOPHILS 0.2 K/UL    ABS. BASOPHILS 0.0 K/UL    RBC COMMENTS ANISOCYTOSIS  1+        DF MANUAL     METABOLIC PANEL, COMPREHENSIVE    Collection Time: 03/20/17  4:32 AM   Result Value Ref Range    Sodium 145 136 - 145 mmol/L    Potassium 4.5 3.5 - 5.1 mmol/L    Chloride 111 (H) 97 - 108 mmol/L    CO2 23 21 - 32 mmol/L    Anion gap 11 5 - 15 mmol/L    Glucose 146 (H) 65 - 100 mg/dL    BUN 74 (H) 6 - 20 MG/DL    Creatinine 2.73 (H) 0.55 - 1.02 MG/DL    BUN/Creatinine ratio 27 (H) 12 - 20      GFR est AA 21 (L) >60 ml/min/1.73m2    GFR est non-AA 18 (L) >60 ml/min/1.73m2    Calcium 9.1 8.5 - 10.1 MG/DL    Bilirubin, total 0.5 0.2 - 1.0 MG/DL    ALT (SGPT) 29 12 - 78 U/L    AST (SGOT) 17 15 - 37 U/L    Alk.  phosphatase 109 45 - 117 U/L    Protein, total 6.5 6.4 - 8.2 g/dL    Albumin 2.3 (L) 3.5 - 5.0 g/dL    Globulin 4.2 (H) 2.0 - 4.0 g/dL    A-G Ratio 0.5 (L) 1.1 - 2.2     MAGNESIUM    Collection Time: 03/20/17  4:32 AM   Result Value Ref Range    Magnesium 2.5 (H) 1.6 - 2.4 mg/dL   PHOSPHORUS    Collection Time: 03/20/17  4:32 AM   Result Value Ref Range    Phosphorus 3.7 2.6 - 4.7 MG/DL   GLUCOSE, POC    Collection Time: 03/20/17  5:53 AM   Result Value Ref Range    Glucose (POC) 149 (H) 65 - 100 mg/dL    Performed by Pedro Quiles              Telemetry:normal sinus rhythm    Imaging:  I have personally reviewed the patients radiographs and have reviewed the reports:  CXR: 3-20-17: small right effusion, minimal left effusions. Pigtail drainage cath in place. MIld Pulmonary edema.         Nelly Diallo MD

## 2017-03-20 NOTE — PROGRESS NOTES
Problem: Mobility Impaired (Adult and Pediatric)  Goal: *Acute Goals and Plan of Care (Insert Text)  Physical Therapy Goals  Initiated 3/14/2017  1. Patient will move from supine to sit and sit to supine , scoot up and down and roll side to side in bed with moderate assistance within 7 day(s). 2. Patient will transfer from bed to chair and chair to bed with maximal assistance x 2 using the least restrictive device within 7 day(s). 3. Patient will perform sit to stand with maximal assistance within 7 day(s). 4. Patient will sit on EOB 2 minutes demonstrating overall good sitting balance without LOB within 7 days. PHYSICAL THERAPY TREATMENT  Patient: Mary Torres (60 y.o. female)  Date: 3/20/2017  Diagnosis: Aneurysm ([address]  AORTIC DISECTION <principal problem not specified>  Procedure(s) (LRB):  ENDOVASCULAR ANEURYSM REPAIR of THORACIC AORTIC DISSECTION, repair of ruptured left iliac artery with stent placement. Left ileo-femoral bypass graft with PTFE. (Bilateral)  LEFT CAROTID SUBCLAVIAN BYPASS with stent placement of left common artery. (Left) 21 Days Post-Op  Precautions: Fall      ASSESSMENT:  Pt received supine in bed and agreeable to PT intervention. Pt cleared by nursing for mobility. Pt seen with OT for skilled management of patient. Pt much more alert and motivated to mobilize this date. Pt reported that she sat in bedside chair x 2 hours yesterday (transferred via mayra lift) and tolerated it well. Pt performed bed mobility with total A x 2 for hips and LEs, however able to reach UEs to bed rails to initiate rolling trunk towards EOB. Pt with overall poor sitting balance while sitting EOB ~ 13 minutes. Pt was provided with manual cueing as described below during sitting activities and BLEs were in supported position (vs dangling) while sitting EOB. Pt required min-max A x 1-2 for support in sitting as pt with overall poor trunk control and unable to maintain trunk at midline.  Pt able to maintain sitting when seated with significant trunk flexion and C-shaped posture, requiring only SBA. Pt with increased fatigue and requested to return supine. Pt was returned supine in bed and left with all needs met and nursing present. Continue to recommend pt discharge to inpatient rehab to improve overall mobility prior to returning home. Progression toward goals:  [ ]    Improving appropriately and progressing toward goals  [X]    Improving slowly and progressing toward goals  [ ]    Not making progress toward goals and plan of care will be adjusted       PLAN:  Patient continues to benefit from skilled intervention to address the above impairments. Continue treatment per established plan of care. Discharge Recommendations:  Inpatient Rehab  Further Equipment Recommendations for Discharge:  TBD by rehab       SUBJECTIVE:   Patient stated I've always had a bad back.       OBJECTIVE DATA SUMMARY:   Critical Behavior:  Neurologic State: Drowsy, Eyes open to voice  Orientation Level: Disoriented to time, Oriented to person, Oriented to place, Oriented to situation  Cognition: Follows commands  Safety/Judgement: Decreased awareness of environment, Decreased insight into deficits  Functional Mobility Training:  Bed Mobility:  Rolling: Total assistance  Supine to Sit: Total assistance  Sit to Supine: Total assistance  Scooting:  Total assistance        Balance:  Sitting: Impaired  Sitting - Static: Poor (constant support)  Sitting - Dynamic: Poor (constant support)  Neuro Re-Education:  Manual cueing to distal aspect of R humerus to facilitate R elbow extension in sitting with RUE in weight bearing position; manual cueing to paraspinals to facilitate trunk extension in sitting (pt with minimal response to this cueing)  Therapeutic Exercises:   Scapular elevation 5x RANJEET in sitting - min A to maintain sitting  Pain:  Pain Scale 1: Numeric (0 - 10)  Pain Intensity 1: 0              Activity Tolerance:   Improving - increased time in sitting; VSS throughout on RA; pt with increased L groin pain (reported stinging)  Please refer to the flowsheet for vital signs taken during this treatment.   After treatment:   [ ]    Patient left in no apparent distress sitting up in chair  [X]    Patient left in no apparent distress in bed  [X]    Call bell left within reach  [X]    Nursing notified  [X]    Caregiver present  [ ]    Bed alarm activated      COMMUNICATION/COLLABORATION:   The patients plan of care was discussed with: Physical Therapist, Occupational Therapist and Registered Nurse     Marina Davis, PT, DPT   Time Calculation: 31 mins

## 2017-03-20 NOTE — INTERDISCIPLINARY ROUNDS
Interdisciplinary team rounds were held with the following team members:Case Management, Diabetes Treatment Specialist, Nursing, Nutrition, Occupational Therapy, Physical Therapy,Pharmacy, Physician and Respiratory Therapy. Plan of care discussed. See clinical pathway and/or care plan for interventions and desired outcomes.     Goals: chest tube can be removed today, continue TPN, surgical tele

## 2017-03-20 NOTE — PROGRESS NOTES
Nephrology Progress Note     Fransisco Chilel       www. [facility]                   Phone - [phone]   Patient: Julia Mejía  YOB: 1955     Date- 3/20/2017     CC: Follow up for ARF         Subjective: Interval History:   -  Cr. Improved  Good urine out put  No hydronephrosis on renal usg  bp high     C/o cough  No sob  No fever     Assessment:   · Acute renal failure likely due to ATN - vancomycin toxicity - & hemodynamic instability. · Metabolic acidosis  · ckd 3 baseline cr. 1.2  · Resp. Failure - s/p extubation  · AFIB with RVR. Now s/p AFL ablation and pacemaker implant  · Complicated type B descending thoracic aortic dissection. -   · S/p TEVAR, L ileo-fem bypass, L carotid-subclavian bypass:2/27/17   · Protein malnutrition  · B/l effusion. S/p bilateral chest tubes. Right side pulled. Left still in place  · S/p chest tube placement  · Ischemic spinal cord injury     Plan:   - start norvasc 10 mg daily  - give clonidine 0.2 mg po now  - continue clonidine patch and iv bp meds -increase hydralazine 20 mg q 4 hr  - may need cardene gtt if bp remains high  -   Care Plan discussed with: pt  [] High complexity decision making was performed  [] Patient is at high-risk of decompensation with multiple organ involvement  Review of Systems: Pertinent items are noted in HPI. Objective:   Vitals:    03/20/17 0600 03/20/17 0700 03/20/17 0719 03/20/17 0800   BP: 166/53 173/60  189/58   Pulse: 75 75  75   Resp: 15 12  15   Temp:    97.6 °F (36.4 °C)   SpO2: 97% 96% 95% 97%   Weight:       Height:           Intake/Output Summary (Last 24 hours) at 03/20/17 0845  Last data filed at 03/20/17 0800   Gross per 24 hour   Intake           1304.1 ml   Output             1440 ml   Net           -135.9 ml     Physical Exam:   GEN: - NAD   RESP: clear b/l, no wheezing.   Left chest tube in place  CVS: RRR,S1,S2   SKIN: No Rash  ABDO: soft , non tender, No hepatosplenomegaly. Neuro- moves upper arm without any difficulty  gu - grubbs +    Chart reviewed. Pertinent Notes reviewed.    Medications list  reviewed     Current Facility-Administered Medications   Medication    TPN ADULT-CENTRAL AA 5% D20%-MVI W/ VIT K-RCH    TPN ADULT-CENTRAL AA 5% D20%-MVI W/ VIT K-RCH    hydrALAZINE (APRESOLINE) 20 mg/mL injection 20 mg    levothyroxine (SYNTHROID) tablet 100 mcg    insulin lispro (HUMALOG) injection    glucose chewable tablet 16 g    dextrose (D50W) injection syrg 12.5-25 g    glucagon (GLUCAGEN) injection 1 mg    labetalol (NORMODYNE;TRANDATE) injection 20 mg    [START ON 3/21/2017] cloNIDine (CATAPRES) 0.2 mg/24 hr patch 2 Patch    amiodarone (CORDARONE) tablet 400 mg    HYDROmorphone (PF) (DILAUDID) injection 0.5 mg    escitalopram oxalate (LEXAPRO) tablet 10 mg    diphenhydrAMINE-zinc acetate 2%-0.1% (BENADRYL) cream    ADDaptor    vancomycin (VANCOCIN) 1,000 mg injection    0.9% sodium chloride (MBP/ADV) 0.9 % infusion    sodium chloride (NS) flush 5-10 mL    sodium chloride (NS) flush 5-10 mL    naloxone (NARCAN) injection 0.4 mg    sodium chloride (NS) flush 10-30 mL    sodium chloride (NS) flush 10 mL    sodium chloride (NS) flush 10 mL    sodium chloride (NS) flush 10-40 mL    sodium chloride (NS) flush 20 mL    phenol throat spray (CHLORASEPTIC) 1 Spray    mineral oil-hydrophil petrolat (AQUAPHOR) ointment    0.9% sodium chloride infusion 250 mL    levalbuterol (XOPENEX) nebulizer soln 1.25 mg/3 mL    heparin (porcine) injection 5,000 Units    albuterol (PROVENTIL VENTOLIN) nebulizer solution 2.5 mg    ipratropium (ATROVENT) 0.02 % nebulizer solution 0.5 mg    polyethylene glycol (MIRALAX) packet 17 g    pantoprazole (PROTONIX) 40 mg in sodium chloride 0.9 % 10 mL injection    sodium chloride (NS) flush 5-10 mL    sodium chloride (NS) flush 5-10 mL    prochlorperazine (COMPAZINE) with saline injection 5 mg    sodium chloride (NS) flush 10 mL    sodium chloride (NS) flush 10-40 mL    acetaminophen (TYLENOL) tablet 650 mg    ondansetron (ZOFRAN) injection 4 mg    bisacodyl (DULCOLAX) suppository 10 mg              Data Review :  Recent Labs      03/20/17   0432  03/19/17   0429  03/18/17   1301  03/18/17   0516   NA  145  143   --   142   K  4.5  4.9   --   4.5   CL  111*  111*   --   109*   CO2  23  21   --   19*   GLU  146*  161*   --   122*   BUN  74*  78*   --   72*   CREA  2.73*  2.95*   --   3.00*   CA  9.1  9.1   --   8.9   MG  2.5*  2.8*  2.7*   --    PHOS  3.7  4.5  5.3*   --    ALB  2.3*  2.4*   --    --    SGOT  17  26   --    --    ALT  29  41   --    --      Recent Labs      03/20/17 0432 03/19/17 0429   WBC  10.1  13.0*   HGB  9.5*  10.0*   HCT  30.6*  32.2*   PLT  310  334     Lab Results   Component Value Date/Time    Culture result: MODERATE  PSEUDOMONAS AERUGINOSA   03/11/2017 11:12 AM    Culture result: MODERATE  NORMAL RESPIRATORY YVAN   03/11/2017 11:12 AM    Culture result: SCANT  NORMAL RESPIRATORY YVAN   03/02/2017 10:25 AM    Culture result: MODERATE  NORMAL RESPIRATORY YVAN   02/27/2017 05:18 PM    Culture result: MIXED UROGENITAL YVAN ISOLATED 06/24/2016 09:12 PM     Lab Results   Component Value Date/Time    Specimen Description: STOOL 11/10/2013 08:00 PM    Specimen Description: BLOOD 05/24/2013 10:50 AM    Specimen Description: BLOOD 05/22/2013 09:55 PM    Specimen Description: STOOL 05/22/2013 06:20 PM    Specimen Description: URINE 08/16/2011 10:54 AM     Hilda Zambrano MD  [facility]  [facility] SYSTM FRANCISCAN [facility] [address]  [place], [address]  Phone - [phone]         Fax - [phone] [facility] Office  [address] [place], [place] [address]way, [place] [address] [place] [address]  Phone - [phone]        Fax - [phone]     [email or web address]

## 2017-03-21 NOTE — PROGRESS NOTES
PCU SHIFT NURSING NOTE      Bedside and Verbal shift change report given to Carmina Lopez RN (oncoming nurse) by Myke Lema RN (offgoing nurse). Report included the following information SBAR, Kardex, [facility], Recent Results and Cardiac Rhythm Paced. Shift Summary: 7305      Admission Date 2/23/2017   Admission Diagnosis Aneurysm ([address]  AORTIC DISECTION   Consults IP CONSULT TO VASCULAR SURGERY  IP CONSULT TO VASCULAR SURGERY  IP CONSULT TO HOSPITALIST  IP CONSULT TO NEPHROLOGY        Consults   []PT   []OT   []Speech   []Case Management      [] Palliative      Cardiac Monitoring Order   []Yes   []No     IV drips   []Yes    Drip:                            Dose:  Drip:                            Dose:  Drip:                            Dose:   []No     GI Prophylaxis   []Yes   []No         DVT Prophylaxis   SCDs:  Sequential Compression Device: Bilateral     Patient Refused VTE Prophylaxis: Yes    Valeriy stockings:  Graduated Compression Stockings: Bilateral      [] Medication   []Contraindicated   []None      Activity Level Activity Level: Bed Rest, Up with Assistance     Activity Assistance: Complete care   Purposeful Rounding every 1-2 hour? []Yes   Jean Baptiste Score  Total Score: 3   Bed Alarm (If score 3 or >)   []Yes   [] Refused (See signed refusal form in chart)   Robin Score  Robin Score: 14   Robin Score (if score 14 or less)   []PMT consult   []Wound Care consult      []Specialty bed   [] Nutrition consult          Needs prior to discharge:   Home O2 required:    []Yes   []No    If yes, how much O2 required?     Other:    Last Bowel Movement: Last Bowel Movement Date: 03/17/17      Influenza Vaccine Received Flu Vaccine for Current Season (usually Sept-March): No    Patient/Guardian Refused (Notify MD): Yes   Pneumonia Vaccine           Diet Active Orders   Diet    DIET DIABETIC WITH OPTIONS Consistent Carb 1800kcal; Mechanical Soft      LDAs         PICC Triple Lumen 71/54/25 Right;Basilic (Active)   [facility] Being Utilized Yes 3/20/2017  7:35 PM   Criteria for Appropriate Use Hemodynamically unstable, requiring monitoring lines, vasopressors, or volume resuscitation 3/20/2017  5:23 PM   Site Assessment Clean, dry, & intact 3/20/2017  5:23 PM   Phlebitis Assessment 0 3/20/2017  5:23 PM   Infiltration Assessment 0 3/20/2017  5:23 PM   Arm Circumference (cm) 29 cm 3/2/2017  4:00 AM   Date of Last Dressing Change 03/15/17 3/20/2017  5:23 PM   Dressing Status Clean, dry, & intact 3/20/2017  5:23 PM   External Catheter Length (cm) 0 centimeters 3/17/2017  8:00 PM   Dressing Type Disk with Chlorhexadine gluconate (CHG); Transparent 3/20/2017  5:23 PM   Action Taken Open ports on tubing capped 3/20/2017  5:23 PM   Hub Color/Line Status Gray;Capped;Flushed 3/20/2017  5:23 PM   Positive Blood Return (Site #1) No 3/20/2017  5:23 PM   Hub Color/Line Status Red;Capped;Flushed 3/20/2017  5:23 PM   Positive Blood Return (Site #2) No 3/20/2017  5:23 PM   Hub Color/Line Status White; Infusing;Flushed 3/20/2017  5:23 PM   Positive Blood Return (Site #3) No 3/20/2017  5:23 PM   Alcohol Cap Used Yes 3/20/2017  5:23 PM                            Urinary Catheter Urinary Catheter 02/24/17 Palacio-Criteria for Appropriate Use: Obstruction/retention, Strict I/Os    Intake & Output   Date 03/20/17 0700 - 03/21/17 0659 03/21/17 0700 - 03/22/17 0659   Shift 7859-8934 0654-5581 24 Hour Total 9863-4485 0431-4447 24 Hour Total   I  N  T  A  K  E   P.O. 480  480         P. O. 480  480       I.V.  (mL/kg/hr) 504  (0.5)  504         Volume (TPN ADULT-CENTRAL AA 5% D20%-MVI W/ VIT K-RCH) 504  504         Volume (TPN ADULT-CENTRAL AA 5% D20%-MVI W/ VIT K-RCH) 0  0       Shift Total  (mL/kg) 984  (11.1)  984  (11.1)      O  U  T  P  U  T   Urine  (mL/kg/hr) 1690  (1.6) 200 1890         Urine Output (mL) (Urinary Catheter 02/24/17 Palacio) 7409 711 5032       Chest Tube 10 0 10         Output (ml) (Chest Tube #2 03/10/17 Left) 10 0 10       Shift Total  (mL/kg) 1700  (19.2) 200  (2.3) 1900  (21.4)      NET -716 -200 -916      Weight (kg) 88.6 88.6 88.6 88.6 88.6 88.6         Readmission Risk Assessment Tool Score High Risk            26       Total Score        3 Relationship with PCP    2 Patient Living Status    3 Patient Length of Stay > 5    4 More than 1 Admission in calendar year    5 Patient Insurance is Medicare, Medicaid or Self Pay    9 Charlson Comorbidity Score       Expected Length of Stay 5d 16h   Actual Length of Stay 26

## 2017-03-21 NOTE — PROGRESS NOTES
Problem: Mobility Impaired (Adult and Pediatric)  Goal: *Acute Goals and Plan of Care (Insert Text)  Physical Therapy Goals  Initiated 3/14/2017  1. Patient will move from supine to sit and sit to supine , scoot up and down and roll side to side in bed with moderate assistance within 7 day(s). 2. Patient will transfer from bed to chair and chair to bed with maximal assistance x 2 using the least restrictive device within 7 day(s). 3. Patient will perform sit to stand with maximal assistance within 7 day(s). 4. Patient will sit on EOB 2 minutes demonstrating overall good sitting balance without LOB within 7 days. PHYSICAL THERAPY TREATMENT  Patient: Kaur Suarez (85 y.o. female)  Date: 3/21/2017  Diagnosis: Aneurysm ([address]  AORTIC DISECTION <principal problem not specified>  Procedure(s) (LRB):  ENDOVASCULAR ANEURYSM REPAIR of THORACIC AORTIC DISSECTION, repair of ruptured left iliac artery with stent placement. Left ileo-femoral bypass graft with PTFE. (Bilateral)  LEFT CAROTID SUBCLAVIAN BYPASS with stent placement of left common artery. (Left) 22 Days Post-Op  Precautions: Fall      ASSESSMENT:  Pt in bed,some c/o nausea/no emesis, agreeable to OOB to recliner chair via [employer]. PT performed PROM BLE in supine, no active mm function detected, pt reports she can feel BLE but no consistent or accurate response to testing. Pt transferred to recliner via [place]/mobility team. Sitting balance activities attempted, but pt exhibits poor sitting balance when positioned away from back of chair. Progression toward goals:  [ ]    Improving appropriately and progressing toward goals  [X]    Improving slowly and progressing toward goals  [ ]    Not making progress toward goals and plan of care will be adjusted       PLAN:  Patient continues to benefit from skilled intervention to address the above impairments. Continue treatment per established plan of care.   Discharge Recommendations:  Inpatient Rehab  Further Equipment Recommendations for Discharge:  TBD at rehab       SUBJECTIVE:   Patient stated I feel sick.       OBJECTIVE DATA SUMMARY:   Critical Behavior:  Neurologic State: Alert, Drowsy, Confused  Orientation Level: Oriented X4  Cognition: Decreased attention/concentration, Decreased command following  Safety/Judgement: Insight into deficits, Awareness of environment  Functional Mobility Training:  Bed Mobility:  Rolling: Maximum assistance;Assist x2; pt does reach for rail & attempt to assist.        Balance:  Sitting: Impaired  Sitting - Static: Poor (constant support)  Sitting - Dynamic: Not tested (Pt unable)                       Neuro Re-Education:  Sitting balance activities  Therapeutic Exercises:   PROM BLE with manual stim, no active mm function detected  Pain:  Pain Scale 1: Numeric (0 - 10)  Pain Intensity 1: 0  Pain Location 1: Head  Pain Orientation 1: Anterior;Right  Pain Description 1: Aching  Pain Intervention(s) 1: Medication (see MAR)  Activity Tolerance:   RA VSS  Please refer to the flowsheet for vital signs taken during this treatment.   After treatment:   [X]    Patient left in no apparent distress sitting up in chair  [ ]    Patient left in no apparent distress in bed  [X]    Call bell left within reach  [X]    Nursing notified  [ ]    Caregiver present  [X]    Bed alarm activated      COMMUNICATION/COLLABORATION:   The patients plan of care was discussed with: Occupational Therapist and Registered Nurse     Dawit Cobian, PT   Time Calculation: 35 mins

## 2017-03-21 NOTE — PROGRESS NOTES
Problem: Dysphagia (Adult)  Goal: *Speech Goal: (INSERT TEXT)  3/14/2017  Speech path reeval  1. Pt will tolerate dys 3/mech soft diet with thins with no overt s/s of aspiration. Speech path goals:  1. Pt will participate with reeval of swallowing daily until started on a diet. Goal met 3/20. 2. Pt will tolerate ice chips for pleasure as fed by staff. Goal met 3/20. 3. Pt will tolerate modified diet without s/s of aspiration. Pt will have purees only with no liquids with no overt s/s of aspiration. Goal met 3/20. SPEECH LANGUAGE PATHOLOGY DYSPHAGIA TREATMENT  Patient: Leticia Villar (11 y.o. female)  Date: 3/21/2017  Diagnosis: Aneurysm ([address]  AORTIC DISECTION <principal problem not specified>  Procedure(s) (LRB):  ENDOVASCULAR ANEURYSM REPAIR of THORACIC AORTIC DISSECTION, repair of ruptured left iliac artery with stent placement. Left ileo-femoral bypass graft with PTFE. (Bilateral)  LEFT CAROTID SUBCLAVIAN BYPASS with stent placement of left common artery. (Left) 22 Days Post-Op  Precautions: swallow Fall      ASSESSMENT:  Patient would only accept liquid trials this date as she reported feeling nauseous for the past two days. Patient with delayed swallow initiation and decreased hyolaryngeal elevation via palpation. Patient also with strong cough, increase in RR, and wet vocal quality after thin. However, O2 stats stable at %. Tolerated nectar thick liquids by straw well. Recommend mechanical soft/nectar thick liquid diet. SLP to follow for diet tolerance and advancement.   Progression toward goals:  [ ]         Improving appropriately and progressing toward goals  [X]         Improving slowly and progressing toward goals  [ ]         Not making progress toward goals and plan of care will be adjusted       PLAN:  Recommendations and Planned Interventions:  --dysphagia 2 (mechanical soft)/nectar thick liquid  --straws OK  --single sips  Patient continues to benefit from skilled intervention to address the above impairments. Continue treatment per established plan of care. Discharge Recommendations: To Be Determined       SUBJECTIVE:   Patient stated No thank you. I know where that will end up. In the green bag when asked if she would eat some of her breakfast tray. Patient alert but drowsy. O x 4, however patient confused. OBJECTIVE:   Cognitive and Communication Status:  Neurologic State: Alert, Drowsy, Confused  Orientation Level: Oriented X4  Cognition: Decreased attention/concentration, Decreased command following  Perception: Appears intact  Perseveration: No perseveration noted  Safety/Judgement: Insight into deficits, Awareness of environment  Dysphagia Treatment:  Oral Assessment:     P.O. Trials:  Patient Position: upright in bed  Vocal quality prior to P.O.: No impairment  Consistency Presented: Thin liquid; Nectar thick liquid  How Presented: Self-fed/presented;Straw;Cup/sip     Bolus Acceptance: No impairment  Bolus Formation/Control: No impairment     Propulsion: No impairment  Oral Residue: None  Initiation of Swallow: Delayed (# of seconds)  Laryngeal Elevation: Decreased  Aspiration Signs/Symptoms: Strong cough; Change vocal quality; Increase in RR  Pharyngeal Phase Characteristics: No impairment, issues, or problems               Oral Phase Severity: No impairment  Pharyngeal Phase Severity : Mild-moderate     Pain:  Pain Scale 1: Numeric (0 - 10)  Pain Intensity 1: 0  Pain Location 1: Head  After treatment:   [ ]              Patient left in no apparent distress sitting up in chair  [X]              Patient left in no apparent distress in bed  [X]              Call bell left within reach  [X]              Nursing notified  [ ]              Caregiver present  [ ]              Bed alarm activated      COMMUNICATION/EDUCATION:      The patients plan of care including recommendations, planned interventions, and recommended diet changes were discussed with: Registered Nurse.   [X] Posted safety precautions in patient's room.      Julia Ricardo  Time Calculation: 20 mins

## 2017-03-21 NOTE — PROGRESS NOTES
PCU SHIFT NURSING NOTE      Bedside and Verbal shift change report given to Reilly Sawyer RN (oncoming nurse) by Teresa Bean RN (offgoing nurse). Report included the following information SBAR, Kardex, ED Summary, OR Summary, Procedure Summary, Intake/Output, MAR, Recent Results, Med Rec Status, Cardiac Rhythm Paced and Alarm Parameters . Shift Summary:         Admission Date 2/23/2017   Admission Diagnosis Aneurysm ([address]  AORTIC DISECTION   Consults IP CONSULT TO VASCULAR SURGERY  IP CONSULT TO VASCULAR SURGERY  IP CONSULT TO HOSPITALIST  IP CONSULT TO NEPHROLOGY        Consults   [x]PT   [x]OT   [x]Speech   [x]Case Management      [] Palliative      Cardiac Monitoring Order   [x]Yes   []No     IV drips   []Yes    Drip:                            Dose:  Drip:                            Dose:  Drip:                            Dose:   [x]No     GI Prophylaxis   [x]Yes   []No         DVT Prophylaxis   SCDs:  Sequential Compression Device: Bilateral     Patient Refused VTE Prophylaxis: Yes    Valeriy stockings:  Graduated Compression Stockings: Bilateral      [x] Medication   []Contraindicated   []None      Activity Level Activity Level: Bed Rest     Activity Assistance: Complete care   Purposeful Rounding every 1-2 hour? [x]Yes   Jean Baptiste Score  Total Score: 3   Bed Alarm (If score 3 or >)   [x]Yes   [] Refused (See signed refusal form in chart)   Robin Score  Robin Score: 14   Robin Score (if score 14 or less)   [x]PMT consult   [x]Wound Care consult      []Specialty bed   [x] Nutrition consult          Needs prior to discharge:   Home O2 required:    []Yes   [x]No    If yes, how much O2 required?     Other:    Last Bowel Movement: Last Bowel Movement Date: 03/17/17 (per chart)      Influenza Vaccine Received Flu Vaccine for Current Season (usually Sept-March): No    Patient/Guardian Refused (Notify MD): Yes   Pneumonia Vaccine           Diet Active Orders   Diet    DIET DIABETIC WITH OPTIONS Consistent Carb 1800kcal; Mechanical Soft      LDAs         PICC Triple Lumen 69/89/05 Right;Basilic (Active)   Central Line Being Utilized Yes 3/21/2017  7:24 AM   Criteria for Appropriate Use Hemodynamically unstable, requiring monitoring lines, vasopressors, or volume resuscitation 3/21/2017  7:24 AM   Site Assessment Clean, dry, & intact 3/21/2017  7:24 AM   Phlebitis Assessment 0 3/21/2017  7:24 AM   Infiltration Assessment 0 3/21/2017  7:24 AM   Arm Circumference (cm) 29 cm 3/2/2017  4:00 AM   Date of Last Dressing Change 03/15/17 3/21/2017  7:24 AM   Dressing Status Clean, dry, & intact 3/21/2017  7:24 AM   External Catheter Length (cm) 0 centimeters 3/17/2017  8:00 PM   Dressing Type Disk with Chlorhexadine gluconate (CHG); Transparent 3/21/2017  7:24 AM   Action Taken Blood drawn 3/21/2017  4:57 AM   Hub Color/Line Status Teresa Churn; Infusing;Flushed 3/21/2017  7:24 AM   Positive Blood Return (Site #1) Yes 3/21/2017  7:24 AM   Hub Color/Line Status Red;Capped;Flushed 3/21/2017  7:24 AM   Positive Blood Return (Site #2) Yes 3/21/2017  7:24 AM   Hub Color/Line Status White;Capped;Flushed 3/21/2017  7:24 AM   Positive Blood Return (Site #3) Yes 3/21/2017  7:24 AM   Alcohol Cap Used Yes 3/21/2017  7:24 AM                            Urinary Catheter Urinary Catheter 02/24/17 Palacio-Criteria for Appropriate Use: Obstruction/retention, Strict I/Os    Intake & Output   Date 03/20/17 0700 - 03/21/17 0659 03/21/17 0700 - 03/22/17 0659   Shift 7005-1873 4694-3390 24 Hour Total 8598-0904 1367-7461 24 Hour Total   I  N  T  A  K  E   P.O. 480 240 720         P. O. 480 240 720       I.V.  (mL/kg/hr) 504  (0.5) 528.2  (0.5) 1032.2  (0.5) 154.4  154.4      Volume (TPN ADULT-CENTRAL AA 5% D20%-MVI W/ VIT K-RCH) 504  504         Volume (TPN ADULT-CENTRAL AA 5% D20%-MVI W/ VIT K-RCH) 0 528.2 528. 2 154.4  154.4    Shift Total  (mL/kg) 984  (11.1) 768.2  (8.7) 1752.2  (19.8) 154.4  (1.7)  154.4  (1.7)   O  U  T  P  U  T   Urine  (mL/kg/hr) 1690  (1.6) 525  (0.5) 2215  (1)         Urine Output (mL) (Urinary Catheter 02/24/17 Palacio) 2502 158 7829       Chest Tube 10 0 10         Output (ml) (Chest Tube #2 03/10/17 Left) 10 0 10       Shift Total  (mL/kg) 1700  (19.2) 525  (5.9) 2225  (25.1)      NET -716 243.2 -472.9 154.4  154.4   Weight (kg) 88.6 88.6 88.6 88.6 88.6 88.6         Readmission Risk Assessment Tool Score High Risk            26       Total Score        3 Relationship with PCP    2 Patient Living Status    3 Patient Length of Stay > 5    4 More than 1 Admission in calendar year    5 Patient Insurance is Medicare, Medicaid or Self Pay    9 Charlson Comorbidity Score       Expected Length of Stay 5d 16h   Actual Length of Stay 26

## 2017-03-21 NOTE — PROGRESS NOTES
Ref sent to [facility] via [facility] for possible Inpt Rehab when medically stable. Pt would prefer [facility] location as she lives in [place].   Yanci Thomas, VIVIAN

## 2017-03-21 NOTE — PROGRESS NOTES
Problem: Self Care Deficits Care Plan (Adult)  Goal: *Acute Goals and Plan of Care (Insert Text)  Occupational Therapy Goals  Initiated 3/14/2017  1. Patient will perform grooming with minimal assistance/contact guard assist within 7 day(s). 2. Patient will perform supine to sit EOB with maximal assistance in preparation for adls within 7 day(s). 3. Patient will perform upper body bathing with moderate assistance within 7 day(s). 4. Patient will participate in functional mobility assessment within 7 day(s). 5. Patient will perform rolling side to side for toileting at bed level with moderate assistance within 7 day(s). 6. Patient will participate in upper extremity therapeutic exercise/activities with supervision/set-up for 10 minutes within 7 day(s). OCCUPATIONAL THERAPY TREATMENT  Patient: Yuli Garcia (44 y.o. female)  Date: 3/21/2017  Diagnosis: [address]m [address] [address]  AORTIC DISECTION <principal problem not specified>  Procedure(s) (LRB):  ENDOVASCULAR ANEURYSM REPAIR of THORACIC AORTIC DISSECTION, repair of ruptured left iliac artery with stent placement. Left ileo-femoral bypass graft with PTFE. (Bilateral)  LEFT CAROTID SUBCLAVIAN BYPASS with stent placement of left common artery. (Left) 22 Days Post-Op  Precautions: Fall      ASSESSMENT:  Pt reports feeling sick today and wants to keep her emesis bag close. Pt was mayra lifted to recliner chair. Poor trunk control demonstrated while seated in chair. Educated pt on importance of shifting weight in chair to protect skin, however, pt is unable to move her buttocks using her BUEs on the armrests (arm chair push ups) or by performing  Lateral weight shifts at this time. Pt would benefit from a pressure reducing chair cushion. Pt continues to complain of pain in her groin area.     Progression toward goals:  [ ]       Improving appropriately and progressing toward goals  [X]       Improving slowly and progressing toward goals  [ ]       Not making progress toward goals and plan of care will be adjusted       PLAN:  Patient continues to benefit from skilled intervention to address the above impairments. Continue treatment per established plan of care. Discharge Recommendations: extensive, intensive  Inpatient Rehab for SCI  Further Equipment Recommendations for Discharge:  tbd       SUBJECTIVE:   Patient reported not feeling well this date      OBJECTIVE DATA SUMMARY:   Cognitive/Behavioral Status:  Neurologic State: Alert;Drowsy; Confused  Orientation Level: Oriented X4  Cognition: improved attention/concentration; command following improved  Perception: Appears intact  Perseveration: No perseveration noted  Safety/Judgement: Insight into deficits; Awareness of environment     Functional Mobility and Transfers for ADLs:  Bed Mobility:  Rolling: Maximum assistance;Assist x2     Transfers:   dependent using mayra lift/pt mobility team     Balance:  Sitting: Impaired  Sitting - Static: Poor (constant support)  Sitting - Dynamic: Not tested (Pt unable)     ADL Intervention:     Educated on protecting skin from pressure by weight shifting and attempting to perform armchair push ups. Cognitive Retraining  Safety/Judgement: Insight into deficits; Awareness of environment     Neuro Re-Education:   Encouraged lateral weight shifts in chair for reducing pressure        Therapeutic Exercises:   PROM to B ankles to stretch heel   Pain:  Pain Scale 1: Numeric (0 - 10)  Pain Intensity 1:  Pt feeling nauseated  Pain Location 1: Head  Pain Orientation 1:   After treatment:   [X] Patient left in no apparent distress sitting up in chair  [ ] Patient left in no apparent distress in bed  [X] Call bell left within reach  [X] Nursing notified  [ ] Caregiver present  [ ] Bed alarm activated      COMMUNICATION/COLLABORATION:   The patients plan of care was discussed with: Physical Therapist, Registered Nurse and Certified Nursing Assistant/Patient Care Technician     Teresa Spaulding, OTR/L  Time Calculation: 23 mins

## 2017-03-21 NOTE — PROGRESS NOTES
Nephrology Progress Note     Fransisco Chilel       www. [email or web address]                   Phone - [phone]   Patient: Geoffrey Almaguer  YOB: 1955     Date- 3/21/2017     CC: Follow up for ARF         Subjective: Interval History:   -  Cr. Improved    bp high     C/o cough  No sob  No fever     Assessment:   · Acute renal failure likely due to ATN - vancomycin toxicity - & hemodynamic instability. · Metabolic acidosis  · ckd 3 baseline cr. 1.2  · Resp. Failure - s/p extubation  · AFIB with RVR. Now s/p AFL ablation and pacemaker implant  · Complicated type B descending thoracic aortic dissection. -   · S/p TEVAR, L ileo-fem bypass, L carotid-subclavian bypass:2/27/17   · Protein malnutrition  · B/l effusion. S/p bilateral chest tubes. Right side pulled. Left still in place  · S/p chest tube placement  · Ischemic spinal cord injury     Plan:   - change hydralazine to po   - continue norvasc 10 mg daily  - give lasix 40 mg po now for edema  - continue clonidine patch and iv bp meds -increase hydralazine 20 mg q 4 hr  - plan for discharge noted  -   Care Plan discussed with: pt  [] High complexity decision making was performed  [] Patient is at high-risk of decompensation with multiple organ involvement  Review of Systems: Pertinent items are noted in HPI. Objective:   Vitals:    03/20/17 2128 03/20/17 2348 03/21/17 0457 03/21/17 0724   BP: 154/53 (!) 113/39 126/79 185/62   Pulse: 75 75 75 76   Resp:  16 18 18   Temp:  98.2 °F (36.8 °C) 98.2 °F (36.8 °C) 98.1 °F (36.7 °C)   SpO2:  100% 95% 97%   Weight:       Height:           Intake/Output Summary (Last 24 hours) at 03/21/17 1008  Last data filed at 03/21/17 3086   Gross per 24 hour   Intake           1721.7 ml   Output             1855 ml   Net           -133.3 ml     Physical Exam:   GEN: - NAD   RESP: clear b/l, no wheezing. CVS: RRR,S1,S2   SKIN: No Rash  ABDO: soft , non tender, No hepatosplenomegaly.   Neuro- moves upper arm without any difficulty        Chart reviewed. Pertinent Notes reviewed.    Medications list  reviewed     Current Facility-Administered Medications   Medication    TPN ADULT-CENTRAL AA 5% D20%-MVI W/ VIT K-RCH    amLODIPine (NORVASC) tablet 10 mg    pantoprazole (PROTONIX) tablet 40 mg    albuterol-ipratropium (DUO-NEB) 2.5 MG-0.5 MG/3 ML    buPROPion XL (WELLBUTRIN XL) tablet 150 mg    clonazePAM (KlonoPIN) tablet 0.5 mg    busPIRone (BUSPAR) tablet 5 mg    hydrALAZINE (APRESOLINE) 20 mg/mL injection 20 mg    levothyroxine (SYNTHROID) tablet 100 mcg    insulin lispro (HUMALOG) injection    glucose chewable tablet 16 g    dextrose (D50W) injection syrg 12.5-25 g    glucagon (GLUCAGEN) injection 1 mg    labetalol (NORMODYNE;TRANDATE) injection 20 mg    cloNIDine (CATAPRES) 0.2 mg/24 hr patch 2 Patch    amiodarone (CORDARONE) tablet 400 mg    HYDROmorphone (PF) (DILAUDID) injection 0.5 mg    diphenhydrAMINE-zinc acetate 2%-0.1% (BENADRYL) cream    ADDaptor    vancomycin (VANCOCIN) 1,000 mg injection    0.9% sodium chloride (MBP/ADV) 0.9 % infusion    sodium chloride (NS) flush 5-10 mL    sodium chloride (NS) flush 5-10 mL    naloxone (NARCAN) injection 0.4 mg    sodium chloride (NS) flush 10-30 mL    sodium chloride (NS) flush 10 mL    sodium chloride (NS) flush 10 mL    sodium chloride (NS) flush 10-40 mL    sodium chloride (NS) flush 20 mL    phenol throat spray (CHLORASEPTIC) 1 Spray    mineral oil-hydrophil petrolat (AQUAPHOR) ointment    0.9% sodium chloride infusion 250 mL    heparin (porcine) injection 5,000 Units    albuterol (PROVENTIL VENTOLIN) nebulizer solution 2.5 mg    polyethylene glycol (MIRALAX) packet 17 g    sodium chloride (NS) flush 5-10 mL    sodium chloride (NS) flush 5-10 mL    prochlorperazine (COMPAZINE) with saline injection 5 mg    sodium chloride (NS) flush 10 mL    sodium chloride (NS) flush 10-40 mL  acetaminophen (TYLENOL) tablet 650 mg    ondansetron (ZOFRAN) injection 4 mg    bisacodyl (DULCOLAX) suppository 10 mg              Data Review :  Recent Labs      03/21/17   0510  03/20/17 0432 03/19/17 0429 03/18/17   1301   NA  139  145  143   --    K  4.3  4.5  4.9   --    CL  106  111*  111*   --    CO2  22  23  21   --    GLU  163*  146*  161*   --    BUN  80*  74*  78*   --    CREA  2.63*  2.73*  2.95*   --    CA  8.5  9.1  9.1   --    MG  2.1  2.5*  2.8*  2.7*   PHOS  2.6  3.7  4.5  5.3*   ALB  2.2*  2.3*  2.4*   --    SGOT  16  17 26   --    ALT  25  29  41   --      Recent Labs      03/21/17   0510  03/20/17   0432  03/19/17   0429   WBC  11.3*  10.1  13.0*   HGB  9.3*  9.5*  10.0*   HCT  29.9*  30.6*  32.2*   PLT  292  310  334     Lab Results   Component Value Date/Time    Culture result: MODERATE  PSEUDOMONAS AERUGINOSA   03/11/2017 11:12 AM    Culture result: MODERATE  NORMAL RESPIRATORY YVAN   03/11/2017 11:12 AM    Culture result: SCANT  NORMAL RESPIRATORY YVAN   03/02/2017 10:25 AM    Culture result: MODERATE  NORMAL RESPIRATORY YVAN   02/27/2017 05:18 PM    Culture result: MIXED UROGENITAL YVAN ISOLATED 06/24/2016 09:12 PM     Lab Results   Component Value Date/Time    Specimen Description: STOOL 11/10/2013 08:00 PM    Specimen Description: BLOOD 05/24/2013 10:50 AM    Specimen Description: BLOOD 05/22/2013 09:55 PM    Specimen Description: STOOL 05/22/2013 06:20 PM    Specimen Description: URINE 08/16/2011 10:54 AM     Dipika Clark MD  [facility]  [facility] [address], [address]  [place], [address]  Phone - [phone]         Fax - [phone] [facility] Office  [address] [facility] [address]  Phone - [phone]        Fax - [phone]     [email or web address]

## 2017-03-22 NOTE — PROGRESS NOTES
Speech pathology note  Reviewed chart and discussed case with RN who reports patient recently given compazine which has made her drowsy. Attempted to arouse patient for dysphagia treatment, however not appropriate at this time. Patient drowsy and unable to maintain alertness for >20 seconds. Patient also reports continued nausea. Will continue to follow for dysphagia treatment. Thank you.     Mesfin Gutierrez., CCC-SLP

## 2017-03-22 NOTE — PROGRESS NOTES
0730    Report received from [facility], [facility]. SBAR, Kardex, ED Summary, OR Summary, Procedure Summary, Intake/Output, MAR, Accordion, Recent Results, Med Rec Status and Cardiac Rhythm NSR were discussed.     243 Nadia Palacio discontinued

## 2017-03-22 NOTE — PROGRESS NOTES
Patient has been complaining of pain in her abdomen. She was given a suppository to aid with bowel elimination. The suppository did not produce desired results. Patient was given an enema which was also unsuccessful. Her abdomen has become more distended throughout the day and is sensitive to the touch. There are also a few hard places/knots present (possibly due to heparin injections). On initial assessment, the patient was alert and cooperative. Throughout the day, her responses have become delayed, her speech is occasionally incomprehensible, she has decreased attention and command following, and her bilateral  have become increasingly weaker. She seemed to think that she was squeezing my fingers very hard when she was not squeezing them at all.

## 2017-03-22 NOTE — PROGRESS NOTES
Problem: Mobility Impaired (Adult and Pediatric)  Goal: *Acute Goals and Plan of Care (Insert Text)  Physical Therapy Goals   Goals reviewed and revised 3/22/2017  1. Patient will move from supine to sit and sit to supine , scoot up and down and roll side to side in bed with maximum assistance within 7 day(s). 2.  Patient will sit on EOB 2 minutes demonstrating overall good sitting balance without LOB within 7 days. 3.  Patient will perform 3/3 UE exercises (shoulder flexion, elbow flexion, hand gripping) with supervision in order to improve UE function within 7 days. 4.  Patient will perform lateral weight shifting to R and L with moderate assistance in order to decrease risk of pressure ulcer formation within 7 days. Initiated 3/14/2017  1. Patient will move from supine to sit and sit to supine , scoot up and down and roll side to side in bed with moderate assistance  within 7 day(s). 2.  Patient will transfer from bed to chair and chair to bed with maximal assistance x 2 using the least restrictive device within 7 day(s). 3.  Patient will perform sit to stand with maximal assistance within 7 day(s). 4.  Patient will sit on EOB 2 minutes demonstrating overall good sitting balance without LOB within 7 days. PHYSICAL THERAPY TREATMENT: WEEKLY REASSESSMENT  Patient: Marcelino Brink (42 y.o. female)  Date: 3/22/2017  Diagnosis: Aneurysm ([address]  AORTIC DISECTION <principal problem not specified>  Procedure(s) (LRB):  ENDOVASCULAR ANEURYSM REPAIR of THORACIC AORTIC DISSECTION, repair of ruptured left iliac artery with stent placement. Left ileo-femoral bypass graft with PTFE. (Bilateral)  LEFT CAROTID SUBCLAVIAN BYPASS with stent placement of left common artery. (Left) 23 Days Post-Op  Precautions: Fall      ASSESSMENT:  Pt received sitting in bedside chair and agreeable to PT session. Nursing cleared pt for mobility and  at start of treatment.  Pt presented with increased drowsiness and decreased attention this date, requiring frequent VCs to improve level of arousal. Pt performed multiple trials of forward weight shifting in recliner with max-total A x2 to facilitate anterior weight shift. She additionally required verbal and tactile cues to improve UE involvement and participation in task. Pt demonstrating poor righting reactions; however, did state that she could feel herself shifting backwards into recliner. Pt reported nausea and groin pain with anterior weight shifting activities, and she was educated on benefits of mobility in decreasing those symptoms. Pt was also educated heavily on importance of participation in PT as it relates to mobility and pressure reliefs, but pt demonstrated poor engagement as she kept closing her eyes. Attempted to perform UE exercises as well, but pt again demonstrated poor participation and intiation, closing her eyes and putting forth decreased effort into activities. Further mobility and exercises limited this date secondary to pt's decreased engagement and state of arousal during PT session. Pt left comfortably positioned in recliner with all needs met,  and all other VSS. Nursing notified of pt's status post session and participation throughout. Pt continues to present with impaired functional mobility, decreased strength, decreased activity tolerance, impaired balance, and decreased insight into impairments. Recommending inpatient rehab vs. SNF for discharge to improve pt's functional mobility. She would benefit from continued skilled acute PT to improve activity tolerance, strength, and balance. Patient's progression toward goals since last assessment: Poor       PLAN:  Goals have been updated based on progression since last assessment. Patient continues to benefit from skilled intervention to address the above impairments. Continue to follow the patient 4 times a week to address goals.   Planned Interventions:  [X]              Bed Mobility Training             [X]       Neuromuscular Re-Education  [X]              Transfer Training                   [ ]       Orthotic/Prosthetic Training  [ ]              Gait Training                         [ ]       Modalities  [X]              Therapeutic Exercises           [ ]       Edema Management/Control  [X]              Therapeutic Activities            [X]       Patient and Family Training/Education  [ ]              Other (comment):  Discharge Recommendations: Inpatient Rehab vs Skilled Nursing Facility  Further Equipment Recommendations for Discharge: TBD based on rehab       SUBJECTIVE:   Patient stated I can't keep my eyes open.       OBJECTIVE DATA SUMMARY:   Critical Behavior:  Neurologic State: Agitated, Alert, Drowsy (mild)  Orientation Level: Oriented X4, Appropriate for age, Other (Comment) (Occasionally loses focus)  Cognition: Follows commands, Impaired decision making, Poor safety awareness  Safety/Judgement: Poor insight into deficits, Awareness of environment     Functional Mobility Training:  Balance:  Sitting: Impaired  Sitting - Static: Poor (constant support)  Sitting - Dynamic: Not tested     Neuro Re-Education:  Anterior weight shifting x3 with max-total A x2 to facilitate forward trunk motion; tactile and manual cues to improve UE involvement with hand over hand placement to improve hand  on recliner  Attempted lateral weight shifting to L using RUE however pt did not initiate movement     Therapeutic Exercises:   Attempted elbow flexion and hand gripping; however, pt demonstrated poor participation and attempts aborted     Pain:  Pain Scale 1: Numeric (0 - 10)  Pain Intensity 1: 10  Pain Location 1: Abdomen  Pain Orientation 1: Anterior;Mid  Pain Description 1: Burning     Activity Tolerance:   Poor- SBP remained 152-153 at beginning and end of session and all other VSS.  Pt demonstrated increased nausea and pain with anterior weight shifting that required extended rest breaks to resolve. Please refer to the flowsheet for vital signs taken during this treatment. After treatment:   [X]  Patient left in no apparent distress sitting up in chair  [ ]  Patient left in no apparent distress in bed  [X]  Call bell left within reach  [X]  Nursing notified  [ ]  Caregiver present  [ ]  Bed alarm activated      COMMUNICATION/COLLABORATION:   The patients plan of care was discussed with: Physical Therapist and Registered Nurse     SUSANNA Dunn   Time Calculation: 30 mins              Regarding student involvement in patient care:  A student participated in this treatment session. Per CMS Medicare statements and APTA guidelines I certify that the following was true:  1. I was present and directly observed the entire session. 2. I made all skilled judgments and clinical decisions regarding care. 3. I am the practitioner responsible for assessment, treatment, and documentation.

## 2017-03-22 NOTE — PROGRESS NOTES
Vascular    C/o nausea  Poor PO intake due to nausea  Looks good  Min CT output  Good UOP  Cr trending down  Exam unchanged  Cont TPN  D/C grubbs and CT in AM  PO intake will likely be rate limiting step for D/C to [facility]

## 2017-03-22 NOTE — PROGRESS NOTES
PICC line dressing changed per protocol by Liam Hugo RN. PICC site and dressing clean, dry and intact. Positive blood return from each port. Flushed with 20 ml of normal saline to each port. All end caps changed. New bio patch and stat lock applied. No complications noted. Merline Silvius RN. BSN. OLVIN. CMSRN. PICC Nurse.  Vascular Access Team

## 2017-03-22 NOTE — PROGRESS NOTES
Bedside shift change report given to AlexsanderVirgilio Micaela Chiu (oncoming nurse) by Kai Maria RN (offgoing nurse). Report included the following information SBAR, Procedure Summary, Intake/Output, MAR, Recent Results and Cardiac Rhythm Lines.

## 2017-03-22 NOTE — PROGRESS NOTES
Per NURSING-MISCELLANEOUS: Please call IR in AM and ask them to remove the left chest tube CONTINUOUS (Order #[id number]) on 3/21/17  10FR chest tube removed bedside 03/22. Patient tolerated well. Bandaged with 4x4 and opsite.

## 2017-03-22 NOTE — PROGRESS NOTES
Nephrology Progress Note     Fransisco Chilel       www. [facility]                   Phone - [phone]   Patient: Neela Cook  YOB: 1955     Date- 3/22/2017     CC: Follow up for ARF         Subjective: Interval History:   -  Cr. Improved  Bun increasing  bp very high  She is drowsy after compazine    Ros  Can't access due to patient's current condition  Nurse reports edema and constipation          Assessment:   · Acute renal failure likely due to ATN - vancomycin toxicity - & hemodynamic instability. · hyponatremia  · ckd 3 baseline cr. 1.2  · Resp. Failure - s/p extubation  · AFIB with RVR. Now s/p AFL ablation and pacemaker implant  · Complicated type B descending thoracic aortic dissection. -   · S/p TEVAR, L ileo-fem bypass, L carotid-subclavian bypass:2/27/17   · Protein malnutrition  · B/l effusion. S/p bilateral chest tubes. Right side pulled. Left still in place  · S/p chest tube placement  · Ischemic spinal cord injury     Plan:   - add minoxidil 5 mg daily   - continue norvasc 10 mg daily,clonidine patch and hydralazine po  - give lasix 80 mg iv for edema  - dulcolax + soap suds enema  - on TPN  - no dialysis indicated at present   told me on weekend that patient was against doing hd  He is okay to start hd if needed. Care Plan discussed with:  and nurse  [] High complexity decision making was performed  [] Patient is at high-risk of decompensation with multiple organ involvement  Review of Systems: Pertinent items are noted in HPI.   Objective:   Vitals:    03/21/17 2354 03/22/17 0400 03/22/17 0731 03/22/17 1008   BP: 150/45 155/89 197/66 198/55   Pulse: 79 (!) 59 75 78   Resp: 18 18 24    Temp: 98.3 °F (36.8 °C) 98.1 °F (36.7 °C) 98 °F (36.7 °C)    SpO2: 94% 98% 100%    Weight: 88.5 kg (195 lb 1.7 oz)      Height:           Intake/Output Summary (Last 24 hours) at 03/22/17 1016  Last data filed at 03/22/17 0636   Gross per 24 hour Intake           534.45 ml   Output             2125 ml   Net         -1590.55 ml     Physical Exam:   GEN: - NAD   RESP: clear b/l, no wheezing. CVS: RRR,S1,S2   SKIN: No Rash  ABDO: soft , non tender, No BS  Neuro- Can't access due to patient's current condition  NECK - no mass        Chart reviewed. Pertinent Notes reviewed.    Medications list  reviewed     Current Facility-Administered Medications   Medication    furosemide (LASIX) injection 80 mg    minoxidil (LONITEN) tablet 5 mg    hydrALAZINE (APRESOLINE) tablet 100 mg    TPN ADULT-CENTRAL AA 5% D20%-MVI W/ VIT K-RCH    amLODIPine (NORVASC) tablet 10 mg    pantoprazole (PROTONIX) tablet 40 mg    albuterol-ipratropium (DUO-NEB) 2.5 MG-0.5 MG/3 ML    buPROPion XL (WELLBUTRIN XL) tablet 150 mg    clonazePAM (KlonoPIN) tablet 0.5 mg    busPIRone (BUSPAR) tablet 5 mg    levothyroxine (SYNTHROID) tablet 100 mcg    insulin lispro (HUMALOG) injection    glucose chewable tablet 16 g    dextrose (D50W) injection syrg 12.5-25 g    glucagon (GLUCAGEN) injection 1 mg    labetalol (NORMODYNE;TRANDATE) injection 20 mg    cloNIDine (CATAPRES) 0.2 mg/24 hr patch 2 Patch    amiodarone (CORDARONE) tablet 400 mg    HYDROmorphone (PF) (DILAUDID) injection 0.5 mg    diphenhydrAMINE-zinc acetate 2%-0.1% (BENADRYL) cream    ADDaptor    vancomycin (VANCOCIN) 1,000 mg injection    0.9% sodium chloride (MBP/ADV) 0.9 % infusion    sodium chloride (NS) flush 5-10 mL    sodium chloride (NS) flush 5-10 mL    naloxone (NARCAN) injection 0.4 mg    sodium chloride (NS) flush 10-30 mL    sodium chloride (NS) flush 10 mL    sodium chloride (NS) flush 10 mL    sodium chloride (NS) flush 10-40 mL    sodium chloride (NS) flush 20 mL    phenol throat spray (CHLORASEPTIC) 1 Spray    mineral oil-hydrophil petrolat (AQUAPHOR) ointment    0.9% sodium chloride infusion 250 mL    heparin (porcine) injection 5,000 Units    albuterol (PROVENTIL VENTOLIN) nebulizer solution 2.5 mg    polyethylene glycol (MIRALAX) packet 17 g    sodium chloride (NS) flush 5-10 mL    sodium chloride (NS) flush 5-10 mL    prochlorperazine (COMPAZINE) with saline injection 5 mg    sodium chloride (NS) flush 10 mL    sodium chloride (NS) flush 10-40 mL    acetaminophen (TYLENOL) tablet 650 mg    ondansetron (ZOFRAN) injection 4 mg    bisacodyl (DULCOLAX) suppository 10 mg              Data Review :  Recent Labs      03/22/17   0324  03/21/17   0510  03/20/17   0432   NA  134*  139  145   K  4.1  4.3  4.5   CL  100  106  111*   CO2  21  22  23   GLU  159*  163*  146*   BUN  81*  80*  74*   CREA  2.61*  2.63*  2.73*   CA  9.0  8.5  9.1   MG  1.9  2.1  2.5*   PHOS  2.4*  2.6  3.7   ALB  2.5*  2.2*  2.3*   SGOT  20  16  17   ALT  25  25  29     Recent Labs      03/22/17   0324  03/21/17   0510  03/20/17   0432   WBC  13.6*  11.3*  10.1   HGB  10.8*  9.3*  9.5*   HCT  32.7*  29.9*  30.6*   PLT  275  292  310     Lab Results   Component Value Date/Time    Culture result: MODERATE  PSEUDOMONAS AERUGINOSA   03/11/2017 11:12 AM    Culture result: MODERATE  NORMAL RESPIRATORY YVAN   03/11/2017 11:12 AM    Culture result: SCANT  NORMAL RESPIRATORY YVAN   03/02/2017 10:25 AM    Culture result: MODERATE  NORMAL RESPIRATORY YVAN   02/27/2017 05:18 PM    Culture result: MIXED UROGENITAL YVAN ISOLATED 06/24/2016 09:12 PM     Lab Results   Component Value Date/Time    Specimen Description: STOOL 11/10/2013 08:00 PM    Specimen Description: BLOOD 05/24/2013 10:50 AM    Specimen Description: BLOOD 05/22/2013 09:55 PM    Specimen Description: STOOL 05/22/2013 06:20 PM    Specimen Description: URINE 08/16/2011 10:54 AM     Ivon Ortiz MD  [facility]  [facility] [address], [address]  [place], [address]  Phone - [phone]         Fax - [phone] [facility] Office  [address] [address], [facility]  Phone - [phone]        Fax - [phone]     [email or web address]

## 2017-03-23 NOTE — PROGRESS NOTES
Renal usg results noted  Patient has atrophic right kidney and left hydronephrosis    We will consult urology.     Azucena Henderson MD

## 2017-03-23 NOTE — PROGRESS NOTES
Problem: Dysphagia (Adult)  Goal: *Speech Goal: (INSERT TEXT)  3/14/2017  Speech path reeval  1. Pt will tolerate dys 3/mech soft diet with thins with no overt s/s of aspiration. Speech path goals:  1. Pt will participate with reeval of swallowing daily until started on a diet. Goal met 3/20. 2. Pt will tolerate ice chips for pleasure as fed by staff. Goal met 3/20. 3. Pt will tolerate modified diet without s/s of aspiration. Pt will have purees only with no liquids with no overt s/s of aspiration. Goal met 3/20. SPEECH LANGUAGE PATHOLOGY DYSPHAGIA TREATMENT  Patient: Denny Rosario (69 y.o. female)  Date: 3/23/2017  Diagnosis: Aneurysm ([address]  AORTIC DISECTION <principal problem not specified>  Procedure(s) (LRB):  ENDOVASCULAR ANEURYSM REPAIR of THORACIC AORTIC DISSECTION, repair of ruptured left iliac artery with stent placement. Left ileo-femoral bypass graft with PTFE. (Bilateral)  LEFT CAROTID SUBCLAVIAN BYPASS with stent placement of left common artery. (Left) 24 Days Post-Op  Precautions: Aspiration Fall      ASSESSMENT:  Moderate oropharyngeal dysphagia characterized by impaired bolus acceptance, reduced bolus manipulation and transit with pharyngeal swallow delay. Patient required cues to maintain alertness and initiate swallow after ice chip trial given. No further PO trials offered given patient lethargy. While patient did not show any signs or symptoms of aspiration with ice chip do not recommend advancing diet given limited trials and lethargy. SLP to continue to follow for dysphagia treatment.   Progression toward goals:  [ ]         Improving appropriately and progressing toward goals  [X]         Improving slowly and progressing toward goals  [ ]         Not making progress toward goals and plan of care will be adjusted       PLAN:  Recommendations and Planned Interventions:  Continue mechanical soft diet, nectar liquids   Patient continues to benefit from skilled intervention to address the above impairments. Continue treatment per established plan of care. Discharge Recommendations: To Be Determined       SUBJECTIVE:   Patient sleeping soundly in bed, she aroused briefly to verbal and tactile cues. PT reports patient requesting ice chips this morning. .      OBJECTIVE:   Cognitive and Communication Status:  Neurologic State: Drowsy  Orientation Level: Oriented to person, Oriented to place  Cognition: Decreased attention/concentration  Perception: Appears intact  Perseveration: No perseveration noted  Safety/Judgement: Insight into deficits, Awareness of environment  Dysphagia Treatment:  Oral Assessment:     P.O. Trials:  Patient Position: Upright in bed  Vocal quality prior to P.O.:    Consistency Presented: Ice chips  How Presented: SLP-fed/presented;Spoon     Bolus Acceptance: Impaired  Bolus Formation/Control: Impaired  Type of Impairment: Delayed; Incomplete;Mastication  Propulsion: Delayed (# of seconds)  Oral Residue: Less than 10% of bolus  Initiation of Swallow: Delayed (# of seconds)  Laryngeal Elevation: Decreased  Aspiration Signs/Symptoms: None                 Oral Phase Severity: Moderate  Pharyngeal Phase Severity : Moderate     After treatment:   [ ]              Patient left in no apparent distress sitting up in chair  [X]              Patient left in no apparent distress in bed  [X]              Call bell left within reach  [ ]              Nursing notified  [ ]              Caregiver present  [ ]              Bed alarm activated      COMMUNICATION/EDUCATION:         The patients plan of care including recommendations, planned interventions, and recommended diet changes were discussed with: Physical Therapist.  [ ]              Posted safety precautions in patient's room.      CANDY Worthington  Time Calculation: 10 mins

## 2017-03-23 NOTE — PROGRESS NOTES
Name of procedure: Shabbir    Complications, if any, r/t procedure: none    VS : Stable     Post Procedure Care Needed/order sets in connectcare: see connect care orders    Pt tolerated procedure well. VSS. No C/O pain. Dressing to site D&I. No bleeding or hematoma noted to site. HOB elevated. Report given to Kristen Pratt, [address] [place] [address]. Floor RN to assume care of pt. Pt taken to U/S for testing then back to room. NAD noted at time of transfer. TRANSFER - OUT REPORT:    Verbal report given to Kristen Pratt RN on True Rayo  being transferred to PCU for routine post - op       Report consisted of patients Situation, Background, Assessment and   Recommendations(SBAR). Information from the following report(s) SBAR, Kardex, Procedure Summary, Intake/Output and MAR was reviewed with the receiving nurse. Lines:   PICC Triple Lumen 92/70/94 Right;Basilic (Active)   Central Line Being Utilized Yes 3/23/2017  8:00 AM   Criteria for Appropriate Use Total parenteral nutrition 3/23/2017  8:00 AM   Site Assessment Clean, dry, & intact 3/23/2017  8:00 AM   Phlebitis Assessment 0 3/23/2017  8:00 AM   Infiltration Assessment 0 3/23/2017  8:00 AM   Arm Circumference (cm) 29 cm 3/2/2017  4:00 AM   Date of Last Dressing Change 03/22/17 3/23/2017  8:00 AM   Dressing Status Clean, dry, & intact 3/23/2017  8:00 AM   External Catheter Length (cm) 0 centimeters 3/23/2017  8:00 AM   Dressing Type Disk with Chlorhexadine gluconate (CHG) 3/23/2017  8:00 AM   Action Taken Dressing changed 3/22/2017 11:14 AM   Hub Color/Line Status Delbert Ben; Infusing 3/23/2017  8:00 AM   Positive Blood Return (Site #1) Yes 3/23/2017  8:00 AM   Hub Color/Line Status White; Infusing 3/23/2017  8:00 AM   Positive Blood Return (Site #2) Yes 3/23/2017  8:00 AM   Hub Color/Line Status Red;Flushed 3/23/2017  8:00 AM   Positive Blood Return (Site #3) Yes 3/23/2017  8:00 AM   Alcohol Cap Used Yes 3/22/2017  4:34 PM        Opportunity for questions and clarification was provided.

## 2017-03-23 NOTE — PROCEDURES
PROCEDURE:Shabbir catheter placement. INDICATION:ARF. ANESTHESIA:local.  COMPLICATION:NONE. SPECIMENS REMOVED:none. BLOOD LOSS:NONE. /ASSISTANT:ARLENE Browne RECOMMENDATIONS:may use. CONSENT OBTAINED:YES.  NOTES:none.

## 2017-03-23 NOTE — PROGRESS NOTES
Chart review. Referral was submitted to [facility] requesting [facility] location when medically stable for discharge.     Brigid Cruz RN CM  Ext [phone]

## 2017-03-23 NOTE — PROGRESS NOTES
Nephrology Progress Note     Fransisco Chilel       [email or web address]. [email or web address]                   Phone - [phone]   Patient: Julia Mejía  YOB: 1955     Date- 3/23/2017     CC: Follow up for ARF         Subjective: Interval History:   -cr worse  No sob  Chest tube removed  Making good amount of urine  Small BM with enema yesterday  C/o nausea   No vomiting  Leg edema better     Assessment:   · Acute renal failure likely due to ATN - vancomycin toxicity - & hemodynamic instability. - ? Renal ischemia. · hyponatremia  · ckd 3 baseline cr. 1.2  · Resp. Failure - s/p extubation  · AFIB with RVR. Now s/p AFL ablation and pacemaker implant  · Complicated type B descending thoracic aortic dissection. -   · S/p TEVAR, L ileo-fem bypass, L carotid-subclavian bypass:2/27/17   · Protein malnutrition  · B/l effusion. S/p bilateral chest tubes. Right side pulled. Left still in place  · S/p chest tube placement  · Ischemic spinal cord injury     Plan:   - check renal doppler to eval for renal ischemia. Her cr. Remained high after stopping vanco and now getting worse. - stop clonopine  - she will need hd soon - d/w pt and . -  wants to talk to patient and DR. Sage Ramey before deciding on dialysis  - her nausea could be due to uremia  - continue minoxidil 5 mg daily   - continue norvasc 10 mg daily,clonidine patch and hydralazine po  - hold lasix today  - lactulose po now  Care Plan discussed with:  and nurse  [] High complexity decision making was performed  [] Patient is at high-risk of decompensation with multiple organ involvement  Review of Systems: Pertinent items are noted in HPI.   Objective:   Vitals:    03/23/17 0035 03/23/17 0440 03/23/17 0804 03/23/17 0820   BP:  145/52 155/61    Pulse:  82 78    Resp:  18 20    Temp:  98.1 °F (36.7 °C) 96.3 °F (35.7 °C)    SpO2:  96% 97% 98%   Weight: 87.8 kg (193 lb 9 oz)      Height:           Intake/Output Summary (Last 24 hours) at 03/23/17 0826  Last data filed at 03/23/17 3214   Gross per 24 hour   Intake                0 ml   Output              800 ml   Net             -800 ml     Physical Exam:   GEN: - NAD   RESP: clear b/l, no wheezing. Decreased at base  CVS: RRR,S1,S2 , edema +  SKIN: No Rash  ABDO: soft , non tender, No BS  Neuro- moves upper arm, normal speech  No grubbs        Chart reviewed. Pertinent Notes reviewed.    Medications list  reviewed     Current Facility-Administered Medications   Medication    minoxidil (LONITEN) tablet 5 mg    TPN Supplemental IV - Dextrose 20% Infusion q24h +++    TPN ADULT-CENTRAL AA 5% D20%-MVI W/ VIT K-RCH    hydrALAZINE (APRESOLINE) tablet 100 mg    amLODIPine (NORVASC) tablet 10 mg    pantoprazole (PROTONIX) tablet 40 mg    albuterol-ipratropium (DUO-NEB) 2.5 MG-0.5 MG/3 ML    buPROPion XL (WELLBUTRIN XL) tablet 150 mg    clonazePAM (KlonoPIN) tablet 0.5 mg    busPIRone (BUSPAR) tablet 5 mg    levothyroxine (SYNTHROID) tablet 100 mcg    insulin lispro (HUMALOG) injection    glucose chewable tablet 16 g    dextrose (D50W) injection syrg 12.5-25 g    glucagon (GLUCAGEN) injection 1 mg    labetalol (NORMODYNE;TRANDATE) injection 20 mg    cloNIDine (CATAPRES) 0.2 mg/24 hr patch 2 Patch    amiodarone (CORDARONE) tablet 400 mg    HYDROmorphone (PF) (DILAUDID) injection 0.5 mg    diphenhydrAMINE-zinc acetate 2%-0.1% (BENADRYL) cream    ADDaptor    vancomycin (VANCOCIN) 1,000 mg injection    0.9% sodium chloride (MBP/ADV) 0.9 % infusion    sodium chloride (NS) flush 5-10 mL    sodium chloride (NS) flush 5-10 mL    naloxone (NARCAN) injection 0.4 mg    sodium chloride (NS) flush 10-30 mL    sodium chloride (NS) flush 10 mL    sodium chloride (NS) flush 10 mL    sodium chloride (NS) flush 10-40 mL    sodium chloride (NS) flush 20 mL    phenol throat spray (CHLORASEPTIC) 1 Spray    mineral oil-hydrophil petrolat (AQUAPHOR) ointment    0.9% sodium chloride infusion 250 mL    heparin (porcine) injection 5,000 Units    albuterol (PROVENTIL VENTOLIN) nebulizer solution 2.5 mg    polyethylene glycol (MIRALAX) packet 17 g    sodium chloride (NS) flush 5-10 mL    sodium chloride (NS) flush 5-10 mL    prochlorperazine (COMPAZINE) with saline injection 5 mg    sodium chloride (NS) flush 10 mL    sodium chloride (NS) flush 10-40 mL    acetaminophen (TYLENOL) tablet 650 mg    ondansetron (ZOFRAN) injection 4 mg    bisacodyl (DULCOLAX) suppository 10 mg              Data Review :  Recent Labs      03/23/17   0256  03/22/17   0324  03/21/17   0510   NA  133*  134*  139   K  3.8  4.1  4.3   CL  100  100  106   CO2  21  21  22   GLU  151*  159*  163*   BUN  94*  81*  80*   CREA  2.90*  2.61*  2.63*   CA  8.8  9.0  8.5   MG  1.8  1.9  2.1   PHOS  3.0  2.4*  2.6   ALB  2.4*  2.5*  2.2*   SGOT  32  20  16   ALT  23  25  25     Recent Labs      03/23/17   0256  03/22/17   0324  03/21/17   0510   WBC  14.1*  13.6*  11.3*   HGB  10.0*  10.8*  9.3*   HCT  31.1*  32.7*  29.9*   PLT  182  275  [address] [address] [facility]  [facility] [address], [address], [address]  Phone - [phone]         Fax - [phone] [facility] Office  [address] [place] [address]  Phone - [phone]        Fax - [phone]     www. [email or web address]

## 2017-03-23 NOTE — PROGRESS NOTES
Vascular    Seen earlier today  Chest tube and grubbs out  She c/o nausea and abdominal pain  No BM  Poor PO intake  Sleepy from nausea meds  WBC 13  Cr 2.61  Abd pain and nausea are concerning  Needs aggressive bowel regimine  If pain and nausea persist, may have to consider angio for chronic mesenteric ischemia  Cont TPN

## 2017-03-23 NOTE — PROGRESS NOTES
Problem: Mobility Impaired (Adult and Pediatric)  Goal: *Acute Goals and Plan of Care (Insert Text)  Physical Therapy Goals   Goals reviewed and revised 3/22/2017  1. Patient will move from supine to sit and sit to supine , scoot up and down and roll side to side in bed with maximum assistance within 7 day(s). 2. Patient will sit on EOB 2 minutes demonstrating overall good sitting balance without LOB within 7 days. 3. Patient will perform 3/3 UE exercises (shoulder flexion, elbow flexion, hand gripping) with supervision in order to improve UE function within 7 days. 4. Patient will perform lateral weight shifting to R and L with moderate assistance in order to decrease risk of pressure ulcer formation within 7 days. Initiated 3/14/2017  1. Patient will move from supine to sit and sit to supine , scoot up and down and roll side to side in bed with moderate assistance within 7 day(s). 2. Patient will transfer from bed to chair and chair to bed with maximal assistance x 2 using the least restrictive device within 7 day(s). 3. Patient will perform sit to stand with maximal assistance within 7 day(s). 4. Patient will sit on EOB 2 minutes demonstrating overall good sitting balance without LOB within 7 days. PHYSICAL THERAPY TREATMENT  Patient: Sabi Fernando (9 y.o. female)  Date: 3/23/2017  Diagnosis: Aneurysm ([address]  AORTIC DISECTION <principal problem not specified>  Procedure(s) (LRB):  ENDOVASCULAR ANEURYSM REPAIR of THORACIC AORTIC DISSECTION, repair of ruptured left iliac artery with stent placement. Left ileo-femoral bypass graft with PTFE. (Bilateral)  LEFT CAROTID SUBCLAVIAN BYPASS with stent placement of left common artery. (Left) 24 Days Post-Op  Precautions: Fall      ASSESSMENT:  Pt received supine in bed asleep and easily awoken to voice and touch. Pt agreeable to PT intervention and cleared by nursing for mobility. VSS prior to mobility.  Pt with overall improved participation and motivation this date. Pt continues to require cueing to improve attention/concentration and command following. Pt required max-total A x 2 for bed mobility. Pt sat on EOB ~ 33 minutes, needing min-total A x 1-2 for support. Pt continues to demonstrate overall poor trunk control, however demonstrated improved use of UEs for mobility and trunk support this date. Pt participated in weight shifting in sitting and exercises as described below. Pt needed frequent brief rest breaks during sitting activities, however was able to tolerate activity performed. Pt was returned supine in bed following therapy session and position with pillows for comfort and to protect her skin. Pt was left with all needs met and nursing present. Recommend patient discharge to inpatient rehab to improve overall mobility and function. Progression toward goals:  [ ]    Improving appropriately and progressing toward goals  [X]    Improving slowly and progressing toward goals  [ ]    Not making progress toward goals and plan of care will be adjusted       PLAN:  Patient continues to benefit from skilled intervention to address the above impairments. Continue treatment per established plan of care. Discharge Recommendations:  Inpatient Rehab  Further Equipment Recommendations for Discharge:  TBD by rehab       SUBJECTIVE:   Patient stated I need some ice chips.       OBJECTIVE DATA SUMMARY:   Critical Behavior:  Neurologic State: Alert  Orientation Level: Oriented to person, Oriented to place  Cognition: Decreased attention/concentration  Safety/Judgement: Insight into deficits, Awareness of environment  Functional Mobility Training:  Bed Mobility:  Rolling: Maximum assistance  Supine to Sit: Total assistance;Assist x2  Sit to Supine: Total assistance;Assist x2  Scooting:  Total assistance        Balance:  Sitting: Impaired  Sitting - Static: Poor (constant support)  Sitting - Dynamic: Poor (constant support)  Neuro Re-Education:  Pt performed forward/backwards weight shift/trunk lean x 5 sitting EOB (pulling forward with BUEs while holding therapists hands with min A x 2; pt required max A x 2 to shift weight backwards from forward leaning position; pt able to lean forward with UEs on armrests of chair x 2 and therapists knees x 1, requiring only min-CGA for support)  Therapeutic Exercises:   RANJEET hand  5x  RANJEET elbow flexion/extension 5x AROM LUE, AAROM RUE  Pain:  Pain Scale 1: Numeric (0 - 10)  Pain Intensity 1: 0              Activity Tolerance:   Improving - increased time in sitting; VSS on RA; pt required frequent brief rest breaks; decreased attention/concentration  After treatment:   [ ]    Patient left in no apparent distress sitting up in chair  [X]    Patient left in no apparent distress in bed  [X]    Call bell left within reach  [X]    Nursing notified  [X]    Caregiver present  [ ]    Bed alarm activated      COMMUNICATION/COLLABORATION:   The patients plan of care was discussed with: Physical Therapist and Registered Nurse     Yanelis Downey, PT, DPT   Time Calculation: 44 mins

## 2017-03-23 NOTE — PROGRESS NOTES
Occupational Therapy    Completed chart review. Patient off floor for procedure. Will continue to follow for weekly OT re-assessment.      Thank you,    Hemant Hennessy OTR/L

## 2017-03-23 NOTE — PROGRESS NOTES
Pt given lactulose this morning without results, soaps suds given per MD order, pt had large watery/brown stool  Mixed with  Semi soft stool. Pt states her stomach feels better.

## 2017-03-23 NOTE — PROGRESS NOTES
D/w patient's  and DR. Sage Ramey. We will start dialysis. Consent for hd and hd cath obtained from . He understand procedure and agrees to  [facility].     Consult IR for Supa Weiner MD

## 2017-03-24 NOTE — PROGRESS NOTES
Vascular    HD early this AM  Completely alert and oriented now  Reports abd pain last night and nausea w/ position change  Denies abd pain at present  Small BM after enemas  Looks great  Abd soft, NT  Lt groin and neck incisions healing well  WBC 15  Renal U/S showed atrophic Rt kidney and Lt hydro  She is clearly better after HD  Lt hydro is likely due to RP hematoma from iliac rupture in OR  Abd pain could be constipation, hydro, or chronic mesenteric ischemia  Will Cont TPN  HD per renal  Abd/pelvis CT   consult  If abd pain, nausea, and renal failure persist after eval & Tx of above, she may need angio Mon/Tues  Cont PT/OT  Pulm toilet (WBC most likely due to atelectasis from 1-2 days of decreased mental status)

## 2017-03-24 NOTE — PROGRESS NOTES
Nephrology Progress Note     Fransisco Chilel       [facility]. [email or web address]                   Phone - [phone]   Patient: Leticia Villar  YOB: 1955     Date- 3/24/2017     CC: Follow up for ARF         Subjective: Interval History:   -s/p hd last night  Mental status much better  bp improved  C/o severe abdo. Pain last night  Edema better    No c/o sob, fever. No c/o  vomiting  No c/o chest pain       Assessment:   · Acute renal failure - multiple etiology  1. ATN on admission  2. Left hydro. - on functioning kidney. She has right renal atrophy. Right renal atrophy might be a chronic issue. She had small right kidney on admission CT abdo. - she has left renal artery stent seen on CT angio --- blood flow seen on renal artery and vein on USG from 3-23  · Left hydronephrosis  · ckd 3 baseline cr. 1.2  · Resp. Failure - s/p extubation  · AFIB with RVR. Now s/p AFL ablation and pacemaker implant  · Complicated type B descending thoracic aortic dissection. -   · S/p TEVAR, L ileo-fem bypass, L carotid-subclavian bypass:2/27/17   · Protein malnutrition  · Renal artery stenosis - h/o left renal artery stent   · Abdominal pain - ? mesentric ischemia  · Ischemic spinal cord injury     Plan:   - no hd today  - hd tomorrow  - consult urology - she will CT abdo. Stone protocol - she has atrophic right kidney. She has functioning left kidney. We will wait urology eval for cystoscopy need. - TPN ordered today  - stop minoxidil , bp on low side today  - continue norvasc 10 mg daily,clonidine patch and hydralazine po  - may need CT ANGIO next week abdo. Pain persist to rule out intestinal ischemia  Care Plan discussed with: pt and DR. Edith Jolley  [x] High complexity decision making was performed  [x] Patient is at high-risk of decompensation with multiple organ involvement  Review of Systems: Pertinent items are noted in HPI.   Objective:   Vitals:    03/24/17 9934 03/24/17 7246 03/24/17 0404 03/24/17 0800   BP: 95/49 (!) 97/35 117/53 141/51   Pulse: (!) 107 (!) 118 97 85   Resp:   16 18   Temp:   97.4 °F (36.3 °C)    TempSrc:       SpO2:   96% 97%   Weight:   86.9 kg (191 lb 9.3 oz)    Height:           Intake/Output Summary (Last 24 hours) at 03/24/17 1006  Last data filed at 03/24/17 0603   Gross per 24 hour   Intake            186.2 ml   Output             1950 ml   Net          -1763.8 ml     Physical Exam:   GEN: - NAD   Neck - supple, right ij amparo +, left upper neck staples +  RESP: clear b/l, no wheezing. Decreased at base  CVS: RRR,S1,S2 , edema +  SKIN: No Rash  ABDO: soft , non tender, No BS  Neuro- moves upper arm, normal speech  No grubbs        Chart reviewed. Pertinent Notes reviewed.    Medications list  reviewed     Current Facility-Administered Medications   Medication    albuterol-ipratropium (DUO-NEB) 2.5 MG-0.5 MG/3 ML    dextrose 20% infusion    TPN ADULT-CENTRAL AA 5% D20%-MVI W/ VIT K-RCH    minoxidil (LONITEN) tablet 5 mg    hydrALAZINE (APRESOLINE) tablet 100 mg    amLODIPine (NORVASC) tablet 10 mg    pantoprazole (PROTONIX) tablet 40 mg    buPROPion XL (WELLBUTRIN XL) tablet 150 mg    busPIRone (BUSPAR) tablet 5 mg    levothyroxine (SYNTHROID) tablet 100 mcg    insulin lispro (HUMALOG) injection    glucose chewable tablet 16 g    dextrose (D50W) injection syrg 12.5-25 g    glucagon (GLUCAGEN) injection 1 mg    labetalol (NORMODYNE;TRANDATE) injection 20 mg    cloNIDine (CATAPRES) 0.2 mg/24 hr patch 2 Patch    HYDROmorphone (PF) (DILAUDID) injection 0.5 mg    diphenhydrAMINE-zinc acetate 2%-0.1% (BENADRYL) cream    ADDaptor    vancomycin (VANCOCIN) 1,000 mg injection    0.9% sodium chloride (MBP/ADV) 0.9 % infusion    sodium chloride (NS) flush 5-10 mL    sodium chloride (NS) flush 5-10 mL    naloxone (NARCAN) injection 0.4 mg    sodium chloride (NS) flush 10-30 mL    sodium chloride (NS) flush 10 mL    sodium chloride (NS) flush 10 mL    sodium chloride (NS) flush 10-40 mL    sodium chloride (NS) flush 20 mL    phenol throat spray (CHLORASEPTIC) 1 Spray    mineral oil-hydrophil petrolat (AQUAPHOR) ointment    0.9% sodium chloride infusion 250 mL    heparin (porcine) injection 5,000 Units    polyethylene glycol (MIRALAX) packet 17 g    prochlorperazine (COMPAZINE) with saline injection 5 mg    sodium chloride (NS) flush 10 mL    sodium chloride (NS) flush 10-40 mL    acetaminophen (TYLENOL) tablet 650 mg    ondansetron (ZOFRAN) injection 4 mg    bisacodyl (DULCOLAX) suppository 10 mg              Data Review :  Recent Labs      03/24/17   0513  03/23/17   0256  03/22/17   0324   NA  137  133*  134*   K  3.5  3.8  4.1   CL  102  100  100   CO2  25  21  21   GLU  198*  151*  159*   BUN  67*  94*  81*   CREA  2.34*  2.90*  2.61*   CA  8.9  8.8  9.0   MG   --   1.8  1.9   PHOS   --   3.0  2.4*   ALB  2.2*  2.4*  2.5*   SGOT  27  32  20   ALT  22  23  25     Recent Labs      03/24/17   0513  03/23/17   0256  03/22/17   0324   WBC  15.8*  14.1*  13.6*   HGB  9.0*  10.0*  10.8*   HCT  27.2*  31.1*  32.7*   PLT  143*  182  275     The right kidney is small and echogenic with an upper pole anechoic 1.2 x 1.1 x  1.2 cm cyst and a lower pole 1.2 x 1.4 x 1.2 cm anechoic cyst. Only venous  signal is identified within the parenchyma.      The left kidney has new moderate hydronephrosis. Both arterial and venous blood  flow is documented.      The right kidney measures 8 cm and the left kidney measures 13.6 cm in length.     The aorta, iliac arteries, and IVC are obscured by bowel gas. No retroperitoneal  mass is identified.     The urinary bladder is normal.     IMPRESSION  IMPRESSION:   Small atrophic right kidney.   New moderate left hydronephrosis    Hilda Zambrano MD  [facility]  [facility] [address]  [place], [address]  Phone - [phone]         Fax - [phone] [facility]  [address] [facility] [place] [address] [place] [address]  Phone - [phone]        Fax - [phone]     [email or web address]

## 2017-03-24 NOTE — PROGRESS NOTES
Supportive visit on PCU per staff request.  No visitors present. Provided listening presence and emotional support as patient shared briefly about her medical concerns and engaged in life review. She seems to have a very minimal support system-mother and brother are . She is , but  does not visit. She stated there is little that gives meaning to her life at this time except her animals. As she was talking about one of her pets who  in February[place], RN came in to prepare to be taken for surgery.  will follow up tomorrow, as able.   GREGG Becker, [facility], [address] [place] [address] [address]     Paging Service  [phone]

## 2017-03-24 NOTE — ANESTHESIA POSTPROCEDURE EVALUATION
Post-Anesthesia Evaluation and Assessment    Patient: Kita Elliott MRN: [id number]  SSN: [id number]    YOB: 1955  Age: 59 y.o. Sex: female       Cardiovascular Function/Vital Signs  Visit Vitals    /59 (BP 1 Location: Left arm, BP Patient Position: At rest)    Pulse 81    Temp 36.6 °C (97.9 °F)    Resp 14    Ht 5' 6.5\" (1.689 m)    Wt 86.9 kg (191 lb 9.3 oz)    SpO2 100%    Breastfeeding No    BMI 30.46 kg/m2       Patient is status post general, total IV anesthesia anesthesia for Procedure(s):  [address] Nausea/Vomiting: None    Postoperative hydration reviewed and adequate. Pain:  Pain Scale 1: Numeric (0 - 10) (03/24/17 1700)  Pain Intensity 1: 0 (03/24/17 1700)   Managed    Neurological Status:   Neuro (WDL): Exceptions to WDL (03/24/17 1630)  Neuro  Neurologic State: Alert;Drowsy; Eyes open spontaneously; Eyes open to voice (03/24/17 1630)  Orientation Level: Oriented to person;Oriented to place;Oriented to situation (03/24/17 1630)  Cognition: Follows commands (03/24/17 1630)  Speech: Clear (03/24/17 1630)  Assessment L Pupil: Round (03/24/17 0038)  Size L Pupil (mm): 3 (03/24/17 0038)  Assessment R Pupil: Round (03/24/17 0038)  Size R Pupil (mm): 3 (03/24/17 0038)  LUE Motor Response: Purposeful;Weak (03/24/17 1630)  LLE Motor Response: Flaccid; No movement to any stimulus (03/24/17 1630)  RUE Motor Response: Purposeful;Weak (03/24/17 1630)  RLE Motor Response: Flaccid; No movement to any stimulus (03/24/17 1630)   At baseline    Mental Status and Level of Consciousness: Arousable    Pulmonary Status:   O2 Device: Nasal cannula (03/24/17 1700)   Adequate oxygenation and airway patent    Complications related to anesthesia: None    Post-anesthesia assessment completed.  No concerns    Signed By: Marycruz Naidu MD     March 24, 2017

## 2017-03-24 NOTE — PROGRESS NOTES
Bedside shift change report given to Summer Stanford (oncoming nurse) by Kamlesh Matthew RN (offgoing nurse). Report included the following information SBAR.

## 2017-03-24 NOTE — PROGRESS NOTES
Problem: Dysphagia (Adult)  Goal: *Speech Goal: (INSERT TEXT)  3/14/2017  Speech path reeval  1. Pt will tolerate dys 3/mech soft diet with thins with no overt s/s of aspiration. Speech path goals:  1. Pt will participate with reeval of swallowing daily until started on a diet. Goal met 3/20. 2. Pt will tolerate ice chips for pleasure as fed by staff. Goal met 3/20. 3. Pt will tolerate modified diet without s/s of aspiration. Pt will have purees only with no liquids with no overt s/s of aspiration. Goal met 3/20. SPEECH LANGUAGE PATHOLOGY DYSPHAGIA TREATMENT  Patient: Darleen Carrillo (49 y.o. female)  Date: 3/24/2017  Diagnosis: Aneurysm ([address]  AORTIC DISECTION <principal problem not specified>  Procedure(s) (LRB):  ENDOVASCULAR ANEURYSM REPAIR of THORACIC AORTIC DISSECTION, repair of ruptured left iliac artery with stent placement. Left ileo-femoral bypass graft with PTFE. (Bilateral)  LEFT CAROTID SUBCLAVIAN BYPASS with stent placement of left common artery. (Left) 25 Days Post-Op  Precautions: swallow Fall      ASSESSMENT:  Patient stated she was nauseous and was only agreeable to limited PO trials. Oral phase was characterized by impaired bolus acceptance (difficulty extracting solid from fork and holding of straw in mouth with cues needed to extract nectar thick liquid), prolonged mastication, delayed posterior propulsion, and intermittent oral holding. Pharyngeal phase was characterized by delayed swallow initiation, decreased elevation via palpation, and strong cough x 2 after nectar thick liquids. Patient limited by decreased attention/concentration. Patient with wet vocal quality before PO trials. Patient also with strong cough x 1 without PO. Recommend mechanical soft diet with no liquids. Recommend MBS to further evaluate pharyngeal phase of swallow. Unable to schedule MBS today as patient may be NPO per urology for procedure.  Question whether patient will be cooperative with MBS as patient with limited acceptance of PO trials and complaints of nausea since SLP began following. Progression toward goals:  [ ]         Improving appropriately and progressing toward goals  [ ]         Improving slowly and progressing toward goals  [X]         Not making progress toward goals and plan of care will be adjusted       PLAN:  Recommendations and Planned Interventions:  --mechanical soft/no liquids  --recommend MBS  --meds in applesauce as tolerated  --SLP to follow for dysphagia treatment  Patient continues to benefit from skilled intervention to address the above impairments. Continue treatment per established plan of care. Discharge Recommendations: To Be Determined       SUBJECTIVE:   Patient stated I'm gonna need that green bag. Patient alert but drowsy. Only agreeable to limited PO trials. OBJECTIVE:   Cognitive and Communication Status:  Neurologic State: Alert, Drowsy  Orientation Level: Disoriented to time, Oriented to person, Oriented to place, Oriented to situation  Cognition: Follows commands, Decreased attention/concentration  Perception: Appears intact  Perseveration: No perseveration noted  Safety/Judgement: Decreased insight into deficits  Dysphagia Treatment:  Oral Assessment:     P.O. Trials:  Patient Position: upright in bed  Vocal quality prior to P.O.: Hoarse; Wet  Consistency Presented: Mechanical soft; Nectar thick liquid  How Presented: SLP-fed/presented;Straw     Bolus Acceptance: Impaired  Bolus Formation/Control: Impaired  Type of Impairment: Delayed;Mastication;Posterior  Propulsion: Delayed (# of seconds)     Initiation of Swallow: Delayed (# of seconds)  Laryngeal Elevation: Decreased  Aspiration Signs/Symptoms: Strong cough  Pharyngeal Phase Characteristics: No impairment, issues, or problems               Oral Phase Severity: Mild-moderate  Pharyngeal Phase Severity : Moderate  Pain:  Pain Scale 1: Numeric (0 - 10)  Pain Intensity 1: 0     After treatment:   [ ] Patient left in no apparent distress sitting up in chair  [X]              Patient left in no apparent distress in bed  [X]              Call bell left within reach  [X]              Nursing notified  [ ]              Caregiver present  [ ]              Bed alarm activated      COMMUNICATION/EDUCATION:      The patients plan of care including recommendations, planned interventions, and recommended diet changes were discussed with: Registered Nurse. [X]              Posted safety precautions in patient's room.      Loura Pass  Time Calculation: 20 mins

## 2017-03-24 NOTE — BRIEF OP NOTE
BRIEF OPERATIVE NOTE    Date of Procedure: 3/24/2017   Preoperative Diagnosis: left hydronephrosis    Postoperative Diagnosis: * No post-op diagnosis entered *    Procedure(s):  CYSTOSCOPY LEFT URETERAL STENT PLACEMENT, simple grubbs, RPG with interpretation  Surgeon(s) and Role:     * Eliud Aguilar MD - Primary            Surgical Staff:  Circ-1: Kaci Albarado RN  Radiology Technician: Cayden Humphries RN-1: Soniya Mckeon RN  Event Time In   Incision Start 1606   Incision Close 1621     Anesthesia: General   Estimated Blood Loss: none  Specimens:   ID Type Source Tests Collected by Time Destination   1 : URINE Urine Bladder CULTURE, URINE Eliud Aguilar MD 3/24/2017 1608 Microbiology      Findings: very purulent urine. Bladder washed and sent for culture. Left hydro moderate. Complications: none  Implants: * No implants in log *    Dictation#: [id number]    Dispo: remove grubbs per primary team. monitor for post obstructive diuresis. F/u urine culture.  Regular diet per priamry team

## 2017-03-24 NOTE — PROGRESS NOTES
10:00 AM  Spoke with Dr Carolina Collazo, urology. Will keep pt NPO until urology rounds on patient. TORB random bladder check now. Speech at bedside recommending barium swallow to be done today, if possible. Per pt/report pt has only been taking sips of liquids for meals. Random Bladder scan 611 ml (250 output noted from purwick canister so far this shift) pt denies discomfort, reports incontinence when coughing. 10:16 AM  Transported to CT    10:25 AM  Paged urology to notify of bladder scan results. 12:29 PM  Discussed plan of care with patient and over telephone with spouse. All in agreement to proceed with OR for hydronephrosis; consent on chart. Pt remains NPO. Mouth care provided. Pt with c/o nausea - PRN Zofran given. 2 PM  Pt with straight cath 1525ml output rafy urine. 2:15PM  Pt transported to OR holding.     5:09 PM  TRANSFER - IN REPORT:    Verbal report received on Tish Vargas  being received from OR/PACU(unit) for routine post - op      Report consisted of patients Situation, Background, Assessment and   Recommendations(SBAR). Information from the following report(s) SBAR and Kardex was reviewed with the receiving nurse. Opportunity for questions and clarification was provided. Assessment completed upon patients arrival to unit and care assumed.

## 2017-03-24 NOTE — PROGRESS NOTES
[id number]  Assumed care of pt. Pt alert/flat affect/delayed responses. Oriented to person and place, overall not oriented to situation. Pt updated on plan of care. Pt with large loose BM, pt cleaned/linens changed. Pt to receive dialysis this PM, dialysis nurse reports around ~0536-9369. Hold pt htn meds per dialysis nurse order. Pt repositioned. Encouraged pulm toilet. Assisted pt in calling her . Pt call bell in reach, TV on. Appears more alert this evening.

## 2017-03-24 NOTE — PERIOP NOTES
TRANSFER - IN REPORT:    Verbal report received from [facility] on Lucky November  being received from 2268 for ordered procedure      Report consisted of patients Situation, Background, Assessment and   Recommendations(SBAR). Information from the following report(s) SBAR, ED Summary, OR Summary, Procedure Summary, Intake/Output and MAR was reviewed with the receiving nurse. Opportunity for questions and clarification was provided. Assessment completed upon patients arrival to unit and care assumed.

## 2017-03-24 NOTE — CONSULTS
Consult    Patient: Sabi Fernando MRN: [id number]  SSN: [id number]    YOB: 1955  Age: 59 y.o. Sex: female      Subjective:      Sabi Fernando is a 59 y.o. female who is being seen for JEANA left hydro. History of AAA repair with new Left hydro in setting of renal failure with Left kidney being only good kidney. Recommend Left stent. The procedure along with the attendant risks and complications and alternative options for treatment were discussed with the patient who understands and agrees to proceed. Problem left hydro  Location- left kidney  Timing is constant  Duration is known since 3/23  Context is History of AAA repair with new Left hydro in setting of renal failure with Left kidney being only good kidney  Association- malaise from ARF    Past Medical History:   Diagnosis Date    Anxiety disorder     Arthritis     BACK, RT. KNEE and HANDS    CAD (coronary artery disease)      s/p 5 vessel CABG 2011    Carotid arterial disease (HCC)     diffuse bilateral CCA plaques    Chronic obstructive pulmonary disease ([address]     Depression with anxiety     Essential hypertension     GERD (gastroesophageal reflux disease)     rarely    GI bleed     Hypothyroid     Mesenteric artery stenosis (HCC)     Microcytic anemia     Renal artery atherosclerosis, bilateral (HCC)      RA occlusion, left s/p stent    Renal atrophy, right     SVT (supraventricular tachycardia) 3/3/2017    UC (ulcerative colitis) ([address]      Past Surgical History:   Procedure Laterality Date    ESOPHAGEAL CAPSULE ENDOSCOPY  6/8/2015         HX COLONOSCOPY      HX CORONARY ARTERY BYPASS GRAFT  8/11    5 way bypass.  08/19/11    HX FEMORAL BYPASS      triple    HX LUMBAR DISKECTOMY  1997    HX UROLOGICAL      vascular stent x 2 to left kidney    SMALL BOWEL ENDOSCOPY,ABLATE LESN  3/13/2015         STRESS TEST LEXISCAN/CARDIOLITE  4/24/12    UPPER GI ENDOSCOPY,CTRL BLEED  6/2/2015           Family History Problem Relation Age of Onset    Post-op Nausea/Vomiting Other     Other Other      LOW BP AND DIFFICULTY BREATHING    Hypertension Mother    Raad Lerner Stroke Mother       age 48,    Raad Lerner Hypertension Brother     Stroke Brother       age 55, smoked     Social History   Substance Use Topics    Smoking status: Former Smoker     Packs/day: 1.00     Years: 40.00     Types: Cigarettes     Quit date: 2011    Smokeless tobacco: Former User      Comment: quit a few times but relapsed    Alcohol use No      Current Facility-Administered Medications   Medication Dose Route Frequency Provider Last Rate Last Dose    TPN ADULT - CENTRAL AA 5% D20% W/ CA + ELECTROLYTES   IntraVENous CONTINUOUS Cayla Robles MD        epoetin fareed (EPOGEN;PROCRIT) injection 10,000 Units  10,000 Units SubCUTAneous Q MON, WED & FRI Cayla Robles MD        [START ON 3/25/2017] amiodarone (CORDARONE) tablet 400 mg  400 mg Oral DAILY Woodrowrikeya Sexton, NP        lactated ringers infusion  25 mL/hr IntraVENous CONTINUOUS Consuelo Segovia MD        sodium chloride (NS) flush 5-10 mL  5-10 mL IntraVENous Q8H Consuelo Segovia MD        sodium chloride (NS) flush 5-10 mL  5-10 mL IntraVENous PRN Consuelo Segovia MD        lidocaine (PF) (XYLOCAINE) 10 mg/mL (1 %) injection 0.1 mL  0.1 mL SubCUTAneous PRN Consuelo Segovia MD        albuterol-ipratropium (DUO-NEB) 2.5 MG-0.5 MG/3 ML  3 mL Nebulization Q6H PRN Cholo Norton MD        dextrose 20% infusion   IntraVENous CONTINUOUS Dayanara Tiwari MD 21 mL/hr at 17      TPN ADULT-CENTRAL AA 5% D20%-MVI W/ VIT K-RCH   IntraVENous CONTINUOUS Dayanara Tiwari MD 42 mL/hr at 17      hydrALAZINE (APRESOLINE) tablet 100 mg  100 mg Oral TID Cayla Robles MD   Stopped at 17 2200    amLODIPine (NORVASC) tablet 10 mg  10 mg Oral DAILY Cayla Robles MD   10 mg at 17 0854    pantoprazole (PROTONIX) tablet 40 mg  40 mg Oral ACB&D [employer] Lagun[employer] MD   40 mg at 03/23/17 1808    buPROPion XL (WELLBUTRIN XL) tablet 150 mg  150 mg Oral DAILY Demond Rivers MD   150 mg at 03/23/17 0854    busPIRone (BUSPAR) tablet 5 mg  5 mg Oral BID Demond Rivers MD   Stopped at 03/23/17 1800    levothyroxine (SYNTHROID) tablet 100 mcg  100 mcg Oral ACB Ashanti Mccurdy MD   100 mcg at 03/23/17 0854    insulin lispro (HUMALOG) injection   SubCUTAneous Q6H David Sanchez MD   3 Units at 03/24/17 0520    glucose chewable tablet 16 g  4 Tab Oral PRN David Sanchez MD        dextrose (D50W) injection syrg 12.5-25 g  12.5-25 g IntraVENous PRN David Sanchez MD        glucagon [facility]) injection 1 mg  1 mg IntraMUSCular PRN David Sanchez MD        labetalol (NORMODYNE;TRANDATE) injection 20 mg  20 mg IntraVENous Q4H PRN Katherine Newberry MD   20 mg at 03/22/17 1008    cloNIDine (CATAPRES) 0.2 mg/24 hr patch 2 Patch  2 Patch TransDERmal Q7D Katherine Newberry MD   2 Patch at 03/21/17 1004    HYDROmorphone (PF) (DILAUDID) injection 0.5 mg  0.5 mg IntraVENous Q2H PRN Halina Perez MD   0.5 mg at 03/19/17 2036    diphenhydrAMINE-zinc acetate 2%-0.1% (BENADRYL) cream   Topical TID PRN Halina Perez MD        ADDaptor             vancomycin (VANCOCIN) 1,000 mg injection             0.9% sodium chloride (MBP/ADV) 0.9 % infusion             sodium chloride (NS) flush 5-10 mL  5-10 mL IntraVENous Q8H Duncan Abraham MD   10 mL at 03/24/17 0520    sodium chloride (NS) flush 5-10 mL  5-10 mL IntraVENous PRN Duncan Chaudhari MD        naloxone [facility]) injection 0.4 mg  0.4 mg IntraVENous PRN Ash Kruse MD        sodium chloride (NS) flush 10-30 mL  10-30 mL InterCATHeter PRN Ashanti Mccurdy MD   10 mL at 03/22/17 1112    sodium chloride (NS) flush 10 mL  10 mL InterCATHeter Q24H Ashanti Mccurdy MD   10 mL at 03/23/17 1810    sodium chloride (NS) flush 10 mL  10 mL InterCATHeter PRN Ashanti Mccurdy MD   10 mL at 03/22/17 1205    sodium chloride (NS) flush 10-40 mL  10-40 mL InterCATHeter Q8H Alannah Mosquera MD   10 mL at 03/24/17 0521    sodium chloride (NS) flush 20 mL  20 mL InterCATHeter PRN Alannah Mosquera MD   20 mL at 03/20/17 1725    phenol throat spray (CHLORASEPTIC) 1 Spray  1 Spray Oral PRN Arn Felty, MD   1 Mikana at 03/13/17 2133    mineral oil-hydrophil petrolat (AQUAPHOR) ointment   Topical PRN Arn Felty, MD        0.9% sodium chloride infusion 250 mL  250 mL IntraVENous PRN Arn Felty, MD        heparin (porcine) injection 5,000 Units  5,000 Units SubCUTAneous Carl Rubio MD   5,000 Units at 03/24/17 0526    polyethylene glycol (MIRALAX) packet 17 g  17 g Oral DAILY Willie Chen MD   17 g at 03/23/17 7595    prochlorperazine (COMPAZINE) with saline injection 5 mg  5 mg IntraVENous Q4H PRN Anita Florian MD   5 mg at 03/22/17 0754    sodium chloride (NS) flush 10 mL  10 mL InterCATHeter Q24H Alannah Mosquera MD   10 mL at 03/23/17 1811    sodium chloride (NS) flush 10-40 mL  10-40 mL InterCATHeter Q8H Alannah Mosquera MD   10 mL at 03/24/17 0521    acetaminophen (TYLENOL) tablet 650 mg  650 mg Oral Q4H PRN Lisa Weinstein MD   650 mg at 03/21/17 0253    ondansetron (ZOFRAN) injection 4 mg  4 mg IntraVENous Q4H PRN Lisa Weinstein MD   4 mg at 03/24/17 0123    bisacodyl (DULCOLAX) suppository 10 mg  10 mg Rectal DAILY PRN Lisa Weinstein MD   10 mg at 03/06/17 0028        Allergies   Allergen Reactions    Tussionex Itching    Codeine Itching    Nickel Other (comments)     Local reaction (redness, irritation, blistering) if wears \"cheap earrings\"    Oxycodone Itching       Review of Systems:  Constitutional: positive for fatigue and malaise, negative for chills and sweats  Eyes: negative for contacts/glasses, cataracts and visual disturbance  Ears, Nose, Mouth, Throat, and Face: negative for tinnitus, ear drainage and earaches  Respiratory: negative for cough, sputum or pleurisy/chest pain  Cardiovascular: negative for none, chest pain, chest pressure/discomfort  Gastrointestinal: negative for dysphagia, odynophagia and dyspepsia  Genitourinary:negative for dysuria, nocturia and urinary incontinence  Integument/Breast: negative for skin lesion(s), dryness and skin color change  Hematologic/Lymphatic: negative for easy bruising, bleeding and petechiae  Musculoskeletal:negative for myalgias, arthralgias and stiff joints  Neurological: negative for seizures, memory problems and speech problems  Behavioral/Psychiatric: negative for anorexia, anxiety and behavior problems    Objective:     Vitals:    03/24/17 0238 03/24/17 0359 03/24/17 0800 03/24/17 1414   BP: (!) 97/35 117/53 141/51 148/77   Pulse: (!) 118 97 85 85   Resp:  16 18 16   Temp:  97.4 °F (36.3 °C) 97.7 °F (36.5 °C) 98.3 °F (36.8 °C)   TempSrc:       SpO2:  96% 97%    Weight:  86.9 kg (191 lb 9.3 oz)     Height:            Physical Exam:  GENERAL: alert, cooperative, no distress, appears stated age  EYE: negative  THROAT & NECK: normal and no erythema or exudates noted. LUNG: clear, no signs of overt COPD  HEART: no signs of overt CHF  ABDOMEN: soft, non-tender. obese  EXTREMITIES:  extremities normal, atraumatic, no cyanosis or edema  SKIN: Normal.  NEUROLOGIC: negative  PSYCHIATRIC: non focal    Recent Results (from the past 24 hour(s))   GLUCOSE, POC    Collection Time: 03/23/17  8:02 PM   Result Value Ref Range    Glucose (POC) 166 (H) 65 - 100 mg/dL    Performed by [address] [place] [address] Drive[address]    Collection Time: 03/24/17  1:04 AM   Result Value Ref Range    Hepatitis B surface Ag <0.10 Index    Hep B surface Ag Interp.  NEGATIVE  NEG     GLUCOSE, POC    Collection Time: 03/24/17  1:15 AM   Result Value Ref Range    Glucose (POC) 173 (H) 65 - 100 mg/dL    Performed by Sarah Hooker    CBC WITH AUTOMATED DIFF    Collection Time: 03/24/17  5:13 AM   Result Value Ref Range    WBC 15.8 (H) 3.6 - 11.0 K/uL    RBC 3.21 (L) 3.80 - 5.20 M/uL    HGB 9.0 (L) 11.5 - 16.0 g/dL    HCT 27.2 (L) 35.0 - 47.0 %    MCV 84.7 80.0 - 99.0 FL    MCH 28.0 26.0 - 34.0 PG    MCHC 33.1 30.0 - 36.5 g/dL    RDW 17.1 (H) 11.5 - 14.5 %    PLATELET 973 (L) 718 - 400 K/uL    NEUTROPHILS 88 (H) 32 - 75 %    LYMPHOCYTES 4 (L) 12 - 49 %    MONOCYTES 5 5 - 13 %    EOSINOPHILS 3 0 - 7 %    BASOPHILS 0 0 - 1 %    ABS. NEUTROPHILS 13.9 (H) 1.8 - 8.0 K/UL    ABS. LYMPHOCYTES 0.6 (L) 0.8 - 3.5 K/UL    ABS. MONOCYTES 0.8 0.0 - 1.0 K/UL    ABS. EOSINOPHILS 0.5 (H) 0.0 - 0.4 K/UL    ABS. BASOPHILS 0.0 0.0 - 0.1 K/UL    DF SMEAR SCANNED      RBC COMMENTS ANISOCYTOSIS  1+        WBC COMMENTS ATYPICAL LYMPHOCYTES PRESENT     METABOLIC PANEL, COMPREHENSIVE    Collection Time: 03/24/17  5:13 AM   Result Value Ref Range    Sodium 137 136 - 145 mmol/L    Potassium 3.5 3.5 - 5.1 mmol/L    Chloride 102 97 - 108 mmol/L    CO2 25 21 - 32 mmol/L    Anion gap 10 5 - 15 mmol/L    Glucose 198 (H) 65 - 100 mg/dL    BUN 67 (H) 6 - 20 MG/DL    Creatinine 2.34 (H) 0.55 - 1.02 MG/DL    BUN/Creatinine ratio 29 (H) 12 - 20      GFR est AA 25 (L) >60 ml/min/1.73m2    GFR est non-AA 21 (L) >60 ml/min/1.73m2    Calcium 8.9 8.5 - 10.1 MG/DL    Bilirubin, total 0.5 0.2 - 1.0 MG/DL    ALT (SGPT) 22 12 - 78 U/L    AST (SGOT) 27 15 - 37 U/L    Alk.  phosphatase 104 45 - 117 U/L    Protein, total 6.2 (L) 6.4 - 8.2 g/dL    Albumin 2.2 (L) 3.5 - 5.0 g/dL    Globulin 4.0 2.0 - 4.0 g/dL    A-G Ratio 0.6 (L) 1.1 - 2.2     GLUCOSE, POC    Collection Time: 03/24/17  5:18 AM   Result Value Ref Range    Glucose (POC) 220 (H) 65 - 100 mg/dL    Performed by [address] [place] ACID, PLASMA    Collection Time: 03/24/17 11:28 AM   Result Value Ref Range    Lactic acid 1.0 0.4 - 2.0 MMOL/L   GLUCOSE, POC    Collection Time: 03/24/17  1:53 PM   Result Value Ref Range    Glucose (POC) 164 (H) 65 - 100 mg/dL    Performed by [facility]:     Hospital Problems  Date Reviewed: 3/24/2017 Codes Class Noted POA    Atrial flutter ([facility] ICD-10-CM: I48.92  ICD-9-CM: 427.32  3/12/2017 Unknown        SSS (sick sinus syndrome) (HCC) ICD-10-CM: I49.5  ICD-9-CM: 427.81  3/6/2017 Unknown        Paroxysmal atrial fibrillation (HCC) ICD-10-CM: I48.0  ICD-9-CM: 427.31  3/6/2017 Unknown        SVT (supraventricular tachycardia) ICD-10-CM: I47.1  ICD-9-CM: 427.89  3/3/2017 Unknown        Aneurysm ([facility] ICD-10-CM: I72.9  ICD-9-CM: 442.9  2/23/2017 Unknown        HTN (hypertension) ICD-10-CM: I10  ICD-9-CM: 401.9  Unknown Yes          left hyudronephrosis, JEANA, solitary fuinctional kidney    Plan: To OR today for urgent ureteral stent. I explained risks and benefits to patient. She understood and would like to proceed. Please monitor for post obstructive diuresis.     Signed By: Freddie Hathaway MD     March 24, 2017

## 2017-03-24 NOTE — PROGRESS NOTES
0300 - pt tolerated first HD well. 1.5 kg removed. Pt vitals stable and now sleeping.      0500 - pt turned and cleaned. Pt had another small BM. Pt thankful and pleasant.

## 2017-03-24 NOTE — PROGRESS NOTES
Physical Therapy Note    Completed chart review and discussed patient with nursing. Nursing reports patient about to be straight cathetered and then leaving off floor for surgery. Will follow up on Monday.     Thank you,  Mike Ricardo, PT, DPT

## 2017-03-24 NOTE — PROGRESS NOTES
Occupational Therapy    Completed chart review and discussed patient with nursing. Nursing reports patient about to be straight cathetered and then leaving off floor for surgery. Will follow up on Monday.     Thank you,    Jaren Ballard OTR/L

## 2017-03-24 NOTE — PROGRESS NOTES
Dr. Isai Rodriguez requested a review of the amiodarone medication dose. Discussed with Dr. Casey Avilez, decrease dose amiodarone 400mg daily. Follow up with Dr. Ehsan Zhang in 2 weeks.

## 2017-03-24 NOTE — PROGRESS NOTES
Nutrition Assessment:    INTERVENTIONS/RECOMMENDATIONS:   Resume PO diet and supplements as able s/p procedure    If PO intake remains poor and the gut is functional, enteral nutrition via NGT is preferred over the use of TPN. ASSESSMENT:   Patient with atrophic right kidney and left hydronephrosis; currently NPO for left stent. Patient was also started on dialysis; much more alert and oriented per notes after receiving first HD. PO intake has remained poor with complaints of nausea. Receiving multiple supplements and SLP has been following. SLP recommended mechanical soft/no liquids this morning as well as an MBS. She has been receiving TPN which is meeting approximately 77% of estimated kcal needs/100% protein needs. If patients PO intake remains poor s/p procedure, enteral nutrition should be utilized over TPN. Will continue to follow progress and plan of care and make further recommendations as needed. Diet Order: NPO (TPN D20 AA5% @ 63 mL/hr; provides 1331 kcal, 76g protein)  % Eaten:  Patient Vitals for the past 72 hrs:   % Diet Eaten   03/24/17 1017 0 %     Pertinent Medications: [x] Reviewed []Other: norvasc, wellbutrin, buspar, epogen, hydralazine, humalog, synthroid, protonix, miralax   Pertinent Labs: [x]Reviewed  []Other: BUN 67, Cr 2.34, -[phone]  Food Allergies: [x]None []Other:     Last BM: 3/23   []Active     [x]Hyperactive  []Hypoactive       [] Absent  BS  Skin:    [] Intact   [] Incision  [] Breakdown   [x]Edema   []Other:    Anthropometrics: Height: 5' 6.5\" (168.9 cm) Weight: 86.9 kg (191 lb 9.3 oz)    IBW (%IBW): 59.1 kg (130 lb 4.7 oz) ( ) UBW (%UBW): 68.2 kg (150 lb 5.7 oz) (  %)    BMI: Body mass index is 30.46 kg/(m^2).     This BMI is indicative of:  []Underweight   []Normal   []Overweight   [x] Obesity   [] Extreme Obesity (BMI>40)  Last Weight Metrics:  Weight Loss Metrics 3/24/2017 2/23/2017 2/23/2017 2/23/2017 2/21/2017 1/24/2017 8/24/2016   Today's Wt 191 lb 9.3 oz - 171 lb 15.3 oz - 170 lb 4 oz 171 lb 4 oz 166 lb 8 oz   BMI - 30.46 kg/m2 - 27.75 kg/m2 27.48 kg/m2 27.64 kg/m2 26.87 kg/m2       Estimated Nutrition Needs (Based on): 1730 Kcals/day (BMR (1442) x 1. 2AF) , 71 g (-83g (1.2-1.4 g/kg IBW)) Protein  Carbohydrate: At Least 130 g/day  Fluids:1750 mL/day     Pt expected to meet estimated nutrient needs: [x]Yes []No    NUTRITION DIAGNOSES:   Problem:  Inadequate protein-energy intake      Etiology: related to decreased appetite, nausea     Signs/Symptoms: as evidenced by PO <25% of meals; now NPO. TPN started       NUTRITION INTERVENTIONS:  Meals/Snacks: General/healthful diet Supplements: Commercial supplement              GOAL:   PO diet/supplements resumed next 1-3 days     NUTRITION MONITORING AND EVALUATION   Behavioral-Environmental Outcomes: Behavior  Food/Nutrient Intake Outcomes: Total energy intake, Enteral/parenteral nutrition intake  Physical Signs/Symptoms Outcomes: Weight/weight change, Electrolyte and renal profile, GI profile, Glucose profile    Previous Goal Met:   [] Met              [x] Progressing Towards Goal              [] Not Progressing Towards Goal   Previous Recommendations:   [x] Implemented          [] Not Implemented          [] Not Applicable    LEARNING NEEDS (Diet, Food/Nutrient-Drug Interaction):    [x] None Identified   [] Identified and Education Provided/Documented   [] Identified and Pt declined/was not appropriate     Cultural, Druze, OR Ethnic Dietary Needs:    [x] None Identified   [] Identified and Addressed     [x] Interdisciplinary Care Plan Reviewed/Documented    [x] Discharge Planning:  Will continue to follow for nutrition discharge plans    [] Participated in Interdisciplinary Rounds    NUTRITION RISK:    [x] High              [] Moderate           []  Low  []  Minimal/Uncompromised      Chuy Murphye  Pager [phone]              Weekend Pager [phone]

## 2017-03-24 NOTE — PERIOP NOTES
Handoff Report from Operating Room to PACU    Report received from [address] [place] [address] ANGELITA Gastelum and Lily Alonso RN regarding Julia Mejía. Surgeon(s):  Florinda Amin MD  And Procedure(s) (LRB):  CYSTOSCOPY LEFT URETERAL STENT PLACEMENT (Left)  confirmed   with allergies, drains and dressings discussed. Anesthesia type, drugs, patient history, complications, estimated blood loss, vital signs, intake and output, and last pain medication, lines and temperature were reviewed.

## 2017-03-24 NOTE — ANESTHESIA PREPROCEDURE EVALUATION
Anesthetic History   No history of anesthetic complications            Review of Systems / Medical History  Patient summary reviewed, nursing notes reviewed and pertinent labs reviewed    Pulmonary    COPD: moderate    Sleep apnea: No treatment  Shortness of breath         Neuro/Psych       CVA  TIA     Cardiovascular    Hypertension: well controlled        Dysrhythmias   CAD and CABG    Exercise tolerance: <4 METS  Comments: Type B Ao dissection repair   GI/Hepatic/Renal     GERD: well controlled    Renal disease: CRI and ARF       Endo/Other      Hypothyroidism: well controlled  Arthritis     Other Findings            Physical Exam    Airway  Mallampati: III  TM Distance: > 6 cm  Neck ROM: decreased range of motion   Mouth opening: Diminished (comment)     Cardiovascular          Murmur: Grade 3, Mitral area     Dental         Pulmonary      Decreased breath sounds: bilateral      Stridor     Abdominal  GI exam deferred       Other Findings            Anesthetic Plan    ASA: 4  Anesthesia type: general and total IV anesthesia    Monitoring Plan: BIS    Post procedure ventilation   Induction: Intravenous  Anesthetic plan and risks discussed with: Patient

## 2017-03-24 NOTE — PROGRESS NOTES
Brief contact with patient as Transport was in room preparing to take patient off floor.   Will follow up as able.miya Hayes Degree, [facility], [address], [address] [place] [address] [phone]    [facility] Paging Service  [phone]

## 2017-03-24 NOTE — CONSULTS
History of AAA repair with new Left hydro in setting of renal failure with Left kidney being only good kidney. Recommend Left stent. The procedure along with the attendant risks and complications and alternative options for treatment were discussed with the patient who understands and agrees to proceed.     Thank you    Kristel Melendez M.D.  [phone]

## 2017-03-24 NOTE — DISCHARGE INSTRUCTIONS
Patient Discharge Instructions    Mary Torres / [id number] : 1955    Admitted 2017 Discharged: 4/3/2017     Take Home Medications     · It is important that you take the medication exactly as they are prescribed. · Keep your medication in the bottles provided by the pharmacist and keep a list of the medication names, dosages, and times to be taken in your wallet. · Do not take other medications without consulting your doctor. What to do at [address] [place] [address] [place]: None    Recommended diet: Regular    Recommended activity: As Tolerated. Call Dr Lila Alvarez if you have questions or problems that the hospice team cannot resolve [phone]        Information obtained by :  I understand that if any problems occur once I am at home I am to contact my physician. I understand and acknowledge receipt of the instructions indicated above.                                                                                                                                            Physician's or R.N.'s Signature                                                                  Date/Time                                                                                                                                              Patient or Representative Signature                                                          Date/Time

## 2017-03-24 NOTE — DIALYSIS
[facility]                         [phone]  Vitals Pre Post Assessment Pre Post   /48 104/56 LOC AXOX2 AXOX3   HR 69 92 Lungs Diminished, coarse upper lobes Unchanged   Temp 98.2 98.3 Cardiac Paced Paced   Resp 16 18 Skin Warm/ dry, l clavicle staples line, abd bruises Unchanged   Weight 90.3 kg 89 kg Edema BLE 2+ non pitting   Generalized x1 Traces +1 BLE      Pain Denies Abd .9/10, floor RN made aware     Orders   Duration: Start: 0035 End: 0235 Total: 2 hours   Dialyzer: Revaclear   K Bath: 2.0    Ca Bath: 2.5    Na / Bicarb: 138/40   Target Fluid Removal: 1500 ml     Access   Type & Location: Left IJ CVC   Comments: Inserted on 03/23/17. Catheter placement verified. + aspiration and NS flush X2 ports. No drainage noted at site. Dressing  CDI w/ biopatch  In place Dated 3/23/17. Labs   Hep B status / date: Pending on 03/24/17   Obtained/Reviewed  Critical Results Called  Reviewed  N/A     Meds Given   Name Dose Route   N/A                 Total Liters Process: 22.7   Net Fluid Removed: 1500 ml    /  Comments   Time Out Done: 0030   Primary Nurse Rpt Pre: Myah Méndez RN   Primary Nurse Rpt Post: Rafi Delaney RN   Pt Education: HD concepts, procedure, access care   Care Plan: Continue HD as ordered. , reinforce HD concepts   Tx Summary: Pt tolerated  1 st tx fairly, c/o nausea but remained stable.  EOT, all possible blood returned , ports flushed and packed with NS only, clamps secured and pt in NAD.   //

## 2017-03-25 NOTE — PROGRESS NOTES
Grubbs reinserted last night for 1500cc by report. Patient had no sensation of a need to void. VSS/AF  Minimal urine output since grubbs placed. Urine dark. Creatinine no better. Impression: solitary functioning left kidney. Now s/p left ureteral stent placement. Plan:  -manually irrigate grubbs with sterile water prn poor drainage or clots. Hopefully renal function will improve with adequate drainage of left renal unit and bladder. Following.

## 2017-03-25 NOTE — PROGRESS NOTES
Supportive visit with patient on PCU. No visitors present. Patient was very drowsy this morning so visit was brief. Delivered a prayer blanket which touched patient deeply. Assured her there are people her care for her and are praying for her. Closed door on way and posted a sign requesting door be kept closed.   GREGG Ku, [facility], [address] [place] [address] [address]    Formerly [facility] Paging Service  [phone])    Plan of Care  [address] [place] [address] [phone]     1955  59 y.o.  female    Patient Telephone Number: [phone] (home)   Mosque Affiliation: Confucianist   Language: English   Extended Emergency Contact Information  Primary Emergency Contact: Boy Potts)  Address: [address] [facility], [address] [place] [address] [address] [place] [address] Phone: [phone]  Mobile Phone: [phone]  Relation: Spouse  Secondary Emergency Contact: [facility] Phone: [phone]  Relation: Spouse   Patient Active Problem List    Diagnosis Date Noted    Atrial flutter ([address] 03/12/2017    SSS (sick sinus syndrome) ([address] 03/06/2017    Paroxysmal atrial fibrillation ([address] 03/06/2017    SVT (supraventricular tachycardia) 03/03/2017    Aneurysm ([address] 02/23/2017    Symptomatic anemia 06/25/2016    Dyslipidemia 12/21/2015    Carotid disease, bilateral ([address] 12/21/2015    ABIMBOLA (obstructive sleep apnea) 12/21/2015    COPD (chronic obstructive pulmonary disease) ([address] 06/29/2015    Anemia 05/02/2015    Iron deficiency anemia due to chronic blood loss 04/21/2015    GI bleed 11/28/2014    Snoring 06/27/2013    Joint pain 06/27/2013    Disturbance of skin sensation 06/27/2013    Cerebral thrombosis without mention of cerebral infarction 06/27/2013    Acute lower GI bleeding 05/21/2013    TIA (transient ischemic attack) 12/23/2012    Hypokalemia 12/23/2012    Renal artery arteriosclerosis ([address] 12/23/2012    CAD (coronary artery disease) 08/19/2011    S/P CABG (coronary artery bypass graft) 08/19/2011    Hypercholesterolemia 03/11/2011    HTN (hypertension)     Depression with anxiety     Hypothyroid       Date of Admission: 2/23/2017  Unit and Room number: [facility] 2 PROGRESSIVE CARE    Today's Date: 3/25/2017        _X__ New PC  ___ Revised PC    Summary of Plan of Care:  Patient has not had visitors during her hospitalization. Her support system seems very limited and she appears very sad. Action Steps for Restorationism/Sacramental Needs:          Action Steps for Spiritual Needs:          Action Steps for Sociological Needs:          Action Steps for Psycho-emotional Needs:  2-3 visits weekly for reassurance and emotional support          This Plan of Care will be in effect until an updated Plan of Care is entered.   Signed by: Abigail Puentes, MPS, [address]     Paging Service  [phone])

## 2017-03-25 NOTE — PROGRESS NOTES
Nephrology Progress Note     Fransisco Chilel       www. [facility]                   Phone - [phone]   Patient: Yuli Garcia  YOB: 1955     Date- 3/25/2017     CC: Follow up for ARF         Subjective: Interval History:   Seen and examined on HD. Pt appears comfortable. No cp or sob reported. BP stable with dialysis. No c/o sob, fever. No c/o  vomiting  No c/o chest pain       Assessment:   · Acute renal failure - multiple etiology  1. ATN on admission  2. Left hydro. - on functioning kidney. She has right renal atrophy. Right renal atrophy might be a chronic issue. She had small right kidney on admission CT abdo. - she has left renal artery stent seen on CT angio --- blood flow seen on renal artery and vein on USG from 3-23  · Left hydronephrosis  · ckd 3 baseline cr. 1.2  · Resp. Failure - s/p extubation  · AFIB with RVR. Now s/p AFL ablation and pacemaker implant  · Complicated type B descending thoracic aortic dissection. -   · S/p TEVAR, L ileo-fem bypass, L carotid-subclavian bypass:2/27/17   · Protein malnutrition  · Renal artery stenosis - h/o left renal artery stent   · Abdominal pain - ? mesentric ischemia  · Ischemic spinal cord injury     Plan:   - HD today   - TPN reordered today  - continue norvasc 10 mg daily,clonidine patch and hydralazine po  - UF 1kg today on HD    [x] High complexity decision making was performed  [x] Patient is at high-risk of decompensation with multiple organ involvement  Review of Systems: Pertinent items are noted in HPI.   Objective:   Vitals:    03/25/17 1255 03/25/17 1300 03/25/17 1330 03/25/17 1400   BP: 179/62 197/61 153/54 177/55   Pulse: 85 83 87 87   Resp: 18 20 20 18   Temp: 98.5 °F (36.9 °C)      TempSrc: Oral      SpO2: 95% 95% 94% 96%   Weight:       Height:           Intake/Output Summary (Last 24 hours) at 03/25/17 1456  Last data filed at 03/25/17 1215   Gross per 24 hour   Intake              290 ml Output             1205 ml   Net             -915 ml     Physical Exam:   GEN: - NAD   Neck - supple, right ij amparo +, left upper neck staples +  RESP: clear b/l, no wheezing. Decreased at base  CVS: RRR,S1,S2 , edema +  SKIN: No Rash  ABDO: soft , non tender, No BS  Neuro- moves upper arm, normal speech  No grubbs        Chart reviewed. Pertinent Notes reviewed.    Medications list  reviewed     Current Facility-Administered Medications   Medication    TPN ADULT - CENTRAL AA 5% D20% W/ CA + ELECTROLYTES    TPN ADULT - CENTRAL AA 5% D20% W/ CA + ELECTROLYTES    epoetin fareed (EPOGEN;PROCRIT) injection 10,000 Units    amiodarone (CORDARONE) tablet 400 mg    albuterol-ipratropium (DUO-NEB) 2.5 MG-0.5 MG/3 ML    hydrALAZINE (APRESOLINE) tablet 100 mg    amLODIPine (NORVASC) tablet 10 mg    pantoprazole (PROTONIX) tablet 40 mg    buPROPion XL (WELLBUTRIN XL) tablet 150 mg    busPIRone (BUSPAR) tablet 5 mg    levothyroxine (SYNTHROID) tablet 100 mcg    insulin lispro (HUMALOG) injection    glucose chewable tablet 16 g    dextrose (D50W) injection syrg 12.5-25 g    glucagon (GLUCAGEN) injection 1 mg    labetalol (NORMODYNE;TRANDATE) injection 20 mg    cloNIDine (CATAPRES) 0.2 mg/24 hr patch 2 Patch    HYDROmorphone (PF) (DILAUDID) injection 0.5 mg    diphenhydrAMINE-zinc acetate 2%-0.1% (BENADRYL) cream    ADDaptor    vancomycin (VANCOCIN) 1,000 mg injection    0.9% sodium chloride (MBP/ADV) 0.9 % infusion    sodium chloride (NS) flush 5-10 mL    sodium chloride (NS) flush 5-10 mL    naloxone (NARCAN) injection 0.4 mg    sodium chloride (NS) flush 10-30 mL    sodium chloride (NS) flush 10 mL    sodium chloride (NS) flush 10 mL    sodium chloride (NS) flush 10-40 mL    sodium chloride (NS) flush 20 mL    phenol throat spray (CHLORASEPTIC) 1 Spray    mineral oil-hydrophil petrolat (AQUAPHOR) ointment    0.9% sodium chloride infusion 250 mL    heparin (porcine) injection 5,000 Units    polyethylene glycol (MIRALAX) packet 17 g    prochlorperazine (COMPAZINE) with saline injection 5 mg    sodium chloride (NS) flush 10 mL    sodium chloride (NS) flush 10-40 mL    acetaminophen (TYLENOL) tablet 650 mg    ondansetron (ZOFRAN) injection 4 mg    bisacodyl (DULCOLAX) suppository 10 mg              Data Review :  Recent Labs      03/25/17 0317 03/24/17   0513  03/23/17   0256   NA  135*  137  133*   K  3.7  3.5  3.8   CL  99  102  100   CO2  23  25  21   GLU  149*  198*  151*   BUN  80*  67*  94*   CREA  2.78*  2.34*  2.90*   CA  8.7  8.9  8.8   MG   --    --   1.8   PHOS   --    --   3.0   ALB  2.2*  2.2*  2.4*   SGOT  20  27  32   ALT  17  22  23     Recent Labs      03/25/17 0317 03/24/17   0513  03/23/17   0256   WBC  10.6  15.8*  14.1*   HGB  8.0*  9.0*  10.0*   HCT  24.2*  27.2*  31.1*   PLT  128*  143*  182     The right kidney is small and echogenic with an upper pole anechoic 1.2 x 1.1 x  1.2 cm cyst and a lower pole 1.2 x 1.4 x 1.2 cm anechoic cyst. Only venous  signal is identified within the parenchyma.      The left kidney has new moderate hydronephrosis. Both arterial and venous blood  flow is documented.      The right kidney measures 8 cm and the left kidney measures 13.6 cm in length.     The aorta, iliac arteries, and IVC are obscured by bowel gas. No retroperitoneal  mass is identified.     The urinary bladder is normal.     IMPRESSION  IMPRESSION:   Small atrophic right kidney. New moderate left hydronephrosis    Haley Quezada MD  [facility]  [facility] [place] [address], [facility], [address]  Phone - [phone]         Fax - [phone] [facility] Office  [address] [place], [place] [address]  Phone - [phone]        Fax - [phone]     [email or web address]

## 2017-03-25 NOTE — PROGRESS NOTES
12:15 PM  Bladder irrigated as per Dr Xuan Osei, no clots just minimal sediment. 3:37 PM  Dialysis completed. 4:45 PM  Bleeding from amparo, dressing saturated. Pressure held x 10mins and site continues to ooze. On-call HD RN paged. 5:10PM  Spoke with Jennifer Mejia HD RN, advised to use ice - at this time bleeding has stopped with new dressing in place. VSS.

## 2017-03-25 NOTE — OP NOTES
[address], [facility]   OP NOTE       Name:  Candi Read   MR#:  [id number]   :  1955   Account #:  [de-identified]    Surgery Date:  2017   Date of Adm:  2017       PREOPERATIVE DIAGNOSIS: Left hydronephrosis. POSTOPERATIVE DIAGNOSIS: Left hydronephrosis. PROCEDURES PERFORMED    Cystoscopy with retrograde pyelogram with interpretation, left ureteral   stent placement. SURGEON: Basia Heath MD.    ANESTHESIA: MAC. SPECIMENS REMOVED: Urine sent for culture. ESTIMATED BLOOD LOSS: None. FINDINGS   1. Very purulent urine in the bladder. Bladder washed out and sent for   culture. 2. Left moderate hydronephrosis. INDICATIONS FOR PROCEDURE: The patient is a 47-year-old   female with a solitary functioning left kidney. She recently had AAA   repair and has new onset left hydronephrosis. The right kidney is   completely atrophic and nonfunctional. Because she is in renal failure from her   obstructed left kidney, she is being brought to the operating room   today for left ureteral stent placement. DESCRIPTION OF PROCEDURE: After obtaining informed consent,   the patient was brought to the operating room, and placed on the   operating table in the supine position. Anesthesia was induced. The   patient was repositioned in dorsal lithotomy position, prepped and   draped in usual aseptic fashion. After final time-out, rigid cystoscope   was inserted to the bladder. It was 100% cloudy with 0%   visualization. The bladder was drained and sent for culture. Bladder was then   irrigated multiple times until it was finally clear enough to be able to   see. Pancystoscopy revealed some inflammation and no tumors or any   other lesions. Left ureteral orifice was identified and cannulated with a   5-Macanese TigerTail catheter.   It was injected with contrast.    RADIOGRAPHIC INTERPRETATION OF LEFT RETROGRADE   PYELOGRAM: Ureter, proximal ureter and kidney appeared   hydronephrotic moderately without filling defects. The distal ureter and   pelvic brim was thinned. This was consistent with obstructed left   kidney. Next, a 0.035 Sensor wire was advanced up to the kidney and   a 7 x 26 double-J ureteral stent was placed with good curl in the kidney   and good curl in the bladder, ensuring appropriate positioning. To   ensure maximal drainage a Palacio catheter was placed using sterile   technique with 10 mL in the balloon to gravity drainage. The patient   was then awakened from anesthesia and taken to recovery in healthy   stable condition having tolerated the procedure well.         Devon Diaz MD      JE / KAILEY   D:  03/24/2017   16:33   T:  03/24/2017   21:32   Job #:  [id number]

## 2017-03-25 NOTE — PROGRESS NOTES
Bedside shift change report given to Richelle Frederick (oncoming nurse) by Parveen Pinon RN (offgoing nurse). Report included the following information SBAR, Procedure Summary, Intake/Output, MAR, Recent Results and Cardiac Rhythm Lines.

## 2017-03-25 NOTE — PROGRESS NOTES
Problem: Patient Education: Go to Patient Education Activity  Goal: Patient/Family Education  Outcome: Progressing Towards Goal  Pt receiving dialysis today. Education given to patient and patient's family about length of treatment today and possible future treatment if needed.  Pt verbalized understanding

## 2017-03-25 NOTE — PROCEDURES
[facility] Team [facility]  [phone]    Vitals   Pre   Post   Assessment   Pre   Post     Temp  Temp: 98.5 °F (36.9 °C) (03/25/17 1255)  98.5 LOC  Alert and oriented x4 same   HR   Pulse (Heart Rate): 87 (03/25/17 1400) 89 Lungs   diminished on room air  placed on 2lnc   B/P   BP: 177/55 (03/25/17 1400) 178/58 Cardiac   Wnl  NSR on telmetry  same     Resp   Resp Rate: 18 (03/25/17 1400) 20 Skin   intact Same      Pain level  Pain Intensity 1: 0 (03/24/17 1736) 0 Edema  trace generalized     same   Orders:    Duration:   Start:   0013 End:   1530 Total:   2.5hrs   Dialyzer:   Dialyzer/Set Up Inspection: Alexei Ask (03/25/17 1255)   K Bath:   Dialysate K (mEq/L): 3 (03/25/17 1255)   Ca Bath:   Dialysate CA (mEq/L): 2.5 (03/25/17 1255)   Na/Bicarb:   Dialysate NA (mEq/L): 140 (03/25/17 1255)   Target Fluid Removal:   Goal/Amount of Fluid to Remove (mL): 1000 mL (03/25/17 1255)   Access     Type & Location:   Rt IJ amparo, ports cleaned and blood aspirated without any issues.  Dressing clean dry and intact   Labs     Obtained/Reviewed   Critical Results Called       HGB   Date Value Ref Range Status   03/25/2017 8.0 (L) 11.5 - 16.0 g/dL Final     K-    Potassium   Date Value Ref Range Status   03/25/2017 3.7 3.5 - 5.1 mmol/L Final     Ca-   Calcium   Date Value Ref Range Status   03/25/2017 8.7 8.5 - 10.1 MG/DL Final     Bun-   BUN   Date Value Ref Range Status   03/25/2017 80 (H) 6 - 20 MG/DL Final     Creat-   Creatinine   Date Value Ref Range Status   03/25/2017 2.78 (H) 0.55 - 1.02 MG/DL Final        Medications/ Blood Products Given     Name   Dose   Route and Time     Epogen 10,000u Primary nurse will give medication at scheduled time             Blood Volume Processed (BVP):   34.7 Net Fluid   Removed:  1kg   Comments   Time Out Done:   Primary Nurse Rpt Pre: Fidelia  Primary Nurse Rpt Post:Fidelia  Pt Education: about 2nd dialysis treatment and possible future treatments if needed  Care Plan: Acute Renal failure  Admiting Diagnosis: descending aortic dissection and severe stenosis of L common carotid artery, ARF   Tx Summary:  1255 treatment started  1400 Pt complaining of SOB. Patient family members just left from their visit and patient had been talking with them since they arrived. O2 sat's 95% on room air but 2lnc placed on patient. Patient encouraged to take slow deep breaths and try to get some rest to help relieve SOB  1410 Patietn resting comfortably and states she feels better  1510 Bleeding noted from [employer] access, dressing changed at this time  1530 treatment completed and blood rinsed back. New caps placed on each port and report given to nurse. Mistry. Nurse also notified to monitor CVC access site for bleeding Pt stable  Pt's previous clinic-not assigned yet  Consent signed - Informed Consent Verified: Yes (03/25/17 1309)  [facility] Consent - on file  Hepatitis Status- Negative as of 3/24/17  Machine #- Machine Number: [id number] (03/25/17 1255)  Telemetry status-NSR on telemetry  Pre-dialysis wt. - Pre-Dialysis Weight: 85.4 kg (188 lb 4.4 oz) (03/25/17 125)

## 2017-03-26 NOTE — PROGRESS NOTES
PCU SHIFT NURSING NOTE      Bedside and Verbal shift change report given to Aniyah Rivers RN (oncoming nurse) by Jovanny Christianson RN (offgoing nurse). Report included the following information SBAR, Kardex, ED Summary, OR Summary, Procedure Summary, Intake/Output, MAR, Recent Results and Cardiac Rhythm NSR/Paced. Shift Summary: 3864 6845: Pt placed on 2L NC per pt request. O2 sat 96% on room air. Will monitor. 2250: Pt medicated w/ PRN Dilaudid for abdominal pain 5/10. Will monitor. 0345: Pt c/o nausea. PRN compazine given. Pt drowsy & dozing in and out of sleep. No additional complaints. Will monitor. 5605: Bedside and Verbal shift change report given to Jovanny Christianson RN (oncoming nurse) by Aniyah Rivers RN (offgoing nurse). Report included the following information SBAR, Kardex, ED Summary, OR Summary, Procedure Summary, Intake/Output, MAR, Recent Results and Cardiac Rhythm NSR/Paced. Admission Date 2/23/2017   Admission Diagnosis Aneurysm ([address]  AORTIC DISECTION  Kidney Stone   Consults IP CONSULT TO VASCULAR SURGERY  IP CONSULT TO VASCULAR SURGERY  IP CONSULT TO HOSPITALIST  IP CONSULT TO NEPHROLOGY  IP CONSULT TO UROLOGY        Consults   []PT   []OT   []Speech   []Case Management      [] Palliative      Cardiac Monitoring Order   [x]Yes   []No     IV drips   [x]Yes    Drip: TPN                    Dose:  Drip:                            Dose:  Drip:                            Dose:   []No     GI Prophylaxis   [x]Yes   []No         DVT Prophylaxis   SCDs:  Sequential Compression Device: Bilateral     Patient Refused VTE Prophylaxis: Yes    Valeriy stockings:  Graduated Compression Stockings: Bilateral      [] Medication   []Contraindicated   []None      Activity Level Activity Level: Bed Rest     Activity Assistance: Complete care   Purposeful Rounding every 1-2 hour?    [x]Yes   Jean Baptiste Score  Total Score: 2   Bed Alarm (If score 3 or >)   [x]Yes   [] Refused (See signed refusal form in chart)   Robin Score  Robin Score: 13   Robin Score (if score 14 or less)   []PMT consult   []Wound Care consult      []Specialty bed   [] Nutrition consult          Needs prior to discharge:   Home O2 required:    []Yes   [x]No    If yes, how much O2 required?     Other:    Last Bowel Movement: Last Bowel Movement Date: 02/23/17      Influenza Vaccine Received Flu Vaccine for Current Season (usually Sept-March): No    Patient/Guardian Refused (Notify MD): Yes   Pneumonia Vaccine           Diet Active Orders   Diet    DIET DIABETIC CONSISTENT CARB Mechanical Soft; 1 NECTAR      LDAs         PICC Triple Lumen 73/51/89 Right;Basilic (Active)   Central Line Being Utilized Yes 3/25/2017  8:00 AM   Criteria for Appropriate Use Limited/no vessel suitable for conventional peripheral access 3/25/2017  8:00 AM   Site Assessment Clean, dry, & intact 3/25/2017  8:00 AM   Phlebitis Assessment 0 3/25/2017  8:00 AM   Infiltration Assessment 0 3/25/2017  8:00 AM   Arm Circumference (cm) 29 cm 3/2/2017  4:00 AM   Date of Last Dressing Change 03/22/17 3/24/2017 12:38 AM   Dressing Status Clean, dry, & intact 3/25/2017  8:00 AM   External Catheter Length (cm) 0 centimeters 3/24/2017 12:38 AM   Dressing Type Transparent;Disk with Chlorhexadine gluconate (CHG) 3/25/2017  8:00 AM   Action Taken Dressing changed 3/23/2017  3:30 PM   Hub Color/Line Status Gray 3/25/2017  8:00 AM   Positive Blood Return (Site #1) Yes 3/25/2017  4:36 AM   Hub Color/Line Status Red 3/25/2017  8:00 AM   Positive Blood Return (Site #2) No 3/25/2017  4:36 AM   Hub Color/Line Status White 3/25/2017  8:00 AM   Positive Blood Return (Site #3) No 3/25/2017  4:36 AM   Alcohol Cap Used Yes 3/22/2017  4:34 PM              Hemodialysis Catheter 03/23/17 (Active)   Central Line Being Utilized Yes 3/25/2017  1:00 PM   Criteria for Appropriate Use Dialysis/apheresis 3/25/2017  1:00 PM   Date Accessed  03/25/17 3/25/2017  1:00 PM   Access Time  1255 3/25/2017  1:00 PM   Site Assessment Clean, dry, & intact 3/25/2017  1:00 PM   Date of Last Dressing Change 03/23/17 3/25/2017  1:00 PM   Dressing Status Clean, dry, & intact 3/25/2017  1:00 PM   Dressing Type Transparent 3/25/2017  1:00 PM   Proximal Hub Color/Line Status Blue 3/25/2017  1:00 PM   Distal Hub Color/Line Status Red 3/25/2017  1:00 PM                  Urinary Catheter Urinary Catheter 03/24/17 2- way; Grubbs-Criteria for Appropriate Use: Surgical procedure (keep grubbs in as per MD)  [REMOVED] Urinary Catheter 02/24/17 Grubbs-Criteria for Appropriate Use: Medically/surgically unstable    Intake & Output   Date 03/24/17 1900 - 03/25/17 0659 03/25/17 0700 - 03/26/17 0659   Shift 2952-5197 24 Hour Total 7375-6994 2412-5037 24 Hour Total   I  N  T  A  K  E   P. O.  60 360  360      P. O.  60 360  360    I.V.  (mL/kg/hr)  355.6         I.V.  50         Volume (dextrose 20% infusion)  101.9         Volume (TPN ADULT-CENTRAL AA 5% D20%-MVI W/ VIT K-RCH)  203.7       Other   240  240      Irrigation Volume Input (mL) (Urinary Catheter 03/24/17 2- way; Grubbs)   240  240    Shift Total  (mL/kg)  415.6  (4.9) 600  (7)  600  (7)   O  U  T  P  U  T   Urine  (mL/kg/hr) 700 2475 875  (0.9)  875      Urine  1525         Urine Voided 700 950         Urine Output (mL) (Urinary Catheter 03/24/17 2- way; Grubbs)   875  875    Blood  5         Estimated Blood Loss  5       Dialysis   1000  1000      NET Fluid Removed (mL)   1000  1000    Shift Total  (mL/kg) 700  (8.2) 2480  (29) 1875  (22)  1875  (22)   NET -700 -2064.5 -1275  -1275   Weight (kg) 85.4 85.4 85.4 85.4 85.4         Readmission Risk Assessment Tool Score High Risk            26       Total Score        3 Relationship with PCP    2 Patient Living Status    3 Patient Length of Stay > 5    4 More than 1 Admission in calendar year    5 Patient Insurance is Medicare, Medicaid or Self Pay    9 Charlson Comorbidity Score       Expected Length of Stay 5d 16h   Actual Length of Stay 30

## 2017-03-26 NOTE — PROGRESS NOTES
Vascular Surgery  --no new changes; drowsy  --  Patient Vitals for the past 12 hrs:   Temp Pulse Resp BP SpO2   03/26/17 1054 98.7 °F (37.1 °C) 79 20 159/50 98 %   03/26/17 0842 99.1 °F (37.3 °C) 83 18 154/62 98 %   03/26/17 0350 98.3 °F (36.8 °C) 80 20 143/54 95 %   03/25/17 2341 98.3 °F (36.8 °C) 87 20 151/44 96 %     -mild abdominal complaints    Palacio still blood tinged  Recent Results (from the past 12 hour(s))   GLUCOSE, POC    Collection Time: 03/25/17 11:42 PM   Result Value Ref Range    Glucose (POC) 220 (H) 65 - 100 mg/dL    Performed by Nixon Stuart    CBC WITH AUTOMATED DIFF    Collection Time: 03/26/17  4:10 AM   Result Value Ref Range    WBC 10.6 3.6 - 11.0 K/uL    RBC 2.69 (L) 3.80 - 5.20 M/uL    HGB 7.6 (L) 11.5 - 16.0 g/dL    HCT 23.0 (L) 35.0 - 47.0 %    MCV 85.5 80.0 - 99.0 FL    MCH 28.3 26.0 - 34.0 PG    MCHC 33.0 30.0 - 36.5 g/dL    RDW 17.4 (H) 11.5 - 14.5 %    PLATELET 349 (L) 432 - 400 K/uL    NEUTROPHILS 82 (H) 32 - 75 %    LYMPHOCYTES 6 (L) 12 - 49 %    MONOCYTES 9 5 - 13 %    EOSINOPHILS 3 0 - 7 %    BASOPHILS 0 0 - 1 %    ABS. NEUTROPHILS 8.7 (H) 1.8 - 8.0 K/UL    ABS. LYMPHOCYTES 0.7 (L) 0.8 - 3.5 K/UL    ABS. MONOCYTES 0.9 0.0 - 1.0 K/UL    ABS. EOSINOPHILS 0.3 0.0 - 0.4 K/UL    ABS. BASOPHILS 0.0 0.0 - 0.1 K/UL   METABOLIC PANEL, COMPREHENSIVE    Collection Time: 03/26/17  4:10 AM   Result Value Ref Range    Sodium 136 136 - 145 mmol/L    Potassium 3.6 3.5 - 5.1 mmol/L    Chloride 100 97 - 108 mmol/L    CO2 26 21 - 32 mmol/L    Anion gap 10 5 - 15 mmol/L    Glucose 138 (H) 65 - 100 mg/dL    BUN 54 (H) 6 - 20 MG/DL    Creatinine 2.13 (H) 0.55 - 1.02 MG/DL    BUN/Creatinine ratio 25 (H) 12 - 20      GFR est AA 28 (L) >60 ml/min/1.73m2    GFR est non-AA 23 (L) >60 ml/min/1.73m2    Calcium 8.3 (L) 8.5 - 10.1 MG/DL    Bilirubin, total 0.5 0.2 - 1.0 MG/DL    ALT (SGPT) 18 12 - 78 U/L    AST (SGOT) 23 15 - 37 U/L    Alk.  phosphatase 117 45 - 117 U/L    Protein, total 5.9 (L) 6.4 - 8.2 g/dL Albumin 2.2 (L) 3.5 - 5.0 g/dL    Globulin 3.7 2.0 - 4.0 g/dL    A-G Ratio 0.6 (L) 1.1 - 2.2     GLUCOSE, POC    Collection Time: 03/26/17  6:01 AM   Result Value Ref Range    Glucose (POC) 223 (H) 65 - 100 mg/dL    Performed by Shemar Alberto          A/P:    --BP seems ideal; feet supported  --continuing current support; hopefully will continue to improve following bladder decompression

## 2017-03-26 NOTE — PROGRESS NOTES
PCU SHIFT NURSING NOTE      Bedside and Verbal shift change report given to Radha Box RN (oncoming nurse) by Chyna Medina RN (offgoing nurse). Report included the following information SBAR, Kardex, ED Summary, OR Summary, Procedure Summary, Intake/Output, MAR, Recent Results and Cardiac Rhythm NSR, Paced. Shift Summary: 6605    2100: Pt given bed bath. Gown and linens changed. Pt repositioned to comfort. Refusing heel boots. Educated pt on importance of heel boots d/t foot drop, pt voiced understanding but continues to refuse. Will try again later. Heels offloaded on pillows. Will monitor. 2350: PRN compazine given per pt request for nausea. Will monitor. 0630: Palacio irrigated w/ 120 cc sterile water per written order. No clots noted. Will monitor. 0715: Bedside and Verbal shift change report given to Catie Estrada RN (oncoming nurse) by Radha Box RN (offgoing nurse). Report included the following information SBAR, Kardex, ED Summary, OR Summary, Procedure Summary, Intake/Output, MAR, Recent Results and Cardiac Rhythm NSR, Paced.      Admission Date 2/23/2017   Admission Diagnosis Aneurysm ([facility] [address]  AORTIC DISECTION  Kidney Stone   Consults IP CONSULT TO VASCULAR SURGERY  IP CONSULT TO VASCULAR SURGERY  IP CONSULT TO HOSPITALIST  IP CONSULT TO NEPHROLOGY  IP CONSULT TO UROLOGY        Consults   []PT   []OT   []Speech   []Case Management      [] Palliative      Cardiac Monitoring Order   [x]Yes   []No     IV drips   [x]Yes    Drip: TPN                    Dose: 63 ml/hr  Drip:                            Dose:  Drip:                            Dose:   []No     GI Prophylaxis   [x]Yes   []No         DVT Prophylaxis   SCDs:  Sequential Compression Device: Bilateral     Patient Refused VTE Prophylaxis: Yes    Valeriy stockings:  Graduated Compression Stockings: Bilateral      [x] Medication   []Contraindicated   []None      Activity Level Activity Level: Bed Rest     Activity Assistance: Complete care   Purposeful Rounding every 1-2 hour?   [x]Yes   Jean Baptiste Score  Total Score: 2   Bed Alarm (If score 3 or >)   [x]Yes   [] Refused (See signed refusal form in chart)   Robin Score  Robin Score: 12   Robin Score (if score 14 or less)   []PMT consult   []Wound Care consult      []Specialty bed   [x] Nutrition consult          Needs prior to discharge:   Home O2 required:    []Yes   [x]No    If yes, how much O2 required? Other:    Last Bowel Movement: Last Bowel Movement Date: 03/23/17      Influenza Vaccine Received Flu Vaccine for Current Season (usually Sept-March): No    Patient/Guardian Refused (Notify MD): Yes   Pneumonia Vaccine           Diet Active Orders   Diet    DIET DIABETIC CONSISTENT CARB Mechanical Soft; 1 NECTAR      LDAs         PICC Triple Lumen 34/06/19 Right;Basilic (Active)   Central Line Being Utilized Yes 3/26/2017  3:00 PM   Criteria for Appropriate Use Limited/no vessel suitable for conventional peripheral access 3/26/2017  3:00 PM   Site Assessment Clean, dry, & intact 3/26/2017  3:00 PM   Phlebitis Assessment 0 3/26/2017  3:00 PM   Infiltration Assessment 0 3/26/2017  3:00 PM   Arm Circumference (cm) 29 cm 3/2/2017  4:00 AM   Date of Last Dressing Change 03/22/17 3/24/2017 12:38 AM   Dressing Status Clean, dry, & intact 3/26/2017  3:00 PM   External Catheter Length (cm) 0 centimeters 3/24/2017 12:38 AM   Dressing Type Disk with Chlorhexadine gluconate (CHG); Transparent 3/26/2017  3:00 PM   Action Taken Dressing changed 3/23/2017  3:30 PM   Hub Color/Line Status [place]; Infusing;Flushed 3/26/2017  3:00 PM   Positive Blood Return (Site #1) Yes 3/26/2017  3:00 PM   Hub Color/Line Status Red;Capped;Flushed 3/26/2017  3:00 PM   Positive Blood Return (Site #2) Yes 3/26/2017  3:00 PM   Hub Color/Line Status White;Capped;Flushed 3/26/2017  3:00 PM   Positive Blood Return (Site #3) Yes 3/26/2017  3:00 PM   Alcohol Cap Used Yes 3/26/2017  3:00 PM              Hemodialysis Catheter 03/23/17 (Active)   [employer] Being Utilized Yes 3/26/2017  3:00 PM   Criteria for Appropriate Use Dialysis/apheresis 3/26/2017  3:00 PM   Date Accessed  03/25/17 3/26/2017  3:50 AM   Access Time  7133 3/25/2017  1:00 PM   Site Assessment Clean, dry, & intact 3/26/2017  8:00 AM   Date of Last Dressing Change 03/25/17 3/26/2017  8:00 AM   Dressing Status Clean, dry, & intact 3/26/2017  8:00 AM   Dressing Type Transparent 3/26/2017  8:00 AM   Proximal Hub Color/Line Status Blue;Capped 3/26/2017  8:00 AM   Distal Hub Color/Line Status Red;Capped 3/26/2017  8:00 AM                  Urinary Catheter Urinary Catheter 03/24/17 2- way; Grubbs-Criteria for Appropriate Use: Obstruction/retention (s/p surgery, keep grubbs per MD)  [REMOVED] Urinary Catheter 02/24/17 Grubbs-Criteria for Appropriate Use: Medically/surgically unstable    Intake & Output   Date 03/25/17 1900 - 03/26/17 0659 03/26/17 0700 - 03/27/17 0659   Shift 6539-6376 24 Hour Total 9525-8797 8835-0433 24 Hour Total   I  N  T  A  K  E   P.O. 240 600 240  240      P. O. 240 600 240  240    I.V.  (mL/kg/hr) 575.4 575.4         Volume (TPN ADULT - CENTRAL AA 5% D20% W/ CA + ELECTROLYTES) 575.4 575.4       Other  240         Irrigation Volume Input (mL) (Urinary Catheter 03/24/17 2- way; Grubbs)  240       Shift Total  (mL/kg) 815.4  (9.5) 1415.4  (16.6) 240  (2.8)  240  (2.8)   O  U  T  P  U  T   Urine  (mL/kg/hr) 425 1300 175  (0.2)  175      Urine Output (mL) (Urinary Catheter 03/24/17 2- way; Grubbs) 425 1300 175  175    Dialysis  1000         NET Fluid Removed (mL)  1000       Shift Total  (mL/kg) 425  (5) 2300  (26.9) 175  (2.1)  175  (2.1)   .4 -884. 6 65  65   Weight (kg) 85.4 85.4 84.8 84.8 84.8         Readmission Risk Assessment Tool Score High Risk            26       Total Score        3 Relationship with PCP    2 Patient Living Status    3 Patient Length of Stay > 5    4 More than 1 Admission in calendar year    5 Patient Insurance is Medicare, Medicaid or Self Pay    9 Charlson Comorbidity Score Expected Length of Stay 5d 16h   Actual Length of Stay 31

## 2017-03-26 NOTE — PROGRESS NOTES
Patient sleeping. Urine still dark. Had hemodialysis yesterday.   VSS/AF  Urine output: 500/375/425 cc  Creatinine 2.1  Impression: s/p left ureteral stent for left ureteral obstruction  Plan:  -continue manual catheter irrigation  -continue supportive care

## 2017-03-26 NOTE — PROGRESS NOTES
PCU SHIFT NURSING NOTE      Bedside shift change report given to Dany (oncoming nurse) by Uri Vasquez (offgoing nurse). Report included the following information SBAR and Kardex. Shift Summary:   Pt found drinking thin liquids, given by visitors at bedside per her request-- visitors educated on diet, sign is already in place above bed- reinforced this to pt, however pt continues to make statements such as \"Ginger ale is thick enough\" and \"I'd know if it was going down the wrong way\" pt refuses to stop drinking thin liquids, irritable with staff/visitors. Will continue to educate and encourage thickened liquids -- plan to schedule modified barium swallow as per speech tx. Pt continues to c/o nausea/abd pain however has been able to take a few bites of food today. However pt continues to be non-compliant with thickened liquids, as mentioned above. 5:30PM  Palacio irrigated until urine clear yellow,  +sediment / no clots noted. 7PM  Pt requested to take off prevalon boots, heels elevated on pillows for a break as pt c/o feeling too hot with them in place.       Admission Date 2/23/2017   Admission Diagnosis Aneurysm ([facility] [address]  AORTIC DISECTION  Kidney Stone   Consults IP CONSULT TO VASCULAR SURGERY  IP CONSULT TO VASCULAR SURGERY  IP CONSULT TO HOSPITALIST  IP CONSULT TO NEPHROLOGY  IP CONSULT TO UROLOGY        Consults   [x]PT   [x]OT   [x]Speech   [x]Case Management      [] Palliative      Cardiac Monitoring Order   [x]Yes   []No     IV drips   []Yes    Drip:                            Dose:  Drip:                            Dose:  Drip:                            Dose:   []No     GI Prophylaxis   [x]Yes   []No         DVT Prophylaxis   SCDs:  Sequential Compression Device: Bilateral     Patient Refused VTE Prophylaxis: Yes    Valeriy stockings:  Graduated Compression Stockings: Bilateral      [x] Medication   []Contraindicated   []None      Activity Level Activity Level: Bed Rest     Activity Assistance: Complete care Purposeful Rounding every 1-2 hour? [x]Yes   Jean Baptiste Score  Total Score: 2   Bed Alarm (If score 3 or >)   [x]Yes   [] Refused (See signed refusal form in chart)   Robin Score  Robin Score: 12   Robin Score (if score 14 or less)   [x]PMT consult   []Wound Care consult      []Specialty bed   [x] Nutrition consult          Needs prior to discharge:   Home O2 required:    []Yes   [x]No    If yes, how much O2 required? Other:    Last Bowel Movement: Last Bowel Movement Date: 03/23/17      Influenza Vaccine Received Flu Vaccine for Current Season (usually Sept-March): No    Patient/Guardian Refused (Notify MD): Yes   Pneumonia Vaccine           Diet Active Orders   Diet    DIET DIABETIC CONSISTENT CARB Mechanical Soft; 1 NECTAR      LDAs         PICC Triple Lumen 87/53/56 Right;Basilic (Active)   Central Line Being Utilized Yes 3/26/2017  3:00 PM   Criteria for Appropriate Use Limited/no vessel suitable for conventional peripheral access 3/26/2017  3:00 PM   Site Assessment Clean, dry, & intact 3/26/2017  3:00 PM   Phlebitis Assessment 0 3/26/2017  3:00 PM   Infiltration Assessment 0 3/26/2017  3:00 PM   Arm Circumference (cm) 29 cm 3/2/2017  4:00 AM   Date of Last Dressing Change 03/22/17 3/24/2017 12:38 AM   Dressing Status Clean, dry, & intact 3/26/2017  3:00 PM   External Catheter Length (cm) 0 centimeters 3/24/2017 12:38 AM   Dressing Type Disk with Chlorhexadine gluconate (CHG); Transparent 3/26/2017  3:00 PM   Action Taken Dressing changed 3/23/2017  3:30 PM   Hub Color/Line Status [place]; Infusing;Flushed 3/26/2017  3:00 PM   Positive Blood Return (Site #1) Yes 3/26/2017  3:00 PM   Hub Color/Line Status Red;Capped;Flushed 3/26/2017  3:00 PM   Positive Blood Return (Site #2) Yes 3/26/2017  3:00 PM   Hub Color/Line Status White;Capped;Flushed 3/26/2017  3:00 PM   Positive Blood Return (Site #3) Yes 3/26/2017  3:00 PM   Alcohol Cap Used Yes 3/26/2017  3:00 PM              Hemodialysis Catheter 03/23/17 (Active)   Central Line Being Utilized Yes 3/26/2017  3:00 PM   Criteria for Appropriate Use Dialysis/apheresis 3/26/2017  3:00 PM   Date Accessed  03/25/17 3/26/2017  3:50 AM   Access Time  1255 3/25/2017  1:00 PM   Site Assessment Clean, dry, & intact 3/26/2017  8:00 AM   Date of Last Dressing Change 03/25/17 3/26/2017  8:00 AM   Dressing Status Clean, dry, & intact 3/26/2017  8:00 AM   Dressing Type Transparent 3/26/2017  8:00 AM   Proximal Hub Color/Line Status Blue;Capped 3/26/2017  8:00 AM   Distal Hub Color/Line Status Red;Capped 3/26/2017  8:00 AM                  Urinary Catheter Urinary Catheter 03/24/17 2- way; Grubbs-Criteria for Appropriate Use: Obstruction/retention (s/p surgery, keep grubbs per MD)  [REMOVED] Urinary Catheter 02/24/17 Grubbs-Criteria for Appropriate Use: Medically/surgically unstable    Intake & Output   Date 03/25/17 1900 - 03/26/17 0659 03/26/17 0700 - 03/27/17 0659   Shift 3324-4334 24 Hour Total 9746-8865 1773-1198 24 Hour Total   I  N  T  A  K  E   P.O. 240 600 660  660      P. O. 240 600 660  660    I.V.  (mL/kg/hr) 575.4 575.4         Volume (TPN ADULT - CENTRAL AA 5% D20% W/ CA + ELECTROLYTES) 575.4 575.4       Other  240 200  200      Irrigation Volume Input (mL) (Urinary Catheter 03/24/17 2- way; Grubbs)  240 200  200    Shift Total  (mL/kg) 815.4  (9.5) 1415.4  (16.6) 860  (10.1)  860  (10.1)   O  U  T  P  U  T   Urine  (mL/kg/hr) 425 1300 600  (0.6)  600      Urine Output (mL) (Urinary Catheter 03/24/17 2- way; Grubbs) 425 1300 600  600    Dialysis  1000         NET Fluid Removed (mL)  1000       Shift Total  (mL/kg) 425  (5) 2300  (26.9) 600  (7.1)  600  (7.1)   .4 -884.6 260  260   Weight (kg) 85.4 85.4 84.8 84.8 84.8         Readmission Risk Assessment Tool Score High Risk            26       Total Score        3 Relationship with PCP    2 Patient Living Status    3 Patient Length of Stay > 5    4 More than 1 Admission in calendar year    5 Patient Insurance is Medicare, Medicaid or Self Pay    9 Charlson Comorbidity Score       Expected Length of Stay 5d 16h   Actual Length of Stay 31

## 2017-03-26 NOTE — PROGRESS NOTES
Vascular Surgery  --seen earlier this morning; mentally seems appropriate  --no real complaints of pain;   + some blood in grubbs tube; getting irrigated  --need to keep Grubbs in clearly    --incisions are clean; +bilateral foot drop--may benefit from boots  --wbc down; Cr seems to be at baseline  --continuing abx, observation

## 2017-03-26 NOTE — PROGRESS NOTES
Nephrology Progress Note     Fransisco Chilel       [email or web address]                   Phone - [phone]   Patient: Kaur Suarez  YOB: 1955     Date- 3/26/2017     CC: Follow up for ARF         Subjective: Interval History:   Seen and examined. HD ok yesterday. UOP 1.3 liters. No cp or sob reported. Feels tired but otherwise ok. No c/o sob, fever. No c/o  vomiting  No c/o chest pain       Assessment:   · Acute renal failure - multiple etiology  1. ATN on admission  2. Left hydro. - on functioning kidney. She has right renal atrophy. Right renal atrophy might be a chronic issue. She had small right kidney on admission CT abdo. - she has left renal artery stent seen on CT angio --- blood flow seen on renal artery and vein on USG from 3-23  · Left hydronephrosis  · ckd 3 baseline cr. 1.2  · Resp. Failure - s/p extubation  · AFIB with RVR. Now s/p AFL ablation and pacemaker implant  · Complicated type B descending thoracic aortic dissection. -   · S/p TEVAR, L ileo-fem bypass, L carotid-subclavian bypass:2/27/17   · Protein malnutrition  · Renal artery stenosis - h/o left renal artery stent   · Abdominal pain - ? mesentric ischemia  · Ischemic spinal cord injury     Plan:   - No HD today  - follow labs and watch for recovery  - reordered TPN today  - will reassess for HD needs in AM  - cont current BP meds    [x] High complexity decision making was performed  [x] Patient is at high-risk of decompensation with multiple organ involvement  Review of Systems: Pertinent items are noted in HPI.   Objective:   Vitals:    03/25/17 2220 03/25/17 2341 03/26/17 0350 03/26/17 0709   BP: 172/63 151/44 143/54    Pulse: 88 87 80    Resp:  20 20    Temp:  98.3 °F (36.8 °C) 98.3 °F (36.8 °C)    TempSrc:       SpO2:  96% 95%    Weight:    84.8 kg (187 lb)   Height:           Intake/Output Summary (Last 24 hours) at 03/26/17 1048  Last data filed at 03/26/17 0659   Gross per 24 hour Intake           1295.4 ml   Output             1950 ml   Net           -654.6 ml     Physical Exam:   GEN: - NAD   Neck - supple, right ij amparo +, left upper neck staples +  RESP: clear b/l, no wheezing. Decreased at base  CVS: RRR,S1,S2 , edema +  SKIN: No Rash  ABDO: soft , non tender, No BS  Neuro- moves upper arm, normal speech  No grubbs        Chart reviewed. Pertinent Notes reviewed.    Medications list  reviewed     Current Facility-Administered Medications   Medication    TPN ADULT - CENTRAL AA 5% D20% W/ CA + ELECTROLYTES    epoetin fareed (EPOGEN;PROCRIT) injection 10,000 Units    amiodarone (CORDARONE) tablet 400 mg    albuterol-ipratropium (DUO-NEB) 2.5 MG-0.5 MG/3 ML    hydrALAZINE (APRESOLINE) tablet 100 mg    amLODIPine (NORVASC) tablet 10 mg    pantoprazole (PROTONIX) tablet 40 mg    buPROPion XL (WELLBUTRIN XL) tablet 150 mg    busPIRone (BUSPAR) tablet 5 mg    levothyroxine (SYNTHROID) tablet 100 mcg    insulin lispro (HUMALOG) injection    glucose chewable tablet 16 g    dextrose (D50W) injection syrg 12.5-25 g    glucagon (GLUCAGEN) injection 1 mg    labetalol (NORMODYNE;TRANDATE) injection 20 mg    cloNIDine (CATAPRES) 0.2 mg/24 hr patch 2 Patch    HYDROmorphone (PF) (DILAUDID) injection 0.5 mg    diphenhydrAMINE-zinc acetate 2%-0.1% (BENADRYL) cream    ADDaptor    vancomycin (VANCOCIN) 1,000 mg injection    0.9% sodium chloride (MBP/ADV) 0.9 % infusion    sodium chloride (NS) flush 5-10 mL    sodium chloride (NS) flush 5-10 mL    naloxone (NARCAN) injection 0.4 mg    sodium chloride (NS) flush 10-30 mL    sodium chloride (NS) flush 10 mL    sodium chloride (NS) flush 10 mL    sodium chloride (NS) flush 10-40 mL    sodium chloride (NS) flush 20 mL    phenol throat spray (CHLORASEPTIC) 1 Spray    mineral oil-hydrophil petrolat (AQUAPHOR) ointment    0.9% sodium chloride infusion 250 mL    heparin (porcine) injection 5,000 Units  polyethylene glycol (MIRALAX) packet 17 g    prochlorperazine (COMPAZINE) with saline injection 5 mg    sodium chloride (NS) flush 10 mL    sodium chloride (NS) flush 10-40 mL    acetaminophen (TYLENOL) tablet 650 mg    ondansetron (ZOFRAN) injection 4 mg    bisacodyl (DULCOLAX) suppository 10 mg              Data Review :  Recent Labs      03/26/17 0410 03/25/17 0317 03/24/17   0513   NA  136  135*  137   K  3.6  3.7  3.5   CL  100  99  102   CO2  26  23  25   GLU  138*  149*  198*   BUN  54*  80*  67*   CREA  2.13*  2.78*  2.34*   CA  8.3*  8.7  8.9   ALB  2.2*  2.2*  2.2*   SGOT  23  20  27   ALT  18  17  22     Recent Labs      03/26/17 0410 03/25/17 0317 03/24/17   0513   WBC  10.6  10.6  15.8*   HGB  7.6*  8.0*  9.0*   HCT  23.0*  24.2*  27.2*   PLT  115*  128*  143*     The right kidney is small and echogenic with an upper pole anechoic 1.2 x 1.1 x  1.2 cm cyst and a lower pole 1.2 x 1.4 x 1.2 cm anechoic cyst. Only venous  signal is identified within the parenchyma.      The left kidney has new moderate hydronephrosis. Both arterial and venous blood  flow is documented.      The right kidney measures 8 cm and the left kidney measures 13.6 cm in length.     The aorta, iliac arteries, and IVC are obscured by bowel gas. No retroperitoneal  mass is identified.     The urinary bladder is normal.     IMPRESSION  IMPRESSION:   Small atrophic right kidney. New moderate left hydronephrosis    David Tran MD  [facility]  [facility] SYSTM FRANCISCAN [facility] [address], [place], [address]  Phone - [phone]         Fax - [phone] [facility] Office  [address] [place] [address]  Phone - [phone]        Fax - [phone]     [email or web address]

## 2017-03-27 NOTE — PROGRESS NOTES
Chart review. Update to [facility]. Patient has requested location near [address] [place] [address]. CM will continue to follow.     Adán Marie RN CM  Ext [phone]

## 2017-03-27 NOTE — PROGRESS NOTES
Urine still dark, no clots in bag.     VSS/AF  Urine output: 725 overnight  Creatinine 2.6  Impression: s/p left ureteral stent for left ureteral obstruction  Plan:  -continue manual catheter irrigation  -continue supportive care

## 2017-03-27 NOTE — PROGRESS NOTES
PCU SHIFT NURSING NOTE      Bedside shift change report given to Vane Guillermo by Carri Payne. Report included the following information SBAR, Kardex, ED Summary, Intake/Output, MAR, Recent Results, Med Rec Status and Cardiac Rhythm Paced. Shift Summary:       Admission Date 2/23/2017   Admission Diagnosis Aneurysm ([address]  AORTIC DISECTION  Kidney Stone   Consults IP CONSULT TO VASCULAR SURGERY  IP CONSULT TO VASCULAR SURGERY  IP CONSULT TO HOSPITALIST  IP CONSULT TO NEPHROLOGY  IP CONSULT TO UROLOGY        Consults   []PT   []OT   []Speech   []Case Management      [] Palliative      Cardiac Monitoring Order   [x]Yes   []No     IV drips   []Yes    Drip:                            Dose:  Drip:                            Dose:  Drip:                            Dose:   [x]No     GI Prophylaxis   [x]Yes   []No         DVT Prophylaxis   SCDs:  Sequential Compression Device: Bilateral     Patient Refused VTE Prophylaxis: Yes    Valeriy stockings:  Graduated Compression Stockings: Bilateral      [x] Medication   []Contraindicated   []None      Activity Level Activity Level: Bed Rest     Activity Assistance: Complete care   Purposeful Rounding every 1-2 hour? [x]Yes   Jean Baptiste Score  Total Score: 2   Bed Alarm (If score 3 or >)   []Yes   [] Refused (See signed refusal form in chart)   Robin Score  Robin Score: 13   Robin Score (if score 14 or less)   []PMT consult   []Wound Care consult      []Specialty bed   [] Nutrition consult          Needs prior to discharge:   Home O2 required:    []Yes   []No    If yes, how much O2 required?     Other:    Last Bowel Movement: Last Bowel Movement Date: 03/23/17      Influenza Vaccine Received Flu Vaccine for Current Season (usually Sept-March): No    Patient/Guardian Refused (Notify MD): Yes   Pneumonia Vaccine           Diet Active Orders   Diet    DIET DIABETIC CONSISTENT CARB Mechanical Soft; 1 NECTAR      LDAs         PICC Triple Lumen 23/77/22 Right;Basilic (Active)   [employer] Being Utilized Yes 3/27/2017  3:06 AM   Criteria for Appropriate Use Limited/no vessel suitable for conventional peripheral access 3/27/2017  3:06 AM   Site Assessment Clean, dry, & intact 3/27/2017  3:06 AM   Phlebitis Assessment 0 3/27/2017  3:06 AM   Infiltration Assessment 0 3/27/2017  3:06 AM   Arm Circumference (cm) 29 cm 3/2/2017  4:00 AM   Date of Last Dressing Change 03/22/17 3/24/2017 12:38 AM   Dressing Status Clean, dry, & intact 3/27/2017  3:06 AM   External Catheter Length (cm) 0 centimeters 3/24/2017 12:38 AM   Dressing Type Disk with Chlorhexadine gluconate (CHG); Transparent 3/27/2017  3:06 AM   Action Taken Dressing changed 3/23/2017  3:30 PM   Hub Color/Line Status Sarah Belch; Infusing 3/27/2017  3:06 AM   Positive Blood Return (Site #1) Yes 3/27/2017  3:06 AM   Hub Color/Line Status Red;Flushed;Capped 3/27/2017  3:06 AM   Positive Blood Return (Site #2) Yes 3/27/2017  3:06 AM   Hub Color/Line Status White;Flushed;Capped 3/27/2017  3:06 AM   Positive Blood Return (Site #3) Yes 3/27/2017  3:06 AM   Alcohol Cap Used Yes 3/27/2017  3:06 AM              Hemodialysis Catheter 03/23/17 (Active)   Central Line Being Utilized Yes 3/27/2017  3:06 AM   Criteria for Appropriate Use Dialysis/apheresis 3/27/2017  3:06 AM   Date Accessed  03/25/17 3/26/2017  3:50 AM   Access Time  1255 3/25/2017  1:00 PM   Site Assessment Clean, dry, & intact 3/26/2017  8:00 AM   Date of Last Dressing Change 03/25/17 3/27/2017  3:06 AM   Dressing Status Clean, dry, & intact 3/27/2017  3:06 AM   Dressing Type Transparent 3/27/2017  3:06 AM   Proximal Hub Color/Line Status Blue;Capped 3/27/2017  3:06 AM   Distal Hub Color/Line Status Red;Capped 3/27/2017  3:06 AM                  Urinary Catheter Urinary Catheter 03/24/17 2- way; Palacio-Criteria for Appropriate Use: Obstruction/retention, Surgical procedure (to remain in per MD)  [REMOVED] Urinary Catheter 02/24/17 Palacio-Criteria for Appropriate Use: Medically/surgically unstable    Intake & Output   Date 03/26/17 0700 - 03/27/17 0659 03/27/17 0700 - 03/28/17 0659   Shift [id number] 1900-0659 24 Hour Total 4962-3605 3109-8411 24 Hour Total   I  N  T  A  K  E   P.O.          P. O.        I.V.  (mL/kg/hr)  716.1  (0.7) 716.1  (0.3)         Volume (TPN ADULT - CENTRAL AA 5% D20% W/ CA + ELECTROLYTES)  716.1 716.1       Other 200 120 320         Irrigation Volume Input (mL) (Urinary Catheter 03/24/17 2- way; Palacio) 200 120 320       Shift Total  (mL/kg) 860  (10.1) 1316.1  (15.2) 2176.1  (25.1)      O  U  T  P  U  T   Urine  (mL/kg/hr) 600  (0.6) 725  (0.7) 1325  (0.6)         Urine Output (mL) (Urinary Catheter 03/24/17 2- way; Palacio)        Shift Total  (mL/kg) 600  (7.1) 725  (8.4) 1325  (15.3)       591.1 851.1      Weight (kg) 84.8 86.5 86.5 86.5 86.5 86.5         Readmission Risk Assessment Tool Score High Risk            26       Total Score        3 Relationship with PCP    2 Patient Living Status    3 Patient Length of Stay > 5    4 More than 1 Admission in calendar year    5 Patient Insurance is Medicare, Medicaid or Self Pay    9 Charlson Comorbidity Score       Expected Length of Stay 5d 16h   Actual Length of Stay 32

## 2017-03-27 NOTE — PROGRESS NOTES
Nephrology Progress Note     Fransisco Chilel       [email or web address]                   Phone - [phone]   Patient: Geoffrey Almaguer  YOB: 1955     Date- 3/27/2017     CC: Follow up for ARF         Subjective: Interval History:   Seen and examined. bp high  C/o nausea and abdominal pain  Cr. Increased today  Hematuria noted  Urine cx positive for candida  No sob  Feels tired     Assessment:   · Acute renal failure - multiple etiology  1. ATN on admission  2. Left hydro. - on functioning kidney. She has right renal atrophy. Right renal atrophy might be a chronic issue. She had small right kidney on admission CT abdo. - she has left renal artery stent seen on CT angio --- blood flow seen on renal artery and vein on USG from 3-23  · Left hydronephrosis- s/p cystoscopy and stent placement 3-24  · ckd 3 baseline cr. 1.2  · Candida UTI  · Resp. Failure - s/p extubation  · AFIB with RVR. Now s/p AFL ablation and pacemaker implant  · Complicated type B descending thoracic aortic dissection. -   · S/p TEVAR, L ileo-fem bypass, L carotid-subclavian bypass:2/27/17   · Protein malnutrition  · Renal artery stenosis - h/o left renal artery stent   · Abdominal pain - ? mesentric ischemia  · Ischemic spinal cord injury     Plan:   - HD today  - start fluconazole 200 mg daily  - cont current BP meds  - check phos and mg  - TPN ordered  - restart minoxidil    [x] High complexity decision making was performed  [x] Patient is at high-risk of decompensation with multiple organ involvement  Review of Systems: Pertinent items are noted in HPI.   Objective:   Vitals:    03/26/17 2335 03/27/17 0306 03/27/17 0638 03/27/17 0804   BP: 166/64 155/52  175/61   Pulse: 76 85  83   Resp: 18 20  18   Temp: 99 °F (37.2 °C) 98.7 °F (37.1 °C)  99.5 °F (37.5 °C)   TempSrc:       SpO2: 98% 98%  100%   Weight:   86.5 kg (190 lb 12.8 oz)    Height:           Intake/Output Summary (Last 24 hours) at 03/27/17 3334  Last data filed at 03/27/17 0659   Gross per 24 hour   Intake           2176.1 ml   Output             1325 ml   Net            851.1 ml     Physical Exam:   GEN: - NAD   Neck - supple, right ij amparo +, left upper neck staples +  RESP: clear b/l, no wheezing. Decreased at base  CVS: RRR,S1,S2 , edema +  SKIN: No Rash  ABDO: soft , non tender, No BS  Neuro- moves upper arm, normal speech  + grubbs, hematuria +        Chart reviewed. Pertinent Notes reviewed.    Medications list  reviewed     Current Facility-Administered Medications   Medication    fluconazole (DIFLUCAN) tablet 400 mg    TPN ADULT - CENTRAL AA 5% D20% W/ CA + ELECTROLYTES    epoetin fareed (EPOGEN;PROCRIT) injection 10,000 Units    amiodarone (CORDARONE) tablet 400 mg    albuterol-ipratropium (DUO-NEB) 2.5 MG-0.5 MG/3 ML    hydrALAZINE (APRESOLINE) tablet 100 mg    amLODIPine (NORVASC) tablet 10 mg    pantoprazole (PROTONIX) tablet 40 mg    buPROPion XL (WELLBUTRIN XL) tablet 150 mg    busPIRone (BUSPAR) tablet 5 mg    levothyroxine (SYNTHROID) tablet 100 mcg    insulin lispro (HUMALOG) injection    glucose chewable tablet 16 g    dextrose (D50W) injection syrg 12.5-25 g    glucagon (GLUCAGEN) injection 1 mg    labetalol (NORMODYNE;TRANDATE) injection 20 mg    cloNIDine (CATAPRES) 0.2 mg/24 hr patch 2 Patch    HYDROmorphone (PF) (DILAUDID) injection 0.5 mg    diphenhydrAMINE-zinc acetate 2%-0.1% (BENADRYL) cream    ADDaptor    vancomycin (VANCOCIN) 1,000 mg injection    0.9% sodium chloride (MBP/ADV) 0.9 % infusion    sodium chloride (NS) flush 5-10 mL    naloxone (NARCAN) injection 0.4 mg    sodium chloride (NS) flush 10-30 mL    sodium chloride (NS) flush 10 mL    sodium chloride (NS) flush 10-40 mL    sodium chloride (NS) flush 20 mL    phenol throat spray (CHLORASEPTIC) 1 Spray    mineral oil-hydrophil petrolat (AQUAPHOR) ointment    0.9% sodium chloride infusion 250 mL    heparin (porcine) injection 5,000 Units    polyethylene glycol (MIRALAX) packet 17 g    prochlorperazine (COMPAZINE) with saline injection 5 mg    sodium chloride (NS) flush 10 mL    sodium chloride (NS) flush 10-40 mL    acetaminophen (TYLENOL) tablet 650 mg    bisacodyl (DULCOLAX) suppository 10 mg              Data Review :  Recent Labs      03/27/17 0320 03/26/17 0410  03/25/17 0317   NA  135*  136  135*   K  3.9  3.6  3.7   CL  99  100  99   CO2  23  26  23   GLU  137*  138*  149*   BUN  66*  54*  80*   CREA  2.53*  2.13*  2.78*   CA  8.5  8.3*  8.7   ALB  2.2*  2.2*  2.2*   SGOT  27  23  20   ALT  20  18  17     Recent Labs      03/27/17 0320 03/26/17 0410 03/25/17 0317   WBC  11.2*  10.6  10.6   HGB  7.7*  7.6*  8.0*   HCT  23.2*  23.0*  24.2*   PLT  138*  115*  128*     The right kidney is small and echogenic with an upper pole anechoic 1.2 x 1.1 x  1.2 cm cyst and a lower pole 1.2 x 1.4 x 1.2 cm anechoic cyst. Only venous  signal is identified within the parenchyma.      The left kidney has new moderate hydronephrosis. Both arterial and venous blood  flow is documented.      The right kidney measures 8 cm and the left kidney measures 13.6 cm in length.     The aorta, iliac arteries, and IVC are obscured by bowel gas. No retroperitoneal  mass is identified.     The urinary bladder is normal.     IMPRESSION  IMPRESSION:   Small atrophic right kidney. New moderate left hydronephrosis    Carlie Jimenes, [address] [facility]  [facility] [address], [place]elam Islasu, [address]  Phone - [phone]         Fax - [phone] [facility] Office  [address] [place] [address]  Phone - [phone]        Fax - [phone]     [email or web address]

## 2017-03-27 NOTE — PROGRESS NOTES
[facility] has accepted patient when medically stable. Patient requests [facility] location. Will require an EMTALA transport at appropriate time.     Anisa Wolf RN CM  Ext [phone]

## 2017-03-27 NOTE — PROGRESS NOTES
Pharmacy Automatic Renal Dosing Protocol - Antimicrobials    Indication for Antimicrobials: candiduria. Had left ureteral stent for obstruction  Current Regimen of Each Antimicrobial (Start Day & Day of Therapy):  Fluconazole 400 mg every day (3/27; day #1    Significant Cultures:   3/11: Sputum cx: PSEUDOMONAS AERUGINOSA - final   3/24: Urine cx: CANDIDA ALBICANS - Final    CAPD, Hemodialysis or Renal Replacement Therapy: HD   Paralysis, amputations, malnutrition: na  Recent Labs      17   0320  17   0410  17   0317   CREA  2.53*  2.13*  2.78*   BUN  66*  54*  80*   WBC  11.2*  10.6  10.6     Temp (24hrs), Av.2 °F (37.3 °C), Min:98.7 °F (37.1 °C), Max:99.8 °F (37.7 °C)    Creatinine Clearance (Creatinine Clearance (ml/min)): HD     Impression/Plan: Patient with candida albicans in the urine and had stent placed on the left ureteral due to obstruction. The appropriate dose is from 200 - 800 mg of fluconazole and will start with 400 mg every day. Both Amiodarone and Fluconazole prolong QTc and ordered for a daily QTc measurements to be documented on the MAR (under QTc). Pharmacy will follow daily and adjust medications as appropriate for renal function and/or serum levels.     Thank you,  Samarai Navarro, PHARMD

## 2017-03-27 NOTE — PROGRESS NOTES
Problem: Dysphagia (Adult)  Goal: *Speech Goal: (INSERT TEXT)  3/27/2017  Speech path reeval   1. Pt will tolerate dys 3/diet with nectar thick liquids with no overt s/s of aspiration. 2. Pt will tolerate thin liquids with no overt s/s of aspiration. 3/14/2017  Speech path reeval  1. Pt will tolerate dys 3/mech soft diet with thins with no overt s/s of aspiration. Goal not met. Speech path goals:  1. Pt will participate with reeval of swallowing daily until started on a diet. Goal met 3/20. 2. Pt will tolerate ice chips for pleasure as fed by staff. Goal met 3/20. 3. Pt will tolerate modified diet without s/s of aspiration. Pt will have purees only with no liquids with no overt s/s of aspiration. Goal met 3/20. SPEECH LANGUAGE PATHOLOGY DYSPHAGIA TREATMENT: WEEKLY REASSESSMENT  Patient: Kaur Suarez (64 y.o. female)  Date: 3/27/2017  Diagnosis: Aneurysm ([address]  AORTIC DISECTION  Kidney Stone <principal problem not specified>  Procedure(s) (LRB):  CYSTOSCOPY LEFT URETERAL STENT PLACEMENT (Left) 3 Days Post-Op  Precautions:  Fall      ASSESSMENT:  Pt seen today for swallowing reeval. There was question of tolerance for current diet at the end of last week when evaluated by Speech. She tolerated her po well today. Masticated the cracker timely and drank nectar thick liquids via cup and straw with no choking or coughing. Vocal quality was grossly wnl. Patient's progression toward goals since last assessment: fair       PLAN:  Goals have been updated based on progression since last assessment. Patient continues to benefit from skilled intervention to address the above impairments. Continue to follow the patient 4 times a week to address goals. Recommendations and Planned Interventions:  Continue with current diet. Discharge Recommendations: To Be Determined       SUBJECTIVE:   Patient stated she felt nauseated after eating and takes very little po. .      OBJECTIVE:   Cognitive and Communication Status:  Neurologic State: Alert  Orientation Level: Oriented to person, Oriented to place (year )  Cognition: Follows commands  Perception: Appears intact  Perseveration: No perseveration noted  Safety/Judgement: Decreased insight into deficits  Dysphagia Treatment:        P.O. Trials:     Vocal quality prior to P.O.: No impairment  Consistency Presented: Nectar thick liquid;Mechanical soft  How Presented: Self-fed/presented;Cup/sip;Straw     Bolus Acceptance: No impairment  Bolus Formation/Control: No impairment     Propulsion: No impairment  Oral Residue: None  Initiation of Swallow: No impairment  Laryngeal Elevation: Functional  Aspiration Signs/Symptoms: None  Pharyngeal Phase Characteristics: No impairment, issues, or problems            Oral Phase Severity: No impairment  Pharyngeal Phase Severity : No impairment        Pain:  Pain Scale 1: Numeric (0 - 10)  Pain Intensity 1: 3  Pain Location 1: Abdomen;Back;Neck  After treatment:   [ ]                Patient left in no apparent distress sitting up in chair  [X]                Patient left in no apparent distress in bed  [X]                Call bell left within reach  [X]                Nursing notified  [ ]                Caregiver present  [ ]                Bed alarm activated      COMMUNICATION/EDUCATION:   Patient was educated regarding herdeficit(s) of dysphagia. The patients plan of care including recommendations, planned interventions, and recommended diet changes were discussed with: Registered Nurse. [X]                Posted safety precautions in patient's room.      CANDY Mcpherson  Time Calculation: 15 mins

## 2017-03-27 NOTE — PROGRESS NOTES
Occupational Therapy  Medical record reviewed and nursing cleared pt for therapy. Nursing provided  IV pain medication as requested by pt during tx session. Pt initially agreed to OT treatment. Her state fluctuated from alert to voice for a few moments, then suddenly she would fall asleep. This continued while attempts were made to engage pt in UE therapeutic exercises using ensure can/ jevity bottle for resistance. Pt able to perform 2 UE reps (RUE)  with jevity bottle (approx 2#) then stated it was too heavy. Pt continued to fall asleep every few moments and the tx session was aborted. Pt has not yet had her dialysis this date. Will defer and continue to follow. Continue to recommend intensive rehab at discharge. 11 minutes.

## 2017-03-28 NOTE — PROGRESS NOTES
Problem: Mobility Impaired (Adult and Pediatric)  Goal: *Acute Goals and Plan of Care (Insert Text)  Physical Therapy Goals   Goals reviewed and revised 3/22/2017  1. Patient will move from supine to sit and sit to supine , scoot up and down and roll side to side in bed with maximum assistance within 7 day(s). 2. Patient will sit on EOB 2 minutes demonstrating overall good sitting balance without LOB within 7 days. 3. Patient will perform 3/3 UE exercises (shoulder flexion, elbow flexion, hand gripping) with supervision in order to improve UE function within 7 days. 4. Patient will perform lateral weight shifting to R and L with moderate assistance in order to decrease risk of pressure ulcer formation within 7 days. Initiated 3/14/2017  1. Patient will move from supine to sit and sit to supine , scoot up and down and roll side to side in bed with moderate assistance within 7 day(s). 2. Patient will transfer from bed to chair and chair to bed with maximal assistance x 2 using the least restrictive device within 7 day(s). 3. Patient will perform sit to stand with maximal assistance within 7 day(s). 4. Patient will sit on EOB 2 minutes demonstrating overall good sitting balance without LOB within 7 days. PHYSICAL THERAPY TREATMENT  Patient: Sonya Corrigan (51 y.o. female)  Date: 3/28/2017  Diagnosis: Aneurysm ([address]  AORTIC DISECTION  Kidney Stone <principal problem not specified>  Procedure(s) (LRB):  CYSTOSCOPY LEFT URETERAL STENT PLACEMENT (Left) 4 Days Post-Op  Precautions: Fall      ASSESSMENT:  Pt received supine in bed on 3L/min of O2. RN cleared pt for mobility and VSS at start of session. Pt presenting with increased attention and improved participation this date. Pt performed bed mobility with total A x2, with the exception of rolling, as pt was able to initiate movement and assist by using RANJEET UE and required maxA x2.  Pt sat at EOB and performed both lateral and anterior weight shifting activities, demonstrating improved ability to initiate movement using head and trunk and additionally assist with RANJEET UE. Although she was able to initiate movement, she did demonstrate increased diffculty with eccentric control of trunk musculature. During anterior weight shifting, pt required VCs and tactile cues to facilitate UE involvement; however, she was able to actively assist both anterior and posterior weight shifting by pulling/pushing with RANJEET UE, requiring mod-maxA x1. Pt experienced bouts of nausea throughout sitting trial that would resolve with rest breaks, and she eventually requested to lay back down due to feeling exhausted. Pt was assisted into supine with all needs met and VSS. RN notified. Recommending inpatient rehab for discharge to improve functional mobility. She would benefit from continued skilled acute PT to improve strength and activity tolerance. Progression toward goals:  [ ]    Improving appropriately and progressing toward goals  [X]    Improving slowly and progressing toward goals  [ ]    Not making progress toward goals and plan of care will be adjusted       PLAN:  Patient continues to benefit from skilled intervention to address the above impairments. Continue treatment per established plan of care. Discharge Recommendations:  Inpatient Rehab  Further Equipment Recommendations for Discharge:  TBD       SUBJECTIVE:   Patient stated Omelia Villarreal you all do something about my legs?       OBJECTIVE DATA SUMMARY:   Critical Behavior:  Neurologic State: Alert  Orientation Level: Oriented X4  Cognition: Follows commands     Functional Mobility Training:  Bed Mobility:  Rolling: Maximum assistance;Assist x1  Supine to Sit: Total assistance;Assist x2  Sit to Supine: Total assistance;Assist x2  Scooting:  Total assistance;Assist x2 (in supine to [facility])     Balance:  Sitting: Impaired  Sitting - Static: Poor (constant support)  Sitting - Dynamic: Poor (constant support)     Neuro Re-Education:  Anterior/posterior weight shifting x6 using RANJEET UE to pull forward/push backward with tactile cues to improve forward movement of trunk with min-maxA x2 for balance/support (VCs required for sequencing and encouragement). Lateral shifting to L and R to facilitate scooting at EOB with maxAx1 to facilitate scooting and maxAx1 for support. Pain:  Pain Scale 1: Numeric (0 - 10)  Pain Intensity 1: 3     Activity Tolerance:   Fair- VSS throughout treatment session; however, pt with increased nausea and fatigue in sitting. Please refer to the flowsheet for vital signs taken during this treatment. After treatment:   [ ]    Patient left in no apparent distress sitting up in chair  [X]    Patient left in no apparent distress in bed  [X]    Call bell left within reach  [X]    Nursing notified  [ ]    Caregiver present  [ ]    Bed alarm activated      COMMUNICATION/COLLABORATION:   The patients plan of care was discussed with: Physical Therapist, Occupational Therapist and Registered Nurse     SUSANNA Yanes   Time Calculation: 29 mins              Regarding student involvement in patient care:  A student participated in this treatment session. Per CMS Medicare statements and APTA guidelines I certify that the following was true:  1. I was present and directly observed the entire session. 2. I made all skilled judgments and clinical decisions regarding care. 3. I am the practitioner responsible for assessment, treatment, and documentation.

## 2017-03-28 NOTE — PROCEDURES
Mesenteric and left renal angio via Rt brachial approach. Celiac stented w/ 6x15 BMS, SMA stented w/ 8G31 & 5x14 BMS after PTA w/ 4x2 balloon. Left renal stent was patent.

## 2017-03-28 NOTE — PROGRESS NOTES
Vascular    Seen earlier  More sleepy today  She cont to report nausea after PO intake  Abd soft  Incisions OK  Urine dark  UCx: c albicans  Good UOP  Will cont TPN  Started Fluconazole  HD per Renal  PC nausea may be due to chronic mesenteric ischemia  Tentatively plan on mesenteric angiogram tomorrow after noon

## 2017-03-28 NOTE — PROGRESS NOTES
Occupational Therapy TREATMENT: WEEKLY REASSESSMENT    Problem: Self Care Deficits Care Plan (Adult)  Goal: *Acute Goals and Plan of Care (Insert Text)  Occupational Therapy Goals  Weekly Re-assessment:   1. Patient will perform grooming in supported sitting within 7 day(s). MODIFIED  2. Patient will perform supine to sit EOB with maximal assistance in preparation for adls within 7 day(s). CONT  3. Patient will perform upper body bathing with moderate assistance in supported sitting within 7 day(s). MODIFED  4. Patient will participate in functional mobility assessment within 7 day(s). CONT  5. Patient will perform rolling side to side for toileting at bed level with moderate assistance within 7 day(s). CONT  6. Patient will participate in upper extremity therapeutic exercise/activities with supervision/set-up for 10 minutes within 7 day(s). CONT      Initiated 3/14/2017  1. Patient will perform grooming with minimal assistance/contact guard assist within 7 day(s). 2. Patient will perform supine to sit EOB with maximal assistance in preparation for adls within 7 day(s). 3. Patient will perform upper body bathing with moderate assistance within 7 day(s). 4. Patient will participate in functional mobility assessment within 7 day(s). 5. Patient will perform rolling side to side for toileting at bed level with moderate assistance within 7 day(s). 6. Patient will participate in upper extremity therapeutic exercise/activities with supervision/set-up for 10 minutes within 7 day(s). OCCUPATIONAL THERAPY TREATMENT  Patient: Ras Castillo (45 y.o. female)  Date: 3/28/2017  Diagnosis: Aneurysm ([address]  AORTIC DISECTION  Kidney Stone <principal problem not specified>  Procedure(s) (LRB):  CYSTOSCOPY LEFT URETERAL STENT PLACEMENT (Left) 4 Days Post-Op  Precautions: Fall      ASSESSMENT:  Nursing cleared for therapy. Patient motivated for therapy.   Provided education provided through verbal cues for BUE involvement and sequencing with bed mobility and sitting balance. Patient sat EOB over 15 mins with fluctuating min-max A with BUE support. Demonstrated ability to initiate forward trunk flexion and extension with tactile and verbal cues. Difficulty maintaining erect posture over 10-15 seconds. Easily fatigued and reports of nausea during session, needing rest breaks to sit with total A. Completed face washing with cool rag  While sitting EOB with total A. Supine <> sit with total A x2 secondary to impaired BLE and trunk strength. BP stable. Patient with limited progression towards goals secondary to being off floor when OT was attempted. Goals have been revised. Recommend continued BUE strengthening to allow for needed BUE support for bed mobility and sitting EOB. Patient will need inpatient rehab once medically stable. She is in agreement and demonstrated excellent motivation today. Progression toward goals:  [ ]       Improving appropriately and progressing toward goals  [X]       Improving slowly and progressing toward goals  [ ]       Not making progress toward goals and plan of care will be adjusted         PLAN:  Goals have been updated based on progression since last assessment. Patient continues to benefit from skilled intervention to address the above impairments. Continue to follow patient 5 times a week to address goals.   Planned Interventions:  [x]                    Self Care Training                  [x]             Therapeutic Activities  [x]                    Functional Mobility Training    []             Cognitive Retraining  [x]                    Therapeutic Exercises           [x]             Endurance Activities  [x]                    Balance Training                   [x]             Neuromuscular Re-Education  []                    Visual/Perceptual Training     [x]        Home Safety Training  [x]                    Patient Education                 [x]             Family Training/Education  []                    Other (comment):  Discharge Recommendations: Inpatient Rehab  Further Equipment Recommendations for Discharge: TBD rehab       SUBJECTIVE:   Patient stated I wish I thought I did as well you think.       OBJECTIVE DATA SUMMARY:   Cognitive/Behavioral Status:  Neurologic State: Alert  Orientation Level: Oriented X4  Cognition: Follows commands        Functional Mobility and Transfers for ADLs:  Bed Mobility:  Rolling: Maximum assistance;Assist x1  Supine to Sit: Total assistance;Assist x2  Sit to Supine: Total assistance;Assist x2  Scooting: Total assistance;Assist x2 (in supine to [facility])     Balance:  Sitting: Impaired  Sitting - Static: Poor (constant support)  Sitting - Dynamic: Poor (constant support)     ADL Intervention:     Provided education provided through verbal and tactile cues for BUE involvement and sequencing with bed mobility and sitting balance to prepare for ADL tasks. Patient sat EOB over 15 mins with fluctuating min-max A with constant BUE support. Completed to increase independence with ADL tasks and decrease caregiver burden. Needing frequent verbal cues for BUE pulling on side of bed/rail and encouragement to continue. Demonstrated ability to initiate forward trunk flexion x approx 5 trials and extension with tactile and verbal cues. Able to sit unsupported approx 10-15 seconds with BUE support. Rest breaks to sit with total A. Completed face washing with wash cloth while sitting EOB with total A. Recommend continued monitoring and assessment of B feet positioning to ensure joint integrity to assist with BLE weightbearing. Pain:  Pain Scale 1: Numeric (0 - 10)  Pain Intensity 1: 0        Activity Tolerance: Bp stable.   Reports nausea x 3 occasions during session     After treatment:   [ ] Patient left in no apparent distress sitting up in chair  [X] Patient left in no apparent distress in bed  [X] Call bell left within reach  [X] Nursing notified  [ ] Caregiver present  [X] Bed alarm activated      COMMUNICATION/COLLABORATION:   The patients plan of care was discussed with: Physical Therapist, Registered Nurse and Patient\     Lazaro Cyr OT  Time Calculation: 28 mins            Note addendum completed 3/30 to update format of note to reflect treatment on 3/28 as a weekly re-assessment.

## 2017-03-28 NOTE — PROGRESS NOTES
PCU SHIFT NURSING NOTE      Bedside shift change report given to Ariella by Sandra Pedro. Report included the following information SBAR, Kardex, ED Summary, Intake/Output, MAR, Recent Results, Med Rec Status and Cardiac Rhythm Paced. Shift Summary:   0730 Patient's  called reporting that Dr. Giancarlo Anaya called and left a message on his phone last night. Patient's  advised that if RN sees him today he will advise him to try to call back. Dr. Sofía Freeman office number also provided to patient's . 21  Dr. Giancarlo Anaya paged in regards to patient meds for N/V. Patient with complaints of nausea and only has Compazine ordered. Patient has been drowsy and PRN meds for pain and nausea are making her more drowsy. 1200 Dr. Giancarlo Anaya returned call and requested that consent be obtained for mesenteric angiogram via right brachial approach with possible angioplasty and stenting, patient on schedule for 1430 today. Also received verbal orders for Zofran 4mg IV Q6H prn for nausea. 1210 While putting order in for Zofran, alert came up for interaction between Fluconazole and Zofran for possible prolonged QTc. Patient already at increased risk for prolonged QTc due to Amiodarone and Fluconazole. Pharmacist recommended not added PRN Zofran. Will discuss with Dr. Giancarlo Anaya. [address] left on Dr[address] Sofía Freeman phone regarding patient not being NPO this AM prior to procedure and Zofran interaction. 1300 Dr. Giancarlo Anaya returned call and was advised patient last intake PO was only fluid and the only thing today has been fluid, no solid food. Per MD, will proceed with surgery this PM as planned. Orders placed for patient to be NPO now. 5300  Rd Dr. Giancarlo Anaya to see if 1400 dose of Heparin needs to be held prior to procedure. Per MD, hold Heparin. 1750 Received patient back to room post cath. Arm board in place to right arm, dressing c/d/i to right brachial insertion site with bruising noted.  Patient still drowsy and awakens off and on. Call bell within reach. VSS. Admission Date 2/23/2017   Admission Diagnosis Aneurysm ([facility] [address]  AORTIC DISECTION  Kidney Stone   Consults IP CONSULT TO VASCULAR SURGERY  IP CONSULT TO VASCULAR SURGERY  IP CONSULT TO HOSPITALIST  IP CONSULT TO NEPHROLOGY  IP CONSULT TO UROLOGY        Consults   []PT   []OT   []Speech   []Case Management      [] Palliative      Cardiac Monitoring Order   [x]Yes   []No     IV drips   []Yes    Drip:                            Dose:  Drip:                            Dose:  Drip:                            Dose:   [x]No     GI Prophylaxis   [x]Yes   []No         DVT Prophylaxis   SCDs:  Sequential Compression Device: Bilateral     Patient Refused VTE Prophylaxis: Yes    Valeriy stockings:  Graduated Compression Stockings: Bilateral      [x] Medication   []Contraindicated   []None      Activity Level Activity Level: Bed Rest     Activity Assistance: Complete care   Purposeful Rounding every 1-2 hour? [x]Yes   Jean Baptiste Score  Total Score: 2   Bed Alarm (If score 3 or >)   []Yes   [] Refused (See signed refusal form in chart)   Robin Score  Robin Score: 15   Robin Score (if score 14 or less)   []PMT consult   []Wound Care consult      []Specialty bed   [] Nutrition consult          Needs prior to discharge:   Home O2 required:    []Yes   []No    If yes, how much O2 required?     Other:    Last Bowel Movement: Last Bowel Movement Date: 03/23/17      Influenza Vaccine Received Flu Vaccine for Current Season (usually Sept-March): No    Patient/Guardian Refused (Notify MD): Yes   Pneumonia Vaccine           Diet Active Orders   Diet    DIET DIABETIC CONSISTENT CARB Mechanical Soft; 1 NECTAR      LDAs         PICC Triple Lumen 16/31/91 Right;Basilic (Active)   Central Line Being Utilized Yes 3/28/2017  1:52 AM   Criteria for Appropriate Use Limited/no vessel suitable for conventional peripheral access 3/28/2017  1:52 AM   Site Assessment Clean, dry, & intact 3/28/2017 1:52 AM   Phlebitis Assessment 0 3/28/2017  1:52 AM   Infiltration Assessment 0 3/28/2017  1:52 AM   Arm Circumference (cm) 29 cm 3/2/2017  4:00 AM   Date of Last Dressing Change 03/24/17 3/27/2017  3:00 PM   Dressing Status Clean, dry, & intact 3/27/2017  3:00 PM   External Catheter Length (cm) 0 centimeters 3/24/2017 12:38 AM   Dressing Type Disk with Chlorhexadine gluconate (CHG) 3/28/2017  1:52 AM   Action Taken Dressing changed 3/23/2017  3:30 PM   Hub Color/Line Status Gray 3/28/2017  1:52 AM   Positive Blood Return (Site #1) Yes 3/28/2017  1:52 AM   Hub Color/Line Status Red 3/28/2017  1:52 AM   Positive Blood Return (Site #2) Yes 3/28/2017  1:52 AM   Hub Color/Line Status White 3/28/2017  1:52 AM   Positive Blood Return (Site #3) Yes 3/28/2017  1:52 AM   Alcohol Cap Used Yes 3/28/2017  1:52 AM              Hemodialysis Catheter 03/23/17 (Active)   Central Line Being Utilized Yes 3/28/2017  1:52 AM   Criteria for Appropriate Use Dialysis/apheresis 3/27/2017  3:00 PM   Date Accessed  03/25/17 3/26/2017  3:50 AM   Access Time  1255 3/25/2017  1:00 PM   Site Assessment Clean, dry, & intact 3/27/2017  3:00 PM   Date of Last Dressing Change 03/25/17 3/27/2017  3:00 PM   Dressing Status Clean, dry, & intact 3/27/2017  3:00 PM   Dressing Type Tape 3/27/2017  3:00 PM   Proximal Hub Color/Line Status Blue;Capped 3/27/2017  3:00 PM   Distal Hub Color/Line Status Red;Capped 3/27/2017  3:00 PM                  Urinary Catheter Urinary Catheter 03/24/17 2- way; Palacio-Criteria for Appropriate Use: Obstruction/retention  [REMOVED] Urinary Catheter 02/24/17 Palacio-Criteria for Appropriate Use: Medically/surgically unstable    Intake & Output   Date 03/27/17 0700 - 03/28/17 0659 03/28/17 0700 - 03/29/17 0659   Shift [id number] 1900-0659 24 Hour Total 0700-1859 1900-0659 24 Hour Total   I  N  T  A  K  E   I.V.  (mL/kg/hr) 532.4  (0.5)  532.4  (0.3)         Volume (TPN ADULT - CENTRAL AA 5% D20% W/ CA + ELECTROLYTES) 532.4  532.4 Other 120  120         Irrigation Volume Input (mL) (Urinary Catheter 03/24/17 2- way; Palacio) 120  120       Shift Total  (mL/kg) 652.4  (7.5)  652.4  (7.5)      O  U  T  P  U  T   Urine  (mL/kg/hr) 650  (0.6) 350  (0.3) 1000  (0.5)         Urine Output (mL) (Urinary Catheter 03/24/17 2- way; Palacio)        Dialysis  1500 1500         NET Fluid Removed (mL)  1500 1500       Shift Total  (mL/kg) 650  (7.5) 1850  (21.2) 2500  (28.6)      NET 2.4 -1850 -1847.7      Weight (kg) 86.5 87.3 87.3 87.3 87.3 87.3         Readmission Risk Assessment Tool Score High Risk            26       Total Score        3 Relationship with PCP    2 Patient Living Status    3 Patient Length of Stay > 5    4 More than 1 Admission in calendar year    5 Patient Insurance is Medicare, Medicaid or Self Pay    9 Charlson Comorbidity Score       Expected Length of Stay 5d 16h   Actual Length of Stay 33

## 2017-03-28 NOTE — PROGRESS NOTES
Nephrology Progress Note     Fransisco Chilel       [facility]. [facility]                   Phone - [phone]   Patient: Darleen Carrillo  YOB: 1955     Date- 3/28/2017     CC: Follow up for ARF         Subjective: Interval History:   Seen and examined. bp stable  Poor po intake  C/o nausea and abdominal pain  Sleepy today. She received dilaudid at 4.30 am  No sob  Hd last night     Assessment:   · Acute renal failure - multiple etiology  1. ATN on admission  2. Left hydro. - on functioning kidney. She has right renal atrophy. Right renal atrophy might be a chronic issue. She had small right kidney on admission CT abdo. - she has left renal artery stent seen on CT angio --- blood flow seen on renal artery and vein on USG from 3-23  · Left hydronephrosis- s/p cystoscopy and stent placement 3-24  · ckd 3 baseline cr. 1.2  · Candida UTI  · Resp. Failure - s/p extubation  · AFIB with RVR. Now s/p AFL ablation and pacemaker implant  · Complicated type B descending thoracic aortic dissection. -   · S/p TEVAR, L ileo-fem bypass, L carotid-subclavian bypass:2/27/17   · Protein malnutrition  · Renal artery stenosis - h/o left renal artery stent   · Abdominal pain - ? mesentric ischemia  · Ischemic spinal cord injury     Plan:   - no HD today  - continue fluconazole 200 mg daily  - cont current BP meds  - stop BUSPAR- it is not recommended in setting of severe arf  - TPN ordered  - plan for angiogram noted    [x] High complexity decision making was performed  [x] Patient is at high-risk of decompensation with multiple organ involvement  Review of Systems: Pertinent items are noted in HPI.   Objective:   Vitals:    03/28/17 0150 03/28/17 0247 03/28/17 0649 03/28/17 0717   BP: 136/47 141/53  140/60   Pulse: 85 85  81   Resp: 22 22  20   Temp:  97.5 °F (36.4 °C)  98.6 °F (37 °C)   TempSrc:       SpO2: 97% 98%  98%   Weight:   87.3 kg (192 lb 8 oz)    Height:           Intake/Output Summary (Last 24 hours) at 03/28/17 0827  Last data filed at 03/28/17 0519   Gross per 24 hour   Intake           652.35 ml   Output             2500 ml   Net         -1847.65 ml     Physical Exam:   GEN: - NAD   Neck - supple, right ij amparo +, left upper neck staples +  RESP: clear b/l, no wheezing. Decreased at base  CVS: RRR,S1,S2 , edema +  SKIN: No Rash  ABDO: soft ,  No BS  Neuro- moves upper arm, normal speech  + grubbs, hematuria +        Chart reviewed. Pertinent Notes reviewed.    Medications list  reviewed     Current Facility-Administered Medications   Medication    fluconazole (DIFLUCAN) tablet 400 mg    QTc monitoring while on both Fluconazole and Amiodarone    minoxidil (LONITEN) tablet 2.5 mg    TPN ADULT - CENTRAL AA 5% D20% W/ CA + ELECTROLYTES    epoetin fareed (EPOGEN;PROCRIT) injection 10,000 Units    amiodarone (CORDARONE) tablet 400 mg    albuterol-ipratropium (DUO-NEB) 2.5 MG-0.5 MG/3 ML    hydrALAZINE (APRESOLINE) tablet 100 mg    amLODIPine (NORVASC) tablet 10 mg    pantoprazole (PROTONIX) tablet 40 mg    levothyroxine (SYNTHROID) tablet 100 mcg    insulin lispro (HUMALOG) injection    glucose chewable tablet 16 g    dextrose (D50W) injection syrg 12.5-25 g    glucagon (GLUCAGEN) injection 1 mg    labetalol (NORMODYNE;TRANDATE) injection 20 mg    cloNIDine (CATAPRES) 0.2 mg/24 hr patch 2 Patch    HYDROmorphone (PF) (DILAUDID) injection 0.5 mg    diphenhydrAMINE-zinc acetate 2%-0.1% (BENADRYL) cream    ADDaptor    vancomycin (VANCOCIN) 1,000 mg injection    0.9% sodium chloride (MBP/ADV) 0.9 % infusion    sodium chloride (NS) flush 5-10 mL    naloxone (NARCAN) injection 0.4 mg    sodium chloride (NS) flush 10-30 mL    sodium chloride (NS) flush 10 mL    sodium chloride (NS) flush 10-40 mL    sodium chloride (NS) flush 20 mL    phenol throat spray (CHLORASEPTIC) 1 Spray    mineral oil-hydrophil petrolat (AQUAPHOR) ointment    0.9% sodium chloride infusion 250 mL    heparin (porcine) injection 5,000 Units    polyethylene glycol (MIRALAX) packet 17 g    prochlorperazine (COMPAZINE) with saline injection 5 mg    sodium chloride (NS) flush 10 mL    sodium chloride (NS) flush 10-40 mL    acetaminophen (TYLENOL) tablet 650 mg    bisacodyl (DULCOLAX) suppository 10 mg              Data Review :  Recent Labs      03/28/17   0445  03/27/17   0320  03/26/17   0410   NA  136  135*  136   K  4.0  3.9  3.6   CL  99  99  100   CO2  26  23  26   GLU  181*  137*  138*   BUN  37*  66*  54*   CREA  1.61*  2.53*  2.13*   CA  7.7*  8.5  8.3*   MG   --   2.0   --    PHOS   --   3.7   --    ALB  2.1*  2.2*  2.2*   SGOT  25  27  23   ALT  22  20  18     Recent Labs      03/28/17   0445  03/27/17   0320  03/26/17   0410   WBC  12.0*  11.2*  10.6   HGB  7.2*  7.7*  7.6*   HCT  21.7*  23.2*  23.0*   PLT  141*  138*  115*         Ella Cadena MD  [facility]  [facility], [facility], [address] [place]  Phone - [phone]         Fax - [phone] [facility]  [address] [place] [address]  Phone - [phone]        Fax - [phone]     www. [facility]

## 2017-03-28 NOTE — PROCEDURES
[facility]  [phone]    Vitals   Pre   Post   Assessment   Pre   Post     Temp  Temp: 98.5 °F (36.9 °C) (03/27/17 2245)  97.5 oral LOC  Alert, oriented, cooperative and anxious No change   HR   Pulse (Heart Rate): 78 (03/27/17 2300) 85 Lungs   diminished No change   B/P   BP: 150/51 (pt resting quietly, watching TV, lines secure/visualized) (03/27/17 2300) 136/47 Cardiac   S1S2 No change   Resp   Resp Rate: 20 (03/27/17 2300) 22 Skin   Warm and dry No change   Pain level  Pain Intensity 1: 2 (03/27/17 2055) No complaints Edema  Generalized 1.5 kg UF obtained   Orders:    Duration:   Start:   2245 End:   0145 Total:   3.0   Dialyzer:   Dialyzer/Set Up Inspection: Anushka Snyder (03/27/17 2245)   K Bath:   Dialysate K (mEq/L): 3 (03/27/17 2245)   Ca Bath:   Dialysate CA (mEq/L): 2.5 (03/27/17 2245)   Na/Bicarb:   Dialysate NA (mEq/L): 140 (03/27/17 2245)   Target Fluid Removal:   Goal/Amount of Fluid to Remove (mL): 1500 mL (03/27/17 2245)   Access     Type & Location:   RIJ; accessed per p&p; deaccessed per p&p; saline locked and curos capped   Labs  None by HD   Obtained/Reviewed   Critical Results Called   Date when labs were drawn-  Hgb-    HGB   Date Value Ref Range Status   03/27/2017 7.7 (L) 11.5 - 16.0 g/dL Final     K-    Potassium   Date Value Ref Range Status   03/27/2017 3.9 3.5 - 5.1 mmol/L Final     Ca-   Calcium   Date Value Ref Range Status   03/27/2017 8.5 8.5 - 10.1 MG/DL Final     Bun-   BUN   Date Value Ref Range Status   03/27/2017 66 (H) 6 - 20 MG/DL Final     Creat-   Creatinine   Date Value Ref Range Status   03/27/2017 2.53 (H) 0.55 - 1.02 MG/DL Final        Medications/ Blood Products Given     Name   Dose   Route and Time           None by HD          Blood Volume Processed (BVP):      50.7 Net Fluid   Removed:    1500 mL   Comments   Time Out Done: 9006  Primary Nurse Rpt Pre: Matty Baker RN  Primary Nurse Rpt Post: Matty Baker RN  Pt Education: per [facility] and this RN  Care Plan: per ED [facility] staff  Tx Summary: tx initiated w/o issue; tx completed w/o issue  Admiting Diagnosis: Type B descending aortic dissection and severe stenosis of L common carotid artery in setting of severe vasculopathy with CAD/CABG, R vertebral artery occlusion, bilateral renal artery atherosclerosis and mesenteric artery stenosis  Pt's previous clinic- NA  Consent signed - Informed Consent Verified: Yes (03/27/17 2245)  [facility] Consent - yes  Hepatitis Status- Ag neg as of 3/24/2017  Machine #- Machine Number: [id number] (03/27/17 2245)  Telemetry status- monitored  Pre-dialysis wt. - Pre-Dialysis Weight: 86.5 kg (190 lb 11.2 oz) (03/27/17 2245)

## 2017-03-28 NOTE — PROGRESS NOTES
TRANSFER - OUT REPORT:    Verbal report given to Shannon Pérez RN(name) on Ras Castillo  being transferred to IVCU(unit) for routine progression of care       Report consisted of patients Situation, Background, Assessment and   Recommendations(SBAR). Information from the following report(s) SBAR, Kardex, Intake/Output and MAR was reviewed with the receiving nurse. Opportunity for questions and clarification was provided.       Patient transported with:   Registered Nurse  Tech

## 2017-03-29 NOTE — PROGRESS NOTES
Bedside shift change report given to Mary Alice Elliott (oncoming nurse) by Opal Schilder RN (offgoing nurse). Report included the following information SBAR, Procedure Summary, Intake/Output, MAR, Recent Results and Cardiac Rhythm Lines.

## 2017-03-29 NOTE — PROGRESS NOTES
Cm met with patient. Patient states \"I am miserable\". \"I never thought I would go through something like this\". Emotional support provided. Cm reviewed patient's preference regarding rehab facilities. [facility] at [id number] [place] [address]  can provide service at appropriate time. Reviewed with patient. Patient requested repositioning. RN informed. Cm will continue to follow.      Van Gramajo RN CM  Ext [phone]

## 2017-03-29 NOTE — PROCEDURES
[facility]  [phone]    Vitals   Pre   Post   Assessment   Pre   Post     Temp  Temp: 98.8 °F (37.1 °C) (03/29/17 1810)  97.7 LOC  A&OX4 A&OX4   HR   Pulse (Heart Rate): 82 (03/29/17 1810) 93 Lungs   diminished  diminished   B/P   BP: 108/42 (03/29/17 1810) 135/71 Cardiac   NSR  afib   Resp   Resp Rate: 20 (03/29/17 1810) 20 Skin   intact  intact   Pain level  Pain Intensity 1: 0 (03/29/17 1550)  Edema    2+NEO   2+NEO   Orders:    Duration:   Start:   Procedure Start Time: 9776 End:   2140 Total:   3.5 hours   Dialyzer:   Dialyzer/Set Up Inspection: Radha Ramos (03/29/17 1810)   K Bath:   Dialysate K (mEq/L): 2 (03/29/17 1810)   Ca Bath:   Dialysate CA (mEq/L): 2.5 (03/29/17 1810)   Na/Bicarb:   Dialysate NA (mEq/L): 140 (03/29/17 1810)   Target Fluid Removal:   Goal/Amount of Fluid to Remove (mL): 2500 mL (03/29/17 1810)   Access     Type & Location:   [facility] [place]/permcath disinfected with Alcavis per policy. Each lumen aspirated for blood return and flushed with Normal Saline per policy. Dialysis initiated. Central line catheter flushed with normal saline per policy. Ports disinfected with Alcavis per policy and lines disconnected and capped using aseptic technique. See LDA docflowsheet for dressing information.      Labs     Obtained/Reviewed   Critical Results Called   Date when labs were drawn-  Hgb-    HGB   Date Value Ref Range Status   03/29/2017 6.9 (L) 11.5 - 16.0 g/dL Final     K-    Potassium   Date Value Ref Range Status   03/29/2017 4.4 3.5 - 5.1 mmol/L Final     Ca-   Calcium   Date Value Ref Range Status   03/29/2017 7.8 (L) 8.5 - 10.1 MG/DL Final     Bun-   BUN   Date Value Ref Range Status   03/29/2017 56 (H) 6 - 20 MG/DL Final     Comment:     INVESTIGATED PER DELTA CHECK PROTOCOL     Creat-   Creatinine   Date Value Ref Range Status   03/29/2017 2.27 (H) 0.55 - 1.02 MG/DL Final        Medications/ Blood Products Given     Name   Dose Route and Time                     Blood Volume Processed (BVP):    65.6 liters Net Fluid   Removed:  2500 ml   Comments Patient anxious during tx and also with nausea and pain. Pain and naesea meds given and then she slept. She also went into a-fib, which was asymptomatic. Brigette Sprung No other problems. Report given to PCU nurse, Michelle Espino RN. Time Out Done: yes, 1810  Primary Nurse Rpt Pre: Ramana Aguilar RN  Primary Nurse Rpt Post:Alivia Quintana RN  Pt Education: volume resreition  Care Plan: Dialysis as ordered  Tx Summary: 3.5 hours 2 K 2.5 Ca 2500 ml removed. Admiting Diagnosis:  Pt's previous clinic-n/a  Consent signed - Informed Consent Verified: Yes (03/27/17 2245)  [facility] Consent - yes  Hepatitis Status- negative by antigen 0-49-01   Machine #- Machine Number: [id number] (03/29/17 1810)  Telemetry status-on tele  Pre-dialysis wt. - Pre-Dialysis Weight: 86.5 kg (190 lb 11.2 oz) (03/27/17 2245)

## 2017-03-29 NOTE — PROGRESS NOTES
Vascular    Still c/o nausea  Poor PO intake  Rt brachial access site OK  She remains TPN dependent  Difficult situation  I don't think she or her  would want PEG tube  Would cont current meds and HD  If she doesn't show signs of renal recovery and tolerate PO, she may be headed toward hospice  Will cont full support for now

## 2017-03-29 NOTE — PROGRESS NOTES
PCU SHIFT NURSING NOTE      0725 Bedside and Verbal shift change report given to Montrell Browning RN (oncoming nurse) by Emily Brown RN (offgoing nurse). Report included the following information SBAR, Kardex, Intake/Output, MAR, Accordion, Recent Results, Med Rec Status and Cardiac Rhythm Paced. Shift Summary: Continue TPN. Pt now tolerating thin liquids per speech. Appetite improving but nausea continues, given prn compazine. Palacio draining dark red/brown urine. BM today. Pt worked with pt, sat on side of bed. Antifungal zinc cream ordered for excoriation/red rash to groin. Wound care consult to evaluate need for specialty bed. Dialysis started tonight around 1800. MD please address: Pt has frequent anxiety, prn med would be helpful. 1915 Bedside and Verbal shift change report given to Danielle Trejo (oncoming nurse) by Montrell Browning RN (offgoing nurse). Report included the following information SBAR, Kardex, Intake/Output, MAR, Accordion, Recent Results, Med Rec Status and Cardiac Rhythm Paced. Admission Date 2/23/2017   Admission Diagnosis Aneurysm ([facility] [address]  AORTIC DISECTION  Kidney Stone   Consults IP CONSULT TO VASCULAR SURGERY  IP CONSULT TO VASCULAR SURGERY  IP CONSULT TO NEPHROLOGY  IP CONSULT TO UROLOGY        Consults   [x]PT   [x]OT   []Speech   [x]Case Management      [] Palliative      Cardiac Monitoring Order   [x]Yes   []No     IV drips   [x]Yes    Drip:  TPN                          Dose:  63 ml/hr  Drip:                            Dose:  Drip:                            Dose:   []No     GI Prophylaxis   [x]Yes   []No         DVT Prophylaxis   SCDs:  Sequential Compression Device: Bilateral     Patient Refused VTE Prophylaxis: Yes    Valeriy stockings:  Graduated Compression Stockings: Bilateral      [x] Medication   []Contraindicated   []None      Activity Level Activity Level: Head of bed elevated (degrees)     Activity Assistance: Complete care   Purposeful Rounding every 1-2 hour?    [x]Yes   Kemper Gitelman Score  Total Score: 2   Bed Alarm (If score 3 or >)   []Yes   [] Refused (See signed refusal form in chart)   Robin Score  Robin Score: 13   Robin Score (if score 14 or less)   [x]PMT consult   [x]Wound Care consult      []Specialty bed   [] Nutrition consult          Needs prior to discharge:   Home O2 required:    []Yes   [x]No    If yes, how much O2 required? Other:    Last Bowel Movement: Last Bowel Movement Date: 03/23/17      Influenza Vaccine Received Flu Vaccine for Current Season (usually Sept-March): No    Patient/Guardian Refused (Notify MD): Yes   Pneumonia Vaccine           Diet Active Orders   Diet    DIET DIABETIC CONSISTENT CARB Regular      LDAs         PICC Triple Lumen 43/79/81 Right;Basilic (Active)   Central Line Being Utilized Yes 3/29/2017  3:50 PM   Criteria for Appropriate Use Total parenteral nutrition 3/29/2017  3:50 PM   Site Assessment Clean, dry, & intact 3/29/2017  3:50 PM   Phlebitis Assessment 0 3/29/2017  3:50 PM   Infiltration Assessment 0 3/29/2017  3:50 PM   Arm Circumference (cm) 29 cm 3/2/2017  4:00 AM   Date of Last Dressing Change 03/29/17 3/29/2017  3:50 PM   Dressing Status Clean, dry, & intact 3/29/2017  3:50 PM   External Catheter Length (cm) 0 centimeters 3/24/2017 12:38 AM   Dressing Type Disk with Chlorhexadine gluconate (CHG); Transparent;Tape 3/29/2017  3:50 PM   Action Taken Dressing changed 3/29/2017 12:11 PM   Hub Color/Line Status [place]; Infusing;Flushed;Patent 3/29/2017  3:50 PM   Positive Blood Return (Site #1) Yes 3/29/2017  3:50 PM   Hub Color/Line Status Red;Capped;Flushed;Patent 3/29/2017  3:50 PM   Positive Blood Return (Site #2) Yes 3/29/2017  3:50 PM   Hub Color/Line Status White;Capped;Flushed;Patent 3/29/2017  3:50 PM   Positive Blood Return (Site #3) Yes 3/29/2017  3:50 PM   Alcohol Cap Used Yes 3/29/2017  3:50 PM              Hemodialysis Catheter 03/23/17 (Active)   Central Line Being Utilized Yes 3/29/2017  3:50 PM   Criteria for Appropriate Use Dialysis/apheresis 3/29/2017  3:50 PM   Date Accessed  03/27/17 3/29/2017  8:26 AM   Access Time  1255 3/25/2017  1:00 PM   Site Assessment Clean, dry, & intact 3/29/2017  3:50 PM   Date of Last Dressing Change 03/27/17 3/28/2017  3:00 PM   Dressing Status Clean, dry, & intact 3/29/2017  3:50 PM   Dressing Type Disk with Chlorhexadine gluconate (CHG); Transparent;Tape 3/29/2017  3:50 PM   Proximal Hub Color/Line Status Red;Capped 3/29/2017  3:50 PM   Distal Hub Color/Line Status Blue;Capped 3/29/2017  3:50 PM                  Urinary Catheter Urinary Catheter 03/24/17 2- way; Palacio-Criteria for Appropriate Use: Surgical procedure, Obstruction/retention  [REMOVED] Urinary Catheter 02/24/17 Palacio-Criteria for Appropriate Use: Medically/surgically unstable    Intake & Output   Date 03/28/17 1900 - 03/29/17 0659 03/29/17 0700 - 03/30/17 0659   Shift 8843-1838 24 Hour Total 0771-4477 6366-9584 24 Hour Total   I  N  T  A  K  E   [address]  [address]         P. O.  240       I.V.  (mL/kg/hr)  519.8         Volume (TPN ADULT - CENTRAL AA 5% D20% W/ CA + ELECTROLYTES)  519.8       Shift Total  (mL/kg)  759.8  (8.7)      O  U  T  P  U  T   Urine  (mL/kg/hr) 800 800 175  (0.2)  175      Urine Voided 800 800         Urine Output (mL) (Urinary Catheter 03/24/17 2- way; Palacio)   175  175    Stool           Stool Occurrence(s)   1 x  1 x    Dialysis   0  0      NET Fluid Removed (mL)   0  0    Shift Total  (mL/kg) 800  (9.2) 800  (9.2) 175  (2)  175  (2)   NET -800 -40.3 -175  -175   Weight (kg) 87.3 87.3 87.3 87.3 87.3         Readmission Risk Assessment Tool Score High Risk            26       Total Score        3 Relationship with PCP    2 Patient Living Status    3 Patient Length of Stay > 5    4 More than 1 Admission in calendar year    5 Patient Insurance is Medicare, Medicaid or Self Pay    9 Charlson Comorbidity Score       Expected Length of Stay 5d 16h   Actual Length of Stay 34

## 2017-03-29 NOTE — PROGRESS NOTES
Nephrology Progress Note     Fransisco Chilel       [email or web address]. [email or web address]                   Phone - [phone]   Patient: Bob Mcclendon  YOB: 1955     Date- 3/29/2017     CC: Follow up for ARF         Subjective: Interval History:   Seen and examined. bp stable  Poor po intake  C/o nausea  S/p mesentric and renal angio. Assessment:   · Acute renal failure - multiple etiology  1. ATN on admission  2. Left hydro. - on functioning kidney. She has right renal atrophy. Right renal atrophy might be a chronic issue. She had small right kidney on admission CT abdo. - she has left renal artery stent seen on CT angio --- blood flow seen on renal artery and vein on USG from 3-23  · Left hydronephrosis- s/p cystoscopy and stent placement 3-24  · ckd 3 baseline cr. 1.2  · Candida UTI  · Resp. Failure - s/p extubation  · AFIB with RVR. Now s/p AFL ablation and pacemaker implant  · Complicated type B descending thoracic aortic dissection. -   · S/p TEVAR, L ileo-fem bypass, L carotid-subclavian bypass:2/27/17   · Protein malnutrition  · Renal artery stenosis - h/o left renal artery stent   · Abdominal pain due to  mesentric ischemia - s/p stenting 3-28-17  · S/p left renal angiogram. Stent patent  · Ischemic spinal cord injury     Plan:   - continue HD today  - cont current BP meds  - TPN ordered      [x] High complexity decision making was performed  [x] Patient is at high-risk of decompensation with multiple organ involvement  Review of Systems: Pertinent items are noted in HPI.   Objective:   Vitals:    03/29/17 0003 03/29/17 0410 03/29/17 0700 03/29/17 0909   BP: 107/87 131/47  158/60   Pulse: 76 83  77   Resp: 18 18  20   Temp: 98.2 °F (36.8 °C) 98.1 °F (36.7 °C)  98.7 °F (37.1 °C)   TempSrc:       SpO2: 98% 99%     Weight:   87.3 kg (192 lb 7.9 oz)    Height:           Intake/Output Summary (Last 24 hours) at 03/29/17 0949  Last data filed at 03/29/17 5080   Gross per 24 hour Intake           519.75 ml   Output              800 ml   Net          -280.25 ml     Physical Exam:   GEN: - NAD   Neck - supple, right ij amparo +, left upper neck staples +  RESP: clear b/l, no wheezing. Decreased at base  CVS: RRR,S1,S2 , edema +  SKIN: No Rash  ABDO: soft ,  No BS  Neuro- moves upper arm, normal speech  + grubbs, hematuria +        Chart reviewed. Pertinent Notes reviewed.    Medications list  reviewed     Current Facility-Administered Medications   Medication    TPN ADULT - CENTRAL AA 5% D20% W/ CA + ELECTROLYTES    heparin (porcine) injection 5,000 Units    fluconazole (DIFLUCAN) tablet 400 mg    QTc monitoring while on both Fluconazole and Amiodarone    minoxidil (LONITEN) tablet 2.5 mg    epoetin fareed (EPOGEN;PROCRIT) injection 10,000 Units    amiodarone (CORDARONE) tablet 400 mg    albuterol-ipratropium (DUO-NEB) 2.5 MG-0.5 MG/3 ML    hydrALAZINE (APRESOLINE) tablet 100 mg    amLODIPine (NORVASC) tablet 10 mg    pantoprazole (PROTONIX) tablet 40 mg    levothyroxine (SYNTHROID) tablet 100 mcg    insulin lispro (HUMALOG) injection    glucose chewable tablet 16 g    dextrose (D50W) injection syrg 12.5-25 g    glucagon (GLUCAGEN) injection 1 mg    labetalol (NORMODYNE;TRANDATE) injection 20 mg    cloNIDine (CATAPRES) 0.2 mg/24 hr patch 2 Patch    HYDROmorphone (PF) (DILAUDID) injection 0.5 mg    diphenhydrAMINE-zinc acetate 2%-0.1% (BENADRYL) cream    ADDaptor    vancomycin (VANCOCIN) 1,000 mg injection    0.9% sodium chloride (MBP/ADV) 0.9 % infusion    sodium chloride (NS) flush 5-10 mL    naloxone (NARCAN) injection 0.4 mg    sodium chloride (NS) flush 10-30 mL    sodium chloride (NS) flush 10 mL    sodium chloride (NS) flush 10-40 mL    sodium chloride (NS) flush 20 mL    phenol throat spray (CHLORASEPTIC) 1 Spray    mineral oil-hydrophil petrolat (AQUAPHOR) ointment    0.9% sodium chloride infusion 250 mL    polyethylene glycol (MIRALAX) packet 17 g    prochlorperazine (COMPAZINE) with saline injection 5 mg    sodium chloride (NS) flush 10 mL    sodium chloride (NS) flush 10-40 mL    acetaminophen (TYLENOL) tablet 650 mg    bisacodyl (DULCOLAX) suppository 10 mg              Data Review :  Recent Labs      03/29/17   0349  03/28/17   0445  03/27/17   0320   NA  135*  136  135*   K  4.4  4.0  3.9   CL  99  99  99   CO2  25  26  23   GLU  146*  181*  137*   BUN  56*  37*  66*   CREA  2.27*  1.61*  2.53*   CA  7.8*  7.7*  8.5   MG   --    --   2.0   PHOS   --    --   3.7   ALB  2.1*  2.1*  2.2*   SGOT  32  25  27   ALT  34  22  20     Recent Labs      03/29/17   0349  03/28/17   0445  03/27/17   0320   WBC  10.5  12.0*  11.2*   HGB  6.9*  7.2*  7.7*   HCT  20.7*  21.7*  23.2*   PLT  162  141*  138*         Marlo Bey MD  [facility]  [facility]  [address], [address]  [place], [address] [place]  Phone - [phone]         Fax - [phone] [facility] Office  [address] [place] [address]  Phone - [phone]        Fax - [phone]     www. [facility]

## 2017-03-29 NOTE — PROGRESS NOTES
Patient has new Left hydroureteronephrosis and Left stent placement in her only good kidney 3/24/2017 as well as a hypotonic neuropathic bladder with recent hematuria probably stent related following AAA repair. No  complaints. No left flank pain. Afebrile. Jeanine Lubna old. This should clear up. Recommend continue Paalcio catheter drainage. We will check on from time to time.     Loan Fajardo M.D.  [phone]

## 2017-03-29 NOTE — PROGRESS NOTES
Vascular    Will hold Amiodarone for 1-2 days due to persistent nausea  Will probably start back at 200 mg every day  D/w Dr Palmira Francis

## 2017-03-29 NOTE — PROGRESS NOTES
Problem: Mobility Impaired (Adult and Pediatric)  Goal: *Acute Goals and Plan of Care (Insert Text)  Physical Therapy Goals   Goals reviewed and revised 3/29/17  1. Patient will move from supine to sit and sit to supine , scoot up and down and roll side to side in bed with maximum assistance within 7 day(s). 2. Patient will sit on EOB 5 minutes demonstrating overall good sitting balance without LOB within 7 days. 3. Patient will perform 3/3 UE exercises (shoulder flexion, elbow flexion, hand gripping) with supervision in order to improve UE function within 7 days. 4. Patient will perform lateral weight shifting to R and L with moderate assistance in order to decrease risk of pressure ulcer formation within 7 days. Goals reviewed and revised 3/22/2017  1. Patient will move from supine to sit and sit to supine , scoot up and down and roll side to side in bed with maximum assistance within 7 day(s). 2. Patient will sit on EOB 2 minutes demonstrating overall good sitting balance without LOB within 7 days. MET  3. Patient will perform 3/3 UE exercises (shoulder flexion, elbow flexion, hand gripping) with supervision in order to improve UE function within 7 days. 4. Patient will perform lateral weight shifting to R and L with moderate assistance in order to decrease risk of pressure ulcer formation within 7 days. Initiated 3/14/2017  1. Patient will move from supine to sit and sit to supine , scoot up and down and roll side to side in bed with moderate assistance within 7 day(s). 2. Patient will transfer from bed to chair and chair to bed with maximal assistance x 2 using the least restrictive device within 7 day(s). 3. Patient will perform sit to stand with maximal assistance within 7 day(s). 4. Patient will sit on EOB 2 minutes demonstrating overall good sitting balance without LOB within 7 days.       PHYSICAL THERAPY TREATMENT: WEEKLY REASSESSMENT  Patient: Johnson Sibley (67 y.o. female)  Date: 3/29/2017  Diagnosis: Aneurysm ([address]  AORTIC DISECTION  Kidney Stone <principal problem not specified>  Procedure(s) (LRB):  CYSTOSCOPY LEFT URETERAL STENT PLACEMENT (Left) 5 Days Post-Op  Precautions: Fall      ASSESSMENT:  Pt received supine in bed on 2L/min of O2 and agreeable to PT intervention. RN cleared pt for mobility and VSS at start of session. Pt required maxA x2 to get to EOB; however, pt with increased effort and assistance using UEs to assist with activity this date. Pt tolerated sitting at EOB for 9 minutes with intermittent bouts of nausea that would resolve with rest breaks. In sitting, pt performed lateral weight shifting and balance activities described below. Pt demonstrated improved static sitting balance with SBA-modA x2 to maintain upright posture. Pt eventually requesting to lie back down due to nausea, and she was assisted to supine. Once in supine, pt had bowel movement and RN came to assist with pt hygiene. Pt able to actively participate in rolling, requiring maxA x1 for bed mobility throughout hygiene tasks, but she required totalA x1-2 for all aspects of performing self hygiene. Pt left with all needs met and positioned supine in bed with VSS. RN notified. Pt continuing to present with impaired dynamic balance, flaccid BLEs, impaired functional mobility, decreased UE strength, and decreased activity tolerance from baseline; however, she has shown improvements in overall participation/motivation and static sitting balance. Recommending inpatient rehab for discharge. Frequency of acute PT increased to 5x/week as pt is improving with activity tolerance during PT sessions. Pt would benefit from continued skilled acute PT to improve safety and independence with functional mobility. Patient's progression toward goals since last assessment: Met static sitting goal; all other goals in progress       PLAN:  Goals have been updated based on progression since last assessment.   Patient continues to benefit from skilled intervention to address the above impairments. Continue to follow the patient 5 times a week to address goals. Planned Interventions:  [X]              Bed Mobility Training             [X]       Neuromuscular Re-Education  [X]              Transfer Training                   [ ]       Orthotic/Prosthetic Training  [ ]              Gait Training                         [ ]       Modalities  [X]              Therapeutic Exercises           [ ]       Edema Management/Control  [X]              Therapeutic Activities            [X]       Patient and Family Training/Education  [ ]              Other (comment):  Discharge Recommendations: Inpatient Rehab  Further Equipment Recommendations for Discharge: TBD by rehab       SUBJECTIVE:   Patient stated I appreciate you girls working with me.       OBJECTIVE DATA SUMMARY:   Critical Behavior:  Neurologic State: Alert  Orientation Level: Oriented X4  Cognition: Follows commands  Safety/Judgement: Decreased insight into deficits     Functional Mobility Training:  Bed Mobility:  Rolling: Maximum assistance;Assist x1 (for LE and trunk management)  Supine to Sit: Maximum assistance;Assist x2 (for LE management and trunk support)  Sit to Supine: Maximum assistance;Assist x2 (for LE management and trunk support)  Scooting: Total assistance;Assist x2 (to scoot towards EOB in sitting) - pt able to assist with scooting by laterally shifting upper body to R and L      Balance:  Sitting: Impaired  Sitting - Static: Poor (constant support); Fair (occasional)  Sitting - Dynamic: Poor (constant support)                Neuro Re-Education:  Lateral weight shifting to R and L x3 each with SBA-modA x2 for balance/safety (pt with increased difficulty shifting towards R>L) and tactile and verbal cues for sequencing and encouragement  Static sitting with SBA x2 with UEs positioned adjacent to greater trochanters     Pain:  Pain Scale 1: Numeric (0 - 10)  Pain Intensity 1: 0     Activity Tolerance:   Fair (improving)- VSS at beginning and end of treatment session; however, pt with bouts of nausea throughout treatment session that eventually required her to lie back down. Please refer to the flowsheet for vital signs taken during this treatment. After treatment:   [ ]  Patient left in no apparent distress sitting up in chair  [X]  Patient left in no apparent distress in bed  [X]  Call bell left within reach  [X]  Nursing notified  [ ]  Caregiver present  [X]  Bed alarm activated      COMMUNICATION/COLLABORATION:   The patients plan of care was discussed with: Physical Therapist and Registered Nurse     SUSANNA Seth   Time Calculation: 43 mins                                Regarding student involvement in patient care:  A student participated in this treatment session. Per CMS Medicare statements and APTA guidelines I certify that the following was true:  1. I was present and directly observed the entire session. 2. I made all skilled judgments and clinical decisions regarding care. 3. I am the practitioner responsible for assessment, treatment, and documentation.

## 2017-03-29 NOTE — PROGRESS NOTES
Occupational Therapy  Initiated tx session  (pt recently worked with PT) performing BUE reach/grasp activity utilizing trunk rotation at bed level (supported sitting) to increase strength/sitting for adls. Pt coughing and bring up \"phlegm\" and then  more liquid in green emesis bag. She reported that she just needed to settle down some and tx session was aborted. Nursing informed.  Will continue to follow

## 2017-03-29 NOTE — PROGRESS NOTES
Problem: Neurolinguistics Impaired (Adult)  Goal: *Acute Goals and Plan of Care (Insert Text)  3/29/2017  Speech path goals:  1. Pt will complete simple to mod verbal reasoning tasks with min cues with 90% acc. 2. Pt will complete verbal organization and attention eval.   SPEECH LANGUAGE PATHOLOGY EVALUATION/SWALLOWING THERAPY  Patient: Valentino Gonzáles (64 y.o. female)  Date: 3/29/2017  Primary Diagnosis: Aneurysm ([address]  AORTIC DISECTION  Kidney Stone  Procedure(s) (LRB):  CYSTOSCOPY LEFT URETERAL STENT PLACEMENT (Left) 5 Days Post-Op   Precautions:   Fall      ASSESSMENT :  Based on the objective data described below, the patient presents with functional swallow of thins. Physician has changed her diet to thins. neurolinguistic eval indicated mild verbal recall deficits, mod to severe verbal reasoning deficits. Verbal problem solving grossly wnl. Verbal memory mildly impaired. Need to complete attention and verbal organization eval. Mildly slower processing. Flat affect but other wise pragmatically appropriate. Patient will benefit from skilled intervention to address the above impairments. Patients rehabilitation potential is considered to be Good  Factors which may influence rehabilitation potential include:   [ ]              None noted  [X]              Mental ability/status  [X]              Medical condition  [X]              Home/family situation and support systems  [ ]              Safety awareness  [ ]              Pain tolerance/management  [ ]              Other:        PLAN :  Recommendations and Planned Interventions:  4x/week speech therapy  Frequency/Duration: Patient will be followed by speech-language pathology 4 times a week to address goals. Discharge Recommendations: inpt rehab       SUBJECTIVE:   Patient stated she was not drinking the thickener any more and it made her feel sick.        OBJECTIVE:       Past Medical History:   Diagnosis Date    Anxiety disorder      Arthritis BACK, RT. KNEE and HANDS    CAD (coronary artery disease)        s/p 5 vessel CABG 2011    Carotid arterial disease (HCC)       diffuse bilateral CCA plaques    Chronic obstructive pulmonary disease (HCC)      Depression with anxiety      Essential hypertension      GERD (gastroesophageal reflux disease)       rarely    GI bleed      Hypothyroid      Mesenteric artery stenosis (HCC)      Microcytic anemia      Renal artery atherosclerosis, bilateral (HCC)        RA occlusion, left s/p stent    Renal atrophy, right      SVT (supraventricular tachycardia) 3/3/2017    UC (ulcerative [address]) [address] [address]       Past Surgical History:   Procedure Laterality Date    ESOPHAGEAL CAPSULE ENDOSCOPY   6/8/2015          HX COLONOSCOPY        HX CORONARY ARTERY BYPASS GRAFT   8/11     5 way bypass.  08/19/11    HX FEMORAL BYPASS         triple    HX LUMBAR DISKECTOMY   1997    HX UROLOGICAL         vascular stent x 2 to left kidney    SMALL BOWEL ENDOSCOPY,ABLATE LESN   3/13/2015          STRESS TEST LEXISCAN/CARDIOLITE   4/24/12    UPPER GI ENDOSCOPY,CTRL BLEED   6/2/2015           Prior Level of Function/Home Situation:   Home Situation  Home Environment: Private residence  # Steps to Enter: 4  Rails to Enter: Yes  Hand Rails : Bilateral (canniot hold ay same time)  One/Two Story Residence: One story  Living Alone: No  Support Systems: Spouse/Significant Other/Partner  Patient Expects to be Discharged to[de-identified] Patient room (U/S first then back to [facility])  Current DME Used/Available at Home: [employer] Amabile, straight, Shower chair, Walker, rollator  Tub or Shower Type: Tub/Shower combination  Mental Status:  Neurologic State: Alert  Orientation Level: Oriented X4  Cognition: Follows commands  Perception: Appears intact  Perseveration: No perseveration noted  Safety/Judgement: Decreased insight into deficits  Motor Speech:     Language Comprehension and Expression:  Auditory Comprehension  Auditory Impairment: No   Verbal Expression  Primary Mode of Expression: Verbal  Initiation: No impairment        Neuro-Linguistics:   on NCSE  Verbal Reasoning Tasks: Impaired; 2/8 acc. Verbal Problem Solvin/8 acc. Mathematical: No Impairment  Memory: Impaired; recalled 1/3 words over 5 mins with no cues; with semantic cues, 2/3 words; mild impaired  Attention : in tasks seemed to be wnl. But became very anxious when her food arrived and there were 2 people in the room including myself. Her hands began to shake. Reasoning Task Exercises-Similarities Accuracy (%): 25 %  Pragmatics:  Pragmatics Impairment:  (flat affect )         SWALLOWING THERAPY; pt drank 4 ounces of thins with no coughing. Drank small sips via cup. Pt wanted a straw and tolerated sips well. Good oropharyngeal swallow. G Codes: In compliance with CMSs Claims Based Outcome Reporting, the following G-code set was chosen for this patient based the use of the NOMS functional outcome to quantify this patient's level of 5 impairment. Using the NOMS, the patient was determined to be at level 5 for verbal recall which correlates with the CJ= 20-39% level of severity. Based on the objective assessment provided within this note, the current, goal, and discharge g-codes are as follows:     Memory   Memory Current Status CJ= 20-39%   Memory Goal Status CI= 1-19%           Pain:  Pain Scale 1: Numeric (0 - 10)  Pain Intensity 1: 0     After treatment:   [ ]              Patient left in no apparent distress sitting up in chair  [X]              Patient left in no apparent distress in bed  [X]              Call bell left within reach  [X]              Nursing notified  [ ]              Caregiver present  [ ]              Bed alarm activated      COMMUNICATION/EDUCATION:   The patients plan of care including recommendations and planned interventions was discussed with: Registered Nurse.   Educated that she did well with verbal problem solving and verbal recall. [ ]  Patient/family have participated as able in goal setting and plan of care. [X]  Patient/family agree to work toward stated goals and plan of care. [ ]  Patient understands intent and goals of therapy, but is neutral about his/her participation. [ ]  Patient is unable to participate in goal setting and plan of care.      Thank you for this referral.  Tami Nascimento, SLP  Time Calculation: 25 mins

## 2017-03-30 NOTE — WOUND CARE
Wound Care Consult: Chart reviewed and patient assessed for moisture issues with her skin and redness. Pt. Has been here for about 34 days. She has received a Defib / [place], several stents and procedures since she was admitted. She desires to move around more but is limited due to her pain and newest sutures. There is no bed rest order but patient's Robin is 12. Pt. Will receive a P-500 air mattress for pressure redistribution. Assessment of the groin revealed some redness associated with moisture all over the groins and thighs. Secura Antifungal thick formula was ordered for her last evening. Nursing is using this on her skin and we will continue this.    Marycarmen Seymour RN, BSN, [employer]

## 2017-03-30 NOTE — PROGRESS NOTES
Problem: Mobility Impaired (Adult and Pediatric)  Goal: *Acute Goals and Plan of Care (Insert Text)  Physical Therapy Goals   Goals reviewed and revised 3/29/17  1. Patient will move from supine to sit and sit to supine , scoot up and down and roll side to side in bed with maximum assistance within 7 day(s). 2. Patient will sit on EOB 5 minutes demonstrating overall good sitting balance without LOB within 7 days. 3. Patient will perform 3/3 UE exercises (shoulder flexion, elbow flexion, hand gripping) with supervision in order to improve UE function within 7 days. 4. Patient will perform lateral weight shifting to R and L with moderate assistance in order to decrease risk of pressure ulcer formation within 7 days. Goals reviewed and revised 3/22/2017  1. Patient will move from supine to sit and sit to supine , scoot up and down and roll side to side in bed with maximum assistance within 7 day(s). 2. Patient will sit on EOB 2 minutes demonstrating overall good sitting balance without LOB within 7 days. 3. Patient will perform 3/3 UE exercises (shoulder flexion, elbow flexion, hand gripping) with supervision in order to improve UE function within 7 days. 4. Patient will perform lateral weight shifting to R and L with moderate assistance in order to decrease risk of pressure ulcer formation within 7 days. Initiated 3/14/2017  1. Patient will move from supine to sit and sit to supine , scoot up and down and roll side to side in bed with moderate assistance within 7 day(s). 2. Patient will transfer from bed to chair and chair to bed with maximal assistance x 2 using the least restrictive device within 7 day(s). 3. Patient will perform sit to stand with maximal assistance within 7 day(s). 4. Patient will sit on EOB 2 minutes demonstrating overall good sitting balance without LOB within 7 days.       PHYSICAL THERAPY TREATMENT  Patient: Tristian Santana (28 y.o. female)  Date: 3/30/2017  Diagnosis: Aneurysm ([address]  AORTIC DISECTION  Kidney Stone <principal problem not specified>  Procedure(s) (LRB):  CYSTOSCOPY LEFT URETERAL STENT PLACEMENT (Left) 6 Days Post-Op  Precautions: Fall      ASSESSMENT:  Pt received supine in bed and agreeable to PT intervention. Pt presenting this date with Hgb 6.4 g/dL; however, nursing cleared pt for EOB mobility. Pt continues to require max-total Ax2 for all bed mobility; however, she has shown increased initiative and assistance in engaging in transfer utilizing [address] [place] [address] Avenue. Pt tolerated sitting at EOB ~4 minutes with min-modA x1-2, with frequent rest breaks requiring total A x1 for supported sitting. In sitting, pt began feeling anxious and requested to lie back down. Pt was instructed in and performed UE exercises in supine (described below). She was positioned in bed with pillows for comfort and left supine with all needs met. Nursing notified of pt's anxiety during treatment session. VSS. Recommending inpatient rehab for discharge. Pt would benefit from continued skilled acute PT to improve independence with functional mobility. Progression toward goals:  [ ]    Improving appropriately and progressing toward goals  [X]    Improving slowly and progressing toward goals  [ ]    Not making progress toward goals and plan of care will be adjusted       PLAN:  Patient continues to benefit from skilled intervention to address the above impairments. Continue treatment per established plan of care. Discharge Recommendations:  Inpatient Rehab  Further Equipment Recommendations for Discharge:  TBD       SUBJECTIVE:   Patient stated I'm feeling very anxious. I don't know why.  (while sitting at EOB)      OBJECTIVE DATA SUMMARY:   Critical Behavior:  Neurologic State: Alert  Orientation Level: Oriented X4  Cognition: Appropriate decision making, Appropriate for age attention/concentration, Appropriate safety awareness, Follows commands  Safety/Judgement: Decreased insight into deficits Functional Mobility Training:  Bed Mobility:  Rolling: Maximum assistance;Assist x1 (for LE and trunk management (pt assists using BUE))  Supine to Sit: Maximum assistance;Assist x2 (for trunk support and BLE management (pt assists with BUE))  Sit to Supine: Assist x2;Maximum assistance (for trunk and LE management)  Scooting: Total assistance;Assist x1;Assist x2 (x1 for scooting to EOB/x2 for scooting in supine to [facility])     Balance:  Sitting: Impaired  Sitting - Static: Poor (constant support)  Sitting - Dynamic: Poor (constant support)                 Neuro Re-Education:  Static sitting with UE positioned adjacent to greater trochanters with min-total Ax1-2 (pt with increased fatigue and decreased participation this date)  Anterior weight shifting with tactile/verbal cues for hand placement x2 with Rabia x1     Therapeutic Exercises:   Supine in bed with HOB elevated:  AROM RANJEET shoulder flexion x10   AROM RANJEET shoulder abduction x10  AROM RANJEET elbow flexion x10  AROM RANJEET towel wringing (hands actively gripping) 5x3 second hold     Pain:  Pain Scale 1: Numeric (0 - 10)  Pain Intensity 1: 4  Pain Location 1: Abdomen  Pain Orientation 1: Mid;Anterior  Pain Description 1: Burning  Pain Intervention(s) 1: Rest;Repositioned     Activity Tolerance:   Fair- VSS; however, pt with increased anxiety in sitting. After treatment:   [ ]    Patient left in no apparent distress sitting up in chair  [X]    Patient left in no apparent distress in bed  [X]    Call bell left within reach  [X]    Nursing notified  [ ]    Caregiver present  [ ]    Bed alarm activated      COMMUNICATION/COLLABORATION:   The patients plan of care was discussed with: Physical Therapist and Registered Nurse     SUSANNA Wolf   Time Calculation: 25 mins              Regarding student involvement in patient care:  A student participated in this treatment session.  Per CMS Medicare statements and APTA guidelines I certify that the following was true:  1. I was present and directly observed the entire session. 2. I made all skilled judgments and clinical decisions regarding care. 3. I am the practitioner responsible for assessment, treatment, and documentation.

## 2017-03-30 NOTE — PROGRESS NOTES
Vascular    Still c/o nausea  Very poor PO intake  AFVSS  Urine still bloody  Exam unchanged  Hb 6.4   Hopefully nausea is due to Amiodarone and it will slowly improve off Amio  Anemia is mostly due to acute blood loss but she also has chronic anemia (2-3 transfusions/year)  Will transfuse 2 units PRBC  Repeat renal U/S to confirm hydro has resolved  Cont TPN  Cont Fluconazole

## 2017-03-30 NOTE — PROGRESS NOTES
Nephrology Progress Note     Fransisco Chilel       [email or web address]                   Phone - [phone]   Patient: Lynn Connors  YOB: 1955     Date- 3/30/2017     CC: Follow up for ARF         Subjective: Interval History:   Seen and examined. bp stable  C/o back pain, abdominal pain, and nausea  Had dialysis yesterday  S/p mesentric and renal angio. Assessment:   · Acute renal failure - multiple etiology. Now on dialysis. 1. ATN on admission  2. Left hydro. - on functioning kidney. She has right renal atrophy. Right renal atrophy might be a chronic issue. She had small right kidney on admission CT abdo. - she has left renal artery stent seen on CT angio --- blood flow seen on renal artery and vein on USG from 3-23  · Left hydronephrosis- s/p cystoscopy and stent placement 3-24  · ckd 3 baseline cr. 1.2  · Candida UTI  · Resp. Failure - s/p extubation  · AFIB with RVR. Now s/p AFL ablation and pacemaker implant  · Complicated type B descending thoracic aortic dissection. -   · S/p TEVAR, L ileo-fem bypass, L carotid-subclavian bypass:2/27/17   · Protein malnutrition  · Renal artery stenosis - h/o left renal artery stent   · Abdominal pain due to  mesentric ischemia - s/p stenting 3-28-17  · S/p left renal angiogram. Stent patent  · Ischemic spinal cord injury     Plan:   - Probable dialysis tomorrow. - cont current BP meds  - TPN ordered      [x] High complexity decision making was performed  [x] Patient is at high-risk of decompensation with multiple organ involvement  Review of Systems: Pertinent items are noted in HPI.   Objective:   Vitals:    03/30/17 0334 03/30/17 0808 03/30/17 1109 03/30/17 1311   BP: 132/50 137/48 140/57 149/54   Pulse: 85 84 82 80   Resp: 18 20 20    Temp: 98.2 °F (36.8 °C) 98.7 °F (37.1 °C) 98.7 °F (37.1 °C)    TempSrc:       SpO2: 97% 95% 96%    Weight:       Height:           Intake/Output Summary (Last 24 hours) at 03/30/17 1332  Last data filed at 03/30/17 0659   Gross per 24 hour   Intake           2320.5 ml   Output             3200 ml   Net           -879.5 ml     Physical Exam:   GEN: - Not comfortable but in no acute distress  Neck - supple, right ij amparo +, left upper neck staples +  RESP: clear  CVS: RRR; no gallop or rub  Extremities:  1+ edema  SKIN: No Rashes noted. Pt says she has a rash noted by an RN but she has not seen it herself and doesn't know where it is  ABDO: soft; no tenderness to light palpation; no guarding and no distension  Neuro- moves upper arm, normal speech  + grubbs, hematuria +  Psych: depressed affect        Chart reviewed. Pertinent Notes reviewed.    Medications list  reviewed     Current Facility-Administered Medications   Medication    0.9% sodium chloride infusion 250 mL    TPN ADULT - CENTRAL AA 5% D20% W/ CA + ELECTROLYTES    miconazole (SECURA) 2 % extra thick cream    heparin (porcine) injection 5,000 Units    fluconazole (DIFLUCAN) tablet 400 mg    QTc monitoring while on both Fluconazole and Amiodarone    minoxidil (LONITEN) tablet 2.5 mg    epoetin fareed (EPOGEN;PROCRIT) injection 10,000 Units    albuterol-ipratropium (DUO-NEB) 2.5 MG-0.5 MG/3 ML    hydrALAZINE (APRESOLINE) tablet 100 mg    amLODIPine (NORVASC) tablet 10 mg    pantoprazole (PROTONIX) tablet 40 mg    levothyroxine (SYNTHROID) tablet 100 mcg    insulin lispro (HUMALOG) injection    glucose chewable tablet 16 g    dextrose (D50W) injection syrg 12.5-25 g    glucagon (GLUCAGEN) injection 1 mg    labetalol (NORMODYNE;TRANDATE) injection 20 mg    cloNIDine (CATAPRES) 0.2 mg/24 hr patch 2 Patch    HYDROmorphone (PF) (DILAUDID) injection 0.5 mg    diphenhydrAMINE-zinc acetate 2%-0.1% (BENADRYL) cream    ADDaptor    vancomycin (VANCOCIN) 1,000 mg injection    0.9% sodium chloride (MBP/ADV) 0.9 % infusion    naloxone (NARCAN) injection 0.4 mg    sodium chloride (NS) flush 10-30 mL    sodium chloride (NS) flush 20 mL    phenol throat spray (CHLORASEPTIC) 1 Spray    mineral oil-hydrophil petrolat (AQUAPHOR) ointment    0.9% sodium chloride infusion 250 mL    polyethylene glycol (MIRALAX) packet 17 g    prochlorperazine (COMPAZINE) with saline injection 5 mg    sodium chloride (NS) flush 10-40 mL    acetaminophen (TYLENOL) tablet 650 mg    bisacodyl (DULCOLAX) suppository 10 mg              Data Review :  Recent Labs      03/30/17 0407 03/29/17 0349 03/28/17   0445   NA  135*  135*  136   K  3.8  4.4  4.0   CL  96*  99  99   CO2  27  25  26   GLU  171*  146*  181*   BUN  33*  56*  37*   CREA  1.56*  2.27*  1.61*   CA  8.2*  7.8*  7.7*   MG   --   1.9   --    PHOS   --   4.4   --    ALB  2.1*  2.1*  2.1*   SGOT  35  32  25   ALT  37  34  22     Recent Labs      03/30/17 0407 03/29/17 0349 03/28/17   0445   WBC  11.0  10.5  12.0*   HGB  6.4*  6.9*  7.2*   HCT  19.8*  20.7*  21.7*   PLT  171  162  141*         Meg Merino MD  [facility]  [facility]  [facility], [facility], [address] [place]  Phone - [phone]         Fax - [phone] [facility] Office  [address] [place] [address]  Phone - [phone]        Fax - [phone]     www. [facility]

## 2017-03-30 NOTE — PROGRESS NOTES
Problem: Neurolinguistics Impaired (Adult)  Goal: *Acute Goals and Plan of Care (Insert Text)  3/29/2017  Speech path goals:  1. Pt will complete simple to mod verbal reasoning tasks with min cues with 90% acc. 2. Pt will complete verbal organization and attention eval. MET  3. Patient will attend to treatment tasks for 5 minutes given minimal verbal cues. 4. Patient will recall 2/3 part message after 5 minute delay. 5. Patient will and complex yes/no questions with 90% accuracy. 6. Patient will complete simple divergent naming tasks with 60% accuracy. SPEECH LANGUAGE PATHOLOGY TREATMENT  Patient: Johnson Sibley (12 y.o. female)  Date: 3/30/2017  Diagnosis: Aneurysm ([facility] [address]  AORTIC DISECTION  Kidney Stone <principal problem not specified>  Procedure(s) (LRB):  CYSTOSCOPY LEFT URETERAL STENT PLACEMENT (Left) 6 Days Post-Op      ASSESSMENT:  Patient reports nausea however agreed to take medication with thin liquid with RN. Patient noted with immediate and delayed coughing with meds in thin liquids. Patient and RN report patient does not usually cough. Patient declined all offers of PO trials. Patient presents with moderate to severe cognitive linguistic evaluation including significantly impaired sustained attention, verbal organization, sequencing and auditory processing. Patient oriented x 4. Patient limited by lethargy, pain and nausea. Progression toward goals:  [ ]       Improving appropriately and progressing toward goals  [X]       Improving slowly and progressing toward goals  [ ]       Not making progress toward goals and plan of care will be adjusted       PLAN:  Patient continues to benefit from skilled intervention to address the above impairments. Continue treatment per established plan of care. Discharge Recommendations: To Be Determined       SUBJECTIVE:   Patient stated That one went right over my head.   Patient agreed to treatment. RN approved visit.   RN reports patient tolerating diet with limited intake. OBJECTIVE:   Mental Status:  Neurologic State: Lethargic  Orientation Level: Oriented to person, Oriented to place, Oriented to situation  Cognition: Follows commands  Perception: Appears intact  Perseveration: No perseveration noted  Safety/Judgement: Decreased insight into deficits  Treatment & Interventions:  Neuro-Linguistics:     Verbal Reasoning Tasks: Impaired     Verbal Organization: Impaired        Memory: Impaired  Attention : Impaired        Memory Exercises  Recall Message Parts: 3  Recall Message Time Delay: 5 minutes  Recall Message Accuracy (%): 0 %  Verbal Organization Exercises  ADL Sequencing-# of Steps: Simple  ADL Sequencing Accuracy (%): 0 %  Divergent naming (level of impairment): Simple  Divergent Naming Accuracy (%): 0 %              After treatment:   [ ]       Patient left in no apparent distress sitting up in chair  [X]       Patient left in no apparent distress in bed  [X]       Call bell left within reach  [X]       Nursing notified  [ ]       Caregiver present  [ ]       Bed alarm activated      COMMUNICATION/COLLABORATION:   Patient was educated regarding role of SLP and POC. Patient did not respond. Further education warranted.   The patients plan of care was discussed with: Registered Nurse     Waverly Gaucher, SLP  Time Calculation: 30 mins

## 2017-03-30 NOTE — PROGRESS NOTES
Bedside shift change report given to [place] (oncoming nurse) by Homar Smith RN (offgoing nurse). Report included the following information SBAR, Procedure Summary, Intake/Output, MAR, Recent Results and Cardiac Rhythm LInes.

## 2017-03-30 NOTE — PROGRESS NOTES
Given lack of improvement in renal function will obtain another renal ultrasound to make sure hydronephrosis remains resolved. Given her urinary retention (and sensory unawareness of same), would recommend leaving grubbs catheter in place until early next week. Could attempt voiding trial at that time unless she has clinically deteriorated.     Imp: left hydro resolved (per renal ultrasound 3/25)    Plan:  -recheck renal ultrasound given lack of improvement in renal function

## 2017-03-30 NOTE — PROGRESS NOTES
Bedside shift change report given to Your.MD Tunde RN (oncoming nurse) by Sinda Gaucher RN (offgoing nurse). Report included the following information SBAR, Kardex, MAR and Recent Results.

## 2017-03-31 NOTE — PROGRESS NOTES
Nutrition Assessment:    INTERVENTIONS/RECOMMENDATIONS:   Meals/Snacks: General/healthful diet: Continue diet consistency per SLP; consistent carbohydrate diet   Supplements: Commercial supplement: Glucerna shakes BID (must be thickened), magic cups BID, ensure pudding BID  Nutrition support: Recommend enteral nutrition over TPN in patient with functional gut      ASSESSMENT:   3/31: Chart reviewed; med noted for acute renal failure, no HD, TPN ordered (D20/5%AA) @ 63 ml/hr. Continues with marginal po intake. 3/27: Patient continues with poor appetite and PO intake. Tray at bedside looks untouched. ONS unopened. Patient awake with eyes open when entering room but when I began talking to patient she closed her eyes with light snoring. Attempted to gauge appetite/PO intake but patient did not respond. Notes indicate patient continues to have nausea. Receiving multiple ONS. Noted to be non-compliant with nectar thick liquids. Continues on dialysis. TPN meeting approximately 77% of kcal needs and 100% protein needs. Recommend utilizing functional gut and providing enteral nutrition support via NGT over parenteral nutrition. Encourage PO intake of meals/ONS and compliance with thickened liquids. Diet Order: Consistent carb  % Eaten:  No data found. Pertinent Medications: [x] Reviewed []Other: TPN  Pertinent Labs: [x]Reviewed  []Other:Creat 2.21  Food Allergies: [x]None []Other:     Last BM:    [x]Active     []Hyperactive  []Hypoactive       [] Absent  BS  Skin:    [x] Intact   [] Incision  [] Breakdown   []Edema   []Other:    Anthropometrics: Height: 5' 6.5\" (168.9 cm) Weight: 90.7 kg (200 lb)    IBW (%IBW): 59.1 kg (130 lb 4.7 oz) ( ) UBW (%UBW): 68.2 kg (150 lb 5.7 oz) (  %)    BMI: Body mass index is 31.8 kg/(m^2).     This BMI is indicative of:  []Underweight   []Normal   [x]Overweight   [] Obesity   [] Extreme Obesity (BMI>40)  Last Weight Metrics:  Weight Loss Metrics 3/31/2017 2/23/2017 2/23/2017 2/23/2017 2/21/2017 1/24/2017 8/24/2016   Today's Wt 200 lb - 171 lb 15.3 oz - 170 lb 4 oz 171 lb 4 oz 166 lb 8 oz   BMI - 31.8 kg/m2 - 27.75 kg/m2 27.48 kg/m2 27.64 kg/m2 26.87 kg/m2       Estimated Nutrition Needs (Based on): 1730 Kcals/day (BMR (1442) x 1. 2AF) , 71 g (-83g (1.2-1.4 g/kg IBW)) Protein  Carbohydrate: At Least 130 g/day  Fluids: 1700 mL/day     Pt expected to meet estimated nutrient needs: []Yes [x]No:  Poor oral intake, TPN ordered per MD    NUTRITION DIAGNOSES:   Problem:  Inadequate protein-energy intake      Etiology: related to decreased appetite, nausea     Signs/Symptoms: as evidenced by PO <25% of meals; TPN started       NUTRITION INTERVENTIONS:  Meals/Snacks: General/healthful diet Enteral/Parenteral Nutrition: Discontinue parenteral nutrition Supplements: Commercial supplement              GOAL:   PO intake >25% of meals/supplements next 2-4 days    NUTRITION MONITORING AND EVALUATION   Behavioral-Environmental Outcomes: Behavior  Food/Nutrient Intake Outcomes: Total energy intake, Enteral/parenteral nutrition intake  Physical Signs/Symptoms Outcomes: Weight/weight change, Electrolyte and renal profile, GI profile, Glucose profile    Previous Goal Met:   [] Met              [x] Progressing Towards Goal              [] Not Progressing Towards Goal   Previous Recommendations:   [] Implemented          [] Not Implemented          [x] Not Applicable    LEARNING NEEDS (Diet, Food/Nutrient-Drug Interaction):    [x] None Identified   [] Identified and Education Provided/Documented   [] Identified and Pt declined/was not appropriate     Cultural, Zoroastrianism, OR Ethnic Dietary Needs:    [x] None Identified   [] Identified and Addressed     [x] Interdisciplinary Care Plan Reviewed/Documented    [x] Discharge Planning:   Too early to determine nutrition plan at discharge   [] Participated in Interdisciplinary Rounds    NUTRITION RISK:    [x] High              [] Moderate           []  Low  [] Minimal/Uncompromised      Megan Slight, [address]  Pager [phone]  Weekend Pager [phone]

## 2017-03-31 NOTE — PROGRESS NOTES
PCU SHIFT NURSING NOTE      0715 Bedside and Verbal shift change report given to Jordy Sorto RN (oncoming nurse) by Erin Null RN (offgoing nurse). Report included the following information SBAR, Kardex, Intake/Output, MAR, Accordion, Recent Results, Med Rec Status and Cardiac Rhythm Paced. Shift Summary: Low hgb today of 6.4, 2 units PRBCs ordered but took a long time to allocate due to pt antibodies, blood started around 1800. [facility] to bed frame with weighing capabilty. Pt continues to be mildly anxious throughout the day. Dr. Rebekah Webster - please see provider message on Kardex regarding anxiety med and possible palliative consult. Thank you!    1920 Bedside and Verbal shift change report given to Leavy Carrel RN (oncoming nurse) by Jordy Sorto RN (offgoing nurse). Report included the following information SBAR, Kardex, Intake/Output, MAR, Accordion, Recent Results, Med Rec Status and Cardiac Rhythm Paced. Admission Date 2/23/2017   Admission Diagnosis Aneurysm ([address]  AORTIC DISECTION  Kidney Stone   Consults IP CONSULT TO VASCULAR SURGERY  IP CONSULT TO VASCULAR SURGERY  IP CONSULT TO NEPHROLOGY  IP CONSULT TO UROLOGY        Consults   [x]PT   [x]OT   [x]Speech   [x]Case Management      [x] Palliative      Cardiac Monitoring Order   [x]Yes   []No     IV drips   [x]Yes    Drip:             TPN               Dose:   63ml/hr    Drip:                            Dose:  Drip:                            Dose:   []No     GI Prophylaxis   [x]Yes   []No         DVT Prophylaxis   SCDs:  Sequential Compression Device: Bilateral     Patient Refused VTE Prophylaxis: Yes    Valeriy stockings:  Graduated Compression Stockings: Bilateral      [x] Medication   []Contraindicated   []None      Activity Level Activity Level: Head of bed elevated (degrees), Chair     Activity Assistance: Complete care   Purposeful Rounding every 1-2 hour?    [x]Yes   Jean Baptiste Score  Total Score: 2   Bed Alarm (If score 3 or >)   [x]Yes   [] Refused (See signed refusal form in chart)   Robin Score  Robin Score: 16   Robin Score (if score 14 or less)   [x]PMT consult   [x]Wound Care consult      [x]Specialty bed   [x] Nutrition consult          Needs prior to discharge:   Home O2 required:    []Yes   [x]No    If yes, how much O2 required? Other:    Last Bowel Movement: Last Bowel Movement Date: 03/29/17      Influenza Vaccine Received Flu Vaccine for Current Season (usually Sept-March): No    Patient/Guardian Refused (Notify MD): Yes   Pneumonia Vaccine           Diet Active Orders   Diet    DIET DIABETIC CONSISTENT CARB Regular      LDAs         PICC Triple Lumen 39/37/61 Right;Basilic (Active)   Central Line Being Utilized Yes 3/30/2017  3:08 PM   Criteria for Appropriate Use Total parenteral nutrition 3/30/2017  3:08 PM   Site Assessment Clean, dry, & intact 3/30/2017  3:08 PM   Phlebitis Assessment 0 3/30/2017  3:08 PM   Infiltration Assessment 0 3/30/2017  3:08 PM   Arm Circumference (cm) 29 cm 3/2/2017  4:00 AM   Date of Last Dressing Change 03/29/17 3/30/2017  3:08 PM   Dressing Status Clean, dry, & intact 3/30/2017  3:08 PM   External Catheter Length (cm) 0 centimeters 3/24/2017 12:38 AM   Dressing Type Disk with Chlorhexadine gluconate (CHG); Transparent;Tape 3/30/2017  3:08 PM   Action Taken Dressing changed 3/29/2017 12:11 PM   Hub Color/Line Status [place]; Infusing;Patent 3/30/2017  3:08 PM   Positive Blood Return (Site #1) Yes 3/29/2017  3:50 PM   Hub Color/Line Status Red;Capped;Flushed;Patent 3/30/2017  3:08 PM   Positive Blood Return (Site #2) Yes 3/30/2017  3:08 PM   Hub Color/Line Status White;Capped;Flushed;Patent 3/30/2017  3:08 PM   Positive Blood Return (Site #3) Yes 3/30/2017  3:08 PM   Alcohol Cap Used Yes 3/30/2017  3:08 PM              Hemodialysis Catheter 03/23/17 (Active)   Central Line Being Utilized Yes 3/30/2017  3:08 PM   Criteria for Appropriate Use Dialysis/apheresis 3/30/2017 3:08 PM   Date Accessed  03/29/17 3/30/2017  2:57 AM   Access Time  1255 3/25/2017  1:00 PM   Site Assessment Clean, dry, & intact 3/30/2017  3:08 PM   Date of Last Dressing Change 03/27/17 3/28/2017  3:00 PM   Dressing Status Clean, dry, & intact 3/30/2017  3:08 PM   Dressing Type Disk with Chlorhexadine gluconate (CHG); Transparent;Tape 3/30/2017  3:08 PM   Proximal Hub Color/Line Status Red;Capped 3/30/2017  3:08 PM   Distal Hub Color/Line Status Blue;Capped 3/30/2017  3:08 PM                  Urinary Catheter Urinary Catheter 03/24/17 2- way; [place]-Criteria for Appropriate Use: Obstruction/retention, Surgical procedure  [REMOVED] Urinary Catheter 02/24/17 Palacio-Criteria for Appropriate Use: Medically/surgically unstable    Intake & Output   Date 03/29/17 1900 - 03/30/17 0659 03/30/17 0700 - 03/31/17 0659   Shift 5481-2082 24 Hour Total 4285-2934 9129-8840 24 Hour Total   I  N  T  A  K  E   I.V.  (mL/kg/hr) 780.2 2320.5 744.5  (0.7)  744.5      Volume (TPN ADULT - CENTRAL AA 5% D20% W/ CA + ELECTROLYTES)  1540.4         Volume (TPN ADULT - CENTRAL AA 5% D20% W/ CA + ELECTROLYTES) 780.2 780.2 744.5  744.5    Shift Total  (mL/kg) 780.2  (8.9) 2320.5  (26.3) 744.5  (8.5)  744.5  (8.5)   O  U  T  P  U  T   Urine  (mL/kg/hr) 700 875         Urine Voided 700 700         Urine Output (mL) (Urinary Catheter 03/24/17 2- way; [place])  175       Stool           Stool Occurrence(s)  1 x       Dialysis 2500 2500         NET Fluid Removed (mL) 2500 2500       Shift Total  (mL/kg) 3200  (36.3) 3375  (38.3)      NET -2419.9 -1054.5 744.5  744.5   Weight (kg) 88.1 88.1 88.1 88.1 88.1         Readmission Risk Assessment Tool Score High Risk            26       Total Score        3 Relationship with PCP    2 Patient Living Status    3 Patient Length of Stay > 5    4 More than 1 Admission in calendar year    5 Patient Insurance is Medicare, Medicaid or Self Pay    9 Charlson Comorbidity Score       Expected Length of Stay 5d 16h Actual Length of Stay 35

## 2017-03-31 NOTE — PROGRESS NOTES
Bedside and Verbal shift change report given to Sherin Sprague (oncoming nurse) by Miley Jacobs (offgoing nurse). Report included the following information SBAR, Kardex, Intake/Output, MAR, Recent Results and Cardiac Rhythm NSR.

## 2017-03-31 NOTE — PROGRESS NOTES
Problem: Mobility Impaired (Adult and Pediatric)  Goal: *Acute Goals and Plan of Care (Insert Text)  Physical Therapy Goals   Goals reviewed and revised 3/29/17  1. Patient will move from supine to sit and sit to supine , scoot up and down and roll side to side in bed with maximum assistance within 7 day(s). 2. Patient will sit on EOB 5 minutes demonstrating overall good sitting balance without LOB within 7 days. 3. Patient will perform 3/3 UE exercises (shoulder flexion, elbow flexion, hand gripping) with supervision in order to improve UE function within 7 days. 4. Patient will perform lateral weight shifting to R and L with moderate assistance in order to decrease risk of pressure ulcer formation within 7 days. Goals reviewed and revised 3/22/2017  1. Patient will move from supine to sit and sit to supine , scoot up and down and roll side to side in bed with maximum assistance within 7 day(s). 2. Patient will sit on EOB 2 minutes demonstrating overall good sitting balance without LOB within 7 days. 3. Patient will perform 3/3 UE exercises (shoulder flexion, elbow flexion, hand gripping) with supervision in order to improve UE function within 7 days. 4. Patient will perform lateral weight shifting to R and L with moderate assistance in order to decrease risk of pressure ulcer formation within 7 days. Initiated 3/14/2017  1. Patient will move from supine to sit and sit to supine , scoot up and down and roll side to side in bed with moderate assistance within 7 day(s). 2. Patient will transfer from bed to chair and chair to bed with maximal assistance x 2 using the least restrictive device within 7 day(s). 3. Patient will perform sit to stand with maximal assistance within 7 day(s). 4. Patient will sit on EOB 2 minutes demonstrating overall good sitting balance without LOB within 7 days.       PHYSICAL THERAPY TREATMENT  Patient: Florencio Yousif (37 y.o. female)  Date: 3/31/2017  Diagnosis: Aneurysm ([address]  AORTIC DISECTION  Kidney Stone <principal problem not specified>  Procedure(s) (LRB):  CYSTOSCOPY LEFT URETERAL STENT PLACEMENT (Left) 7 Days Post-Op  Precautions: Fall      ASSESSMENT:  Pt received supine in bed and agreeable to PT intervention. RN cleared pt for mobility; however, she noted pt with increased anxiety this date. HR in 50s and  at start of session. Pt with increased drowsiness and decreased attention, requiring VCs to keep eyes open. Pt performed bed mobility with total Ax2, engaging less with BUE as compared to previous PT sessions. Pt required total A x1 to maintain upright at EOB ~2 minutes, but she complained of increased nausea and requested to lie back down. Pt assisted back to supine in bed. Further PT intervention limited this date secondary to pt's increased drowsiness, decreased active participation in mobility, and decreased activity tolerance. Pt left with all needs met and VSS. RN notified of pt's decreased participation secondary to nausea and drowsiness. Recommending inpatient rehab for discharge to improve safety and independence with functional mobility. Pt would benefit from continued skilled acute PT to improve strength and activity tolerance. Progression toward goals:  [ ]    Improving appropriately and progressing toward goals  [X]    Improving slowly and progressing toward goals  [ ]    Not making progress toward goals and plan of care will be adjusted       PLAN:  Patient continues to benefit from skilled intervention to address the above impairments. Continue treatment per established plan of care. Discharge Recommendations:  Inpatient Rehab  Further Equipment Recommendations for Discharge:  TBD by rehab       SUBJECTIVE:   Patient stated The paralysis in my legs might not come back.  (pt referring to LE function not returning)      OBJECTIVE DATA SUMMARY:   Critical Behavior:  Neurologic State: Alert  Orientation Level: Oriented X4  Cognition: Appropriate for age attention/concentration  Safety/Judgement: Decreased insight into deficits     Functional Mobility Training:  Bed Mobility:  Rolling: Total assistance;Assist x2 (pt attempted to assist with BUE; however, decreased effort)  Supine to Sit: Total assistance;Assist x2 (for BLE management and trunk support)  Sit to Supine: Total assistance;Assist x2 (for BLE management and trunk support)  Scooting: Total assistance;Assist x2 (to scoot to [facility] in supine and to scoot to EOB in sitting)     Balance:  Sitting: Impaired  Sitting - Static: Poor (constant support)  Sitting - Dynamic: Poor (constant support)     Pain:  Pain Scale 1: Numeric (0 - 10)  Pain Intensity 1: 0  Pain Location 1: Abdomen  Pain Orientation 1: Mid  Pain Description 1: Aching  Pain Intervention(s) 1: Medication (see MAR)     Activity Tolerance:   Fair- Increased nausea in sitting; however, VSS on RA. After treatment:   [ ]    Patient left in no apparent distress sitting up in chair  [X]    Patient left in no apparent distress in bed  [X]    Call bell left within reach  [X]    Nursing notified  [ ]    Caregiver present  [ ]    Bed alarm activated      COMMUNICATION/COLLABORATION:   The patients plan of care was discussed with: Physical Therapist, Occupational Therapist and Registered Nurse     Theron Harper   Time Calculation: 22 mins              Regarding student involvement in patient care:  A student participated in this treatment session. Per CMS Medicare statements and APTA guidelines I certify that the following was true:  1. I was present and directly observed the entire session. 2. I made all skilled judgments and clinical decisions regarding care. 3. I am the practitioner responsible for assessment, treatment, and documentation.

## 2017-03-31 NOTE — PROGRESS NOTES
PCU SHIFT NURSING NOTE      Bedside and Verbal shift change report given to Martha Richards RN (oncoming nurse) by Ravi Jennings RN (offgoing nurse). Report included the following information SBAR, Kardex, Intake/Output, MAR, Recent Results and Cardiac Rhythm Paced. Shift Summary:       Admission Date 2/23/2017   Admission Diagnosis Aneurysm ([facility] [address]  AORTIC DISECTION  Kidney Stone   Consults IP CONSULT TO VASCULAR SURGERY  IP CONSULT TO VASCULAR SURGERY  IP CONSULT TO NEPHROLOGY  IP CONSULT TO UROLOGY        Consults   [x]PT   [x]OT   []Speech   []Case Management      [] Palliative      Cardiac Monitoring Order   [x]Yes   []No     IV drips   [x]Yes    Drip:              TPN              Dose:  Drip:                            Dose:  Drip:                            Dose:   []No     GI Prophylaxis   []Yes   []No         DVT Prophylaxis   SCDs:  Sequential Compression Device: Bilateral     Patient Refused VTE Prophylaxis: Yes    Valeriy stockings:  Graduated Compression Stockings: Bilateral      [] Medication   []Contraindicated   []None      Activity Level Activity Level: Head of bed elevated (degrees), Recliner     Activity Assistance: Complete care   Purposeful Rounding every 1-2 hour? [x]Yes   Jean Baptiste Score  Total Score: 2   Bed Alarm (If score 3 or >)   []Yes   [] Refused (See signed refusal form in chart)   Robin Score  Robin Score: 13   Robin Score (if score 14 or less)   [x]PMT consult   []Wound Care consult      [x]Specialty bed   [] Nutrition consult          Needs prior to discharge:   Home O2 required:    []Yes   [x]No    If yes, how much O2 required?     Other:    Last Bowel Movement: Last Bowel Movement Date: 03/31/17      Influenza Vaccine Received Flu Vaccine for Current Season (usually Sept-March): No    Patient/Guardian Refused (Notify MD): Yes   Pneumonia Vaccine           Diet Active Orders   Diet    DIET DIABETIC CONSISTENT CARB Regular      LDAs         PICC Triple Lumen 52/07/83 Right;Basilic (Active) Central Line Being Utilized Yes 3/31/2017  7:30 AM   Criteria for Appropriate Use Total parenteral nutrition 3/31/2017  7:30 AM   Site Assessment Clean, dry, & intact 3/31/2017  7:30 AM   Phlebitis Assessment 0 3/31/2017  7:30 AM   Infiltration Assessment 0 3/31/2017  7:30 AM   Arm Circumference (cm) 29 cm 3/2/2017  4:00 AM   Date of Last Dressing Change 03/29/17 3/31/2017  7:30 AM   Dressing Status Clean, dry, & intact 3/31/2017  7:30 AM   External Catheter Length (cm) 0 centimeters 3/24/2017 12:38 AM   Dressing Type Disk with Chlorhexadine gluconate (CHG); Transparent;Tape 3/31/2017  7:30 AM   Action Taken Dressing changed 3/29/2017 12:11 PM   Hub Color/Line Status [place]; Infusing;Flushed;Patent 3/31/2017  7:30 AM   Positive Blood Return (Site #1) Yes 3/31/2017  7:30 AM   Hub Color/Line Status Red;Capped 3/31/2017  7:30 AM   Positive Blood Return (Site #2) Yes 3/30/2017  7:28 PM   Hub Color/Line Status White;Capped 3/31/2017  7:30 AM   Positive Blood Return (Site #3) Yes 3/30/2017  7:28 PM   Alcohol Cap Used Yes 3/31/2017  7:30 AM              Hemodialysis Catheter 03/23/17 (Active)   Central Line Being Utilized Yes 3/31/2017  7:30 AM   Criteria for Appropriate Use Dialysis/apheresis 3/31/2017  7:30 AM   Date Accessed  03/29/17 3/31/2017  7:30 AM   Access Time  1255 3/25/2017  1:00 PM   Site Assessment Clean, dry, & intact 3/31/2017  7:30 AM   Date of Last Dressing Change 03/27/17 3/28/2017  3:00 PM   Dressing Status Clean, dry, & intact 3/31/2017  7:30 AM   Dressing Type Disk with Chlorhexadine gluconate (CHG); Transparent;Tape 3/31/2017  7:30 AM   Proximal Hub Color/Line Status Red;Capped 3/31/2017  7:30 AM   Distal Hub Color/Line Status Blue;Capped 3/31/2017  7:30 AM                  Urinary Catheter Urinary Catheter 03/24/17 2- way; Palacio-Criteria for Appropriate Use: Obstruction/retention  [REMOVED] Urinary Catheter 02/24/17 Palacio-Criteria for Appropriate Use: Medically/surgically unstable    Intake & Output Date 03/30/17 1900 - 03/31/17 0659 03/31/17 0700 - 04/01/17 0659   Shift 9604-4212 24 Hour Total 2676-9177 5293-4547 24 Hour Total   I  N  T  A  K  E   I.V.  (mL/kg/hr) 337.1 1081.5 463.1  (0.4)  463.1      Volume (TPN ADULT - CENTRAL AA 5% D20% W/ CA + ELECTROLYTES)  744.5         Volume (TPN ADULT - CENTRAL AA 5% D20% W/ CA + ELECTROLYTES) 337.1 337.1 463.1  463.1    Blood 657.9 657.9         Volume (TRANSFUSE PACKED RBC'S) 325 325         Volume (TRANSFUSE PACKED RBC'S) 332.9 332.9       Shift Total  (mL/kg) 995  (11) 1739.4  (19.2) 463.1  (5.1)  463.1  (5.1)   O  U  T  P  U  T   Urine  (mL/kg/hr) 375 375 380  (0.3)  380      Urine Output (mL) (Urinary Catheter 03/24/17 2- way; [place]) 375 375 380  380    Dialysis   723  723      NET Fluid Removed (mL)   723  723    Shift Total  (mL/kg) 375  (4.1) 375  (4.1) 1103  (12.2)  1103  (12.2)    1364.4 -640  -640   Weight (kg) 90.7 90.7 90.7 90.7 90.7         Readmission Risk Assessment Tool Score High Risk            26       Total Score        3 Relationship with PCP    2 Patient Living Status    3 Patient Length of Stay > 5    4 More than 1 Admission in calendar year    5 Patient Insurance is Medicare, Medicaid or Self Pay    9 Charlson Comorbidity Score       Expected Length of Stay 5d 16h   Actual Length of Stay 36

## 2017-03-31 NOTE — PROGRESS NOTES
Nephrology Progress Note     Fransisco Chilel       www. [facility]                   Phone - [phone]   Patient: Abdulaziz Donovan  YOB: 1955     Date- 3/31/2017     CC: Follow up for ARF         Subjective: Interval History:   Low na  Phos slightly high  C/o anxiety  C/o nausea   No fever  No sob     Assessment:   · Acute renal failure - multiple etiology. Now on dialysis. 1. ATN on admission  2. Left hydro. - on functioning kidney. She has right renal atrophy. Right renal atrophy might be a chronic issue. She had small right kidney on admission CT abdo. - she has left renal artery stent seen on CT angio --- blood flow seen on renal artery and vein on USG from 3-23  · Left hydronephrosis- s/p cystoscopy and stent placement 3-24  · ckd 3 baseline cr. 1.2  · hyponatremia  · Candida UTI  · Resp. Failure - s/p extubation  · AFIB with RVR. Now s/p AFL ablation and pacemaker implant  · Complicated type B descending thoracic aortic dissection. -   · S/p TEVAR, L ileo-fem bypass, L carotid-subclavian bypass:2/27/17   · Protein malnutrition  · Renal artery stenosis - h/o left renal artery stent   · Abdominal pain due to  mesentric ischemia - s/p stenting 3-28-17  · S/p left renal angiogram. Stent patent  · Ischemic spinal cord injury     Plan:   - hemodialysis today. Okay to go to dialysis unit for hd.  - cont current BP meds  - TPN ordered  - we will recheck na and phos level tomorrow after hd. If remains abnormal- we will adjust TPN  - start zofran. He becomes drowsy with compazine  - start prn xanax. Renal function status d/w pt. She will need dialysis for now. PLAN D/W PT AND NURSE  [x] High complexity decision making was performed  [x] Patient is at high-risk of decompensation with multiple organ involvement  Review of Systems: Pertinent items are noted in HPI.   Objective:   Vitals:    03/31/17 0400 03/31/17 0500 03/31/17 0530 03/31/17 0730   BP: 130/51 152/58 129/57 149/61   Pulse: 77 75 78 76   Resp: 16  16 18   Temp: 98.6 °F (37 °C)  98.5 °F (36.9 °C) 98.7 °F (37.1 °C)   TempSrc:       SpO2: 93% 93% 94% 95%   Weight:       Height:           Intake/Output Summary (Last 24 hours) at 03/31/17 4428  Last data filed at 03/31/17 0441   Gross per 24 hour   Intake           1739.4 ml   Output              375 ml   Net           1364.4 ml     Physical Exam:   GEN: - Not comfortable but in no acute distress  Neck - supple, right ij amparo +, left upper neck staples +  RESP: clear b/l, no wheezing  CVS: RRR; no gallop or rub  Extremities:  1+ edema  ABDO: soft; no tenderness to light palpation  Neuro- moves upper arm, normal speech  + grubbs,   Psych: depressed affect        Chart reviewed. Pertinent Notes reviewed.    Medications list  reviewed     Current Facility-Administered Medications   Medication    0.9% sodium chloride infusion 250 mL    TPN ADULT - CENTRAL AA 5% D20% W/ CA + ELECTROLYTES    miconazole (SECURA) 2 % extra thick cream    heparin (porcine) injection 5,000 Units    fluconazole (DIFLUCAN) tablet 400 mg    minoxidil (LONITEN) tablet 2.5 mg    epoetin fareed (EPOGEN;PROCRIT) injection 10,000 Units    albuterol-ipratropium (DUO-NEB) 2.5 MG-0.5 MG/3 ML    hydrALAZINE (APRESOLINE) tablet 100 mg    amLODIPine (NORVASC) tablet 10 mg    pantoprazole (PROTONIX) tablet 40 mg    levothyroxine (SYNTHROID) tablet 100 mcg    insulin lispro (HUMALOG) injection    glucose chewable tablet 16 g    dextrose (D50W) injection syrg 12.5-25 g    glucagon (GLUCAGEN) injection 1 mg    labetalol (NORMODYNE;TRANDATE) injection 20 mg    cloNIDine (CATAPRES) 0.2 mg/24 hr patch 2 Patch    HYDROmorphone (PF) (DILAUDID) injection 0.5 mg    diphenhydrAMINE-zinc acetate 2%-0.1% (BENADRYL) cream    ADDaptor    vancomycin (VANCOCIN) 1,000 mg injection    0.9% sodium chloride (MBP/ADV) 0.9 % infusion    naloxone (NARCAN) injection 0.4 mg    sodium chloride (NS) flush 10-30 mL    sodium chloride (NS) flush 20 mL    phenol throat spray (CHLORASEPTIC) 1 Spray    mineral oil-hydrophil petrolat (AQUAPHOR) ointment    0.9% sodium chloride infusion 250 mL    polyethylene glycol (MIRALAX) packet 17 g    prochlorperazine (COMPAZINE) with saline injection 5 mg    sodium chloride (NS) flush 10-40 mL    acetaminophen (TYLENOL) tablet 650 mg    bisacodyl (DULCOLAX) suppository 10 mg              Data Review :  Recent Labs      03/31/17   0550  03/30/17   0407  03/29/17   0349   NA  131*  135*  135*   K  4.4  3.8  4.4   CL  94*  96*  99   CO2  24  27  25   GLU  165*  171*  146*   BUN  51*  33*  56*   CREA  2.21*  1.56*  2.27*   CA  8.2*  8.2*  7.8*   MG  2.0   --   1.9   PHOS  4.9*   --   4.4   ALB  2.3*  2.1*  2.1*   SGOT  25  35  32   ALT  35  37  34     Recent Labs      03/31/17   0550  03/30/17   0407  03/29/17   0349   WBC  10.9  11.0  10.5   HGB  8.4*  6.4*  6.9*   HCT  25.1*  19.8*  20.7*   PLT  185  171  [address], [address] [facility]  [facility] [address], [address]  [place], [address]  Phone - [phone]         Fax - [phone] [facility] Office  [address] [place] [address]  Phone - [phone]        Fax - [phone]     www. [facility]

## 2017-03-31 NOTE — PROCEDURES
[facility]  [phone]    Vitals   Pre   Post   Assessment   Pre   Post     Temp  Temp: 97.1 °F (36.2 °C) (03/31/17 1237)  97.5 LOC  Alert and oriented, very anxious same   HR   Pulse (Heart Rate): 80 (03/31/17 1526) 90 Lungs   diminished  diminished   B/P   BP: (!) 124/37 (03/31/17 1526) 151/49 Cardiac   NSR  NSR   Resp   Resp Rate: 18 (03/31/17 1526) 18 Skin   intact  intact   Pain level  Pain Intensity 1: 0 (03/31/17 1237)  Edema    2-3+NEO   2-3+NEO   Orders:    Duration:   Start:   Procedure Start Time: 1520 End:   1725 Total:   2 hours   Dialyzer:   Dialyzer/Set Up Inspection: Felecia Floyd (03/31/17 1526)   K Bath:   Dialysate K (mEq/L): 2 (03/31/17 1526)   Ca Bath:   Dialysate CA (mEq/L): 2.5 (03/31/17 1526)   Na/Bicarb:   Dialysate NA (mEq/L): 140 (03/31/17 1526)   Target Fluid Removal:   Goal/Amount of Fluid to Remove (mL): 1500 mL (03/31/17 1526)   Access     Type & Location:   [facility] [place]/permcath disinfected with Alcavis per policy. Each lumen aspirated for blood return and flushed with Normal Saline per policy. Dialysis initiated.      Labs     Obtained/Reviewed   Critical Results Called   Date when labs were drawn-  Hgb-    HGB   Date Value Ref Range Status   03/31/2017 8.4 (L) 11.5 - 16.0 g/dL Final     K-    Potassium   Date Value Ref Range Status   03/31/2017 4.4 3.5 - 5.1 mmol/L Final     Ca-   Calcium   Date Value Ref Range Status   03/31/2017 8.2 (L) 8.5 - 10.1 MG/DL Final     Bun-   BUN   Date Value Ref Range Status   03/31/2017 51 (H) 6 - 20 MG/DL Final     Comment:     INVESTIGATED PER DELTA CHECK PROTOCOL     Creat-   Creatinine   Date Value Ref Range Status   03/31/2017 2.21 (H) 0.55 - 1.02 MG/DL Final        Medications/ Blood Products Given     Name   Dose   Route and Time     compazine 5mg  ivp/1616             Blood Volume Processed (BVP):    41.7 liters Net Fluid   Removed:  723 ml   Comments Patient asked about ending tx early as soon as she arrived on the unit. She was very anxious and wanted anxiety medication. Also c/o nausea. I called her PCU nurse and she sent me some compazine that helped for a while. Patient asked to get off at the 45 min kenneth and again at 1.75 hours. I convinced her to run a minimum of 2 hours and she agreed. I encouraged her to ask her Doctors for a palliative care consult so she could address her issues. No other problems, report called to PCU nurse, Megha Trevizo RN, using SBAR. Time Out Done: yes, 1525  Primary Nurse Rpt Pre:Lana GARCIA  Primary Nurse Rpt Post: Megha Trevizo RN  Pt Education: yes, dialysis process  Care Plan: Dialysis as ordered  Tx Summary: 2 hours on a 2 K 2.5 Ca removed 723 ml. Tx ended early by patient. Admiting Diagnosis:  Pt's previous clinic-n/a  Consent signed - Informed Consent Verified: Yes (03/27/17 2245)  [facility] Consent - yes  Hepatitis Status- negative by antigen 2-40-26  Machine #- Machine Number: [id number] (03/31/17 1526)  Telemetry status-yes  Pre-dialysis wt. - Pre-Dialysis Weight: 86.5 kg (190 lb 11.2 oz) (03/27/17 2245)

## 2017-03-31 NOTE — PROGRESS NOTES
Problem: Self Care Deficits Care Plan (Adult)  Goal: *Acute Goals and Plan of Care (Insert Text)  Occupational Therapy Goals  Weekly Re-assessment:   1. Patient will perform grooming in supported sitting within 7 day(s). MODIFIED  2. Patient will perform supine to sit EOB with maximal assistance in preparation for adls within 7 day(s). CONT  3. Patient will perform upper body bathing with moderate assistance in supported sitting within 7 day(s). MODIFED  4. Patient will participate in functional mobility assessment within 7 day(s). CONT  5. Patient will perform rolling side to side for toileting at bed level with moderate assistance within 7 day(s). CONT  6. Patient will participate in upper extremity therapeutic exercise/activities with supervision/set-up for 10 minutes within 7 day(s). CONT      Initiated 3/14/2017  1. Patient will perform grooming with minimal assistance/contact guard assist within 7 day(s). 2. Patient will perform supine to sit EOB with maximal assistance in preparation for adls within 7 day(s). 3. Patient will perform upper body bathing with moderate assistance within 7 day(s). 4. Patient will participate in functional mobility assessment within 7 day(s). 5. Patient will perform rolling side to side for toileting at bed level with moderate assistance within 7 day(s). 6. Patient will participate in upper extremity therapeutic exercise/activities with supervision/set-up for 10 minutes within 7 day(s). OCCUPATIONAL THERAPY TREATMENT  Patient: Kishor Cruz (51 y.o. female)  Date: 3/31/2017  Diagnosis: Aneurysm ([address]  AORTIC DISECTION  Kidney Stone <principal problem not specified>  Procedure(s) (LRB):  CYSTOSCOPY LEFT URETERAL STENT PLACEMENT (Left) 7 Days Post-Op  Precautions: Fall      ASSESSMENT:  Nursing cleared for therapy. Patient drowsy through out session with intermittent alertness. Eyes closing often. Attempted to sit EOB all bed mobility with total A.   Sitting briefly with total A, <2 mins with patient complaining of nausea and requesting to return to supine. HR upper 40s-low 60s during session. Patient verbally reported giving up on therapy since her legs are paralyzed and now she is on dialysis. Brief education on importance of therapy for compensatory techniques. She was agreeable to attempt therapy with verbal encouragement. Patient would benefit from additional education on spinal cord compensatory techniques/DME available with IP rehab in order to increase her understanding and rehab and rehab potential once medically stable. Progression toward goals:  [ ]       Improving appropriately and progressing toward goals  [X]       Improving slowly and progressing toward goals  [ ]       Not making progress toward goals and plan of care will be adjusted       PLAN:  Patient continues to benefit from skilled intervention to address the above impairments. Continue treatment per established plan of care. Discharge Recommendations:  Inpatient Rehab  Further Equipment Recommendations for Discharge:  TBD Rehab       SUBJECTIVE:   Patient stated It's not worth it, my legs are paralyzed.   In discussion regarding role of therapy. OBJECTIVE DATA SUMMARY:   Cognitive/Behavioral Status:  Neurologic State: Alert  Orientation Level: Oriented X4  Cognition: Follows commands              Functional Mobility and Transfers for ADLs:  Bed Mobility:  Rolling: Total assistance;Assist x2  Supine to Sit: Total assistance;Assist x2  Sit to Supine: Total assistance;Assist x2  Scooting: Total assistance;Assist x2     Balance:  Sitting: Impaired  Sitting - Static: Poor (constant support)  Sitting - Dynamic: Poor (constant support)     ADL Intervention:   Patient drowsy through out session with intermittent alertness. Patient agreeable to therapy with brief encouragement. Attempted to sit EOB all bed mobility with total A.  Verbal cues for hand placement and use of bed rails.  Sitting briefly with total A. Sitting <2 mins with patient complaining of nausea and requesting to return to supine. Pain:  Pain Scale 1: Numeric (0 - 10)  Pain Intensity 1: 0  Pain Location 1: Abdomen  Pain Orientation 1: Mid  Pain Description 1: Aching  Pain Intervention(s) 1: Medication (see MAR)     Activity Tolerance:   HR upper 40s-low 60s during session.       After treatment:   [ ] Patient left in no apparent distress sitting up in chair  [X] Patient left in no apparent distress in bed  [X] Call bell left within reach  [X] Nursing notified  [ ] Caregiver present  [ ] Bed alarm activated      COMMUNICATION/COLLABORATION:   The patients plan of care was discussed with: Physical Therapist, Registered Nurse and Patient     Krysta Lenz OT  Time Calculation: 20 mins

## 2017-03-31 NOTE — PROGRESS NOTES
Bedside shift change report given to [address] [place] [address] (oncoming nurse) by Antonieta Wu RN (offgoing nurse). Report included the following information SBAR, Kardex, MAR and Recent Results.

## 2017-04-01 NOTE — PROGRESS NOTES
PCU SHIFT NURSING NOTE      0785 Bedside and Verbal shift change report given to Recee Cruz RN (oncoming nurse) by Katherine Soto (offgoing nurse). Report included the following information SBAR, Kardex, Intake/Output, MAR, Accordion, Recent Results, Med Rec Status and Cardiac Rhythm Paced. Shift Summary: Prn xanax ordered for pt's anxiety, prn zofran order added in addition to compazine to help with pt's persistent nausea. Pt more withdrawn today after speaking with Dr. Sandra Marinelli about long term outcomes for her situation and the option to discontinue dialysis. Pt stopped dialysis early per dialysis nurse related to complaints of nausea, anxiety, and discomfort. 1930 Bedside and Verbal shift change report given to Ashley Michel RN (oncoming nurse) by Reece Cruz RN (offgoing nurse). Report included the following information SBAR, Kardex, Intake/Output, MAR, Accordion, Recent Results, Med Rec Status and Cardiac Rhythm Paced. Admission Date 2/23/2017   Admission Diagnosis Aneurysm ([facility] [address]  AORTIC DISECTION  Kidney Stone   Consults IP CONSULT TO VASCULAR SURGERY  IP CONSULT TO VASCULAR SURGERY  IP CONSULT TO NEPHROLOGY  IP CONSULT TO UROLOGY        Consults   [x]PT   [x]OT   [x]Speech   [x]Case Management      [x] Palliative      Cardiac Monitoring Order   [x]Yes   []No     IV drips   [x]Yes    Drip:     TPN                       Dose:    63ml/hr    Drip:                            Dose:  Drip:                            Dose:   []No     GI Prophylaxis   [x]Yes   []No         DVT Prophylaxis   SCDs:  Sequential Compression Device: Bilateral     Patient Refused VTE Prophylaxis: Yes    Valeriy stockings:  Graduated Compression Stockings: Bilateral      [x] Medication   []Contraindicated   []None      Activity Level Activity Level: Head of bed elevated (degrees), Recliner     Activity Assistance: Complete care   Purposeful Rounding every 1-2 hour?    [x]Yes   Jean Baptiste Score  Total Score: 2   Bed Alarm (If score 3 or >)   []Yes [] Refused (See signed refusal form in chart)   Robin Score  Robin Score: 13   Robin Score (if score 14 or less)   [x]PMT consult   [x]Wound Care consult      [x]Specialty bed   [x] Nutrition consult          Needs prior to discharge:   Home O2 required:    []Yes   [x]No    If yes, how much O2 required? Other:    Last Bowel Movement: Last Bowel Movement Date: 03/31/17      Influenza Vaccine Received Flu Vaccine for Current Season (usually Sept-March): No    Patient/Guardian Refused (Notify MD): Yes   Pneumonia Vaccine           Diet Active Orders   Diet    DIET DIABETIC CONSISTENT CARB Regular      LDAs         PICC Triple Lumen 82/27/63 Right;Basilic (Active)   Central Line Being Utilized Yes 3/31/2017  6:55 PM   Criteria for Appropriate Use Total parenteral nutrition 3/31/2017  6:55 PM   Site Assessment Clean, dry, & intact 3/31/2017  6:55 PM   Phlebitis Assessment 0 3/31/2017  6:55 PM   Infiltration Assessment 0 3/31/2017  6:55 PM   Arm Circumference (cm) 29 cm 3/2/2017  4:00 AM   Date of Last Dressing Change 03/31/17 3/31/2017  6:55 PM   Dressing Status Clean, dry, & intact 3/31/2017  6:55 PM   External Catheter Length (cm) 0 centimeters 3/24/2017 12:38 AM   Dressing Type Disk with Chlorhexadine gluconate (CHG); Transparent;Tape 3/31/2017  7:30 AM   Action Taken Dressing changed 3/29/2017 12:11 PM   Hub Color/Line Status Kenny Raid; Infusing;Flushed;Patent 3/31/2017  6:55 PM   Positive Blood Return (Site #1) Yes 3/31/2017  6:55 PM   Hub Color/Line Status Red;Capped;Flushed;Patent 3/31/2017  6:55 PM   Positive Blood Return (Site #2) Yes 3/31/2017  6:55 PM   Hub Color/Line Status White; Infusing;Flushed;Patent 3/31/2017  6:55 PM   Positive Blood Return (Site #3) Yes 3/31/2017  6:55 PM   Alcohol Cap Used Yes 3/31/2017  6:55 PM              Hemodialysis Catheter 03/23/17 (Active)   Central Line Being Utilized Yes 3/31/2017  6:55 PM   Criteria for Appropriate Use Dialysis/apheresis 3/31/2017  6:55 PM   Date Accessed 03/29/17 3/31/2017  7:30 AM   Access Time  1255 3/25/2017  1:00 PM   Site Assessment Clean, dry, & intact 3/31/2017  6:55 PM   Date of Last Dressing Change 03/27/17 3/28/2017  3:00 PM   Dressing Status Clean, dry, & intact 3/31/2017  6:55 PM   Dressing Type Disk with Chlorhexadine gluconate (CHG); Transparent;Tape 3/31/2017  6:55 PM   Proximal Hub Color/Line Status Red;Capped 3/31/2017  6:55 PM   Distal Hub Color/Line Status Blue;Capped 3/31/2017  6:55 PM                  Urinary Catheter Urinary Catheter 03/24/17 2- way; Palacio-Criteria for Appropriate Use: Obstruction/retention  [REMOVED] Urinary Catheter 02/24/17 Palacio-Criteria for Appropriate Use: Medically/surgically unstable    Intake & Output   Date 03/30/17 1900 - 03/31/17 0659 03/31/17 0700 - 04/01/17 0659   Shift 4725-6738 24 Hour Total 7437-4622 3989-6910 24 Hour Total   I  N  T  A  K  E   I.V.  (mL/kg/hr) 337.1 1081.5 463.1  (0.4)  463.1      Volume (TPN ADULT - CENTRAL AA 5% D20% W/ CA + ELECTROLYTES)  744.5         Volume (TPN ADULT - CENTRAL AA 5% D20% W/ CA + ELECTROLYTES) 337.1 337.1 463.1  463.1    Blood 657.9 657.9         Volume (TRANSFUSE PACKED RBC'S) 325 325         Volume (TRANSFUSE PACKED RBC'S) 332.9 332.9       Shift Total  (mL/kg) 995  (11) 1739.4  (19.2) 463.1  (5.1)  463.1  (5.1)   O  U  T  P  U  T   Urine  (mL/kg/hr) 375 375 380  (0.3)  380      Urine Output (mL) (Urinary Catheter 03/24/17 2- way; Palacio) 375 375 380  380    Dialysis   723  723      NET Fluid Removed (mL)   723  723    Shift Total  (mL/kg) 375  (4.1) 375  (4.1) 1103  (12.2)  1103  (12.2)    1364.4 -376 -750   Weight (kg) 90.7 90.7 90.7 90.7 90.7         Readmission Risk Assessment Tool Score High Risk            26       Total Score        3 Relationship with PCP    2 Patient Living Status    3 Patient Length of Stay > 5    4 More than 1 Admission in calendar year    5 Patient Insurance is Medicare, Medicaid or Self Pay    9 Charlson Comorbidity Score       Expected Length of Stay 5d 16h   Actual Length of Stay 36

## 2017-04-01 NOTE — PROGRESS NOTES
Supportive visit on PCU for ongoing pastoral care. Meeting is planned for Monday to discuss going home with hospice with patient and her . Provided listening presence as she shared there is nothing that gives her tea. She also expressed that with her medical challenges \"this is not living\". She has one surviving sibling who is 25 months younger. Sister does not visit because she said it would be too hard to see patient in present condition. As she spoke further about her sister, it became clear they were not particularly close growing up. Patient shared she has no fear about death. She does not seem to think anyone will mourn her loss. When asked about any sense of God's presence--she stated she has never felt God was particularly close. She did acknowledge God as her creator. Tish was open to assurance of prayer. Advised her of  availability.   GREGG Fay, [facility], [address] [place] [address] [address]     Paging Service  [phone])

## 2017-04-01 NOTE — PROGRESS NOTES
Vascular    Nausea improving  Tolerating some liquids now  Had HD today  Renal U/S OK  Exam unchanged  She \"wants to go home and die\"  Cont current care through weekend  Her prognosis is indeed poor and her QOL will likely be poor  I explained that her eating will improve, her kidneys might and her paralysis will be permanent  May need to involve palliative care and consider hospice if she still feels this way Monday

## 2017-04-01 NOTE — PROGRESS NOTES
Nephrology Progress Note     Fransisco Chilel       www. [facility]                   Phone - [phone]   Patient: Javid Gore  YOB: 1955     Date- 4/1/2017     CC: Follow up for ARF         Subjective: Interval History:   S/p 2 hour hd yesterday  She stopped dialysis early due to anxiety  She denies sob  No bmp done  She wants to go home on hospice     Assessment:   · Acute renal failure - multiple etiology. Now on dialysis. 1. ATN on admission  2. Left hydro. - on functioning kidney. She has right renal atrophy. Right renal atrophy might be a chronic issue. She had small right kidney on admission CT abdo. - she has left renal artery stent seen on CT angio --- blood flow seen on renal artery and vein on USG from 3-23  · Left hydronephrosis- s/p cystoscopy and stent placement 3-24  · ckd 3 baseline cr. 1.2  · hyponatremia  · Candida UTI  · Resp. Failure - s/p extubation  · AFIB with RVR. Now s/p AFL ablation and pacemaker implant  · Complicated type B descending thoracic aortic dissection. -   · S/p TEVAR, L ileo-fem bypass, L carotid-subclavian bypass:2/27/17   · Protein malnutrition  · Renal artery stenosis - h/o left renal artery stent   · Abdominal pain due to  mesentric ischemia - s/p stenting 3-28-17  · S/p left renal angiogram. Stent patent  · Ischemic spinal cord injury     Plan:   -  No hemodialysis today. If she agrees we will do next hd Monday. - cont current BP meds  - TPN ordered - remove k and phos from TPN  - check bmp today     PLAN D/W PT AND NURSE  [] High complexity decision making was performed  [] Patient is at high-risk of decompensation with multiple organ involvement  Review of Systems: Pertinent items are noted in HPI.   Objective:   Vitals:    03/31/17 2224 04/01/17 0440 04/01/17 0625 04/01/17 0744   BP: 146/53 173/57  142/67   Pulse: 82 82  81   Resp:  18  18   Temp:  98 °F (36.7 °C)  98 °F (36.7 °C)   TempSrc:       SpO2:  95%  95% Weight:   90.7 kg (200 lb)    Height:           Intake/Output Summary (Last 24 hours) at 04/01/17 1317  Last data filed at 04/01/17 0807   Gross per 24 hour   Intake          1055.85 ml   Output             1428 ml   Net          -372.15 ml     Physical Exam:   GEN: -  no acute distress  Neck - supple, right ij amparo +  RESP: clear b/l, no wheezing  CVS: RRR; no gallop or rub  Extremities:  1+ edema  ABDO: soft; no tenderness to light palpation  Neuro- moves upper arm, normal speech  + grubbs,   Psych: depressed affect        Chart reviewed. Pertinent Notes reviewed.    Medications list  reviewed     Current Facility-Administered Medications   Medication    TPN ADULT - CENTRAL AA 5% D20% W/ CA + ELECTROLYTES    ondansetron (ZOFRAN) injection 4 mg    ALPRAZolam (XANAX) tablet 0.25 mg    0.9% sodium chloride infusion 250 mL    miconazole (SECURA) 2 % extra thick cream    heparin (porcine) injection 5,000 Units    fluconazole (DIFLUCAN) tablet 400 mg    minoxidil (LONITEN) tablet 2.5 mg    epoetin fareed (EPOGEN;PROCRIT) injection 10,000 Units    albuterol-ipratropium (DUO-NEB) 2.5 MG-0.5 MG/3 ML    hydrALAZINE (APRESOLINE) tablet 100 mg    amLODIPine (NORVASC) tablet 10 mg    pantoprazole (PROTONIX) tablet 40 mg    levothyroxine (SYNTHROID) tablet 100 mcg    insulin lispro (HUMALOG) injection    glucose chewable tablet 16 g    dextrose (D50W) injection syrg 12.5-25 g    glucagon (GLUCAGEN) injection 1 mg    labetalol (NORMODYNE;TRANDATE) injection 20 mg    cloNIDine (CATAPRES) 0.2 mg/24 hr patch 2 Patch    HYDROmorphone (PF) (DILAUDID) injection 0.5 mg    diphenhydrAMINE-zinc acetate 2%-0.1% (BENADRYL) cream    ADDaptor    vancomycin (VANCOCIN) 1,000 mg injection    0.9% sodium chloride (MBP/ADV) 0.9 % infusion    naloxone (NARCAN) injection 0.4 mg    sodium chloride (NS) flush 10-30 mL    sodium chloride (NS) flush 20 mL    phenol throat spray (CHLORASEPTIC) 1 Spray    mineral oil-hydrophil petrolat (AQUAPHOR) ointment    0.9% sodium chloride infusion 250 mL    polyethylene glycol (MIRALAX) packet 17 g    prochlorperazine (COMPAZINE) with saline injection 5 mg    sodium chloride (NS) flush 10-40 mL    acetaminophen (TYLENOL) tablet 650 mg    bisacodyl (DULCOLAX) suppository 10 mg              Data Review :  Recent Labs      04/01/17   0446  03/31/17   0550  03/30/17   0407   NA   --   131*  135*   K   --   4.4  3.8   CL   --   94*  96*   CO2   --   24  27   GLU   --   165*  171*   BUN   --   51*  33*   CREA   --   2.21*  1.56*   CA   --   8.2*  8.2*   MG  1.9  2.0   --    PHOS  4.8*  4.9*   --    ALB   --   2.3*  2.1*   SGOT   --   25  35   ALT   --   35  37     Recent Labs      03/31/17   0550  03/30/17   0407   WBC  10.9  11.0   HGB  8.4*  6.4*   HCT  25.1*  19.8*   PLT  185  43 Hailey Draper MD  [facility]  [facility], [facility] 1, [address] [facility]  Phone - [phone]         Fax - [phone] [facility] Office  [address] [place] [address]  Phone - [phone]        Fax - [phone]     www. [facility]

## 2017-04-02 NOTE — PROGRESS NOTES
PCU SHIFT NURSING NOTE      Bedside and Verbal shift change report given to Chip Bill RN (oncoming nurse) by Flores Kraft RN (offgoing nurse). Report included the following information SBAR, Kardex, Intake/Output, MAR, Recent Results and Cardiac Rhythm Paced. Shift Summary:       Admission Date 2/23/2017   Admission Diagnosis Aneurysm ([address]  AORTIC DISECTION  Kidney Stone   Consults IP CONSULT TO VASCULAR SURGERY  IP CONSULT TO VASCULAR SURGERY  IP CONSULT TO NEPHROLOGY  IP CONSULT TO UROLOGY        Consults   [x]PT   [x]OT   []Speech   []Case Management      [] Palliative      Cardiac Monitoring Order   [x]Yes   []No     IV drips   []Yes    Drip:                            Dose:  Drip:                            Dose:  Drip:                            Dose:   [x]No     GI Prophylaxis   []Yes   []No         DVT Prophylaxis   SCDs:  Sequential Compression Device: Bilateral     Patient Refused VTE Prophylaxis: Yes    Valeriy stockings:  Graduated Compression Stockings: Bilateral      [] Medication   []Contraindicated   []None      Activity Level Activity Level: Head of bed elevated (degrees)     Activity Assistance: Complete care   Purposeful Rounding every 1-2 hour? [x]Yes   Jean Baptiste Score  Total Score: 2   Bed Alarm (If score 3 or >)   [x]Yes   [] Refused (See signed refusal form in chart)   Robin Score  Robin Score: 13   Robin Score (if score 14 or less)   [x]PMT consult   []Wound Care consult      []Specialty bed   [] Nutrition consult          Needs prior to discharge:   Home O2 required:    [x]Yes   []No    If yes, how much O2 required?     Other:    Last Bowel Movement: Last Bowel Movement Date: 03/31/17      Influenza Vaccine Received Flu Vaccine for Current Season (usually Sept-March): No    Patient/Guardian Refused (Notify MD): Yes   Pneumonia Vaccine           Diet Active Orders   Diet    DIET DIABETIC CONSISTENT CARB Regular      LDAs         PICC Triple Lumen 30/98/85 Right;Basilic (Active)   [facility] Being Utilized Yes 4/1/2017  3:38 PM   Criteria for Appropriate Use Total parenteral nutrition 4/1/2017  3:38 PM   Site Assessment Clean, dry, & intact 4/1/2017  3:38 PM   Phlebitis Assessment 0 4/1/2017  3:38 PM   Infiltration Assessment 0 4/1/2017  3:38 PM   Arm Circumference (cm) 29 cm 3/2/2017  4:00 AM   Date of Last Dressing Change 03/31/17 4/1/2017  3:38 PM   Dressing Status Clean, dry, & intact 4/1/2017  3:38 PM   External Catheter Length (cm) 0 centimeters 3/24/2017 12:38 AM   Dressing Type Disk with Chlorhexadine gluconate (CHG) 4/1/2017  3:38 PM   Action Taken Dressing changed 3/29/2017 12:11 PM   Hub Color/Line Status Teresa Shafern 4/1/2017  3:38 PM   Positive Blood Return (Site #1) Yes 4/1/2017  3:38 PM   Hub Color/Line Status Red 4/1/2017  3:38 PM   Positive Blood Return (Site #2) Yes 4/1/2017  3:38 PM   Hub Color/Line Status White 4/1/2017  3:38 PM   Positive Blood Return (Site #3) Yes 4/1/2017  3:38 PM   Alcohol Cap Used Yes 4/1/2017  3:38 PM              Hemodialysis Catheter 03/23/17 (Active)   Central Line Being Utilized Yes 3/31/2017  8:05 PM   Criteria for Appropriate Use Dialysis/apheresis 4/1/2017  3:38 PM   Date Accessed  03/29/17 4/1/2017  3:38 PM   Access Time  1255 3/25/2017  1:00 PM   Site Assessment Clean, dry, & intact 4/1/2017  3:38 PM   Date of Last Dressing Change 03/27/17 4/1/2017  3:38 PM   Dressing Status Clean, dry, & intact 4/1/2017  3:38 PM   Dressing Type Disk with Chlorhexadine gluconate (CHG) 4/1/2017  3:38 PM   Proximal Hub Color/Line Status Red 4/1/2017  3:38 PM   Distal Hub Color/Line Status Blue 4/1/2017  3:38 PM                  Urinary Catheter Urinary Catheter 03/24/17 2- way; Palacio-Criteria for Appropriate Use: Obstruction/retention  [REMOVED] Urinary Catheter 02/24/17 Palacio-Criteria for Appropriate Use: Medically/surgically unstable    Intake & Output   Date 03/31/17 1900 - 04/01/17 0659 04/01/17 0700 - 04/02/17 0659   Shift 1025-4980 24 Hour Total 0643-4887 3654-2628 24 Hour Total   I  N  T  A  K  E   P.O.   240  240      P. O.   240  240    I.V.  (mL/kg/hr) 685.7 1148.7 611.1  (0.6)  611.1      Volume (TPN ADULT - CENTRAL AA 5% D20% W/ CA + ELECTROLYTES)  463.1         Volume (TPN ADULT - CENTRAL AA 5% D20% W/ CA + ELECTROLYTES) 685.7 685.7 611.1  611.1    Shift Total  (mL/kg) 685.7  (7.6) 1148.7  (12.7) 851.1  (9.4)  851.1  (9.4)   O  U  T  P  U  T   Urine  (mL/kg/hr) 500 880 550  (0.5)  550      Urine Output (mL) (Urinary Catheter 03/24/17 2- way; Palacio) 500 880 550  550    Dialysis  723         NET Fluid Removed (mL)  723       Shift Total  (mL/kg) 500  (5.5) 1603  (17.7) 550  (6.1)  550  (6.1)   .7 -454. 3 301.1  301.1   Weight (kg) 90.7 90.7 90.7 90.7 90.7         Readmission Risk Assessment Tool Score High Risk            26       Total Score        3 Relationship with PCP    2 Patient Living Status    3 Patient Length of Stay > 5    4 More than 1 Admission in calendar year    5 Patient Insurance is Medicare, Medicaid or Self Pay    9 Charlson Comorbidity Score       Expected Length of Stay 5d 16h   Actual Length of Stay 37

## 2017-04-02 NOTE — PROGRESS NOTES
Sleepy but easily rousable  Liquids making her nauseated but no vomiting  Palliative plans this week

## 2017-04-02 NOTE — PROGRESS NOTES
Fransisco Chilel     NAME:Tish Oswald  INH:[id number]   ABL:9/6/3692   -          Labs reviewed  TPN reordered. Cr. Increased  She will need hd on Monday if she wants to continue with dialysis. Ella Cadena, [facility]  [facility]  [facility], [facility], [address] [place]  Phone - [phone]         Fax - [phone] [facility] Office  [address] [place] [address]  Phone - [phone]        Fax - [phone]     www. [facility]

## 2017-04-03 NOTE — PROGRESS NOTES
Cm acknowledged consult for end of life care. CM met with patient and . There is no preference to agency but turn around time is to be of top priority. Patient wants to return home on hospice as soon as possible. She does not want to stay in the hospital.    CM will submit referrals to several agencies. Cm submitted referral to [facility]. Confirmed receipt of referral with Rina at [facility]. If they are unable to staff they will forward to another hospice agency. CM verbalized patient and 's wishes for a timely turn around. Rina verbalized understanding.     Duarte Aguilar RN C M  Ext [phone]

## 2017-04-03 NOTE — PROGRESS NOTES
Nephrology Progress Note     Fransisco Chilel       www. [facility]                   Phone - [phone]   Patient: Mayito Delaney  YOB: 1955     Date- 4/3/2017     CC: Follow up for ARF         Subjective: Interval History:   Bun and cr. Worse  Na low  Patient and  refusing dialysis  She wants to go home on hospice     Assessment:   · Acute renal failure - multiple etiology. Now on dialysis. 1. ATN on admission  2. Left hydro. - on functioning kidney. She has right renal atrophy. Right renal atrophy might be a chronic issue. She had small right kidney on admission CT abdo. - she has left renal artery stent seen on CT angio --- blood flow seen on renal artery and vein on USG from 3-23  · Left hydronephrosis- s/p cystoscopy and stent placement 3-24  · ckd 3 baseline cr. 1.2  · hyponatremia  · Candida UTI  · Resp. Failure - s/p extubation  · AFIB with RVR. Now s/p AFL ablation and pacemaker implant  · Complicated type B descending thoracic aortic dissection. -   · S/p TEVAR, L ileo-fem bypass, L carotid-subclavian bypass:2/27/17   · Protein malnutrition  · Renal artery stenosis - h/o left renal artery stent   · Abdominal pain due to  mesentric ischemia - s/p stenting 3-28-17  · S/p left renal angiogram. Stent patent  · Ischemic spinal cord injury     Plan:   -  No more hemodialysis . Per pt and .  -  Pt and family requesting home hospice. - we will notify DR. Basilio Osgood  - stop TPN   - remove amparo cath before discharge    PLAN D/W PT AND NURSE    Review of Systems: Pertinent items are noted in HPI.   Objective:   Vitals:    04/02/17 2308 04/03/17 0302 04/03/17 0312 04/03/17 0717   BP: 118/43 (!) 116/33  126/51   Pulse: 76 80  76   Resp: 18 18  18   Temp: 98.1 °F (36.7 °C) 98 °F (36.7 °C)  98.2 °F (36.8 °C)   TempSrc:       SpO2: 94% 94%  94%   Weight:   94.3 kg (207 lb 12.8 oz)    Height:           Intake/Output Summary (Last 24 hours) at 04/03/17 0597  Last data filed at 04/03/17 0308   Gross per 24 hour   Intake          1286.85 ml   Output              900 ml   Net           386.85 ml     Physical Exam:   GEN: -  no acute distress  Neck - supple, right ij amparo +  RESP: clear b/l, no wheezing  CVS: RRR; no gallop or rub  Extremities:  1+ edema  Neuro- moves upper arm, normal speech  + grubbs,           Chart reviewed. Pertinent Notes reviewed.    Medications list  reviewed     Current Facility-Administered Medications   Medication    TPN ADULT-CENTRAL AA 5% D20%-MVI W/ VIT K-RCH    ondansetron (ZOFRAN) injection 4 mg    ALPRAZolam (XANAX) tablet 0.25 mg    0.9% sodium chloride infusion 250 mL    miconazole (SECURA) 2 % extra thick cream    heparin (porcine) injection 5,000 Units    fluconazole (DIFLUCAN) tablet 400 mg    minoxidil (LONITEN) tablet 2.5 mg    epoetin fareed (EPOGEN;PROCRIT) injection 10,000 Units    albuterol-ipratropium (DUO-NEB) 2.5 MG-0.5 MG/3 ML    hydrALAZINE (APRESOLINE) tablet 100 mg    amLODIPine (NORVASC) tablet 10 mg    pantoprazole (PROTONIX) tablet 40 mg    levothyroxine (SYNTHROID) tablet 100 mcg    insulin lispro (HUMALOG) injection    glucose chewable tablet 16 g    dextrose (D50W) injection syrg 12.5-25 g    glucagon (GLUCAGEN) injection 1 mg    labetalol (NORMODYNE;TRANDATE) injection 20 mg    cloNIDine (CATAPRES) 0.2 mg/24 hr patch 2 Patch    HYDROmorphone (PF) (DILAUDID) injection 0.5 mg    diphenhydrAMINE-zinc acetate 2%-0.1% (BENADRYL) cream    ADDaptor    vancomycin (VANCOCIN) 1,000 mg injection    0.9% sodium chloride (MBP/ADV) 0.9 % infusion    naloxone (NARCAN) injection 0.4 mg    sodium chloride (NS) flush 10-30 mL    sodium chloride (NS) flush 20 mL    phenol throat spray (CHLORASEPTIC) 1 Spray    mineral oil-hydrophil petrolat (AQUAPHOR) ointment    0.9% sodium chloride infusion 250 mL    polyethylene glycol (MIRALAX) packet 17 g    prochlorperazine (COMPAZINE) with saline injection 5 mg    sodium chloride (NS) flush 10-40 mL    acetaminophen (TYLENOL) tablet 650 mg    bisacodyl (DULCOLAX) suppository 10 mg              Data Review :  Recent Labs      04/02/17 0407  04/01/17   0446   NA  129*  134*   K  4.2  4.3   CL  93*  97   CO2  21  24   GLU  155*  142*   BUN  55*  39*   CREA  2.31*  1.81*   CA  8.5  8.3*   MG   --   1.9   PHOS  5.5*  4.8*   ALB  2.2*   --      Recent Labs      04/03/17   0315  04/02/17   0407   WBC  9.5  9.8   HGB  7.8*  8.2*   HCT  23.6*  25.0*   PLT  188  [address] [facility]  [facility]  [facility], [facility], [address] [place]  Phone - [phone]         Fax - [phone] [facility] Office  [address] [address]  Phone - [phone]        Fax - [phone]     www. [facility]

## 2017-04-03 NOTE — PROGRESS NOTES
Follow up visit with MsPasquale Mitch. She had already had meeting with her physicians at this time and said she was just waiting to go home and die. I offered presence and attempted to get Ms. Nikos Orellana to speak further about this; but she would fall off to sleep in the middle of her sentences. She did this two or three times during my visit; she asked for assistance eating and I attempted to offer some of her food that was present on her lunch tray, but she was not interested in eating it. Ms. Nikos Orellana fell asleep again. Pastoral care will continue to follow and provide support. Please page as needed. [phone]. Visit by: Brannon Valdez. Ramos Mariano.  Елена Vora MA, [facility]    Lead  Profession Development [employer] Advancement

## 2017-04-03 NOTE — PROGRESS NOTES
Occupational Therapy  Medical record reviewed and nursing reports that pt is planning on discharge home with hospice support as soon as services can be arranged. Will sign off and complete the therapy orders.

## 2017-04-03 NOTE — PROGRESS NOTES
Palliative Care team is meeting with patient's  to discuss hospice care at home.  Hospice is not able to staff at this time and will refer to another agency. 1130 am - Ac Alvarado LCSW Palliative Care met with patient and her . Hosp[facility] referral to be [facility] to [facility] per Ac Alvarado LCSW. CM will continue to follow.     Heike Fulton RN CM  Ext [phone]

## 2017-04-03 NOTE — PROGRESS NOTES
PCU SHIFT NURSING NOTE      Bedside and Verbal shift change report given to Diandra Hobbs RN (oncoming nurse) by Atul Tavares, JOSE (offgoing nurse). Report included the following information SBAR, Kardex, Intake/Output, MAR, Recent Results and Cardiac Rhythm SR/Paced. Shift Summary:   1910 - Pt repositioned with the assistance of another nurse; pt then about 10 min later requests another reposition on the other side. 1940 - Pt called out with a complaint that she is not being repositioned enough for someone who is paralyzed. Explained to pt that this nurse would reposition, but is unable to reposition every 15 min d/t other pt care. Pt verbalized understanding. Attempted to spend time with pt as she seemed lonely. 2010 - Pt requesting to speak to a , stating that she needs someone to sit with her and talk. Pt very anxious and agitated; administered prn xanax and dilaudid for pain, as well as paged the ; waiting for return call. 2020 -  returned paged & discussed pt's requests to die.  stated that she was on her way. 2049 -  at the bedside; pt seems open to her presence.   0302 - Pt very talkative; states that she has Glenda decisions to make today\" and does not want dialysis until she knows what quality of life she will have with the paralysis. Provided therapeutic touch and attempted to reassure that staff will support her in her decisions.        Admission Date 2/23/2017   Admission Diagnosis Aneurysm ([address]  AORTIC DISECTION  Kidney Stone   Consults IP CONSULT TO VASCULAR SURGERY  IP CONSULT TO VASCULAR SURGERY  IP CONSULT TO NEPHROLOGY  IP CONSULT TO UROLOGY        Consults   [x]PT   [x]OT   []Speech   []Case Management      [] Palliative      Cardiac Monitoring Order   [x]Yes   []No     IV drips   []Yes    Drip:                            Dose:  Drip:                            Dose:  Drip:                            Dose:   [x]No     GI Prophylaxis   []Yes   []No         DVT Prophylaxis   SCDs:  Sequential Compression Device: Bilateral     Patient Refused VTE Prophylaxis: Yes    Valeriy stockings:  Graduated Compression Stockings: Bilateral      [] Medication   []Contraindicated   []None      Activity Level Activity Level: Head of bed elevated (degrees)     Activity Assistance: Complete care   Purposeful Rounding every 1-2 hour? []Yes   Jean Baptiste Score  Total Score: 2   Bed Alarm (If score 3 or >)   [x]Yes   [] Refused (See signed refusal form in chart)   Robin Score  Robin Score: 12   Robin Score (if score 14 or less)   [x]PMT consult   []Wound Care consult      []Specialty bed   [] Nutrition consult          Needs prior to discharge:   Home O2 required:    [x]Yes   []No    If yes, how much O2 required?     Other:    Last Bowel Movement: Last Bowel Movement Date: 03/31/17      Influenza Vaccine Received Flu Vaccine for Current Season (usually Sept-March): No    Patient/Guardian Refused (Notify MD): Yes   Pneumonia Vaccine           Diet Active Orders   Diet    DIET DIABETIC CONSISTENT CARB Regular      LDAs         PICC Triple Lumen 41/21/77 Right;Basilic (Active)   Central Line Being Utilized Yes 4/2/2017  8:00 AM   Criteria for Appropriate Use Total parenteral nutrition 4/2/2017  4:00 PM   Site Assessment Clean, dry, & intact 4/2/2017  4:00 PM   Phlebitis Assessment 0 4/2/2017  4:00 PM   Infiltration Assessment 0 4/2/2017  4:00 PM   Arm Circumference (cm) 29 cm 3/2/2017  4:00 AM   Date of Last Dressing Change 03/31/17 4/2/2017  4:00 PM   Dressing Status Clean, dry, & intact 4/2/2017  4:00 PM   External Catheter Length (cm) 0 centimeters 3/24/2017 12:38 AM   Dressing Type Disk with Chlorhexadine gluconate (CHG) 4/2/2017  4:00 PM   Action Taken Dressing changed 3/29/2017 12:11 PM   Hub Color/Line Status Gray 4/2/2017  4:00 PM   Positive Blood Return (Site #1) Yes 4/2/2017  4:00 PM   Hub Color/Line Status Red 4/2/2017  4:00 PM   Positive Blood Return (Site #2) Yes 4/2/2017  4:00 PM   Hub Color/Line Status White 4/2/2017  4:00 PM   Positive Blood Return (Site #3) Yes 4/2/2017  4:00 PM   Alcohol Cap Used Yes 4/2/2017  4:00 PM              Hemodialysis Catheter 03/23/17 (Active)   Central Line Being Utilized Yes 4/2/2017  2:56 AM   Criteria for Appropriate Use Dialysis/apheresis 4/2/2017  4:00 PM   Date Accessed  03/29/17 4/2/2017  8:00 AM   Access Time  1255 3/25/2017  1:00 PM   Site Assessment Clean, dry, & intact 4/2/2017  4:00 PM   Date of Last Dressing Change 03/27/17 4/1/2017  3:38 PM   Dressing Status Clean, dry, & intact 4/2/2017  4:00 PM   Dressing Type Disk with Chlorhexadine gluconate (CHG) 4/2/2017  4:00 PM   Proximal Hub Color/Line Status Red 4/2/2017  4:00 PM   Distal Hub Color/Line Status Blue 4/2/2017  4:00 PM                  Urinary Catheter Urinary Catheter 03/24/17 2- way; Palacio-Criteria for Appropriate Use: Obstruction/retention  [REMOVED] Urinary Catheter 02/24/17 Palacio-Criteria for Appropriate Use: Medically/surgically unstable    Intake & Output   Date 04/01/17 1900 - 04/02/17 0659 04/02/17 0700 - 04/03/17 0659   Shift 3948-8830 24 Hour Total 2013-2335 2285-5661 24 Hour Total   I  N  T  A  K  E   P. O. 720 960 360  360      P. O. 720 960 360  360    I.V.  (mL/kg/hr) 683.6 1294.7 617.4  (0.6)  617.4      Volume (TPN ADULT - CENTRAL AA 5% D20% W/ CA + ELECTROLYTES)  611.1         Volume (TPN ADULT-CENTRAL AA 5% D20%-MVI W/ VIT K-RCH) 683.6 683.6 617.4  617.4    Shift Total  (mL/kg) 1403.6  (15.1) 2254.7  (24.2) 977.4  (10.5)  977.4  (10.5)   O  U  T  P  U  T   Urine  (mL/kg/hr) 350 900 500  (0.4)  500      Urine Output (mL) (Urinary Catheter 03/24/17 2- way; Palacio) 350 900 500  500    Shift Total  (mL/kg) 350  (3.8) 900  (9.7) 500  (5.4)  500  (5.4)   NET 1053.6 1354.7 477.4  477.4   Weight (kg) 93.1 93.1 93.1 93.1 93.1         Readmission Risk Assessment Tool Score High Risk            26       Total Score        3 Relationship with PCP    2 Patient Living Status    3 Patient Length of Stay > 5    4 More than 1 Admission in calendar year    5 Patient Insurance is Medicare, Medicaid or Self Pay    9 Charlson Comorbidity Score       Expected Length of Stay 5d 16h   Actual Length of Stay 38

## 2017-04-03 NOTE — PROGRESS NOTES
Chart reviewed, recent events noted; pt choosing to stop HD, go home with hospice/comfort care. PT will sign off.

## 2017-04-03 NOTE — PROGRESS NOTES
Responded hazel bautista from Nurse Diandra Hobbs to visit the patient  in her room. for pastoral support. Consulted with the nurse and Provided active listening and support as patient shared about being in the hospital for 33 days. She shared concerns about wanting to die and to refuse dialysis. She spoke about no one doing anything for her ,her  two sons or her sister. She presented as a strong lady from [place] that had a concern of not wanting to be a burden to anyone. She said that she was depressed because she was to blame for the situation that she finds herself in. She expressed her desire to die if she had to rely on others for everything. I spoke with her about her need to express her needs and desires with others. I communicated  the thought that others could not read her mind and it was unfair to expect them to. She felt that the doctor was unwilling to tell her the hard cold truth of her illness and what was her prognosis and quality of life likely to be if she agreed to the treatment plan. While admitting that she did not ask the doctor anything. She stated that her  Gail Shepard was coming to the hospital in the morning to meet with the Doctor. She requested  that she would like a  to be with her during the meeting to help her understand what the DrPasquale was saying. She also would like the  present to encourage her to speak up and tell her family what her wishes were and help them to understand if she did decide for hospice. Advised of  Availability. Chaplains will follow as able and/or needed. [address]  THOMAS    paging service [phone] ([phone])

## 2017-04-03 NOTE — HOSPICE
[facility] Note:  Consult for home hospice received. Met with patient,  Ann Marie Pike and a close friend Alejandra Ivey who was assisting  with planning for discharge home.  on board with comfort care and very much wants to honor patient's wish of her going home as soon as possible, hopefully today. Sandra Gonzalez will facilitate transfer to [facility]. Patient in need of a hospital bed and over the bed table. Patient would like to go home today if possible. Have given Christiano's cell phone # to [facility] to coordinate equipment delivery. Will let BINDU Phoenix know about discharge time, once contact with [facility] is made. 1: 15 pm Spoke to [facility] # [phone] who noted that equipment will be delivered between 1 - 5 pm today, requesting transportation after 5 pm. Spoke to [facility] regarding this. She will arrange transport.

## 2017-04-03 NOTE — PROGRESS NOTES
FOC copy to patient's  and on chart.  asked that communication be his cell phone. Number confirmed. [phone]. [facility]  asked about transportation from hospital.  CM informed him that we would arrange at appropriate time.  provided CM copy of POA and AMD.  Copy placed on chart.     Sayra Manley RN CM  Ext [phone]

## 2017-04-03 NOTE — CONSULTS
PALLIATIVE MEDICINE     Palliative Medicine was consulted to help with moving pt on with Hospice; I spoke with Nguyen Perla, who is assisting with pt's transition from the hospital to home with [facility], therefore, I will cancel the [facility] consult. Please reconsult if Palliative Medicine can be of further service.

## 2017-04-03 NOTE — PROGRESS NOTES
Cm confirmed with Mr. Nuzhat Bryant that he has been contacted by [facility] and will be evaluating the needs in the home. Cm will continue to follow.     Brigid Cruz RN CM  Ext [phone]

## 2017-04-03 NOTE — PROGRESS NOTES
CM reviewed discharge plan with patient. Patient alert and oriented at time of discussion. Patient expressed thanks in making arrangements for her to return home.     Mechelle Florian RN CM  Ext [phone]

## 2017-04-03 NOTE — PROGRESS NOTES
2nd IM letter reviewed with patient's . Copy given in hand and on chart.     Van Gramajo RN CM  Ext [phone]

## 2017-04-10 ENCOUNTER — PATIENT OUTREACH (OUTPATIENT)
Dept: FAMILY MEDICINE CLINIC | Age: 62
End: 2017-04-10

## 2017-04-10 NOTE — PROGRESS NOTES
4/10/17, This writer/NN contacted [facility], [phone], spoke to Flori, able to confirm patient passed away on 4/7/17. This writer/NN agrees to notify Renita Mariano NP. Stephania Blanton for the family at this difficult time.

## 2017-04-10 NOTE — Clinical Note
Clarissa Nash am so glad you were able to speak with her once she returned home!! 4/10/17, This writer/NN contacted [facility], [phone], spoke to Jamil August, able to confirm patient passed away on 4/7/17.  You were an amazing part of her life!!! Mulugeta Briones!!!! Alexa Botello

## 2017-05-01 NOTE — DISCHARGE SUMMARY
Physician Discharge Summary     Patient ID:  Jp Bradford  [id number]  49 y.o.  1955    Admit Date: 2/23/2017    Discharge Date: 4/3/2017    Admission Diagnoses: Thoracic Aortic Dissection    Discharge Diagnoses: Active Problems:    HTN (hypertension) ()      Aneurysm (HCC) (2/23/2017)      SVT (supraventricular tachycardia) ([facility] (3/3/2017)      SSS (sick sinus syndrome) ([facility] (3/6/2017)      Paroxysmal atrial fibrillation ([facility] (3/6/2017)      Atrial flutter ([facility] (3/12/2017)     Thoracic Aortic Dissection    Acute Renal Failure    Paralysis of Lower Extremities    Acute Hypoxic Respiratory Failure    Hospital Course: The patient was transferred from [facility] with chest and back pain due to an acute dissection of the descending thoracic aorta. 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 She was admitted to the ICU and managed medically with very high doses of Esmolol and Cardene. She had worsening hypoxic respiratory failure and persistent chest and back pain. She underwent TEVAR and left CCA_SCA bypass. She had a CSF drain for two days and was noted to have spontaneous movement of her legs. After more than one week of mechanical ventilation, she failed an attempt at extubation. Sometime after that she was no longer noted to have spontaneous leg movement. Her neuro exam was compromised because she was sedated on a vent and also had severe metabolic encephalopathy. She developed progressive renal failure and required dialysis. She had persistent nausea and was supported with TPN. She underwent mesenteric angiography with PTA/stenting of the celiac and SMA with the hope that her nausea would improve if it was an atypical manifestation of chronic mesenteric ischemia, but it did not. She underwent cystoscopy and left ureteral stent placement for hydronephrosis.  Ultimately, she remained paraplegic and required dialysis and TPN. She elected to be discharged on hospice. Patient Instructions:   Cannot display discharge medications since this patient is not currently admitted.     Signed:  Ester Shea MD  5/1/2017  4:53 AM

## 2017-05-30 NOTE — PROGRESS NOTES
Cardiology Progress Note            [address] [address]  [phone]    3/14/2017 12:48 PM    Admit Date: 2/23/2017    Admit Diagnosis: Aneurysm (HCC);AORTIC DISECTION    Subjective:     Tish Maxx Alexandra  Denies cp.  Has no feeling in her legs    Visit Vitals    /51    Pulse 68    Temp 98 °F (36.7 °C)    Resp 17    Ht 5' 5.98\" (1.676 m)    Wt 202 lb 9.6 oz (91.9 kg)    SpO2 98%    Breastfeeding No    BMI 32.72 kg/m2     Current Facility-Administered Medications   Medication Dose Route Frequency    amiodarone (CORDARONE) 450 mg in dextrose 5% 250 mL infusion  0.5 mg/min IntraVENous TITRATE    hydrALAZINE (APRESOLINE) 20 mg/mL injection 10 mg  10 mg IntraVENous Q6H    phenol throat spray (CHLORASEPTIC) 1 Spray  1 Spray Oral PRN    levothyroxine (SYNTHROID) injection 260 mcg  260 mcg IntraVENous EVERY MON & TH    mineral oil-hydrophil petrolat (AQUAPHOR) ointment   Topical PRN    cefepime (MAXIPIME) 2 g in 0.9% sodium chloride (MBP/ADV) 100 mL  2 g IntraVENous Q24H    0.9% sodium chloride infusion 250 mL  250 mL IntraVENous PRN    levalbuterol (XOPENEX) nebulizer soln 1.25 mg/3 mL  1.25 mg Nebulization QID RT    dilTIAZem (CARDIZEM) 100 mg in 0.9% sodium chloride (MBP/ADV) 100 mL infusion  0-15 mg/hr IntraVENous TITRATE    heparin (porcine) injection 5,000 Units  5,000 Units SubCUTAneous Q8H    cloNIDine (CATAPRES) 0.2 mg/24 hr patch 1 Patch  1 Patch TransDERmal Q7D    albuterol (PROVENTIL VENTOLIN) nebulizer solution 2.5 mg  2.5 mg Nebulization Q2H PRN    ipratropium (ATROVENT) 0.02 % nebulizer solution 0.5 mg  0.5 mg Nebulization QID RT    HYDROmorphone (PF) (DILAUDID) injection 1 mg  1 mg IntraVENous Q3H PRN    polyethylene glycol (MIRALAX) packet 17 g  17 g Oral DAILY    pantoprazole (PROTONIX) 40 mg in sodium chloride 0.9 % 10 mL injection  40 mg IntraVENous DAILY    sodium chloride (NS) flush 5-10 mL  5-10 mL IntraVENous Q8H    sodium chloride (NS) flush 5-10 mL  5-10 mL IntraVENous PRN    prochlorperazine (COMPAZINE) with saline injection 5 mg  5 mg IntraVENous Q4H PRN    sodium chloride (NS) flush 10 mL  10 mL InterCATHeter Q24H    sodium chloride (NS) flush 10-40 mL  10-40 mL InterCATHeter Q8H    acetaminophen (TYLENOL) tablet 650 mg  650 mg Oral Q4H PRN    naloxone (NARCAN) injection 0.4 mg  0.4 mg IntraVENous PRN    ondansetron (ZOFRAN) injection 4 mg  4 mg IntraVENous Q4H PRN    bisacodyl (DULCOLAX) suppository 10 mg  10 mg Rectal DAILY PRN         Objective:      Visit Vitals    /51    Pulse 68    Temp 98 °F (36.7 °C)    Resp 17    Ht 5' 5.98\" (1.676 m)    Wt 202 lb 9.6 oz (91.9 kg)    SpO2 98%    Breastfeeding No    BMI 32.72 kg/m2       Physical Exam:  Abdomen: soft, non-tender  Extremities: extremities normal  Heart: regular rate and rhythm  Lungs: clear to auscultation bilaterally  Pulses: 2+ and symmetric    Data Review:   Labs:    Recent Labs      03/12/17   0643   WBC  12.8*   HGB  9.4*   HCT  27.9*   PLT  382     Recent Labs      03/14/17   0433  03/13/17   1638  03/13/17   0403  03/12/17   0643   NA  139  138  139  137   K  4.1  4.4  5.6*  4.7   CL  104  105  107  105   CO2  22  20*  19*  18*   GLU  130*  132*  146*  109*   BUN  64*  62*  52*  42*   CREA  3.21*  3.12*  3.05*  3.04*   CA  8.8  8.8  8.4*  8.1*   MG   --    --    --   2.4   PHOS   --    --    --   6.1*   ALB   --    --    --   2.1*   TBILI   --    --    --   1.0   SGOT   --    --    --   55*   ALT   --    --    --   66       No results for input(s): TROIQ, CPK, CKMB in the last 72 hours.       Intake/Output Summary (Last 24 hours) at 03/14/17 1248  Last data filed at 03/14/17 1213   Gross per 24 hour   Intake          2130.22 ml   Output             1825 ml   Net           305.22 ml        Telemetry: nsr    Assessment:     Active Problems:    HTN (hypertension) ()      Aneurysm (HCC) (2/23/2017)      SVT (supraventricular tachycardia) (3/3/2017)      SSS (sick sinus syndrome) (HCC) (3/6/2017)      Paroxysmal atrial fibrillation ([facility] (3/6/2017)      Atrial flutter ([facility] (3/12/2017)        Plan:     Mary Torres has been extubated for several days now. She continues to have episodes of afl. In light of her pause of 20 secs, she is a candidate for an atrial flutter ablation and dual chamber pacemaker. I discussed the risks/benefits/alternatives of the procedure with the patient. Risks include (but are not limited to) bleeding, heart block, infection, cva/mi/tamponade/death. The patient understands and agrees to proceed.  She understands that she is at increased risks of complications in light of her co-morbidities, including infection      Theodore Enciso MD, [facility]    3/14/2017 yes

## 2017-07-13 NOTE — PROCEDURES
[address]   PERIPHERAL ANGIOPLASTY       Name:  Jodi Shaffer   MR#:  [id number]   :  1955   Account #:  [de-identified]        Date of Adm:  2017       PREOPERATIVE DIAGNOSIS: Chronic mesenteric ischemia. POSTOPERATIVE DIAGNOSIS: Chronic mesenteric ischemia. PROCEDURE PERFORMED   1. Angioplasty and stenting of celiac artery. 2. Angioplasty and stenting of superior mesenteric artery. 3. Selective celiac and superior mesenteric arteriograms. 4. Selective left renal arteriogram.   5. Abdominal aortogram.   6. Sedation. SURGEON: Delbert Babin MD    ESTIMATED BLOOD LOSS: Minimal.     SPECIMENS REMOVED: None. ANESTHESIA:  Fentanyl, Versed    DRAINS: None. COMPLICATIONS: None. INDICATIONS FOR PROCEDURE: The patient is a critically ill female   with multiple medical problems, who has intractable nausea and   anorexia as well as renal failure following emergent repair and aortic   dissection. She has a history of severe peripheral arterial disease. She   presents for mesenteric and renal angiography to see if her intractable   anorexia and nausea is due to an atypical manifestation of chronic   mesenteric ischemia and to see if renal revascularization might   increase her chance of renal recovery. The patient has failed   supportive care and, therefore, it is felt that the possible benefit of   intervention outweighs the risk of further renal injury from contrast   exposure. DESCRIPTION OF PROCEDURE: After informed consent was   obtained, the patient was taken to the cath lab. EKG, vital signs and   pulse oximetry were monitored throughout and were stable. The patient   was sedated with intravenous fentanyl and Versed. The right arm was   prepped in the area of the antecubital fossa. The right arm was chosen   because the patient has a left carotid subclavian bypass.  The right   brachial artery was accessed under ultrasound guidance with a   micropuncture needle and a 6-Bahamian sheath was placed. The patient   was systemically heparinized. A pigtail catheter was navigated from   the right arm to the upper abdominal aorta and an abdominal   aortogram was performed in a lateral projection. This suggested   significant stenosis within the celiac and superior mesenteric arteries. The celiac artery was selectively cannulated with a 6-Bahamian IM guide. A selective arteriogram was performed. This showed moderate to   severe stenosis at the origin of the celiac artery extending into the   proximal celiac artery. This stenosis was crossed with a grand slam   wire and was primarily stented with a 6 x 15 balloon expandable bare   metal stent. Repeat angiogram showed a good result with no significant   residual stenosis. The superior mesenteric artery was then selectively cannulated with   the same guide and a selective arteriogram was done. This showed   moderate to severe stenosis at the origin of the superior mesenteric   artery extending into the proximal superior mesenteric artery. Both the   celiac and superior mesenteric arteries had densely calcified plaque. The stenosis in the superior mesenteric artery was crossed with a   grand slam wire and an attempt was made to deliver a 6 x 18 balloon   expandable stent, but it would not cross the stenosis. The stenosis was   angioplastied with a 4 x 2 balloon and then stented with a 6 x 18   balloon expandable bare metal stent in the proximal SMA extending   into the aorta. This was not long enough to treat the entire lesion and,   therefore, a 5 x 14 balloon expandable bare metal stent was deployed   more distally overlapping with the first stent extending into the normal   artery beyond the stenosis. Repeat angiogram showed good position of   both stents with no residual stenosis. Next, there was an existing left renal artery stent.  This was selectively   cannulated with the IM guide catheter under fluoroscopic guidance and   a selective left renal angiogram was done. This showed that the   existing left renal artery stent was widely patent. The patient was   transferred to the recovery area where the right brachial artery sheath   was removed and manual pressure was held with good hemostasis. She tolerated the procedure well.          Isiah Michel ([place] Holliday) MD Kate Brown / [facility]   D:  07/12/2017   14:45   T:  07/13/2017   13:34   Job #:  [id number]

## 2018-02-02 LAB
ABO + RH BLD: NORMAL
ABO + RH BLD: NORMAL
ANTI-COMPLEMENT (C3B,C3D): NORMAL
BLD PROD TYP BPU: NORMAL
BLOOD GROUP ANTIBODIES SERPL: NORMAL
BPU ID: NORMAL
CROSSMATCH RESULT,%XM: NORMAL
DAT IGG-SP REAG RBC QL: NORMAL
STATUS OF UNIT,%ST: NORMAL
UNIT DIVISION, %UDIV: 0

## 2019-02-04 NOTE — PROGRESS NOTES
Spiritual Care Assessment/Progress Notes    Lorne November [id number]  [id number]    1955  59 y.o.  female    Patient Telephone Number: [phone] (home)   Advent Affiliation: Nondenominational   Language: English   Extended Emergency Contact Information  Primary Emergency Contact: Cedric Jensen  Address: [facility] [address] [place] [address] [address] [facility] Phone: [phone]  Mobile Phone: [phone]  Relation: Spouse  Secondary Emergency Contact: [address] [place] [address] Phone: [phone]  Relation: Spouse   Patient Active Problem List    Diagnosis Date Noted    SSS (sick sinus syndrome) ([facility] 03/06/2017    Paroxysmal atrial fibrillation ([address] 03/06/2017    SVT (supraventricular tachycardia) 03/03/2017    Aneurysm ([address] 02/23/2017    Symptomatic anemia 06/25/2016    Dyslipidemia 12/21/2015    Carotid disease, bilateral ([address] 12/21/2015    ABIMBOLA (obstructive sleep apnea) 12/21/2015    COPD (chronic obstructive pulmonary disease) ([facility] 06/29/2015    Anemia 05/02/2015    Iron deficiency anemia due to chronic blood loss 04/21/2015    GI bleed 11/28/2014    Snoring 06/27/2013    Joint pain 06/27/2013    Disturbance of skin sensation 06/27/2013    Cerebral thrombosis without mention of cerebral infarction 06/27/2013    Acute lower GI bleeding 05/21/2013    TIA (transient ischemic attack) 12/23/2012    Hypokalemia 12/23/2012    Renal artery arteriosclerosis ([address] 12/23/2012    CAD (coronary artery disease) 08/19/2011    S/P CABG (coronary artery bypass graft) 08/19/2011    Hypercholesterolemia 03/11/2011    HTN (hypertension)     Depression with anxiety     Hypothyroid         Date: 3/11/2017       Level of Advent/Spiritual Activity:  [x]         Involved in mckay tradition/spiritual practice    []         Not involved in mckay tradition/spiritual practice  [x]         Spiritually oriented    []         Claims no spiritual orientation    []         seeking spiritual identity  []         Feels alienated from Holiness practice/tradition  []         Feels angry about Holiness practice/tradition  [x]         Spirituality/Holiness tradition is a resource for coping at this time. [x]         Not able to assess due to medical condition    Services Provided Today:  []         crisis intervention    []         reading Scriptures  [x]         spiritual assessment    [x]         prayer  [x]         empathic listening/emotional support  [x]         rites and rituals (cite in comments)  []         life review     []         Holiness support  []         theological development             advocacy  []         ethical dialog     []         blessing  []         bereavement support    []         support to family  []         anticipatory grief support   []         help with AMD  []         spiritual guidance    []         meditation      Spiritual Care Needs  []         Emotional Support  [x]         Spiritual/Uatsdin Care  []         Loss/Adjustment  []         Advocacy/Referral                /Ethics  []         No needs expressed at               this time  []         Other: (note in               comments)  Spiritual Care Plan  []         Follow up visits with               []pt/family  []         Provide materials  []         Schedule sacraments  [x]         Contact Community               Clergy  [x]         Follow up as needed  []         Other: (note in               comments)     Comments:  Attempted visit for spiritual assessment. Patient is intubated at this time. No family present. Chart review notes patient receives few, if any, family visits. In order to assess sacramental needs for Sabianist patient, telephoned patient's  Cyrus Stauffer. As Mr. Ángel Hernandez is not Sabianist, explained to him the offering of Sacrament of the Sick whereby a patient is anointed and prayed for. He agreed that this is something patient would want.   Will arrange for  to visit patient when he is here on Monday, March 13.  Nikos Reyna also indicated prayer would be appreciated.     GREGG Colbert, [facility], [address] [facility] [address]     Paging Service  [phone]) Melolabial Interpolation Flap Text: A decision was made to reconstruct the defect utilizing an interpolation axial flap and a staged reconstruction.  A telfa template was made of the defect.  This telfa template was then used to outline the melolabial interpolation flap.  The donor area for the pedicle flap was then injected with anesthesia.  The flap was excised through the skin and subcutaneous tissue down to the layer of the underlying musculature.  The pedicle flap was carefully excised within this deep plane to maintain its blood supply.  The edges of the donor site were undermined.   The donor site was closed in a primary fashion.  The pedicle was then rotated into position and sutured.  Once the tube was sutured into place, adequate blood supply was confirmed with blanching and refill.  The pedicle was then wrapped with xeroform gauze and dressed appropriately with a telfa and gauze bandage to ensure continued blood supply and protect the attached pedicle.

## 2022-09-21 NOTE — PROGRESS NOTES
Patient Notified of PCP's recommendations via My Chart. Vascular  Pain controlled  Long discussion about the risks with and without surgery including spinal cord ischemia, hemodialysis, limb loss, bowel ischemia, MI, CVA and death and she understands that surgery is her best chance for meaningful survival.  Cr down to 1.28  Will write orders including setting up 2 units of prbcs

## 2023-05-10 NOTE — PROGRESS NOTES
[facility]  Pulmonary, Critical Care, and Sleep Medicine    Name: Geoffrey Almaguer MRN: [id number]   : 1955 Hospital: [facility]   Date: 3/13/2017            IMPRESSION:   · Acute respiratory failure with hypoxia: failed extubation 3/6, extubated  3/12  · Post extubation stridor mild, better but at risk for spasm  · Bronchitis  · Bilateral pleural effusions bilateral chest tubes 3/10 per IR to drain effusions  · Sore throat  · Malnutrition- NG removed with extubation, off TF  · COPD noted to be moderate severity. · AAA Type B dissection; distal thoracic aorta- off IV esmolol  · S/p Left CCA- SCA bypass 17  · S/p TEVAR 17  · Post op iliac artery repair  · IMH from Penetrating Aortic ulceration  · spinal drain placed for measurement of ICP, drainage for Ao dissection Opening pressure (cm H2O):  26  · PICC line  · Renal disease: CRI; JEANA - improved  · Abn LFTS  · Anemia  · Sleep apnea: No treatment  · Hypertension: well controlled  · CAD and CABG  · Hypothyroidism: well controlled  · Arthritis  · GERD: well controlled      PLAN:   · Will ask IR to remove chest tubes if minimal output  · Finish decadron  · Abx, check culture  · NPO until voice, throat better  · Follow Sputum culture  · IVF, albumin  · Monitor renal fx, worse today  · Jet nebs. · Transfuse prn Hbg less than 7  · Antihypertensives, catapress patch, off cardene drip  · PUD/DVT prophylaxis       Subjective/History:     3/13 Whispery voice. Sore throat. No stridor. No nausea. Right chest sore. Chest tubes still in place    3/12 still with some ET secretions. Sputum culture with GNRs. Now on SBT. Later extubated after SBT and acceptable ABGs. Moderate ETT secretions but strong cough. Pt asked to avoid talking too much until vocal cord swellin ghas time to regress. Pt had a cuff leak prior to extubation. Within hours pt developed more raspiness with breathing.  Stridorous per RT     Will add humidiifcation, Racemic epinephrine and IV decadron x 2 doses. Keep IV precedex in case steroids cause agitation. Will also d/c NGT as it may impede clearance of upper airway secretions is acutely and chronically ill. May need BIPAP prn but not sure if it will be tolerated. Reintubate prn     3/11 Failed extubation 3/6. Now intubated, sedated. Copious frothy white secretions. Labored with SBT. Not tolrating TF. Bilateral chest tubes placed by IR on 3/11. Both lungs clearer on CXR this AM. TRansfused 1unit PRBC    The patient is critically ill and can not provide additional history due to Ventilated.             Current Facility-Administered Medications   Medication Dose Route Frequency    cefepime (MAXIPIME) 2 g in 0.9% sodium chloride (MBP/ADV) 100 mL  2 g IntraVENous Q24H    amiodarone (CORDARONE) 450 mg in dextrose 5% 250 mL infusion  0.5 mg/min IntraVENous CONTINUOUS    dexmedetomidine (PRECEDEX) 400 mcg in 0.9% sodium chloride 100 mL infusion  0.2-0.7 mcg/kg/hr IntraVENous TITRATE    levalbuterol (XOPENEX) nebulizer soln 1.25 mg/3 mL  1.25 mg Nebulization QID RT    levothyroxine (SYNTHROID) injection 260 mcg  260 mcg IntraVENous EVERY MON & TH    dilTIAZem (CARDIZEM) 100 mg in 0.9% sodium chloride (MBP/ADV) 100 mL infusion  0-15 mg/hr IntraVENous TITRATE    0.45% sodium chloride infusion  25 mL/hr IntraVENous CONTINUOUS    heparin (porcine) injection 5,000 Units  5,000 Units SubCUTAneous Q8H    cloNIDine (CATAPRES) 0.2 mg/24 hr patch 1 Patch  1 Patch TransDERmal Q7D    ipratropium (ATROVENT) 0.02 % nebulizer solution 0.5 mg  0.5 mg Nebulization QID RT    polyethylene glycol (MIRALAX) packet 17 g  17 g Oral DAILY    pantoprazole (PROTONIX) 40 mg in sodium chloride 0.9 % 10 mL injection  40 mg IntraVENous DAILY    sodium chloride (NS) flush 5-10 mL  5-10 mL IntraVENous Q8H    chlorhexidine (PERIDEX) 0.12 % mouthwash 15 mL  15 mL Oral Q12H    sodium chloride (NS) flush 10 mL  10 mL InterCATHeter Q24H  sodium chloride (NS) flush 10-40 mL  10-40 mL InterCATHeter Q8H     Review of Systems:  Review of systems not obtained due to patient factors. Objective:   Vital Signs:    Visit Vitals    /77    Pulse (!) 125    Temp 96.4 °F (35.8 °C)    Resp 23    Ht 5' 5.98\" (1.676 m)    Wt 91.9 kg (202 lb 9.6 oz)    SpO2 97%    Breastfeeding No    BMI 32.72 kg/m2       O2 Device: Nasal cannula, 02 face tent   O2 Flow Rate (L/min): 6 l/min   Temp (24hrs), Av.4 °F (36.3 °C), Min:96.4 °F (35.8 °C), Max:99.7 °F (37.6 °C)       Intake/Output:   Last shift:         Last 3 shifts:  1901 -  0700  In: 2826.9 [I.V.:1606.9]  Out: 4762 [Urine:3842]    Intake/Output Summary (Last 24 hours) at 17 0729  Last data filed at 17 0600   Gross per 24 hour   Intake           1335.1 ml   Output             3055 ml   Net          -1719.9 ml       Ventilator Settings:  Mode Rate Tidal Volume Pressure FiO2 PEEP   CPAP   500 ml  5 cm H2O 50 % 5 cm H20     Peak airway pressure: 20 cm H2O    Minute ventilation: 9.07 l/min      Physical Exam:    General:  WDWNWF no distress, appears stated age. Head:  Normocephalic, without obvious abnormality, atraumatic. Eyes:  Conjunctivae/corneas clear. PERRL, EOMs intact. Nose: Nares normal. Septum midline. Mucosa normal. No drainage or sinus tenderness. Throat: Lips, mucosa, and tongue normal. Teeth and gums normal. Has a white coating over mouth. Neck: Supple, symmetrical, trachea midline, no adenopathy, thyroid: no enlargment/tenderness/nodules, no carotid bruit and no JVD. Back:   Symmetric, no curvature. ROM normal.   Lungs:   Decreased BS bilaterally, has bilateral rhonchi. Chest wall:  No tenderness or deformity. Heart:  Regular rate and rhythm, S1, S2 normal, no murmur, click, rub or gallop. Abdomen:   Obese;  bowel sounds are decreased,  No masses,  No organomegaly. Extremities: Extremities normal, atraumatic, no cyanosis or edema. Warm. Pulses: 2+ and symmetric all extremities. Skin: Skin color, texture, turgor normal. No rashes or lesions   Lymph nodes: Cervical, supraclavicular, and axillary nodes normal.   Neurologic: Not able to fully assess. Pt is post op, effects of anesthesia in place.         Data:     Recent Results (from the past 24 hour(s))   BLOOD GAS, ARTERIAL    Collection Time: 03/12/17 10:58 AM   Result Value Ref Range    pH 7.37 7.35 - 7.45      PCO2 34 (L) 35.0 - 45.0 mmHg    PO2 71 (L) 80 - 100 mmHg    O2 SAT 94 92 - 97 %    BICARBONATE 19 (L) 22 - 26 mmol/L    BASE DEFICIT 5.2 mmol/L    O2 METHOD VENTILATOR      FIO2 40 %    MODE CPAP      SPONTANEOUS RATE 28.0      PRESSURE SUPPORT 5.0      EPAP/CPAP/PEEP 5.0      Sample source ARTERIAL      SITE LEFT RADIAL      LEIGH ANN'S TEST YES     METABOLIC PANEL, BASIC    Collection Time: 03/13/17  4:03 AM   Result Value Ref Range    Sodium 139 136 - 145 mmol/L    Potassium 5.6 (H) 3.5 - 5.1 mmol/L    Chloride 107 97 - 108 mmol/L    CO2 19 (L) 21 - 32 mmol/L    Anion gap 13 5 - 15 mmol/L    Glucose 146 (H) 65 - 100 mg/dL    BUN 52 (H) 6 - 20 MG/DL    Creatinine 3.05 (H) 0.55 - 1.02 MG/DL    BUN/Creatinine ratio 17 12 - 20      GFR est AA 19 (L) >60 ml/min/1.73m2    GFR est non-AA 15 (L) >60 ml/min/1.73m2    Calcium 8.4 (L) 8.5 - 10.1 MG/DL             Telemetry:normal sinus rhythm    Imaging:  I have personally reviewed the patients radiographs and have reviewed the reports:  CXR: no acute changes, bilateral pleural effusions       Carola Griffin MD Detail Level: Zone Detail Level: Generalized Detail Level: Detailed

## 2023-10-11 NOTE — PROGRESS NOTES
Nutrition Assessment:    INTERVENTIONS/RECOMMENDATIONS:   Meals/Snacks: General/healthful diet: Continue diet consistency per SLP; consistent carbohydrate diet   Supplements: Commercial supplement: Glucerna shakes BID (must be thickened), magic cups BID, ensure pudding BID  Nutrition support: Recommend enteral nutrition over TPN in patient with functional gut     ASSESSMENT:   Patient continues with poor appetite and PO intake. Tray at bedside looks untouched. ONS unopened. Patient awake with eyes open when entering room but when I began talking to patient she closed her eyes with light snoring. Attempted to gauge appetite/PO intake but patient did not respond. Notes indicate patient continues to have nausea. Receiving multiple ONS. Noted to be non-compliant with nectar thick liquids. Continues on dialysis. TPN meeting approximately 77% of kcal needs and 100% protein needs. Recommend utilizing functional gut and providing enteral nutrition support via NGT over parenteral nutrition. Encourage PO intake of meals/ONS and compliance with thickened liquids.      Diet Order: Consistent carbohydrate/mechanical soft diet + nectar thick liquids (TPN D20 AA5% @ 63 mL/hr; provides 1331 kcal, 76g protein)  % Eaten:  Patient Vitals for the past 72 hrs:   % Diet Eaten   03/26/17 1700 5 %   03/26/17 1215 5 %   03/26/17 0905 5 %   03/25/17 1615 0 %   03/25/17 1020 0 %     Pertinent Medications: [x] Reviewed []Other: amiodarone, norvasc, wellbutrin, buspar, epogen, hydralazine, humalog, synthroid, protonix, miralax  Pertinent Labs: [x]Reviewed  []Other: BUN 66, Cr 2.53, -[phone]  Food Allergies: [x]None []Other:     Last BM: 3/23   [x]Active     []Hyperactive  []Hypoactive       [] Absent  BS  Skin:    [x] Intact   [] Incision  [] Breakdown   []Edema   []Other:    Anthropometrics: Height: 5' 6.5\" (168.9 cm) Weight: 86.5 kg (190 lb 12.8 oz)    IBW (%IBW): 59.1 kg (130 lb 4.7 oz) ( ) UBW (%UBW): 68.2 kg (150 lb 5.7 oz) ( %)    BMI: Body mass index is 30.33 kg/(m^2). This BMI is indicative of:  []Underweight   []Normal   []Overweight   [x] Obesity   [] Extreme Obesity (BMI>40)  Last Weight Metrics:  Weight Loss Metrics 3/27/2017 2/23/2017 2/23/2017 2/23/2017 2/21/2017 1/24/2017 8/24/2016   Today's Wt 190 lb 12.8 oz - 171 lb 15.3 oz - 170 lb 4 oz 171 lb 4 oz 166 lb 8 oz   BMI - 30.33 kg/m2 - 27.75 kg/m2 27.48 kg/m2 27.64 kg/m2 26.87 kg/m2       Estimated Nutrition Needs (Based on): 1730 Kcals/day (BMR (1442) x 1. 2AF) , 71 g (-83g (1.2-1.4 g/kg IBW)) Protein  Carbohydrate: At Least 130 g/day  Fluids: 1750 mL/day     Pt expected to meet estimated nutrient needs: [x]Yes []No    NUTRITION DIAGNOSES:   Problem:  Inadequate protein-energy intake      Etiology: related to decreased appetite, nausea     Signs/Symptoms: as evidenced by PO <25% of meals; TPN started       NUTRITION INTERVENTIONS:  Meals/Snacks: General/healthful diet Supplements: Commercial supplement              GOAL:   PO intake >25% of meals/supplements next 2-4 days    NUTRITION MONITORING AND EVALUATION   Behavioral-Environmental Outcomes: Behavior  Food/Nutrient Intake Outcomes:  Total energy intake, Enteral/parenteral nutrition intake  Physical Signs/Symptoms Outcomes: Weight/weight change, Electrolyte and renal profile, GI profile, Glucose profile    Previous Goal Met:   [x] Met              [] Progressing Towards Goal              [] Not Progressing Towards Goal   Previous Recommendations:   [x] Implemented          [] Not Implemented          [] Not Applicable    LEARNING NEEDS (Diet, Food/Nutrient-Drug Interaction):    [x] None Identified   [] Identified and Education Provided/Documented   [] Identified and Pt declined/was not appropriate     Cultural, Confucianism, OR Ethnic Dietary Needs:    [x] None Identified   [] Identified and Addressed     [x] Interdisciplinary Care Plan Reviewed/Documented    [x] Discharge Planning: consistent carbohydrate diet - consistency per SLP   [] Participated in Interdisciplinary Rounds    NUTRITION RISK:    [x] High              [] Moderate           []  Low  []  Minimal/Uncompromised      Alayna Bran  Pager [phone]              Weekend Pager [phone] Topical Ketoconazole Counseling: Patient counseled that this medication may cause skin irritation or allergic reactions.  In the event of skin irritation, the patient was advised to reduce the amount of the drug applied or use it less frequently.   The patient verbalized understanding of the proper use and possible adverse effects of ketoconazole.  All of the patient's questions and concerns were addressed.

## 2024-02-03 NOTE — PROGRESS NOTES
Vascular    Much more lethargic today  Difficult to arouse  Otherwise exam unchanged  Minimal response to bowel regimin  CR and BUN up  Long talk w/ her   N/V, Abd pain, and ARF could be due to her severe arterial disease  Will do HD for now and see if she improves  If she is not improving may need CT of Abd or angio  He is not sure if she would want anymore procedures  He wants her to be Do not intubate and no CPR 04-Feb-2024

## 2024-06-16 NOTE — PERIOP NOTES
TRANSFER - OUT REPORT:    Verbal report given to [address] [facility] [address] RN (name) on Kaur Suarez  being transferred to PCU (unit) for routine post - op       Report consisted of patients Situation, Background, Assessment and   Recommendations(SBAR). Information from the following report(s) SBAR, Kardex, OR Summary, Procedure Summary, Intake/Output and MAR was reviewed with the receiving nurse. Lines:   PICC Triple Lumen 28/27/92 Right;Basilic (Active)   Central Line Being Utilized Yes 3/24/2017  4:26 PM   Criteria for Appropriate Use Total parenteral nutrition 3/24/2017  4:26 PM   Site Assessment Clean, dry, & intact 3/24/2017  4:26 PM   Phlebitis Assessment 0 3/24/2017  4:26 PM   Infiltration Assessment 0 3/24/2017  4:26 PM   Arm Circumference (cm) 29 cm 3/2/2017  4:00 AM   Date of Last Dressing Change 03/22/17 3/24/2017 12:38 AM   Dressing Status Clean, dry, & intact 3/24/2017  4:26 PM   External Catheter Length (cm) 0 centimeters 3/24/2017 12:38 AM   Dressing Type Disk with Chlorhexadine gluconate (CHG); Transparent 3/24/2017  4:26 PM   Action Taken Dressing changed 3/23/2017  3:30 PM   Hub Color/Line Status Dorthea ; Infusing 3/24/2017 12:38 AM   Positive Blood Return (Site #1) Yes 3/24/2017 12:38 AM   Hub Color/Line Status White;Flushed 3/24/2017 12:38 AM   Positive Blood Return (Site #2) Yes 3/24/2017 12:38 AM   Hub Color/Line Status Red;Flushed 3/24/2017 12:38 AM   Positive Blood Return (Site #3) Yes 3/23/2017  3:30 PM   Alcohol Cap Used Yes 3/22/2017  4:34 PM        Opportunity for questions and clarification was provided.       Patient transported with:   Monitor  O2 @ 3 liters  Registered Nurse  [facility] °F (36.6 °C)     TempSrc: Oral     SpO2: 100% 99% 100%   Weight: 67.1 kg (148 lb)         Medical Decision Making  Ddx including migraine, tension HA, anemia, hypovolemia, electrolyte disturbance, constipation, pregnancy. No acute findings on evaluation, other than very mild hypokalemia and baseline anemia. HA improved with toradol. Benign abdominal exam, no focla findings on neuro exam. Stable for discharge with outpatient follow up    Amount and/or Complexity of Data Reviewed  External Data Reviewed: labs and notes.  Labs: ordered.    Risk  Prescription drug management.            REASSESSMENT          CRITICAL CARE TIME   Total Critical Care time was 0 minutes, excluding separately reportable procedures.  There was a high probability of clinically significant/life threatening deterioration in the patient's condition which required my urgent intervention.      CONSULTS:  None    PROCEDURES:  Unless otherwise noted below, none     Procedures      FINAL IMPRESSION      1. Acute nonintractable headache, unspecified headache type    2. Anemia, unspecified type    3. Nausea    4. Hypertension, unspecified type    5. Noncompliance with medication regimen    6. Hypokalemia          DISPOSITION/PLAN   DISPOSITION Decision To Discharge 06/15/2024 11:59:24 PM      PATIENT REFERRED TO:  No follow-up provider specified.    DISCHARGE MEDICATIONS:  New Prescriptions    ONDANSETRON (ZOFRAN-ODT) 4 MG DISINTEGRATING TABLET    Take 1 tablet by mouth 3 times daily as needed for Nausea or Vomiting     Controlled Substances Monitoring:          No data to display                (Please note that portions of this note were completed with a voice recognition program.  Efforts were made to edit the dictations but occasionally words are mis-transcribed.)    Shannan Christian DO (electronically signed)  Attending Emergency Physician            Shannan Christian DO  06/16/24 0019

## 2024-12-09 NOTE — ROUTINE PROCESS
Increase aerobic exercise with a goal of at least 30 minutes, 5 days a week.    Start Ezetimibe 10 mg daily for cholesterol.    Start Losartan 25 mg daily for blood pressure.     Take your blood pressure each morning and evening after sitting quietly for at least 5 minutes.  Record the blood pressure, pulse, date and time (AM or PM) . After two weeks, send the the results all together to Melvi Warren PA-C.      Pt dc home on hospice. Pt stated understanding of dc instructions. Pt transported by ems.

## (undated) DEVICE — Device

## (undated) DEVICE — SUTURE VCRL SZ 3-0 L27IN ABSRB UD L36MM CT-1 1/2 CIR J258H

## (undated) DEVICE — GUIDEWIRE URO L150CM DIA0.035IN TAPR 3CM NIT HYDRPHLC ANG

## (undated) DEVICE — VASCULAR-RICHMOND-LF: Brand: MEDLINE INDUSTRIES, INC.

## (undated) DEVICE — DRAPE XR C ARM 41X74IN LF --

## (undated) DEVICE — INFECTION CONTROL KIT SYS

## (undated) DEVICE — TOWEL SURG W17XL27IN STD BLU COT NONFENESTRATED PREWASHED

## (undated) DEVICE — REM POLYHESIVE ADULT PATIENT RETURN ELECTRODE: Brand: VALLEYLAB

## (undated) DEVICE — PACK,UNIVERSAL,NO GOWNS: Brand: MEDLINE

## (undated) DEVICE — SOLUTION IRRIGATION H2O 0797305] ICU MEDICAL INC]

## (undated) DEVICE — GUIDEWIRE VASC L260CM DIA0.035IN L7CM DIA3MM J TIP PTFE S

## (undated) DEVICE — GOWN,SIRUS,NONRNF,SETINSLV,2XL,18/CS: Brand: MEDLINE

## (undated) DEVICE — SYR IRR BLB 2OZ DISP BLU STRL -- CONVERT TO ITEM 357637

## (undated) DEVICE — INFLATION DEVICE: Brand: ENCORE™ 26

## (undated) DEVICE — 3000CC GUARDIAN II: Brand: GUARDIAN

## (undated) DEVICE — SOLUTION IV 1000ML 0.9% SOD CHL

## (undated) DEVICE — ZINACTIVE USE 2641837 CLIP LIG M BLU TI HRT SHP WIRE HORZ 600 PER BX

## (undated) DEVICE — SYR LR LCK 1ML GRAD NSAF 30ML --

## (undated) DEVICE — GDWIRE ANGIO ZIPWIRE 0.035X150 --

## (undated) DEVICE — MAGNETIC DRAPE: Brand: DEVON

## (undated) DEVICE — GAUZE SPONGES,12 PLY: Brand: CURITY

## (undated) DEVICE — BLADE OPHTH 180DEG CUT SURF BLU STR SHRP DBL BVL GRINDLESS

## (undated) DEVICE — STERILE POLYISOPRENE POWDER-FREE SURGICAL GLOVES: Brand: PROTEXIS

## (undated) DEVICE — (D)SYR 10ML 1/5ML GRAD NSAF -- PKGING CHANGE USE ITEM 338027

## (undated) DEVICE — CYSTOSCOPY PACK: Brand: CONVERTORS

## (undated) DEVICE — VESSEL LOOPS,MAXI, BLUE: Brand: DEVON

## (undated) DEVICE — PTA BALLOON DILATATION CATHETER: Brand: MUSTANG™

## (undated) DEVICE — SPONGE LAP 18X18IN STRL -- 5/PK

## (undated) DEVICE — HANDLE LT SNAP ON ULT DURABLE LENS FOR TRUMPF ALC DISPOSABLE

## (undated) DEVICE — ACCUDRAIN® EXTERNAL CSF DRAINAGE SYSTEM: Brand: ACCUDRAIN®

## (undated) DEVICE — INTRODUCER SHTH 6FR CANN L11CM DIL TIP 35MM GRN TUNGSTEN

## (undated) DEVICE — SOLUTION IRRIG 1000ML H2O STRL BLT

## (undated) DEVICE — SUTURE PROL 6 0 24 RB 2 ETHALLOY NDL DBL ARM D5166

## (undated) DEVICE — BAG COLLECTION FLD OR-TBL NS --

## (undated) DEVICE — SHEAR HARMONIC FOCUS OEM 9CM --

## (undated) DEVICE — (D)PREP SKN CHLRAPRP APPL 26ML -- CONVERT TO ITEM 371833

## (undated) DEVICE — SUT PROL 7-0 18IN BV1 DA BLU --

## (undated) DEVICE — KENDALL SCD EXPRESS SLEEVES, KNEE LENGTH, MEDIUM: Brand: KENDALL SCD

## (undated) DEVICE — MEDI-VAC NON-CONDUCTIVE SUCTION TUBING: Brand: CARDINAL HEALTH

## (undated) DEVICE — NITINOL WIRE WITH HYDROPHILIC TIP: Brand: SENSOR

## (undated) DEVICE — SUTURE PERMAHAND SZ 3-0 L30IN NONABSORBABLE BLK SILK BRAID A304H

## (undated) DEVICE — SYRINGE TB 1ML TRNSLUC BRL WHT PLUNG BLK MRK CONVENTIONAL

## (undated) DEVICE — SYR POWER 150ML 8IN FILL TUBE --

## (undated) DEVICE — SOLUTION IRRIG 3000ML 0.9% SOD CHL FLX CONT 0797208] ICU MEDICAL INC]

## (undated) DEVICE — SYR 3ML LL TIP 1/10ML GRAD --

## (undated) DEVICE — DILATOR: Brand: COOK

## (undated) DEVICE — NEEDLE ANGIO 19GA L6.98CM ART ENTRY W/O STYL 1 PART PERC

## (undated) DEVICE — FILTER CLP DISP FOR 5513E CLIPVAC

## (undated) DEVICE — SPONGE: SPECIALTY PEANUT XR 100/CS: Brand: MEDICAL ACTION INDUSTRIES

## (undated) DEVICE — CATHETER ANGIO 5FR L70CM GWIRE 0.035IN 1CM SPC OMNI FLSH 21

## (undated) DEVICE — Z DISCONTINUEDSOLUTION PREP 2OZ 10% POVIDONE IOD SCR CAP BTL

## (undated) DEVICE — MEDTRONIC

## (undated) DEVICE — DEVON™ KNEE AND BODY STRAP 60" X 3" (1.5 M X 7.6 CM): Brand: DEVON

## (undated) DEVICE — SUTURE VCRL SZ 3-0 L27IN ABSRB UD L26MM SH 1/2 CIR J416H

## (undated) DEVICE — SUTURE GORTX SZ 4-0 L24IN NONABSORBABLE L13MM PT-13 3/8 CIR 5K08A

## (undated) DEVICE — 3M™ DURAPORE™ SURGICAL TAPE 1538-1, 1 INCH X 10 YARD (2,5CM X 9,1M), 12 ROLLS/BOX: Brand: 3M™ DURAPORE™

## (undated) DEVICE — JELLY,LUBE,STERILE,FLIP TOP,TUBE,4-OZ: Brand: MEDLINE

## (undated) DEVICE — AGENT HEMSTAT W4XL4IN OXIDIZED REGENERATED CELOS ABSRB SFT

## (undated) DEVICE — SUTURE VCRL SZ 2-0 L36IN ABSRB VLT L36MM CT-1 1/2 CIR J345H

## (undated) DEVICE — GUIDEWIRE VASC L150CM DIA0.035IN TAPR 3CM HYDRPHLC NIT ANG

## (undated) DEVICE — INTENDED FOR TISSUE SEPARATION, AND OTHER PROCEDURES THAT REQUIRE A SHARP SURGICAL BLADE TO PUNCTURE OR CUT.: Brand: BARD-PARKER ® CARBON RIB-BACK BLADES

## (undated) DEVICE — INTRODUCER SHTH 7FR CANN L11CM DIL TIP 35MM ORNG TUNGSTEN

## (undated) DEVICE — Device: Brand: VISIONS PV .035 DIGITAL IVUS CATHETER

## (undated) DEVICE — SKIN MARKER,REGULAR TIP WITH RULER AND LABELS: Brand: DEVON

## (undated) DEVICE — PART NUMBER 108260, VTI 8 MHZ DISPOSABLE DOPPLER PROBE, STRAIGHT, BOX: Brand: VTI 8 MHZ DISPOSABLE DOPPLER PROBE, STRAIGHT, BOX

## (undated) DEVICE — OPEN-END URETERAL CATHETER: Brand: COOK

## (undated) DEVICE — TUBING IRRIG L77IN DIA0.241IN L BOR FOR CYSTO W/ NVENT

## (undated) DEVICE — KIT INFECTION CTRL ST FRAN --

## (undated) DEVICE — DRAPE FLD WRM W44XL66IN C6L FOR INTRATEMP SYS THERMABASIN

## (undated) DEVICE — CATHETER ANGIO 5FR L100CM GWIRE 0.038IN BERN NONBRAIDED HI

## (undated) DEVICE — NEEDLE HYPO 25GA L5/8IN ORNG HUB S STL LATCH BVL UP

## (undated) DEVICE — FOGARTY ARTERIAL EMBOLECTOMY CATHETER 4F 80CM: Brand: FOGARTY

## (undated) DEVICE — INTRODUCER SHTH 5FR CANN L11CM DIL TIP 25MM GRY TUNGSTEN

## (undated) DEVICE — TUBING HI PRSS INJ 48IN 1200PSI FLX BRAID POLYUR CNTRST

## (undated) DEVICE — 3M™ IOBAN™ 2 ANTIMICROBIAL INCISE DRAPE 6651EZ: Brand: IOBAN™ 2

## (undated) DEVICE — CATH 46420 EDM LUMBAR 80CM/OPEN TIP

## (undated) DEVICE — SUTURE MCRYL SZ 3-0 L27IN ABSRB UD L24MM PS-1 3/8 CIR PRIM Y936H

## (undated) DEVICE — SUTURE MCRYL SZ 4-0 L27IN ABSRB UD L19MM PS-2 1/2 CIR PRIM Y426H

## (undated) DEVICE — COVER,TABLE,60X90,STERILE: Brand: MEDLINE

## (undated) DEVICE — X-RAY SPONGES,16 PLY: Brand: DERMACEA

## (undated) DEVICE — TRAY CATHETER 16FR F INCLUDE LUB URIN M TEMP SENS 2 STATLOK STBL

## (undated) DEVICE — SUT PROL 6-0 24IN BV1 DA BLU --

## (undated) DEVICE — SUTURE PROL SZ 5-0 L24IN NONABSORBABLE BLU RB-2 L13IN 1/2 8554H

## (undated) DEVICE — MICROPUNCTURE INTRODUCER SET SILHOUETTE TRANSITIONLESS WITH STAINLESS STEEL WIRE GUIDE: Brand: MICROPUNCTURE

## (undated) DEVICE — INTRODUCER SHTH 8FR CANN L11CM DIL TIP 35MM BLU TUNGSTEN

## (undated) DEVICE — BLADE ASSEMB CLP HAIR FINE --

## (undated) DEVICE — GOWN,PLEAT,SPECIALTY,XL,STRL: Brand: MEDLINE

## (undated) DEVICE — DRAPE PRB US TRNSDCR 6X96IN --